# Patient Record
Sex: MALE | Race: BLACK OR AFRICAN AMERICAN | NOT HISPANIC OR LATINO | URBAN - METROPOLITAN AREA
[De-identification: names, ages, dates, MRNs, and addresses within clinical notes are randomized per-mention and may not be internally consistent; named-entity substitution may affect disease eponyms.]

---

## 2017-01-13 ENCOUNTER — EMERGENCY (EMERGENCY)
Facility: HOSPITAL | Age: 72
LOS: 1 days | Discharge: HOME | End: 2017-01-13
Attending: EMERGENCY MEDICINE
Payer: MEDICARE

## 2017-01-13 VITALS
DIASTOLIC BLOOD PRESSURE: 97 MMHG | TEMPERATURE: 100 F | RESPIRATION RATE: 19 BRPM | OXYGEN SATURATION: 97 % | HEART RATE: 114 BPM | SYSTOLIC BLOOD PRESSURE: 184 MMHG

## 2017-01-13 VITALS — HEIGHT: 68 IN | WEIGHT: 216.93 LBS

## 2017-01-13 DIAGNOSIS — R11.2 NAUSEA WITH VOMITING, UNSPECIFIED: ICD-10-CM

## 2017-01-13 DIAGNOSIS — Z96.60 PRESENCE OF UNSPECIFIED ORTHOPEDIC JOINT IMPLANT: Chronic | ICD-10-CM

## 2017-01-13 DIAGNOSIS — E78.5 HYPERLIPIDEMIA, UNSPECIFIED: ICD-10-CM

## 2017-01-13 DIAGNOSIS — Z98.89 OTHER SPECIFIED POSTPROCEDURAL STATES: Chronic | ICD-10-CM

## 2017-01-13 DIAGNOSIS — Z96.641 PRESENCE OF RIGHT ARTIFICIAL HIP JOINT: ICD-10-CM

## 2017-01-13 DIAGNOSIS — E11.9 TYPE 2 DIABETES MELLITUS WITHOUT COMPLICATIONS: ICD-10-CM

## 2017-01-13 DIAGNOSIS — Z98.890 OTHER SPECIFIED POSTPROCEDURAL STATES: ICD-10-CM

## 2017-01-13 DIAGNOSIS — Z79.4 LONG TERM (CURRENT) USE OF INSULIN: ICD-10-CM

## 2017-01-13 DIAGNOSIS — E03.9 HYPOTHYROIDISM, UNSPECIFIED: ICD-10-CM

## 2017-01-13 LAB
ALBUMIN SERPL ELPH-MCNC: 4.7 G/DL — SIGNIFICANT CHANGE UP (ref 3.3–5.2)
ALP SERPL-CCNC: 103 U/L — SIGNIFICANT CHANGE UP (ref 40–120)
ALT FLD-CCNC: 21 U/L — SIGNIFICANT CHANGE UP
ANION GAP SERPL CALC-SCNC: 16 MMOL/L — SIGNIFICANT CHANGE UP (ref 5–17)
AST SERPL-CCNC: 19 U/L — SIGNIFICANT CHANGE UP
BILIRUB SERPL-MCNC: 0.9 MG/DL — SIGNIFICANT CHANGE UP (ref 0.4–2)
BUN SERPL-MCNC: 11 MG/DL — SIGNIFICANT CHANGE UP (ref 8–20)
CALCIUM SERPL-MCNC: 9.2 MG/DL — SIGNIFICANT CHANGE UP (ref 8.6–10.2)
CHLORIDE SERPL-SCNC: 89 MMOL/L — LOW (ref 98–107)
CO2 SERPL-SCNC: 27 MMOL/L — SIGNIFICANT CHANGE UP (ref 22–29)
CREAT SERPL-MCNC: 0.58 MG/DL — SIGNIFICANT CHANGE UP (ref 0.5–1.3)
EOSINOPHIL NFR BLD AUTO: 1 % — SIGNIFICANT CHANGE UP (ref 0–6)
GLUCOSE SERPL-MCNC: 313 MG/DL — HIGH (ref 70–115)
HCT VFR BLD CALC: 43.3 % — SIGNIFICANT CHANGE UP (ref 42–52)
HGB BLD-MCNC: 14.8 G/DL — SIGNIFICANT CHANGE UP (ref 14–18)
LACTATE BLDV-MCNC: 2.7 MMOL/L — HIGH (ref 0.7–2)
LIDOCAIN IGE QN: 8 U/L — LOW (ref 22–51)
LYMPHOCYTES # BLD AUTO: 8 % — LOW (ref 20–55)
MCHC RBC-ENTMCNC: 29.8 PG — SIGNIFICANT CHANGE UP (ref 27–31)
MCHC RBC-ENTMCNC: 34.2 G/DL — SIGNIFICANT CHANGE UP (ref 32–36)
MCV RBC AUTO: 87.3 FL — SIGNIFICANT CHANGE UP (ref 80–94)
MONOCYTES NFR BLD AUTO: 6 % — SIGNIFICANT CHANGE UP (ref 3–10)
NEUTROPHILS NFR BLD AUTO: 82 % — HIGH (ref 37–73)
NEUTS BAND # BLD: 3 % — SIGNIFICANT CHANGE UP (ref 0–8)
PLAT MORPH BLD: NORMAL — SIGNIFICANT CHANGE UP
PLATELET # BLD AUTO: 263 K/UL — SIGNIFICANT CHANGE UP (ref 150–400)
POTASSIUM SERPL-MCNC: 4.3 MMOL/L — SIGNIFICANT CHANGE UP (ref 3.5–5.3)
POTASSIUM SERPL-SCNC: 4.3 MMOL/L — SIGNIFICANT CHANGE UP (ref 3.5–5.3)
PROT SERPL-MCNC: 8.8 G/DL — HIGH (ref 6.6–8.7)
RBC # BLD: 4.96 M/UL — SIGNIFICANT CHANGE UP (ref 4.6–6.2)
RBC # FLD: 13 % — SIGNIFICANT CHANGE UP (ref 11–15.6)
RBC BLD AUTO: NORMAL — SIGNIFICANT CHANGE UP
SODIUM SERPL-SCNC: 132 MMOL/L — LOW (ref 135–145)
TROPONIN T SERPL-MCNC: <0.01 NG/ML — SIGNIFICANT CHANGE UP (ref 0–0.06)
WBC # BLD: 9.39 K/UL — SIGNIFICANT CHANGE UP (ref 4.8–10.8)
WBC # FLD AUTO: 9.39 K/UL — SIGNIFICANT CHANGE UP (ref 4.8–10.8)

## 2017-01-13 PROCEDURE — 93010 ELECTROCARDIOGRAM REPORT: CPT

## 2017-01-13 PROCEDURE — 99285 EMERGENCY DEPT VISIT HI MDM: CPT

## 2017-01-13 PROCEDURE — 74177 CT ABD & PELVIS W/CONTRAST: CPT | Mod: 26

## 2017-01-13 PROCEDURE — 93010 ELECTROCARDIOGRAM REPORT: CPT | Mod: 77

## 2017-01-13 RX ORDER — ONDANSETRON 8 MG/1
8 TABLET, FILM COATED ORAL ONCE
Refills: 0 | Status: COMPLETED | OUTPATIENT
Start: 2017-01-13 | End: 2017-01-13

## 2017-01-13 RX ORDER — SODIUM CHLORIDE 9 MG/ML
1000 INJECTION, SOLUTION INTRAVENOUS
Refills: 0 | Status: DISCONTINUED | OUTPATIENT
Start: 2017-01-13 | End: 2017-01-13

## 2017-01-13 RX ORDER — ONDANSETRON 8 MG/1
1 TABLET, FILM COATED ORAL
Qty: 15 | Refills: 0
Start: 2017-01-13 | End: 2017-01-18

## 2017-01-13 RX ORDER — INSULIN HUMAN 100 [IU]/ML
6 INJECTION, SOLUTION SUBCUTANEOUS ONCE
Refills: 0 | Status: COMPLETED | OUTPATIENT
Start: 2017-01-13 | End: 2017-01-13

## 2017-01-13 RX ORDER — SODIUM CHLORIDE 9 MG/ML
1000 INJECTION INTRAMUSCULAR; INTRAVENOUS; SUBCUTANEOUS ONCE
Refills: 0 | Status: COMPLETED | OUTPATIENT
Start: 2017-01-13 | End: 2017-01-13

## 2017-01-13 RX ADMIN — ONDANSETRON 8 MILLIGRAM(S): 8 TABLET, FILM COATED ORAL at 16:05

## 2017-01-13 RX ADMIN — SODIUM CHLORIDE 3000 MILLILITER(S): 9 INJECTION INTRAMUSCULAR; INTRAVENOUS; SUBCUTANEOUS at 16:05

## 2017-01-13 RX ADMIN — INSULIN HUMAN 6 UNIT(S): 100 INJECTION, SOLUTION SUBCUTANEOUS at 18:48

## 2017-01-13 NOTE — ED ADULT NURSE NOTE - CHIEF COMPLAINT QUOTE
patient arrived ambulatory to ED, awake, alert and oriented times 3, breathing unlabored,  patient complaining of N/V, decreased PO intake and epigastric pain which started last night.  No CP as per patient.

## 2017-01-13 NOTE — ED PROVIDER NOTE - MEDICAL DECISION MAKING DETAILS
pt feelign much better he is tolerating po fluids he is reliable to check sugar at home and wife will be watching him he is asking for dispo home likely colitis stric return precautions fever intractable abd pain unbalt to tolerate po fluids pt is feeling much better no ttp to abd at discahrge pt and pts wife agree to paln of care

## 2017-01-13 NOTE — ED STATDOCS - PROGRESS NOTE DETAILS
72 y/o M pt with hx of diabetes presents to ED c/o abdominal pain,  nausea, vomiting starting last night. Pt called his endocrinologist because he couldn't keep his medication down was told to come to ED. Last sugar 299 at noon.

## 2017-01-13 NOTE — ED PROVIDER NOTE - OBJECTIVE STATEMENT
pt presents with intermittent achy crampy abd pain assoictaed with non bloody non bilious vomiting x 16 hours last meal chinese last night. pt states he cannot hold down his diabetes medications. denies fever. denies HA or neck pain. no chest pain or sob.  no urinary f/u/d. no back pain. no motor or sensory deficits. denies drug use. no recent travel. no rash. no other acute issues symptoms or concerns

## 2017-01-13 NOTE — ED ADULT NURSE NOTE - OBJECTIVE STATEMENT
72 y/o male c/o abd pain x 1 day. Pt AOx3, denies chest pain, SOB. Abd soft non tender, generalized abd pain, c/o nausea denies vomiting. NSR 84HR. will continue to monitor.

## 2017-01-17 RX ORDER — ONDANSETRON 8 MG/1
1 TABLET, FILM COATED ORAL
Qty: 15 | Refills: 0
Start: 2017-01-17 | End: 2017-01-22

## 2017-02-07 ENCOUNTER — APPOINTMENT (OUTPATIENT)
Dept: ORTHOPEDIC SURGERY | Facility: CLINIC | Age: 72
End: 2017-02-07

## 2017-02-07 VITALS
BODY MASS INDEX: 32.33 KG/M2 | HEART RATE: 111 BPM | TEMPERATURE: 98.7 F | HEIGHT: 67 IN | SYSTOLIC BLOOD PRESSURE: 152 MMHG | WEIGHT: 206 LBS | DIASTOLIC BLOOD PRESSURE: 89 MMHG

## 2017-02-22 ENCOUNTER — FORM ENCOUNTER (OUTPATIENT)
Age: 72
End: 2017-02-22

## 2017-02-23 ENCOUNTER — APPOINTMENT (OUTPATIENT)
Dept: MRI IMAGING | Facility: CLINIC | Age: 72
End: 2017-02-23

## 2017-02-23 ENCOUNTER — OUTPATIENT (OUTPATIENT)
Dept: OUTPATIENT SERVICES | Facility: HOSPITAL | Age: 72
LOS: 1 days | End: 2017-02-23
Payer: MEDICARE

## 2017-02-23 DIAGNOSIS — Z98.89 OTHER SPECIFIED POSTPROCEDURAL STATES: Chronic | ICD-10-CM

## 2017-02-23 DIAGNOSIS — Z96.60 PRESENCE OF UNSPECIFIED ORTHOPEDIC JOINT IMPLANT: Chronic | ICD-10-CM

## 2017-02-23 DIAGNOSIS — Z00.8 ENCOUNTER FOR OTHER GENERAL EXAMINATION: ICD-10-CM

## 2017-02-23 DIAGNOSIS — M48.04 SPINAL STENOSIS, THORACIC REGION: ICD-10-CM

## 2017-02-23 DIAGNOSIS — Z98.890 OTHER SPECIFIED POSTPROCEDURAL STATES: ICD-10-CM

## 2017-02-23 DIAGNOSIS — M47.14 OTHER SPONDYLOSIS WITH MYELOPATHY, THORACIC REGION: ICD-10-CM

## 2017-02-23 DIAGNOSIS — M48.07 SPINAL STENOSIS, LUMBOSACRAL REGION: ICD-10-CM

## 2017-02-23 PROCEDURE — 72148 MRI LUMBAR SPINE W/O DYE: CPT

## 2017-07-17 ENCOUNTER — APPOINTMENT (OUTPATIENT)
Dept: ORTHOPEDIC SURGERY | Facility: CLINIC | Age: 72
End: 2017-07-17

## 2017-07-17 VITALS
HEART RATE: 105 BPM | DIASTOLIC BLOOD PRESSURE: 88 MMHG | TEMPERATURE: 97.9 F | SYSTOLIC BLOOD PRESSURE: 152 MMHG | BODY MASS INDEX: 34.06 KG/M2 | HEIGHT: 67 IN | WEIGHT: 217 LBS

## 2017-07-17 DIAGNOSIS — M19.012 PRIMARY OSTEOARTHRITIS, LEFT SHOULDER: ICD-10-CM

## 2017-07-17 RX ORDER — LEVOTHYROXINE SODIUM 0.14 MG/1
137 TABLET ORAL
Qty: 90 | Refills: 0 | Status: ACTIVE | COMMUNITY
Start: 2017-07-09

## 2017-07-17 RX ORDER — METOPROLOL SUCCINATE 25 MG/1
25 TABLET, EXTENDED RELEASE ORAL
Qty: 90 | Refills: 0 | Status: ACTIVE | COMMUNITY
Start: 2017-06-04

## 2017-07-17 RX ORDER — HYOSCYAMINE SULFATE 0.38 MG/1
0.38 TABLET, EXTENDED RELEASE ORAL
Qty: 60 | Refills: 0 | Status: ACTIVE | COMMUNITY
Start: 2017-06-26

## 2017-07-17 RX ORDER — INSULIN ASPART 100 [IU]/ML
100 INJECTION, SOLUTION INTRAVENOUS; SUBCUTANEOUS
Qty: 30 | Refills: 0 | Status: ACTIVE | COMMUNITY
Start: 2017-05-05

## 2017-08-22 ENCOUNTER — APPOINTMENT (OUTPATIENT)
Dept: ORTHOPEDIC SURGERY | Facility: CLINIC | Age: 72
End: 2017-08-22
Payer: MEDICARE

## 2017-08-22 VITALS
HEIGHT: 67 IN | WEIGHT: 218 LBS | DIASTOLIC BLOOD PRESSURE: 90 MMHG | SYSTOLIC BLOOD PRESSURE: 167 MMHG | BODY MASS INDEX: 34.21 KG/M2 | HEART RATE: 95 BPM

## 2017-08-22 PROCEDURE — 99214 OFFICE O/P EST MOD 30 MIN: CPT

## 2017-08-26 ENCOUNTER — EMERGENCY (EMERGENCY)
Facility: HOSPITAL | Age: 72
LOS: 1 days | Discharge: HOME | End: 2017-08-26
Attending: EMERGENCY MEDICINE
Payer: COMMERCIAL

## 2017-08-26 VITALS
RESPIRATION RATE: 18 BRPM | OXYGEN SATURATION: 100 % | SYSTOLIC BLOOD PRESSURE: 133 MMHG | HEART RATE: 88 BPM | DIASTOLIC BLOOD PRESSURE: 62 MMHG | TEMPERATURE: 98 F

## 2017-08-26 VITALS
RESPIRATION RATE: 18 BRPM | SYSTOLIC BLOOD PRESSURE: 162 MMHG | WEIGHT: 214.95 LBS | DIASTOLIC BLOOD PRESSURE: 80 MMHG | HEART RATE: 104 BPM | TEMPERATURE: 98 F | HEIGHT: 67 IN

## 2017-08-26 DIAGNOSIS — Z98.89 OTHER SPECIFIED POSTPROCEDURAL STATES: Chronic | ICD-10-CM

## 2017-08-26 DIAGNOSIS — Z96.60 PRESENCE OF UNSPECIFIED ORTHOPEDIC JOINT IMPLANT: Chronic | ICD-10-CM

## 2017-08-26 PROCEDURE — 99283 EMERGENCY DEPT VISIT LOW MDM: CPT

## 2017-08-26 RX ORDER — IBUPROFEN 200 MG
600 TABLET ORAL ONCE
Refills: 0 | Status: COMPLETED | OUTPATIENT
Start: 2017-08-26 | End: 2017-08-26

## 2017-08-26 RX ADMIN — Medication 600 MILLIGRAM(S): at 16:24
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
## 2017-08-26 NOTE — ED ADULT NURSE NOTE - NS ED NURSE DC INFO COMPLEXITY
Simple: Patient demonstrates quick and easy understanding/Straightforward: Basic instructions, no meds, no home treatment/Patient asked questions/Verbalized Understanding
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
## 2017-09-15 ENCOUNTER — OTHER (OUTPATIENT)
Age: 72
End: 2017-09-15

## 2017-09-27 ENCOUNTER — OUTPATIENT (OUTPATIENT)
Dept: OUTPATIENT SERVICES | Facility: HOSPITAL | Age: 72
LOS: 1 days | End: 2017-09-27

## 2017-09-27 ENCOUNTER — APPOINTMENT (OUTPATIENT)
Dept: MRI IMAGING | Facility: CLINIC | Age: 72
End: 2017-09-27
Payer: MEDICARE

## 2017-09-27 DIAGNOSIS — Z98.89 OTHER SPECIFIED POSTPROCEDURAL STATES: Chronic | ICD-10-CM

## 2017-09-27 DIAGNOSIS — Z96.60 PRESENCE OF UNSPECIFIED ORTHOPEDIC JOINT IMPLANT: Chronic | ICD-10-CM

## 2017-09-27 DIAGNOSIS — Z00.8 ENCOUNTER FOR OTHER GENERAL EXAMINATION: ICD-10-CM

## 2017-09-27 PROCEDURE — 73721 MRI JNT OF LWR EXTRE W/O DYE: CPT | Mod: 26,RT

## 2017-10-04 ENCOUNTER — OTHER (OUTPATIENT)
Age: 72
End: 2017-10-04

## 2017-10-20 ENCOUNTER — APPOINTMENT (OUTPATIENT)
Dept: PHYSICAL MEDICINE AND REHAB | Facility: CLINIC | Age: 72
End: 2017-10-20
Payer: MEDICARE

## 2017-10-20 VITALS
BODY MASS INDEX: 34.21 KG/M2 | SYSTOLIC BLOOD PRESSURE: 186 MMHG | WEIGHT: 218 LBS | OXYGEN SATURATION: 97 % | HEIGHT: 67 IN | DIASTOLIC BLOOD PRESSURE: 93 MMHG | HEART RATE: 93 BPM

## 2017-10-20 DIAGNOSIS — M54.16 RADICULOPATHY, LUMBAR REGION: ICD-10-CM

## 2017-10-20 PROCEDURE — 99214 OFFICE O/P EST MOD 30 MIN: CPT

## 2017-11-09 ENCOUNTER — FORM ENCOUNTER (OUTPATIENT)
Age: 72
End: 2017-11-09

## 2017-11-10 ENCOUNTER — OUTPATIENT (OUTPATIENT)
Dept: OUTPATIENT SERVICES | Facility: HOSPITAL | Age: 72
LOS: 1 days | End: 2017-11-10

## 2017-11-10 ENCOUNTER — APPOINTMENT (OUTPATIENT)
Dept: MRI IMAGING | Facility: CLINIC | Age: 72
End: 2017-11-10
Payer: MEDICARE

## 2017-11-10 DIAGNOSIS — Z96.60 PRESENCE OF UNSPECIFIED ORTHOPEDIC JOINT IMPLANT: Chronic | ICD-10-CM

## 2017-11-10 DIAGNOSIS — Z98.89 OTHER SPECIFIED POSTPROCEDURAL STATES: Chronic | ICD-10-CM

## 2017-11-10 DIAGNOSIS — Z00.8 ENCOUNTER FOR OTHER GENERAL EXAMINATION: ICD-10-CM

## 2017-11-10 PROCEDURE — 72158 MRI LUMBAR SPINE W/O & W/DYE: CPT | Mod: 26

## 2017-12-07 ENCOUNTER — APPOINTMENT (OUTPATIENT)
Dept: ORTHOPEDIC SURGERY | Facility: CLINIC | Age: 72
End: 2017-12-07
Payer: MEDICARE

## 2017-12-07 VITALS
WEIGHT: 218 LBS | HEART RATE: 109 BPM | SYSTOLIC BLOOD PRESSURE: 150 MMHG | BODY MASS INDEX: 34.21 KG/M2 | HEIGHT: 67 IN | DIASTOLIC BLOOD PRESSURE: 82 MMHG

## 2017-12-07 PROCEDURE — 99215 OFFICE O/P EST HI 40 MIN: CPT

## 2017-12-07 PROCEDURE — 72100 X-RAY EXAM L-S SPINE 2/3 VWS: CPT

## 2017-12-15 ENCOUNTER — APPOINTMENT (OUTPATIENT)
Dept: PHYSICAL MEDICINE AND REHAB | Facility: CLINIC | Age: 72
End: 2017-12-15
Payer: MEDICARE

## 2017-12-15 DIAGNOSIS — M76.30 ILIOTIBIAL BAND SYNDROME, UNSPECIFIED LEG: ICD-10-CM

## 2017-12-15 PROCEDURE — 99214 OFFICE O/P EST MOD 30 MIN: CPT

## 2017-12-23 ENCOUNTER — EMERGENCY (EMERGENCY)
Facility: HOSPITAL | Age: 72
LOS: 1 days | Discharge: HOME | End: 2017-12-23
Attending: EMERGENCY MEDICINE | Admitting: EMERGENCY MEDICINE
Payer: MEDICARE

## 2017-12-23 VITALS
TEMPERATURE: 98 F | OXYGEN SATURATION: 97 % | DIASTOLIC BLOOD PRESSURE: 77 MMHG | HEART RATE: 89 BPM | SYSTOLIC BLOOD PRESSURE: 138 MMHG | RESPIRATION RATE: 20 BRPM

## 2017-12-23 VITALS
SYSTOLIC BLOOD PRESSURE: 167 MMHG | WEIGHT: 216.93 LBS | RESPIRATION RATE: 20 BRPM | TEMPERATURE: 98 F | HEIGHT: 66 IN | OXYGEN SATURATION: 95 % | DIASTOLIC BLOOD PRESSURE: 91 MMHG | HEART RATE: 88 BPM

## 2017-12-23 DIAGNOSIS — Z98.89 OTHER SPECIFIED POSTPROCEDURAL STATES: Chronic | ICD-10-CM

## 2017-12-23 DIAGNOSIS — Z96.60 PRESENCE OF UNSPECIFIED ORTHOPEDIC JOINT IMPLANT: Chronic | ICD-10-CM

## 2017-12-23 LAB
ALBUMIN SERPL ELPH-MCNC: 4.1 G/DL — SIGNIFICANT CHANGE UP (ref 3.3–5.2)
ALP SERPL-CCNC: 72 U/L — SIGNIFICANT CHANGE UP (ref 40–120)
ALT FLD-CCNC: 32 U/L — SIGNIFICANT CHANGE UP
ANION GAP SERPL CALC-SCNC: 13 MMOL/L — SIGNIFICANT CHANGE UP (ref 5–17)
APTT BLD: 33.4 SEC — SIGNIFICANT CHANGE UP (ref 27.5–37.4)
AST SERPL-CCNC: 54 U/L — HIGH
BASOPHILS # BLD AUTO: 0 K/UL — SIGNIFICANT CHANGE UP (ref 0–0.2)
BASOPHILS NFR BLD AUTO: 0.7 % — SIGNIFICANT CHANGE UP (ref 0–2)
BILIRUB SERPL-MCNC: 0.4 MG/DL — SIGNIFICANT CHANGE UP (ref 0.4–2)
BLD GP AB SCN SERPL QL: SIGNIFICANT CHANGE UP
BUN SERPL-MCNC: 11 MG/DL — SIGNIFICANT CHANGE UP (ref 8–20)
CALCIUM SERPL-MCNC: 9 MG/DL — SIGNIFICANT CHANGE UP (ref 8.6–10.2)
CHLORIDE SERPL-SCNC: 97 MMOL/L — LOW (ref 98–107)
CO2 SERPL-SCNC: 28 MMOL/L — SIGNIFICANT CHANGE UP (ref 22–29)
CREAT SERPL-MCNC: 0.82 MG/DL — SIGNIFICANT CHANGE UP (ref 0.5–1.3)
EOSINOPHIL # BLD AUTO: 0.3 K/UL — SIGNIFICANT CHANGE UP (ref 0–0.5)
EOSINOPHIL NFR BLD AUTO: 4.8 % — SIGNIFICANT CHANGE UP (ref 0–5)
GLUCOSE SERPL-MCNC: 94 MG/DL — SIGNIFICANT CHANGE UP (ref 70–115)
HCT VFR BLD CALC: 37.7 % — LOW (ref 42–52)
HGB BLD-MCNC: 12.5 G/DL — LOW (ref 14–18)
INR BLD: 1.22 RATIO — HIGH (ref 0.88–1.16)
LACTATE BLDV-MCNC: 0.8 MMOL/L — SIGNIFICANT CHANGE UP (ref 0.5–2)
LYMPHOCYTES # BLD AUTO: 1.7 K/UL — SIGNIFICANT CHANGE UP (ref 1–4.8)
LYMPHOCYTES # BLD AUTO: 27.4 % — SIGNIFICANT CHANGE UP (ref 20–55)
MCHC RBC-ENTMCNC: 28.7 PG — SIGNIFICANT CHANGE UP (ref 27–31)
MCHC RBC-ENTMCNC: 33.2 G/DL — SIGNIFICANT CHANGE UP (ref 32–36)
MCV RBC AUTO: 86.5 FL — SIGNIFICANT CHANGE UP (ref 80–94)
MONOCYTES # BLD AUTO: 0.7 K/UL — SIGNIFICANT CHANGE UP (ref 0–0.8)
MONOCYTES NFR BLD AUTO: 10.8 % — HIGH (ref 3–10)
NEUTROPHILS # BLD AUTO: 3.4 K/UL — SIGNIFICANT CHANGE UP (ref 1.8–8)
NEUTROPHILS NFR BLD AUTO: 56 % — SIGNIFICANT CHANGE UP (ref 37–73)
OB PNL STL: NEGATIVE — SIGNIFICANT CHANGE UP
PLATELET # BLD AUTO: 216 K/UL — SIGNIFICANT CHANGE UP (ref 150–400)
POTASSIUM SERPL-MCNC: 4.2 MMOL/L — SIGNIFICANT CHANGE UP (ref 3.5–5.3)
POTASSIUM SERPL-SCNC: 4.2 MMOL/L — SIGNIFICANT CHANGE UP (ref 3.5–5.3)
PROT SERPL-MCNC: 7.7 G/DL — SIGNIFICANT CHANGE UP (ref 6.6–8.7)
PROTHROM AB SERPL-ACNC: 13.5 SEC — HIGH (ref 9.8–12.7)
RBC # BLD: 4.36 M/UL — LOW (ref 4.6–6.2)
RBC # FLD: 14 % — SIGNIFICANT CHANGE UP (ref 11–15.6)
SODIUM SERPL-SCNC: 138 MMOL/L — SIGNIFICANT CHANGE UP (ref 135–145)
TYPE + AB SCN PNL BLD: SIGNIFICANT CHANGE UP
WBC # BLD: 6.1 K/UL — SIGNIFICANT CHANGE UP (ref 4.8–10.8)
WBC # FLD AUTO: 6.1 K/UL — SIGNIFICANT CHANGE UP (ref 4.8–10.8)

## 2017-12-23 PROCEDURE — 99284 EMERGENCY DEPT VISIT MOD MDM: CPT

## 2017-12-23 PROCEDURE — 93010 ELECTROCARDIOGRAM REPORT: CPT

## 2017-12-23 PROCEDURE — 74177 CT ABD & PELVIS W/CONTRAST: CPT | Mod: 26

## 2017-12-23 RX ORDER — MORPHINE SULFATE 50 MG/1
4 CAPSULE, EXTENDED RELEASE ORAL ONCE
Refills: 0 | Status: DISCONTINUED | OUTPATIENT
Start: 2017-12-23 | End: 2017-12-23

## 2017-12-23 RX ORDER — ACETAMINOPHEN 500 MG
975 TABLET ORAL ONCE
Refills: 0 | Status: COMPLETED | OUTPATIENT
Start: 2017-12-23 | End: 2017-12-23

## 2017-12-23 RX ADMIN — Medication 975 MILLIGRAM(S): at 16:45

## 2017-12-23 NOTE — ED ADULT NURSE NOTE - OBJECTIVE STATEMENT
pt comes to ED with reports of abd bloating x few months after eating. states the last 5 days it had been getting worse, was advised by his PMD to take Pepto for comfort. reporting black stools, but has been taking Pepto Bismol the past few days. pt with no chest pain or SOB, reports abd pain and bloating. distention noted on exam. slight bilat lower extremity edema present. pt denies cardiac hx. even and unlabored resps present, skin warm dry and intact.

## 2017-12-23 NOTE — ED ADULT TRIAGE NOTE - CHIEF COMPLAINT QUOTE
Patient arrived to ED today with c/o abdominal pain and black stools.  Patient states he has had black stools for the past 5 days.

## 2017-12-23 NOTE — ED PROVIDER NOTE - ABDOMINAL EXAM
Mild tenderness to LUQ/mid abdominal and LLQ region. NO rebound tenderness. No hernia noted. No testicular pain./soft

## 2017-12-23 NOTE — ED ADULT NURSE REASSESSMENT NOTE - NS ED NURSE REASSESS COMMENT FT1
pt remains AOX3 in no distress, no reports of pain currently. awaiting transport to CT scan. even and unlabored resps present.

## 2017-12-23 NOTE — CONSULT NOTE ADULT - SUBJECTIVE AND OBJECTIVE BOX
73 y/o M PSHx umbilical and Spigelian hernia s/p repair presents with progressively worsening Left abdominal distention and pain. Pt states pain began a few months prior, worsening in last 5 days with associated distention when taking PO. Denies any nausea/vomiting, passing flatus and having regular BM. Wife is sick at home with flu, traveled to [facility] a few months ago, denies change in diet habits. Colonoscopy about 5 years ago with Dr. Tim, unremarkable.    PMHx: DM, HTN, HLD, Hypothyroidism  PSHx: R Spigellian hernia? repaired with mesh, ventral hernia repaired as infant, R hip repair  NKDA  Denies Tobacco, ETOH, or drug misuse, LIves at home with fiance    Vital Signs Last 24 Hrs  T(C): 36.7 (23 Dec 2017 16:28), Max: 36.9 (23 Dec 2017 11:08)  T(F): 98.1 (23 Dec 2017 16:28), Max: 98.5 (23 Dec 2017 11:08)  HR: 90 (23 Dec 2017 16:28) (88 - 90)  BP: 180/91 (23 Dec 2017 16:28) (167/91 - 180/91)  BP(mean): --  RR: 20 (23 Dec 2017 16:28) (20 - 20)  SpO2: 97% (23 Dec 2017 16:28) (95% - 97%)    NAD, AAOX3, WDWN  Head NCAT, EOMI,   Chest expansion symmetric  Lungs CTAB  RRR, S1S2nl  Abd soft, mild distention without tympany, mild Left TTP without rebound/guarding/rigidity, old healed R paramedian scar, umbilical scar  : No hernias    CBC Full  -  ( 23 Dec 2017 12:11 )  WBC Count : 6.1 K/uL  Hemoglobin : 12.5 g/dL  Hematocrit : 37.7 %  Platelet Count - Automated : 216 K/uL  Mean Cell Volume : 86.5 fl  Mean Cell Hemoglobin : 28.7 pg  Mean Cell Hemoglobin Concentration : 33.2 g/dL  Auto Neutrophil # : 3.4 K/uL  Auto Lymphocyte # : 1.7 K/uL  Auto Monocyte # : 0.7 K/uL  Auto Eosinophil # : 0.3 K/uL  Auto Basophil # : 0.0 K/uL  Auto Neutrophil % : 56.0 %  Auto Lymphocyte % : 27.4 %  Auto Monocyte % : 10.8 %  Auto Eosinophil % : 4.8 %  Auto Basophil % : 0.7 %    12-23    138  |  97<L>  |  11.0  ----------------------------<  94  4.2   |  28.0  |  0.82    Ca    9.0      23 Dec 2017 12:11    TPro  7.7  /  Alb  4.1  /  TBili  0.4  /  DBili  x   /  AST  54<H>  /  ALT  32  /  AlkPhos  72  12-23

## 2017-12-23 NOTE — ED ADULT NURSE NOTE - PMH
BPH (benign prostatic hyperplasia)    Diabetes    Dyslipidemia    HTN (hypertension)    Hypothyroid    OA (osteoarthritis)

## 2017-12-23 NOTE — CONSULT NOTE ADULT - ATTENDING COMMENTS
Consulted for CT finding of anatomic malposition of cecum based on CT scan. Patient reports history of bloating and abdominal discomfort for past two weeks. Reports normal bowel function and flatus.  No leukocytosis. No CLARI.  Patient's abdomen is obese, nontender, mild distension.  Cecum noted to be anterior to stomach and with moderately dilated.    No acute surgical intervention warranted and suspect this is just an anatomic variance.  Do not suspect that immediate bloating complaints related to cecum.  Due to report of PO intolerance would recommend observation overnight, fluid hydration, and serial abdominal examinations. Would consider GI consultation for upper endoscopy.  Can obtain KUB to evaluate cecal diameter if clinically worsens or abdominal examination worsens.  Will follow while patient is in house.

## 2017-12-23 NOTE — ED PROVIDER NOTE - OBJECTIVE STATEMENT
Pt. present to ED c/o Left sided(upper/mid/lower) abdominal pain x 1 week. Pt. also c/o having abdominal swelling/distension on and off for the past month. Pt.  also states that for the past 3-4 days he has noted black watery stool. NO vomiting. No chest pain. The abdominal pain at times radiates down to his genital area. Pt. denies any testicular pain. Pt. states that his pain currently is 7 out 10.

## 2017-12-23 NOTE — CONSULT NOTE ADULT - ASSESSMENT
73 y/o M with abd pain and distention, radiologic finding of malpositioned cecum, mildly distended, nontoxic, hemodynamically nl, nonobstructed  - 71 y/o M with abd pain and distention, radiologic finding of malpositioned cecum, mildly distended, nontoxic, hemodynamically nl, nonobstructed, likely gastric related pain  -Recommend medicine evaluation for admission for PO intolerance, GI w/u, upper and lower endoscopy to evaluate for pain and distention  -No surgical intervention indicated  -Repeat AM labs with lactate, AXR, serial abd exams

## 2017-12-23 NOTE — ED PROVIDER NOTE - PROGRESS NOTE DETAILS
Pt. seen by cardiologist. He recommend a repeat troponin. If negative, pt. can be d/c home and to follow up with him in the office. Pt. has no symptoms now. Pt. would benefit further outpatient work up. This was discussed with the patient and his wife. Pt. re-evaluated. Pt. still with some upper abdominal tenderness. CT scan discussed with pt. Surgical consultation obtained. Pt. seen by Surgical team. No acute surgical intervention needed. Sx believes that the patient would benefit from an upper and lower scope. I spoke to Dr. Tim(pt's PMD and GI doc). CT and labs discussed with Dr. Tim. He agrees that pt. does not need to be admitted and would gladly see the patient in his office next week. This was discussed with the patient and he agrees to being discharged home. Pt. only now has no one to pick him up at this time. Pt. willing to pay for a cab to take him home. He understands that he needs to follow up with Dr. Tim.

## 2017-12-23 NOTE — ED ADULT NURSE REASSESSMENT NOTE - NS ED NURSE REASSESS COMMENT FT1
pt OK for d/c as per GI and PMD Meeta, pt remains awake, alert and oriented in no distress, even and unlabored resps. pt states he would like to go home, seen by sx and no admission needed.

## 2017-12-28 ENCOUNTER — APPOINTMENT (OUTPATIENT)
Dept: CT IMAGING | Facility: CLINIC | Age: 72
End: 2017-12-28

## 2018-01-03 ENCOUNTER — APPOINTMENT (OUTPATIENT)
Dept: CT IMAGING | Facility: CLINIC | Age: 73
End: 2018-01-03
Payer: MEDICARE

## 2018-01-03 ENCOUNTER — OUTPATIENT (OUTPATIENT)
Dept: OUTPATIENT SERVICES | Facility: HOSPITAL | Age: 73
LOS: 1 days | End: 2018-01-03
Payer: MEDICARE

## 2018-01-03 DIAGNOSIS — Z96.60 PRESENCE OF UNSPECIFIED ORTHOPEDIC JOINT IMPLANT: Chronic | ICD-10-CM

## 2018-01-03 DIAGNOSIS — Z00.8 ENCOUNTER FOR OTHER GENERAL EXAMINATION: ICD-10-CM

## 2018-01-03 DIAGNOSIS — Z98.89 OTHER SPECIFIED POSTPROCEDURAL STATES: Chronic | ICD-10-CM

## 2018-01-03 PROCEDURE — 74261 CT COLONOGRAPHY DX: CPT

## 2018-01-03 PROCEDURE — 74261 CT COLONOGRAPHY DX: CPT | Mod: 26

## 2018-01-08 ENCOUNTER — APPOINTMENT (OUTPATIENT)
Dept: ORTHOPEDIC SURGERY | Facility: CLINIC | Age: 73
End: 2018-01-08
Payer: MEDICARE

## 2018-01-08 VITALS
DIASTOLIC BLOOD PRESSURE: 84 MMHG | SYSTOLIC BLOOD PRESSURE: 167 MMHG | BODY MASS INDEX: 34.53 KG/M2 | TEMPERATURE: 98.2 F | HEIGHT: 67 IN | WEIGHT: 220 LBS | HEART RATE: 75 BPM

## 2018-01-08 PROCEDURE — 99213 OFFICE O/P EST LOW 20 MIN: CPT

## 2018-01-17 ENCOUNTER — APPOINTMENT (OUTPATIENT)
Dept: ORTHOPEDIC SURGERY | Facility: CLINIC | Age: 73
End: 2018-01-17
Payer: MEDICARE

## 2018-01-17 VITALS
HEIGHT: 67 IN | DIASTOLIC BLOOD PRESSURE: 93 MMHG | SYSTOLIC BLOOD PRESSURE: 177 MMHG | TEMPERATURE: 98.4 F | BODY MASS INDEX: 34.53 KG/M2 | WEIGHT: 220 LBS | HEART RATE: 100 BPM

## 2018-01-17 PROCEDURE — 99205 OFFICE O/P NEW HI 60 MIN: CPT | Mod: 25

## 2018-01-17 PROCEDURE — 99214 OFFICE O/P EST MOD 30 MIN: CPT | Mod: 25

## 2018-01-17 PROCEDURE — 20610 DRAIN/INJ JOINT/BURSA W/O US: CPT | Mod: 50

## 2018-01-17 PROCEDURE — 73562 X-RAY EXAM OF KNEE 3: CPT | Mod: 50

## 2018-03-21 ENCOUNTER — APPOINTMENT (OUTPATIENT)
Dept: ORTHOPEDIC SURGERY | Facility: CLINIC | Age: 73
End: 2018-03-21
Payer: MEDICARE

## 2018-03-21 VITALS
BODY MASS INDEX: 34.53 KG/M2 | HEART RATE: 110 BPM | DIASTOLIC BLOOD PRESSURE: 99 MMHG | WEIGHT: 220 LBS | HEIGHT: 67 IN | TEMPERATURE: 98.4 F | SYSTOLIC BLOOD PRESSURE: 194 MMHG

## 2018-03-21 DIAGNOSIS — M17.0 BILATERAL PRIMARY OSTEOARTHRITIS OF KNEE: ICD-10-CM

## 2018-03-21 PROCEDURE — 73564 X-RAY EXAM KNEE 4 OR MORE: CPT | Mod: 50

## 2018-03-21 PROCEDURE — 99213 OFFICE O/P EST LOW 20 MIN: CPT

## 2018-03-21 RX ORDER — PANTOPRAZOLE 40 MG/1
40 TABLET, DELAYED RELEASE ORAL
Qty: 30 | Refills: 0 | Status: ACTIVE | COMMUNITY
Start: 2017-12-12

## 2018-03-21 RX ORDER — DICLOFENAC SODIUM 75 MG/1
75 TABLET, DELAYED RELEASE ORAL
Qty: 30 | Refills: 0 | Status: ACTIVE | COMMUNITY
Start: 2018-03-21 | End: 1900-01-01

## 2018-03-21 RX ORDER — SILDENAFIL 20 MG/1
20 TABLET ORAL
Qty: 30 | Refills: 0 | Status: ACTIVE | COMMUNITY
Start: 2017-11-29

## 2018-03-21 RX ORDER — TAMSULOSIN HYDROCHLORIDE 0.4 MG/1
0.4 CAPSULE ORAL
Qty: 30 | Refills: 0 | Status: ACTIVE | COMMUNITY
Start: 2017-11-21

## 2018-03-21 RX ORDER — DOXYCYCLINE HYCLATE 100 MG/1
100 CAPSULE ORAL
Qty: 14 | Refills: 0 | Status: ACTIVE | COMMUNITY
Start: 2017-11-28

## 2018-03-21 RX ORDER — GLIMEPIRIDE 2 MG/1
2 TABLET ORAL
Qty: 30 | Refills: 0 | Status: ACTIVE | COMMUNITY
Start: 2017-12-14

## 2018-03-21 RX ORDER — TADALAFIL 10 MG/1
10 TABLET, FILM COATED ORAL
Qty: 6 | Refills: 0 | Status: ACTIVE | COMMUNITY
Start: 2017-10-18

## 2018-04-13 ENCOUNTER — OUTPATIENT (OUTPATIENT)
Dept: OUTPATIENT SERVICES | Facility: HOSPITAL | Age: 73
LOS: 1 days | End: 2018-04-13
Payer: MEDICARE

## 2018-04-13 ENCOUNTER — OTHER (OUTPATIENT)
Age: 73
End: 2018-04-13

## 2018-04-13 DIAGNOSIS — Z96.60 PRESENCE OF UNSPECIFIED ORTHOPEDIC JOINT IMPLANT: Chronic | ICD-10-CM

## 2018-04-13 DIAGNOSIS — Z98.89 OTHER SPECIFIED POSTPROCEDURAL STATES: Chronic | ICD-10-CM

## 2018-04-13 DIAGNOSIS — M17.0 BILATERAL PRIMARY OSTEOARTHRITIS OF KNEE: ICD-10-CM

## 2018-04-13 DIAGNOSIS — M70.61 TROCHANTERIC BURSITIS, RIGHT HIP: ICD-10-CM

## 2018-04-13 DIAGNOSIS — Z51.89 ENCOUNTER FOR OTHER SPECIFIED AFTERCARE: ICD-10-CM

## 2018-05-04 ENCOUNTER — APPOINTMENT (OUTPATIENT)
Dept: ORTHOPEDIC SURGERY | Facility: CLINIC | Age: 73
End: 2018-05-04
Payer: MEDICARE

## 2018-05-04 VITALS
DIASTOLIC BLOOD PRESSURE: 78 MMHG | SYSTOLIC BLOOD PRESSURE: 172 MMHG | HEART RATE: 106 BPM | WEIGHT: 220 LBS | HEIGHT: 68 IN | BODY MASS INDEX: 33.34 KG/M2

## 2018-05-04 DIAGNOSIS — M48.07 SPINAL STENOSIS, LUMBOSACRAL REGION: ICD-10-CM

## 2018-05-04 PROCEDURE — 99215 OFFICE O/P EST HI 40 MIN: CPT

## 2018-05-04 PROCEDURE — 72100 X-RAY EXAM L-S SPINE 2/3 VWS: CPT

## 2018-05-17 ENCOUNTER — FORM ENCOUNTER (OUTPATIENT)
Age: 73
End: 2018-05-17

## 2018-05-18 ENCOUNTER — APPOINTMENT (OUTPATIENT)
Dept: MRI IMAGING | Facility: CLINIC | Age: 73
End: 2018-05-18
Payer: MEDICARE

## 2018-05-18 ENCOUNTER — OUTPATIENT (OUTPATIENT)
Dept: OUTPATIENT SERVICES | Facility: HOSPITAL | Age: 73
LOS: 1 days | End: 2018-05-18
Payer: MEDICARE

## 2018-05-18 DIAGNOSIS — Z96.60 PRESENCE OF UNSPECIFIED ORTHOPEDIC JOINT IMPLANT: Chronic | ICD-10-CM

## 2018-05-18 DIAGNOSIS — R26.9 UNSPECIFIED ABNORMALITIES OF GAIT AND MOBILITY: ICD-10-CM

## 2018-05-18 DIAGNOSIS — E11.9 TYPE 2 DIABETES MELLITUS WITHOUT COMPLICATIONS: ICD-10-CM

## 2018-05-18 DIAGNOSIS — M47.14 OTHER SPONDYLOSIS WITH MYELOPATHY, THORACIC REGION: ICD-10-CM

## 2018-05-18 DIAGNOSIS — Z98.89 OTHER SPECIFIED POSTPROCEDURAL STATES: Chronic | ICD-10-CM

## 2018-05-18 PROCEDURE — 72146 MRI CHEST SPINE W/O DYE: CPT | Mod: 26

## 2018-05-18 PROCEDURE — 72146 MRI CHEST SPINE W/O DYE: CPT

## 2018-05-23 ENCOUNTER — APPOINTMENT (OUTPATIENT)
Dept: ORTHOPEDIC SURGERY | Facility: CLINIC | Age: 73
End: 2018-05-23
Payer: MEDICARE

## 2018-05-23 VITALS
HEART RATE: 103 BPM | TEMPERATURE: 98 F | DIASTOLIC BLOOD PRESSURE: 89 MMHG | HEIGHT: 68 IN | SYSTOLIC BLOOD PRESSURE: 179 MMHG | WEIGHT: 220 LBS | BODY MASS INDEX: 33.34 KG/M2

## 2018-05-23 PROCEDURE — 73564 X-RAY EXAM KNEE 4 OR MORE: CPT | Mod: 50

## 2018-05-23 PROCEDURE — 99213 OFFICE O/P EST LOW 20 MIN: CPT

## 2018-06-01 ENCOUNTER — APPOINTMENT (OUTPATIENT)
Dept: ORTHOPEDIC SURGERY | Facility: CLINIC | Age: 73
End: 2018-06-01
Payer: MEDICARE

## 2018-06-01 VITALS
SYSTOLIC BLOOD PRESSURE: 154 MMHG | DIASTOLIC BLOOD PRESSURE: 83 MMHG | HEART RATE: 111 BPM | HEIGHT: 68 IN | BODY MASS INDEX: 33.34 KG/M2 | WEIGHT: 220 LBS

## 2018-06-01 PROCEDURE — 99215 OFFICE O/P EST HI 40 MIN: CPT

## 2018-07-13 ENCOUNTER — APPOINTMENT (OUTPATIENT)
Dept: ORTHOPEDIC SURGERY | Facility: CLINIC | Age: 73
End: 2018-07-13

## 2018-08-29 ENCOUNTER — OTHER (OUTPATIENT)
Age: 73
End: 2018-08-29

## 2018-09-14 ENCOUNTER — APPOINTMENT (OUTPATIENT)
Dept: ORTHOPEDIC SURGERY | Facility: CLINIC | Age: 73
End: 2018-09-14
Payer: MEDICARE

## 2018-09-14 VITALS
BODY MASS INDEX: 33.34 KG/M2 | DIASTOLIC BLOOD PRESSURE: 88 MMHG | SYSTOLIC BLOOD PRESSURE: 169 MMHG | HEART RATE: 109 BPM | HEIGHT: 68 IN | WEIGHT: 220 LBS

## 2018-09-14 DIAGNOSIS — M70.61 TROCHANTERIC BURSITIS, RIGHT HIP: ICD-10-CM

## 2018-09-14 PROCEDURE — 99214 OFFICE O/P EST MOD 30 MIN: CPT

## 2018-10-17 ENCOUNTER — APPOINTMENT (OUTPATIENT)
Dept: ORTHOPEDIC SURGERY | Facility: CLINIC | Age: 73
End: 2018-10-17

## 2018-10-29 ENCOUNTER — OTHER (OUTPATIENT)
Age: 73
End: 2018-10-29

## 2018-11-26 ENCOUNTER — INPATIENT (INPATIENT)
Facility: HOSPITAL | Age: 73
LOS: 13 days | Discharge: NURSING, REHAB OR LONG-TERM CARE | DRG: 460 | End: 2018-12-10
Attending: HOSPITALIST | Admitting: HOSPITALIST
Payer: MEDICARE

## 2018-11-26 VITALS
SYSTOLIC BLOOD PRESSURE: 182 MMHG | WEIGHT: 216.93 LBS | HEIGHT: 67 IN | RESPIRATION RATE: 20 BRPM | HEART RATE: 100 BPM | TEMPERATURE: 98 F | DIASTOLIC BLOOD PRESSURE: 83 MMHG | OXYGEN SATURATION: 98 %

## 2018-11-26 DIAGNOSIS — Z96.60 PRESENCE OF UNSPECIFIED ORTHOPEDIC JOINT IMPLANT: Chronic | ICD-10-CM

## 2018-11-26 DIAGNOSIS — Z98.89 OTHER SPECIFIED POSTPROCEDURAL STATES: Chronic | ICD-10-CM

## 2018-11-26 LAB
ALBUMIN SERPL ELPH-MCNC: 4.3 G/DL — SIGNIFICANT CHANGE UP (ref 3.3–5.2)
ALP SERPL-CCNC: 95 U/L — SIGNIFICANT CHANGE UP (ref 40–120)
ALT FLD-CCNC: 21 U/L — SIGNIFICANT CHANGE UP
ANION GAP SERPL CALC-SCNC: 12 MMOL/L — SIGNIFICANT CHANGE UP (ref 5–17)
APPEARANCE UR: CLEAR — SIGNIFICANT CHANGE UP
APTT BLD: 33 SEC — SIGNIFICANT CHANGE UP (ref 27.5–36.3)
AST SERPL-CCNC: 28 U/L — SIGNIFICANT CHANGE UP
BACTERIA # UR AUTO: ABNORMAL
BASOPHILS # BLD AUTO: 0 K/UL — SIGNIFICANT CHANGE UP (ref 0–0.2)
BASOPHILS NFR BLD AUTO: 0.5 % — SIGNIFICANT CHANGE UP (ref 0–2)
BILIRUB SERPL-MCNC: 0.5 MG/DL — SIGNIFICANT CHANGE UP (ref 0.4–2)
BILIRUB UR-MCNC: NEGATIVE — SIGNIFICANT CHANGE UP
BUN SERPL-MCNC: 8 MG/DL — SIGNIFICANT CHANGE UP (ref 8–20)
CALCIUM SERPL-MCNC: 8.7 MG/DL — SIGNIFICANT CHANGE UP (ref 8.6–10.2)
CHLORIDE SERPL-SCNC: 91 MMOL/L — LOW (ref 98–107)
CO2 SERPL-SCNC: 28 MMOL/L — SIGNIFICANT CHANGE UP (ref 22–29)
COLOR SPEC: YELLOW — SIGNIFICANT CHANGE UP
CREAT SERPL-MCNC: 0.72 MG/DL — SIGNIFICANT CHANGE UP (ref 0.5–1.3)
DIFF PNL FLD: ABNORMAL
EOSINOPHIL # BLD AUTO: 0.3 K/UL — SIGNIFICANT CHANGE UP (ref 0–0.5)
EOSINOPHIL NFR BLD AUTO: 5 % — SIGNIFICANT CHANGE UP (ref 0–5)
EPI CELLS # UR: SIGNIFICANT CHANGE UP
GLUCOSE SERPL-MCNC: 180 MG/DL — HIGH (ref 70–115)
GLUCOSE UR QL: NEGATIVE MG/DL — SIGNIFICANT CHANGE UP
HCT VFR BLD CALC: 36.6 % — LOW (ref 42–52)
HGB BLD-MCNC: 12.1 G/DL — LOW (ref 14–18)
INR BLD: 1.28 RATIO — HIGH (ref 0.88–1.16)
KETONES UR-MCNC: NEGATIVE — SIGNIFICANT CHANGE UP
LEUKOCYTE ESTERASE UR-ACNC: NEGATIVE — SIGNIFICANT CHANGE UP
LYMPHOCYTES # BLD AUTO: 1.1 K/UL — SIGNIFICANT CHANGE UP (ref 1–4.8)
LYMPHOCYTES # BLD AUTO: 16.9 % — LOW (ref 20–55)
MCHC RBC-ENTMCNC: 27.9 PG — SIGNIFICANT CHANGE UP (ref 27–31)
MCHC RBC-ENTMCNC: 33.1 G/DL — SIGNIFICANT CHANGE UP (ref 32–36)
MCV RBC AUTO: 84.3 FL — SIGNIFICANT CHANGE UP (ref 80–94)
MONOCYTES # BLD AUTO: 0.8 K/UL — SIGNIFICANT CHANGE UP (ref 0–0.8)
MONOCYTES NFR BLD AUTO: 12.7 % — HIGH (ref 3–10)
NEUTROPHILS # BLD AUTO: 4.3 K/UL — SIGNIFICANT CHANGE UP (ref 1.8–8)
NEUTROPHILS NFR BLD AUTO: 64.6 % — SIGNIFICANT CHANGE UP (ref 37–73)
NITRITE UR-MCNC: NEGATIVE — SIGNIFICANT CHANGE UP
NT-PROBNP SERPL-SCNC: 92 PG/ML — SIGNIFICANT CHANGE UP (ref 0–300)
PH UR: 6.5 — SIGNIFICANT CHANGE UP (ref 5–8)
PLATELET # BLD AUTO: 287 K/UL — SIGNIFICANT CHANGE UP (ref 150–400)
POTASSIUM SERPL-MCNC: 4.4 MMOL/L — SIGNIFICANT CHANGE UP (ref 3.5–5.3)
POTASSIUM SERPL-SCNC: 4.4 MMOL/L — SIGNIFICANT CHANGE UP (ref 3.5–5.3)
PROT SERPL-MCNC: 7.7 G/DL — SIGNIFICANT CHANGE UP (ref 6.6–8.7)
PROT UR-MCNC: 15 MG/DL
PROTHROM AB SERPL-ACNC: 14.8 SEC — HIGH (ref 10–12.9)
RBC # BLD: 4.34 M/UL — LOW (ref 4.6–6.2)
RBC # FLD: 14.1 % — SIGNIFICANT CHANGE UP (ref 11–15.6)
RBC CASTS # UR COMP ASSIST: SIGNIFICANT CHANGE UP /HPF (ref 0–4)
SODIUM SERPL-SCNC: 131 MMOL/L — LOW (ref 135–145)
SP GR SPEC: 1.01 — SIGNIFICANT CHANGE UP (ref 1.01–1.02)
UROBILINOGEN FLD QL: 4 MG/DL
WBC # BLD: 6.6 K/UL — SIGNIFICANT CHANGE UP (ref 4.8–10.8)
WBC # FLD AUTO: 6.6 K/UL — SIGNIFICANT CHANGE UP (ref 4.8–10.8)
WBC UR QL: SIGNIFICANT CHANGE UP

## 2018-11-26 PROCEDURE — 93970 EXTREMITY STUDY: CPT | Mod: 26

## 2018-11-26 PROCEDURE — 72148 MRI LUMBAR SPINE W/O DYE: CPT | Mod: 26

## 2018-11-26 PROCEDURE — 71046 X-RAY EXAM CHEST 2 VIEWS: CPT | Mod: 26

## 2018-11-26 PROCEDURE — 99285 EMERGENCY DEPT VISIT HI MDM: CPT

## 2018-11-26 RX ORDER — SODIUM CHLORIDE 9 MG/ML
1000 INJECTION INTRAMUSCULAR; INTRAVENOUS; SUBCUTANEOUS ONCE
Refills: 0 | Status: COMPLETED | OUTPATIENT
Start: 2018-11-26 | End: 2018-11-26

## 2018-11-26 RX ORDER — OXYCODONE AND ACETAMINOPHEN 5; 325 MG/1; MG/1
1 TABLET ORAL ONCE
Refills: 0 | Status: DISCONTINUED | OUTPATIENT
Start: 2018-11-26 | End: 2018-11-26

## 2018-11-26 RX ADMIN — OXYCODONE AND ACETAMINOPHEN 1 TABLET(S): 5; 325 TABLET ORAL at 21:00

## 2018-11-26 RX ADMIN — OXYCODONE AND ACETAMINOPHEN 1 TABLET(S): 5; 325 TABLET ORAL at 20:30

## 2018-11-26 RX ADMIN — SODIUM CHLORIDE 1000 MILLILITER(S): 9 INJECTION INTRAMUSCULAR; INTRAVENOUS; SUBCUTANEOUS at 20:21

## 2018-11-26 NOTE — ED PROVIDER NOTE - MEDICAL DECISION MAKING DETAILS
Pt with possible metabolic causes for weakness and inability to walk vs cord compression. Will check labs, MRI, and neuro consult.

## 2018-11-26 NOTE — ED PROVIDER NOTE - OBJECTIVE STATEMENT
Pt is a 72 y/o M, with PMHx of BPH, DM, HLD, HTN, hypothyroidism, and OA, presenting to the ED with c/o swelling and pain to the BLE, with progressive difficulties with ambulation for the last 1 week. Pt states that at baseline, he walks with a walker, but over the last 1 week he has not been able to walk even with the walker. He reports progressive pain and swelling to the BLE. No trauma. Pt also c/o urinary incontinence over the same time period, no dysuria, no fevers. He notes that he is not drinking as much water as usual but has the sensation of fully voiding but still dribbling after standing up.  Pt reports chronic back pain s/p laminectomy. Denies numbness/tingling to the LE,, abd pain, CP, SOB, diarrhea, constipation. No recent falls or sick contacts. Wife at bedside mentions pt consumes rum daily.

## 2018-11-26 NOTE — CONSULT NOTE ADULT - SUBJECTIVE AND OBJECTIVE BOX
Pt Name: TIMUR LOUIS    MRN: [id number]      Patient is a 73y Male presenting to the emergency department with a chief complaint of chronic lower back pain with progressively worsening B/L LE weakness (R>L) x 1.5 weeks. Pt states that he has been participating in physical therapy to strengthen his legs and normally ambulates with a walker. He states that for the past week he has been experiencing worsening LE weakness to the point he is unable to ambulate with a walker. Pt admits to recent fall approx 1 week ago due to his progressing weakness that began before the fall. Pt also admits to mild paresthesias of the RLE. Pt otherwise denies fever, chills, c/p, sob, abdominal pain, saddle anesthesia, urinary/bowel dysfunction, IVDA and has no other complaints. Pt states that he is able to feel the sensation of having to urinate and is able to hold in his urine if needed, but that he sometimes experiences "a few dribbles" after he is done urinating.       HEALTH ISSUES - PROBLEM Dx:      .      REVIEW OF SYSTEMS      General: Alert, responsive, in NAD    Skin/Breast: No rashes, no pruritis   	  Ophthalmologic: No visual changes. No redness.   	  ENMT:	No discharge. No swelling.    Respiratory and Thorax: No difficulty breathing. No cough.  	   Cardiovascular:	No chest pain. No palpitations.    Gastrointestinal:	 No abdominal pain. No diarrhea.     Genitourinary: No dysuria. No bleeding.    Musculoskeletal: SEE HPI.    Neurological: +paresthesias of the RLE.    Psychiatric: No anxiety or depression.    Hematology/Lymphatics: No swelling.    Endocrine: No Hx of diabetes.    ROS is otherwise negative.    PAST MEDICAL & SURGICAL HISTORY:  PAST MEDICAL & SURGICAL HISTORY:  BPH (benign prostatic hyperplasia)  HTN (hypertension)  Hypothyroid  Dyslipidemia  OA (osteoarthritis)  Diabetes  S/P laminectomy  S/P hernia repair  S/P hip replacement: R      Allergies: No Known Allergies      Medications:     FAMILY HISTORY:  Family history of diabetes mellitus (Mother)  : non-contributory    Social History: h/o ETOH abuse, denies IVDA    Ambulation: Walking independently [ ] With Cane [ ] With Walker [ x ]  Bed / wheelchairbound [ ]                           12.1   6.6   )-----------( 287      ( 26 Nov 2018 17:19 )             36.6     11-26    131<L>  |  91<L>  |  8.0  ----------------------------<  180<H>  4.4   |  28.0  |  0.72    Ca    8.7      26 Nov 2018 17:19    TPro  7.7  /  Alb  4.3  /  TBili  0.5  /  DBili  x   /  AST  28  /  ALT  21  /  AlkPhos  95  11-26      PHYSICAL EXAM:    Vital Signs Last 24 Hrs  T(C): 36.8 (26 Nov 2018 19:30), Max: 36.8 (26 Nov 2018 16:01)  T(F): 98.3 (26 Nov 2018 19:30), Max: 98.3 (26 Nov 2018 16:01)  HR: 91 (26 Nov 2018 19:30) (91 - 100)  BP: 163/80 (26 Nov 2018 19:30) (163/80 - 182/83)  BP(mean): --  RR: 18 (26 Nov 2018 19:30) (18 - 20)  SpO2: 98% (26 Nov 2018 19:30) (98% - 98%)  Daily Height in cm: 170.18 (26 Nov 2018 16:01)    Daily     Appearance: Alert, responsive, in no acute distress.    Skin: no rash on visible skin. Skin is clean, dry and intact. No bleeding. No abrasions. No ulcerations.    Vascular: 2+ distal DP/PT/radial pulses. Cap refill < 2 sec. No signs of venous  insufficiency  or stasis. No extremity ulcerations. No cyanosis.    Musculoskeletal:         Left Upper Extremity: Atraumatic with normal alignment NROM. No crepitus. No bony tenderness.        Right Upper Extremity: Atraumatic with normal alignment NROM. No crepitus. No bony tenderness.        Left Lower Extremity: Atraumatic with normal alignment NROM. No crepitus. No bony tenderness.        Right Lower Extremity: Atraumatic with normal alignment NROM. No crepitus. No bony tenderness.     Neurological: Decreased sensation of the RLE on the L5 nerve root, SILT on the LLE. SILT of the b/l LE.     Pathologic reflexes: NO Odom,  NO  Clonus            Reflexes:   Patella, intact            Motor exam: [  ]          Upper extremities - 5/5 hand  strength b/l, 5/5 biceps flexion b/l, 5/5 tricep ext b/l, 5/5 shoulder flexion b/l           Lower extremities  -  4/5 plantar/dorsiflexion/hip flexion on the LLE, 4/5 plantar/dorsiflexion on the RLE, 3/5 hip flexion of the RLE      Imaging Studies: < from: MR Lumbar Spine No Cont (11.26.18 @ 21:26) >  ******PRELIMINARY REPORT******    ******PRELIMINARY REPORT******           EXAM:  MR SPINE LUMBAR                          PROCEDURE DATE:  11/26/2018        ******PRELIMINARY REPORT******    ******PRELIMINARY REPORT******          INTERPRETATION:[facility] RADI[employer]IST PRELIMINARY ADDENDUM REPORT      Addendum created by Andreas Carlin MD on 11/26/2018 10:09:36 PM EST     THIS REPORT CONTAINS FINDINGS THAT MAY BE CRITICAL TO PATIENT CARE. The   findings were verbally communicated via telephone conference withDr Schaefer   10:09 PM EST on 11/26/2018. The findings were acknowledged and understood.    Initial report created on 11/26/2018 10:05:10 PM EST     EXAM:    MR Lumbar Spine Without Intravenous Contrast.     EXAM DATE/TIME:    11/26/20188:44 PM     CLINICAL HISTORY:    73 years old, male; Screening exam; Patient HX: Pain. Inability to   ambulate     TECHNIQUE:    Multiplanar magnetic resonance images of the lumbar spine without   intravenous   contrast.     COMPARISON:    No relevant prior studies available.     FINDINGS:    Vertebrae:  There is no vertebral fracture. Vertebral height is   maintained.    Marrow:  There is no evidence of a marrow infiltrating lesion.    Spinal cord:  There is increased signal noted within the spinal cord at   T11-T12 which is the level of cord compression. This is consistent with   myelopathic changes secondary to cord compression.    Thoracic discs/Spinal canal/Neural foramina:  T10-T11 facet arthropathy   with   moderate left and more severe right foraminal stenosis. Mild central   stenosis.   No disc bulge or herniation. T11-T12: There is a central disc herniation   with   cephalad extrusion. There is severe central spinal stenosis with cord   compression slightly superior to the disc space. There is a T12   laminectomy.   The spinal stenosis is slightly superior to the posterior decompression.     DISCS/SPINAL CANAL/NEURAL FORAMINA:      T12-L1:  Moderate central to right paracentral disc herniation indenting   the   thecal sac. The laminectomy decompresses the spinal canal posteriorly.   There is   moderate left and more severe right foraminal stenosis.    L1-L2:  No disc bulge or herniation. Facet arthropathy with moderate   right and   more severe left foraminal stenosis.    L2-L3:  Severe disc space narrowing. There is spondylosis. There is a   left   paracentral osteophyte and disc bulge. This encroaches on the left   lateral   recess there is severe bilateral foraminal stenosis.. A laminectomy   decompresses the spinal canal posteriorly.    L3-L4:  Severe disc space narrowing with a posterior osteophyte and disc   bulge. A laminectomy decompresses the spinal canal posteriorly. There is   moderate to severe bilateral foraminal stenosis.    L4-L5:  There is a largecentral to right paracentral disc herniation   indenting the thecal sac. A laminectomy decompresses the spinal canal   posteriorly. There is severe bilateral foraminal stenosis.    L5-S1:  Mild disc bulge. Facet arthropathy with severe bilateral   foraminal   stenosis.      Kidneys and ureters:  Bilateral renal cysts are only partly visualized.    Soft tissues:  There is no evidence of paraspinous or intraspinal soft   tissue   mass, hemorrhage or fluid collection.     IMPRESSION:   1. T11-T12: Cephalad disc extrusion superimposed on a facet and ligament   hypertrophy resulting in severe spinal stenosis with cord compression.   This is   slightly superior to a posterior laminectomy and decompression. There is   increased signal within the spinal cord indicating myelopathic changes   secondary to cord compression.   2. Disc disease and spondylosis from T12-L1 to L5-S1 is detailed above.   Central   stenosis is decompressed by laminectomies at T12-L1 and L2-L3 to L4-L5.   Multilevel foraminal stenosis as detailed above.          ******PRELIMINARY REPORT******    ******PRELIMINARY REPORT******              ANDREAS CARLIN     < end of copied text >      A/P:  Pt is a 73y Male with progressively worsening LE weakness x 1.5 weeks. MRI shows T11-T12 spinal stenosis with cord compression.      PLAN d/w Dr. Gray:  * Pain control as clinically indicated  * MRI completed  * Treatment plan to be finalized after review of pending imaging studies by Dr. Gray. Pt Name: TIMUR LOUIS    MRN: [id number]      Patient is a 73y Male presenting to the emergency department with a chief complaint of chronic lower back pain with progressively worsening B/L LE weakness (R>L) x 1.5 weeks. Pt states that he has been participating in physical therapy to strengthen his legs and normally ambulates with a walker. He states that for the past week he has been experiencing worsening LE weakness to the point he is unable to ambulate with a walker. Pt admits to recent fall approx 1 week ago due to his progressing weakness that began before the fall. Pt also admits to mild paresthesias of the RLE. Pt otherwise denies fever, chills, c/p, sob, abdominal pain, saddle anesthesia, urinary/bowel dysfunction, IVDA and has no other complaints. Pt states that he is able to feel the sensation of having to urinate and is able to hold in his urine if needed, but that he sometimes experiences "a few dribbles" after he is done urinating.       HEALTH ISSUES - PROBLEM Dx:      .      REVIEW OF SYSTEMS      General: Alert, responsive, in NAD    Skin/Breast: No rashes, no pruritis   	  Ophthalmologic: No visual changes. No redness.   	  ENMT:	No discharge. No swelling.    Respiratory and Thorax: No difficulty breathing. No cough.  	   Cardiovascular:	No chest pain. No palpitations.    Gastrointestinal:	 No abdominal pain. No diarrhea.     Genitourinary: No dysuria. No bleeding.    Musculoskeletal: SEE HPI.    Neurological: +paresthesias of the RLE.    Psychiatric: No anxiety or depression.    Hematology/Lymphatics: No swelling.    Endocrine: No Hx of diabetes.    ROS is otherwise negative.    PAST MEDICAL & SURGICAL HISTORY:  PAST MEDICAL & SURGICAL HISTORY:  BPH (benign prostatic hyperplasia)  HTN (hypertension)  Hypothyroid  Dyslipidemia  OA (osteoarthritis)  Diabetes  S/P laminectomy  S/P hernia repair  S/P hip replacement: R      Allergies: No Known Allergies      Medications:     FAMILY HISTORY:  Family history of diabetes mellitus (Mother)  : non-contributory    Social History: h/o ETOH abuse, denies IVDA    Ambulation: Walking independently [ ] With Cane [ ] With Walker [ x ]  Bed / wheelchairbound [ ]                           12.1   6.6   )-----------( 287      ( 26 Nov 2018 17:19 )             36.6     11-26    131<L>  |  91<L>  |  8.0  ----------------------------<  180<H>  4.4   |  28.0  |  0.72    Ca    8.7      26 Nov 2018 17:19    TPro  7.7  /  Alb  4.3  /  TBili  0.5  /  DBili  x   /  AST  28  /  ALT  21  /  AlkPhos  95  11-26      PHYSICAL EXAM:    Vital Signs Last 24 Hrs  T(C): 36.8 (26 Nov 2018 19:30), Max: 36.8 (26 Nov 2018 16:01)  T(F): 98.3 (26 Nov 2018 19:30), Max: 98.3 (26 Nov 2018 16:01)  HR: 91 (26 Nov 2018 19:30) (91 - 100)  BP: 163/80 (26 Nov 2018 19:30) (163/80 - 182/83)  BP(mean): --  RR: 18 (26 Nov 2018 19:30) (18 - 20)  SpO2: 98% (26 Nov 2018 19:30) (98% - 98%)  Daily Height in cm: 170.18 (26 Nov 2018 16:01)    Daily     Appearance: Alert, responsive, in no acute distress.    Skin: no rash on visible skin. Skin is clean, dry and intact. No bleeding. No abrasions. No ulcerations.    Vascular: 2+ distal DP/PT/radial pulses. Cap refill < 2 sec. No signs of venous  insufficiency  or stasis. No extremity ulcerations. No cyanosis.    Musculoskeletal:         Back: No bony deformities or step offs noted, No midline tenderness throughout the entire spine noted.       Left Upper Extremity: Atraumatic with normal alignment NROM. No crepitus. No bony tenderness.        Right Upper Extremity: Atraumatic with normal alignment NROM. No crepitus. No bony tenderness.        Left Lower Extremity: Atraumatic with normal alignment NROM. No crepitus. No bony tenderness.        Right Lower Extremity: Atraumatic with normal alignment NROM. No crepitus. No bony tenderness.     Neurological: Decreased sensation of the RLE on the L5 nerve root, SILT on the LLE. SILT of the b/l LE. Strong rectal tone noted.    Pathologic reflexes: NO Odom,  NO  Clonus            Reflexes:   Unable to elicit Patella reflexes b/l - possibly due to patients not fully cooperative with exam    Motor exam: [  ]          Upper extremities - 5/5 hand  strength b/l, 5/5 biceps flexion b/l, 5/5 tricep ext b/l, 5/5 shoulder flexion b/l           Lower extremities  -  4/5 plantar/dorsiflexion/hip flexion on the LLE, 4/5 plantar/dorsiflexion on the RLE, 3/5 hip flexion of the RLE      Imaging Studies: < from: MR Lumbar Spine No Cont (11.26.18 @ 21:26) >  ******PRELIMINARY REPORT******    ******PRELIMINARY REPORT******           EXAM:  MR SPINE LUMBAR                          PROCEDURE DATE:  11/26/2018        ******PRELIMINARY REPORT******    ******PRELIMINARY REPORT******          INTERPRETATION:[facility] RADIOLOGIST PRELIMINARY ADDENDUM REPORT      Addendum created by Andreas Carlin MD on 11/26/2018 10:09:36 PM EST     THIS REPORT CONTAINS FINDINGS THAT MAY BE CRITICAL TO PATIENT CARE. The   findings were verbally communicated via telephone conference withDr Schaefer   10:09 PM EST on 11/26/2018. The findings were acknowledged and understood.    Initial report created on 11/26/2018 10:05:10 PM EST     EXAM:    MR Lumbar Spine Without Intravenous Contrast.     EXAM DATE/TIME:    11/26/20188:44 PM     CLINICAL HISTORY:    73 years old, male; Screening exam; Patient HX: Pain. Inability to   ambulate     TECHNIQUE:    Multiplanar magnetic resonance images of the lumbar spine without   intravenous   contrast.     COMPARISON:    No relevant prior studies available.     FINDINGS:    Vertebrae:  There is no vertebral fracture. Vertebral height is   maintained.    Marrow:  There is no evidence of a marrow infiltrating lesion.    Spinal cord:  There is increased signal noted within the spinal cord at   T11-T12 which is the level of cord compression. This is consistent with   myelopathic changes secondary to cord compression.    Thoracic discs/Spinal canal/Neural foramina:  T10-T11 facet arthropathy   with   moderate left and more severe right foraminal stenosis. Mild central   stenosis.   No disc bulge or herniation. T11-T12: There is a central disc herniation   with   cephalad extrusion. There is severe central spinal stenosis with cord   compression slightly superior to the disc space. There is a T12   laminectomy.   The spinal stenosis is slightly superior to the posterior decompression.     DISCS/SPINAL CANAL/NEURAL FORAMINA:      T12-L1:  Moderate central to right paracentral disc herniation indenting   the   thecal sac. The laminectomy decompresses the spinal canal posteriorly.   There is   moderate left and more severe right foraminal stenosis.    L1-L2:  No disc bulge or herniation. Facet arthropathy with moderate   right and   more severe left foraminal stenosis.    L2-L3:  Severe disc space narrowing. There is spondylosis. There is a   left   paracentral osteophyte and disc bulge. This encroaches on the left   lateral   recess there is severe bilateral foraminal stenosis.. A laminectomy   decompresses the spinal canal posteriorly.    L3-L4:  Severe disc space narrowing with a posterior osteophyte and disc   bulge. A laminectomy decompresses the spinal canal posteriorly. There is   moderate to severe bilateral foraminal stenosis.    L4-L5:  There is a largecentral to right paracentral disc herniation   indenting the thecal sac. A laminectomy decompresses the spinal canal   posteriorly. There is severe bilateral foraminal stenosis.    L5-S1:  Mild disc bulge. Facet arthropathy with severe bilateral   foraminal   stenosis.      Kidneys and ureters:  Bilateral renal cysts are only partly visualized.    Soft tissues:  There is no evidence of paraspinous or intraspinal soft   tissue   mass, hemorrhage or fluid collection.     IMPRESSION:   1. T11-T12: Cephalad disc extrusion superimposed on a facet and ligament   hypertrophy resulting in severe spinal stenosis with cord compression.   This is   slightly superior to a posterior laminectomy and decompression. There is   increased signal within the spinal cord indicating myelopathic changes   secondary to cord compression.   2. Disc disease and spondylosis from T12-L1 to L5-S1 is detailed above.   Central   stenosis is decompressed by laminectomies at T12-L1 and L2-L3 to L4-L5.   Multilevel foraminal stenosis as detailed above.          ******PRELIMINARY REPORT******    ******PRELIMINARY REPORT******              ANDREAS CARLIN     < end of copied text >      A/P:  Pt is a 73y Male with progressively worsening LE weakness x 1.5 weeks. MRI shows T11-T12 spinal stenosis with cord compression.      PLAN d/w Dr. Gray:  * Pain control as clinically indicated  * MRI completed  * Treatment plan to be finalized after review of pending imaging studies by Dr. Gray. Pt Name: TIMUR LOUIS    MRN: [id number]      Patient is a 73y Male presenting to the emergency department with a chief complaint of chronic lower back pain with progressively worsening B/L LE weakness (R>L) x 1.5 weeks. Pt states that he has been participating in physical therapy to strengthen his legs and normally ambulates with a walker. He states that for the past week he has been experiencing worsening LE weakness to the point he is unable to ambulate with a walker. Pt admits to recent fall approx 1 week ago due to his progressing weakness that began before the fall. Pt also admits to mild paresthesias of the RLE. Pt otherwise denies fever, chills, c/p, sob, abdominal pain, saddle anesthesia, urinary/bowel dysfunction, IVDA and has no other complaints. Pt states that he is able to feel the sensation of having to urinate and is able to hold in his urine if needed, but that he sometimes experiences "a few dribbles" after he is done urinating.       HEALTH ISSUES - PROBLEM Dx:      .      REVIEW OF SYSTEMS      General: Alert, responsive, in NAD    Skin/Breast: No rashes, no pruritis   	  Ophthalmologic: No visual changes. No redness.   	  ENMT:	No discharge. No swelling.    Respiratory and Thorax: No difficulty breathing. No cough.  	   Cardiovascular:	No chest pain. No palpitations.    Gastrointestinal:	 No abdominal pain. No diarrhea.     Genitourinary: No dysuria. No bleeding.    Musculoskeletal: SEE HPI.    Neurological: +paresthesias of the RLE.    Psychiatric: No anxiety or depression.    Hematology/Lymphatics: No swelling.    Endocrine: No Hx of diabetes.    ROS is otherwise negative.    PAST MEDICAL & SURGICAL HISTORY:  PAST MEDICAL & SURGICAL HISTORY:  BPH (benign prostatic hyperplasia)  HTN (hypertension)  Hypothyroid  Dyslipidemia  OA (osteoarthritis)  Diabetes  S/P laminectomy  S/P hernia repair  S/P hip replacement: R      Allergies: No Known Allergies      Medications:     FAMILY HISTORY:  Family history of diabetes mellitus (Mother)  : non-contributory    Social History: h/o ETOH abuse, denies IVDA    Ambulation: Walking independently [ ] With Cane [ ] With Walker [ x ]  Bed / wheelchairbound [ ]                           12.1   6.6   )-----------( 287      ( 26 Nov 2018 17:19 )             36.6     11-26    131<L>  |  91<L>  |  8.0  ----------------------------<  180<H>  4.4   |  28.0  |  0.72    Ca    8.7      26 Nov 2018 17:19    TPro  7.7  /  Alb  4.3  /  TBili  0.5  /  DBili  x   /  AST  28  /  ALT  21  /  AlkPhos  95  11-26      PHYSICAL EXAM:    Vital Signs Last 24 Hrs  T(C): 36.8 (26 Nov 2018 19:30), Max: 36.8 (26 Nov 2018 16:01)  T(F): 98.3 (26 Nov 2018 19:30), Max: 98.3 (26 Nov 2018 16:01)  HR: 91 (26 Nov 2018 19:30) (91 - 100)  BP: 163/80 (26 Nov 2018 19:30) (163/80 - 182/83)  BP(mean): --  RR: 18 (26 Nov 2018 19:30) (18 - 20)  SpO2: 98% (26 Nov 2018 19:30) (98% - 98%)  Daily Height in cm: 170.18 (26 Nov 2018 16:01)    Daily     Appearance: Alert, responsive, in no acute distress.    Skin: no rash on visible skin. Skin is clean, dry and intact. No bleeding. No abrasions. No ulcerations.    Vascular: 2+ distal DP/PT/radial pulses. Cap refill < 2 sec. No signs of venous  insufficiency  or stasis. No extremity ulcerations. No cyanosis.    Musculoskeletal:         Back: No bony deformities or step offs noted, No midline tenderness throughout the entire spine noted.       Left Upper Extremity: Atraumatic with normal alignment NROM. No crepitus. No bony tenderness.        Right Upper Extremity: Atraumatic with normal alignment NROM. No crepitus. No bony tenderness.        Left Lower Extremity: Atraumatic with normal alignment NROM. No crepitus. No bony tenderness.        Right Lower Extremity: Atraumatic with normal alignment NROM. No crepitus. No bony tenderness.     Neurological: Decreased sensation of the RLE on the L5 nerve root, SILT on the LLE. SILT of the b/l LE. Strong rectal tone noted.    Pathologic reflexes: NO Odom,  NO  Clonus            Reflexes:   Unable to elicit Patella reflexes b/l - possibly due to patients not fully cooperative with exam    Motor exam: [  ]          Upper extremities - 5/5 hand  strength b/l, 5/5 biceps flexion b/l, 5/5 tricep ext b/l, 5/5 shoulder flexion b/l           Lower extremities  -  4/5 plantar/dorsiflexion/hip flexion on the LLE, 4/5 plantar/dorsiflexion on the RLE, 3/5 hip flexion of the RLE      Imaging Studies: < from: MR Lumbar Spine No Cont (11.26.18 @ 21:26) >  ******PRELIMINARY REPORT******    ******PRELIMINARY REPORT******           EXAM:  MR SPINE LUMBAR                          PROCEDURE DATE:  11/26/2018        ******PRELIMINARY REPORT******    ******PRELIMINARY REPORT******          INTERPRETATION:[facility] RADIOLOGIST PRELIMINARY ADDENDUM REPORT      Addendum created by Andreas Carlin MD on 11/26/2018 10:09:36 PM EST     THIS REPORT CONTAINS FINDINGS THAT MAY BE CRITICAL TO PATIENT CARE. The   findings were verbally communicated via telephone conference withDr Schaefer   10:09 PM EST on 11/26/2018. The findings were acknowledged and understood.    Initial report created on 11/26/2018 10:05:10 PM EST     EXAM:    MR Lumbar Spine Without Intravenous Contrast.     EXAM DATE/TIME:    11/26/20188:44 PM     CLINICAL HISTORY:    73 years old, male; Screening exam; Patient HX: Pain. Inability to   ambulate     TECHNIQUE:    Multiplanar magnetic resonance images of the lumbar spine without   intravenous   contrast.     COMPARISON:    No relevant prior studies available.     FINDINGS:    Vertebrae:  There is no vertebral fracture. Vertebral height is   maintained.    Marrow:  There is no evidence of a marrow infiltrating lesion.    Spinal cord:  There is increased signal noted within the spinal cord at   T11-T12 which is the level of cord compression. This is consistent with   myelopathic changes secondary to cord compression.    Thoracic discs/Spinal canal/Neural foramina:  T10-T11 facet arthropathy   with   moderate left and more severe right foraminal stenosis. Mild central   stenosis.   No disc bulge or herniation. T11-T12: There is a central disc herniation   with   cephalad extrusion. There is severe central spinal stenosis with cord   compression slightly superior to the disc space. There is a T12   laminectomy.   The spinal stenosis is slightly superior to the posterior decompression.     DISCS/SPINAL CANAL/NEURAL FORAMINA:      T12-L1:  Moderate central to right paracentral disc herniation indenting   the   thecal sac. The laminectomy decompresses the spinal canal posteriorly.   There is   moderate left and more severe right foraminal stenosis.    L1-L2:  No disc bulge or herniation. Facet arthropathy with moderate   right and   more severe left foraminal stenosis.    L2-L3:  Severe disc space narrowing. There is spondylosis. There is a   left   paracentral osteophyte and disc bulge. This encroaches on the left   lateral   recess there is severe bilateral foraminal stenosis.. A laminectomy   decompresses the spinal canal posteriorly.    L3-L4:  Severe disc space narrowing with a posterior osteophyte and disc   bulge. A laminectomy decompresses the spinal canal posteriorly. There is   moderate to severe bilateral foraminal stenosis.    L4-L5:  There is a largecentral to right paracentral disc herniation   indenting the thecal sac. A laminectomy decompresses the spinal canal   posteriorly. There is severe bilateral foraminal stenosis.    L5-S1:  Mild disc bulge. Facet arthropathy with severe bilateral   foraminal   stenosis.      Kidneys and ureters:  Bilateral renal cysts are only partly visualized.    Soft tissues:  There is no evidence of paraspinous or intraspinal soft   tissue   mass, hemorrhage or fluid collection.     IMPRESSION:   1. T11-T12: Cephalad disc extrusion superimposed on a facet and ligament   hypertrophy resulting in severe spinal stenosis with cord compression.   This is   slightly superior to a posterior laminectomy and decompression. There is   increased signal within the spinal cord indicating myelopathic changes   secondary to cord compression.   2. Disc disease and spondylosis from T12-L1 to L5-S1 is detailed above.   Central   stenosis is decompressed by laminectomies at T12-L1 and L2-L3 to L4-L5.   Multilevel foraminal stenosis as detailed above.          ******PRELIMINARY REPORT******    ******PRELIMINARY REPORT******              ANDREAS CARLIN     < end of copied text >      A/P:  Pt is a 73y Male with progressively worsening LE weakness x 1.5 weeks. MRI shows T11-T12 spinal stenosis with cord compression.      PLAN awaiting discussion with Dr. Gray:  * Pain control as clinically indicated  * MRI completed  * Treatment plan to be finalized after review of pending imaging studies by Dr. Gray. Pt Name: TIMUR LOUIS    MRN: [id number]      Patient is a 73y Male presenting to the emergency department with a chief complaint of chronic lower back pain with progressively worsening B/L LE weakness (R>L) x 1.5 weeks. Pt states that he has been participating in physical therapy to strengthen his legs and normally ambulates with a walker. He states that for the past week he has been experiencing worsening LE weakness to the point he is unable to ambulate with a walker. Pt admits to recent fall approx 1 week ago due to his progressing weakness that began before the fall. Pt also admits to mild paresthesias of the RLE. Pt otherwise denies fever, chills, c/p, sob, abdominal pain, saddle anesthesia, urinary/bowel dysfunction, IVDA and has no other complaints. Pt states that he is able to feel the sensation of having to urinate and is able to hold in his urine if needed, but that he sometimes experiences "a few dribbles" after he is done urinating.       HEALTH ISSUES - PROBLEM Dx:      .      REVIEW OF SYSTEMS      General: Alert, responsive, in NAD    Skin/Breast: No rashes, no pruritis   	  Ophthalmologic: No visual changes. No redness.   	  ENMT:	No discharge. No swelling.    Respiratory and Thorax: No difficulty breathing. No cough.  	   Cardiovascular:	No chest pain. No palpitations.    Gastrointestinal:	 No abdominal pain. No diarrhea.     Genitourinary: No dysuria. No bleeding.    Musculoskeletal: SEE HPI.    Neurological: +paresthesias of the RLE.    Psychiatric: No anxiety or depression.    Hematology/Lymphatics: No swelling.    Endocrine: No Hx of diabetes.    ROS is otherwise negative.    PAST MEDICAL & SURGICAL HISTORY:  PAST MEDICAL & SURGICAL HISTORY:  BPH (benign prostatic hyperplasia)  HTN (hypertension)  Hypothyroid  Dyslipidemia  OA (osteoarthritis)  Diabetes  S/P laminectomy  S/P hernia repair  S/P hip replacement: R      Allergies: No Known Allergies      Medications:     FAMILY HISTORY:  Family history of diabetes mellitus (Mother)  : non-contributory    Social History: h/o ETOH abuse, denies IVDA    Ambulation: Walking independently [ ] With Cane [ ] With Walker [ x ]  Bed / wheelchairbound [ ]                           12.1   6.6   )-----------( 287      ( 26 Nov 2018 17:19 )             36.6     11-26    131<L>  |  91<L>  |  8.0  ----------------------------<  180<H>  4.4   |  28.0  |  0.72    Ca    8.7      26 Nov 2018 17:19    TPro  7.7  /  Alb  4.3  /  TBili  0.5  /  DBili  x   /  AST  28  /  ALT  21  /  AlkPhos  95  11-26      PHYSICAL EXAM:    Vital Signs Last 24 Hrs  T(C): 36.8 (26 Nov 2018 19:30), Max: 36.8 (26 Nov 2018 16:01)  T(F): 98.3 (26 Nov 2018 19:30), Max: 98.3 (26 Nov 2018 16:01)  HR: 91 (26 Nov 2018 19:30) (91 - 100)  BP: 163/80 (26 Nov 2018 19:30) (163/80 - 182/83)  BP(mean): --  RR: 18 (26 Nov 2018 19:30) (18 - 20)  SpO2: 98% (26 Nov 2018 19:30) (98% - 98%)  Daily Height in cm: 170.18 (26 Nov 2018 16:01)    Daily     Appearance: Alert, responsive, in no acute distress.    Skin: no rash on visible skin. Skin is clean, dry and intact. No bleeding. No abrasions. No ulcerations.    Vascular: 2+ distal DP/PT/radial pulses. Cap refill < 2 sec. No signs of venous  insufficiency  or stasis. No extremity ulcerations. No cyanosis.    Musculoskeletal:         Back: No bony deformities or step offs noted, No midline tenderness throughout the entire spine noted.       Left Upper Extremity: Atraumatic with normal alignment NROM. No crepitus. No bony tenderness.        Right Upper Extremity: Atraumatic with normal alignment NROM. No crepitus. No bony tenderness.        Left Lower Extremity: Atraumatic with normal alignment NROM. No crepitus. No bony tenderness.        Right Lower Extremity: Atraumatic with normal alignment NROM. No crepitus. No bony tenderness.     Neurological: Decreased sensation of the RLE on the L5 nerve root, SILT on the LLE. SILT of the b/l LE. Strong rectal tone noted.    Pathologic reflexes: NO Odom,  NO  Clonus            Reflexes:   Unable to elicit Patella reflexes b/l - possibly due to patients not fully cooperative with exam    Motor exam: [  ]          Upper extremities - 5/5 hand  strength b/l, 5/5 biceps flexion b/l, 5/5 tricep ext b/l, 5/5 shoulder flexion b/l           Lower extremities  -  4/5 plantar/dorsiflexion/hip flexion on the LLE, 4/5 plantar/dorsiflexion on the RLE, 3/5 hip flexion of the RLE      Imaging Studies: < from: MR Lumbar Spine No Cont (11.26.18 @ 21:26) >  ******PRELIMINARY REPORT******    ******PRELIMINARY REPORT******           EXAM:  MR SPINE LUMBAR                          PROCEDURE DATE:  11/26/2018        ******PRELIMINARY REPORT******    ******PRELIMINARY REPORT******          INTERPRETATION:[facility] RAD[facility] PRELIMINARY ADDENDUM REPORT      Addendum created by Andreas Carlin MD on 11/26/2018 10:09:36 PM EST     THIS REPORT CONTAINS FINDINGS THAT MAY BE CRITICAL TO PATIENT CARE. The   findings were verbally communicated via telephone conference withDr Schaefer   10:09 PM EST on 11/26/2018. The findings were acknowledged and understood.    Initial report created on 11/26/2018 10:05:10 PM EST     EXAM:    MR Lumbar Spine Without Intravenous Contrast.     EXAM DATE/TIME:    11/26/20188:44 PM     CLINICAL HISTORY:    73 years old, male; Screening exam; Patient HX: Pain. Inability to   ambulate     TECHNIQUE:    Multiplanar magnetic resonance images of the lumbar spine without   intravenous   contrast.     COMPARISON:    No relevant prior studies available.     FINDINGS:    Vertebrae:  There is no vertebral fracture. Vertebral height is   maintained.    Marrow:  There is no evidence of a marrow infiltrating lesion.    Spinal cord:  There is increased signal noted within the spinal cord at   T11-T12 which is the level of cord compression. This is consistent with   myelopathic changes secondary to cord compression.    Thoracic discs/Spinal canal/Neural foramina:  T10-T11 facet arthropathy   with   moderate left and more severe right foraminal stenosis. Mild central   stenosis.   No disc bulge or herniation. T11-T12: There is a central disc herniation   with   cephalad extrusion. There is severe central spinal stenosis with cord   compression slightly superior to the disc space. There is a T12   laminectomy.   The spinal stenosis is slightly superior to the posterior decompression.     DISCS/SPINAL CANAL/NEURAL FORAMINA:      T12-L1:  Moderate central to right paracentral disc herniation indenting   the   thecal sac. The laminectomy decompresses the spinal canal posteriorly.   There is   moderate left and more severe right foraminal stenosis.    L1-L2:  No disc bulge or herniation. Facet arthropathy with moderate   right and   more severe left foraminal stenosis.    L2-L3:  Severe disc space narrowing. There is spondylosis. There is a   left   paracentral osteophyte and disc bulge. This encroaches on the left   lateral   recess there is severe bilateral foraminal stenosis.. A laminectomy   decompresses the spinal canal posteriorly.    L3-L4:  Severe disc space narrowing with a posterior osteophyte and disc   bulge. A laminectomy decompresses the spinal canal posteriorly. There is   moderate to severe bilateral foraminal stenosis.    L4-L5:  There is a largecentral to right paracentral disc herniation   indenting the thecal sac. A laminectomy decompresses the spinal canal   posteriorly. There is severe bilateral foraminal stenosis.    L5-S1:  Mild disc bulge. Facet arthropathy with severe bilateral   foraminal   stenosis.      Kidneys and ureters:  Bilateral renal cysts are only partly visualized.    Soft tissues:  There is no evidence of paraspinous or intraspinal soft   tissue   mass, hemorrhage or fluid collection.     IMPRESSION:   1. T11-T12: Cephalad disc extrusion superimposed on a facet and ligament   hypertrophy resulting in severe spinal stenosis with cord compression.   This is   slightly superior to a posterior laminectomy and decompression. There is   increased signal within the spinal cord indicating myelopathic changes   secondary to cord compression.   2. Disc disease and spondylosis from T12-L1 to L5-S1 is detailed above.   Central   stenosis is decompressed by laminectomies at T12-L1 and L2-L3 to L4-L5.   Multilevel foraminal stenosis as detailed above.          ******PRELIMINARY REPORT******    ******PRELIMINARY REPORT******              ANDERAS CARLIN     < end of copied text >      A/P:  Pt is a 73y Male with progressively worsening LE weakness x 1.5 weeks. MRI shows T11-T12 spinal stenosis with cord compression.      PLAN-- awaiting recommendations from Dr. Gray:  * Pain control as clinically indicated  * MRI completed  * Treatment plan to be finalized after review of pending imaging studies by Dr. Gray. Pt Name: TIMUR LOUIS    MRN: [id number]      Patient is a 73y Male presenting to the emergency department with a chief complaint of chronic lower back pain with progressively worsening B/L LE weakness (R>L) x 1.5 weeks. Pt states that he has been participating in physical therapy to strengthen his legs and normally ambulates with a walker. He states that for the past week he has been experiencing worsening LE weakness to the point he is unable to ambulate with a walker. Pt admits to recent fall approx 1 week ago due to his progressing weakness that began before the fall. Pt also admits to mild paresthesias of the RLE. Pt otherwise denies fever, chills, c/p, sob, abdominal pain, saddle anesthesia, urinary/bowel dysfunction, IVDA and has no other complaints. Pt states that he is able to feel the sensation of having to urinate and is able to hold in his urine if needed, but that he sometimes experiences "a few dribbles" after he is done urinating.       HEALTH ISSUES - PROBLEM Dx:      .      REVIEW OF SYSTEMS      General: Alert, responsive, in NAD    Skin/Breast: No rashes, no pruritis   	  Ophthalmologic: No visual changes. No redness.   	  ENMT:	No discharge. No swelling.    Respiratory and Thorax: No difficulty breathing. No cough.  	   Cardiovascular:	No chest pain. No palpitations.    Gastrointestinal:	 No abdominal pain. No diarrhea.     Genitourinary: No dysuria. No bleeding.    Musculoskeletal: SEE HPI.    Neurological: +paresthesias of the RLE.    Psychiatric: No anxiety or depression.    Hematology/Lymphatics: No swelling.    Endocrine: No Hx of diabetes.    ROS is otherwise negative.    PAST MEDICAL & SURGICAL HISTORY:  PAST MEDICAL & SURGICAL HISTORY:  BPH (benign prostatic hyperplasia)  HTN (hypertension)  Hypothyroid  Dyslipidemia  OA (osteoarthritis)  Diabetes  S/P laminectomy  S/P hernia repair  S/P hip replacement: R      Allergies: No Known Allergies      Medications:     FAMILY HISTORY:  Family history of diabetes mellitus (Mother)  : non-contributory    Social History: h/o ETOH abuse, denies IVDA    Ambulation: Walking independently [ ] With Cane [ ] With Walker [ x ]  Bed / wheelchairbound [ ]                           12.1   6.6   )-----------( 287      ( 26 Nov 2018 17:19 )             36.6     11-26    131<L>  |  91<L>  |  8.0  ----------------------------<  180<H>  4.4   |  28.0  |  0.72    Ca    8.7      26 Nov 2018 17:19    TPro  7.7  /  Alb  4.3  /  TBili  0.5  /  DBili  x   /  AST  28  /  ALT  21  /  AlkPhos  95  11-26      PHYSICAL EXAM:    Vital Signs Last 24 Hrs  T(C): 36.8 (26 Nov 2018 19:30), Max: 36.8 (26 Nov 2018 16:01)  T(F): 98.3 (26 Nov 2018 19:30), Max: 98.3 (26 Nov 2018 16:01)  HR: 91 (26 Nov 2018 19:30) (91 - 100)  BP: 163/80 (26 Nov 2018 19:30) (163/80 - 182/83)  BP(mean): --  RR: 18 (26 Nov 2018 19:30) (18 - 20)  SpO2: 98% (26 Nov 2018 19:30) (98% - 98%)  Daily Height in cm: 170.18 (26 Nov 2018 16:01)    Daily     Appearance: Alert, responsive, in no acute distress.    Skin: no rash on visible skin. Skin is clean, dry and intact. No bleeding. No abrasions. No ulcerations.    Vascular: 2+ distal DP/PT/radial pulses. Cap refill < 2 sec. No signs of venous  insufficiency  or stasis. No extremity ulcerations. No cyanosis.    Musculoskeletal:         Back: No bony deformities or step offs noted, No midline tenderness throughout the entire spine noted.       Left Upper Extremity: Atraumatic with normal alignment NROM. No crepitus. No bony tenderness.        Right Upper Extremity: Atraumatic with normal alignment NROM. No crepitus. No bony tenderness.        Left Lower Extremity: Atraumatic with normal alignment NROM. No crepitus. No bony tenderness.        Right Lower Extremity: Atraumatic with normal alignment NROM. No crepitus. No bony tenderness.     Neurological: Decreased sensation of the RLE on the L5 nerve root, SILT on the LLE. SILT of the b/l LE. Strong rectal tone noted.    Pathologic reflexes: NO Odom,  NO  Clonus            Reflexes:   Unable to elicit Patella reflexes b/l - possibly due to patients not fully cooperative with exam    Motor exam: [  ]          Upper extremities - 5/5 hand  strength b/l, 5/5 biceps flexion b/l, 5/5 tricep ext b/l, 5/5 shoulder flexion b/l           Lower extremities  -  4/5 plantar/dorsiflexion/hip flexion on the LLE, 4/5 plantar/dorsiflexion on the RLE, 3/5 hip flexion of the RLE      Imaging Studies: < from: MR Lumbar Spine No Cont (11.26.18 @ 21:26) >  ******PRELIMINARY REPORT******    ******PRELIMINARY REPORT******           EXAM:  MR SPINE LUMBAR                          PROCEDURE DATE:  11/26/2018        ******PRELIMINARY REPORT******    ******PRELIMINARY REPORT******          INTERPRETATION:[facility] PRELIMINARY ADDENDUM REPORT      Addendum created by Andreas Carlin MD on 11/26/2018 10:09:36 PM EST     THIS REPORT CONTAINS FINDINGS THAT MAY BE CRITICAL TO PATIENT CARE. The   findings were verbally communicated via telephone conference withDr Schaefer   10:09 PM EST on 11/26/2018. The findings were acknowledged and understood.    Initial report created on 11/26/2018 10:05:10 PM EST     EXAM:    MR Lumbar Spine Without Intravenous Contrast.     EXAM DATE/TIME:    11/26/20188:44 PM     CLINICAL HISTORY:    73 years old, male; Screening exam; Patient HX: Pain. Inability to   ambulate     TECHNIQUE:    Multiplanar magnetic resonance images of the lumbar spine without   intravenous   contrast.     COMPARISON:    No relevant prior studies available.     FINDINGS:    Vertebrae:  There is no vertebral fracture. Vertebral height is   maintained.    Marrow:  There is no evidence of a marrow infiltrating lesion.    Spinal cord:  There is increased signal noted within the spinal cord at   T11-T12 which is the level of cord compression. This is consistent with   myelopathic changes secondary to cord compression.    Thoracic discs/Spinal canal/Neural foramina:  T10-T11 facet arthropathy   with   moderate left and more severe right foraminal stenosis. Mild central   stenosis.   No disc bulge or herniation. T11-T12: There is a central disc herniation   with   cephalad extrusion. There is severe central spinal stenosis with cord   compression slightly superior to the disc space. There is a T12   laminectomy.   The spinal stenosis is slightly superior to the posterior decompression.     DISCS/SPINAL CANAL/NEURAL FORAMINA:      T12-L1:  Moderate central to right paracentral disc herniation indenting   the   thecal sac. The laminectomy decompresses the spinal canal posteriorly.   There is   moderate left and more severe right foraminal stenosis.    L1-L2:  No disc bulge or herniation. Facet arthropathy with moderate   right and   more severe left foraminal stenosis.    L2-L3:  Severe disc space narrowing. There is spondylosis. There is a   left   paracentral osteophyte and disc bulge. This encroaches on the left   lateral   recess there is severe bilateral foraminal stenosis.. A laminectomy   decompresses the spinal canal posteriorly.    L3-L4:  Severe disc space narrowing with a posterior osteophyte and disc   bulge. A laminectomy decompresses the spinal canal posteriorly. There is   moderate to severe bilateral foraminal stenosis.    L4-L5:  There is a largecentral to right paracentral disc herniation   indenting the thecal sac. A laminectomy decompresses the spinal canal   posteriorly. There is severe bilateral foraminal stenosis.    L5-S1:  Mild disc bulge. Facet arthropathy with severe bilateral   foraminal   stenosis.      Kidneys and ureters:  Bilateral renal cysts are only partly visualized.    Soft tissues:  There is no evidence of paraspinous or intraspinal soft   tissue   mass, hemorrhage or fluid collection.     IMPRESSION:   1. T11-T12: Cephalad disc extrusion superimposed on a facet and ligament   hypertrophy resulting in severe spinal stenosis with cord compression.   This is   slightly superior to a posterior laminectomy and decompression. There is   increased signal within the spinal cord indicating myelopathic changes   secondary to cord compression.   2. Disc disease and spondylosis from T12-L1 to L5-S1 is detailed above.   Central   stenosis is decompressed by laminectomies at T12-L1 and L2-L3 to L4-L5.   Multilevel foraminal stenosis as detailed above.          ******PRELIMINARY REPORT******    ******PRELIMINARY REPORT******              ANDREAS CARLIN     < end of copied text >      A/P:  Pt is a 73y Male with progressively worsening LE weakness x 1.5 weeks. MRI shows T11-T12 spinal stenosis with cord compression.      PLAN d/w Dr. Gray:  * Pain control as clinically indicated  * MRI L spine completed  * F/u MRI C & T spine (ordered)  * Patient likely non op as he is a poor surgical candidate  * Final Treatment plan to be finalized after review of pending imaging studies by Dr. Gray.

## 2018-11-26 NOTE — ED STATDOCS - OBJECTIVE STATEMENT
Telemedicine assessment was conducted (using real time 2 way audio-video technology) by Dr. Conchis Paiz  located at [address] [place] [id number]  ++++++++++++++++++++++++  Pertinent patient history and initial plan:     Pt is a 74 y/o M, with PMHx of BPH, DM, HLD, HTN, hypothyroidism, and OA, presenting to the ED with c/o swelling and pain to the BLE, with progressive difficulties with ambulation for the last 1.5 weeks. Pt states that at baseline, he walks with a walker, but over the last 1.5 weeks he has not been able to walk even with the walker. He reports progressive pain and swelling to the BLE. No trauma. Pt also c/o urinary urgency and frequency over the same time frame. He notes that he is not drinking as much water as usual but has the sensation of fully voiding but still dribbling after standing up. Denies fever. Pt reports chronic back pain s/p laminectomy. Denies numbness/tingling/weakness to the LE, fevers, abd pain, CP, SOB, diarrhea, constipation. No recent falls or sick contacts. Telemedicine assessment was conducted (using real time 2 way audio-video technology) by Dr. Conchis Paiz  located at [address] [place] [phone]  ++++++++++++++++++++++++  Pertinent patient history and initial plan:     Pt is a 74 y/o M, with PMHx of BPH, DM, HLD, HTN, hypothyroidism, and OA, presenting to the ED with c/o swelling and pain to the BLE, with progressive difficulties with ambulation for the last 1 week. Pt states that at baseline, he walks with a walker, but over the last 1 week he has not been able to walk even with the walker. He reports progressive pain and swelling to the BLE. No trauma. Pt also c/o urinary incontinence over the same time period, no dysuria, no fevers. He notes that he is not drinking as much water as usual but has the sensation of fully voiding but still dribbling after standing up.  Pt reports chronic back pain s/p laminectomy. Denies numbness/tingling to the LE,, abd pain, CP, SOB, diarrhea, constipation. No recent falls or sick contacts.    On virtual and self exam pt appears chronically ill, wheelchair bound, no pedal edema, unable to lift up legs without assistance.    Plan - concern for cord compression, will check labs, defer MRI till in person exam

## 2018-11-26 NOTE — ED ADULT NURSE NOTE - NSIMPLEMENTINTERV_GEN_ALL_ED
Implemented All Fall Risk Interventions:  [place] to call system. Call bell, personal items and telephone within reach. Instruct patient to call for assistance. Room bathroom lighting operational. Non-slip footwear when patient is off stretcher. Physically safe environment: no spills, clutter or unnecessary equipment. Stretcher in lowest position, wheels locked, appropriate side rails in place. Provide visual cue, wrist band, yellow gown, etc. Monitor gait and stability. Monitor for mental status changes and reorient to person, place, and time. Review medications for side effects contributing to fall risk. Reinforce activity limits and safety measures with patient and family.

## 2018-11-26 NOTE — ED PROVIDER NOTE - PROGRESS NOTE DETAILS
received sign out dr lux . pt refused thoracic mri. awaiting spine formal recs. medicine not accepting pt withotu spine recs. awaiting attending recs that has been consulted [place]: received sign out from , spine saw the patient, recommend non-operative management. I spoke to hospitalist team, admit to Dr. Enriquez

## 2018-11-26 NOTE — ED ADULT NURSE NOTE - OBJECTIVE STATEMENT
pt reports inability to walk for week and a half due to leg swelling and giving out. pt reports after a hip replacement and laminectomy was using a walker but isn't able to stand and walk lately. states his wife has been helping him to get around. pt also reports 5 recent falls and back pain. pt pending some testing to determine if there is nerve damage and these symptoms are r/t neuropathy. pt reports inability to walk for week and a half due to leg swelling and giving out, also with back pain. pt reports after a hip replacement and laminectomy was using a walker but isn't able to stand and walk lately. states his wife has been helping him to get around. pt also reports 5 recent falls last fall 2 weeks ago. pt pending some out-pt testing to determine if there is nerve damage and these symptoms are r/t neuropathy.

## 2018-11-26 NOTE — ED ADULT TRIAGE NOTE - CHIEF COMPLAINT QUOTE
Pt comes to ED accompanied by family c/o increased swelling to B/L lower extremities, hx of DM, also reports neuropathy to B/L lower extremities.

## 2018-11-27 DIAGNOSIS — M48.00 SPINAL STENOSIS, SITE UNSPECIFIED: ICD-10-CM

## 2018-11-27 LAB
BLD GP AB SCN SERPL QL: SIGNIFICANT CHANGE UP
CULTURE RESULTS: SIGNIFICANT CHANGE UP
GLUCOSE BLDC GLUCOMTR-MCNC: 184 MG/DL — HIGH (ref 70–99)
GLUCOSE BLDC GLUCOMTR-MCNC: 227 MG/DL — HIGH (ref 70–99)
GLUCOSE BLDC GLUCOMTR-MCNC: 294 MG/DL — HIGH (ref 70–99)
SPECIMEN SOURCE: SIGNIFICANT CHANGE UP
TYPE + AB SCN PNL BLD: SIGNIFICANT CHANGE UP
VIT B12 SERPL-MCNC: 436 PG/ML — SIGNIFICANT CHANGE UP (ref 232–1245)

## 2018-11-27 PROCEDURE — 72141 MRI NECK SPINE W/O DYE: CPT | Mod: 26

## 2018-11-27 PROCEDURE — 72146 MRI CHEST SPINE W/O DYE: CPT | Mod: 26

## 2018-11-27 PROCEDURE — 99223 1ST HOSP IP/OBS HIGH 75: CPT | Mod: AI

## 2018-11-27 PROCEDURE — 93010 ELECTROCARDIOGRAM REPORT: CPT

## 2018-11-27 PROCEDURE — 72128 CT CHEST SPINE W/O DYE: CPT | Mod: 26

## 2018-11-27 PROCEDURE — 72131 CT LUMBAR SPINE W/O DYE: CPT | Mod: 26

## 2018-11-27 RX ORDER — BUPIVACAINE 13.3 MG/ML
20 INJECTION, SUSPENSION, LIPOSOMAL INFILTRATION ONCE
Refills: 0 | Status: DISCONTINUED | OUTPATIENT
Start: 2018-11-28 | End: 2018-11-28

## 2018-11-27 RX ORDER — INSULIN LISPRO 100/ML
VIAL (ML) SUBCUTANEOUS
Refills: 0 | Status: DISCONTINUED | OUTPATIENT
Start: 2018-11-27 | End: 2018-11-28

## 2018-11-27 RX ORDER — DEXAMETHASONE 0.5 MG/5ML
6 ELIXIR ORAL ONCE
Refills: 0 | Status: DISCONTINUED | OUTPATIENT
Start: 2018-11-27 | End: 2018-11-27

## 2018-11-27 RX ORDER — DEXTROSE 50 % IN WATER 50 %
25 SYRINGE (ML) INTRAVENOUS ONCE
Refills: 0 | Status: DISCONTINUED | OUTPATIENT
Start: 2018-11-27 | End: 2018-11-28

## 2018-11-27 RX ORDER — CEPHALEXIN 500 MG
500 CAPSULE ORAL
Refills: 0 | Status: DISCONTINUED | OUTPATIENT
Start: 2018-11-27 | End: 2018-11-27

## 2018-11-27 RX ORDER — DEXAMETHASONE 0.5 MG/5ML
10 ELIXIR ORAL ONCE
Refills: 0 | Status: COMPLETED | OUTPATIENT
Start: 2018-11-27 | End: 2018-11-27

## 2018-11-27 RX ORDER — CELECOXIB 200 MG/1
200 CAPSULE ORAL ONCE
Refills: 0 | Status: COMPLETED | OUTPATIENT
Start: 2018-11-28 | End: 2018-11-28

## 2018-11-27 RX ORDER — ATORVASTATIN CALCIUM 80 MG/1
40 TABLET, FILM COATED ORAL AT BEDTIME
Refills: 0 | Status: DISCONTINUED | OUTPATIENT
Start: 2018-11-27 | End: 2018-11-28

## 2018-11-27 RX ORDER — GLUCAGON INJECTION, SOLUTION 0.5 MG/.1ML
1 INJECTION, SOLUTION SUBCUTANEOUS ONCE
Refills: 0 | Status: DISCONTINUED | OUTPATIENT
Start: 2018-11-27 | End: 2018-11-28

## 2018-11-27 RX ORDER — LISINOPRIL 2.5 MG/1
10 TABLET ORAL DAILY
Refills: 0 | Status: DISCONTINUED | OUTPATIENT
Start: 2018-11-27 | End: 2018-11-28

## 2018-11-27 RX ORDER — SENNA PLUS 8.6 MG/1
2 TABLET ORAL AT BEDTIME
Refills: 0 | Status: DISCONTINUED | OUTPATIENT
Start: 2018-11-27 | End: 2018-11-28

## 2018-11-27 RX ORDER — LISINOPRIL 2.5 MG/1
10 TABLET ORAL DAILY
Refills: 0 | Status: DISCONTINUED | OUTPATIENT
Start: 2018-11-27 | End: 2018-11-27

## 2018-11-27 RX ORDER — DIAZEPAM 5 MG
5 TABLET ORAL ONCE
Refills: 0 | Status: DISCONTINUED | OUTPATIENT
Start: 2018-11-27 | End: 2018-11-28

## 2018-11-27 RX ORDER — PANTOPRAZOLE SODIUM 20 MG/1
40 TABLET, DELAYED RELEASE ORAL
Refills: 0 | Status: DISCONTINUED | OUTPATIENT
Start: 2018-11-27 | End: 2018-11-28

## 2018-11-27 RX ORDER — ENOXAPARIN SODIUM 100 MG/ML
40 INJECTION SUBCUTANEOUS ONCE
Refills: 0 | Status: COMPLETED | OUTPATIENT
Start: 2018-11-27 | End: 2018-11-27

## 2018-11-27 RX ORDER — SODIUM CHLORIDE 9 MG/ML
1000 INJECTION, SOLUTION INTRAVENOUS
Refills: 0 | Status: DISCONTINUED | OUTPATIENT
Start: 2018-11-27 | End: 2018-11-28

## 2018-11-27 RX ORDER — TAMSULOSIN HYDROCHLORIDE 0.4 MG/1
0.4 CAPSULE ORAL AT BEDTIME
Refills: 0 | Status: DISCONTINUED | OUTPATIENT
Start: 2018-11-27 | End: 2018-11-28

## 2018-11-27 RX ORDER — LEVOTHYROXINE SODIUM 125 MCG
137 TABLET ORAL DAILY
Refills: 0 | Status: DISCONTINUED | OUTPATIENT
Start: 2018-11-27 | End: 2018-11-28

## 2018-11-27 RX ORDER — DEXTROSE 50 % IN WATER 50 %
15 SYRINGE (ML) INTRAVENOUS ONCE
Refills: 0 | Status: DISCONTINUED | OUTPATIENT
Start: 2018-11-27 | End: 2018-11-28

## 2018-11-27 RX ORDER — KETOROLAC TROMETHAMINE 30 MG/ML
15 SYRINGE (ML) INJECTION ONCE
Refills: 0 | Status: DISCONTINUED | OUTPATIENT
Start: 2018-11-27 | End: 2018-11-27

## 2018-11-27 RX ORDER — OXYCODONE HYDROCHLORIDE 5 MG/1
10 TABLET ORAL ONCE
Refills: 0 | Status: DISCONTINUED | OUTPATIENT
Start: 2018-11-28 | End: 2018-11-28

## 2018-11-27 RX ORDER — DEXTROSE 50 % IN WATER 50 %
12.5 SYRINGE (ML) INTRAVENOUS ONCE
Refills: 0 | Status: DISCONTINUED | OUTPATIENT
Start: 2018-11-27 | End: 2018-11-28

## 2018-11-27 RX ADMIN — Medication 500 MILLIGRAM(S): at 02:42

## 2018-11-27 RX ADMIN — PANTOPRAZOLE SODIUM 40 MILLIGRAM(S): 20 TABLET, DELAYED RELEASE ORAL at 17:33

## 2018-11-27 RX ADMIN — Medication 15 MILLIGRAM(S): at 03:00

## 2018-11-27 RX ADMIN — LISINOPRIL 10 MILLIGRAM(S): 2.5 TABLET ORAL at 12:40

## 2018-11-27 RX ADMIN — SENNA PLUS 2 TABLET(S): 8.6 TABLET ORAL at 23:37

## 2018-11-27 RX ADMIN — Medication 4: at 17:32

## 2018-11-27 RX ADMIN — Medication 6: at 12:40

## 2018-11-27 RX ADMIN — ATORVASTATIN CALCIUM 40 MILLIGRAM(S): 80 TABLET, FILM COATED ORAL at 23:37

## 2018-11-27 RX ADMIN — Medication 500 MILLIGRAM(S): at 06:21

## 2018-11-27 RX ADMIN — Medication 10 MILLIGRAM(S): at 02:41

## 2018-11-27 RX ADMIN — ENOXAPARIN SODIUM 40 MILLIGRAM(S): 100 INJECTION SUBCUTANEOUS at 12:40

## 2018-11-27 RX ADMIN — TAMSULOSIN HYDROCHLORIDE 0.4 MILLIGRAM(S): 0.4 CAPSULE ORAL at 23:38

## 2018-11-27 RX ADMIN — Medication 137 MICROGRAM(S): at 12:40

## 2018-11-27 RX ADMIN — SODIUM CHLORIDE 80 MILLILITER(S): 9 INJECTION, SOLUTION INTRAVENOUS at 23:56

## 2018-11-27 RX ADMIN — Medication 15 MILLIGRAM(S): at 02:41

## 2018-11-27 NOTE — H&P ADULT - NSHPPHYSICALEXAM_GEN_ALL_CORE
Vital Signs Last 24 Hrs  T(C): 37 (27 Nov 2018 07:46), Max: 37 (27 Nov 2018 07:46)  T(F): 98.6 (27 Nov 2018 07:46), Max: 98.6 (27 Nov 2018 07:46)  HR: 96 (27 Nov 2018 07:46) (85 - 100)  BP: 177/93 (27 Nov 2018 07:46) (154/74 - 182/83)  BP(mean): --  RR: 16 (27 Nov 2018 07:46) (16 - 20)  SpO2: 98% (27 Nov 2018 07:46) (98% - 100%)    General appearance: No acute distress, Awake, Alert  HEENT: Normocephalic, Atraumatic, Conjunctiva clear, EOMI  Neck: Supple, No JVD, No tenderness  Lungs: Clear to auscultation, Breath sound equal bilaterally, No wheezes, No rales  Cardiovascular: S1S2, Regular rhythm  Abdomen: Soft, Nontender, Nondistended, No guarding/rebound, Positive bowel sounds  Extremities: No clubbing, No cyanosis, No edema, No calf tenderness  Neuro: Decreased strength and sensation to the lower extremities  Psychiatric: Appropriate mood, Normal affect

## 2018-11-27 NOTE — ED ADULT NURSE REASSESSMENT NOTE - COMFORT CARE
plan of care explained/repositioned/side rails up/treatment delay explained/wait time explained/warm blanket provided

## 2018-11-27 NOTE — CONSULT NOTE ADULT - SUBJECTIVE AND OBJECTIVE BOX
CHIEF COMPLAINT: cant walk    HPI: 73yMale  73M presented with increasing leg weakness and episodes of mechanical falls. The patient reports as history of spinal stenosis for which he has had laminectomies in the past and has had increasing difficulty ambulating with his walker. He also noted decrease in sensation in both his legs. He reports a history of chronic back pain which has not changed recently. He denied any urinary incontinence but did note some hesitancy with urination. There was no associated dysuria or hematuria. The leg weakness is constant and progressive. He is unaware of any aggravating or relieving factors. The patient's wife reported decrease in oral intake at home.      Patient has had walking difficulties for several months.  EMG/ NCV study in Feb 2018 showed moderately severe sensorimotor axonal polyneuropathy and chronic L3-4 bilateral radiculopathy and active right radiculopathy.  Saw Dr Shields on 11/20 who recommended blood work for autoimmune disease (not yet done)    PAST MEDICAL & SURGICAL HISTORY:  BPH (benign prostatic hyperplasia)  HTN (hypertension)  Hypothyroid  Dyslipidemia  OA (osteoarthritis)  Diabetes  S/P laminectomy T11-12, L3-4  S/P hernia repair  S/P hip replacement: R    MEDICATIONS  (STANDING):  atorvastatin 40 milliGRAM(s) Oral at bedtime  dextrose 5%. 1000 milliLiter(s) (50 mL/Hr) IV Continuous <Continuous>  dextrose 50% Injectable 12.5 Gram(s) IV Push once  dextrose 50% Injectable 25 Gram(s) IV Push once  dextrose 50% Injectable 25 Gram(s) IV Push once  diazepam    Tablet 5 milliGRAM(s) Oral once  enoxaparin Injectable 40 milliGRAM(s) SubCutaneous Once  insulin lispro (HumaLOG) corrective regimen sliding scale   SubCutaneous three times a day before meals  levothyroxine 137 MICROGram(s) Oral daily  pantoprazole    Tablet 40 milliGRAM(s) Oral before breakfast  senna 2 Tablet(s) Oral at bedtime  tamsulosin 0.4 milliGRAM(s) Oral at bedtime    MEDICATIONS  (PRN):  dextrose 40% Gel 15 Gram(s) Oral once PRN Blood Glucose LESS THAN 70 milliGRAM(s)/deciliter  glucagon  Injectable 1 milliGRAM(s) IntraMuscular once PRN Glucose LESS THAN 70 milligrams/deciliter    Allergies    No Known Allergies    Intolerances        FAMILY HISTORY:  Family history of diabetes mellitus (Father)          SOCIAL HISTORY:    Tobacco: no    Alcohol:   no  Drugs:   no        REVIEW OF SYSTEMS:    Relevant systems are negative except as noted in the chart, HPI, and PMH      VITAL SIGNS:  Vital Signs Last 24 Hrs  T(C): 37 (27 Nov 2018 07:46), Max: 37 (27 Nov 2018 07:46)  T(F): 98.6 (27 Nov 2018 07:46), Max: 98.6 (27 Nov 2018 07:46)  HR: 96 (27 Nov 2018 07:46) (85 - 100)  BP: 177/93 (27 Nov 2018 07:46) (154/74 - 182/83)  BP(mean): --  RR: 16 (27 Nov 2018 07:46) (16 - 20)  SpO2: 98% (27 Nov 2018 07:46) (98% - 100%)    PHYSICAL EXAMINATION:    General: Well-developed, well nourished, in no acute distress.  Cardiac:  Regular rate and rhythm. No carotid bruits appreciated.  Eyes: Fundoscopic examination was deferred.  Neurologic:  - Mental Status:  Alert, awake, oriented to person, place, and time; Speech is fluent. Language is normal. Follows commands well.  Insight and knowledge appear appropriate.  Cranial Nerves II-XII:    II:  Visual acuity is normal for age ; Visual fields are full to confrontation; Pupils are equal, round, and reactive to light.  III, IV, VI:  Extraocular movements are intact without nystagmus.  V:  Facial sensation is intact in the V1-V3 distribution bilaterally.  VII:  Face is symmetric with normal eye closure and smile  VIII:  Hearing is grossly intact  IX, X, XII:  speech is clear  XI:  Head turning and shoulder shrug are intact.  - Motor:  Strength is 5/5 x 4 except 4/5 bialteral EHL    There is no pronator drift. .  - Reflexes:  1+ and symmetric at the knees. Trace at ankles   Plantar responses flexor.  - Sensory:  Decreased bilateral LE distal with additonal reduction in the left L5,S1 dermatomes  - Coordination:  Finger-nose-finger is normal. Rapid alternating hand and foot  movements are intact. Dexterity appears normal      LABS:                          12.1   6.6   )-----------( 287      ( 26 Nov 2018 17:19 )             36.6     26 Nov 2018 17:19    131    |  91     |  8.0    ----------------------------<  180    4.4     |  28.0   |  0.72     Ca    8.7        26 Nov 2018 17:19    TPro  7.7    /  Alb  4.3    /  TBili  0.5    /  DBili  x      /  AST  28     /  ALT  21     /  AlkPhos  95     26 Nov 2018 17:19    LIVER FUNCTIONS - ( 26 Nov 2018 17:19 )  Alb: 4.3 g/dL / Pro: 7.7 g/dL / ALK PHOS: 95 U/L / ALT: 21 U/L / AST: 28 U/L / GGT: x           PT/INR - ( 26 Nov 2018 17:19 )   PT: 14.8 sec;   INR: 1.28 ratio         PTT - ( 26 Nov 2018 17:19 )  PTT:33.0 sec      RADIOLOGY & ADDITIONAL STUDIES:    < from: MR Cervical Spine No Cont (11.27.18 @ 09:27) >  IMPRESSION:      Asymmetric to the right discherniation at C2/C3 mildly flattening   the ventral aspect of the cord. No cord signal abnormality. Additional   mild multilevel discogenic degenerative changes, as above.      < end of copied text >      ]< from: MR Thoracic Spine No Cont (11.27.18 @ 09:28) >  IMPRESSION: Mild cord compression at T11/T12, unchanged. No cord signal   abnormality. Limited study secondary to motion artifact. Consider   follow-up imaging with anesthesia sedation.    < end of copied text >      < from: MR Lumbar Spine No Cont (11.26.18 @ 21:26) >  DISCS/SPINAL CANAL/NEURAL FORAMINA:      T12-L1:  Moderate central to right paracentral disc herniation indenting   the   thecal sac. The laminectomy decompresses the spinal canal posteriorly.   There is   moderate left and more severe right foraminal stenosis.    L1-L2:  No disc bulge or herniation. Facet arthropathy with moderate   right and   more severe left foraminal stenosis.    L2-L3:  Severe disc space narrowing. There is spondylosis. There is a   left   paracentral osteophyte and disc bulge. This encroaches on the left   lateral   recess there is severe bilateral foraminal stenosis.. A laminectomy   decompresses the spinal canal posteriorly.    L3-L4:  Severe disc space narrowing with a posterior osteophyte and disc   bulge. A laminectomy decompresses the spinal canal posteriorly. There is   moderate to severe bilateral foraminal stenosis.    L4-L5:  There is a largecentral to right paracentral disc herniation   indenting the thecal sac. A laminectomy decompresses the spinal canal   posteriorly. There is severe bilateral foraminal stenosis.    L5-S1:  Mild disc bulge. Facet arthropathy with severe bilateral   foraminal   stenosis.      Kidneys and ureters:  Bilateral renal cysts are only partly visualized.    Soft tissues:  There is no evidence of paraspinous or intraspinal soft   tissue   mass, hemorrhage or fluid collection.     IMPRESSION:   1. T11-T12: Cephalad disc extrusion superimposed on a facet and ligament   hypertrophy resulting in severe spinal stenosis with cord compression.   This is   slightly superior to a posterior laminectomy and decompression. There is   increased signal within the spinal cord indicating myelopathic changes   secondary to cord compression.   2. Disc disease and spondylosis from T12-L1 to L5-S1 is detailed above.   Central   stenosis is decompressed by laminectomies at T12-L1 and L2-L3 to L4-L5.   Multilevel foraminal stenosis as detailed above.        < end of copied text >  IMPRESSION:    Polyneuropathy- chronic, likely diabetic  Lumbar Radiculopathies chronic and active based on EMG from Feb 2018.  h/o bilateral laminectomies T11-12 and L3-5  There is persistent foraminal stenosis at these levels. His neurologic exam does not demonstrate any notable myelopathic features to clearly implicat the cervical  or thoracic spine stenosis as contirbuting to his progressive gait difficulties    PLAN:  1. Orthopedic evaluation - Dr. Gray-   2. Last EMG was 9 months ago. Might be beneficial to repeat the study if additonal surgery is being contemplated  3. Will order labs that Dr. Shields had requested last week in the office , not yet done  4. PT, Rehab, pain managment

## 2018-11-27 NOTE — H&P ADULT - NSHPSOCIALHISTORY_GEN_ALL_CORE
Prior tobacco use  Denied alcohol abuse   Lives at home with his wife    Family History:  Father with diabetes

## 2018-11-27 NOTE — PROGRESS NOTE ADULT - SUBJECTIVE AND OBJECTIVE BOX
ORTHO-SPINE PROGRESS NOTE:    Pt Name: TIMUR LOUIS    MRN: [id number]      Patient is a being followed for progressive lower extremity weakness. He well known to the ortho-spine program. He has a long standing history of thoracic spine stenosis. He reports of decreased right LE sensation. He does have a history of peripheral neuropathy. He currently reports of left LE weakness. He is pending MR's of C & T spine. Current management plan is non-operative given overall medical comorbities but operative management could be considered depending on current imaging.         PAST MEDICAL & SURGICAL HISTORY:  BPH (benign prostatic hyperplasia)  HTN (hypertension)  Hypothyroid  Dyslipidemia  OA (osteoarthritis)  Diabetes  S/P laminectomy  S/P hernia repair  S/P hip replacement: R      Allergies: No Known Allergies      Medications: cephalexin 500 milliGRAM(s) Oral four times a day                            12.1   6.6   )-----------( 287      ( 26 Nov 2018 17:19 )             36.6     11-26    131<L>  |  91<L>  |  8.0  ----------------------------<  180<H>  4.4   |  28.0  |  0.72    Ca    8.7      26 Nov 2018 17:19    TPro  7.7  /  Alb  4.3  /  TBili  0.5  /  DBili  x   /  AST  28  /  ALT  21  /  AlkPhos  95  11-26      PHYSICAL EXAM:    Vital Signs Last 24 Hrs  T(C): 36.7 (27 Nov 2018 02:47), Max: 36.9 (27 Nov 2018 00:00)  T(F): 98 (27 Nov 2018 02:47), Max: 98.4 (27 Nov 2018 00:00)  HR: 85 (27 Nov 2018 02:47) (85 - 100)  BP: 156/70 (27 Nov 2018 02:47) (154/74 - 182/83)  BP(mean): --  RR: 16 (27 Nov 2018 02:47) (16 - 20)  SpO2: 100% (27 Nov 2018 02:47) (98% - 100%)  Daily Height in cm: 170.18 (26 Nov 2018 16:01)    Daily     Appearance: Alert, responsive, in no acute distress.    Neurological: Sensation of the left LE is grossly intact to light touch without focal deficit. There is diminished distal RLE sensation distal to the knee. 4/5 motor function of right hip/ knee and ankle. 3/5 motor strength of the LLE joints. No clonus noted. No focal deficits or weaknesses found.    Skin: no rash on visible skin. Skin is clean, dry and intact. No bleeding. No abrasions. No ulcerations.    Vascular: 2+ distal pulses. Cap refill < 2 sec. No signs of venous   insufficiency   or stasis. No extremity ulcerations. No cyanosis.    MS: No bony tenderness. No calf tenderness.       A/P:  Pt is a  73y Male with T11-T12 cord compression with myelopathic changes of the LE's    PLAN:   * Pending MR's of C& T spines  * Likely non-op management - may consider operative intervention to halt progression of myelopathic changes. Operative risks were briefly discussed with patient.

## 2018-11-27 NOTE — H&P ADULT - ASSESSMENT
73M with spinal stenosis and progressive lower extremity weakness.    Spinal stenosis - Lumbar MRI results noted. Spine consultation noted, unclear if the patient would be suitable for further surgery. MRI of the cervical and thoracic spine pending results. Physical Therapy evaluation for leg weakness and multiple falls.    Diabetes - Insulin coverage, close monitoring of blood glucose levels.    Hypothyroidism - On levothyroxine.    Hyponatremia - Mild. Repeat laboratory studies ordered to monitor. Diet as tolerated.    Discussed with the patient and her  at the bedside.

## 2018-11-27 NOTE — H&P ADULT - NEGATIVE CARDIOVASCULAR SYMPTOMS
no chest pain/no palpitations/no dyspnea on exertion/no orthopnea/no peripheral edema/no claudication

## 2018-11-27 NOTE — H&P ADULT - HISTORY OF PRESENT ILLNESS
73M presented with increasing leg weakness and episodes of mechanical falls. The patient reports as history of spinal stenosis for which he has had laminectomies in the past and has had increasing difficulty ambulating with his walker. He also noted decrease in sensation in both his legs. He reports a history of chronic back pain which has not changed recently. He denied any urinary incontinence but did note some hesitancy with urination. There was no associated dysuria or hematuria. The leg weakness is constant and progressive. He is unaware of any aggravating or relieving factors. The patient's wife reported decrease in oral intake at home.

## 2018-11-27 NOTE — CONSULT NOTE ADULT - SUBJECTIVE AND OBJECTIVE BOX
[place] [facility]                                   [address] [place] [address]                                                      PHONE: [phone]                                                         FAX: [phone]  [email or web address]  ---------------------------------------------------------------------------------------------------------------------------------    Reason for Consult: CV CLEARANCE    HPI:  TIMUR LOUIS is an 73y Male with Hx of HTN HCL prior spinal surgery a year ago presenting to [facility] after he had experiencing weakness and falls in the last few weeks planning to go for laminectomy tomorrow. Cardiac wise asymptomatic no CP or SOB. N o prior Hx of CAD or CVA    PAST MEDICAL & SURGICAL HISTORY:  BPH (benign prostatic hyperplasia)  HTN (hypertension)  Hypothyroid  Dyslipidemia  OA (osteoarthritis)  Diabetes  S/P laminectomy  S/P hernia repair  S/P hip replacement: R      No Known Allergies      MEDICATIONS  (STANDING):  atorvastatin 40 milliGRAM(s) Oral at bedtime  dextrose 5%. 1000 milliLiter(s) (50 mL/Hr) IV Continuous <Continuous>  dextrose 50% Injectable 12.5 Gram(s) IV Push once  dextrose 50% Injectable 25 Gram(s) IV Push once  dextrose 50% Injectable 25 Gram(s) IV Push once  diazepam    Tablet 5 milliGRAM(s) Oral once  insulin lispro (HumaLOG) corrective regimen sliding scale   SubCutaneous three times a day before meals  levothyroxine 137 MICROGram(s) Oral daily  lisinopril 10 milliGRAM(s) Oral daily  pantoprazole    Tablet 40 milliGRAM(s) Oral before breakfast  senna 2 Tablet(s) Oral at bedtime  tamsulosin 0.4 milliGRAM(s) Oral at bedtime    MEDICATIONS  (PRN):  dextrose 40% Gel 15 Gram(s) Oral once PRN Blood Glucose LESS THAN 70 milliGRAM(s)/deciliter  glucagon  Injectable 1 milliGRAM(s) IntraMuscular once PRN Glucose LESS THAN 70 milligrams/deciliter      Social History:  Cigarettes:                    Alchohol:                 Illicit Drug Abuse:      REVIEW OF SYSTEMS:    Constitutional: No fever, weight loss or fatigue  Eyes: No eye pain, visual disturbances, or discharge  ENMT:  No difficulty hearing, tinnitus, vertigo; No sinus or throat pain  Neck: No pain or stiffness  Respiratory: No cough, wheezing, chills or hemoptysis  Cardiovascular: No chest pain, palpitations, shortness of breath, dizziness or leg swelling  Gastrointestinal: No abdominal or epigastric pain. No nausea, vomiting or hematemesis; No diarrhea or constipation. No melena or hematochezia.  Genitourinary: No dysuria, frequency, hematuria or incontinence  Rectal: No pain, hemorrhoids or incontinence  Neurological: No headaches, memory loss, loss of strength, numbness or tremors  Skin: No itching, burning, rashes or lesions   Lymph Nodes: No enlarged glands  Endocrine: No heat or cold intolerance; No hair loss  Musculoskeletal: No joint pain or swelling; No muscle, back or extremity pain  Psychiatric: No depression, anxiety, mood swings or difficulty sleeping  Heme/Lymph: No easy bruising or bleeding gums  Allergy and Immunologic: No hives or eczema    Vital Signs Last 24 Hrs  T(C): 36.9 (2018 15:56), Max: 37 (2018 07:46)  T(F): 98.4 (2018 15:56), Max: 98.6 (2018 07:46)  HR: 92 (2018 15:56) (85 - 100)  BP: 150/83 (2018 15:56) (150/83 - 177/93)  BP(mean): --  RR: 18 (2018 15:56) (16 - 18)  SpO2: 98% (2018 15:56) (96% - 100%)  ICU Vital Signs Last 24 Hrs  TIMUR LOUIS  I&O's Detail    I&O's Summary    Drug Dosing Weight  TIMUR LOUIS      PHYSICAL EXAM:  General: Appears well developed, well nourished alert and cooperative.  HEENT: Head; normocephalic, atraumatic.  Eyes: Pupils reactive, cornea wnl.  Neck: Supple, no nodes adenopathy, no NVD or carotid bruit or thyromegaly.  CARDIOVASCULAR: Normal S1 and S2, No murmur, rub, gallop or lift.   LUNGS: No rales, rhonchi or wheeze. Normal breath sounds bilaterally.  ABDOMEN: Soft, nontender without mass or organomegaly. bowel sounds normoactive.  EXTREMITIES: No clubbing, cyanosis or edema. Distal pulses wnl.   SKIN: warm and dry with normal turgor.  NEURO: Alert/oriented x 3/normal motor exam. No pathologic reflexes.    PSYCH: normal affect.        LABS:                        12.1   6.6   )-----------( 287      ( 2018 17:19 )             36.6         131<L>  |  91<L>  |  8.0  ----------------------------<  180<H>  4.4   |  28.0  |  0.72    Ca    8.7      2018 17:19    TPro  7.7  /  Alb  4.3  /  TBili  0.5  /  DBili  x   /  AST  28  /  ALT  21  /  AlkPhos  95      TIMUR LOUIS      PT/INR - ( 2018 17:19 )   PT: 14.8 sec;   INR: 1.28 ratio         PTT - ( 2018 17:19 )  PTT:33.0 sec  Urinalysis Basic - ( 2018 17:45 )    Color: Yellow / Appearance: Clear / S.010 / pH: x  Gluc: x / Ketone: Negative  / Bili: Negative / Urobili: 4 mg/dL   Blood: x / Protein: 15 mg/dL / Nitrite: Negative   Leuk Esterase: Negative / RBC: 0-2 /HPF / WBC 0-2   Sq Epi: x / Non Sq Epi: Occasional / Bacteria: Moderate        RADIOLOGY & ADDITIONAL STUDIES:    INTERPRETATION OF TELEMETRY (personally reviewed):    ECG: NSR LVH      ACTIVE PROBLEMS:  HEALTH ISSUES - PROBLEM Dx:          Assessment and Plan:    TIMUR LOUIS is an 73y Male with Hx of HTN HCL prior spinal surgery a year ago presenting to [facility] after he had experiencing weakness and falls in the last few weeks planning to go for laminectomy tomorrow. Cardiac wise asymptomatic no CP or SOB. N o prior Hx of CAD or CVA    1) Preoperative Optimization       -No Cardiovascular Contraindication to laminectomy       -Patient is acceptable risk for his surgery       -Continue cardiac medications as scheduled       - Please recall with any questions or concerns       FLEX STEIN MD, FACC, ERYN

## 2018-11-27 NOTE — ED ADULT NURSE REASSESSMENT NOTE - NS ED NURSE REASSESS COMMENT FT1
Verbal report given to  holding  nurse Kirill More , pt care endorsed at this time to holding nurse, all questions answered, pt in no distress at this time
pt continues to c/o lower back pain, medicated as rxd. updated on plan of care, call bell in reach
received pt at this time from ongoing shift. pt a&ox3 still c/o generalized back pain. pt returned from mri, ortho pa at bedside to consult.
spine team at bedside, discussed at length with pt necessity of mri. pt verbalizes understanding. test scheduled for this am
Pt received in the stretcher awaiting MRI , no distress noted , no chest pain reported, pt  requesting a diaper , transport here to  pt to MRI

## 2018-11-27 NOTE — PROGRESS NOTE ADULT - ATTENDING COMMENTS
Patient was evaluated approximately 1400 hrs. on 1127 2018 have reviewed thoracic and lumbar MRIs. 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 I have read I do appreciate Dimitri Liu/neurology's note and I do agree that this is a multifactorial issue with diabetes lumbar stenosis peripheral foraminal encroachment etc. I have discussed a revision laminectomy at thoracic spine to help decompress his thoracic myelomalacia segment. Patient wishes to undergo this in an effort to possibly call to progressive thoracic myelopathy. Albeit multifactorial.    I discussed the potential for worsening signs and symptoms I think this may help prevent further progression of his gait abnormality urinary tract retention etc. He is also understanding of this mesh and may worsen the signs and symptoms and will require revision surgery. De La Cruz catheter will be required. Palpation follow up with neurology for EMG studies etc. The patient be scheduled for revision thoracic laminectomy tomorrow on 1128 2018. In addition to incomplete resolution of the signs and symptoms of also discussed potential for assistive devices he is currently using a rolling walker which I think this can be mandatory postoperative as well as the progression to use/utilization of a wheelchair    A long discussion was had with the patient regarding surgical plan. Patient is aware that surgery is elective in nature and is choosing to proceed with surgery. Risks, benefits, alternatives were discussed and all questions, comments and concerns were answered to the patient's satisfaction. The statistical probability of improvement was discussed at length as well as post surgical course.  Literature was provided regarding the specific type of surgery as well as a written surgical consent which the patient will need to sign and return to the office prior to surgical date.    If patient is a smoker, discontinuation of smoking was advised and must be accomplished 6-8 weeks prior to surgery date.  Patient was advised that help with quitting smoking is available through [place] [employer]oker[employer] Quit Line and phone number/website was provided, or patient can ask assistance from primary care provider.  Elective surgery will not be performed unless patient complies with this policy.

## 2018-11-28 LAB
ANION GAP SERPL CALC-SCNC: 15 MMOL/L — SIGNIFICANT CHANGE UP (ref 5–17)
APTT BLD: 32.6 SEC — SIGNIFICANT CHANGE UP (ref 27.5–36.3)
BASOPHILS # BLD AUTO: 0 K/UL — SIGNIFICANT CHANGE UP (ref 0–0.2)
BASOPHILS NFR BLD AUTO: 0.2 % — SIGNIFICANT CHANGE UP (ref 0–2)
BUN SERPL-MCNC: 14 MG/DL — SIGNIFICANT CHANGE UP (ref 8–20)
CALCIUM SERPL-MCNC: 8.7 MG/DL — SIGNIFICANT CHANGE UP (ref 8.6–10.2)
CHLORIDE SERPL-SCNC: 94 MMOL/L — LOW (ref 98–107)
CO2 SERPL-SCNC: 26 MMOL/L — SIGNIFICANT CHANGE UP (ref 22–29)
CREAT SERPL-MCNC: 0.76 MG/DL — SIGNIFICANT CHANGE UP (ref 0.5–1.3)
DSDNA AB FLD-ACNC: <0.2 AI — SIGNIFICANT CHANGE UP
ENA SS-A AB FLD IA-ACNC: <0.2 AI — SIGNIFICANT CHANGE UP
EOSINOPHIL # BLD AUTO: 0 K/UL — SIGNIFICANT CHANGE UP (ref 0–0.5)
EOSINOPHIL NFR BLD AUTO: 0.4 % — SIGNIFICANT CHANGE UP (ref 0–5)
GLUCOSE BLDC GLUCOMTR-MCNC: 213 MG/DL — HIGH (ref 70–99)
GLUCOSE BLDC GLUCOMTR-MCNC: 214 MG/DL — HIGH (ref 70–99)
GLUCOSE BLDC GLUCOMTR-MCNC: 222 MG/DL — HIGH (ref 70–99)
GLUCOSE BLDC GLUCOMTR-MCNC: 227 MG/DL — HIGH (ref 70–99)
GLUCOSE SERPL-MCNC: 211 MG/DL — HIGH (ref 70–115)
HBA1C BLD-MCNC: 7.5 % — HIGH (ref 4–5.6)
HCT VFR BLD CALC: 35.3 % — LOW (ref 42–52)
HCT VFR BLD CALC: 36.4 % — LOW (ref 42–52)
HGB BLD-MCNC: 11.5 G/DL — LOW (ref 14–18)
HGB BLD-MCNC: 11.9 G/DL — LOW (ref 14–18)
INR BLD: 1.28 RATIO — HIGH (ref 0.88–1.16)
INTERPRETATION SERPL IFE-IMP: SIGNIFICANT CHANGE UP
LYMPHOCYTES # BLD AUTO: 0.8 K/UL — LOW (ref 1–4.8)
LYMPHOCYTES # BLD AUTO: 9.2 % — LOW (ref 20–55)
MCHC RBC-ENTMCNC: 27 PG — SIGNIFICANT CHANGE UP (ref 27–31)
MCHC RBC-ENTMCNC: 27.7 PG — SIGNIFICANT CHANGE UP (ref 27–31)
MCHC RBC-ENTMCNC: 32.6 G/DL — SIGNIFICANT CHANGE UP (ref 32–36)
MCHC RBC-ENTMCNC: 32.7 G/DL — SIGNIFICANT CHANGE UP (ref 32–36)
MCV RBC AUTO: 82.7 FL — SIGNIFICANT CHANGE UP (ref 80–94)
MCV RBC AUTO: 85.1 FL — SIGNIFICANT CHANGE UP (ref 80–94)
MONOCYTES # BLD AUTO: 0.5 K/UL — SIGNIFICANT CHANGE UP (ref 0–0.8)
MONOCYTES NFR BLD AUTO: 5.3 % — SIGNIFICANT CHANGE UP (ref 3–10)
NEUTROPHILS # BLD AUTO: 7.6 K/UL — SIGNIFICANT CHANGE UP (ref 1.8–8)
NEUTROPHILS NFR BLD AUTO: 83.9 % — HIGH (ref 37–73)
PLATELET # BLD AUTO: 263 K/UL — SIGNIFICANT CHANGE UP (ref 150–400)
PLATELET # BLD AUTO: 307 K/UL — SIGNIFICANT CHANGE UP (ref 150–400)
POTASSIUM SERPL-MCNC: 3.7 MMOL/L — SIGNIFICANT CHANGE UP (ref 3.5–5.3)
POTASSIUM SERPL-SCNC: 3.7 MMOL/L — SIGNIFICANT CHANGE UP (ref 3.5–5.3)
PROTHROM AB SERPL-ACNC: 14.8 SEC — HIGH (ref 10–12.9)
RBC # BLD: 4.15 M/UL — LOW (ref 4.6–6.2)
RBC # BLD: 4.4 M/UL — LOW (ref 4.6–6.2)
RBC # FLD: 13.8 % — SIGNIFICANT CHANGE UP (ref 11–15.6)
RBC # FLD: 14.2 % — SIGNIFICANT CHANGE UP (ref 11–15.6)
SODIUM SERPL-SCNC: 135 MMOL/L — SIGNIFICANT CHANGE UP (ref 135–145)
T PALLIDUM AB TITR SER: NEGATIVE — SIGNIFICANT CHANGE UP
WBC # BLD: 10.7 K/UL — SIGNIFICANT CHANGE UP (ref 4.8–10.8)
WBC # BLD: 9 K/UL — SIGNIFICANT CHANGE UP (ref 4.8–10.8)
WBC # FLD AUTO: 10.7 K/UL — SIGNIFICANT CHANGE UP (ref 4.8–10.8)
WBC # FLD AUTO: 9 K/UL — SIGNIFICANT CHANGE UP (ref 4.8–10.8)

## 2018-11-28 PROCEDURE — 99232 SBSQ HOSP IP/OBS MODERATE 35: CPT

## 2018-11-28 PROCEDURE — 63046 LAM FACETEC & FORAMOT THRC: CPT | Mod: 22

## 2018-11-28 PROCEDURE — 20936 SP BONE AGRFT LOCAL ADD-ON: CPT

## 2018-11-28 PROCEDURE — 22842 INSERT SPINE FIXATION DEVICE: CPT

## 2018-11-28 PROCEDURE — 20930 SP BONE ALGRFT MORSEL ADD-ON: CPT

## 2018-11-28 PROCEDURE — 22610 ARTHRD PST TQ 1NTRSPC THRC: CPT

## 2018-11-28 PROCEDURE — 22614 ARTHRD PST TQ 1NTRSPC EA ADD: CPT

## 2018-11-28 PROCEDURE — 63048 LAM FACETEC &FORAMOT EA ADDL: CPT | Mod: 22

## 2018-11-28 RX ORDER — VANCOMYCIN HCL 1 G
1500 VIAL (EA) INTRAVENOUS ONCE
Refills: 0 | Status: COMPLETED | OUTPATIENT
Start: 2018-11-28 | End: 2018-11-28

## 2018-11-28 RX ORDER — MAGNESIUM HYDROXIDE 400 MG/1
30 TABLET, CHEWABLE ORAL EVERY 12 HOURS
Refills: 0 | Status: DISCONTINUED | OUTPATIENT
Start: 2018-11-28 | End: 2018-12-10

## 2018-11-28 RX ORDER — INSULIN HUMAN 100 [IU]/ML
6 INJECTION, SOLUTION SUBCUTANEOUS ONCE
Refills: 0 | Status: COMPLETED | OUTPATIENT
Start: 2018-11-28 | End: 2018-11-28

## 2018-11-28 RX ORDER — GABAPENTIN 400 MG/1
300 CAPSULE ORAL EVERY 8 HOURS
Refills: 0 | Status: DISCONTINUED | OUTPATIENT
Start: 2018-11-28 | End: 2018-12-10

## 2018-11-28 RX ORDER — DEXTROSE 50 % IN WATER 50 %
15 SYRINGE (ML) INTRAVENOUS ONCE
Refills: 0 | Status: DISCONTINUED | OUTPATIENT
Start: 2018-11-28 | End: 2018-12-05

## 2018-11-28 RX ORDER — LISINOPRIL 2.5 MG/1
10 TABLET ORAL DAILY
Refills: 0 | Status: DISCONTINUED | OUTPATIENT
Start: 2018-11-30 | End: 2018-12-04

## 2018-11-28 RX ORDER — SODIUM CHLORIDE 9 MG/ML
1000 INJECTION INTRAMUSCULAR; INTRAVENOUS; SUBCUTANEOUS
Refills: 0 | Status: DISCONTINUED | OUTPATIENT
Start: 2018-11-28 | End: 2018-11-28

## 2018-11-28 RX ORDER — VANCOMYCIN HCL 1 G
1500 VIAL (EA) INTRAVENOUS
Refills: 0 | Status: COMPLETED | OUTPATIENT
Start: 2018-11-28 | End: 2018-11-29

## 2018-11-28 RX ORDER — SODIUM CHLORIDE 9 MG/ML
1000 INJECTION, SOLUTION INTRAVENOUS
Refills: 0 | Status: DISCONTINUED | OUTPATIENT
Start: 2018-11-28 | End: 2018-12-04

## 2018-11-28 RX ORDER — ONDANSETRON 8 MG/1
4 TABLET, FILM COATED ORAL EVERY 6 HOURS
Refills: 0 | Status: DISCONTINUED | OUTPATIENT
Start: 2018-11-28 | End: 2018-11-28

## 2018-11-28 RX ORDER — SODIUM CHLORIDE 9 MG/ML
1000 INJECTION, SOLUTION INTRAVENOUS
Refills: 0 | Status: DISCONTINUED | OUTPATIENT
Start: 2018-11-28 | End: 2018-12-05

## 2018-11-28 RX ORDER — GLUCAGON INJECTION, SOLUTION 0.5 MG/.1ML
1 INJECTION, SOLUTION SUBCUTANEOUS ONCE
Refills: 0 | Status: DISCONTINUED | OUTPATIENT
Start: 2018-11-28 | End: 2018-12-05

## 2018-11-28 RX ORDER — SENNA PLUS 8.6 MG/1
2 TABLET ORAL AT BEDTIME
Refills: 0 | Status: DISCONTINUED | OUTPATIENT
Start: 2018-11-28 | End: 2018-12-10

## 2018-11-28 RX ORDER — DEXTROSE 50 % IN WATER 50 %
25 SYRINGE (ML) INTRAVENOUS ONCE
Refills: 0 | Status: DISCONTINUED | OUTPATIENT
Start: 2018-11-28 | End: 2018-12-05

## 2018-11-28 RX ORDER — ENOXAPARIN SODIUM 100 MG/ML
40 INJECTION SUBCUTANEOUS EVERY 24 HOURS
Refills: 0 | Status: DISCONTINUED | OUTPATIENT
Start: 2018-11-29 | End: 2018-12-10

## 2018-11-28 RX ORDER — FENTANYL CITRATE 50 UG/ML
30 INJECTION INTRAVENOUS
Refills: 0 | Status: DISCONTINUED | OUTPATIENT
Start: 2018-11-28 | End: 2018-11-28

## 2018-11-28 RX ORDER — DOCUSATE SODIUM 100 MG
100 CAPSULE ORAL THREE TIMES A DAY
Refills: 0 | Status: DISCONTINUED | OUTPATIENT
Start: 2018-11-28 | End: 2018-12-10

## 2018-11-28 RX ORDER — TAMSULOSIN HYDROCHLORIDE 0.4 MG/1
0.4 CAPSULE ORAL AT BEDTIME
Refills: 0 | Status: DISCONTINUED | OUTPATIENT
Start: 2018-11-28 | End: 2018-12-02

## 2018-11-28 RX ORDER — INSULIN HUMAN 100 [IU]/ML
8 INJECTION, SOLUTION SUBCUTANEOUS ONCE
Refills: 0 | Status: COMPLETED | OUTPATIENT
Start: 2018-11-28 | End: 2018-11-28

## 2018-11-28 RX ORDER — OXYCODONE HYDROCHLORIDE 5 MG/1
5 TABLET ORAL EVERY 4 HOURS
Refills: 0 | Status: DISCONTINUED | OUTPATIENT
Start: 2018-11-28 | End: 2018-11-29

## 2018-11-28 RX ORDER — ACETAMINOPHEN 500 MG
975 TABLET ORAL EVERY 6 HOURS
Refills: 0 | Status: DISCONTINUED | OUTPATIENT
Start: 2018-11-28 | End: 2018-12-05

## 2018-11-28 RX ORDER — INSULIN LISPRO 100/ML
VIAL (ML) SUBCUTANEOUS
Refills: 0 | Status: DISCONTINUED | OUTPATIENT
Start: 2018-11-28 | End: 2018-12-05

## 2018-11-28 RX ORDER — OXYCODONE HYDROCHLORIDE 5 MG/1
10 TABLET ORAL EVERY 4 HOURS
Refills: 0 | Status: DISCONTINUED | OUTPATIENT
Start: 2018-11-28 | End: 2018-11-29

## 2018-11-28 RX ORDER — ONDANSETRON 8 MG/1
4 TABLET, FILM COATED ORAL EVERY 6 HOURS
Refills: 0 | Status: DISCONTINUED | OUTPATIENT
Start: 2018-11-28 | End: 2018-12-10

## 2018-11-28 RX ORDER — FENTANYL CITRATE 50 UG/ML
25 INJECTION INTRAVENOUS
Refills: 0 | Status: DISCONTINUED | OUTPATIENT
Start: 2018-11-28 | End: 2018-11-28

## 2018-11-28 RX ORDER — DEXTROSE 50 % IN WATER 50 %
12.5 SYRINGE (ML) INTRAVENOUS ONCE
Refills: 0 | Status: DISCONTINUED | OUTPATIENT
Start: 2018-11-28 | End: 2018-12-05

## 2018-11-28 RX ORDER — PANTOPRAZOLE SODIUM 20 MG/1
40 TABLET, DELAYED RELEASE ORAL
Refills: 0 | Status: DISCONTINUED | OUTPATIENT
Start: 2018-11-28 | End: 2018-12-03

## 2018-11-28 RX ORDER — LEVOTHYROXINE SODIUM 125 MCG
137 TABLET ORAL DAILY
Refills: 0 | Status: DISCONTINUED | OUTPATIENT
Start: 2018-11-28 | End: 2018-12-06

## 2018-11-28 RX ORDER — ONDANSETRON 8 MG/1
4 TABLET, FILM COATED ORAL ONCE
Refills: 0 | Status: COMPLETED | OUTPATIENT
Start: 2018-11-28 | End: 2018-11-28

## 2018-11-28 RX ORDER — NALOXONE HYDROCHLORIDE 4 MG/.1ML
0.1 SPRAY NASAL
Refills: 0 | Status: DISCONTINUED | OUTPATIENT
Start: 2018-11-28 | End: 2018-12-10

## 2018-11-28 RX ORDER — ACETAMINOPHEN 500 MG
650 TABLET ORAL EVERY 6 HOURS
Refills: 0 | Status: DISCONTINUED | OUTPATIENT
Start: 2018-11-30 | End: 2018-12-10

## 2018-11-28 RX ORDER — METHOCARBAMOL 500 MG/1
500 TABLET, FILM COATED ORAL EVERY 6 HOURS
Refills: 0 | Status: DISCONTINUED | OUTPATIENT
Start: 2018-11-28 | End: 2018-12-10

## 2018-11-28 RX ADMIN — Medication 137 MICROGRAM(S): at 06:36

## 2018-11-28 RX ADMIN — INSULIN HUMAN 8 UNIT(S): 100 INJECTION, SOLUTION SUBCUTANEOUS at 12:20

## 2018-11-28 RX ADMIN — Medication 75 MILLIGRAM(S): at 11:46

## 2018-11-28 RX ADMIN — Medication 4: at 22:27

## 2018-11-28 RX ADMIN — GABAPENTIN 300 MILLIGRAM(S): 400 CAPSULE ORAL at 22:13

## 2018-11-28 RX ADMIN — INSULIN HUMAN 6 UNIT(S): 100 INJECTION, SOLUTION SUBCUTANEOUS at 18:30

## 2018-11-28 RX ADMIN — Medication 975 MILLIGRAM(S): at 23:00

## 2018-11-28 RX ADMIN — FENTANYL CITRATE 25 MICROGRAM(S): 50 INJECTION INTRAVENOUS at 19:50

## 2018-11-28 RX ADMIN — SODIUM CHLORIDE 100 MILLILITER(S): 9 INJECTION INTRAMUSCULAR; INTRAVENOUS; SUBCUTANEOUS at 19:14

## 2018-11-28 RX ADMIN — FENTANYL CITRATE 25 MICROGRAM(S): 50 INJECTION INTRAVENOUS at 19:40

## 2018-11-28 RX ADMIN — SODIUM CHLORIDE 100 MILLILITER(S): 9 INJECTION, SOLUTION INTRAVENOUS at 22:12

## 2018-11-28 RX ADMIN — Medication 300 MILLIGRAM(S): at 12:17

## 2018-11-28 RX ADMIN — FENTANYL CITRATE 25 MICROGRAM(S): 50 INJECTION INTRAVENOUS at 20:05

## 2018-11-28 RX ADMIN — CELECOXIB 200 MILLIGRAM(S): 200 CAPSULE ORAL at 11:46

## 2018-11-28 RX ADMIN — SENNA PLUS 2 TABLET(S): 8.6 TABLET ORAL at 22:13

## 2018-11-28 RX ADMIN — OXYCODONE HYDROCHLORIDE 10 MILLIGRAM(S): 5 TABLET ORAL at 11:46

## 2018-11-28 RX ADMIN — Medication 975 MILLIGRAM(S): at 22:13

## 2018-11-28 RX ADMIN — LISINOPRIL 10 MILLIGRAM(S): 2.5 TABLET ORAL at 06:36

## 2018-11-28 RX ADMIN — TAMSULOSIN HYDROCHLORIDE 0.4 MILLIGRAM(S): 0.4 CAPSULE ORAL at 22:13

## 2018-11-28 RX ADMIN — Medication 100 MILLIGRAM(S): at 22:13

## 2018-11-28 RX ADMIN — ONDANSETRON 4 MILLIGRAM(S): 8 TABLET, FILM COATED ORAL at 19:22

## 2018-11-28 NOTE — BRIEF OPERATIVE NOTE - PROCEDURE
<<-----Click on this checkbox to enter Procedure Thoracic fusion  11/28/2018    Active  DBRANDENSTEIN  Laminectomy  11/28/2018    Active  DBRANDENSTEIN

## 2018-11-28 NOTE — PROGRESS NOTE ADULT - SUBJECTIVE AND OBJECTIVE BOX
TIMUR LOUIS  ----------------------------------------  The patient was seen and evaluated for spinal stenosis.  The patient is in no acute distress.  Denied any chest pain, palpitations, dyspnea, or abdominal pain.  Offers no complaints.    Vital Signs Last 24 Hrs  T(C): 36.9 (2018 11:14), Max: 36.9 (2018 15:56)  T(F): 98.4 (2018 11:14), Max: 98.5 (2018 18:11)  HR: 94 (2018 11:14) (90 - 95)  BP: 155/78 (2018 11:14) (125/70 - 157/83)  BP(mean): --  RR: 16 (2018 11:14) (16 - 20)  SpO2: 99% (2018 11:14) (98% - 99%)    CAPILLARY BLOOD GLUCOSE  POCT Blood Glucose.: 222 mg/dL (2018 11:24)  POCT Blood Glucose.: 214 mg/dL (2018 08:20)  POCT Blood Glucose.: 184 mg/dL (2018 23:42)  POCT Blood Glucose.: 227 mg/dL (2018 17:30)  POCT Blood Glucose.: 294 mg/dL (2018 12:39)    PHYSICAL EXAMINATION:  ----------------------------------------  General appearance: No acute distress, Awake, Alert  HEENT: Normocephalic, Atraumatic, Conjunctiva clear, EOMI  Neck: Supple, No JVD, No tenderness  Lungs: Clear to auscultation, Breath sound equal bilaterally, No wheezes, No rales  Cardiovascular: S1S2, Regular rhythm  Abdomen: Soft, Nontender, Nondistended, No guarding/rebound, Positive bowel sounds  Extremities: No clubbing, No cyanosis, No edema, No calf tenderness  Neuro: Decreased strength and sensation to the lower extremities  Psychiatric: Appropriate mood, Normal affect    LABORATORY STUDIES:  ----------------------------------------             11.9   10.7  )-----------( 307      ( 2018 06:08 )             36.4     11-    135  |  94<L>  |  14.0  ----------------------------<  211<H>  3.7   |  26.0  |  0.76    Ca    8.7      2018 06:08    TPro  7.7  /  Alb  4.3  /  TBili  0.5  /  DBili  x   /  AST  28  /  ALT  21  /  AlkPhos  95      LIVER FUNCTIONS - ( 2018 17:19 )  Alb: 4.3 g/dL / Pro: 7.7 g/dL / ALK PHOS: 95 U/L / ALT: 21 U/L / AST: 28 U/L / GGT: x           PT/INR - ( 2018 06:08 )   PT: 14.8 sec;   INR: 1.28 ratio    PTT - ( 2018 06:08 )  PTT:32.6 sec    Urinalysis Basic - ( 2018 17:45 )  Color: Yellow / Appearance: Clear / S.010 / pH: x  Gluc: x / Ketone: Negative  / Bili: Negative / Urobili: 4 mg/dL   Blood: x / Protein: 15 mg/dL / Nitrite: Negative   Leuk Esterase: Negative / RBC: 0-2 /HPF / WBC 0-2   Sq Epi: x / Non Sq Epi: Occasional / Bacteria: Moderate    Culture - Urine (collected 2018 17:46)  Source: .Urine Clean Catch (Midstream)  Final Report (2018 15:11):    Culture grew 3 or more types of organisms which indicate    collection contamination; consider recollection only if clinically    indicated.    MEDICATIONS  (STANDING):  atorvastatin 40 milliGRAM(s) Oral at bedtime  BUpivacaine liposome 1.3% Injectable 20 milliLiter(s) Local Injection once  celecoxib 200 milliGRAM(s) Oral once  dextrose 5% + sodium chloride 0.45%. 1000 milliLiter(s) (80 mL/Hr) IV Continuous <Continuous>  dextrose 5%. 1000 milliLiter(s) (50 mL/Hr) IV Continuous <Continuous>  dextrose 50% Injectable 12.5 Gram(s) IV Push once  dextrose 50% Injectable 25 Gram(s) IV Push once  dextrose 50% Injectable 25 Gram(s) IV Push once  diazepam    Tablet 5 milliGRAM(s) Oral once  insulin lispro (HumaLOG) corrective regimen sliding scale   SubCutaneous three times a day before meals  levothyroxine 137 MICROGram(s) Oral daily  lisinopril 10 milliGRAM(s) Oral daily  oxyCODONE  ER Tablet 10 milliGRAM(s) Oral once  pantoprazole    Tablet 40 milliGRAM(s) Oral before breakfast  pregabalin 75 milliGRAM(s) Oral once  senna 2 Tablet(s) Oral at bedtime  tamsulosin 0.4 milliGRAM(s) Oral at bedtime  vancomycin  IVPB 1500 milliGRAM(s) IV Intermittent once    MEDICATIONS  (PRN):  dextrose 40% Gel 15 Gram(s) Oral once PRN Blood Glucose LESS THAN 70 milliGRAM(s)/deciliter  glucagon  Injectable 1 milliGRAM(s) IntraMuscular once PRN Glucose LESS THAN 70 milligrams/deciliter      ASSESSMENT / PLAN:  ----------------------------------------  Spinal stenosis - For spine surgery today. Will most likely require transfer to a rehabilitation facility after discharge from the hospital.    Diabetes - Insulin coverage, close monitoring of blood glucose levels.    Hypothyroidism - On levothyroxine.    Hyponatremia - Mild. Resolved on repeat laboratory studies. Diet as tolerated.    Discussed with the patient and his wife at the bedside.

## 2018-11-28 NOTE — PROGRESS NOTE ADULT - SUBJECTIVE AND OBJECTIVE BOX
TIMUR [id number]    73yMale  Spinal stenosis  Family history of diabetes mellitus (Father)  Handoff  MEWS Score  BPH (benign prostatic hyperplasia)  HTN (hypertension)  Hypothyroid  Dyslipidemia  OA (osteoarthritis)  Diabetes  Thoracic myelopathy  Thoracic stenosis  Thoracic myelopathy  Thoracic stenosis  Spinal stenosis  Laminectomy  Thoracic fusion  S/P laminectomy  S/P hernia repair  S/P hip replacement  NUMBNESS IN LEGS/NOT URINATING  90+    STATUS POST:  T11, T10 laminectomy/T10 through L1 posterior instrumented and posterolateral fusion for thoracic  stenosis with myelopathy, repair of incidental durotomy  SUBJECTIVE: Patient seen and examined doing well    Back Pain controlled at this time    OBJECTIVE:   T(C): 36.9 (11-28-18 @ 11:14), Max: 36.9 (11-28-18 @ 11:14)  HR: 94 (11-28-18 @ 11:14) (90 - 94)  BP: 155/78 (11-28-18 @ 11:14) (133/71 - 157/83)  RR: 16 (11-28-18 @ 11:14) (16 - 20)  SpO2: 99% (11-28-18 @ 11:14) (99% - 99%)   Constitutional: Pleasant in no acute distress  Psych:A&Ox3  Abdominal: soft and supple non distended  Lymphatics: no pretibial pitting edema  Spine:          Dressing:  clean/dry/intact                            HV drain in place, patent, Rich dressing operating well               Sensation:           [x ] Upper extremity decreased in patchy distribution manually         [ x] Lower extremity  decreased in patchy distribution manually                               Motor:         Upper extremity grossly nonfocal bilaterally         [x ] Lower extremity                 HF(l2)   KE(l3)    TA(l4)   EHL(l5)  GS(s1)                                                 R        4/5        3/5        1/5       1/5         1/5                                               L         2/5        2/5       1/5       1/5          1/5                                                        [x] warm well perfused; capillary refill sluggish, consistent with PVD                A/P : 73yMale   S/P THORACIC LAMINECTOMY, THORACOLUMBAR FUSION AS ABOVE  POD#0  -    Pain control- PCA for now, start oral rx 23-48 hours  -    DVT ppx: [ x]SCDs[ x] Pharmacolgic lovenox daily until ambulating well or 28 days post surgery   -    Periop abx:  Vanco   -    Check AM labs    -    Monitor Drain Output, can discontinue drain when output equal or less than 10 cc/hr/12 hr.  change dressing when drain pulled and as needed, honeycomb dressing.  Consider continue RICH dressing if incisional area is exudative or large.   -    Resume home meds as appropriate  -PT/OT WBAT, balance and gait can start in 24-48 hours once patient is able to sit up/stand without headache  - TLSO with OOB for comfort is not mandatory and will be delivered by [facility] tomorrow  -medical follow up, patient is admitted back to Hospitalist team, Dr Moises Burgos  -Acute rehab consult due to myelopathy.  Likely discharge 3-5 days to acute vs [facility].  - Patient with incidental durotomy, bed rest x 24 hours, ok log roll, bed pain and keep HOB no higher than 15 degrees. In 24 hours at approximately 1800 on 11/29/18, can  Raise HOB incrementally 10 degrees per hour as tolerated.  If headache occurs, decrease HOB and call MD.   - Will follow while admitted. TIMUR [id number]    73yMale  Spinal stenosis  Family history of diabetes mellitus (Father)  Handoff  MEWS Score  BPH (benign prostatic hyperplasia)  HTN (hypertension)  Hypothyroid  Dyslipidemia  OA (osteoarthritis)  Diabetes  Thoracic myelopathy  Thoracic stenosis  Thoracic myelopathy  Thoracic stenosis  Spinal stenosis  Laminectomy  Thoracic fusion  S/P laminectomy  S/P hernia repair  S/P hip replacement  NUMBNESS IN LEGS/NOT URINATING  90+    STATUS POST:  T11, T10 laminectomy/T10 through L1 posterior instrumented and posterolateral fusion for thoracic  stenosis with myelopathy, repair of incidental durotomy  SUBJECTIVE: Patient seen and examined doing well    Back Pain controlled at this time    OBJECTIVE:   T(C): 36.9 (11-28-18 @ 11:14), Max: 36.9 (11-28-18 @ 11:14)  HR: 94 (11-28-18 @ 11:14) (90 - 94)  BP: 155/78 (11-28-18 @ 11:14) (133/71 - 157/83)  RR: 16 (11-28-18 @ 11:14) (16 - 20)  SpO2: 99% (11-28-18 @ 11:14) (99% - 99%)   Constitutional: Pleasant in no acute distress  Psych:A&Ox3  Abdominal: soft and supple non distended  Lymphatics: no pretibial pitting edema  Spine:          Dressing:  clean/dry/intact                            HV drain in place, patent, Rich dressing operating well               Sensation:           [x ] Upper extremity decreased in patchy distribution manually         [ x] Lower extremity  decreased in patchy distribution manually                               Motor:         Upper extremity grossly nonfocal bilaterally         [x ] Lower extremity                 HF(l2)   KE(l3)    TA(l4)   EHL(l5)  GS(s1)                                                 R        3/5        3/5        3/5       2/5         3/5                                               L         4/5        4/5       3/5       2/5          3/5                                                        [x] warm well perfused; capillary refill sluggish, consistent with PVD                A/P : 73yMale   S/P THORACIC LAMINECTOMY, THORACOLUMBAR FUSION AS ABOVE  POD#0  -    Pain control- PCA for now, start oral rx 23-48 hours  -    DVT ppx: [ x]SCDs[ x] Pharmacolgic lovenox daily until ambulating well or 28 days post surgery   -    Periop abx:  Vanco   -    Check AM labs    -    Monitor Drain Output, can discontinue drain when output equal or less than 10 cc/hr/12 hr.  change dressing when drain pulled and as needed, honeycomb dressing.  Consider continue RICH dressing if incisional area is exudative or large.   -    Resume home meds as appropriate  -PT/OT WBAT, balance and gait can start in 24-48 hours once patient is able to sit up/stand without headache  - TLSO with OOB for comfort is not mandatory and will be delivered by [facility] tomorrow  -medical follow up, patient is admitted back to Hospitalist team, Dr Moises Burgos  -Acute rehab consult due to myelopathy.  Likely discharge 3-5 days to acute vs [facility].  - Patient with incidental durotomy, bed rest x 24 hours, ok log roll, bed pain and keep HOB no higher than 15 degrees. In 24 hours at approximately 1800 on 11/29/18, can  Raise HOB incrementally 10 degrees per hour as tolerated.  If headache occurs, decrease HOB and call MD.   - Will follow while admitted.

## 2018-11-28 NOTE — BRIEF OPERATIVE NOTE - PRE-OP DX
Thoracic myelopathy  11/28/2018    Active  Ollie Gray  Thoracic stenosis  11/28/2018    Active  Ollie Gray

## 2018-11-29 LAB
ANA PAT FLD IF-IMP: ABNORMAL
ANA TITR SER: ABNORMAL
ANION GAP SERPL CALC-SCNC: 11 MMOL/L — SIGNIFICANT CHANGE UP (ref 5–17)
ANISOCYTOSIS BLD QL: SLIGHT — SIGNIFICANT CHANGE UP
BUN SERPL-MCNC: 11 MG/DL — SIGNIFICANT CHANGE UP (ref 8–20)
CALCIUM SERPL-MCNC: 7.7 MG/DL — LOW (ref 8.6–10.2)
CHLORIDE SERPL-SCNC: 97 MMOL/L — LOW (ref 98–107)
CO2 SERPL-SCNC: 28 MMOL/L — SIGNIFICANT CHANGE UP (ref 22–29)
CREAT SERPL-MCNC: 0.74 MG/DL — SIGNIFICANT CHANGE UP (ref 0.5–1.3)
ELLIPTOCYTES BLD QL SMEAR: SLIGHT — SIGNIFICANT CHANGE UP
GLUCOSE BLDC GLUCOMTR-MCNC: 144 MG/DL — HIGH (ref 70–99)
GLUCOSE BLDC GLUCOMTR-MCNC: 150 MG/DL — HIGH (ref 70–99)
GLUCOSE BLDC GLUCOMTR-MCNC: 159 MG/DL — HIGH (ref 70–99)
GLUCOSE BLDC GLUCOMTR-MCNC: 204 MG/DL — HIGH (ref 70–99)
GLUCOSE SERPL-MCNC: 150 MG/DL — HIGH (ref 70–115)
HCT VFR BLD CALC: 32.9 % — LOW (ref 42–52)
HGB BLD-MCNC: 10.4 G/DL — LOW (ref 14–18)
HYPOCHROMIA BLD QL: SLIGHT — SIGNIFICANT CHANGE UP
LYMPHOCYTES # BLD AUTO: 5 % — LOW (ref 20–55)
MACROCYTES BLD QL: SLIGHT — SIGNIFICANT CHANGE UP
MCHC RBC-ENTMCNC: 27.4 PG — SIGNIFICANT CHANGE UP (ref 27–31)
MCHC RBC-ENTMCNC: 31.6 G/DL — LOW (ref 32–36)
MCV RBC AUTO: 86.8 FL — SIGNIFICANT CHANGE UP (ref 80–94)
MICROCYTES BLD QL: SLIGHT — SIGNIFICANT CHANGE UP
MONOCYTES NFR BLD AUTO: 7 % — SIGNIFICANT CHANGE UP (ref 3–10)
NEUTROPHILS NFR BLD AUTO: 87 % — HIGH (ref 37–73)
PLAT MORPH BLD: NORMAL — SIGNIFICANT CHANGE UP
PLATELET # BLD AUTO: 279 K/UL — SIGNIFICANT CHANGE UP (ref 150–400)
POIKILOCYTOSIS BLD QL AUTO: SLIGHT — SIGNIFICANT CHANGE UP
POTASSIUM SERPL-MCNC: 4.3 MMOL/L — SIGNIFICANT CHANGE UP (ref 3.5–5.3)
POTASSIUM SERPL-SCNC: 4.3 MMOL/L — SIGNIFICANT CHANGE UP (ref 3.5–5.3)
RBC # BLD: 3.79 M/UL — LOW (ref 4.6–6.2)
RBC # FLD: 14.3 % — SIGNIFICANT CHANGE UP (ref 11–15.6)
RBC BLD AUTO: ABNORMAL
SODIUM SERPL-SCNC: 136 MMOL/L — SIGNIFICANT CHANGE UP (ref 135–145)
VARIANT LYMPHS # BLD: 1 % — SIGNIFICANT CHANGE UP (ref 0–6)
WBC # BLD: 13.8 K/UL — HIGH (ref 4.8–10.8)
WBC # FLD AUTO: 13.8 K/UL — HIGH (ref 4.8–10.8)

## 2018-11-29 PROCEDURE — 99232 SBSQ HOSP IP/OBS MODERATE 35: CPT

## 2018-11-29 PROCEDURE — 99222 1ST HOSP IP/OBS MODERATE 55: CPT | Mod: GC

## 2018-11-29 RX ORDER — KETOROLAC TROMETHAMINE 30 MG/ML
15 SYRINGE (ML) INJECTION EVERY 6 HOURS
Refills: 0 | Status: DISCONTINUED | OUTPATIENT
Start: 2018-11-29 | End: 2018-11-30

## 2018-11-29 RX ADMIN — ONDANSETRON 4 MILLIGRAM(S): 8 TABLET, FILM COATED ORAL at 10:00

## 2018-11-29 RX ADMIN — Medication 2: at 09:00

## 2018-11-29 RX ADMIN — Medication 975 MILLIGRAM(S): at 11:33

## 2018-11-29 RX ADMIN — Medication 975 MILLIGRAM(S): at 18:09

## 2018-11-29 RX ADMIN — SODIUM CHLORIDE 100 MILLILITER(S): 9 INJECTION, SOLUTION INTRAVENOUS at 16:49

## 2018-11-29 RX ADMIN — Medication 975 MILLIGRAM(S): at 18:47

## 2018-11-29 RX ADMIN — Medication 137 MICROGRAM(S): at 05:39

## 2018-11-29 RX ADMIN — PANTOPRAZOLE SODIUM 40 MILLIGRAM(S): 20 TABLET, DELAYED RELEASE ORAL at 05:40

## 2018-11-29 RX ADMIN — Medication 975 MILLIGRAM(S): at 05:57

## 2018-11-29 RX ADMIN — ONDANSETRON 4 MILLIGRAM(S): 8 TABLET, FILM COATED ORAL at 21:10

## 2018-11-29 RX ADMIN — Medication 15 MILLIGRAM(S): at 21:18

## 2018-11-29 RX ADMIN — ENOXAPARIN SODIUM 40 MILLIGRAM(S): 100 INJECTION SUBCUTANEOUS at 05:49

## 2018-11-29 RX ADMIN — METHOCARBAMOL 500 MILLIGRAM(S): 500 TABLET, FILM COATED ORAL at 05:39

## 2018-11-29 RX ADMIN — Medication 100 MILLIGRAM(S): at 21:12

## 2018-11-29 RX ADMIN — SENNA PLUS 2 TABLET(S): 8.6 TABLET ORAL at 21:12

## 2018-11-29 RX ADMIN — TAMSULOSIN HYDROCHLORIDE 0.4 MILLIGRAM(S): 0.4 CAPSULE ORAL at 21:12

## 2018-11-29 RX ADMIN — GABAPENTIN 300 MILLIGRAM(S): 400 CAPSULE ORAL at 13:21

## 2018-11-29 RX ADMIN — Medication 100 MILLIGRAM(S): at 13:21

## 2018-11-29 RX ADMIN — GABAPENTIN 300 MILLIGRAM(S): 400 CAPSULE ORAL at 05:39

## 2018-11-29 RX ADMIN — Medication 30 MILLILITER(S): at 21:12

## 2018-11-29 RX ADMIN — GABAPENTIN 300 MILLIGRAM(S): 400 CAPSULE ORAL at 21:12

## 2018-11-29 RX ADMIN — Medication 975 MILLIGRAM(S): at 05:40

## 2018-11-29 RX ADMIN — Medication 300 MILLIGRAM(S): at 00:21

## 2018-11-29 RX ADMIN — Medication 975 MILLIGRAM(S): at 12:30

## 2018-11-29 RX ADMIN — OXYCODONE HYDROCHLORIDE 10 MILLIGRAM(S): 5 TABLET ORAL at 09:25

## 2018-11-29 RX ADMIN — Medication 15 MILLIGRAM(S): at 22:00

## 2018-11-29 RX ADMIN — OXYCODONE HYDROCHLORIDE 10 MILLIGRAM(S): 5 TABLET ORAL at 08:25

## 2018-11-29 RX ADMIN — Medication 100 MILLIGRAM(S): at 05:40

## 2018-11-29 NOTE — CONSULT NOTE ADULT - SUBJECTIVE AND OBJECTIVE BOX
73yM was admitted on 11-27    Patient is a 73y old  Male who presents with a chief complaint of weakness     HPI:  72yo male with a h/o spinal stenosis s/p laminectomies presented to [facility] 11/26/18 with constant, progressive leg weakness and episodes of mechanical falls. The patient endorsed increasing difficulty ambulating with his walker and decreased sensation in b/l LEs. No acute change in chronic back pain reported.   MRI 11/36/18 revealed osteophyte and dorsal ligamentous ossification notable for spinal cord compression at T11-12 with less significant findings elsewhere.   Patient underwent laminectomy and thoracic fusion (T10-T11 laminectomy/T10-L1 posterior instrumented and posterolateral fusion) with Dr Gray 11/28/18, EBL ~200cc.  Post-op course c/b incidental durotomy s/p repair, plan for bed rest x 24hrs.    REVIEW OF SYSTEMS  Constitutional - No fever, No weight loss, No fatigue  HEENT - No eye pain, No visual disturbances, No difficulty hearing, No tinnitus, No vertigo, No neck pain  Respiratory - No cough, No wheezing, No shortness of breath  Cardiovascular - No chest pain, No palpitations  Gastrointestinal - No abdominal pain, No nausea, No vomiting, No diarrhea, No constipation  Genitourinary - No dysuria, No frequency, No hematuria, No incontinence  Neurological - No headaches, No memory loss, No loss of strength, No numbness, No tremors  Skin - No itching, No rashes, No lesions   Endocrine - No temperature intolerance  Musculoskeletal - No joint pain, No joint swelling, No muscle pain  Psychiatric - No depression, No anxiety    VITALS  T(C): 35.9 (11-29-18 @ 04:25), Max: 36.9 (11-28-18 @ 11:14)  HR: 82 (11-29-18 @ 04:25) (80 - 110)  BP: 113/61 (11-29-18 @ 04:25) (112/58 - 166/77)  RR: 20 (11-29-18 @ 04:25) (6 - 20)  SpO2: 97% (11-29-18 @ 04:25) (88% - 100%)  Wt(kg): --    PAST MEDICAL & SURGICAL HISTORY  BPH (benign prostatic hyperplasia)  HTN (hypertension)  Hypothyroid  Dyslipidemia  OA (osteoarthritis)  Diabetes  S/P laminectomy  S/P hernia repair  S/P hip replacement    SOCIAL HISTORY  Smoking - Denied  EtOH - Denied   Drugs - Denied    FUNCTIONAL HISTORY  Lives   Independent    CURRENT FUNCTIONAL STATUS  bedrest until this evening s/p durotomy   PT/OT eval pending [ ]    FAMILY HISTORY   Family history of diabetes mellitus (Father)    RECENT LABS/IMAGING  CBC Full  -  ( 29 Nov 2018 05:34 )  WBC Count : 13.8 K/uL  Hemoglobin : 10.4 g/dL  Hematocrit : 32.9 %  Platelet Count - Automated : 279 K/uL  Mean Cell Volume : 86.8 fl  Mean Cell Hemoglobin : 27.4 pg  Mean Cell Hemoglobin Concentration : 31.6 g/dL  Auto Neutrophil # : x  Auto Lymphocyte # : x  Auto Monocyte # : x  Auto Eosinophil # : x  Auto Basophil # : x  Auto Neutrophil % : 87.0 %  Auto Lymphocyte % : 5.0 %  Auto Monocyte % : 7.0 %  Auto Eosinophil % : x  Auto Basophil % : x    11-29    136  |  97<L>  |  11.0  ----------------------------<  150<H>  4.3   |  28.0  |  0.74    Ca    7.7<L>      29 Nov 2018 05:34    ALLERGIES NKA    MEDICATIONS  (STANDING):  acetaminophen   Tablet .. 975 milliGRAM(s) Oral every 6 hours  dextrose 5%. 1000 milliLiter(s) (50 mL/Hr) IV Continuous <Continuous>  dextrose 50% Injectable 12.5 Gram(s) IV Push once  dextrose 50% Injectable 25 Gram(s) IV Push once  dextrose 50% Injectable 25 Gram(s) IV Push once  docusate sodium 100 milliGRAM(s) Oral three times a day  enoxaparin Injectable 40 milliGRAM(s) SubCutaneous every 24 hours  gabapentin 300 milliGRAM(s) Oral every 8 hours  insulin lispro (HumaLOG) corrective regimen sliding scale   SubCutaneous three times a day before meals  levothyroxine 137 MICROGram(s) Oral daily  pantoprazole    Tablet 40 milliGRAM(s) Oral before breakfast  senna 2 Tablet(s) Oral at bedtime  sodium chloride 0.45%. 1000 milliLiter(s) (100 mL/Hr) IV Continuous <Continuous>  tamsulosin 0.4 milliGRAM(s) Oral at bedtime    MEDICATIONS  (PRN):  aluminum hydroxide/magnesium hydroxide/simethicone Suspension 30 milliLiter(s) Oral every 12 hours PRN Indigestion  dextrose 40% Gel 15 Gram(s) Oral once PRN Blood Glucose LESS THAN 70 milliGRAM(s)/deciliter  glucagon  Injectable 1 milliGRAM(s) IntraMuscular once PRN Glucose LESS THAN 70 milligrams/deciliter  magnesium hydroxide Suspension 30 milliLiter(s) Oral every 12 hours PRN Constipation  methocarbamol 500 milliGRAM(s) Oral every 6 hours PRN Back Spasms  naloxone Injectable 0.1 milliGRAM(s) IV Push every 3 minutes PRN For ANY of the following changes in patient status:  A. RR LESS THAN 10 breaths per minute, B. Oxygen saturation LESS THAN 90%, C. Sedation score of 6  ondansetron Injectable 4 milliGRAM(s) IV Push every 6 hours PRN Nausea  oxyCODONE    IR 5 milliGRAM(s) Oral every 4 hours PRN Moderate Pain (4 - 6)  oxyCODONE    IR 10 milliGRAM(s) Oral every 4 hours PRN Severe Pain (7 - 10) 73yM was admitted on 11-27    Patient is a 73y old  Male who presents with a chief complaint of weakness     HPI:  74yo male with a h/o spinal stenosis s/p laminectomies presented to [facility] 11/26/18 with constant, progressive leg weakness and episodes of mechanical falls. The patient endorsed increasing difficulty ambulating with his walker x 2 weeks and decreased sensation in b/l LEs, below knee level. No acute change in chronic back pain reported.   MRI 11/26/18 revealed osteophyte and dorsal ligamentous ossification notable for spinal cord compression at T11-12 with less significant findings elsewhere.   Patient underwent laminectomy and thoracic fusion (T10-T11 laminectomy/T10-L1 posterior instrumented and posterolateral fusion) with Dr Gray 11/28/18, EBL ~200cc.  Post-op course c/b incidental durotomy s/p repair, plan for bed rest x 24hrs.    REVIEW OF SYSTEMS  Constitutional - No fever, No weight loss, No fatigue  HEENT - No eye pain, No visual disturbances, No difficulty hearing, No tinnitus, No vertigo, No neck pain  Respiratory - No cough, No wheezing, No shortness of breath  Cardiovascular - No chest pain, No palpitations  Gastrointestinal - No abdominal pain, No nausea, No vomiting, No diarrhea, (+) constipation  Genitourinary - No dysuria, No frequency, No hematuria, No incontinence  Neurological - No headaches, No memory loss, No loss of strength, No tremors, Improved numbness post-operatively   Skin - No itching, No rashes, No lesions   Endocrine - No temperature intolerance  Musculoskeletal - No joint pain, No joint swelling, (+) back pain   Psychiatric - No depression, No anxiety    VITALS  T(C): 35.9 (11-29-18 @ 04:25), Max: 36.9 (11-28-18 @ 11:14)  HR: 82 (11-29-18 @ 04:25) (80 - 110)  BP: 113/61 (11-29-18 @ 04:25) (112/58 - 166/77)  RR: 20 (11-29-18 @ 04:25) (6 - 20)  SpO2: 97% (11-29-18 @ 04:25) (88% - 100%)  Wt(kg): --    PAST MEDICAL & SURGICAL HISTORY  BPH (benign prostatic hyperplasia)  HTN (hypertension)  Hypothyroid  Dyslipidemia  OA (osteoarthritis)  Diabetes  S/P laminectomy  S/P hernia repair  S/P hip replacement    SOCIAL HISTORY  Smoking - Denied  EtOH - Denied   Drugs - Denied    FUNCTIONAL HISTORY  Lives with wife in [place], 1 [address], no stairs inside  Previously ambulating with RW, needed help with dressing, needed assistance into the bathroom although able to perform personal hygiene.     CURRENT FUNCTIONAL STATUS  bedrest until this evening s/p durotomy   PT/OT eval pending   Bed mobility requiring assistance     FAMILY HISTORY   Family history of diabetes mellitus (Father)    RECENT LABS/IMAGING  CBC Full  -  ( 29 Nov 2018 05:34 )  WBC Count : 13.8 K/uL  Hemoglobin : 10.4 g/dL  Hematocrit : 32.9 %  Platelet Count - Automated : 279 K/uL  Mean Cell Volume : 86.8 fl  Mean Cell Hemoglobin : 27.4 pg  Mean Cell Hemoglobin Concentration : 31.6 g/dL  Auto Neutrophil # : x  Auto Lymphocyte # : x  Auto Monocyte # : x  Auto Eosinophil # : x  Auto Basophil # : x  Auto Neutrophil % : 87.0 %  Auto Lymphocyte % : 5.0 %  Auto Monocyte % : 7.0 %  Auto Eosinophil % : x  Auto Basophil % : x    11-29    136  |  97<L>  |  11.0  ----------------------------<  150<H>  4.3   |  28.0  |  0.74    Ca    7.7<L>      29 Nov 2018 05:34    ALLERGIES NKA    MEDICATIONS  (STANDING):  acetaminophen   Tablet .. 975 milliGRAM(s) Oral every 6 hours  dextrose 5%. 1000 milliLiter(s) (50 mL/Hr) IV Continuous <Continuous>  dextrose 50% Injectable 12.5 Gram(s) IV Push once  dextrose 50% Injectable 25 Gram(s) IV Push once  dextrose 50% Injectable 25 Gram(s) IV Push once  docusate sodium 100 milliGRAM(s) Oral three times a day  enoxaparin Injectable 40 milliGRAM(s) SubCutaneous every 24 hours  gabapentin 300 milliGRAM(s) Oral every 8 hours  insulin lispro (HumaLOG) corrective regimen sliding scale   SubCutaneous three times a day before meals  levothyroxine 137 MICROGram(s) Oral daily  pantoprazole    Tablet 40 milliGRAM(s) Oral before breakfast  senna 2 Tablet(s) Oral at bedtime  sodium chloride 0.45%. 1000 milliLiter(s) (100 mL/Hr) IV Continuous <Continuous>  tamsulosin 0.4 milliGRAM(s) Oral at bedtime    MEDICATIONS  (PRN):  aluminum hydroxide/magnesium hydroxide/simethicone Suspension 30 milliLiter(s) Oral every 12 hours PRN Indigestion  dextrose 40% Gel 15 Gram(s) Oral once PRN Blood Glucose LESS THAN 70 milliGRAM(s)/deciliter  glucagon  Injectable 1 milliGRAM(s) IntraMuscular once PRN Glucose LESS THAN 70 milligrams/deciliter  magnesium hydroxide Suspension 30 milliLiter(s) Oral every 12 hours PRN Constipation  methocarbamol 500 milliGRAM(s) Oral every 6 hours PRN Back Spasms  naloxone Injectable 0.1 milliGRAM(s) IV Push every 3 minutes PRN For ANY of the following changes in patient status:  A. RR LESS THAN 10 breaths per minute, B. Oxygen saturation LESS THAN 90%, C. Sedation score of 6  ondansetron Injectable 4 milliGRAM(s) IV Push every 6 hours PRN Nausea  oxyCODONE    IR 5 milliGRAM(s) Oral every 4 hours PRN Moderate Pain (4 - 6)  oxyCODONE    IR 10 milliGRAM(s) Oral every 4 hours PRN Severe Pain (7 - 10)    ----------------------------------------------------------------------------------------  PHYSICAL EXAM  Constitutional - in some discomfort, using ice pack for low back/incisional pain   HEENT - NCAT, EOMI  Neck - Supple, No limited ROM  Chest - CTA bilaterally, No wheeze  Cardiovascular - RRR, S1S2, No murmurs  Abdomen - BS+, Soft, NTND  - De La Cruz catheter still in place   Extremities - No calf tenderness   Neurologic Exam -                    Cognitive - Awake, Alert, Oriented x 4     Communication - Fluent, No dysarthria     Cranial Nerves - CN 2-12 intact     Motor -                     LEFT    UE - ShAB 5/5, EF 5/5, EE 5/5, WE 5/5,  5/5                    RIGHT UE - ShAB 5/5, EF 5/5, EE 5/5, WE 5/5,  5/5                    LEFT    LE - HF 2/5, KE 3/5, DF 3/5, PF 3/5, EHL 3/5                    RIGHT LE - HF 2/5, KE 4/5, DF 4/5, PF 4/5, EHL 4/5     Sensory - subjectively reduced LT sensation in b/l pedal L5 distribution     Reflexes - DTR diminished, symmetrical      Balance - to be evaluated   Psychiatric - Mood stable, Affect WNL  Skin- thoracolumbar spine with DSD c/d/i + drain in place with sanguinous drainage 73yM was admitted on 11-27    Patient is a 73y old  Male who presents with a chief complaint of weakness     HPI:  72yo male with a h/o spinal stenosis s/p laminectomies presented to [facility] 11/26/18 with constant, progressive leg weakness and episodes of mechanical falls. The patient endorsed increasing difficulty ambulating with his walker x 2 weeks and decreased sensation in b/l LEs, below knee level. No acute change in chronic back pain reported.   MRI 11/26/18 revealed osteophyte and dorsal ligamentous ossification notable for spinal cord compression at T11-12 with less significant findings elsewhere.   Patient underwent laminectomy and thoracic fusion (T10-T11 laminectomy/T10-L1 posterior instrumented and posterolateral fusion) with Dr Gray 11/28/18, EBL ~200cc.  Post-op course c/b incidental durotomy s/p repair, plan for bed rest x 24hrs.    REVIEW OF SYSTEMS  Constitutional - No fever, No weight loss, No fatigue (+) difficulty sleeping   HEENT - No eye pain, No visual disturbances, No difficulty hearing, No tinnitus, No vertigo, No neck pain  Respiratory - No cough, No wheezing, No shortness of breath  Cardiovascular - No chest pain, No palpitations  Gastrointestinal - No abdominal pain, (+) nausea, No vomiting, No diarrhea, (+) constipation  Genitourinary - No dysuria, No frequency, No hematuria, No incontinence  Neurological - No headaches, No memory loss, No loss of strength, No tremors, Improved numbness post-operatively   Skin - No itching, No rashes, No lesions   Endocrine - No temperature intolerance  Musculoskeletal - No joint pain, No joint swelling, (+) back pain   Psychiatric - No depression, No anxiety    VITALS  T(C): 35.9 (11-29-18 @ 04:25), Max: 36.9 (11-28-18 @ 11:14)  HR: 82 (11-29-18 @ 04:25) (80 - 110)  BP: 113/61 (11-29-18 @ 04:25) (112/58 - 166/77)  RR: 20 (11-29-18 @ 04:25) (6 - 20)  SpO2: 97% (11-29-18 @ 04:25) (88% - 100%)  Wt(kg): --    PAST MEDICAL & SURGICAL HISTORY  BPH (benign prostatic hyperplasia)  HTN (hypertension)  Hypothyroid  Dyslipidemia  OA (osteoarthritis)  Diabetes  S/P laminectomy  S/P hernia repair  S/P hip replacement    SOCIAL HISTORY  Smoking - Denied  EtOH - Denied   Drugs - Denied    FUNCTIONAL HISTORY  Lives with wife in PH, 1 [address], no stairs inside  Previously ambulating with RW, needed help with dressing, needed assistance into the bathroom although able to perform personal hygiene.     CURRENT FUNCTIONAL STATUS  bedrest until this evening s/p durotomy   PT/OT eval pending   Bed mobility requiring assistance from resident physician     FAMILY HISTORY   Family history of diabetes mellitus (Father)    RECENT LABS/IMAGING  CBC Full  -  ( 29 Nov 2018 05:34 )  WBC Count : 13.8 K/uL  Hemoglobin : 10.4 g/dL  Hematocrit : 32.9 %  Platelet Count - Automated : 279 K/uL  Mean Cell Volume : 86.8 fl  Mean Cell Hemoglobin : 27.4 pg  Mean Cell Hemoglobin Concentration : 31.6 g/dL  Auto Neutrophil # : x  Auto Lymphocyte # : x  Auto Monocyte # : x  Auto Eosinophil # : x  Auto Basophil # : x  Auto Neutrophil % : 87.0 %  Auto Lymphocyte % : 5.0 %  Auto Monocyte % : 7.0 %  Auto Eosinophil % : x  Auto Basophil % : x    11-29    136  |  97<L>  |  11.0  ----------------------------<  150<H>  4.3   |  28.0  |  0.74    Ca    7.7<L>      29 Nov 2018 05:34    ALLERGIES NKA    MEDICATIONS  (STANDING):  acetaminophen   Tablet .. 975 milliGRAM(s) Oral every 6 hours  dextrose 5%. 1000 milliLiter(s) (50 mL/Hr) IV Continuous <Continuous>  dextrose 50% Injectable 12.5 Gram(s) IV Push once  dextrose 50% Injectable 25 Gram(s) IV Push once  dextrose 50% Injectable 25 Gram(s) IV Push once  docusate sodium 100 milliGRAM(s) Oral three times a day  enoxaparin Injectable 40 milliGRAM(s) SubCutaneous every 24 hours  gabapentin 300 milliGRAM(s) Oral every 8 hours  insulin lispro (HumaLOG) corrective regimen sliding scale   SubCutaneous three times a day before meals  levothyroxine 137 MICROGram(s) Oral daily  pantoprazole    Tablet 40 milliGRAM(s) Oral before breakfast  senna 2 Tablet(s) Oral at bedtime  sodium chloride 0.45%. 1000 milliLiter(s) (100 mL/Hr) IV Continuous <Continuous>  tamsulosin 0.4 milliGRAM(s) Oral at bedtime    MEDICATIONS  (PRN):  aluminum hydroxide/magnesium hydroxide/simethicone Suspension 30 milliLiter(s) Oral every 12 hours PRN Indigestion  dextrose 40% Gel 15 Gram(s) Oral once PRN Blood Glucose LESS THAN 70 milliGRAM(s)/deciliter  glucagon  Injectable 1 milliGRAM(s) IntraMuscular once PRN Glucose LESS THAN 70 milligrams/deciliter  magnesium hydroxide Suspension 30 milliLiter(s) Oral every 12 hours PRN Constipation  methocarbamol 500 milliGRAM(s) Oral every 6 hours PRN Back Spasms  naloxone Injectable 0.1 milliGRAM(s) IV Push every 3 minutes PRN For ANY of the following changes in patient status:  A. RR LESS THAN 10 breaths per minute, B. Oxygen saturation LESS THAN 90%, C. Sedation score of 6  ondansetron Injectable 4 milliGRAM(s) IV Push every 6 hours PRN Nausea  oxyCODONE    IR 5 milliGRAM(s) Oral every 4 hours PRN Moderate Pain (4 - 6)  oxyCODONE    IR 10 milliGRAM(s) Oral every 4 hours PRN Severe Pain (7 - 10)    ----------------------------------------------------------------------------------------  PHYSICAL EXAM  Constitutional - in some discomfort, using ice pack for low back/incisional pain   HEENT - NCAT, EOMI  Neck - Supple, No limited ROM  Chest - CTA bilaterally, No wheeze  Cardiovascular - RRR, S1S2, No murmurs  Abdomen - BS+, Soft, NTND  - De La Cruz catheter still in place   Extremities - No calf tenderness   Neurologic Exam -                    Cognitive - Awake, Alert, Oriented x 4     Communication - Fluent, No dysarthria     Cranial Nerves - CN 2-12 intact     Motor -                     LEFT    UE - ShAB 5/5, EF 5/5, EE 5/5, WE 5/5,  5/5                    RIGHT UE - ShAB 5/5, EF 5/5, EE 5/5, WE 5/5,  5/5                    LEFT    LE - HF 2/5, KE 3/5, DF 3/5, PF 3/5, EHL 3/5                    RIGHT LE - HF 2/5, KE 4/5, DF 4/5, PF 4/5, EHL 4/5     Sensory - subjectively reduced LT sensation in b/l pedal L5 distribution     Reflexes - DTR diminished, symmetrical      Balance - to be evaluated   Psychiatric - Mood stable, Affect WNL  Skin- thoracolumbar spine with DSD c/d/i + drain in place with sanguinous drainage    ------------------------------------------------------------------------------------------------  ASSESSMENT/PLAN    72yo male with gait, ADL and functional deficits following spinal stenosis with T11-L2 spinal cord compression s/p laminectomy and thoracic fusion 11/28/18.    Pain - Tylenol, Neurontin, oxycodone PRN  DVT PPX - SCDs, lovenox   Rehab -   Pending OT/OT evaluation, recommend ACUTE inpatient rehabilitation for the functional deficits consisting of 3 hours of therapy/day & 24 hour RN/daily PMR physician for comorbid medical management. Will continue to follow for ongoing rehab needs and recommendations. Patient will be able to tolerate 3 hours a day.

## 2018-11-29 NOTE — PROGRESS NOTE ADULT - SUBJECTIVE AND OBJECTIVE BOX
INTERVAL HISTORY:   s/p T10-L1 decompression/fusion      VITAL SIGNS:  Vital Signs Last 24 Hrs  T(C): 36.7 (29 Nov 2018 09:01), Max: 36.9 (28 Nov 2018 11:14)  T(F): 98.1 (29 Nov 2018 09:01), Max: 98.4 (28 Nov 2018 11:14)  HR: 81 (29 Nov 2018 09:01) (80 - 110)  BP: 97/50 (29 Nov 2018 09:01) (90/41 - 166/77)  BP(mean): --  RR: 18 (29 Nov 2018 09:01) (6 - 20)  SpO2: 100% (29 Nov 2018 09:01) (88% - 100%)    PHYSICAL EXAMINATION:    Mentation:  nl  Language/Speech: nl  CN: nl  Visual Fields:  Motor:very limited exam- pain, nausea, post-op. Moves toes well/ Toe strength appears goo  Sensory:  DTR:  Babinski:      MEDS:  MEDICATIONS  (STANDING):  acetaminophen   Tablet .. 975 milliGRAM(s) Oral every 6 hours  dextrose 5%. 1000 milliLiter(s) (50 mL/Hr) IV Continuous <Continuous>  dextrose 50% Injectable 12.5 Gram(s) IV Push once  dextrose 50% Injectable 25 Gram(s) IV Push once  dextrose 50% Injectable 25 Gram(s) IV Push once  docusate sodium 100 milliGRAM(s) Oral three times a day  enoxaparin Injectable 40 milliGRAM(s) SubCutaneous every 24 hours  gabapentin 300 milliGRAM(s) Oral every 8 hours  insulin lispro (HumaLOG) corrective regimen sliding scale   SubCutaneous three times a day before meals  levothyroxine 137 MICROGram(s) Oral daily  pantoprazole    Tablet 40 milliGRAM(s) Oral before breakfast  senna 2 Tablet(s) Oral at bedtime  sodium chloride 0.45%. 1000 milliLiter(s) (100 mL/Hr) IV Continuous <Continuous>  tamsulosin 0.4 milliGRAM(s) Oral at bedtime    MEDICATIONS  (PRN):  aluminum hydroxide/magnesium hydroxide/simethicone Suspension 30 milliLiter(s) Oral every 12 hours PRN Indigestion  dextrose 40% Gel 15 Gram(s) Oral once PRN Blood Glucose LESS THAN 70 milliGRAM(s)/deciliter  glucagon  Injectable 1 milliGRAM(s) IntraMuscular once PRN Glucose LESS THAN 70 milligrams/deciliter  Will not actively follow.   Neurologically cleared for discharge/disposition.  Please recontact as needed.  Follow up in office in 2-4 weeks as instructed.magnesium hydroxide Suspension 30 milliLiter(s) Oral every 12 hours PRN Constipation  methocarbamol 500 milliGRAM(s) Oral every 6 hours PRN Back Spasms  naloxone Injectable 0.1 milliGRAM(s) IV Push every 3 minutes PRN For ANY of the following changes in patient status:  A. RR LESS THAN 10 breaths per minute, B. Oxygen saturation LESS THAN 90%, C. Sedation score of 6  ondansetron Injectable 4 milliGRAM(s) IV Push every 6 hours PRN Nausea  oxyCODONE    IR 5 milliGRAM(s) Oral every 4 hours PRN Moderate Pain (4 - 6)  oxyCODONE    IR 10 milliGRAM(s) Oral every 4 hours PRN Severe Pain (7 - 10)      LABS:                          10.4   13.8  )-----------( 279      ( 29 Nov 2018 05:34 )             32.9     11-29    136  |  97<L>  |  11.0  ----------------------------<  150<H>  4.3   |  28.0  |  0.74    Ca    7.7<L>      29 Nov 2018 05:34      BELLA 1:80 homogeneous-  nonspecfic      RADIOLOGY & ADDITIONAL STUDIES:      IMPRESSION & PLAN:    s/p thoracic spine decompression procedure. He is stable. Hope for functional improvment given multifactorial issues as outlined  Post op managements and rehab  Follow up with Dr. Shields as planned, reivew of neuropathy labs ordered  Will not actively follow.   Neurologically cleared for discharge/disposition.  Please recontact as needed.  Follow up in office as noted

## 2018-11-29 NOTE — PROGRESS NOTE ADULT - SUBJECTIVE AND OBJECTIVE BOX
ORTHO-SPINE POST-OP PROGRESS NOTE:      [id number]    TIMUR LOUIS      PROCEDURE:  T11 & T10 laminectomy/T10 through L1 posterior instrumented and posterolateral fusion for thoracic  stenosis with myelopathy, repair of incidental durotomy    DOS: 11/29/2018      SUBJECTIVE: 73y Patient seen and examined. Patient reports of moderate discomfort that is controlled by pain medications. Patient denies of acute sensory or motor changes.                             10.4   13.8  )-----------( 279      ( 29 Nov 2018 05:34 )             32.9           I&O's Detail    28 Nov 2018 07:01  -  29 Nov 2018 07:00  --------------------------------------------------------  IN:    sodium chloride 0.45%.: 900 mL    sodium chloride 0.9%: 400 mL  Total IN: 1300 mL    OUT:    Accordian: 340 mL    Indwelling Catheter - Urethral: 1200 mL  Total OUT: 1540 mL    Total NET: -240 mL            acetaminophen   Tablet .. 975 milliGRAM(s) Oral every 6 hours  aluminum hydroxide/magnesium hydroxide/simethicone Suspension 30 milliLiter(s) Oral every 12 hours PRN  dextrose 40% Gel 15 Gram(s) Oral once PRN  dextrose 5%. 1000 milliLiter(s) IV Continuous <Continuous>  dextrose 50% Injectable 12.5 Gram(s) IV Push once  dextrose 50% Injectable 25 Gram(s) IV Push once  dextrose 50% Injectable 25 Gram(s) IV Push once  docusate sodium 100 milliGRAM(s) Oral three times a day  enoxaparin Injectable 40 milliGRAM(s) SubCutaneous every 24 hours  gabapentin 300 milliGRAM(s) Oral every 8 hours  glucagon  Injectable 1 milliGRAM(s) IntraMuscular once PRN  insulin lispro (HumaLOG) corrective regimen sliding scale   SubCutaneous three times a day before meals  levothyroxine 137 MICROGram(s) Oral daily  magnesium hydroxide Suspension 30 milliLiter(s) Oral every 12 hours PRN  methocarbamol 500 milliGRAM(s) Oral every 6 hours PRN  naloxone Injectable 0.1 milliGRAM(s) IV Push every 3 minutes PRN  ondansetron Injectable 4 milliGRAM(s) IV Push every 6 hours PRN  oxyCODONE    IR 5 milliGRAM(s) Oral every 4 hours PRN  oxyCODONE    IR 10 milliGRAM(s) Oral every 4 hours PRN  pantoprazole    Tablet 40 milliGRAM(s) Oral before breakfast  senna 2 Tablet(s) Oral at bedtime  sodium chloride 0.45%. 1000 milliLiter(s) IV Continuous <Continuous>  tamsulosin 0.4 milliGRAM(s) Oral at bedtime        T(C): 36.8 (11-29-18 @ 08:07), Max: 36.9 (11-28-18 @ 11:14)  HR: 83 (11-29-18 @ 08:07) (80 - 110)  BP: 90/41 (11-29-18 @ 08:07) (90/41 - 166/77)  RR: 18 (11-29-18 @ 08:07) (6 - 20)  SpO2: 100% (11-29-18 @ 08:07) (88% - 100%)  Wt(kg): --      PHYSICAL EXAM:     Appearance: Alert, responsive, in no acute distress.    Neurological: Sensation of the left LE is grossly intact to light touch without focal deficit. There is diminished distal RLE sensation distal to the knee. 4/5 motor function of right hip/ knee and ankle. 3/5 motor strength of the LLE joints. No clonus noted. No focal deficits or weaknesses found.    Skin: + drain and RICH dressing. no rash on visible skin.  No bleeding. No abrasions. No ulcerations.    Vascular: 2+ distal pulses. Cap refill < 2 sec. No signs of venous   insufficiency   or stasis. No extremity ulcerations. No cyanosis.                                                        A/P :  69y Male S/P T11 & T10 laminectomy/T10 through L1 posterior instrumented and posterolateral fusion for thoracic  stenosis with myelopathy, repair of incidental durotomy  POD# 1    -  Pain control  -  DVT ppx: [x]SCDs   [x] Pharmacolgic    -  PT and out of bed today  -  Weight bearing status: WBAT [X]        PWB    [ ]     TTWB  [ ]      NWB  [ ]  * Patient to remain on bedrest until 6p tonight  -  Dispo: [facility] when medically optimized  - Will continue drain until later this afternoon  - Will continue barreto until 7a Friday morning ORTHO-SPINE POST-OP PROGRESS NOTE:      [id number]    TIMUR LOUIS      PROCEDURE:  T11 & T10 laminectomy/T10 through L1 posterior instrumented and posterolateral fusion for thoracic  stenosis with myelopathy, repair of incidental durotomy    DOS: 11/29/2018      SUBJECTIVE: 73y Patient seen and examined. Patient reports of moderate discomfort that is controlled by pain medications. Patient denies of acute sensory or motor changes.                             10.4   13.8  )-----------( 279      ( 29 Nov 2018 05:34 )             32.9           I&O's Detail    28 Nov 2018 07:01  -  29 Nov 2018 07:00  --------------------------------------------------------  IN:    sodium chloride 0.45%.: 900 mL    sodium chloride 0.9%: 400 mL  Total IN: 1300 mL    OUT:    Accordian: 340 mL    Indwelling Catheter - Urethral: 1200 mL  Total OUT: 1540 mL    Total NET: -240 mL            acetaminophen   Tablet .. 975 milliGRAM(s) Oral every 6 hours  aluminum hydroxide/magnesium hydroxide/simethicone Suspension 30 milliLiter(s) Oral every 12 hours PRN  dextrose 40% Gel 15 Gram(s) Oral once PRN  dextrose 5%. 1000 milliLiter(s) IV Continuous <Continuous>  dextrose 50% Injectable 12.5 Gram(s) IV Push once  dextrose 50% Injectable 25 Gram(s) IV Push once  dextrose 50% Injectable 25 Gram(s) IV Push once  docusate sodium 100 milliGRAM(s) Oral three times a day  enoxaparin Injectable 40 milliGRAM(s) SubCutaneous every 24 hours  gabapentin 300 milliGRAM(s) Oral every 8 hours  glucagon  Injectable 1 milliGRAM(s) IntraMuscular once PRN  insulin lispro (HumaLOG) corrective regimen sliding scale   SubCutaneous three times a day before meals  levothyroxine 137 MICROGram(s) Oral daily  magnesium hydroxide Suspension 30 milliLiter(s) Oral every 12 hours PRN  methocarbamol 500 milliGRAM(s) Oral every 6 hours PRN  naloxone Injectable 0.1 milliGRAM(s) IV Push every 3 minutes PRN  ondansetron Injectable 4 milliGRAM(s) IV Push every 6 hours PRN  oxyCODONE    IR 5 milliGRAM(s) Oral every 4 hours PRN  oxyCODONE    IR 10 milliGRAM(s) Oral every 4 hours PRN  pantoprazole    Tablet 40 milliGRAM(s) Oral before breakfast  senna 2 Tablet(s) Oral at bedtime  sodium chloride 0.45%. 1000 milliLiter(s) IV Continuous <Continuous>  tamsulosin 0.4 milliGRAM(s) Oral at bedtime        T(C): 36.8 (11-29-18 @ 08:07), Max: 36.9 (11-28-18 @ 11:14)  HR: 83 (11-29-18 @ 08:07) (80 - 110)  BP: 90/41 (11-29-18 @ 08:07) (90/41 - 166/77)  RR: 18 (11-29-18 @ 08:07) (6 - 20)  SpO2: 100% (11-29-18 @ 08:07) (88% - 100%)  Wt(kg): --      PHYSICAL EXAM:     Appearance: Alert, responsive, in no acute distress.    Neurological: Sensation of the left LE is grossly intact to light touch without focal deficit. There is diminished distal RLE sensation distal to the knee. 4/5 motor function of right hip/ knee and ankle. 3/5 motor strength of the LLE joints. No clonus noted. No focal deficits or weaknesses found.    Skin: + drain and RICH dressing. no rash on visible skin.  No bleeding. No abrasions. No ulcerations.    Vascular: 2+ distal pulses. Cap refill < 2 sec. No signs of venous   insufficiency   or stasis. No extremity ulcerations. No cyanosis.                                                        A/P :  69y Male S/P T11 & T10 laminectomy/T10 through L1 posterior instrumented and posterolateral fusion for thoracic  stenosis with myelopathy, repair of incidental durotomy  POD# 1    -  Pain control  -  DVT ppx: [x]SCDs   [x] Pharmacolgic    -  PT and out of bed today  -  Weight bearing status: WBAT [X]        PWB    [ ]     TTWB  [ ]      NWB  [ ]  * Patient to remain on bedrest until 8 am Saturday 11/31  -  Dispo: [facility] when medically optimized  - Will continue drain until later this afternoon  - Will continue barreto until 7a Friday morning

## 2018-11-29 NOTE — CONSULT NOTE ADULT - ATTENDING COMMENTS
Agree with Dr. Whyte.  Patient s/p thoracic & lumbar laminectomy for myelopathy.  Rehab - Recommend ACUTE inpatient rehabilitation for the functional deficits consisting of 3 hours of therapy/day & 24 hour RN/daily PMR physician for comorbid medical management. Will continue to follow for ongoing rehab needs and recommendations. Patient will be able to tolerate 3 hours a day.    Continue bedside therapy as well as OOB throughout the day with mobilization throughout the day with staff to maintain cardiopulmonary function and prevention of secondary complications related to debility.
Patient was examined in the ER this morning. I have also reviewed my office notes that document this progressive thoracic myelopathic-type presentation as well as lumbar neurogenic claudication patient has a complex medical history of a whether significant intervening factors is diabetic and diabetic neuropathy. Patient does have progressive weakness in his legs which I think can be contributing or stemming from his thoracic myelopathy. I did discuss operative management with the patient this morning and at present the concept that this may be preventative to help prevent progression is not going to be curative and render him asymptomatic at this point I still think the use of assistive devices is going to require despite operative versus nonoperative management patient wishes to discuss this with his wife/operative management and now returned later today for an operative conversation with risks and benefits etc.

## 2018-11-29 NOTE — PROGRESS NOTE ADULT - SUBJECTIVE AND OBJECTIVE BOX
TIMUR LOUIS  ----------------------------------------  The patient was seen and evaluated for spinal stenosis.  The patient is in no acute distress.  Denied any chest pain, palpitations, dyspnea, or abdominal pain.    Vital Signs Last 24 Hrs  T(C): 36.7 (29 Nov 2018 09:01), Max: 36.9 (28 Nov 2018 22:17)  T(F): 98.1 (29 Nov 2018 09:01), Max: 98.4 (28 Nov 2018 22:17)  HR: 81 (29 Nov 2018 09:01) (80 - 110)  BP: 97/50 (29 Nov 2018 09:01) (90/41 - 166/77)  BP(mean): --  RR: 18 (29 Nov 2018 09:01) (6 - 20)  SpO2: 100% (29 Nov 2018 09:01) (88% - 100%)    CAPILLARY BLOOD GLUCOSE  POCT Blood Glucose.: 150 mg/dL (29 Nov 2018 11:35)  POCT Blood Glucose.: 159 mg/dL (29 Nov 2018 08:36)  POCT Blood Glucose.: 213 mg/dL (28 Nov 2018 22:24)  POCT Blood Glucose.: 227 mg/dL (28 Nov 2018 18:22)    PHYSICAL EXAMINATION:  ----------------------------------------  General appearance: No acute distress, Awake, Alert  HEENT: Normocephalic, Atraumatic, Conjunctiva clear, EOMI  Neck: Supple, No JVD, No tenderness  Lungs: Clear to auscultation, Breath sound equal bilaterally, No wheezes, No rales  Cardiovascular: S1S2, Regular rhythm  Abdomen: Soft, Nontender, Nondistended, No guarding/rebound, Positive bowel sounds, back dressing intact, Drain in place  Extremities: No clubbing, No cyanosis, No edema, No calf tenderness  Neuro: Decreased strength and sensation to the lower extremities  Psychiatric: Appropriate mood, Normal affect    LABORATORY STUDIES:  ----------------------------------------             10.4   13.8  )-----------( 279      ( 29 Nov 2018 05:34 )             32.9     11-29    136  |  97<L>  |  11.0  ----------------------------<  150<H>  4.3   |  28.0  |  0.74    Ca    7.7<L>      29 Nov 2018 05:34    PT/INR - ( 28 Nov 2018 06:08 )   PT: 14.8 sec;   INR: 1.28 ratio    PTT - ( 28 Nov 2018 06:08 )  PTT:32.6 sec    Culture - Urine (collected 26 Nov 2018 17:46)  Source: .Urine Clean Catch (Midstream)  Final Report (27 Nov 2018 15:11):    Culture grew 3 or more types of organisms which indicate    collection contamination; consider recollection only if clinically    indicated.    MEDICATIONS  (STANDING):  acetaminophen   Tablet .. 975 milliGRAM(s) Oral every 6 hours  dextrose 5%. 1000 milliLiter(s) (50 mL/Hr) IV Continuous <Continuous>  dextrose 50% Injectable 12.5 Gram(s) IV Push once  dextrose 50% Injectable 25 Gram(s) IV Push once  dextrose 50% Injectable 25 Gram(s) IV Push once  docusate sodium 100 milliGRAM(s) Oral three times a day  enoxaparin Injectable 40 milliGRAM(s) SubCutaneous every 24 hours  gabapentin 300 milliGRAM(s) Oral every 8 hours  insulin lispro (HumaLOG) corrective regimen sliding scale   SubCutaneous three times a day before meals  levothyroxine 137 MICROGram(s) Oral daily  pantoprazole    Tablet 40 milliGRAM(s) Oral before breakfast  senna 2 Tablet(s) Oral at bedtime  sodium chloride 0.45%. 1000 milliLiter(s) (100 mL/Hr) IV Continuous <Continuous>  tamsulosin 0.4 milliGRAM(s) Oral at bedtime    MEDICATIONS  (PRN):  aluminum hydroxide/magnesium hydroxide/simethicone Suspension 30 milliLiter(s) Oral every 12 hours PRN Indigestion  dextrose 40% Gel 15 Gram(s) Oral once PRN Blood Glucose LESS THAN 70 milliGRAM(s)/deciliter  glucagon  Injectable 1 milliGRAM(s) IntraMuscular once PRN Glucose LESS THAN 70 milligrams/deciliter  magnesium hydroxide Suspension 30 milliLiter(s) Oral every 12 hours PRN Constipation  methocarbamol 500 milliGRAM(s) Oral every 6 hours PRN Back Spasms  naloxone Injectable 0.1 milliGRAM(s) IV Push every 3 minutes PRN For ANY of the following changes in patient status:  A. RR LESS THAN 10 breaths per minute, B. Oxygen saturation LESS THAN 90%, C. Sedation score of 6  ondansetron Injectable 4 milliGRAM(s) IV Push every 6 hours PRN Nausea  oxyCODONE    IR 5 milliGRAM(s) Oral every 4 hours PRN Moderate Pain (4 - 6)  oxyCODONE    IR 10 milliGRAM(s) Oral every 4 hours PRN Severe Pain (7 - 10)      ASSESSMENT / PLAN:  ----------------------------------------  Spinal stenosis - Status post spinal surgery. Drain in palce. Analgesic medications as needed.    Diabetes - Insulin coverage, close monitoring of blood glucose levels.    Hypothyroidism - On levothyroxine.    Hyponatremia - Mild. Resolved on repeat laboratory studies. Diet as tolerated.    BPH - On tamsulosin.

## 2018-11-29 NOTE — PROGRESS NOTE ADULT - SUBJECTIVE AND OBJECTIVE BOX
D/w Dr. Rodríguez -  to be removed tonight. Patient seen at bedside. Back dressing mild blood drainage however moderate bloody drainage coming from drain site. HV drain sewn in. Suture cut  and drain removed without complication. Betadine compressive dressing placed over drain site secured with tegaderm. Patient without complaints.

## 2018-11-30 LAB
ANION GAP SERPL CALC-SCNC: 11 MMOL/L — SIGNIFICANT CHANGE UP (ref 5–17)
AUTO DIFF PNL BLD: NEGATIVE — SIGNIFICANT CHANGE UP
AUTO DIFF PNL BLD: NEGATIVE — SIGNIFICANT CHANGE UP
B BURGDOR C6 AB SER-ACNC: NEGATIVE — SIGNIFICANT CHANGE UP
B BURGDOR IGG+IGM SER-ACNC: 0.21 INDEX — SIGNIFICANT CHANGE UP (ref 0.01–0.89)
BASOPHILS # BLD AUTO: 0 K/UL — SIGNIFICANT CHANGE UP (ref 0–0.2)
BASOPHILS NFR BLD AUTO: 0.2 % — SIGNIFICANT CHANGE UP (ref 0–2)
BUN SERPL-MCNC: 9 MG/DL — SIGNIFICANT CHANGE UP (ref 8–20)
C-ANCA SER-ACNC: NEGATIVE — SIGNIFICANT CHANGE UP
C-ANCA SER-ACNC: NEGATIVE — SIGNIFICANT CHANGE UP
CALCIUM SERPL-MCNC: 7.5 MG/DL — LOW (ref 8.6–10.2)
CHLORIDE SERPL-SCNC: 95 MMOL/L — LOW (ref 98–107)
CO2 SERPL-SCNC: 27 MMOL/L — SIGNIFICANT CHANGE UP (ref 22–29)
CREAT SERPL-MCNC: 0.56 MG/DL — SIGNIFICANT CHANGE UP (ref 0.5–1.3)
EOSINOPHIL # BLD AUTO: 0.2 K/UL — SIGNIFICANT CHANGE UP (ref 0–0.5)
EOSINOPHIL NFR BLD AUTO: 2 % — SIGNIFICANT CHANGE UP (ref 0–5)
GLUCOSE BLDC GLUCOMTR-MCNC: 173 MG/DL — HIGH (ref 70–99)
GLUCOSE BLDC GLUCOMTR-MCNC: 212 MG/DL — HIGH (ref 70–99)
GLUCOSE BLDC GLUCOMTR-MCNC: 224 MG/DL — HIGH (ref 70–99)
GLUCOSE SERPL-MCNC: 175 MG/DL — HIGH (ref 70–115)
HCT VFR BLD CALC: 28.5 % — LOW (ref 42–52)
HGB BLD-MCNC: 9.1 G/DL — LOW (ref 14–18)
LYMPHOCYTES # BLD AUTO: 0.8 K/UL — LOW (ref 1–4.8)
LYMPHOCYTES # BLD AUTO: 9.5 % — LOW (ref 20–55)
MCHC RBC-ENTMCNC: 27.4 PG — SIGNIFICANT CHANGE UP (ref 27–31)
MCHC RBC-ENTMCNC: 31.9 G/DL — LOW (ref 32–36)
MCV RBC AUTO: 85.8 FL — SIGNIFICANT CHANGE UP (ref 80–94)
MONOCYTES # BLD AUTO: 1 K/UL — HIGH (ref 0–0.8)
MONOCYTES NFR BLD AUTO: 12.3 % — HIGH (ref 3–10)
NEUTROPHILS # BLD AUTO: 6.4 K/UL — SIGNIFICANT CHANGE UP (ref 1.8–8)
NEUTROPHILS NFR BLD AUTO: 75.4 % — HIGH (ref 37–73)
P-ANCA SER-ACNC: NEGATIVE — SIGNIFICANT CHANGE UP
P-ANCA SER-ACNC: NEGATIVE — SIGNIFICANT CHANGE UP
PLATELET # BLD AUTO: 218 K/UL — SIGNIFICANT CHANGE UP (ref 150–400)
POTASSIUM SERPL-MCNC: 4 MMOL/L — SIGNIFICANT CHANGE UP (ref 3.5–5.3)
POTASSIUM SERPL-SCNC: 4 MMOL/L — SIGNIFICANT CHANGE UP (ref 3.5–5.3)
RBC # BLD: 3.32 M/UL — LOW (ref 4.6–6.2)
RBC # FLD: 14.2 % — SIGNIFICANT CHANGE UP (ref 11–15.6)
SODIUM SERPL-SCNC: 133 MMOL/L — LOW (ref 135–145)
WBC # BLD: 8.5 K/UL — SIGNIFICANT CHANGE UP (ref 4.8–10.8)
WBC # FLD AUTO: 8.5 K/UL — SIGNIFICANT CHANGE UP (ref 4.8–10.8)
WNV AB SPEC QL: SIGNIFICANT CHANGE UP
WNV IGG TITR FLD: NEGATIVE — SIGNIFICANT CHANGE UP
WNV IGM SPEC QL: NEGATIVE — SIGNIFICANT CHANGE UP

## 2018-11-30 PROCEDURE — 99232 SBSQ HOSP IP/OBS MODERATE 35: CPT

## 2018-11-30 RX ADMIN — Medication 137 MICROGRAM(S): at 06:04

## 2018-11-30 RX ADMIN — Medication 975 MILLIGRAM(S): at 06:04

## 2018-11-30 RX ADMIN — GABAPENTIN 300 MILLIGRAM(S): 400 CAPSULE ORAL at 23:15

## 2018-11-30 RX ADMIN — Medication 4: at 14:03

## 2018-11-30 RX ADMIN — Medication 975 MILLIGRAM(S): at 07:00

## 2018-11-30 RX ADMIN — ENOXAPARIN SODIUM 40 MILLIGRAM(S): 100 INJECTION SUBCUTANEOUS at 06:05

## 2018-11-30 RX ADMIN — Medication 100 MILLIGRAM(S): at 23:15

## 2018-11-30 RX ADMIN — PANTOPRAZOLE SODIUM 40 MILLIGRAM(S): 20 TABLET, DELAYED RELEASE ORAL at 06:04

## 2018-11-30 RX ADMIN — Medication 975 MILLIGRAM(S): at 23:14

## 2018-11-30 RX ADMIN — TAMSULOSIN HYDROCHLORIDE 0.4 MILLIGRAM(S): 0.4 CAPSULE ORAL at 23:15

## 2018-11-30 RX ADMIN — Medication 2: at 17:48

## 2018-11-30 RX ADMIN — Medication 15 MILLIGRAM(S): at 07:00

## 2018-11-30 RX ADMIN — SENNA PLUS 2 TABLET(S): 8.6 TABLET ORAL at 23:15

## 2018-11-30 RX ADMIN — GABAPENTIN 300 MILLIGRAM(S): 400 CAPSULE ORAL at 06:03

## 2018-11-30 RX ADMIN — Medication 100 MILLIGRAM(S): at 06:04

## 2018-11-30 RX ADMIN — Medication 975 MILLIGRAM(S): at 01:13

## 2018-11-30 RX ADMIN — Medication 100 MILLIGRAM(S): at 14:03

## 2018-11-30 RX ADMIN — Medication 15 MILLIGRAM(S): at 06:02

## 2018-11-30 RX ADMIN — GABAPENTIN 300 MILLIGRAM(S): 400 CAPSULE ORAL at 14:03

## 2018-11-30 RX ADMIN — ONDANSETRON 4 MILLIGRAM(S): 8 TABLET, FILM COATED ORAL at 06:09

## 2018-11-30 RX ADMIN — Medication 650 MILLIGRAM(S): at 23:14

## 2018-11-30 RX ADMIN — Medication 975 MILLIGRAM(S): at 00:18

## 2018-11-30 NOTE — PROGRESS NOTE ADULT - ATTENDING COMMENTS
Patient is postop day #2 status post revision Greg lumbar laminectomy as well as instrumentation effusion. Patient's head is elevated approximately 30° has no complaints of spinal headache given his unrepairable incidental durotomy. Patient states his legs do feel better from strength perspective and mild improvement and status subjective stable and improved sensory exam. Overall patient's dressing remained clean dry and intact as no acute neurologic deficits continue with the head of the bed elevated to approximately 30° and no further at this point in time tomorrow we can advance up to approximately 45° or even in the sitting position on Saturday on December 1, 2018

## 2018-11-30 NOTE — PROGRESS NOTE ADULT - SUBJECTIVE AND OBJECTIVE BOX
Patient in bed, continues to be on rest.  Continues to feel nauseated, but drinking/eating.  Reports the back pain is improved.     FUNCTIONAL PROGRESS  Unable to assess at this time    REVIEW OF SYSTEMS  Constitutional - No fever,  +fatigue  Neurological - +loss of strength, +numbness, No tremors  Skin - No rashes, +lesions   Musculoskeletal - +joint pain, No joint swelling, +muscle pain  Psychiatric - No depression, No anxiety    VITALS  T(C): 36.9 (11-30-18 @ 07:53), Max: 37.1 (11-29-18 @ 15:49)  HR: 96 (11-30-18 @ 07:53) (80 - 96)  BP: 118/59 (11-30-18 @ 07:53) (109/59 - 144/73)  RR: 20 (11-30-18 @ 07:53) (18 - 20)  SpO2: 96% (11-30-18 @ 07:53) (96% - 99%)  Wt(kg): --    MEDICATIONS   acetaminophen   Tablet .. 975 milliGRAM(s) every 6 hours  acetaminophen   Tablet .. 650 milliGRAM(s) every 6 hours PRN  aluminum hydroxide/magnesium hydroxide/simethicone Suspension 30 milliLiter(s) every 12 hours PRN  dextrose 40% Gel 15 Gram(s) once PRN  dextrose 5%. 1000 milliLiter(s) <Continuous>  dextrose 50% Injectable 12.5 Gram(s) once  dextrose 50% Injectable 25 Gram(s) once  dextrose 50% Injectable 25 Gram(s) once  docusate sodium 100 milliGRAM(s) three times a day  enoxaparin Injectable 40 milliGRAM(s) every 24 hours  gabapentin 300 milliGRAM(s) every 8 hours  glucagon  Injectable 1 milliGRAM(s) once PRN  insulin lispro (HumaLOG) corrective regimen sliding scale   three times a day before meals  ketorolac   Injectable 15 milliGRAM(s) every 6 hours PRN  levothyroxine 137 MICROGram(s) daily  lisinopril 10 milliGRAM(s) daily  magnesium hydroxide Suspension 30 milliLiter(s) every 12 hours PRN  methocarbamol 500 milliGRAM(s) every 6 hours PRN  naloxone Injectable 0.1 milliGRAM(s) every 3 minutes PRN  ondansetron Injectable 4 milliGRAM(s) every 6 hours PRN  oxyCODONE    IR 10 milliGRAM(s) every 4 hours PRN  pantoprazole    Tablet 40 milliGRAM(s) before breakfast  senna 2 Tablet(s) at bedtime  sodium chloride 0.45%. 1000 milliLiter(s) <Continuous>  tamsulosin 0.4 milliGRAM(s) at bedtime      RECENT LABS/IMAGING  CBC Full  -  ( 30 Nov 2018 05:53 )  WBC Count : 8.5 K/uL  Hemoglobin : 9.1 g/dL  Hematocrit : 28.5 %  Platelet Count - Automated : 218 K/uL  Mean Cell Volume : 85.8 fl  Mean Cell Hemoglobin : 27.4 pg  Mean Cell Hemoglobin Concentration : 31.9 g/dL  Auto Neutrophil # : 6.4 K/uL  Auto Lymphocyte # : 0.8 K/uL  Auto Monocyte # : 1.0 K/uL  Auto Eosinophil # : 0.2 K/uL  Auto Basophil # : 0.0 K/uL  Auto Neutrophil % : 75.4 %  Auto Lymphocyte % : 9.5 %  Auto Monocyte % : 12.3 %  Auto Eosinophil % : 2.0 %  Auto Basophil % : 0.2 %    11-30    133<L>  |  95<L>  |  9.0  ----------------------------<  175<H>  4.0   |  27.0  |  0.56    Ca    7.5<L>      30 Nov 2018 06:07      ----------------------------------------------------------------------------------------  PHYSICAL EXAM  Constitutional - NAD, Comfortable  Extremities - No calf tenderness   Neurologic Exam -                    Motor -                     LEFT    LE - HF 2/5, KE 3/5, DF 3/5, PF 3/5, EHL 3/5                    RIGHT LE - HF 2/5, KE 4/5, DF 4/5, PF 4/5, EHL 4/5     Sensory - reduced LT sensation in b/l pedal L5 distribution  Psychiatric - Mood stable, Affect WNL  Skin- thoracolumbar spine with DSD c/d/i + drain in place with sanguinous drainage    ------------------------------------------------------------------------------------------------  ASSESSMENT/PLAN    73M with functional deficits following spinal stenosis with T11-L2 spinal cord compression s/p laminectomy and thoracic fusion  Pain - Tylenol, Toradol, Neurontin, Robaxin, Oxycodone PRN  DVT PPX - SCDs, Lovenox   Rehab - Bed rest, pending OT/OT evaluation. Will likely need ACUTE inpatient rehabilitation for the functional deficits consisting of 3 hours of therapy/day & 24 hour RN/daily PMR physician for comorbid medical management. Will continue to follow for ongoing rehab needs and recommendations. Patient will be able to tolerate 3 hours a day.

## 2018-11-30 NOTE — PROVIDER CONTACT NOTE (OTHER) - REASON
Chart Reviewed. Events noted for Spinal Sx Revision, PT Orders cancelled. Await updated orders as per MD to continue initiate PT Evaluation

## 2018-11-30 NOTE — PROVIDER CONTACT NOTE (OTHER) - SITUATION
OT orders to evaluate and treat have been cancelled. Need new orders if and when appropriate to be seen for OT services.

## 2018-11-30 NOTE — PROGRESS NOTE ADULT - SUBJECTIVE AND OBJECTIVE BOX
73 year Old Male, S/P Revision Laminectomy T11 Posterior Spinal Fusion at T10 Through L1 with Neuro Monitoring SSEP, MEP, and EMG on 11/29/18 under GETA/TIVA. Patient is resting comfortably, vital signs are stable. Patient is awake and alert. Patient mentioned about sore throat. Patient did have Stridor in Recovery Room, which was treated successfully with Rasemic Epi with nebulizing treatment, and Solu Cortef 100mgs. IVP. O2 sat was   97% during treatment, Stridor did resolve, no reintubation was required. Patient had no other issues with Anesthesia Care. 73 year Old Male, S/P Revision Laminectomy T11 Posterior Spinal Fusion at T10 Through L1 with Neuro Monitoring SSEP, MEP, and EMG on 11/28/18 under GETA/TIVA. Patient is resting comfortably, vital signs are stable. Patient is awake and alert. Patient mentioned about sore throat. Patient did have Stridor in Recovery Room, which was treated successfully with Rasemic Epi with nebulizing treatment, and Solu Cortef 100mgs. IVP. O2 sat was   97% during treatment, Stridor did resolve, no reintubation was required. Patient had no other issues with Anesthesia Care.

## 2018-11-30 NOTE — PROGRESS NOTE ADULT - SUBJECTIVE AND OBJECTIVE BOX
TIMUR LOUIS    [id number]    POST OPERATIVE DAY #:  2   STATUS POST:  T11 & T10 laminectomy/T10 through L1 posterior instrumented and posterolateral fusion for thoracic  stenosis with myelopathy, repair of incidental durotomy                  SUBJECTIVE: Patient seen and examined, patient laying on his right side in bed complaining of abdominal pain and nausea. States no BM or flatulence.      Pain (0-10): 5      PAST MEDICAL & SURGICAL HISTORY:  BPH (benign prostatic hyperplasia)  HTN (hypertension)  Hypothyroid  Dyslipidemia  OA (osteoarthritis)  Diabetes  S/P laminectomy  S/P hernia repair  S/P hip replacement: R    OBJECTIVE:     Vital Signs Last 24 Hrs  T(C): 36.9 (30 Nov 2018 07:53), Max: 37.1 (29 Nov 2018 15:49)  T(F): 98.5 (30 Nov 2018 07:53), Max: 98.8 (30 Nov 2018 04:25)  HR: 96 (30 Nov 2018 07:53) (80 - 96)  BP: 118/59 (30 Nov 2018 07:53) (109/59 - 144/73)  BP(mean): --  RR: 20 (30 Nov 2018 07:53) (18 - 20)  SpO2: 96% (30 Nov 2018 07:53) (96% - 99%)  I&O's Summary    29 Nov 2018 07:01  -  30 Nov 2018 07:00  --------------------------------------------------------  IN: 1000 mL / OUT: 1415 mL / NET: -415 mL        Constitutional: Alert, responsive, in no acute distress.   Abdominal: soft and supple non distended yet has mild pain to palpation  Lymphatics no pretibial pitting edema    Spine:          Dressing: Yunior dressing clean/dry/intact. Dressing from drain site saturated - will change        Sensation of the left LE is grossly intact to light touch without focal deficit. There is diminished distal RLE sensation distal to the knee. 4/5 motor function of right hip/ knee and ankle. 3/5 motor strength of the LLE joints. No clonus noted. No focal deficits or weaknesses found.        Vascular:  warm well perfused; capillary refill <3 seconds                                                      MEDICATIONS  (STANDING):  acetaminophen   Tablet .. 975 milliGRAM(s) Oral every 6 hours  dextrose 5%. 1000 milliLiter(s) (50 mL/Hr) IV Continuous <Continuous>  dextrose 50% Injectable 12.5 Gram(s) IV Push once  dextrose 50% Injectable 25 Gram(s) IV Push once  dextrose 50% Injectable 25 Gram(s) IV Push once  docusate sodium 100 milliGRAM(s) Oral three times a day  enoxaparin Injectable 40 milliGRAM(s) SubCutaneous every 24 hours  gabapentin 300 milliGRAM(s) Oral every 8 hours  insulin lispro (HumaLOG) corrective regimen sliding scale   SubCutaneous three times a day before meals  levothyroxine 137 MICROGram(s) Oral daily  lisinopril 10 milliGRAM(s) Oral daily  pantoprazole    Tablet 40 milliGRAM(s) Oral before breakfast  senna 2 Tablet(s) Oral at bedtime  sodium chloride 0.45%. 1000 milliLiter(s) (100 mL/Hr) IV Continuous <Continuous>  tamsulosin 0.4 milliGRAM(s) Oral at bedtime    MEDICATIONS  (PRN):  acetaminophen   Tablet .. 650 milliGRAM(s) Oral every 6 hours PRN Temp greater or equal to 38C (100.4F), Mild Pain (1 - 3)  aluminum hydroxide/magnesium hydroxide/simethicone Suspension 30 milliLiter(s) Oral every 12 hours PRN Indigestion  dextrose 40% Gel 15 Gram(s) Oral once PRN Blood Glucose LESS THAN 70 milliGRAM(s)/deciliter  glucagon  Injectable 1 milliGRAM(s) IntraMuscular once PRN Glucose LESS THAN 70 milligrams/deciliter  ketorolac   Injectable 15 milliGRAM(s) IV Push every 6 hours PRN Moderate Pain (4 - 6)  magnesium hydroxide Suspension 30 milliLiter(s) Oral every 12 hours PRN Constipation  methocarbamol 500 milliGRAM(s) Oral every 6 hours PRN Back Spasms  naloxone Injectable 0.1 milliGRAM(s) IV Push every 3 minutes PRN For ANY of the following changes in patient status:  A. RR LESS THAN 10 breaths per minute, B. Oxygen saturation LESS THAN 90%, C. Sedation score of 6  ondansetron Injectable 4 milliGRAM(s) IV Push every 6 hours PRN Nausea  oxyCODONE    IR 10 milliGRAM(s) Oral every 4 hours PRN Severe Pain (7 - 10)           LABS:                        9.1    8.5   )-----------( 218      ( 30 Nov 2018 05:53 )             28.5       11-30    133<L>  |  95<L>  |  9.0  ----------------------------<  175<H>  4.0   |  27.0  |  0.56    Ca    7.5<L>      30 Nov 2018 06:07      A/P :  73y Male S/P T11 & T10 laminectomy/T10 through L1 posterior instrumented and posterolateral fusion for thoracic  stenosis with myelopathy, repair of incidental durotomy                       POD#2  -    Pain control  -    DVT ppx: [x ]SCDs[x ] Pharmacolgic- lovenox     -    Resume home meds  -    Bedrest until tomorrow- due to durotomy repair  -    Dispo: Acute Rehab

## 2018-11-30 NOTE — PROGRESS NOTE ADULT - SUBJECTIVE AND OBJECTIVE BOX
TIMUR LOUIS  ----------------------------------------  The patient was seen and evaluated for spinal stenosis.  The patient is in no acute distress.  Denied any chest pain, palpitations, or dyspnea.  Reported some abdominal discomfort with food.    Vital Signs Last 24 Hrs  T(C): 36.9 (30 Nov 2018 07:53), Max: 37.1 (29 Nov 2018 15:49)  T(F): 98.5 (30 Nov 2018 07:53), Max: 98.8 (30 Nov 2018 04:25)  HR: 96 (30 Nov 2018 07:53) (80 - 96)  BP: 118/59 (30 Nov 2018 07:53) (109/59 - 144/73)  BP(mean): --  RR: 20 (30 Nov 2018 07:53) (18 - 20)  SpO2: 96% (30 Nov 2018 07:53) (96% - 99%)    CAPILLARY BLOOD GLUCOSE  POCT Blood Glucose.: 212 mg/dL (30 Nov 2018 09:43)  POCT Blood Glucose.: 204 mg/dL (29 Nov 2018 22:10)  POCT Blood Glucose.: 144 mg/dL (29 Nov 2018 18:14)  POCT Blood Glucose.: 150 mg/dL (29 Nov 2018 11:35)    PHYSICAL EXAMINATION:  ----------------------------------------      LABORATORY STUDIES:  ----------------------------------------                        9.1    8.5   )-----------( 218      ( 30 Nov 2018 05:53 )             28.5     11-30    133<L>  |  95<L>  |  9.0  ----------------------------<  175<H>  4.0   |  27.0  |  0.56    Ca    7.5<L>      30 Nov 2018 06:07                        MEDICATIONS  (STANDING):  acetaminophen   Tablet .. 975 milliGRAM(s) Oral every 6 hours  dextrose 5%. 1000 milliLiter(s) (50 mL/Hr) IV Continuous <Continuous>  dextrose 50% Injectable 12.5 Gram(s) IV Push once  dextrose 50% Injectable 25 Gram(s) IV Push once  dextrose 50% Injectable 25 Gram(s) IV Push once  docusate sodium 100 milliGRAM(s) Oral three times a day  enoxaparin Injectable 40 milliGRAM(s) SubCutaneous every 24 hours  gabapentin 300 milliGRAM(s) Oral every 8 hours  insulin lispro (HumaLOG) corrective regimen sliding scale   SubCutaneous three times a day before meals  levothyroxine 137 MICROGram(s) Oral daily  lisinopril 10 milliGRAM(s) Oral daily  pantoprazole    Tablet 40 milliGRAM(s) Oral before breakfast  senna 2 Tablet(s) Oral at bedtime  sodium chloride 0.45%. 1000 milliLiter(s) (100 mL/Hr) IV Continuous <Continuous>  tamsulosin 0.4 milliGRAM(s) Oral at bedtime    MEDICATIONS  (PRN):  acetaminophen   Tablet .. 650 milliGRAM(s) Oral every 6 hours PRN Temp greater or equal to 38C (100.4F), Mild Pain (1 - 3)  aluminum hydroxide/magnesium hydroxide/simethicone Suspension 30 milliLiter(s) Oral every 12 hours PRN Indigestion  dextrose 40% Gel 15 Gram(s) Oral once PRN Blood Glucose LESS THAN 70 milliGRAM(s)/deciliter  glucagon  Injectable 1 milliGRAM(s) IntraMuscular once PRN Glucose LESS THAN 70 milligrams/deciliter  ketorolac   Injectable 15 milliGRAM(s) IV Push every 6 hours PRN Moderate Pain (4 - 6)  magnesium hydroxide Suspension 30 milliLiter(s) Oral every 12 hours PRN Constipation  methocarbamol 500 milliGRAM(s) Oral every 6 hours PRN Back Spasms  naloxone Injectable 0.1 milliGRAM(s) IV Push every 3 minutes PRN For ANY of the following changes in patient status:  A. RR LESS THAN 10 breaths per minute, B. Oxygen saturation LESS THAN 90%, C. Sedation score of 6  ondansetron Injectable 4 milliGRAM(s) IV Push every 6 hours PRN Nausea  oxyCODONE    IR 10 milliGRAM(s) Oral every 4 hours PRN Severe Pain (7 - 10)      ASSESSMENT / PLAN:  ---------------------------------------- TIMUR LOUIS  ----------------------------------------  The patient was seen and evaluated for spinal stenosis.  The patient is in no acute distress.  Denied any chest pain, palpitations, or dyspnea.  Reported some abdominal discomfort with food.    HPI:  73M presented with worsening leg weakness and falls at home. MRI of the lumbar spine noted T11-T12 disc extrusion superimposed on a facet and ligament hypertrophy resulting in severe spinal stenosis with cord compression, disc disease and spondylosis from T12-L1 to L5-S1, multilevel foraminal stenosis. The patient was seen by Ortho/Spine in consultation for spinal stenosis. Lower extremity Doppler was without DVT. MRI of the cervical spine noted disc herniation at C2/C3 mildly flattening the ventral aspect of the cord, mild multilevel discogenic degenerative changes. MRI of the thoracic spine noted unchanged mild cord compression at T11/T12. The patient was seen by Neurology in consultation for myelopathy. The patient was seen by Orthopedics with recommendations to pursue surgical intervention to prevent further progression of the myelopathy. The patient was seen by Cardiology in consultation for perioperative evaluation. The patient underwent surgery for decompression. The patient was followed by Orthopedics.     Vital Signs Last 24 Hrs  T(C): 36.9 (30 Nov 2018 07:53), Max: 37.1 (29 Nov 2018 15:49)  T(F): 98.5 (30 Nov 2018 07:53), Max: 98.8 (30 Nov 2018 04:25)  HR: 96 (30 Nov 2018 07:53) (80 - 96)  BP: 118/59 (30 Nov 2018 07:53) (109/59 - 144/73)  BP(mean): --  RR: 20 (30 Nov 2018 07:53) (18 - 20)  SpO2: 96% (30 Nov 2018 07:53) (96% - 99%)    CAPILLARY BLOOD GLUCOSE  POCT Blood Glucose.: 212 mg/dL (30 Nov 2018 09:43)  POCT Blood Glucose.: 204 mg/dL (29 Nov 2018 22:10)  POCT Blood Glucose.: 144 mg/dL (29 Nov 2018 18:14)  POCT Blood Glucose.: 150 mg/dL (29 Nov 2018 11:35)    PHYSICAL EXAMINATION:  ----------------------------------------  General appearance: No acute distress, Awake, Alert  HEENT: Normocephalic, Atraumatic, Conjunctiva clear, EOMI  Neck: Supple, No JVD, No tenderness  Lungs: Clear to auscultation, Breath sound equal bilaterally, No wheezes, No rales  Cardiovascular: S1S2, Regular rhythm  Abdomen: Soft, Nontender, Nondistended, No guarding/rebound, Positive bowel sounds, back dressing intact  Extremities: No clubbing, No cyanosis, No edema, No calf tenderness  Neuro: Decreased strength and sensation to the lower extremities  Psychiatric: Appropriate mood, Normal affect    LABORATORY STUDIES:  ----------------------------------------             9.1    8.5   )-----------( 218      ( 30 Nov 2018 05:53 )             28.5     11-30    133<L>  |  95<L>  |  9.0  ----------------------------<  175<H>  4.0   |  27.0  |  0.56    Ca    7.5<L>      30 Nov 2018 06:07    MEDICATIONS  (STANDING):  acetaminophen   Tablet .. 975 milliGRAM(s) Oral every 6 hours  dextrose 5%. 1000 milliLiter(s) (50 mL/Hr) IV Continuous <Continuous>  dextrose 50% Injectable 12.5 Gram(s) IV Push once  dextrose 50% Injectable 25 Gram(s) IV Push once  dextrose 50% Injectable 25 Gram(s) IV Push once  docusate sodium 100 milliGRAM(s) Oral three times a day  enoxaparin Injectable 40 milliGRAM(s) SubCutaneous every 24 hours  gabapentin 300 milliGRAM(s) Oral every 8 hours  insulin lispro (HumaLOG) corrective regimen sliding scale   SubCutaneous three times a day before meals  levothyroxine 137 MICROGram(s) Oral daily  lisinopril 10 milliGRAM(s) Oral daily  pantoprazole    Tablet 40 milliGRAM(s) Oral before breakfast  senna 2 Tablet(s) Oral at bedtime  sodium chloride 0.45%. 1000 milliLiter(s) (100 mL/Hr) IV Continuous <Continuous>  tamsulosin 0.4 milliGRAM(s) Oral at bedtime    MEDICATIONS  (PRN):  acetaminophen   Tablet .. 650 milliGRAM(s) Oral every 6 hours PRN Temp greater or equal to 38C (100.4F), Mild Pain (1 - 3)  aluminum hydroxide/magnesium hydroxide/simethicone Suspension 30 milliLiter(s) Oral every 12 hours PRN Indigestion  dextrose 40% Gel 15 Gram(s) Oral once PRN Blood Glucose LESS THAN 70 milliGRAM(s)/deciliter  glucagon  Injectable 1 milliGRAM(s) IntraMuscular once PRN Glucose LESS THAN 70 milligrams/deciliter  ketorolac   Injectable 15 milliGRAM(s) IV Push every 6 hours PRN Moderate Pain (4 - 6)  magnesium hydroxide Suspension 30 milliLiter(s) Oral every 12 hours PRN Constipation  methocarbamol 500 milliGRAM(s) Oral every 6 hours PRN Back Spasms  naloxone Injectable 0.1 milliGRAM(s) IV Push every 3 minutes PRN For ANY of the following changes in patient status:  A. RR LESS THAN 10 breaths per minute, B. Oxygen saturation LESS THAN 90%, C. Sedation score of 6  ondansetron Injectable 4 milliGRAM(s) IV Push every 6 hours PRN Nausea  oxyCODONE    IR 10 milliGRAM(s) Oral every 4 hours PRN Severe Pain (7 - 10)      ASSESSMENT / PLAN:  ----------------------------------------  Spinal stenosis - Status post spinal surgery. Dressing in place. Analgesic medications as needed.    Diabetes - Insulin coverage, close monitoring of blood glucose levels.    Hypothyroidism - On levothyroxine.    Hyponatremia - Mild. Resolved on repeat laboratory studies. Diet as tolerated.    BPH - On tamsulosin.    Abdominal discomfort - Abdominal examination was unremarkable. On pantoprazole. Had evidence of bowel movement when being changed.

## 2018-12-01 ENCOUNTER — TRANSCRIPTION ENCOUNTER (OUTPATIENT)
Age: 73
End: 2018-12-01

## 2018-12-01 LAB
ANION GAP SERPL CALC-SCNC: 10 MMOL/L — SIGNIFICANT CHANGE UP (ref 5–17)
BASOPHILS # BLD AUTO: 0 K/UL — SIGNIFICANT CHANGE UP (ref 0–0.2)
BASOPHILS NFR BLD AUTO: 0.1 % — SIGNIFICANT CHANGE UP (ref 0–2)
BUN SERPL-MCNC: 8 MG/DL — SIGNIFICANT CHANGE UP (ref 8–20)
CALCIUM SERPL-MCNC: 7.8 MG/DL — LOW (ref 8.6–10.2)
CHLORIDE SERPL-SCNC: 91 MMOL/L — LOW (ref 98–107)
CO2 SERPL-SCNC: 31 MMOL/L — HIGH (ref 22–29)
CREAT SERPL-MCNC: 0.49 MG/DL — LOW (ref 0.5–1.3)
EOSINOPHIL # BLD AUTO: 0 K/UL — SIGNIFICANT CHANGE UP (ref 0–0.5)
EOSINOPHIL NFR BLD AUTO: 0.3 % — SIGNIFICANT CHANGE UP (ref 0–5)
GLUCOSE BLDC GLUCOMTR-MCNC: 188 MG/DL — HIGH (ref 70–99)
GLUCOSE BLDC GLUCOMTR-MCNC: 192 MG/DL — HIGH (ref 70–99)
GLUCOSE BLDC GLUCOMTR-MCNC: 281 MG/DL — HIGH (ref 70–99)
GLUCOSE SERPL-MCNC: 191 MG/DL — HIGH (ref 70–115)
HCT VFR BLD CALC: 29.3 % — LOW (ref 42–52)
HGB BLD-MCNC: 9.6 G/DL — LOW (ref 14–18)
LYMPHOCYTES # BLD AUTO: 0.6 K/UL — LOW (ref 1–4.8)
LYMPHOCYTES # BLD AUTO: 5.1 % — LOW (ref 20–55)
MCHC RBC-ENTMCNC: 27.7 PG — SIGNIFICANT CHANGE UP (ref 27–31)
MCHC RBC-ENTMCNC: 32.8 G/DL — SIGNIFICANT CHANGE UP (ref 32–36)
MCV RBC AUTO: 84.4 FL — SIGNIFICANT CHANGE UP (ref 80–94)
MONOCYTES # BLD AUTO: 1.3 K/UL — HIGH (ref 0–0.8)
MONOCYTES NFR BLD AUTO: 12 % — HIGH (ref 3–10)
NEUTROPHILS # BLD AUTO: 9.2 K/UL — HIGH (ref 1.8–8)
NEUTROPHILS NFR BLD AUTO: 82.1 % — HIGH (ref 37–73)
PLATELET # BLD AUTO: 248 K/UL — SIGNIFICANT CHANGE UP (ref 150–400)
POTASSIUM SERPL-MCNC: 4.1 MMOL/L — SIGNIFICANT CHANGE UP (ref 3.5–5.3)
POTASSIUM SERPL-SCNC: 4.1 MMOL/L — SIGNIFICANT CHANGE UP (ref 3.5–5.3)
RBC # BLD: 3.47 M/UL — LOW (ref 4.6–6.2)
RBC # FLD: 13.9 % — SIGNIFICANT CHANGE UP (ref 11–15.6)
SODIUM SERPL-SCNC: 132 MMOL/L — LOW (ref 135–145)
WBC # BLD: 11.2 K/UL — HIGH (ref 4.8–10.8)
WBC # FLD AUTO: 11.2 K/UL — HIGH (ref 4.8–10.8)

## 2018-12-01 PROCEDURE — 99232 SBSQ HOSP IP/OBS MODERATE 35: CPT

## 2018-12-01 RX ORDER — METOPROLOL TARTRATE 50 MG
25 TABLET ORAL DAILY
Refills: 0 | Status: DISCONTINUED | OUTPATIENT
Start: 2018-12-01 | End: 2018-12-10

## 2018-12-01 RX ADMIN — Medication 975 MILLIGRAM(S): at 13:46

## 2018-12-01 RX ADMIN — GABAPENTIN 300 MILLIGRAM(S): 400 CAPSULE ORAL at 05:51

## 2018-12-01 RX ADMIN — Medication 650 MILLIGRAM(S): at 23:00

## 2018-12-01 RX ADMIN — ENOXAPARIN SODIUM 40 MILLIGRAM(S): 100 INJECTION SUBCUTANEOUS at 05:50

## 2018-12-01 RX ADMIN — Medication 975 MILLIGRAM(S): at 00:00

## 2018-12-01 RX ADMIN — Medication 975 MILLIGRAM(S): at 13:24

## 2018-12-01 RX ADMIN — PANTOPRAZOLE SODIUM 40 MILLIGRAM(S): 20 TABLET, DELAYED RELEASE ORAL at 05:51

## 2018-12-01 RX ADMIN — Medication 975 MILLIGRAM(S): at 06:30

## 2018-12-01 RX ADMIN — SENNA PLUS 2 TABLET(S): 8.6 TABLET ORAL at 22:47

## 2018-12-01 RX ADMIN — GABAPENTIN 300 MILLIGRAM(S): 400 CAPSULE ORAL at 22:46

## 2018-12-01 RX ADMIN — Medication 100 MILLIGRAM(S): at 22:47

## 2018-12-01 RX ADMIN — Medication 100 MILLIGRAM(S): at 05:51

## 2018-12-01 RX ADMIN — Medication 25 MILLIGRAM(S): at 13:43

## 2018-12-01 RX ADMIN — TAMSULOSIN HYDROCHLORIDE 0.4 MILLIGRAM(S): 0.4 CAPSULE ORAL at 22:47

## 2018-12-01 RX ADMIN — Medication 975 MILLIGRAM(S): at 17:52

## 2018-12-01 RX ADMIN — Medication 975 MILLIGRAM(S): at 22:19

## 2018-12-01 RX ADMIN — Medication 975 MILLIGRAM(S): at 17:57

## 2018-12-01 RX ADMIN — Medication 137 MICROGRAM(S): at 05:51

## 2018-12-01 RX ADMIN — Medication 2: at 09:33

## 2018-12-01 RX ADMIN — GABAPENTIN 300 MILLIGRAM(S): 400 CAPSULE ORAL at 13:26

## 2018-12-01 RX ADMIN — Medication 2: at 13:23

## 2018-12-01 RX ADMIN — Medication 975 MILLIGRAM(S): at 05:51

## 2018-12-01 RX ADMIN — Medication 975 MILLIGRAM(S): at 23:00

## 2018-12-01 RX ADMIN — LISINOPRIL 10 MILLIGRAM(S): 2.5 TABLET ORAL at 05:51

## 2018-12-01 RX ADMIN — Medication 6: at 17:51

## 2018-12-01 RX ADMIN — Medication 100 MILLIGRAM(S): at 13:26

## 2018-12-01 NOTE — DISCHARGE NOTE ADULT - CARE PROVIDER_API CALL
Ollie Gray (DO), Orthopaedic Surgery  [address] [facility] [address]  [place] [address]  Phone: [phone]  Fax: [phone] Ollie Gray (DO), Orthopaedic Surgery  [address] [facility] [facility]  [address]  [place] [address]  Phone: [phone]  Fax: [phone]    Mina Tim (MD), Gastroenterology; Internal Medicine  [address] [facility]  [address]  [place] [address]  Phone: [phone]  Fax: [phone]    Vivek Shields), Neurology  [address] [place] [address]  Phone: [phone]  Fax: [phone]    Jono Wall (DO), Internal Medicine  [address] [place] [address]  Phone: [phone]  Fax: [phone]

## 2018-12-01 NOTE — DISCHARGE NOTE ADULT - MEDICATION SUMMARY - MEDICATIONS TO TAKE
I will START or STAY ON the medications listed below when I get home from the hospital:    oxyCODONE 5 mg oral tablet  -- 1 tab(s) by mouth every 4 hours, As needed, Moderate Pain (4 - 6)  -- Indication: For Pain    oxyCODONE 10 mg oral tablet  -- 1 tab(s) by mouth every 4 hours, As needed, Severe Pain (7 - 10)  -- Indication: For Pain    tamsulosin 0.4 mg oral capsule  -- 2 cap(s) by mouth once a day (at bedtime)  -- Indication: For BPH    gabapentin 300 mg oral capsule  -- 1 cap(s) by mouth every 8 hours  -- Indication: For Pain    HumaLOG 100 units/mL subcutaneous solution  -- 2 Unit(s) if Glucose 151 - 200  4 Unit(s) if Glucose 201 - 250  6 Unit(s) if Glucose 251 - 300  8 Unit(s) if Glucose 301 - 350  10 Unit(s) if Glucose 351 - 400  12 Unit(s) if Glucose Greater Than 400  -- Indication: For DM    atorvastatin 40 mg oral tablet  -- 1 tab(s) by mouth once a day  -- Indication: For Hyperlipidemia    metoprolol succinate 25 mg oral tablet, extended release  -- 1 tab(s) by mouth once a day  -- Indication: For HTN    polyethylene glycol 3350 oral powder for reconstitution  -- 17 gram(s) by mouth once a day  -- Indication: For Constipation    docusate sodium 100 mg oral capsule  -- 1 cap(s) by mouth 3 times a day  -- Indication: For Constipation    senna oral tablet  -- 2 tab(s) by mouth once a day (at bedtime)  -- Indication: For Constipation    sodium chloride 1 g oral tablet  -- 1 tab(s) by mouth 3 times a day  -- Indication: For Hyponatremia    methocarbamol 500 mg oral tablet  -- 1 tab(s) by mouth every 6 hours, As needed, Back Spasms  -- Indication: For Spasms    Restasis 0.05% ophthalmic emulsion  -- 1 drop(s) to each affected eye every 12 hours  -- Indication: For Dry eyes    levothyroxine 150 mcg (0.15 mg) oral tablet  -- 1 tab(s) by mouth once a day  -- Indication: For Hypothyroidism I will START or STAY ON the medications listed below when I get home from the hospital:    oxyCODONE 5 mg oral tablet  -- 1 tab(s) by mouth every 4 hours, As needed, Moderate Pain (4 - 6)  -- Indication: For Pain    oxyCODONE 10 mg oral tablet  -- 1 tab(s) by mouth every 4 hours, As needed, Severe Pain (7 - 10)  -- Indication: For Pain    tamsulosin 0.4 mg oral capsule  -- 2 cap(s) by mouth once a day (at bedtime)  -- Indication: For BPH    enoxaparin  -- 40 milligram(s) subcutaneous once a day through December 27, 2018  -- Indication: For VTE prophylaxis    gabapentin 300 mg oral capsule  -- 1 cap(s) by mouth every 8 hours  -- Indication: For Pain    HumaLOG 100 units/mL subcutaneous solution  -- 2 Unit(s) if Glucose 151 - 200  4 Unit(s) if Glucose 201 - 250  6 Unit(s) if Glucose 251 - 300  8 Unit(s) if Glucose 301 - 350  10 Unit(s) if Glucose 351 - 400  12 Unit(s) if Glucose Greater Than 400  -- Indication: For DM    atorvastatin 40 mg oral tablet  -- 1 tab(s) by mouth once a day  -- Indication: For Hyperlipidemia    metoprolol succinate 25 mg oral tablet, extended release  -- 1 tab(s) by mouth once a day  -- Indication: For HTN    polyethylene glycol 3350 oral powder for reconstitution  -- 17 gram(s) by mouth once a day  -- Indication: For Constipation    docusate sodium 100 mg oral capsule  -- 1 cap(s) by mouth 3 times a day  -- Indication: For Constipation    senna oral tablet  -- 2 tab(s) by mouth once a day (at bedtime)  -- Indication: For Constipation    sodium chloride 1 g oral tablet  -- 1 tab(s) by mouth 3 times a day  -- Indication: For Hyponatremia    methocarbamol 500 mg oral tablet  -- 1 tab(s) by mouth every 6 hours, As needed, Back Spasms  -- Indication: For Spasms    Restasis 0.05% ophthalmic emulsion  -- 1 drop(s) to each affected eye every 12 hours  -- Indication: For Dry eyes    levothyroxine 150 mcg (0.15 mg) oral tablet  -- 1 tab(s) by mouth once a day  -- Indication: For Hypothyroidism

## 2018-12-01 NOTE — PROGRESS NOTE ADULT - ASSESSMENT
ASSESSMENT / PLAN:  ----------------------------------------  Spinal stenosis - Status post spinal surgery. Dressing in place. Pain control. OT/PT. complete mobilization after cleared by ortho. ortho follow up    Diabetes - Insulin coverage, close monitoring of blood glucose levels.    Hypothyroidism - On levothyroxine.    Hyponatremia - Mild. Resolved on repeat laboratory studies. Diet as tolerated.    BPH - On tamsulosin.    Abdominal discomfort - resolved. Abdominal examination was unremarkable. On pantoprazole. Had evidence of bowel movement when being changed.    urinary frequency with post-void dribbling: h/p BPH and myelopathy s/p spinal surgery/ c/w flomax. bladder scan to r/o retention. UA and UC    DVT-P: lovenox

## 2018-12-01 NOTE — DISCHARGE NOTE ADULT - MEDICATION SUMMARY - MEDICATIONS TO CHANGE
I will SWITCH the dose or number of times a day I take the medications listed below when I get home from the hospital:    tamsulosin 0.4 mg oral capsule  -- 1 cap(s) by mouth once a day    Synthroid  -- 13 microgram(s) by mouth once a day

## 2018-12-01 NOTE — DISCHARGE NOTE ADULT - CARE PLAN
Principal Discharge DX:	Spinal stenosis of lumbar region, unspecified whether neurogenic claudication present  Goal:	improve ADLs  Assessment and plan of treatment:	Patient was admitted for thoracic laminectomy and fusion for T10-L1 for cord compression and myelopathy, durotomy on 11/28/2018. Patient was on bedrest for initial 48 hours post op for prevention dural leak. Patient had no evidence of headache or photosensitivity therefore his head was raised incrementally on 12/1/18 without incident. Patient was able to participate in PT/OT 12/2/18. Patient to go to [facility] Principal Discharge DX:	Spinal stenosis of lumbar region, unspecified whether neurogenic claudication present  Goal:	improve ADLs  Assessment and plan of treatment:	Patient was admitted for thoracic laminectomy and fusion for T10-L1 for cord compression and myelopathy, durotomy on 11/28/2018. Patient was on bedrest for initial 48 hours post op for prevention dural leak. Patient had no evidence of headache or photosensitivity therefore his head was raised incrementally on 12/1/18 without incident. Patient was able to participate in PT/OT 12/2/18. Patient to go to [facility]  Secondary Diagnosis:	Hypothyroid  Assessment and plan of treatment:	Continue on levothyroxine.  Secondary Diagnosis:	HTN (hypertension)  Assessment and plan of treatment:	Continue on the cardiac medications.  Secondary Diagnosis:	Hyponatremia  Assessment and plan of treatment:	Continue on sodium chloride supplementation. Laboratory studies to be repeated to monitor sodium levels. Continue on fluid restriction.  Secondary Diagnosis:	Diabetes  Assessment and plan of treatment:	Continue on insulin coverage. Close glucose monitoring.

## 2018-12-01 NOTE — PROGRESS NOTE ADULT - SUBJECTIVE AND OBJECTIVE BOX
Dr. Rodriguez Hospitalist Progress Note  TIMUR LOUIS [id number]    Patient is a 73y old  Male who presents with a chief complaint of revision lami and fusion secondary to thoracic progressive myelopathy (01 Dec 2018 14:12). s/p surgery. POD#3  back pain, leg weakness and numbness, urinary dribling    HPI:  73M presented with increasing leg weakness and episodes of mechanical falls. The patient reports as history of spinal stenosis for which he has had laminectomies in the past and has had increasing difficulty ambulating with his walker. He also noted decrease in sensation in both his legs. He reports a history of chronic back pain which has not changed recently. He denied any urinary incontinence but did note some hesitancy with urination. There was no associated dysuria or hematuria. The leg weakness is constant and progressive. He is unaware of any aggravating or relieving factors. The patient's wife reported decrease in oral intake at home. (27 Nov 2018 10:48). Patient is approximately 3 days status post revision laminectomy and fusion secondary to progressive thoracic myelopathy. and durotomy. ortho suggested [facility]. seen by PMR, suggested possibly need acute rehab. PT will reassess. ortho suggested slow mobilization no full mobilization until monday    seen at bedside with ortho team, suggested gradual elevation of head end. patient denied any headaches. reported limb weakness or numbness but not worsening. reported urinary frequency and post-void dribbling. no retention. no urgency/dysuria/hematuria. no fecal incontinence. h/o spine surgery as above. h/o BPH on flomax.  still have back pain controlled with meds.         ROS:  CONSTITUTIONAL:  No distress.no fever/chills  HEENT:  Eyes:  No diplopia or blurred vision.   CARDIOVASCULAR:  No pressure, squeezing, tightness, heaviness or aching about the chest; no palpitations.no leg swelling, no orthopnea or PND  RESPIRATORY:  no SOB. no wheezing.no cough or sputum.  No hemoptysis  GI: no abd pain, no nausea, no vomiting, no diarrhea, no constipation. No hematochezia or melena  EXT:No joint pain or joint swelling or redness. no calf pain   SKIN: no skin break or ulcer. No cellulitis.   CNS: No headaches. No depression or anxiety. No SI. as per HPI    T(C): 38.1 (12-01-18 @ 15:41), Max: 38.1 (12-01-18 @ 15:41)  HR: 110 (12-01-18 @ 15:41) (102 - 110)  BP: 130/74 (12-01-18 @ 15:41) (130/74 - 172/87)  RR: 18 (12-01-18 @ 15:41) (18 - 20)  SpO2: 96% (12-01-18 @ 15:41) (95% - 96%)    12-01-18 @ 07:01  -  12-01-18 @ 17:15  --------------------------------------------------------  IN: 0 mL / OUT: 500 mL / NET: -500 mL    CAPILLARY BLOOD GLUCOSE      POCT Blood Glucose.: 192 mg/dL (01 Dec 2018 13:11)  POCT Blood Glucose.: 188 mg/dL (01 Dec 2018 09:00)  POCT Blood Glucose.: 173 mg/dL (30 Nov 2018 17:44)      Physical Exam:  GENERAL: Not in distress. Alert    HEENT:  Normocephalic and atraumatic. PEARLA,EOMI  NECK: Supple.  No JVD.    CARDIOVASCULAR: RRR S1, S2. No murmur/rubs/gallop  LUNGS: BLAE+, no rales, no wheezing, no rhonchi.    ABDOMEN: ND. Soft,  NT, no guarding / rebound / rigidity. BS normoactive. No CVA tenderness.    BACK: No spine tenderness.   EXTREMITIES: no cyanosis, no clubbing, no edema.   SKIN: no rash. No skin break or ulcer. No cellulitis.  NEUROLOGIC: AAO*3.strength is symmetric sensation intact, speech fluent.    PSYCHIATRIC: Calm.  No agitation.    Labs                        9.6    11.2  )-----------( 248      ( 01 Dec 2018 07:31 )             29.3     12-01    132<L>  |  91<L>  |  8.0  ----------------------------<  191<H>  4.1   |  31.0<H>  |  0.49<L>    Ca    7.8<L>      01 Dec 2018 07:31       MEDICATIONS  (STANDING):  acetaminophen   Tablet .. 975 milliGRAM(s) Oral every 6 hours  dextrose 5%. 1000 milliLiter(s) (50 mL/Hr) IV Continuous <Continuous>  dextrose 50% Injectable 12.5 Gram(s) IV Push once  dextrose 50% Injectable 25 Gram(s) IV Push once  dextrose 50% Injectable 25 Gram(s) IV Push once  docusate sodium 100 milliGRAM(s) Oral three times a day  enoxaparin Injectable 40 milliGRAM(s) SubCutaneous every 24 hours  gabapentin 300 milliGRAM(s) Oral every 8 hours  insulin lispro (HumaLOG) corrective regimen sliding scale   SubCutaneous three times a day before meals  levothyroxine 137 MICROGram(s) Oral daily  lisinopril 10 milliGRAM(s) Oral daily  metoprolol succinate ER 25 milliGRAM(s) Oral daily  pantoprazole    Tablet 40 milliGRAM(s) Oral before breakfast  senna 2 Tablet(s) Oral at bedtime  sodium chloride 0.45%. 1000 milliLiter(s) (100 mL/Hr) IV Continuous <Continuous>  tamsulosin 0.4 milliGRAM(s) Oral at bedtime    MEDICATIONS  (PRN):  acetaminophen   Tablet .. 650 milliGRAM(s) Oral every 6 hours PRN Temp greater or equal to 38C (100.4F), Mild Pain (1 - 3)  aluminum hydroxide/magnesium hydroxide/simethicone Suspension 30 milliLiter(s) Oral every 12 hours PRN Indigestion  dextrose 40% Gel 15 Gram(s) Oral once PRN Blood Glucose LESS THAN 70 milliGRAM(s)/deciliter  glucagon  Injectable 1 milliGRAM(s) IntraMuscular once PRN Glucose LESS THAN 70 milligrams/deciliter  ketorolac   Injectable 15 milliGRAM(s) IV Push every 6 hours PRN Moderate Pain (4 - 6)  magnesium hydroxide Suspension 30 milliLiter(s) Oral every 12 hours PRN Constipation  methocarbamol 500 milliGRAM(s) Oral every 6 hours PRN Back Spasms  naloxone Injectable 0.1 milliGRAM(s) IV Push every 3 minutes PRN For ANY of the following changes in patient status:  A. RR LESS THAN 10 breaths per minute, B. Oxygen saturation LESS THAN 90%, C. Sedation score of 6  ondansetron Injectable 4 milliGRAM(s) IV Push every 6 hours PRN Nausea  oxyCODONE    IR 10 milliGRAM(s) Oral every 4 hours PRN Severe Pain (7 - 10)

## 2018-12-01 NOTE — DISCHARGE NOTE ADULT - PROVIDER TOKENS
TOKEN:'[id number]' TOKEN:'[id number]:'[id number]:'[id number]',TOKEN:'[id number]'

## 2018-12-01 NOTE — DISCHARGE NOTE ADULT - HOSPITAL COURSE
73M presented with worsening leg weakness and falls at home. On presentation, H/H(12.1/36.6), Na(131). MRI of the lumbar spine noted T11-T12 disc extrusion superimposed on a facet and ligament hypertrophy resulting in severe spinal stenosis with cord compression, disc disease and spondylosis from T12-L1 to L5-S1, multilevel foraminal stenosis. The patient was seen by Ortho/Spine in consultation for spinal stenosis. Lower extremity Doppler was without DVT. MRI of the cervical spine noted disc herniation at C2/C3 mildly flattening the ventral aspect of the cord, mild multilevel discogenic degenerative changes. MRI of the thoracic spine noted unchanged mild cord compression at T11/T12. The patient was seen by Neurology in consultation for myelopathy. The patient was seen by Orthopedics with recommendations to pursue surgical intervention to prevent further progression of the myelopathy. The patient was seen by Cardiology in consultation for perioperative evaluation. The patient underwent surgery with revision laminectomy and fusion. A dural tear was also repaired during surgery. Repeat laboratory studies noted increased hyponatremia. The patient was seen by Physical Therapy for evaluation and thought to benefit from transfer to a rehabilitation facility upon discharge from the hospital. The patient had abdominal discomfort and nausea. Xray of the abdomen noted no dilated bowel loops or air-fluid levels, no significant fecal retention in the colon. The patient also noted urinary frequency and prostate ultrasound noted enlarged prostate measuring 4.0 x 3.6 x 4.4 cm, a 0.75 cm cyst within the left seminal vesicle. The patient as seen by Urology in consultation and continued on tamsulosin. 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 Hypertonic saline infusion was initiated. TSH was noted to be elevated and the dose of levothyroxine was adjusted. Xray of the abdomen noted moderate stool within the colon with LEFT upper quadrant small bowel ileus pattern. Repeat laboratory studies noted improvement in the hyponatremia and the hypertonic saline infusion was discontinued. The patient had recurrent hyponatremia and sodium chloride supplementation was initiated along with continuation of fluid restriction with improvement. The patient was transferred to the rehabilitation facility for further management with instructions to monitor the sodium level and to follow up with Orthopedics, Nephrology, and Neurology.    38 minutes total time

## 2018-12-01 NOTE — DISCHARGE NOTE ADULT - PATIENT PORTAL LINK FT
You can access the [facility] Patient Portal, offered by [facility], by registering with the following website: [email or web address]

## 2018-12-01 NOTE — DISCHARGE NOTE ADULT - PLAN OF CARE
improve ADLs Patient was admitted for thoracic laminectomy and fusion for T10-L1 for cord compression and myelopathy, durotomy on 11/28/2018. Patient was on bedrest for initial 48 hours post op for prevention dural leak. Patient had no evidence of headache or photosensitivity therefore his head was raised incrementally on 12/1/18 without incident. Patient was able to participate in PT/OT 12/2/18. Patient to go to [facility] Continue on levothyroxine. Continue on the cardiac medications. Continue on sodium chloride supplementation. Laboratory studies to be repeated to monitor sodium levels. Continue on fluid restriction. Continue on insulin coverage. Close glucose monitoring.

## 2018-12-01 NOTE — DISCHARGE NOTE ADULT - MEDICATION SUMMARY - MEDICATIONS TO STOP TAKING
I will STOP taking the medications listed below when I get home from the hospital:    Ct Solostar Pen  --  subcutaneous    glimepiride 2 mg oral tablet  -- 1 tab(s) by mouth once a day    metFORMIN 500 mg oral tablet  -- 1 tab(s) by mouth 3 times a day

## 2018-12-01 NOTE — DISCHARGE NOTE ADULT - CARE PROVIDERS DIRECT ADDRESSES
[email or web address]

## 2018-12-01 NOTE — PROGRESS NOTE ADULT - SUBJECTIVE AND OBJECTIVE BOX
Patient is evaluated on December 1, 2018. Patient is approximately 3 days status post revision laminectomy and fusion secondary to progressive thoracic myelopathy.    Intraoperative course was discussed with a repairable dural tear secondary to removal of these osteophytes. Patient has been doing well thus far. He's been head is elevated to approximately 30° he has no evidence of a spinal headache and his dressings/negative pressure dressing remains relatively clean dry and intact.    Physical exam as mentioned the skin is dry dressings maintained neurologically he states his legs feel better from the sentry perspective motor strength is stable at the left being slightly worse than the right approximate 4/5. There is no evidence of a spinal headache at this point in time.    Impression status post revision laminectomy and fusion secondary to progressive thoracic myelopathy.    His plan dressing will be changed today would continue with the negative pressure dressing at this point in time. We can elevate the head of the bed to approximately 45° and even later today with an approximate increase the head of the bed to 90° her eating/oral intake. Urinary we'll continue with Flomax and observation for the urinary issue. Continue with PT OT would not recommend full embolization to approximately early part of this coming week maybe Monday

## 2018-12-02 LAB
ANION GAP SERPL CALC-SCNC: 9 MMOL/L — SIGNIFICANT CHANGE UP (ref 5–17)
APPEARANCE UR: CLEAR — SIGNIFICANT CHANGE UP
APPEARANCE UR: CLEAR — SIGNIFICANT CHANGE UP
BILIRUB UR-MCNC: NEGATIVE — SIGNIFICANT CHANGE UP
BILIRUB UR-MCNC: NEGATIVE — SIGNIFICANT CHANGE UP
BUN SERPL-MCNC: 14 MG/DL — SIGNIFICANT CHANGE UP (ref 8–20)
CALCIUM SERPL-MCNC: 8 MG/DL — LOW (ref 8.6–10.2)
CHLORIDE SERPL-SCNC: 89 MMOL/L — LOW (ref 98–107)
CO2 SERPL-SCNC: 31 MMOL/L — HIGH (ref 22–29)
COLOR SPEC: YELLOW — SIGNIFICANT CHANGE UP
COLOR SPEC: YELLOW — SIGNIFICANT CHANGE UP
CREAT SERPL-MCNC: 0.56 MG/DL — SIGNIFICANT CHANGE UP (ref 0.5–1.3)
DIFF PNL FLD: NEGATIVE — SIGNIFICANT CHANGE UP
DIFF PNL FLD: NEGATIVE — SIGNIFICANT CHANGE UP
EPI CELLS # UR: SIGNIFICANT CHANGE UP
EPI CELLS # UR: SIGNIFICANT CHANGE UP
GLUCOSE BLDC GLUCOMTR-MCNC: 158 MG/DL — HIGH (ref 70–99)
GLUCOSE BLDC GLUCOMTR-MCNC: 164 MG/DL — HIGH (ref 70–99)
GLUCOSE BLDC GLUCOMTR-MCNC: 208 MG/DL — HIGH (ref 70–99)
GLUCOSE BLDC GLUCOMTR-MCNC: 228 MG/DL — HIGH (ref 70–99)
GLUCOSE SERPL-MCNC: 180 MG/DL — HIGH (ref 70–115)
GLUCOSE UR QL: 100 MG/DL
GLUCOSE UR QL: 50 MG/DL
HCT VFR BLD CALC: 29 % — LOW (ref 42–52)
HGB BLD-MCNC: 9.3 G/DL — LOW (ref 14–18)
KETONES UR-MCNC: ABNORMAL
KETONES UR-MCNC: NEGATIVE — SIGNIFICANT CHANGE UP
LEUKOCYTE ESTERASE UR-ACNC: NEGATIVE — SIGNIFICANT CHANGE UP
LEUKOCYTE ESTERASE UR-ACNC: NEGATIVE — SIGNIFICANT CHANGE UP
MCHC RBC-ENTMCNC: 27 PG — SIGNIFICANT CHANGE UP (ref 27–31)
MCHC RBC-ENTMCNC: 32.1 G/DL — SIGNIFICANT CHANGE UP (ref 32–36)
MCV RBC AUTO: 84.1 FL — SIGNIFICANT CHANGE UP (ref 80–94)
NITRITE UR-MCNC: NEGATIVE — SIGNIFICANT CHANGE UP
NITRITE UR-MCNC: NEGATIVE — SIGNIFICANT CHANGE UP
PH UR: 6.5 — SIGNIFICANT CHANGE UP (ref 5–8)
PH UR: 8 — SIGNIFICANT CHANGE UP (ref 5–8)
PLATELET # BLD AUTO: 238 K/UL — SIGNIFICANT CHANGE UP (ref 150–400)
POTASSIUM SERPL-MCNC: 3.8 MMOL/L — SIGNIFICANT CHANGE UP (ref 3.5–5.3)
POTASSIUM SERPL-SCNC: 3.8 MMOL/L — SIGNIFICANT CHANGE UP (ref 3.5–5.3)
PROT UR-MCNC: 30 MG/DL
PROT UR-MCNC: NEGATIVE MG/DL — SIGNIFICANT CHANGE UP
RBC # BLD: 3.45 M/UL — LOW (ref 4.6–6.2)
RBC # FLD: 14 % — SIGNIFICANT CHANGE UP (ref 11–15.6)
SODIUM SERPL-SCNC: 129 MMOL/L — LOW (ref 135–145)
SP GR SPEC: 1.01 — SIGNIFICANT CHANGE UP (ref 1.01–1.02)
SP GR SPEC: 1.01 — SIGNIFICANT CHANGE UP (ref 1.01–1.02)
UROBILINOGEN FLD QL: 1 MG/DL
UROBILINOGEN FLD QL: 4 MG/DL
WBC # BLD: 10.2 K/UL — SIGNIFICANT CHANGE UP (ref 4.8–10.8)
WBC # FLD AUTO: 10.2 K/UL — SIGNIFICANT CHANGE UP (ref 4.8–10.8)
WBC UR QL: SIGNIFICANT CHANGE UP

## 2018-12-02 PROCEDURE — 99232 SBSQ HOSP IP/OBS MODERATE 35: CPT

## 2018-12-02 RX ORDER — INSULIN LISPRO 100/ML
3 VIAL (ML) SUBCUTANEOUS
Refills: 0 | Status: DISCONTINUED | OUTPATIENT
Start: 2018-12-02 | End: 2018-12-05

## 2018-12-02 RX ORDER — SODIUM CHLORIDE 9 MG/ML
1000 INJECTION INTRAMUSCULAR; INTRAVENOUS; SUBCUTANEOUS
Refills: 0 | Status: DISCONTINUED | OUTPATIENT
Start: 2018-12-02 | End: 2018-12-03

## 2018-12-02 RX ORDER — INSULIN GLARGINE 100 [IU]/ML
10 INJECTION, SOLUTION SUBCUTANEOUS AT BEDTIME
Refills: 0 | Status: DISCONTINUED | OUTPATIENT
Start: 2018-12-02 | End: 2018-12-05

## 2018-12-02 RX ORDER — TAMSULOSIN HYDROCHLORIDE 0.4 MG/1
0.8 CAPSULE ORAL AT BEDTIME
Refills: 0 | Status: DISCONTINUED | OUTPATIENT
Start: 2018-12-02 | End: 2018-12-10

## 2018-12-02 RX ADMIN — SODIUM CHLORIDE 75 MILLILITER(S): 9 INJECTION INTRAMUSCULAR; INTRAVENOUS; SUBCUTANEOUS at 09:26

## 2018-12-02 RX ADMIN — Medication 100 MILLIGRAM(S): at 05:31

## 2018-12-02 RX ADMIN — GABAPENTIN 300 MILLIGRAM(S): 400 CAPSULE ORAL at 05:30

## 2018-12-02 RX ADMIN — Medication 975 MILLIGRAM(S): at 14:00

## 2018-12-02 RX ADMIN — TAMSULOSIN HYDROCHLORIDE 0.8 MILLIGRAM(S): 0.4 CAPSULE ORAL at 22:33

## 2018-12-02 RX ADMIN — LISINOPRIL 10 MILLIGRAM(S): 2.5 TABLET ORAL at 05:30

## 2018-12-02 RX ADMIN — Medication 2: at 18:14

## 2018-12-02 RX ADMIN — Medication 975 MILLIGRAM(S): at 18:14

## 2018-12-02 RX ADMIN — GABAPENTIN 300 MILLIGRAM(S): 400 CAPSULE ORAL at 22:33

## 2018-12-02 RX ADMIN — SENNA PLUS 2 TABLET(S): 8.6 TABLET ORAL at 22:35

## 2018-12-02 RX ADMIN — Medication 975 MILLIGRAM(S): at 23:15

## 2018-12-02 RX ADMIN — Medication 975 MILLIGRAM(S): at 18:39

## 2018-12-02 RX ADMIN — Medication 975 MILLIGRAM(S): at 22:33

## 2018-12-02 RX ADMIN — Medication 975 MILLIGRAM(S): at 13:37

## 2018-12-02 RX ADMIN — INSULIN GLARGINE 10 UNIT(S): 100 INJECTION, SOLUTION SUBCUTANEOUS at 22:00

## 2018-12-02 RX ADMIN — Medication 4: at 09:24

## 2018-12-02 RX ADMIN — Medication 100 MILLIGRAM(S): at 13:36

## 2018-12-02 RX ADMIN — PANTOPRAZOLE SODIUM 40 MILLIGRAM(S): 20 TABLET, DELAYED RELEASE ORAL at 05:31

## 2018-12-02 RX ADMIN — Medication 975 MILLIGRAM(S): at 06:09

## 2018-12-02 RX ADMIN — ONDANSETRON 4 MILLIGRAM(S): 8 TABLET, FILM COATED ORAL at 08:17

## 2018-12-02 RX ADMIN — Medication 3 UNIT(S): at 18:14

## 2018-12-02 RX ADMIN — Medication 100 MILLIGRAM(S): at 22:33

## 2018-12-02 RX ADMIN — ENOXAPARIN SODIUM 40 MILLIGRAM(S): 100 INJECTION SUBCUTANEOUS at 05:30

## 2018-12-02 RX ADMIN — Medication 25 MILLIGRAM(S): at 05:30

## 2018-12-02 RX ADMIN — Medication 137 MICROGRAM(S): at 05:30

## 2018-12-02 RX ADMIN — Medication 4: at 13:38

## 2018-12-02 RX ADMIN — Medication 975 MILLIGRAM(S): at 05:30

## 2018-12-02 RX ADMIN — GABAPENTIN 300 MILLIGRAM(S): 400 CAPSULE ORAL at 13:37

## 2018-12-02 NOTE — PROGRESS NOTE ADULT - SUBJECTIVE AND OBJECTIVE BOX
PA note- Dressing check:        Lumbar dressing clean, dry and intact. No staining present.       * Because there was no evidence of dressing staining over the past 8 hours, next dressing check to be done in the morning during round for the convenience of the patient.

## 2018-12-02 NOTE — PHYSICAL THERAPY INITIAL EVALUATION ADULT - ADDITIONAL COMMENTS
Prior to admission, pt with difficulty ambulating, requiring RW and assist.  Pt reports LE weakness becoming worse over last few weeks. Pt and wife do not drive, neighbor assists with shopping

## 2018-12-02 NOTE — PROGRESS NOTE ADULT - SUBJECTIVE AND OBJECTIVE BOX
Ortho Post Op Check    Name: TIMUR LOUIS    MR #: [id number]    Procedure: T11 & T10 laminectomy/T10 through L1 posterior instrumented and posterolateral fusion for thoracic stenosis with myelopathy  Surgeon: Dr Gray    PAST MEDICAL & SURGICAL HISTORY:  BPH (benign prostatic hyperplasia)  HTN (hypertension)  Hypothyroid  Dyslipidemia  OA (osteoarthritis)  Diabetes  S/P laminectomy  S/P hernia repair  S/P hip replacement: R      Pt uncomfortable with complaints of N/V and abdominal pain  Denies CP, SOB, numbness/tingling     General Exam:  Vital Signs Last 24 Hrs  T(C): 37.1 (12-02-18 @ 08:15), Max: 37.1 (12-02-18 @ 05:03)  T(F): 98.7 (12-02-18 @ 08:15), Max: 98.7 (12-02-18 @ 05:03)  HR: 91 (12-02-18 @ 08:15) (91 - 91)  BP: 152/79 (12-02-18 @ 08:15) (152/79 - 153/74)  BP(mean): --  RR: 19 (12-02-18 @ 08:15) (19 - 19)  SpO2: 97% (12-02-18 @ 08:15) (97% - 98%)    General: Pt Alert and oriented, NAD, controlled pain.  Patient laying on his right side with knees flexed.   Back Dressings saturated with blood. Dr Gray and I expressed blood from center incision hematoma/seroma.   There is active mild bleeding. Incision cleansed with betadine and bulky dressing applied.   Pulses: 1+ dorsalis pedis pulse. Cap refill < 2 sec.  Sensation: Grossly intact to light touch without deficit.  Motor: + 4/5                          9.6    11.2  )-----------( 248      ( 01 Dec 2018 07:31 )             29.3   02 Dec 2018 07:07    129    |  89     |  14.0   ----------------------------<  180    3.8     |  31.0   |  0.56     Ca    8.0        02 Dec 2018 07:07      MEDICATIONS  (STANDING):  acetaminophen   Tablet .. 975 milliGRAM(s) Oral every 6 hours  dextrose 5%. 1000 milliLiter(s) (50 mL/Hr) IV Continuous <Continuous>  dextrose 50% Injectable 12.5 Gram(s) IV Push once  dextrose 50% Injectable 25 Gram(s) IV Push once  dextrose 50% Injectable 25 Gram(s) IV Push once  docusate sodium 100 milliGRAM(s) Oral three times a day  enoxaparin Injectable 40 milliGRAM(s) SubCutaneous every 24 hours  gabapentin 300 milliGRAM(s) Oral every 8 hours  insulin glargine Injectable (LANTUS) 10 Unit(s) SubCutaneous at bedtime  insulin lispro (HumaLOG) corrective regimen sliding scale   SubCutaneous three times a day before meals  levothyroxine 137 MICROGram(s) Oral daily  lisinopril 10 milliGRAM(s) Oral daily  metoprolol succinate ER 25 milliGRAM(s) Oral daily  pantoprazole    Tablet 40 milliGRAM(s) Oral before breakfast  senna 2 Tablet(s) Oral at bedtime  sodium chloride 0.45%. 1000 milliLiter(s) (100 mL/Hr) IV Continuous <Continuous>  sodium chloride 0.9%. 1000 milliLiter(s) (75 mL/Hr) IV Continuous <Continuous>  tamsulosin 0.4 milliGRAM(s) Oral at bedtime    MEDICATIONS  (PRN):  acetaminophen   Tablet .. 650 milliGRAM(s) Oral every 6 hours PRN Temp greater or equal to 38C (100.4F), Mild Pain (1 - 3)  aluminum hydroxide/magnesium hydroxide/simethicone Suspension 30 milliLiter(s) Oral every 12 hours PRN Indigestion  dextrose 40% Gel 15 Gram(s) Oral once PRN Blood Glucose LESS THAN 70 milliGRAM(s)/deciliter  glucagon  Injectable 1 milliGRAM(s) IntraMuscular once PRN Glucose LESS THAN 70 milligrams/deciliter  ketorolac   Injectable 15 milliGRAM(s) IV Push every 6 hours PRN Moderate Pain (4 - 6)  magnesium hydroxide Suspension 30 milliLiter(s) Oral every 12 hours PRN Constipation  methocarbamol 500 milliGRAM(s) Oral every 6 hours PRN Back Spasms  naloxone Injectable 0.1 milliGRAM(s) IV Push every 3 minutes PRN For ANY of the following changes in patient status:  A. RR LESS THAN 10 breaths per minute, B. Oxygen saturation LESS THAN 90%, C. Sedation score of 6  ondansetron Injectable 4 milliGRAM(s) IV Push every 6 hours PRN Nausea  oxyCODONE    IR 10 milliGRAM(s) Oral every 4 hours PRN Severe Pain (7 - 10)      A/P: 73yMale POD#4 s/p T11 & T10 laminectomy/T10 through L1 posterior instrumented and posterolateral fusion for thoracic stenosis with myelopathy  - Stable  - Pain Control  - DVT ppx: lovenox  - PT eval pending  - Weight bearing status: WBAT with brace and walker.   - will continue to monitor dressing/ wound  - Dc planning to rehab tuesday/wednesday

## 2018-12-02 NOTE — PROGRESS NOTE ADULT - SUBJECTIVE AND OBJECTIVE BOX
Dr. Rodriguez Hospitalist Progress Note  TIMUR LOUIS [id number]    Patient is a 73y old  Male who presents with a chief complaint of revision lami and fusion secondary to thoracic progressive myelopathy (01 Dec 2018 14:12). s/p surgery. POD#4  back pain, leg weakness and numbness, urinary dribling. today nausea, vomiting, one episode of vertigo.    seen at bedside with ortho team, suggested gradual elevation of head end. patient denied any headaches. reported limb weakness or numbness but not worsening. reported urinary frequency and post-void dribbling. no retention. no urgency/dysuria/hematuria. no fecal incontinence. h/o spine surgery as above. h/o BPH on flomax.  still have back pain controlled with meds. nausea+, vomitted once this am. no abd pain. no BM for 2-3 days. one episode of room spinning after retching and vomiting. ortho changed back dressing this am. UA,UC and post-void bladder scan pending.     ROS:  CONSTITUTIONAL:  No distress.no fever/chills  HEENT:  Eyes:  No diplopia or blurred vision.   CARDIOVASCULAR:  No pressure, squeezing, tightness, heaviness or aching about the chest; no palpitations.no leg swelling, no orthopnea or PND  RESPIRATORY:  no SOB. no wheezing.no cough or sputum.  No hemoptysis  GI: as per HPI  EXT:No joint pain or joint swelling or redness. no calf pain .   SKIN: no skin rash  CNS: No headaches. No depression or anxiety. No SI. as per HPI    Vital Signs Last 24 Hrs  T(C): 37.1 (02 Dec 2018 05:03), Max: 38.1 (01 Dec 2018 15:41)  T(F): 98.7 (02 Dec 2018 05:03), Max: 100.5 (01 Dec 2018 15:41)  HR: 91 (02 Dec 2018 05:03) (91 - 110)  BP: 153/74 (02 Dec 2018 05:03) (98/46 - 165/78)  BP(mean): --  RR: 19 (02 Dec 2018 05:03) (18 - 19)  SpO2: 98% (02 Dec 2018 05:03) (96% - 98%)    CAPILLARY BLOOD GLUCOSE      POCT Blood Glucose.: 281 mg/dL (01 Dec 2018 17:49)  POCT Blood Glucose.: 192 mg/dL (01 Dec 2018 13:11)  POCT Blood Glucose.: 188 mg/dL (01 Dec 2018 09:00)    I&O's Detail    01 Dec 2018 07:01  -  02 Dec 2018 07:00  --------------------------------------------------------  IN:    Oral Fluid: 700 mL  Total IN: 700 mL    OUT:    Voided: 700 mL  Total OUT: 700 mL    Total NET: 0 mL      Physical Exam:  GENERAL: Not in distress. Alert    HEENT:  Normocephalic and atraumatic.   NECK: Supple.  No JVD.    CARDIOVASCULAR: RRR S1, S2. No murmur/rubs/gallop.  LUNGS: BLAE+, no rales, no wheezing, no rhonchi.    ABDOMEN: ND. Soft,  NT, no guarding / rebound / rigidity. BS normoactive. No CVA tenderness.    BACK: dressing over the back socked with small blood and serosanguinous fluid. mild TP  EXTREMITIES: no cyanosis, no clubbing, no edema.   SKIN: no rash. No skin break or ulcer. No cellulitis.  NEUROLOGIC: AAO*3.strength is symmetric sensation intact, speech fluent.    PSYCHIATRIC: Calm.  No agitation.    Labs             cbc pending    12-02    129<L>  |  89<L>  |  14.0  ----------------------------<  180<H>  3.8   |  31.0<H>  |  0.56    Ca    8.0<L>      02 Dec 2018 07:07    Hemoglobin A1C, Whole Blood (11.28.18 @ 06:08)    Hemoglobin A1C, Whole Blood: 7.5 %    MEDICATIONS  (STANDING):  acetaminophen   Tablet .. 975 milliGRAM(s) Oral every 6 hours  dextrose 5%. 1000 milliLiter(s) (50 mL/Hr) IV Continuous <Continuous>  dextrose 50% Injectable 12.5 Gram(s) IV Push once  dextrose 50% Injectable 25 Gram(s) IV Push once  dextrose 50% Injectable 25 Gram(s) IV Push once  docusate sodium 100 milliGRAM(s) Oral three times a day  enoxaparin Injectable 40 milliGRAM(s) SubCutaneous every 24 hours  gabapentin 300 milliGRAM(s) Oral every 8 hours  insulin lispro (HumaLOG) corrective regimen sliding scale   SubCutaneous three times a day before meals  levothyroxine 137 MICROGram(s) Oral daily  lisinopril 10 milliGRAM(s) Oral daily  metoprolol succinate ER 25 milliGRAM(s) Oral daily  pantoprazole    Tablet 40 milliGRAM(s) Oral before breakfast  senna 2 Tablet(s) Oral at bedtime  sodium chloride 0.45%. 1000 milliLiter(s) (100 mL/Hr) IV Continuous <Continuous>  tamsulosin 0.4 milliGRAM(s) Oral at bedtime    MEDICATIONS  (PRN):  acetaminophen   Tablet .. 650 milliGRAM(s) Oral every 6 hours PRN Temp greater or equal to 38C (100.4F), Mild Pain (1 - 3)  aluminum hydroxide/magnesium hydroxide/simethicone Suspension 30 milliLiter(s) Oral every 12 hours PRN Indigestion  dextrose 40% Gel 15 Gram(s) Oral once PRN Blood Glucose LESS THAN 70 milliGRAM(s)/deciliter  glucagon  Injectable 1 milliGRAM(s) IntraMuscular once PRN Glucose LESS THAN 70 milligrams/deciliter  ketorolac   Injectable 15 milliGRAM(s) IV Push every 6 hours PRN Moderate Pain (4 - 6)  magnesium hydroxide Suspension 30 milliLiter(s) Oral every 12 hours PRN Constipation  methocarbamol 500 milliGRAM(s) Oral every 6 hours PRN Back Spasms  naloxone Injectable 0.1 milliGRAM(s) IV Push every 3 minutes PRN For ANY of the following changes in patient status:  A. RR LESS THAN 10 breaths per minute, B. Oxygen saturation LESS THAN 90%, C. Sedation score of 6  ondansetron Injectable 4 milliGRAM(s) IV Push every 6 hours PRN Nausea  oxyCODONE    IR 10 milliGRAM(s) Oral every 4 hours PRN Severe Pain (7 - 10)

## 2018-12-02 NOTE — PROGRESS NOTE ADULT - SUBJECTIVE AND OBJECTIVE BOX
Ortho PA note addendum:  Called by nurse to evaluate incision due to bloody drainage on dressing. Removed dressing that was approximately 80% saturated wit bloody discharge. Incision had no active bleeding noted. I tried to express the center of incision, a few drops of blood came out. Incision was cleansed with betadine and new bulky sterile dressings applied. Patient states he was comfortable and was asking for straw so he could drink.

## 2018-12-02 NOTE — PHYSICAL THERAPY INITIAL EVALUATION ADULT - PLANNED THERAPY INTERVENTIONS, PT EVAL
balance training/bed mobility training/gait training/neuromuscular re-education/ROM/strengthening/stretching/transfer training

## 2018-12-02 NOTE — PROGRESS NOTE ADULT - ASSESSMENT
ASSESSMENT / PLAN:  ----------------------------------------  Spinal stenosis - Status post spinal surgery. Dressing in place. scoked. spoke to ortho PA, reported seroma and small hematoma over incision site. they are monitoring closely. CBC ordered. Pain control. OT/PT. complete mobilization after cleared by ortho. ortho follow up    Diabetes - Insulin coverage, close monitoring of blood glucose levels. BS needs to better controlled. A1c 7.5, will add small lantus for BS control now    Hypothyroidism - On levothyroxine.    Hyponatremia - Mild. Resolved on repeat laboratory studies. Diet as tolerated.    BPH - On tamsulosin.    Abdominal discomfort - resolved. Abdominal examination was unremarkable. On pantoprazole. currently nausea/vomiting. no hematochezia or melena. on zofran PRN. monitor. CT abd/pelvis if symptos persist or worsens    urinary frequency with post-void dribbling, currently fever and mils tachy: h/p BPH and myelopathy s/p spinal surgery/ c/w flomax. bladder scan to r/o retention. UA and UC. BC. informed ortho.     DVT-P: lovenox ASSESSMENT / PLAN:  ----------------------------------------  Spinal stenosis - Status post spinal surgery. Dressing in place. scoked. spoke to ortho PA, reported seroma and small hematoma over incision site. they are monitoring closely. CBC ordered. Pain control. OT/PT. complete mobilization after cleared by ortho. ortho follow up    Diabetes - Insulin coverage, close monitoring of blood glucose levels. BS needs to better controlled. A1c 7.5, will add small lantus for BS control now    Hypothyroidism - On levothyroxine.    Hyponatremia : recurred. possibly related to vomiting. will add small dose Nacl. monitor    BPH - On tamsulosin.    Abdominal discomfort - resolved. Abdominal examination was unremarkable. On pantoprazole. currently nausea/vomiting. no hematochezia or melena. on zofran PRN. monitor. CT abd/pelvis if symptos persist or worsens    urinary frequency with post-void dribbling, currently fever and mils tachy: h/p BPH and myelopathy s/p spinal surgery/ c/w flomax. bladder scan to r/o retention. UA and UC. BC. informed ortho.     DVT-P: lovenox

## 2018-12-02 NOTE — PROGRESS NOTE ADULT - SUBJECTIVE AND OBJECTIVE BOX
PA note - Dressing check:            Lumbar dressing clean, dry and intact without evidence of active bleeding, discharge or wound dehiscence. No staining present on dressing at this time.       * Will re-evaluate dressing in approximately 4 hours.

## 2018-12-02 NOTE — PHYSICAL THERAPY INITIAL EVALUATION ADULT - IMPAIRMENTS CONTRIBUTING TO GAIT DEVIATIONS, PT EVAL
Bilateral knee flexion/impaired balance/impaired coordination/narrow base of support/decreased ROM/decreased strength

## 2018-12-03 LAB
ACE SERPL-CCNC: 6 U/L — LOW (ref 14–82)
ANION GAP SERPL CALC-SCNC: 13 MMOL/L — SIGNIFICANT CHANGE UP (ref 5–17)
BUN SERPL-MCNC: 8 MG/DL — SIGNIFICANT CHANGE UP (ref 8–20)
CALCIUM SERPL-MCNC: 7.9 MG/DL — LOW (ref 8.6–10.2)
CHLORIDE SERPL-SCNC: 86 MMOL/L — LOW (ref 98–107)
CO2 SERPL-SCNC: 26 MMOL/L — SIGNIFICANT CHANGE UP (ref 22–29)
CREAT SERPL-MCNC: 0.44 MG/DL — LOW (ref 0.5–1.3)
GLUCOSE BLDC GLUCOMTR-MCNC: 175 MG/DL — HIGH (ref 70–99)
GLUCOSE BLDC GLUCOMTR-MCNC: 187 MG/DL — HIGH (ref 70–99)
GLUCOSE BLDC GLUCOMTR-MCNC: 208 MG/DL — HIGH (ref 70–99)
GLUCOSE BLDC GLUCOMTR-MCNC: 237 MG/DL — HIGH (ref 70–99)
GLUCOSE SERPL-MCNC: 168 MG/DL — HIGH (ref 70–115)
HCT VFR BLD CALC: 26.9 % — LOW (ref 42–52)
HGB BLD-MCNC: 8.9 G/DL — LOW (ref 14–18)
MCHC RBC-ENTMCNC: 27.4 PG — SIGNIFICANT CHANGE UP (ref 27–31)
MCHC RBC-ENTMCNC: 33.1 G/DL — SIGNIFICANT CHANGE UP (ref 32–36)
MCV RBC AUTO: 82.8 FL — SIGNIFICANT CHANGE UP (ref 80–94)
OSMOLALITY SERPL: 276 MOSM/KG — LOW (ref 280–300)
PLATELET # BLD AUTO: 271 K/UL — SIGNIFICANT CHANGE UP (ref 150–400)
POTASSIUM SERPL-MCNC: 3.9 MMOL/L — SIGNIFICANT CHANGE UP (ref 3.5–5.3)
POTASSIUM SERPL-SCNC: 3.9 MMOL/L — SIGNIFICANT CHANGE UP (ref 3.5–5.3)
RBC # BLD: 3.25 M/UL — LOW (ref 4.6–6.2)
RBC # FLD: 13.7 % — SIGNIFICANT CHANGE UP (ref 11–15.6)
SODIUM SERPL-SCNC: 125 MMOL/L — LOW (ref 135–145)
WBC # BLD: 9.3 K/UL — SIGNIFICANT CHANGE UP (ref 4.8–10.8)
WBC # FLD AUTO: 9.3 K/UL — SIGNIFICANT CHANGE UP (ref 4.8–10.8)

## 2018-12-03 PROCEDURE — 74019 RADEX ABDOMEN 2 VIEWS: CPT | Mod: 26

## 2018-12-03 PROCEDURE — 99233 SBSQ HOSP IP/OBS HIGH 50: CPT

## 2018-12-03 PROCEDURE — 99232 SBSQ HOSP IP/OBS MODERATE 35: CPT

## 2018-12-03 RX ORDER — ONDANSETRON 8 MG/1
4 TABLET, FILM COATED ORAL
Refills: 0 | Status: DISCONTINUED | OUTPATIENT
Start: 2018-12-03 | End: 2018-12-03

## 2018-12-03 RX ORDER — METOCLOPRAMIDE HCL 10 MG
10 TABLET ORAL THREE TIMES A DAY
Refills: 0 | Status: DISCONTINUED | OUTPATIENT
Start: 2018-12-03 | End: 2018-12-03

## 2018-12-03 RX ORDER — POLYETHYLENE GLYCOL 3350 17 G/17G
17 POWDER, FOR SOLUTION ORAL DAILY
Refills: 0 | Status: DISCONTINUED | OUTPATIENT
Start: 2018-12-03 | End: 2018-12-10

## 2018-12-03 RX ORDER — HYDRALAZINE HCL 50 MG
10 TABLET ORAL ONCE
Refills: 0 | Status: COMPLETED | OUTPATIENT
Start: 2018-12-03 | End: 2018-12-03

## 2018-12-03 RX ORDER — PANTOPRAZOLE SODIUM 20 MG/1
40 TABLET, DELAYED RELEASE ORAL
Refills: 0 | Status: DISCONTINUED | OUTPATIENT
Start: 2018-12-03 | End: 2018-12-05

## 2018-12-03 RX ADMIN — Medication 137 MICROGRAM(S): at 05:25

## 2018-12-03 RX ADMIN — Medication 975 MILLIGRAM(S): at 13:00

## 2018-12-03 RX ADMIN — ONDANSETRON 4 MILLIGRAM(S): 8 TABLET, FILM COATED ORAL at 13:00

## 2018-12-03 RX ADMIN — Medication 25 MILLIGRAM(S): at 05:25

## 2018-12-03 RX ADMIN — Medication 4: at 13:01

## 2018-12-03 RX ADMIN — Medication 100 MILLIGRAM(S): at 13:00

## 2018-12-03 RX ADMIN — Medication 100 MILLIGRAM(S): at 05:25

## 2018-12-03 RX ADMIN — SENNA PLUS 2 TABLET(S): 8.6 TABLET ORAL at 22:14

## 2018-12-03 RX ADMIN — Medication 975 MILLIGRAM(S): at 05:25

## 2018-12-03 RX ADMIN — LISINOPRIL 10 MILLIGRAM(S): 2.5 TABLET ORAL at 05:25

## 2018-12-03 RX ADMIN — INSULIN GLARGINE 10 UNIT(S): 100 INJECTION, SOLUTION SUBCUTANEOUS at 22:16

## 2018-12-03 RX ADMIN — Medication 3 UNIT(S): at 09:43

## 2018-12-03 RX ADMIN — TAMSULOSIN HYDROCHLORIDE 0.8 MILLIGRAM(S): 0.4 CAPSULE ORAL at 22:15

## 2018-12-03 RX ADMIN — Medication 975 MILLIGRAM(S): at 06:25

## 2018-12-03 RX ADMIN — Medication 4: at 17:54

## 2018-12-03 RX ADMIN — Medication 3 UNIT(S): at 13:01

## 2018-12-03 RX ADMIN — PANTOPRAZOLE SODIUM 40 MILLIGRAM(S): 20 TABLET, DELAYED RELEASE ORAL at 17:53

## 2018-12-03 RX ADMIN — ENOXAPARIN SODIUM 40 MILLIGRAM(S): 100 INJECTION SUBCUTANEOUS at 05:25

## 2018-12-03 RX ADMIN — SODIUM CHLORIDE 100 MILLILITER(S): 9 INJECTION, SOLUTION INTRAVENOUS at 19:40

## 2018-12-03 RX ADMIN — Medication 10 MILLIGRAM(S): at 17:53

## 2018-12-03 RX ADMIN — Medication 2: at 09:42

## 2018-12-03 RX ADMIN — ONDANSETRON 4 MILLIGRAM(S): 8 TABLET, FILM COATED ORAL at 20:04

## 2018-12-03 RX ADMIN — GABAPENTIN 300 MILLIGRAM(S): 400 CAPSULE ORAL at 13:00

## 2018-12-03 RX ADMIN — Medication 975 MILLIGRAM(S): at 14:00

## 2018-12-03 RX ADMIN — PANTOPRAZOLE SODIUM 40 MILLIGRAM(S): 20 TABLET, DELAYED RELEASE ORAL at 05:25

## 2018-12-03 RX ADMIN — GABAPENTIN 300 MILLIGRAM(S): 400 CAPSULE ORAL at 22:14

## 2018-12-03 RX ADMIN — Medication 100 MILLIGRAM(S): at 22:14

## 2018-12-03 RX ADMIN — GABAPENTIN 300 MILLIGRAM(S): 400 CAPSULE ORAL at 05:25

## 2018-12-03 NOTE — PROGRESS NOTE ADULT - SUBJECTIVE AND OBJECTIVE BOX
Patient in bed, feels vertigo.  Also complaining of a headache.   Reports that he was in the chair yesterday and worked with PT.  Reports that he did not perform many steps.     FUNCTIONAL PROGRESS    Bed Mobility: Rolling/Turning:     · Level of [place]	moderate assist (50% patients effort)	  · Physical Assist/Nonphysical Assist	1 person assist	  · Assistive Device	bed rails	    Bed Mobility: Sit to Supine:     · Level of [place]	maximum assist (25% patients effort)	  · Physical Assist/Nonphysical Assist	1 person assist	  · Assistive Device	bed rails	    Bed Mobility: Supine to Sit:     · Level of [place]	maximum assist (25% patients effort)	  · Physical Assist/Nonphysical Assist	1 person assist	  · Assistive Device	bed rails	    Transfer: Bed to Chair:     Transfer Skill: Bed to Chair   · Level of [place]	maximum assist (25% patients effort)	  · Physical Assist/Nonphysical Assist	2 person assist	  · Weight-Bearing Restrictions	weight-bearing as tolerated	  · Assistive Device	rolling walker	    Transfer: Chair to Bed:     · Level of [place]	maximum assist (25% patients effort)	  · Physical Assist/Nonphysical Assist	2 person assist	  · Weight-Bearing Restrictions	weight-bearing as tolerated	  · Assistive Device	rolling walker	    Transfer: Sit to Stand:     · Level of [place]	maximum assist (25% patients effort)	  · Physical Assist/Nonphysical Assist	2 person assist	  · Weight-Bearing Restrictions	weight-bearing as tolerated	  · Assistive Device	rolling walker	    Transfer: Stand to Sit:     · Level of [place]	maximum assist (25% patients effort)	  · Physical Assist/Nonphysical Assist	2 person assist	  · Weight-Bearing Restrictions	weight-bearing as tolerated	  · Assistive Device	rolling walker	    Gait Skills:     · Level of [place]	maximum assist (25% patients effort)	  · Physical Assist/Nonphysical Assist	2 person assist	  · Weight-Bearing Restrictions	weight-bearing as tolerated	  · Assistive Device	rolling walker	  · Gait Distance	bed to chair; chair to bed	      REVIEW OF SYSTEMS  Constitutional - No fever,  +fatigue  HEENT - +vertigo, No neck pain  Neurological - +headaches, No memory loss, +loss of strength, +numbness, No tremors  Skin - No rashes, +lesions   Musculoskeletal - +joint pain, +muscle pain  Psychiatric - +depression, No anxiety    VITALS  T(C): 36.9 (18 @ 08:18), Max: 37.1 (12-02-18 @ 20:27)  HR: 90 (18 @ 08:18) (90 - 98)  BP: 156/68 (18 @ 08:18) (153/74 - 175/90)  RR: 18 (18 @ 08:18) (18 - 19)  SpO2: 98% (18 @ 08:18) (96% - 98%)  Wt(kg): --    MEDICATIONS   acetaminophen   Tablet .. 975 milliGRAM(s) every 6 hours  acetaminophen   Tablet .. 650 milliGRAM(s) every 6 hours PRN  aluminum hydroxide/magnesium hydroxide/simethicone Suspension 30 milliLiter(s) every 12 hours PRN  dextrose 40% Gel 15 Gram(s) once PRN  dextrose 5%. 1000 milliLiter(s) <Continuous>  dextrose 50% Injectable 12.5 Gram(s) once  dextrose 50% Injectable 25 Gram(s) once  dextrose 50% Injectable 25 Gram(s) once  docusate sodium 100 milliGRAM(s) three times a day  enoxaparin Injectable 40 milliGRAM(s) every 24 hours  gabapentin 300 milliGRAM(s) every 8 hours  glucagon  Injectable 1 milliGRAM(s) once PRN  insulin glargine Injectable (LANTUS) 10 Unit(s) at bedtime  insulin lispro (HumaLOG) corrective regimen sliding scale   three times a day before meals  insulin lispro Injectable (HumaLOG) 3 Unit(s) three times a day before meals  ketorolac   Injectable 15 milliGRAM(s) every 6 hours PRN  levothyroxine 137 MICROGram(s) daily  lisinopril 10 milliGRAM(s) daily  magnesium hydroxide Suspension 30 milliLiter(s) every 12 hours PRN  methocarbamol 500 milliGRAM(s) every 6 hours PRN  metoprolol succinate ER 25 milliGRAM(s) daily  naloxone Injectable 0.1 milliGRAM(s) every 3 minutes PRN  ondansetron Injectable 4 milliGRAM(s) every 6 hours PRN  oxyCODONE    IR 10 milliGRAM(s) every 4 hours PRN  pantoprazole    Tablet 40 milliGRAM(s) before breakfast  senna 2 Tablet(s) at bedtime  sodium chloride 0.45%. 1000 milliLiter(s) <Continuous>  sodium chloride 0.9%. 1000 milliLiter(s) <Continuous>  tamsulosin 0.8 milliGRAM(s) at bedtime      RECENT LABS/IMAGING  CBC Full  -  ( 03 Dec 2018 05:47 )  WBC Count : 9.3 K/uL  Hemoglobin : 8.9 g/dL  Hematocrit : 26.9 %  Platelet Count - Automated : 271 K/uL  Mean Cell Volume : 82.8 fl  Mean Cell Hemoglobin : 27.4 pg  Mean Cell Hemoglobin Concentration : 33.1 g/dL  Auto Neutrophil # : x  Auto Lymphocyte # : x  Auto Monocyte # : x  Auto Eosinophil # : x  Auto Basophil # : x  Auto Neutrophil % : x  Auto Lymphocyte % : x  Auto Monocyte % : x  Auto Eosinophil % : x  Auto Basophil % : x    12-03    125<L>  |  86<L>  |  8.0  ----------------------------<  168<H>  3.9   |  26.0  |  0.44<L>    Ca    7.9<L>      03 Dec 2018 05:47      Urinalysis Basic - ( 02 Dec 2018 19:57 )    Color: Yellow / Appearance: Clear / S.015 / pH: x  Gluc: x / Ketone: Negative  / Bili: Negative / Urobili: 1 mg/dL   Blood: x / Protein: Negative mg/dL / Nitrite: Negative   Leuk Esterase: Negative / RBC: x / WBC x   Sq Epi: x / Non Sq Epi: Occasional / Bacteria: x    ----------------------------------------------------------------------------------------  PHYSICAL EXAM  Constitutional - NAD, Mildly uncomfortable  Extremities - No calf tenderness   Neurologic Exam -                    Motor -                     LEFT    LE - HF 2/5, KE 3/5, DF 3/5, PF 3/5, EHL 3/5                    RIGHT LE - HF 2/5, KE 4/5, DF 4/5, PF 4/5, EHL 4/5     Sensory - Decreased LT in L5   Psychiatric - Mood frustrated     ------------------------------------------------------------------------------------------------  ASSESSMENT/PLAN  73M with functional deficits following spinal stenosis with T11-L2 spinal cord compression s/p laminectomy and thoracic fusion  Pain - Tylenol, Toradol, Neurontin, Robaxin, Oxycodone  DM2 - Lantus, Humalog  DVT PPX - SCDs, Lovenox   Rehab - Recommend ACUTE inpatient rehabilitation for the functional deficits consisting of 3 hours of therapy/day & 24 hour RN/daily PMR physician for comorbid medical management. Will continue to follow for ongoing rehab needs and recommendations. Patient will be able to tolerate 3 hours a day.    Continue bedside therapy as well as OOB throughout the day with mobilization throughout the day with staff to maintain cardiopulmonary function and prevention of secondary complications related to debility.

## 2018-12-03 NOTE — DIETITIAN INITIAL EVALUATION ADULT. - OTHER INFO
Pt admitted for spinal stenosis, s/p fusion. Pt currently with abdominal pain and nausea. CT scan ordered, Pt to remain NPO to rule out obstruction.  Pt had fair appetite/intake prior to NPO- able to drink some Glucerna, and few spoonfuls of potatoes.

## 2018-12-03 NOTE — PROGRESS NOTE ADULT - SUBJECTIVE AND OBJECTIVE BOX
Dressing check at 1130AM - large posterior dressing dry; dime size serosanguinous spot at proximal dressing noted, unchanged from this AM.    Patient sitting in chair upright, c/o room spinning on him from time to time, denies HA - patient has known dural tear. Will continue to monitor dressing q 4 hours as per DB    Pt also endorses urinary post-void/dribbling symptoms which he has had for many months prior to sx, no paresthesias - hospitalist aware, pt has h/o BPH

## 2018-12-03 NOTE — PROGRESS NOTE ADULT - ASSESSMENT
ASSESSMENT / PLAN:  ----------------------------------------  Spinal stenosis - Status post spinal surgery. Dressing in place. dry. Pain control. OT/PT. PMR cx appreciated. For Acute Rehab    Diabetes - c/w lantus, ISS. a1c 7.5%. adjust meds    Hypothyroidism - On levothyroxine.    Hyponatremia : recurred. possibly related to vomiting/fluid. DC fluid. encourage PO. monitor level. Uosm, Sarah, S.osm,     Abdominal discomfort - resolved. Abdominal examination was unremarkable. On pantoprazole. currently nausea/vomiting/constipation. passing gas. no hematochezia or melena. on zofran PRN. DC zofran, try reglan.  monitor. abd xray. CT colonography on jan 2018 reported mobile cecum with risk of volvulous. will keep NPO until xray is done    BPH with LUTS-  urinary frequency with post-void dribbling,  one episode of fever and mild tachy: h/p BPH and myelopathy s/p spinal surgery/ c/w flomax. bladder scan showed post-void residual yesterday, not today. UA neg. and UC contamination. . BC pending. urology eval     DVT-P: lovenox ASSESSMENT / PLAN:  ----------------------------------------  Spinal stenosis - Status post spinal surgery. Dressing in place. dry. Pain control. OT/PT. PMR cx appreciated. For Acute Rehab    Diabetes - c/w lantus, ISS. a1c 7.5%. adjust meds    Hypothyroidism - On levothyroxine.    Hyponatremia : recurred. possibly related to vomiting/fluid. DC fluid. encourage PO. monitor level. Uosm, Sarah, S.osm,     Abdominal discomfort - resolved. Abdominal examination was unremarkable. On pantoprazole. currently nausea/vomiting/constipation. passing gas. no hematochezia or melena. on zofran PRN. c/w zofran for now, try reglan if no obstruction.  monitor. abd xray. CT colonography on jan 2018 reported mobile cecum with risk of volvulous. will keep NPO until xray is done. GI eval if persistent dyspepsia. FOBT    BPH with LUTS-  urinary frequency with post-void dribbling,  one episode of fever and mild tachy: h/p BPH and myelopathy s/p spinal surgery/ c/w flomax. bladder scan showed post-void residual yesterday, not today. UA neg. and UC contamination. . BC pending. urology eval     DVT-P: lovenox ASSESSMENT / PLAN:  ----------------------------------------  Spinal stenosis - Status post spinal surgery. Dressing in place. dry. Pain control. OT/PT. PMR cx appreciated. For Acute Rehab    Diabetes - c/w lantus, ISS. a1c 7.5%. adjust meds    Hypothyroidism - On levothyroxine.    Hyponatremia : recurred. possibly related to vomiting/fluid. DC fluid. encourage PO. monitor level. Uosm, Sarah, S.osm,     Abdominal discomfort - resolved. Abdominal examination was unremarkable. On pantoprazole. currently nausea/vomiting/constipation. passing gas. no hematochezia or melena. on zofran PRN. c/w zofran for now, try reglan if no obstruction.  monitor. abd xray. CT colonography on jan 2018 reported mobile cecum with risk of volvulous. will keep NPO until xray is done. GI eval if persistent dyspepsia. FOBT    BPH with LUTS-  urinary frequency with post-void dribbling,  one episode of fever and mild tachy: h/p BPH and myelopathy s/p spinal surgery/ c/w flomax. bladder scan showed post-void residual yesterday, not today. UA neg. and UC contamination. . BC pending. prostate USG, urology eval     DVT-P: lovenox

## 2018-12-03 NOTE — PROGRESS NOTE ADULT - SUBJECTIVE AND OBJECTIVE BOX
CANDIDOTOYA LOUIS  [id number]      Procedure: T11 & T10 laminectomy/T10 through L1 posterior instrumented and posterolateral fusion for thoracic stenosis with myelopathy  Surgeon: Dr Gray on 11/28/18  - Patient seen and examined bedside; laying on his left side which is most comfortable for him.  His pain is relatively controlled at this time.  He continues to have nausea and vomiting x1 while at bed.  He denies pain radiating down his legs.  He feels weakness in both his legs which is improving.  Numbness is improving as well.  He has been able to sit in the chair during PT.  He denies fevers, chills, SOB, CP.  His only other complaint is urination - he states he has sensation to urinate, but cannot stop his stream once he starts/dribbling which is concerning for him.                          8.9    9.3   )-----------( 271      ( 03 Dec 2018 05:47 )             26.9     12-03    125<L>  |  86<L>  |  8.0  ----------------------------<  168<H>  3.9   |  26.0  |  0.44<L>    Ca    7.9<L>      03 Dec 2018 05:47        MEDICATIONS  (STANDING):  acetaminophen   Tablet .. 975 milliGRAM(s) Oral every 6 hours  dextrose 5%. 1000 milliLiter(s) (50 mL/Hr) IV Continuous <Continuous>  dextrose 50% Injectable 12.5 Gram(s) IV Push once  dextrose 50% Injectable 25 Gram(s) IV Push once  dextrose 50% Injectable 25 Gram(s) IV Push once  docusate sodium 100 milliGRAM(s) Oral three times a day  enoxaparin Injectable 40 milliGRAM(s) SubCutaneous every 24 hours  gabapentin 300 milliGRAM(s) Oral every 8 hours  insulin glargine Injectable (LANTUS) 10 Unit(s) SubCutaneous at bedtime  insulin lispro (HumaLOG) corrective regimen sliding scale   SubCutaneous three times a day before meals  insulin lispro Injectable (HumaLOG) 3 Unit(s) SubCutaneous three times a day before meals  levothyroxine 137 MICROGram(s) Oral daily  lisinopril 10 milliGRAM(s) Oral daily  metoprolol succinate ER 25 milliGRAM(s) Oral daily  pantoprazole    Tablet 40 milliGRAM(s) Oral before breakfast  senna 2 Tablet(s) Oral at bedtime  sodium chloride 0.45%. 1000 milliLiter(s) (100 mL/Hr) IV Continuous <Continuous>  sodium chloride 0.9%. 1000 milliLiter(s) (75 mL/Hr) IV Continuous <Continuous>  tamsulosin 0.8 milliGRAM(s) Oral at bedtime    MEDICATIONS  (PRN):  acetaminophen   Tablet .. 650 milliGRAM(s) Oral every 6 hours PRN Temp greater or equal to 38C (100.4F), Mild Pain (1 - 3)  aluminum hydroxide/magnesium hydroxide/simethicone Suspension 30 milliLiter(s) Oral every 12 hours PRN Indigestion  dextrose 40% Gel 15 Gram(s) Oral once PRN Blood Glucose LESS THAN 70 milliGRAM(s)/deciliter  glucagon  Injectable 1 milliGRAM(s) IntraMuscular once PRN Glucose LESS THAN 70 milligrams/deciliter  ketorolac   Injectable 15 milliGRAM(s) IV Push every 6 hours PRN Moderate Pain (4 - 6)  magnesium hydroxide Suspension 30 milliLiter(s) Oral every 12 hours PRN Constipation  methocarbamol 500 milliGRAM(s) Oral every 6 hours PRN Back Spasms  naloxone Injectable 0.1 milliGRAM(s) IV Push every 3 minutes PRN For ANY of the following changes in patient status:  A. RR LESS THAN 10 breaths per minute, B. Oxygen saturation LESS THAN 90%, C. Sedation score of 6  ondansetron Injectable 4 milliGRAM(s) IV Push every 6 hours PRN Nausea  oxyCODONE    IR 10 milliGRAM(s) Oral every 4 hours PRN Severe Pain (7 - 10)      General: Pt Alert and oriented, NAD, controlled pain.  Patient laying on his left side with knees slightly flexed.   Back Dressings:  dime-sized blood-tinged fluid on the proximal aspect of the dressing.  Dressing otherwise dry.  Will recheck in a few hours to see if drainage increases and needs to be changed  Pulses: 1+ dorsalis pedis pulse. Cap refill < 2 sec.  Sensation: Decreased sensation right medial calf; otherwise sensation grossly intact  Motor: + 4/5 bilateral LE        A/P: 73yMale POD#5 s/p T11 & T10 laminectomy/T10 through L1 posterior instrumented and posterolateral fusion for thoracic stenosis with myelopathy    - Stable  - Pain Control as clinically indicated  - DVT ppx: lovenox  - Continue physical therapy  - Weight bearing status: WBAT with brace and walker.   - Will continue to monitor dressing/ wound q 4 hours  - Dc planning to rehab Tuesday/Wednesday CANDIDOTOYA LOUIS  [id number]      Procedure: T11 & T10 laminectomy/T10 through L1 posterior instrumented and posterolateral fusion for thoracic stenosis with myelopathy  Surgeon: Dr Gray on 11/28/18  - Patient seen and examined bedside; laying on his left side which is most comfortable for him.  His pain is relatively controlled at this time.  He continues to have nausea and vomiting x1 while at bed.  He denies pain radiating down his legs.  He feels weakness in both his legs which is improving.  Numbness is improving as well.  He has been able to sit in the chair during PT.  He denies fevers, chills, SOB, CP.  His only other complaint is urination - he states he has sensation to urinate, but cannot stop his stream once he starts/dribbling which is concerning for him.                          8.9    9.3   )-----------( 271      ( 03 Dec 2018 05:47 )             26.9     12-03    125<L>  |  86<L>  |  8.0  ----------------------------<  168<H>  3.9   |  26.0  |  0.44<L>    Ca    7.9<L>      03 Dec 2018 05:47        MEDICATIONS  (STANDING):  acetaminophen   Tablet .. 975 milliGRAM(s) Oral every 6 hours  dextrose 5%. 1000 milliLiter(s) (50 mL/Hr) IV Continuous <Continuous>  dextrose 50% Injectable 12.5 Gram(s) IV Push once  dextrose 50% Injectable 25 Gram(s) IV Push once  dextrose 50% Injectable 25 Gram(s) IV Push once  docusate sodium 100 milliGRAM(s) Oral three times a day  enoxaparin Injectable 40 milliGRAM(s) SubCutaneous every 24 hours  gabapentin 300 milliGRAM(s) Oral every 8 hours  insulin glargine Injectable (LANTUS) 10 Unit(s) SubCutaneous at bedtime  insulin lispro (HumaLOG) corrective regimen sliding scale   SubCutaneous three times a day before meals  insulin lispro Injectable (HumaLOG) 3 Unit(s) SubCutaneous three times a day before meals  levothyroxine 137 MICROGram(s) Oral daily  lisinopril 10 milliGRAM(s) Oral daily  metoprolol succinate ER 25 milliGRAM(s) Oral daily  pantoprazole    Tablet 40 milliGRAM(s) Oral before breakfast  senna 2 Tablet(s) Oral at bedtime  sodium chloride 0.45%. 1000 milliLiter(s) (100 mL/Hr) IV Continuous <Continuous>  sodium chloride 0.9%. 1000 milliLiter(s) (75 mL/Hr) IV Continuous <Continuous>  tamsulosin 0.8 milliGRAM(s) Oral at bedtime    MEDICATIONS  (PRN):  acetaminophen   Tablet .. 650 milliGRAM(s) Oral every 6 hours PRN Temp greater or equal to 38C (100.4F), Mild Pain (1 - 3)  aluminum hydroxide/magnesium hydroxide/simethicone Suspension 30 milliLiter(s) Oral every 12 hours PRN Indigestion  dextrose 40% Gel 15 Gram(s) Oral once PRN Blood Glucose LESS THAN 70 milliGRAM(s)/deciliter  glucagon  Injectable 1 milliGRAM(s) IntraMuscular once PRN Glucose LESS THAN 70 milligrams/deciliter  ketorolac   Injectable 15 milliGRAM(s) IV Push every 6 hours PRN Moderate Pain (4 - 6)  magnesium hydroxide Suspension 30 milliLiter(s) Oral every 12 hours PRN Constipation  methocarbamol 500 milliGRAM(s) Oral every 6 hours PRN Back Spasms  naloxone Injectable 0.1 milliGRAM(s) IV Push every 3 minutes PRN For ANY of the following changes in patient status:  A. RR LESS THAN 10 breaths per minute, B. Oxygen saturation LESS THAN 90%, C. Sedation score of 6  ondansetron Injectable 4 milliGRAM(s) IV Push every 6 hours PRN Nausea  oxyCODONE    IR 10 milliGRAM(s) Oral every 4 hours PRN Severe Pain (7 - 10)      General: Pt Alert and oriented, NAD, controlled pain.  Patient laying on his left side with knees slightly flexed.   Back Dressings:  dime-sized blood-tinged fluid on the proximal aspect of the dressing.  Dressing otherwise dry.  Will recheck in a few hours to see if drainage increases and needs to be changed  Pulses: 1+ dorsalis pedis pulse. Cap refill < 2 sec.  Sensation: Decreased sensation right medial calf; otherwise sensation grossly intact  Motor: + 4/5 bilateral LE        A/P: 73yMale POD#5 s/p T11 & T10 laminectomy/T10 through L1 posterior instrumented and posterolateral fusion for thoracic stenosis with myelopathy  - Monitor H/H  - Stable  - Pain Control as clinically indicated  - DVT ppx: lovenox  - Continue physical therapy  - Weight bearing status: WBAT with brace and walker.   - Will continue to monitor dressing/ wound q 4 hours  - Dc planning to rehab Tuesday/Wednesday

## 2018-12-03 NOTE — PROGRESS NOTE ADULT - SUBJECTIVE AND OBJECTIVE BOX
Dr. Rodriguez Hospitalist Progress Note  TIMUR LOUIS [id number]    Patient is a 73y old  Male who presents with a chief complaint of revision lami and fusion secondary to thoracic progressive myelopathy (01 Dec 2018 14:12). s/p surgery. POD#4  back pain, leg weakness and numbness, urinary dribling.  nausea, vomiting,     seen at bedside.  + urinary frequency and post-void dribbling. no retention. no urgency/dysuria/hematuria. no fecal incontinence. h/o spine surgery as above. h/o BPH on flomax.  flomax dose was increased last night. UA neg. back pain controlled with meds. nausea+, no vomiting today. able to bear down Glucerna and mashed potatoes, no abd pain. no BM for  3-4 days. on multiple laxatives. not eating well.  no vertigo today . b/l leg weakness and numbness is improving slowly.     ROS:  CONSTITUTIONAL:  No distress.no fever/chills  CARDIOVASCULAR:  No pressure, squeezing, tightness, heaviness or aching about the chest; no palpitations.no leg swelling, no orthopnea or PND  RESPIRATORY:  no SOB. no wheezing.no cough or sputum.  No hemoptysis  GI: as per HPI  EXT:No joint pain or joint swelling or redness. no calf pain .   SKIN: no skin rash  CNS: No headaches. No depression or anxiety. No SI. as per HPI    Vital Signs Last 24 Hrs  T(C): 36.9 (03 Dec 2018 08:18), Max: 37.1 (02 Dec 2018 20:27)  T(F): 98.5 (03 Dec 2018 08:18), Max: 98.8 (02 Dec 2018 20:27)  HR: 90 (03 Dec 2018 08:18) (90 - 98)  BP: 156/68 (03 Dec 2018 08:18) (153/74 - 175/90)  BP(mean): --  RR: 18 (03 Dec 2018 08:18) (18 - 19)  SpO2: 98% (03 Dec 2018 08:18) (96% - 98%)    CAPILLARY BLOOD GLUCOSE      POCT Blood Glucose.: 237 mg/dL (03 Dec 2018 12:57)  POCT Blood Glucose.: 187 mg/dL (03 Dec 2018 09:08)  POCT Blood Glucose.: 164 mg/dL (02 Dec 2018 21:35)  POCT Blood Glucose.: 158 mg/dL (02 Dec 2018 18:00)      I&O's Detail    02 Dec 2018 07:01  -  03 Dec 2018 07:00  --------------------------------------------------------  IN:  Total IN: 0 mL    OUT:    Voided: 200 mL  Total OUT: 200 mL    Total NET: -200 mL      Physical Exam:  GENERAL: Not in distress. Alert    HEENT:  Normocephalic and atraumatic.   NECK: Supple.  No JVD.    CARDIOVASCULAR: RRR S1, S2. No murmur/rubs/gallop.  LUNGS: BLAE+, no rales, no wheezing, no rhonchi.    ABDOMEN: ND. Soft,  NT, no guarding / rebound / rigidity. BS normoactive. No CVA tenderness.    BACK: dressing over the back , dry. mild TP  EXTREMITIES: no cyanosis, no clubbing, no edema.   SKIN: no rash.  NEUROLOGIC: AAO*3. grossly intact  PSYCHIATRIC: Calm.  No agitation.    Labs                                   8.9    9.3   )-----------( 271      ( 03 Dec 2018 05:47 )             26.9       12-03    125<L>  |  86<L>  |  8.0  ----------------------------<  168<H>  3.9   |  26.0  |  0.44<L>    Ca    7.9<L>      03 Dec 2018 05:47    Culture - Urine (11.26.18 @ 17:46)    Specimen Source: .Urine Clean Catch (Midstream)    Culture Results:   Culture grew 3 or more types of organisms which indicate  collection contamination; consider recollection only if clinically  indicated.    Osmolality, Serum (12.03.18 @ 05:47)    Osmolality, Serum: 276 mosm/kg      Hemoglobin A1C, Whole Blood (11.28.18 @ 06:08)    Hemoglobin A1C, Whole Blood: 7.5 %    < from: CT Lumbar Spine No Cont (11.27.18 @ 16:48) >    Impression:   Marked severe degenerative changes. Obliteration of the L2-L3 disc space.   Discitis at this level cannot be excluded. Large posterior osteophytes   narrowing the spinal canal. Evidence of prior lumbar surgery...    < end of copied text >    < from: CT Thoracic Spine No Cont (11.27.18 @ 16:48) >  Impression:   Marked severe degenerative changes. Obliteration of the L2-L3 disc space.   Discitis at this level cannot be excluded. Large posterior osteophytes   narrowing the spinal canal. Evidence of prior lumbar surgery...    < end of copied text >    < from: CT Colonography, Diagnosis (01.03.18 @ 10:47) >    IMPRESSION:     No colonic polyp or mass.    Mobile cecum as above placing the patient at risk for cecal volvulus.    < end of copied text >      MEDICATIONS  (STANDING):  acetaminophen   Tablet .. 975 milliGRAM(s) Oral every 6 hours  dextrose 5%. 1000 milliLiter(s) (50 mL/Hr) IV Continuous <Continuous>  dextrose 50% Injectable 12.5 Gram(s) IV Push once  dextrose 50% Injectable 25 Gram(s) IV Push once  dextrose 50% Injectable 25 Gram(s) IV Push once  docusate sodium 100 milliGRAM(s) Oral three times a day  enoxaparin Injectable 40 milliGRAM(s) SubCutaneous every 24 hours  gabapentin 300 milliGRAM(s) Oral every 8 hours  insulin glargine Injectable (LANTUS) 10 Unit(s) SubCutaneous at bedtime  insulin lispro (HumaLOG) corrective regimen sliding scale   SubCutaneous three times a day before meals  insulin lispro Injectable (HumaLOG) 3 Unit(s) SubCutaneous three times a day before meals  levothyroxine 137 MICROGram(s) Oral daily  lisinopril 10 milliGRAM(s) Oral daily  metoclopramide 10 milliGRAM(s) Oral three times a day  metoprolol succinate ER 25 milliGRAM(s) Oral daily  pantoprazole  Injectable 40 milliGRAM(s) IV Push two times a day  polyethylene glycol 3350 17 Gram(s) Oral daily  senna 2 Tablet(s) Oral at bedtime  sodium chloride 0.45%. 1000 milliLiter(s) (100 mL/Hr) IV Continuous <Continuous>  tamsulosin 0.8 milliGRAM(s) Oral at bedtime    MEDICATIONS  (PRN):  acetaminophen   Tablet .. 650 milliGRAM(s) Oral every 6 hours PRN Temp greater or equal to 38C (100.4F), Mild Pain (1 - 3)  aluminum hydroxide/magnesium hydroxide/simethicone Suspension 30 milliLiter(s) Oral every 12 hours PRN Indigestion  dextrose 40% Gel 15 Gram(s) Oral once PRN Blood Glucose LESS THAN 70 milliGRAM(s)/deciliter  glucagon  Injectable 1 milliGRAM(s) IntraMuscular once PRN Glucose LESS THAN 70 milligrams/deciliter  ketorolac   Injectable 15 milliGRAM(s) IV Push every 6 hours PRN Moderate Pain (4 - 6)  magnesium hydroxide Suspension 30 milliLiter(s) Oral every 12 hours PRN Constipation  methocarbamol 500 milliGRAM(s) Oral every 6 hours PRN Back Spasms  naloxone Injectable 0.1 milliGRAM(s) IV Push every 3 minutes PRN For ANY of the following changes in patient status:  A. RR LESS THAN 10 breaths per minute, B. Oxygen saturation LESS THAN 90%, C. Sedation score of 6  ondansetron Injectable 4 milliGRAM(s) IV Push every 6 hours PRN Nausea  oxyCODONE    IR 10 milliGRAM(s) Oral every 4 hours PRN Severe Pain (7 - 10)

## 2018-12-04 LAB
-  AMIKACIN: SIGNIFICANT CHANGE UP
-  AMIKACIN: SIGNIFICANT CHANGE UP
-  AMPICILLIN/SULBACTAM: SIGNIFICANT CHANGE UP
-  AMPICILLIN/SULBACTAM: SIGNIFICANT CHANGE UP
-  AMPICILLIN: SIGNIFICANT CHANGE UP
-  AMPICILLIN: SIGNIFICANT CHANGE UP
-  AZTREONAM: SIGNIFICANT CHANGE UP
-  AZTREONAM: SIGNIFICANT CHANGE UP
-  CEFAZOLIN: SIGNIFICANT CHANGE UP
-  CEFAZOLIN: SIGNIFICANT CHANGE UP
-  CEFEPIME: SIGNIFICANT CHANGE UP
-  CEFEPIME: SIGNIFICANT CHANGE UP
-  CEFOXITIN: SIGNIFICANT CHANGE UP
-  CEFOXITIN: SIGNIFICANT CHANGE UP
-  CEFTRIAXONE: SIGNIFICANT CHANGE UP
-  CEFTRIAXONE: SIGNIFICANT CHANGE UP
-  CIPROFLOXACIN: SIGNIFICANT CHANGE UP
-  CIPROFLOXACIN: SIGNIFICANT CHANGE UP
-  ERTAPENEM: SIGNIFICANT CHANGE UP
-  ERTAPENEM: SIGNIFICANT CHANGE UP
-  GENTAMICIN: SIGNIFICANT CHANGE UP
-  GENTAMICIN: SIGNIFICANT CHANGE UP
-  IMIPENEM: SIGNIFICANT CHANGE UP
-  IMIPENEM: SIGNIFICANT CHANGE UP
-  LEVOFLOXACIN: SIGNIFICANT CHANGE UP
-  LEVOFLOXACIN: SIGNIFICANT CHANGE UP
-  MEROPENEM: SIGNIFICANT CHANGE UP
-  MEROPENEM: SIGNIFICANT CHANGE UP
-  NITROFURANTOIN: SIGNIFICANT CHANGE UP
-  NITROFURANTOIN: SIGNIFICANT CHANGE UP
-  PIPERACILLIN/TAZOBACTAM: SIGNIFICANT CHANGE UP
-  PIPERACILLIN/TAZOBACTAM: SIGNIFICANT CHANGE UP
-  TIGECYCLINE: SIGNIFICANT CHANGE UP
-  TIGECYCLINE: SIGNIFICANT CHANGE UP
-  TOBRAMYCIN: SIGNIFICANT CHANGE UP
-  TOBRAMYCIN: SIGNIFICANT CHANGE UP
-  TRIMETHOPRIM/SULFAMETHOXAZOLE: SIGNIFICANT CHANGE UP
-  TRIMETHOPRIM/SULFAMETHOXAZOLE: SIGNIFICANT CHANGE UP
ALBUMIN SERPL ELPH-MCNC: 3.3 G/DL — SIGNIFICANT CHANGE UP (ref 3.3–5.2)
ANION GAP SERPL CALC-SCNC: 12 MMOL/L — SIGNIFICANT CHANGE UP (ref 5–17)
BUN SERPL-MCNC: 8 MG/DL — SIGNIFICANT CHANGE UP (ref 8–20)
CALCIUM SERPL-MCNC: 7.7 MG/DL — LOW (ref 8.6–10.2)
CHLORIDE SERPL-SCNC: 83 MMOL/L — LOW (ref 98–107)
CO2 SERPL-SCNC: 26 MMOL/L — SIGNIFICANT CHANGE UP (ref 22–29)
CREAT ?TM UR-MCNC: 51 MG/DL — SIGNIFICANT CHANGE UP
CREAT SERPL-MCNC: 0.43 MG/DL — LOW (ref 0.5–1.3)
CULTURE RESULTS: SIGNIFICANT CHANGE UP
GLUCOSE BLDC GLUCOMTR-MCNC: 146 MG/DL — HIGH (ref 70–99)
GLUCOSE BLDC GLUCOMTR-MCNC: 146 MG/DL — HIGH (ref 70–99)
GLUCOSE BLDC GLUCOMTR-MCNC: 167 MG/DL — HIGH (ref 70–99)
GLUCOSE BLDC GLUCOMTR-MCNC: 192 MG/DL — HIGH (ref 70–99)
GLUCOSE SERPL-MCNC: 131 MG/DL — HIGH (ref 70–115)
HCT VFR BLD CALC: 27.2 % — LOW (ref 42–52)
HGB BLD-MCNC: 9.2 G/DL — LOW (ref 14–18)
MCHC RBC-ENTMCNC: 27.5 PG — SIGNIFICANT CHANGE UP (ref 27–31)
MCHC RBC-ENTMCNC: 33.8 G/DL — SIGNIFICANT CHANGE UP (ref 32–36)
MCV RBC AUTO: 81.4 FL — SIGNIFICANT CHANGE UP (ref 80–94)
METHOD TYPE: SIGNIFICANT CHANGE UP
METHOD TYPE: SIGNIFICANT CHANGE UP
NT-PROBNP SERPL-SCNC: 467 PG/ML — HIGH (ref 0–300)
ORGANISM # SPEC MICROSCOPIC CNT: SIGNIFICANT CHANGE UP
OSMOLALITY SERPL: 254 MOSM/KG — LOW (ref 280–300)
OSMOLALITY UR: 613 MOSM/KG — SIGNIFICANT CHANGE UP (ref 300–1000)
PLATELET # BLD AUTO: 301 K/UL — SIGNIFICANT CHANGE UP (ref 150–400)
POTASSIUM SERPL-MCNC: 3.9 MMOL/L — SIGNIFICANT CHANGE UP (ref 3.5–5.3)
POTASSIUM SERPL-SCNC: 3.9 MMOL/L — SIGNIFICANT CHANGE UP (ref 3.5–5.3)
RBC # BLD: 3.34 M/UL — LOW (ref 4.6–6.2)
RBC # FLD: 13.8 % — SIGNIFICANT CHANGE UP (ref 11–15.6)
SODIUM SERPL-SCNC: 121 MMOL/L — LOW (ref 135–145)
SODIUM UR-SCNC: 232 MMOL/L — SIGNIFICANT CHANGE UP
SPECIMEN SOURCE: SIGNIFICANT CHANGE UP
URATE SERPL-MCNC: 1.5 MG/DL — LOW (ref 3.4–7)
WBC # BLD: 9.9 K/UL — SIGNIFICANT CHANGE UP (ref 4.8–10.8)
WBC # FLD AUTO: 9.9 K/UL — SIGNIFICANT CHANGE UP (ref 4.8–10.8)

## 2018-12-04 PROCEDURE — 99223 1ST HOSP IP/OBS HIGH 75: CPT

## 2018-12-04 PROCEDURE — 99232 SBSQ HOSP IP/OBS MODERATE 35: CPT

## 2018-12-04 PROCEDURE — 99233 SBSQ HOSP IP/OBS HIGH 50: CPT

## 2018-12-04 PROCEDURE — 76857 US EXAM PELVIC LIMITED: CPT | Mod: 26

## 2018-12-04 RX ORDER — SODIUM CHLORIDE 5 G/100ML
1000 INJECTION, SOLUTION INTRAVENOUS
Refills: 0 | Status: DISCONTINUED | OUTPATIENT
Start: 2018-12-04 | End: 2018-12-05

## 2018-12-04 RX ORDER — LISINOPRIL 2.5 MG/1
20 TABLET ORAL DAILY
Refills: 0 | Status: DISCONTINUED | OUTPATIENT
Start: 2018-12-04 | End: 2018-12-10

## 2018-12-04 RX ADMIN — Medication 3 UNIT(S): at 13:08

## 2018-12-04 RX ADMIN — SENNA PLUS 2 TABLET(S): 8.6 TABLET ORAL at 21:08

## 2018-12-04 RX ADMIN — INSULIN GLARGINE 10 UNIT(S): 100 INJECTION, SOLUTION SUBCUTANEOUS at 21:08

## 2018-12-04 RX ADMIN — Medication 2: at 18:46

## 2018-12-04 RX ADMIN — ONDANSETRON 4 MILLIGRAM(S): 8 TABLET, FILM COATED ORAL at 13:05

## 2018-12-04 RX ADMIN — TAMSULOSIN HYDROCHLORIDE 0.8 MILLIGRAM(S): 0.4 CAPSULE ORAL at 21:08

## 2018-12-04 RX ADMIN — Medication 975 MILLIGRAM(S): at 01:28

## 2018-12-04 RX ADMIN — Medication 975 MILLIGRAM(S): at 14:05

## 2018-12-04 RX ADMIN — GABAPENTIN 300 MILLIGRAM(S): 400 CAPSULE ORAL at 21:08

## 2018-12-04 RX ADMIN — Medication 25 MILLIGRAM(S): at 05:22

## 2018-12-04 RX ADMIN — ENOXAPARIN SODIUM 40 MILLIGRAM(S): 100 INJECTION SUBCUTANEOUS at 05:21

## 2018-12-04 RX ADMIN — Medication 975 MILLIGRAM(S): at 19:30

## 2018-12-04 RX ADMIN — Medication 975 MILLIGRAM(S): at 18:48

## 2018-12-04 RX ADMIN — PANTOPRAZOLE SODIUM 40 MILLIGRAM(S): 20 TABLET, DELAYED RELEASE ORAL at 18:46

## 2018-12-04 RX ADMIN — ONDANSETRON 4 MILLIGRAM(S): 8 TABLET, FILM COATED ORAL at 06:31

## 2018-12-04 RX ADMIN — PANTOPRAZOLE SODIUM 40 MILLIGRAM(S): 20 TABLET, DELAYED RELEASE ORAL at 06:31

## 2018-12-04 RX ADMIN — Medication 100 MILLIGRAM(S): at 05:20

## 2018-12-04 RX ADMIN — GABAPENTIN 300 MILLIGRAM(S): 400 CAPSULE ORAL at 05:20

## 2018-12-04 RX ADMIN — SODIUM CHLORIDE 100 MILLILITER(S): 9 INJECTION, SOLUTION INTRAVENOUS at 06:31

## 2018-12-04 RX ADMIN — Medication 100 MILLIGRAM(S): at 21:08

## 2018-12-04 RX ADMIN — POLYETHYLENE GLYCOL 3350 17 GRAM(S): 17 POWDER, FOR SOLUTION ORAL at 13:06

## 2018-12-04 RX ADMIN — LISINOPRIL 10 MILLIGRAM(S): 2.5 TABLET ORAL at 05:21

## 2018-12-04 RX ADMIN — Medication 137 MICROGRAM(S): at 05:21

## 2018-12-04 RX ADMIN — Medication 3 UNIT(S): at 18:47

## 2018-12-04 RX ADMIN — Medication 975 MILLIGRAM(S): at 00:28

## 2018-12-04 RX ADMIN — Medication 2: at 13:08

## 2018-12-04 RX ADMIN — Medication 100 MILLIGRAM(S): at 13:05

## 2018-12-04 RX ADMIN — GABAPENTIN 300 MILLIGRAM(S): 400 CAPSULE ORAL at 13:05

## 2018-12-04 RX ADMIN — Medication 975 MILLIGRAM(S): at 13:05

## 2018-12-04 NOTE — CONSULT NOTE ADULT - ASSESSMENT
DX : SIADH,    Anemia,      D.C 0.45 NS.    Urine Indices.    2 % saline, Oral Fluid Restriction,    Correct SNa+ 4-6 Meq., in 24 Hours,

## 2018-12-04 NOTE — PROGRESS NOTE ADULT - ATTENDING COMMENTS
Patient was evaluated this morning with the PA with regard to dressing changes may be some aspect of a subcutaneous hematoma which is expected given the early discontinuation the drain secondary to a nonrepairable dural tear of item observe this patient here at [facility]acious hematomas not expansile or leads 2 wound dehiscence. Patient is going to be continued to be monitored PT OT out of bed to chair ambulation etc.

## 2018-12-04 NOTE — PROGRESS NOTE ADULT - SUBJECTIVE AND OBJECTIVE BOX
Patient in bed, family at bedside.  Continues ot have nausea and vertigo intermittently.   Reports back pain.  Wants to be able to walk more and has made it only to the bedside commode.  Relayed requests to therapy teams.    FUNCTIONAL PROGRESS  12/2  Bed Mobility: Rolling/Turning:     · Level of Mobile	moderate assist (50% patients effort)	  · Physical Assist/Nonphysical Assist	1 person assist	  · Assistive Device	bed rails	    Bed Mobility: Sit to Supine:     · Level of Mobile	maximum assist (25% patients effort)	  · Physical Assist/Nonphysical Assist	1 person assist	  · Assistive Device	bed rails	    Bed Mobility: Supine to Sit:     · Level of Mobile	maximum assist (25% patients effort)	  · Physical Assist/Nonphysical Assist	1 person assist	  · Assistive Device	bed rails	    Transfer: Bed to Chair:     Transfer Skill: Bed to Chair   · Level of Mobile	maximum assist (25% patients effort)	  · Physical Assist/Nonphysical Assist	2 person assist	  · Weight-Bearing Restrictions	weight-bearing as tolerated	  · Assistive Device	rolling walker	    Transfer: Chair to Bed:     · Level of Mobile	maximum assist (25% patients effort)	  · Physical Assist/Nonphysical Assist	2 person assist	  · Weight-Bearing Restrictions	weight-bearing as tolerated	  · Assistive Device	rolling walker	    Transfer: Sit to Stand:     · Level of Mobile	maximum assist (25% patients effort)	  · Physical Assist/Nonphysical Assist	2 person assist	  · Weight-Bearing Restrictions	weight-bearing as tolerated	  · Assistive Device	rolling walker	    Transfer: Stand to Sit:     · Level of Mobile	maximum assist (25% patients effort)	  · Physical Assist/Nonphysical Assist	2 person assist	  · Weight-Bearing Restrictions	weight-bearing as tolerated	  · Assistive Device	rolling walker	        REVIEW OF SYSTEMS  Constitutional - No fever,  +fatigue  HEENT - No vertigo, No neck pain  Neurological - +headaches, +loss of strength, +numbness  Musculoskeletal - +joint pain, +muscle pain  Psychiatric - +depression, No anxiety    VITALS  T(C): 37.7 (12-04-18 @ 04:25), Max: 37.7 (12-04-18 @ 04:25)  HR: 95 (12-04-18 @ 04:25) (84 - 95)  BP: 161/79 (12-04-18 @ 04:25) (155/80 - 161/79)  RR: 20 (12-04-18 @ 04:25) (20 - 20)  SpO2: 95% (12-04-18 @ 04:25) (95% - 97%)  Wt(kg): --    MEDICATIONS   acetaminophen   Tablet .. 975 milliGRAM(s) every 6 hours  acetaminophen   Tablet .. 650 milliGRAM(s) every 6 hours PRN  aluminum hydroxide/magnesium hydroxide/simethicone Suspension 30 milliLiter(s) every 12 hours PRN  dextrose 40% Gel 15 Gram(s) once PRN  dextrose 5%. 1000 milliLiter(s) <Continuous>  dextrose 50% Injectable 12.5 Gram(s) once  dextrose 50% Injectable 25 Gram(s) once  dextrose 50% Injectable 25 Gram(s) once  docusate sodium 100 milliGRAM(s) three times a day  enoxaparin Injectable 40 milliGRAM(s) every 24 hours  gabapentin 300 milliGRAM(s) every 8 hours  glucagon  Injectable 1 milliGRAM(s) once PRN  insulin glargine Injectable (LANTUS) 10 Unit(s) at bedtime  insulin lispro (HumaLOG) corrective regimen sliding scale   three times a day before meals  insulin lispro Injectable (HumaLOG) 3 Unit(s) three times a day before meals  ketorolac   Injectable 15 milliGRAM(s) every 6 hours PRN  levothyroxine 137 MICROGram(s) daily  lisinopril 20 milliGRAM(s) daily  magnesium hydroxide Suspension 30 milliLiter(s) every 12 hours PRN  methocarbamol 500 milliGRAM(s) every 6 hours PRN  metoprolol succinate ER 25 milliGRAM(s) daily  naloxone Injectable 0.1 milliGRAM(s) every 3 minutes PRN  ondansetron Injectable 4 milliGRAM(s) every 6 hours PRN  oxyCODONE    IR 10 milliGRAM(s) every 4 hours PRN  pantoprazole  Injectable 40 milliGRAM(s) two times a day  polyethylene glycol 3350 17 Gram(s) daily  senna 2 Tablet(s) at bedtime  sodium chloride 2% . 1000 milliLiter(s) <Continuous>  tamsulosin 0.8 milliGRAM(s) at bedtime      RECENT LABS/IMAGING  CBC Full  -  ( 04 Dec 2018 09:15 )  WBC Count : 9.9 K/uL  Hemoglobin : 9.2 g/dL  Hematocrit : 27.2 %  Platelet Count - Automated : 301 K/uL  Mean Cell Volume : 81.4 fl  Mean Cell Hemoglobin : 27.5 pg  Mean Cell Hemoglobin Concentration : 33.8 g/dL  Auto Neutrophil # : x  Auto Lymphocyte # : x  Auto Monocyte # : x  Auto Eosinophil # : x  Auto Basophil # : x  Auto Neutrophil % : x  Auto Lymphocyte % : x  Auto Monocyte % : x  Auto Eosinophil % : x  Auto Basophil % : x    12-04    121<L>  |  83<L>  |  8.0  ----------------------------<  131<H>  3.9   |  26.0  |  0.43<L>    Ca    7.7<L>      04 Dec 2018 09:15    TPro  x   /  Alb  3.3  /  TBili  x   /  DBili  x   /  AST  x   /  ALT  x   /  AlkPhos  x   12-04      ----------------------------------------------------------------------------------------  PHYSICAL EXAM  Constitutional - NAD, Comfortable  Extremities - No calf tenderness   Neurologic Exam -                    Motor -                     LEFT    LE - HF 2/5, KE 3/5, DF 3/5, PF 3/5, EHL 3/5                    RIGHT LE - HF 2/5, KE 4/5, DF 4/5, PF 4/5, EHL 4/5     Sensory - Decreased LT in L5   Psychiatric - Mood depressed    ------------------------------------------------------------------------------------------------  ASSESSMENT/PLAN  73M with functional deficits following spinal stenosis with T11-L2 spinal cord compression s/p laminectomy and thoracic fusion  Pain - Tylenol, Toradol, Neurontin, Robaxin, Oxycodone  DM2 - Lantus, Humalog  DVT PPX - SCDs, Lovenox   Rehab - Family wants [facility] sent out for DC tomorrow.    Will sign off, please reconsult for additional rehab dispo needs if functional status changes.

## 2018-12-04 NOTE — PROGRESS NOTE ADULT - SUBJECTIVE AND OBJECTIVE BOX
Dr. Rodriguez Hospitalist Progress Note  TIMUR LOUIS [id number]    Patient is a 73y old  Male who presents with a chief complaint of revision lami and fusion secondary to thoracic progressive myelopathy (01 Dec 2018 14:12). s/p surgery. POD#4  back pain, leg weakness and numbness, urinary dribling.  nausea, vomiting,     seen at bedside.  + urinary frequency and post-void dribbling. no retention. no urgency/dysuria/hematuria. no fecal incontinence. h/o spine surgery as above. h/o BPH on flomax. seen by urology. awaiting repeat culture. UA neg. + nausea. no vomiting. no abd pain/diarrhoa. constipation , no hematochezia/melena/hematuria. no CP. SOB. palpitation. legs weakness and numbness is slowly gettng better. no fever/chills/bd xray no obstruction. resumed diet with cleared liquid.    ROS:  as per HPI    Vital Signs Last 24 Hrs  T(C): 37.7 (04 Dec 2018 04:25), Max: 37.7 (04 Dec 2018 04:25)  T(F): 99.9 (04 Dec 2018 04:25), Max: 99.9 (04 Dec 2018 04:25)  HR: 95 (04 Dec 2018 04:25) (84 - 95)  BP: 161/79 (04 Dec 2018 04:25) (152/77 - 171/93)  BP(mean): --  RR: 20 (04 Dec 2018 04:25) (18 - 20)  SpO2: 95% (04 Dec 2018 04:25) (95% - 97%)    CAPILLARY BLOOD GLUCOSE      POCT Blood Glucose.: 167 mg/dL (04 Dec 2018 13:03)  POCT Blood Glucose.: 146 mg/dL (04 Dec 2018 06:26)  POCT Blood Glucose.: 175 mg/dL (03 Dec 2018 21:37)  POCT Blood Glucose.: 208 mg/dL (03 Dec 2018 17:51)    I&O's Detail    03 Dec 2018 07:01  -  04 Dec 2018 07:00  --------------------------------------------------------  IN:    sodium chloride 0.45%: 1200 mL  Total IN: 1200 mL    OUT:    Voided: 1500 mL  Total OUT: 1500 mL    Total NET: -300 mL      Physical Exam:  GENERAL: Not in distress. Alert    HEENT:  Normocephalic and atraumatic.   NECK: Supple.  No JVD.    CARDIOVASCULAR: RRR S1, S2. No murmur/rubs/gallop.  LUNGS: BLAE+, no rales, no wheezing, no rhonchi.    ABDOMEN: ND. Soft,  NT, no guarding / rebound / rigidity. BS normoactive. No CVA tenderness.    BACK: dressing over the back , dry. mild TP  EXTREMITIES: no cyanosis, no clubbing, no edema.   SKIN: no rash.  NEUROLOGIC: AAO*3. grossly intact  PSYCHIATRIC: Calm.  No agitation.    Labs                                   9.2    9.9   )-----------( 301      ( 04 Dec 2018 09:15 )             27.2       12-04    121<L>  |  83<L>  |  8.0  ----------------------------<  131<H>  3.9   |  26.0  |  0.43<L>    Ca    7.7<L>      04 Dec 2018 09:15      Culture - Urine (18 @ 17:46)    Specimen Source: .Urine Clean Catch (Midstream)  Urinalysis Basic - ( 02 Dec 2018 19:57 )    Color: Yellow / Appearance: Clear / S.015 / pH: x  Gluc: x / Ketone: Negative  / Bili: Negative / Urobili: 1 mg/dL   Blood: x / Protein: Negative mg/dL / Nitrite: Negative   Leuk Esterase: Negative / RBC: x / WBC x   Sq Epi: x / Non Sq Epi: Occasional / Bacteria: x    Culture - Urine (18 @ 19:56)    Specimen Source: .Urine    Culture Results:   Culture in progress      Culture Results:   Culture grew 3 or more types of organisms which indicate  collection contamination; consider recollection only if clinically  indicated.    Osmolality, Serum (18 @ 05:47)    Osmolality, Serum: 276 mosm/kg      Hemoglobin A1C, Whole Blood (18 @ 06:08)    Hemoglobin A1C, Whole Blood: 7.5 %    < from: CT Lumbar Spine No Cont (18 @ 16:48) >    Impression:   Marked severe degenerative changes. Obliteration of the L2-L3 disc space.   Discitis at this level cannot be excluded. Large posterior osteophytes   narrowing the spinal canal. Evidence of prior lumbar surgery...    < end of copied text >    < from: CT Thoracic Spine No Cont (18 @ 16:48) >  Impression:   Marked severe degenerative changes. Obliteration of the L2-L3 disc space.   Discitis at this level cannot be excluded. Large posterior osteophytes   narrowing the spinal canal. Evidence of prior lumbar surgery...    < end of copied text >    < from: CT Colonography, Diagnosis (18 @ 10:47) >    IMPRESSION:     No colonic polyp or mass.    Mobile cecum as above placing the patient at risk for cecal volvulus.    < end of copied text >    < from: US Prostate, Non Rectal (18 @ 11:18) >  IMPRESSION:        Prostamegaly measuring 4.0 x 3.6 x 4.4 cm (volume 47.39 mL).    < end of copied text >    MEDICATIONS  (STANDING):  acetaminophen   Tablet .. 975 milliGRAM(s) Oral every 6 hours  dextrose 5%. 1000 milliLiter(s) (50 mL/Hr) IV Continuous <Continuous>  dextrose 50% Injectable 12.5 Gram(s) IV Push once  dextrose 50% Injectable 25 Gram(s) IV Push once  dextrose 50% Injectable 25 Gram(s) IV Push once  docusate sodium 100 milliGRAM(s) Oral three times a day  enoxaparin Injectable 40 milliGRAM(s) SubCutaneous every 24 hours  gabapentin 300 milliGRAM(s) Oral every 8 hours  insulin glargine Injectable (LANTUS) 10 Unit(s) SubCutaneous at bedtime  insulin lispro (HumaLOG) corrective regimen sliding scale   SubCutaneous three times a day before meals  insulin lispro Injectable (HumaLOG) 3 Unit(s) SubCutaneous three times a day before meals  levothyroxine 137 MICROGram(s) Oral daily  lisinopril 20 milliGRAM(s) Oral daily  metoprolol succinate ER 25 milliGRAM(s) Oral daily  pantoprazole  Injectable 40 milliGRAM(s) IV Push two times a day  polyethylene glycol 3350 17 Gram(s) Oral daily  senna 2 Tablet(s) Oral at bedtime  sodium chloride 2% . 1000 milliLiter(s) (100 mL/Hr) IV Continuous <Continuous>  tamsulosin 0.8 milliGRAM(s) Oral at bedtime    MEDICATIONS  (PRN):  acetaminophen   Tablet .. 650 milliGRAM(s) Oral every 6 hours PRN Temp greater or equal to 38C (100.4F), Mild Pain (1 - 3)  aluminum hydroxide/magnesium hydroxide/simethicone Suspension 30 milliLiter(s) Oral every 12 hours PRN Indigestion  dextrose 40% Gel 15 Gram(s) Oral once PRN Blood Glucose LESS THAN 70 milliGRAM(s)/deciliter  glucagon  Injectable 1 milliGRAM(s) IntraMuscular once PRN Glucose LESS THAN 70 milligrams/deciliter  ketorolac   Injectable 15 milliGRAM(s) IV Push every 6 hours PRN Moderate Pain (4 - 6)  magnesium hydroxide Suspension 30 milliLiter(s) Oral every 12 hours PRN Constipation  methocarbamol 500 milliGRAM(s) Oral every 6 hours PRN Back Spasms  naloxone Injectable 0.1 milliGRAM(s) IV Push every 3 minutes PRN For ANY of the following changes in patient status:  A. RR LESS THAN 10 breaths per minute, B. Oxygen saturation LESS THAN 90%, C. Sedation score of 6  ondansetron Injectable 4 milliGRAM(s) IV Push every 6 hours PRN Nausea  oxyCODONE    IR 10 milliGRAM(s) Oral every 4 hours PRN Severe Pain (7 - 10)

## 2018-12-04 NOTE — CONSULT NOTE ADULT - SUBJECTIVE AND OBJECTIVE BOX
HPI:  73M admitted with progressive lower extremity weakness and a h/o spinal stenosis. Now s/p T10-L2 fusion and laminectomy of T11/12 laminectomy.      PAST MEDICAL & SURGICAL HISTORY:  BPH (benign prostatic hyperplasia)  HTN (hypertension)  Hypothyroid  Dyslipidemia  OA (osteoarthritis)  Diabetes  S/P laminectomy  S/P hernia repair  S/P hip replacement: R      Home Medications:  Aspir 81 oral delayed release tablet: 1 tab(s) orally once a day (2018 12:37)  atorvastatin 40 mg oral tablet: 1 tab(s) orally once a day (2018 12:37)  glimepiride 2 mg oral tablet: 1 tab(s) orally once a day (2018 12:37)  Lantus Solostar Pen:  subcutaneous  (2018 12:37)  metFORMIN 500 mg oral tablet: 1 tab(s) orally 3 times a day (2018 12:37)  NovoLOG:  (2018 12:37)  Restasis 0.05% ophthalmic emulsion: 1 drop(s) to each affected eye every 12 hours (2018 12:37)  Synthroid: 13 microgram(s) orally once a day (2018 12:37)  tamsulosin 0.4 mg oral capsule: 1 cap(s) orally once a day (2018 12:37)      Review of Systems: All negative except where noted in HPI    MEDICATIONS  (STANDING):  acetaminophen   Tablet .. 975 milliGRAM(s) Oral every 6 hours  dextrose 5%. 1000 milliLiter(s) (50 mL/Hr) IV Continuous <Continuous>  dextrose 50% Injectable 12.5 Gram(s) IV Push once  dextrose 50% Injectable 25 Gram(s) IV Push once  dextrose 50% Injectable 25 Gram(s) IV Push once  docusate sodium 100 milliGRAM(s) Oral three times a day  enoxaparin Injectable 40 milliGRAM(s) SubCutaneous every 24 hours  gabapentin 300 milliGRAM(s) Oral every 8 hours  insulin glargine Injectable (LANTUS) 10 Unit(s) SubCutaneous at bedtime  insulin lispro (HumaLOG) corrective regimen sliding scale   SubCutaneous three times a day before meals  insulin lispro Injectable (HumaLOG) 3 Unit(s) SubCutaneous three times a day before meals  levothyroxine 137 MICROGram(s) Oral daily  lisinopril 10 milliGRAM(s) Oral daily  metoprolol succinate ER 25 milliGRAM(s) Oral daily  pantoprazole  Injectable 40 milliGRAM(s) IV Push two times a day  polyethylene glycol 3350 17 Gram(s) Oral daily  senna 2 Tablet(s) Oral at bedtime  sodium chloride 0.45%. 1000 milliLiter(s) (100 mL/Hr) IV Continuous <Continuous>  tamsulosin 0.8 milliGRAM(s) Oral at bedtime    MEDICATIONS  (PRN):  acetaminophen   Tablet .. 650 milliGRAM(s) Oral every 6 hours PRN Temp greater or equal to 38C (100.4F), Mild Pain (1 - 3)  aluminum hydroxide/magnesium hydroxide/simethicone Suspension 30 milliLiter(s) Oral every 12 hours PRN Indigestion  dextrose 40% Gel 15 Gram(s) Oral once PRN Blood Glucose LESS THAN 70 milliGRAM(s)/deciliter  glucagon  Injectable 1 milliGRAM(s) IntraMuscular once PRN Glucose LESS THAN 70 milligrams/deciliter  ketorolac   Injectable 15 milliGRAM(s) IV Push every 6 hours PRN Moderate Pain (4 - 6)  magnesium hydroxide Suspension 30 milliLiter(s) Oral every 12 hours PRN Constipation  methocarbamol 500 milliGRAM(s) Oral every 6 hours PRN Back Spasms  naloxone Injectable 0.1 milliGRAM(s) IV Push every 3 minutes PRN For ANY of the following changes in patient status:  A. RR LESS THAN 10 breaths per minute, B. Oxygen saturation LESS THAN 90%, C. Sedation score of 6  ondansetron Injectable 4 milliGRAM(s) IV Push every 6 hours PRN Nausea  oxyCODONE    IR 10 milliGRAM(s) Oral every 4 hours PRN Severe Pain (7 - 10)      SOCIAL HISTORY:      Vital Signs Last 24 Hrs  T(C): 37.7 (04 Dec 2018 04:25), Max: 37.7 (04 Dec 2018 04:25)  T(F): 99.9 (04 Dec 2018 04:25), Max: 99.9 (04 Dec 2018 04:25)  HR: 95 (04 Dec 2018 04:25) (84 - 95)  BP: 161/79 (04 Dec 2018 04:25) (152/77 - 171/93)  BP(mean): --  RR: 20 (04 Dec 2018 04:25) (18 - 20)  SpO2: 95% (04 Dec 2018 04:25) (95% - 97%)    Physical Exam:    General: NAD  HEENT: PERRL, EOMI  Neck: No JVD, FROM without pain  Pulm: Respirations non-labored, no accessory muscle use  Abdomen: Soft, NT/ND, unable to palpate bladder  Neuro: Alert and oriented x3, no focal deficits      LABS:  Urine culture pending      Urinalysis Basic - ( 02 Dec 2018 19:57 )    Color: Yellow / Appearance: Clear / S.015 / pH: x  Gluc: x / Ketone: Negative  / Bili: Negative / Urobili: 1 mg/dL   Blood: x / Protein: Negative mg/dL / Nitrite: Negative   Leuk Esterase: Negative / RBC: x / WBC x   Sq Epi: x / Non Sq Epi: Occasional / Bacteria: x        RADIOLOGY & ADDITIONAL STUDIES    Impression and Plan:    Plan to be discussed with  HPI:  73M admitted with progressive lower extremity weakness and a h/o spinal stenosis. Now s/p T10-L2 fusion and laminectomy of T11/12 laminectomy. Pt has been having low velocity urinary streams. He states that this has been going on for "a while" and that the issue predates his recent surgery. Denies dysuria, hematuria, sensation of urinary retention, sensation of incomplete voiding.       PAST MEDICAL & SURGICAL HISTORY:  BPH (benign prostatic hyperplasia)  HTN (hypertension)  Hypothyroid  Dyslipidemia  OA (osteoarthritis)  Diabetes  S/P laminectomy  S/P hernia repair  S/P hip replacement: R      Home Medications:  Aspir 81 oral delayed release tablet: 1 tab(s) orally once a day (2018 12:37)  atorvastatin 40 mg oral tablet: 1 tab(s) orally once a day (2018 12:37)  glimepiride 2 mg oral tablet: 1 tab(s) orally once a day (2018 12:37)  Lantus Solostar Pen:  subcutaneous  (2018 12:37)  metFORMIN 500 mg oral tablet: 1 tab(s) orally 3 times a day (2018 12:37)  NovoLOG:  (2018 12:37)  Restasis 0.05% ophthalmic emulsion: 1 drop(s) to each affected eye every 12 hours (2018 12:37)  Synthroid: 13 microgram(s) orally once a day (2018 12:37)  tamsulosin 0.4 mg oral capsule: 1 cap(s) orally once a day (2018 12:37)      Review of Systems: All negative except where noted in HPI    MEDICATIONS  (STANDING):  acetaminophen   Tablet .. 975 milliGRAM(s) Oral every 6 hours  dextrose 5%. 1000 milliLiter(s) (50 mL/Hr) IV Continuous <Continuous>  dextrose 50% Injectable 12.5 Gram(s) IV Push once  dextrose 50% Injectable 25 Gram(s) IV Push once  dextrose 50% Injectable 25 Gram(s) IV Push once  docusate sodium 100 milliGRAM(s) Oral three times a day  enoxaparin Injectable 40 milliGRAM(s) SubCutaneous every 24 hours  gabapentin 300 milliGRAM(s) Oral every 8 hours  insulin glargine Injectable (LANTUS) 10 Unit(s) SubCutaneous at bedtime  insulin lispro (HumaLOG) corrective regimen sliding scale   SubCutaneous three times a day before meals  insulin lispro Injectable (HumaLOG) 3 Unit(s) SubCutaneous three times a day before meals  levothyroxine 137 MICROGram(s) Oral daily  lisinopril 10 milliGRAM(s) Oral daily  metoprolol succinate ER 25 milliGRAM(s) Oral daily  pantoprazole  Injectable 40 milliGRAM(s) IV Push two times a day  polyethylene glycol 3350 17 Gram(s) Oral daily  senna 2 Tablet(s) Oral at bedtime  sodium chloride 0.45%. 1000 milliLiter(s) (100 mL/Hr) IV Continuous <Continuous>  tamsulosin 0.8 milliGRAM(s) Oral at bedtime    MEDICATIONS  (PRN):  acetaminophen   Tablet .. 650 milliGRAM(s) Oral every 6 hours PRN Temp greater or equal to 38C (100.4F), Mild Pain (1 - 3)  aluminum hydroxide/magnesium hydroxide/simethicone Suspension 30 milliLiter(s) Oral every 12 hours PRN Indigestion  dextrose 40% Gel 15 Gram(s) Oral once PRN Blood Glucose LESS THAN 70 milliGRAM(s)/deciliter  glucagon  Injectable 1 milliGRAM(s) IntraMuscular once PRN Glucose LESS THAN 70 milligrams/deciliter  ketorolac   Injectable 15 milliGRAM(s) IV Push every 6 hours PRN Moderate Pain (4 - 6)  magnesium hydroxide Suspension 30 milliLiter(s) Oral every 12 hours PRN Constipation  methocarbamol 500 milliGRAM(s) Oral every 6 hours PRN Back Spasms  naloxone Injectable 0.1 milliGRAM(s) IV Push every 3 minutes PRN For ANY of the following changes in patient status:  A. RR LESS THAN 10 breaths per minute, B. Oxygen saturation LESS THAN 90%, C. Sedation score of 6  ondansetron Injectable 4 milliGRAM(s) IV Push every 6 hours PRN Nausea  oxyCODONE    IR 10 milliGRAM(s) Oral every 4 hours PRN Severe Pain (7 - 10)      SOCIAL HISTORY:      Vital Signs Last 24 Hrs  T(C): 37.7 (04 Dec 2018 04:25), Max: 37.7 (04 Dec 2018 04:25)  T(F): 99.9 (04 Dec 2018 04:25), Max: 99.9 (04 Dec 2018 04:25)  HR: 95 (04 Dec 2018 04:25) (84 - 95)  BP: 161/79 (04 Dec 2018 04:25) (152/77 - 171/93)  BP(mean): --  RR: 20 (04 Dec 2018 04:25) (18 - 20)  SpO2: 95% (04 Dec 2018 04:25) (95% - 97%)    Physical Exam:    General: NAD  HEENT: PERRL, EOMI  Neck: No JVD, FROM without pain  Pulm: Respirations non-labored, no accessory muscle use  Abdomen: Soft, NT/ND, unable to palpate bladder  Neuro: Alert and oriented x3, no focal deficits        LABS:  Urine culture pending      Urinalysis Basic - ( 02 Dec 2018 19:57 )    Color: Yellow / Appearance: Clear / S.015 / pH: x  Gluc: x / Ketone: Negative  / Bili: Negative / Urobili: 1 mg/dL   Blood: x / Protein: Negative mg/dL / Nitrite: Negative   Leuk Esterase: Negative / RBC: x / WBC x   Sq Epi: x / Non Sq Epi: Occasional / Bacteria: x        Impression and Plan:  73M with low velocity urinary stream. Urine culture was performed but contaminated. Awaiting culture results.  Bladder scan q6h to r/o urinary retention  Encourage upright position, standing preferably, when urinating  Plan to be discussed with  HPI:  73M admitted with progressive lower extremity weakness and a h/o spinal stenosis. Now s/p T10-L2 fusion and laminectomy of T11/12 laminectomy. Pt has been having low velocity urinary streams. He states that this has been going on for "a while" and that the issue predates his recent surgery. Denies dysuria, hematuria, sensation of urinary retention, sensation of incomplete voiding.       PAST MEDICAL & SURGICAL HISTORY:  BPH (benign prostatic hyperplasia)  HTN (hypertension)  Hypothyroid  Dyslipidemia  OA (osteoarthritis)  Diabetes  S/P laminectomy  S/P hernia repair  S/P hip replacement: R      Home Medications:  Aspir 81 oral delayed release tablet: 1 tab(s) orally once a day (2018 12:37)  atorvastatin 40 mg oral tablet: 1 tab(s) orally once a day (2018 12:37)  glimepiride 2 mg oral tablet: 1 tab(s) orally once a day (2018 12:37)  Lantus Solostar Pen:  subcutaneous  (2018 12:37)  metFORMIN 500 mg oral tablet: 1 tab(s) orally 3 times a day (2018 12:37)  NovoLOG:  (2018 12:37)  Restasis 0.05% ophthalmic emulsion: 1 drop(s) to each affected eye every 12 hours (2018 12:37)  Synthroid: 13 microgram(s) orally once a day (2018 12:37)  tamsulosin 0.4 mg oral capsule: 1 cap(s) orally once a day (2018 12:37)      Review of Systems: All negative except where noted in HPI    MEDICATIONS  (STANDING):  acetaminophen   Tablet .. 975 milliGRAM(s) Oral every 6 hours  dextrose 5%. 1000 milliLiter(s) (50 mL/Hr) IV Continuous <Continuous>  dextrose 50% Injectable 12.5 Gram(s) IV Push once  dextrose 50% Injectable 25 Gram(s) IV Push once  dextrose 50% Injectable 25 Gram(s) IV Push once  docusate sodium 100 milliGRAM(s) Oral three times a day  enoxaparin Injectable 40 milliGRAM(s) SubCutaneous every 24 hours  gabapentin 300 milliGRAM(s) Oral every 8 hours  insulin glargine Injectable (LANTUS) 10 Unit(s) SubCutaneous at bedtime  insulin lispro (HumaLOG) corrective regimen sliding scale   SubCutaneous three times a day before meals  insulin lispro Injectable (HumaLOG) 3 Unit(s) SubCutaneous three times a day before meals  levothyroxine 137 MICROGram(s) Oral daily  lisinopril 10 milliGRAM(s) Oral daily  metoprolol succinate ER 25 milliGRAM(s) Oral daily  pantoprazole  Injectable 40 milliGRAM(s) IV Push two times a day  polyethylene glycol 3350 17 Gram(s) Oral daily  senna 2 Tablet(s) Oral at bedtime  sodium chloride 0.45%. 1000 milliLiter(s) (100 mL/Hr) IV Continuous <Continuous>  tamsulosin 0.8 milliGRAM(s) Oral at bedtime    MEDICATIONS  (PRN):  acetaminophen   Tablet .. 650 milliGRAM(s) Oral every 6 hours PRN Temp greater or equal to 38C (100.4F), Mild Pain (1 - 3)  aluminum hydroxide/magnesium hydroxide/simethicone Suspension 30 milliLiter(s) Oral every 12 hours PRN Indigestion  dextrose 40% Gel 15 Gram(s) Oral once PRN Blood Glucose LESS THAN 70 milliGRAM(s)/deciliter  glucagon  Injectable 1 milliGRAM(s) IntraMuscular once PRN Glucose LESS THAN 70 milligrams/deciliter  ketorolac   Injectable 15 milliGRAM(s) IV Push every 6 hours PRN Moderate Pain (4 - 6)  magnesium hydroxide Suspension 30 milliLiter(s) Oral every 12 hours PRN Constipation  methocarbamol 500 milliGRAM(s) Oral every 6 hours PRN Back Spasms  naloxone Injectable 0.1 milliGRAM(s) IV Push every 3 minutes PRN For ANY of the following changes in patient status:  A. RR LESS THAN 10 breaths per minute, B. Oxygen saturation LESS THAN 90%, C. Sedation score of 6  ondansetron Injectable 4 milliGRAM(s) IV Push every 6 hours PRN Nausea  oxyCODONE    IR 10 milliGRAM(s) Oral every 4 hours PRN Severe Pain (7 - 10)      SOCIAL HISTORY:      Vital Signs Last 24 Hrs  T(C): 37.7 (04 Dec 2018 04:25), Max: 37.7 (04 Dec 2018 04:25)  T(F): 99.9 (04 Dec 2018 04:25), Max: 99.9 (04 Dec 2018 04:25)  HR: 95 (04 Dec 2018 04:25) (84 - 95)  BP: 161/79 (04 Dec 2018 04:25) (152/77 - 171/93)  BP(mean): --  RR: 20 (04 Dec 2018 04:25) (18 - 20)  SpO2: 95% (04 Dec 2018 04:25) (95% - 97%)    Physical Exam:    General: NAD  HEENT: PERRL, EOMI  Neck: No JVD, FROM without pain  Pulm: Respirations non-labored, no accessory muscle use  Abdomen: Soft, NT/ND, unable to palpate bladder  Neuro: Alert and oriented x3, no focal deficits        LABS:  Urine culture pending      Urinalysis Basic - ( 02 Dec 2018 19:57 )    Color: Yellow / Appearance: Clear / S.015 / pH: x  Gluc: x / Ketone: Negative  / Bili: Negative / Urobili: 1 mg/dL   Blood: x / Protein: Negative mg/dL / Nitrite: Negative   Leuk Esterase: Negative / RBC: x / WBC x   Sq Epi: x / Non Sq Epi: Occasional / Bacteria: x        Impression and Plan:  73M with low velocity urinary stream. Urine culture was performed but contaminated. Awaiting culture results.  Agree with Flomax 0.8mg  Bladder scan q6h to r/o urinary retention  Encourage upright position, standing preferably, when urinating  Awaiting urine culture results  Plan discussed with Dr. Bello

## 2018-12-04 NOTE — CONSULT NOTE ADULT - SUBJECTIVE AND OBJECTIVE BOX
Patient is a 73y old  Male who presents with a chief complaint of revision lami and fusion secondary to thoracic progressive myelopathy (01 Dec 2018 14:12)    HPI:  72 YO - M presented with increasing leg weakness and episodes of mechanical falls. The patient reports  history of spinal stenosis for which he has had laminectomies in the past and has had increasing difficulty ambulating with his walker. He also noted decrease in sensation in both his legs. He reports a history of chronic back pain which has not changed recently. He denied any urinary incontinence but did note some hesitancy with urination. There was no associated dysuria or hematuria. The leg weakness is constant and progressive. He is unaware of any aggravating or relieving factors. The patient's wife reported decrease in oral intake at home. (2018 10:48)    PAST MEDICAL & SURGICAL HISTORY:    BPH (benign prostatic hyperplasia)  HTN (hypertension)  Hypothyroid  OA (osteoarthritis)  Diabetes,    S/P laminectomy  S/P hernia repair  S/P hip replacement: R    FAMILY HISTORY:  Family history of diabetes mellitus (Father)    Social History: No ETOH,    MEDICATIONS  (STANDING):  acetaminophen   Tablet .. 975 milliGRAM(s) Oral every 6 hours  dextrose 5%. 1000 milliLiter(s) (50 mL/Hr) IV Continuous <Continuous>  dextrose 50% Injectable 12.5 Gram(s) IV Push once  dextrose 50% Injectable 25 Gram(s) IV Push once  dextrose 50% Injectable 25 Gram(s) IV Push once  docusate sodium 100 milliGRAM(s) Oral three times a day  enoxaparin Injectable 40 milliGRAM(s) SubCutaneous every 24 hours  gabapentin 300 milliGRAM(s) Oral every 8 hours  insulin glargine Injectable (LANTUS) 10 Unit(s) SubCutaneous at bedtime  insulin lispro (HumaLOG) corrective regimen sliding scale   SubCutaneous three times a day before meals  insulin lispro Injectable (HumaLOG) 3 Unit(s) SubCutaneous three times a day before meals  levothyroxine 137 MICROGram(s) Oral daily  lisinopril 20 milliGRAM(s) Oral daily  metoprolol succinate ER 25 milliGRAM(s) Oral daily  pantoprazole  Injectable 40 milliGRAM(s) IV Push two times a day  polyethylene glycol 3350 17 Gram(s) Oral daily  senna 2 Tablet(s) Oral at bedtime  sodium chloride 2% . 1000 milliLiter(s) (100 mL/Hr) IV Continuous <Continuous>  tamsulosin 0.8 milliGRAM(s) Oral at bedtime    MEDICATIONS  (PRN):  acetaminophen   Tablet .. 650 milliGRAM(s) Oral every 6 hours PRN Temp greater or equal to 38C (100.4F), Mild Pain (1 - 3)  aluminum hydroxide/magnesium hydroxide/simethicone Suspension 30 milliLiter(s) Oral every 12 hours PRN Indigestion  dextrose 40% Gel 15 Gram(s) Oral once PRN Blood Glucose LESS THAN 70 milliGRAM(s)/deciliter  glucagon  Injectable 1 milliGRAM(s) IntraMuscular once PRN Glucose LESS THAN 70 milligrams/deciliter  ketorolac   Injectable 15 milliGRAM(s) IV Push every 6 hours PRN Moderate Pain (4 - 6)  magnesium hydroxide Suspension 30 milliLiter(s) Oral every 12 hours PRN Constipation  methocarbamol 500 milliGRAM(s) Oral every 6 hours PRN Back Spasms  naloxone Injectable 0.1 milliGRAM(s) IV Push every 3 minutes PRN For ANY of the following changes in patient status:  A. RR LESS THAN 10 breaths per minute, B. Oxygen saturation LESS THAN 90%, C. Sedation score of 6  ondansetron Injectable 4 milliGRAM(s) IV Push every 6 hours PRN Nausea  oxyCODONE    IR 10 milliGRAM(s) Oral every 4 hours PRN Severe Pain (7 - 10)      Allergies    No Known Allergies    REVIEW OF SYSTEMS:    CONSTITUTIONAL: No fever, weight loss, or fatigue  EYES: No eye pain, visual disturbances, or discharge  ENMT:  No difficulty hearing, tinnitus, vertigo; No sinus or throat pain  NECK: No pain or stiffness  BREASTS: No pain, masses, or nipple discharge  RESPIRATORY: No cough, wheezing, chills or hemoptysis; No shortness of breath  CARDIOVASCULAR: No chest pain, palpitations, dizziness, or leg swelling  GASTROINTESTINAL: No abdominal or epigastric pain. No nausea, vomiting, or hematemesis; No diarrhea or constipation. No melena or hematochezia.  GENITOURINARY: No dysuria, frequency, hematuria, or incontinence  NEUROLOGICAL: No headaches, memory loss,  + loss of strength, numbness,  tremors  SKIN: No itching, burning, rashes, or lesions   LYMPH NODES: No enlarged glands  ENDOCRINE: No heat or cold intolerance; No hair loss  MUSCULOSKELETAL: No joint pain or swelling; + muscle, back, or extremity pain  PSYCHIATRIC: No depression, anxiety, mood swings, or difficulty sleeping  HEME/LYMPH: No easy bruising, or bleeding gums  ALLERGY AND IMMUNOLOGIC: No hives or eczema      Vital Signs Last 24 Hrs  T(C): 37.7 (04 Dec 2018 04:25), Max: 37.7 (04 Dec 2018 04:25)  T(F): 99.9 (04 Dec 2018 04:25), Max: 99.9 (04 Dec 2018 04:25)  HR: 95 (04 Dec 2018 04:25) (84 - 95)  BP: 161/79 (04 Dec 2018 04:25) (152/77 - 171/93)  BP(mean): --  RR: 20 (04 Dec 2018 04:25) (18 - 20)  SpO2: 95% (04 Dec 2018 04:25) (95% - 97%)    PHYSICAL EXAM:    GENERAL: NAD, well-developed, pale,  HEAD:  Atraumatic, Normocephalic  EYES: EOMI, PERRLA, conjunctiva and sclera clear  ENMT: Moist mucous membranes,   NECK: Supple, No JVD, Normal thyroid  NERVOUS SYSTEM:  Alert & Oriented X3, Good concentration;   CHEST/LUNG: Clear to percussion bilaterally; No rales, rhonchi, wheezing, or rubs  HEART: Regular rate and rhythm; No murmurs, rubs, or gallops  ABDOMEN: Soft, Nontender, Nondistended; Bowel sounds present  EXTREMITIES:  2+ Peripheral Pulses, No clubbing, cyanosis, or edema  LYMPH: No lymphadenopathy noted  SKIN: No rashes or lesions      LABS:                        9.2    9.9   )-----------( 301      ( 04 Dec 2018 09:15 )             27.2     12-04    121<L>  |  83<L>  |  8.0  ----------------------------<  131<H>  3.9   |  26.0  |  0.43<L>    Ca    7.7<L>      04 Dec 2018 09:15    Urinalysis Basic - ( 02 Dec 2018 19:57 )    Color: Yellow / Appearance: Clear / S.015 / pH: x  Gluc: x / Ketone: Negative  / Bili: Negative / Urobili: 1 mg/dL   Blood: x / Protein: Negative mg/dL / Nitrite: Negative   Leuk Esterase: Negative / RBC: x / WBC x   Sq Epi: x / Non Sq Epi: Occasional / Bacteria: x    RADIOLOGY & ADDITIONAL TESTS:    EXAM:  XR CHEST PA LAT 2V                          PROCEDURE DATE:  2018      INTERPRETATION:  PA and lateral chest radiographs     COMPARISON: 3/9/2015.    CLINICAL INFORMATION: Chest Pain.    FINDINGS:    The airway is midline.  There are no airspace consolidations.  There is no pleural effusion or pneumothorax.   The heart size is within the limits of normal.   Marginal osteophytes are noted at multiple disc spaces in the spine.      IMPRESSION:    No acute radiographic cardiopulmonary pathology.      MYRNA HARDEN M.D., ATTENDING RADIOLOGIST  This document has been electronically signed. 2018  7:12AM

## 2018-12-04 NOTE — PROGRESS NOTE ADULT - SUBJECTIVE AND OBJECTIVE BOX
ORTHO-SPINE POST-OP PROGRESS NOTE:      [id number]    TIMUR LOUIS      PROCEDURE:  T11 & T10 laminectomy/T10 through L1 posterior instrumented and posterolateral fusion for thoracic  stenosis with myelopathy, repair of incidental durotomy    DOS: 11/29/2018      SUBJECTIVE: 73y Patient seen and examined. Patient reports of mild to moderate discomfort with active movement that is controlled by pain medications. Patient denies of acute sensory or motor changes. He was out of bed to chair yesterday. He is pending transfer to [facility].                             8.9    9.3   )-----------( 271      ( 03 Dec 2018 05:47 )             26.9         I&O's Detail    03 Dec 2018 07:01  -  04 Dec 2018 07:00  --------------------------------------------------------  IN:    sodium chloride 0.45%.: 1200 mL  Total IN: 1200 mL    OUT:    Voided: 1500 mL  Total OUT: 1500 mL    Total NET: -300 mL            acetaminophen   Tablet .. 975 milliGRAM(s) Oral every 6 hours  acetaminophen   Tablet .. 650 milliGRAM(s) Oral every 6 hours PRN  aluminum hydroxide/magnesium hydroxide/simethicone Suspension 30 milliLiter(s) Oral every 12 hours PRN  dextrose 40% Gel 15 Gram(s) Oral once PRN  dextrose 5%. 1000 milliLiter(s) IV Continuous <Continuous>  dextrose 50% Injectable 12.5 Gram(s) IV Push once  dextrose 50% Injectable 25 Gram(s) IV Push once  dextrose 50% Injectable 25 Gram(s) IV Push once  docusate sodium 100 milliGRAM(s) Oral three times a day  enoxaparin Injectable 40 milliGRAM(s) SubCutaneous every 24 hours  gabapentin 300 milliGRAM(s) Oral every 8 hours  glucagon  Injectable 1 milliGRAM(s) IntraMuscular once PRN  insulin glargine Injectable (LANTUS) 10 Unit(s) SubCutaneous at bedtime  insulin lispro (HumaLOG) corrective regimen sliding scale   SubCutaneous three times a day before meals  insulin lispro Injectable (HumaLOG) 3 Unit(s) SubCutaneous three times a day before meals  ketorolac   Injectable 15 milliGRAM(s) IV Push every 6 hours PRN  levothyroxine 137 MICROGram(s) Oral daily  lisinopril 10 milliGRAM(s) Oral daily  magnesium hydroxide Suspension 30 milliLiter(s) Oral every 12 hours PRN  methocarbamol 500 milliGRAM(s) Oral every 6 hours PRN  metoprolol succinate ER 25 milliGRAM(s) Oral daily  naloxone Injectable 0.1 milliGRAM(s) IV Push every 3 minutes PRN  ondansetron Injectable 4 milliGRAM(s) IV Push every 6 hours PRN  oxyCODONE    IR 10 milliGRAM(s) Oral every 4 hours PRN  pantoprazole  Injectable 40 milliGRAM(s) IV Push two times a day  polyethylene glycol 3350 17 Gram(s) Oral daily  senna 2 Tablet(s) Oral at bedtime  sodium chloride 0.45%. 1000 milliLiter(s) IV Continuous <Continuous>  tamsulosin 0.8 milliGRAM(s) Oral at bedtime        T(C): 37.7 (12-04-18 @ 04:25), Max: 37.7 (12-04-18 @ 04:25)  HR: 95 (12-04-18 @ 04:25) (84 - 95)  BP: 161/79 (12-04-18 @ 04:25) (152/77 - 171/93)  RR: 20 (12-04-18 @ 04:25) (18 - 20)  SpO2: 95% (12-04-18 @ 04:25) (95% - 98%)  Wt(kg): --      PHYSICAL EXAM:     Constitutional: Alert, responsive, in no acute distress.     Spine:          Dressing: + saturation with serous bloody discharge. + mild soft tissue swelling noted of the lumbar back associated with wound.            Skin: Wound is clean and intact. + slight active thin bloody discharge for most proximal aspect of the wound. No wound dehiscence or erythema noted.               Motor exam: 5/5 hip flexion, knee flexion and ankle plantar and dorsiflexion. No focal weaknesses noted.                                                        Neurological: Sensation of the left LE is grossly intact to light touch without focal deficit. There is diminished distal RLE sensation distal to the knee. 4/5 motor function of right hip/ knee and ankle. 3/5 motor strength of the LLE joints. No clonus noted. No focal deficits or weaknesses found.    Vascular:  + warm well perfused; capillary refill <3 seconds                                                       A/P :  69y Male S/P T11 & T10 laminectomy/T10 through L1 posterior instrumented and posterolateral fusion for thoracic  stenosis with myelopathy, repair of incidental durotomy  POD# 6    -  Pain control  -  DVT ppx: [x]SCDs   [x] Pharmacolgic    -  PT and out of bed today  -  Weight bearing status: WBAT [X]        PWB    [ ]     TTWB  [ ]      NWB  [ ]  -  Dispo: [facility] Wednesday to allow for one additional day to monitor wound drainage  - dressing changed performed

## 2018-12-04 NOTE — PROGRESS NOTE ADULT - ASSESSMENT
ASSESSMENT / PLAN:  ----------------------------------------  Spinal stenosis - Status post spinal surgery. Dressing in place. dry. Pain control. OT/PT. PMR cx appreciated. For Acute Rehab once medically stable and cleared bu ortho. WBAT    Diabetes - c/w lantus, ISS. a1c 7.5%. adjust meds    Hypothyroidism - On levothyroxine.    Hyponatremia : worsening. possibly related to SIADH, nephrology cx appreciated. on 2% NS and fluid restriction. f/u hyponatremia w/u     Abdominal discomfort - resolved. Abdominal examination was unremarkable. On pantoprazole. currently nausea/constipation. passing gas. no hematochezia or melena. on zofran PRN. c/w zofran for now, try reglan if no obstruction.  monitor. abd xray showed no obstruction. nausea could be related to worsening hyponatremia.. CT colonography on jan 2018 reported mobile cecum with risk of volvulous. DC NPO, start clear liquid, advance diet as tolerated. GI eval if persistent dyspepsia. FOBT    BPH with LUTS-  urinary frequency with post-void dribbling,  one episode of fever and mild tachy: h/o BPH and myelopathy s/p spinal surgery/ c/w flomax. bladder scan showed post-void residual initially, no retention after increasing flomax.  UA neg. and UC contamination. repeat UC pending . BC pending. prostate USG noted, urology eval noted    DVT-P: lovenox

## 2018-12-05 DIAGNOSIS — I10 ESSENTIAL (PRIMARY) HYPERTENSION: ICD-10-CM

## 2018-12-05 DIAGNOSIS — M48.061 SPINAL STENOSIS, LUMBAR REGION WITHOUT NEUROGENIC CLAUDICATION: ICD-10-CM

## 2018-12-05 DIAGNOSIS — K59.01 SLOW TRANSIT CONSTIPATION: ICD-10-CM

## 2018-12-05 DIAGNOSIS — R50.9 FEVER, UNSPECIFIED: ICD-10-CM

## 2018-12-05 DIAGNOSIS — E22.2 SYNDROME OF INAPPROPRIATE SECRETION OF ANTIDIURETIC HORMONE: ICD-10-CM

## 2018-12-05 DIAGNOSIS — E03.9 HYPOTHYROIDISM, UNSPECIFIED: ICD-10-CM

## 2018-12-05 LAB
ALBUMIN SERPL ELPH-MCNC: 3.4 G/DL — SIGNIFICANT CHANGE UP (ref 3.3–5.2)
ALP SERPL-CCNC: 67 U/L — SIGNIFICANT CHANGE UP (ref 40–120)
ALT FLD-CCNC: 21 U/L — SIGNIFICANT CHANGE UP
ANION GAP SERPL CALC-SCNC: 11 MMOL/L — SIGNIFICANT CHANGE UP (ref 5–17)
ANION GAP SERPL CALC-SCNC: 9 MMOL/L — SIGNIFICANT CHANGE UP (ref 5–17)
APPEARANCE UR: CLEAR — SIGNIFICANT CHANGE UP
AST SERPL-CCNC: 28 U/L — SIGNIFICANT CHANGE UP
BACTERIA # UR AUTO: ABNORMAL
BILIRUB SERPL-MCNC: 0.9 MG/DL — SIGNIFICANT CHANGE UP (ref 0.4–2)
BILIRUB UR-MCNC: NEGATIVE — SIGNIFICANT CHANGE UP
BUN SERPL-MCNC: 8 MG/DL — SIGNIFICANT CHANGE UP (ref 8–20)
BUN SERPL-MCNC: 9 MG/DL — SIGNIFICANT CHANGE UP (ref 8–20)
CALCIUM SERPL-MCNC: 7.8 MG/DL — LOW (ref 8.6–10.2)
CALCIUM SERPL-MCNC: 7.8 MG/DL — LOW (ref 8.6–10.2)
CHLORIDE SERPL-SCNC: 83 MMOL/L — LOW (ref 98–107)
CHLORIDE SERPL-SCNC: 84 MMOL/L — LOW (ref 98–107)
CO2 SERPL-SCNC: 26 MMOL/L — SIGNIFICANT CHANGE UP (ref 22–29)
CO2 SERPL-SCNC: 26 MMOL/L — SIGNIFICANT CHANGE UP (ref 22–29)
COLOR SPEC: YELLOW — SIGNIFICANT CHANGE UP
CREAT SERPL-MCNC: 0.44 MG/DL — LOW (ref 0.5–1.3)
CREAT SERPL-MCNC: 0.44 MG/DL — LOW (ref 0.5–1.3)
CULTURE RESULTS: SIGNIFICANT CHANGE UP
DIFF PNL FLD: ABNORMAL
EPI CELLS # UR: SIGNIFICANT CHANGE UP
GLUCOSE BLDC GLUCOMTR-MCNC: 128 MG/DL — HIGH (ref 70–99)
GLUCOSE BLDC GLUCOMTR-MCNC: 129 MG/DL — HIGH (ref 70–99)
GLUCOSE BLDC GLUCOMTR-MCNC: 168 MG/DL — HIGH (ref 70–99)
GLUCOSE BLDC GLUCOMTR-MCNC: 189 MG/DL — HIGH (ref 70–99)
GLUCOSE SERPL-MCNC: 116 MG/DL — HIGH (ref 70–115)
GLUCOSE SERPL-MCNC: 176 MG/DL — HIGH (ref 70–115)
GLUCOSE UR QL: NEGATIVE MG/DL — SIGNIFICANT CHANGE UP
KETONES UR-MCNC: ABNORMAL
LEUKOCYTE ESTERASE UR-ACNC: ABNORMAL
NITRITE UR-MCNC: NEGATIVE — SIGNIFICANT CHANGE UP
OB PNL STL: NEGATIVE — SIGNIFICANT CHANGE UP
PH UR: 7 — SIGNIFICANT CHANGE UP (ref 5–8)
POTASSIUM SERPL-MCNC: 3.8 MMOL/L — SIGNIFICANT CHANGE UP (ref 3.5–5.3)
POTASSIUM SERPL-MCNC: 4 MMOL/L — SIGNIFICANT CHANGE UP (ref 3.5–5.3)
POTASSIUM SERPL-SCNC: 3.8 MMOL/L — SIGNIFICANT CHANGE UP (ref 3.5–5.3)
POTASSIUM SERPL-SCNC: 4 MMOL/L — SIGNIFICANT CHANGE UP (ref 3.5–5.3)
PROT SERPL-MCNC: 6.5 G/DL — LOW (ref 6.6–8.7)
PROT UR-MCNC: 30 MG/DL
RBC CASTS # UR COMP ASSIST: SIGNIFICANT CHANGE UP /HPF (ref 0–4)
SODIUM SERPL-SCNC: 118 MMOL/L — CRITICAL LOW (ref 135–145)
SODIUM SERPL-SCNC: 121 MMOL/L — LOW (ref 135–145)
SP GR SPEC: 1.01 — SIGNIFICANT CHANGE UP (ref 1.01–1.02)
SPECIMEN SOURCE: SIGNIFICANT CHANGE UP
UROBILINOGEN FLD QL: 8 MG/DL
WBC UR QL: SIGNIFICANT CHANGE UP

## 2018-12-05 PROCEDURE — 74018 RADEX ABDOMEN 1 VIEW: CPT | Mod: 26

## 2018-12-05 PROCEDURE — 99233 SBSQ HOSP IP/OBS HIGH 50: CPT

## 2018-12-05 PROCEDURE — 71045 X-RAY EXAM CHEST 1 VIEW: CPT | Mod: 26

## 2018-12-05 RX ORDER — DEXTROSE 50 % IN WATER 50 %
25 SYRINGE (ML) INTRAVENOUS ONCE
Refills: 0 | Status: DISCONTINUED | OUTPATIENT
Start: 2018-12-05 | End: 2018-12-10

## 2018-12-05 RX ORDER — INSULIN LISPRO 100/ML
VIAL (ML) SUBCUTANEOUS AT BEDTIME
Refills: 0 | Status: DISCONTINUED | OUTPATIENT
Start: 2018-12-05 | End: 2018-12-10

## 2018-12-05 RX ORDER — GLUCAGON INJECTION, SOLUTION 0.5 MG/.1ML
1 INJECTION, SOLUTION SUBCUTANEOUS ONCE
Refills: 0 | Status: DISCONTINUED | OUTPATIENT
Start: 2018-12-05 | End: 2018-12-10

## 2018-12-05 RX ORDER — DEXTROSE 50 % IN WATER 50 %
12.5 SYRINGE (ML) INTRAVENOUS ONCE
Refills: 0 | Status: DISCONTINUED | OUTPATIENT
Start: 2018-12-05 | End: 2018-12-10

## 2018-12-05 RX ORDER — INSULIN LISPRO 100/ML
VIAL (ML) SUBCUTANEOUS
Refills: 0 | Status: DISCONTINUED | OUTPATIENT
Start: 2018-12-05 | End: 2018-12-10

## 2018-12-05 RX ORDER — SODIUM CHLORIDE 5 G/100ML
1000 INJECTION, SOLUTION INTRAVENOUS
Refills: 0 | Status: DISCONTINUED | OUTPATIENT
Start: 2018-12-05 | End: 2018-12-06

## 2018-12-05 RX ORDER — SODIUM CHLORIDE 9 MG/ML
1000 INJECTION, SOLUTION INTRAVENOUS
Refills: 0 | Status: DISCONTINUED | OUTPATIENT
Start: 2018-12-05 | End: 2018-12-10

## 2018-12-05 RX ORDER — DEXTROSE 50 % IN WATER 50 %
15 SYRINGE (ML) INTRAVENOUS ONCE
Refills: 0 | Status: DISCONTINUED | OUTPATIENT
Start: 2018-12-05 | End: 2018-12-10

## 2018-12-05 RX ADMIN — PANTOPRAZOLE SODIUM 40 MILLIGRAM(S): 20 TABLET, DELAYED RELEASE ORAL at 06:53

## 2018-12-05 RX ADMIN — METHOCARBAMOL 500 MILLIGRAM(S): 500 TABLET, FILM COATED ORAL at 13:54

## 2018-12-05 RX ADMIN — Medication 100 MILLIGRAM(S): at 23:26

## 2018-12-05 RX ADMIN — Medication 10 MILLIGRAM(S): at 09:13

## 2018-12-05 RX ADMIN — GABAPENTIN 300 MILLIGRAM(S): 400 CAPSULE ORAL at 13:54

## 2018-12-05 RX ADMIN — ENOXAPARIN SODIUM 40 MILLIGRAM(S): 100 INJECTION SUBCUTANEOUS at 06:51

## 2018-12-05 RX ADMIN — MAGNESIUM HYDROXIDE 30 MILLILITER(S): 400 TABLET, CHEWABLE ORAL at 07:39

## 2018-12-05 RX ADMIN — Medication 100 MILLIGRAM(S): at 06:51

## 2018-12-05 RX ADMIN — Medication 25 MILLIGRAM(S): at 06:51

## 2018-12-05 RX ADMIN — Medication 2: at 13:43

## 2018-12-05 RX ADMIN — GABAPENTIN 300 MILLIGRAM(S): 400 CAPSULE ORAL at 23:26

## 2018-12-05 RX ADMIN — Medication 137 MICROGRAM(S): at 06:51

## 2018-12-05 RX ADMIN — POLYETHYLENE GLYCOL 3350 17 GRAM(S): 17 POWDER, FOR SOLUTION ORAL at 13:44

## 2018-12-05 RX ADMIN — Medication 100 MILLIGRAM(S): at 13:44

## 2018-12-05 RX ADMIN — SENNA PLUS 2 TABLET(S): 8.6 TABLET ORAL at 23:26

## 2018-12-05 RX ADMIN — TAMSULOSIN HYDROCHLORIDE 0.8 MILLIGRAM(S): 0.4 CAPSULE ORAL at 23:26

## 2018-12-05 RX ADMIN — GABAPENTIN 300 MILLIGRAM(S): 400 CAPSULE ORAL at 06:51

## 2018-12-05 RX ADMIN — LISINOPRIL 20 MILLIGRAM(S): 2.5 TABLET ORAL at 06:51

## 2018-12-05 NOTE — PROGRESS NOTE ADULT - SUBJECTIVE AND OBJECTIVE BOX
[facility] DIVISION OF KIDNEY DISEASES AND HYPERTENSION -- FOLLOW UP NOTE  --------------------------------------------------------------------------------  Chief Complaint: hyponatremia    24 hour events/subjective:  Pt seen and examined this AM  Worsening hyponatremia  Off fluids this AM; had gotten 6 hrs 2% yesterday      PAST HISTORY  --------------------------------------------------------------------------------  No significant changes to PMH, PSH, FHx, SHx, unless otherwise noted    ALLERGIES & MEDICATIONS  --------------------------------------------------------------------------------  Allergies    No Known Allergies    Intolerances      Standing Inpatient Medications  dextrose 5%. 1000 milliLiter(s) IV Continuous <Continuous>  dextrose 50% Injectable 12.5 Gram(s) IV Push once  dextrose 50% Injectable 25 Gram(s) IV Push once  dextrose 50% Injectable 25 Gram(s) IV Push once  docusate sodium 100 milliGRAM(s) Oral three times a day  enoxaparin Injectable 40 milliGRAM(s) SubCutaneous every 24 hours  gabapentin 300 milliGRAM(s) Oral every 8 hours  insulin lispro (HumaLOG) corrective regimen sliding scale   SubCutaneous three times a day before meals  insulin lispro (HumaLOG) corrective regimen sliding scale   SubCutaneous at bedtime  levothyroxine 137 MICROGram(s) Oral daily  lisinopril 20 milliGRAM(s) Oral daily  metoprolol succinate ER 25 milliGRAM(s) Oral daily  polyethylene glycol 3350 17 Gram(s) Oral daily  senna 2 Tablet(s) Oral at bedtime  sodium chloride 2% . 1000 milliLiter(s) IV Continuous <Continuous>  tamsulosin 0.8 milliGRAM(s) Oral at bedtime    PRN Inpatient Medications  acetaminophen   Tablet .. 650 milliGRAM(s) Oral every 6 hours PRN  aluminum hydroxide/magnesium hydroxide/simethicone Suspension 30 milliLiter(s) Oral every 12 hours PRN  bisacodyl Suppository 10 milliGRAM(s) Rectal daily PRN  dextrose 40% Gel 15 Gram(s) Oral once PRN  glucagon  Injectable 1 milliGRAM(s) IntraMuscular once PRN  magnesium hydroxide Suspension 30 milliLiter(s) Oral every 12 hours PRN  methocarbamol 500 milliGRAM(s) Oral every 6 hours PRN  naloxone Injectable 0.1 milliGRAM(s) IV Push every 3 minutes PRN  ondansetron Injectable 4 milliGRAM(s) IV Push every 6 hours PRN  oxyCODONE    IR 10 milliGRAM(s) Oral every 4 hours PRN      REVIEW OF SYSTEMS  --------------------------------------------------------------------------------  Gen: No weight changes, fatigue, fevers/chills, weakness  Skin: No rashes  Head/Eyes/Ears/Mouth: No headache; Normal hearing; Normal vision w/o blurriness; No sinus pain/discomfort, sore throat  Respiratory: No dyspnea, cough, wheezing, hemoptysis  CV: No chest pain, PND, orthopnea  GI: No abdominal pain, diarrhea, constipation, nausea, vomiting, melena, hematochezia  : No increased frequency, dysuria, hematuria, nocturia  MSK: No joint pain/swelling; no back pain; no edema  Neuro: No dizziness/lightheadedness, weakness, seizures, numbness, tingling  Heme: No easy bruising or bleeding  Endo: No heat/cold intolerance  Psych: No significant nervousness, anxiety, stress, depression    All other systems were reviewed and are negative, except as noted.    VITALS/PHYSICAL EXAM  --------------------------------------------------------------------------------  T(C): 37.1 (12-05-18 @ 07:55), Max: 37.8 (12-04-18 @ 23:20)  HR: 94 (12-05-18 @ 07:55) (93 - 94)  BP: 142/71 (12-05-18 @ 07:55) (121/68 - 142/71)  RR: 20 (12-05-18 @ 07:55) (20 - 20)  SpO2: 98% (12-05-18 @ 07:55) (98% - 99%)  Wt(kg): --        12-05-18 @ 07:01  -  12-05-18 @ 14:23  --------------------------------------------------------  IN: 575 mL / OUT: 0 mL / NET: 575 mL      Physical Exam:  	Gen: NAD, well-appearing  	HEENT: PERRL, supple neck, clear oropharynx  	Pulm: CTA B/L  	CV: RRR, S1S2; no rub  	Back: No spinal or CVA tenderness; no sacral edema  	Abd: +BS, soft, nontender/nondistended  	: No suprapubic tenderness  	UE: Warm, FROM, no clubbing, intact strength; no edema; no asterixis  	LE: Warm, FROM, no clubbing, intact strength; no edema  	Neuro: No focal deficits, intact gait  	Psych: Normal affect and mood  	Skin: Warm, without rashes  	Vascular access:    LABS/STUDIES  --------------------------------------------------------------------------------              9.2    9.9   >-----------<  301      [12-04-18 @ 09:15]              27.2     121  |  84  |  9.0  ----------------------------<  176      [12-05-18 @ 11:39]  4.0   |  26.0  |  0.44        Ca     7.8     [12-05-18 @ 11:39]    TPro  6.5  /  Alb  3.4  /  TBili  0.9  /  DBili  x   /  AST  28  /  ALT  21  /  AlkPhos  67  [12-05-18 @ 07:08]        Uric acid 1.5      [12-04-18 @ 16:12]  Serum Osmolality 254      [12-04-18 @ 16:12]    Creatinine Trend:  SCr 0.44 [12-05 @ 11:39]  SCr 0.44 [12-05 @ 07:08]  SCr 0.43 [12-04 @ 09:15]  SCr 0.44 [12-03 @ 05:47]  SCr 0.56 [12-02 @ 07:07]    Urinalysis - [12-02-18 @ 19:57]      Color Yellow / Appearance Clear / SG 1.015 / pH 8.0      Gluc 50 / Ketone Negative  / Bili Negative / Urobili 1       Blood Negative / Protein Negative / Leuk Est Negative / Nitrite Negative      RBC  / WBC  / Hyaline  / Gran  / Sq Epi  / Non Sq Epi Occasional / Bacteria     Urine Creatinine 51      [12-04-18 @ 15:12]  Urine Sodium 232      [12-04-18 @ 15:12]  Urine Osmolality 613      [12-04-18 @ 15:12]    HbA1c 7.5      [11-28-18 @ 06:08]      BELLA: titer 1:80, pattern Homogeneous      [11-27-18 @ 22:20]  ANCA: cANCA Negative, pANCA Negative, atypical ANCA Negative      [11-29-18 @ 23:33]  Syphilis Screen (Treponema Pallidum Ab) Negative      [11-27-18 @ 22:20]  Immunofixation Serum:   No Monoclonal Band Identified      [11-27-18 @ 22:20]

## 2018-12-05 NOTE — PROGRESS NOTE ADULT - ASSESSMENT
73 year old male back pain, status post spinal surgery, constipation, SIADH with hyponatremia, DM, hypothyroid, Htn. 12/5 patient sodium continue to be very low. I spoke with nephrology reagarding treatment.

## 2018-12-05 NOTE — PROGRESS NOTE ADULT - ATTENDING COMMENTS
Patient was evaluated Yunior dressings in place some saturation on the proximal aspect of the picot dressing. I do think this postop seroma should be treated conservatively at this point in time his wound dehiscence worsens with his no resolution of the seroma we will consider up bringing him back for a cleanout/washout of his postop seroma.

## 2018-12-05 NOTE — PROGRESS NOTE ADULT - PROBLEM SELECTOR PLAN 2
Monitor sodium closely, Add 2% saline as per nephro on 12/5. Tight fluid restriction. No symptoms on 12/5.

## 2018-12-05 NOTE — PROGRESS NOTE ADULT - SUBJECTIVE AND OBJECTIVE BOX
CANDIDOTOYA LOUIS     Chief Complaint: Patient is a 73y old  Male who presents with a chief complaint of Myelopathy (04 Dec 2018 13:33)      PAST MEDICAL & SURGICAL HISTORY:  BPH (benign prostatic hyperplasia)  HTN (hypertension)  Hypothyroid  Dyslipidemia  OA (osteoarthritis)  Diabetes  S/P laminectomy  S/P hernia repair  S/P hip replacement: R      HPI/OVERNIGHT EVENTS: Patient with constipation and worsening hyponatremia. He has some nausea and low grade fever.    MEDICATIONS  (STANDING):  dextrose 5%. 1000 milliLiter(s) (50 mL/Hr) IV Continuous <Continuous>  dextrose 50% Injectable 12.5 Gram(s) IV Push once  dextrose 50% Injectable 25 Gram(s) IV Push once  dextrose 50% Injectable 25 Gram(s) IV Push once  docusate sodium 100 milliGRAM(s) Oral three times a day  enoxaparin Injectable 40 milliGRAM(s) SubCutaneous every 24 hours  gabapentin 300 milliGRAM(s) Oral every 8 hours  insulin lispro (HumaLOG) corrective regimen sliding scale   SubCutaneous three times a day before meals  insulin lispro (HumaLOG) corrective regimen sliding scale   SubCutaneous at bedtime  levothyroxine 137 MICROGram(s) Oral daily  lisinopril 20 milliGRAM(s) Oral daily  metoprolol succinate ER 25 milliGRAM(s) Oral daily  polyethylene glycol 3350 17 Gram(s) Oral daily  senna 2 Tablet(s) Oral at bedtime  sodium chloride 2% . 1000 milliLiter(s) (75 mL/Hr) IV Continuous <Continuous>  tamsulosin 0.8 milliGRAM(s) Oral at bedtime      Vital Signs Last 24 Hrs  T(C): 37.1 (05 Dec 2018 07:55), Max: 37.8 (04 Dec 2018 23:20)  T(F): 98.8 (05 Dec 2018 07:55), Max: 100.1 (04 Dec 2018 23:20)  HR: 94 (05 Dec 2018 07:55) (93 - 94)  BP: 142/71 (05 Dec 2018 07:55) (121/68 - 142/71)  BP(mean): --  RR: 20 (05 Dec 2018 07:55) (20 - 20)  SpO2: 98% (05 Dec 2018 07:55) (98% - 99%)    PHYSICAL EXAM:  HEENT: PERRLA, EOMI, Normal Hearing  Neck: No LAD, No JVD  Back: No CVA tenderness  Respiratory: CTAB Cardiovascular: S1 and S2, RRR, no M/G/R  Gastrointestinal: BS+, soft, NT/ND  Extremities: No peripheral edema  Vascular: 2+ peripheral pulses  Neurological: A/O x 3, no focal deficits  Wound on his back        CAPILLARY BLOOD GLUCOSE    LABS:                        9.2    9.9   )-----------( 301      ( 04 Dec 2018 09:15 )             27.2     12-05    121<L>  |  84<L>  |  9.0  ----------------------------<  176<H>  4.0   |  26.0  |  0.44<L>    Ca    7.8<L>      05 Dec 2018 11:39    TPro  6.5<L>  /  Alb  3.4  /  TBili  0.9  /  DBili  x   /  AST  28  /  ALT  21  /  AlkPhos  67  12-05          RADIOLOGY & ADDITIONAL TESTS:

## 2018-12-05 NOTE — PROGRESS NOTE ADULT - SUBJECTIVE AND OBJECTIVE BOX
Ortho Post Op Check    Name: TIMUR LOUIS    MR #: [id number]    Procedure:T11 & T10 laminectomy/T10 through L1 posterior instrumented and posterolateral fusion for thoracic stenosis with myelopathy  Surgeon: Dr Gray      PAST MEDICAL & SURGICAL HISTORY:  BPH (benign prostatic hyperplasia)  HTN (hypertension)  Hypothyroid  Dyslipidemia  OA (osteoarthritis)  Diabetes  S/P laminectomy  S/P hernia repair  S/P hip replacement: R      Pt laying on his side. States he is still nauseous.    Denies CP, SOB.     General Exam:  Vital Signs Last 24 Hrs  T(C): 37.1 (12-05-18 @ 07:55), Max: 37.8 (12-05-18 @ 04:45)  T(F): 98.8 (12-05-18 @ 07:55), Max: 100 (12-05-18 @ 04:45)  HR: 94 (12-05-18 @ 07:55) (94 - 94)  BP: 142/71 (12-05-18 @ 07:55) (122/64 - 142/71)  BP(mean): --  RR: 20 (12-05-18 @ 07:55) (20 - 20)  SpO2: 98% (12-05-18 @ 07:55) (98% - 99%)    General: Pt Alert and oriented, NAD, controlled pain.  back dressing saturated with serosanguinous drainage. New RICH dressing applied as per Dr Gray.   Pulses: 1+ dorsalis pedis pulse. Cap refill < 2 sec.  Sensation: Grossly intact to light touch without deficit.  Motor: + 4/5                          9.2    9.9   )-----------( 301      ( 04 Dec 2018 09:15 )             27.2   05 Dec 2018 07:08    118    |  83     |  8.0    ----------------------------<  116    3.8     |  26.0   |  0.44     Ca    7.8        05 Dec 2018 07:08    TPro  6.5    /  Alb  3.4    /  TBili  0.9    /  DBili  x      /  AST  28     /  ALT  21     /  AlkPhos  67     05 Dec 2018 07:08    MEDICATIONS  (STANDING):  dextrose 5%. 1000 milliLiter(s) (50 mL/Hr) IV Continuous <Continuous>  dextrose 50% Injectable 12.5 Gram(s) IV Push once  dextrose 50% Injectable 25 Gram(s) IV Push once  dextrose 50% Injectable 25 Gram(s) IV Push once  docusate sodium 100 milliGRAM(s) Oral three times a day  enoxaparin Injectable 40 milliGRAM(s) SubCutaneous every 24 hours  gabapentin 300 milliGRAM(s) Oral every 8 hours  insulin lispro (HumaLOG) corrective regimen sliding scale   SubCutaneous three times a day before meals  insulin lispro (HumaLOG) corrective regimen sliding scale   SubCutaneous at bedtime  levothyroxine 137 MICROGram(s) Oral daily  lisinopril 20 milliGRAM(s) Oral daily  metoprolol succinate ER 25 milliGRAM(s) Oral daily  polyethylene glycol 3350 17 Gram(s) Oral daily  senna 2 Tablet(s) Oral at bedtime  tamsulosin 0.8 milliGRAM(s) Oral at bedtime    MEDICATIONS  (PRN):  acetaminophen   Tablet .. 650 milliGRAM(s) Oral every 6 hours PRN Temp greater or equal to 38C (100.4F), Mild Pain (1 - 3)  aluminum hydroxide/magnesium hydroxide/simethicone Suspension 30 milliLiter(s) Oral every 12 hours PRN Indigestion  bisacodyl Suppository 10 milliGRAM(s) Rectal daily PRN Constipation  dextrose 40% Gel 15 Gram(s) Oral once PRN Blood Glucose LESS THAN 70 milliGRAM(s)/deciliter  glucagon  Injectable 1 milliGRAM(s) IntraMuscular once PRN Glucose LESS THAN 70 milligrams/deciliter  magnesium hydroxide Suspension 30 milliLiter(s) Oral every 12 hours PRN Constipation  methocarbamol 500 milliGRAM(s) Oral every 6 hours PRN Back Spasms  naloxone Injectable 0.1 milliGRAM(s) IV Push every 3 minutes PRN For ANY of the following changes in patient status:  A. RR LESS THAN 10 breaths per minute, B. Oxygen saturation LESS THAN 90%, C. Sedation score of 6  ondansetron Injectable 4 milliGRAM(s) IV Push every 6 hours PRN Nausea  oxyCODONE    IR 10 milliGRAM(s) Oral every 4 hours PRN Severe Pain (7 - 10)      A/P: 73yMale POD#8 s/p T11 & T10 laminectomy/T10 through L1 posterior instrumented and posterolateral fusion for thoracic stenosis with myelopathy  - Stable  - Pain Control  - DVT ppx: Lovenox   - PT eval pending  - Weight bearing status: WBAT with brace  - Medical team aware and treating Na+  - Will monitor

## 2018-12-05 NOTE — PROGRESS NOTE ADULT - ASSESSMENT
DX : SIADH,    Anemia,    Urine Indices.    2 % saline, Oral Fluid Restriction, reinstated 2% saline @ 75cc/hr for 24 hrs    Correct SNa+ 4-6 Meq., in 24 Hours,    dw Dr Barrett

## 2018-12-05 NOTE — PROGRESS NOTE ADULT - SUBJECTIVE AND OBJECTIVE BOX
Event note: Spine dressing/RICH saturated. No active bleeding. Incision wiped down. Removed and replaced with sterile betadine soaked gauze wet to dry dressing and tegaderms. Will recheck in 2-3 hours.

## 2018-12-06 LAB
24R-OH-CALCIDIOL SERPL-MCNC: 17.8 NG/ML — LOW (ref 30–80)
ANION GAP SERPL CALC-SCNC: 12 MMOL/L — SIGNIFICANT CHANGE UP (ref 5–17)
BUN SERPL-MCNC: 9 MG/DL — SIGNIFICANT CHANGE UP (ref 8–20)
CA-I BLD-SCNC: 1.03 MMOL/L — LOW (ref 1.12–1.3)
CALCIUM SERPL-MCNC: 7.9 MG/DL — LOW (ref 8.6–10.2)
CHLORIDE SERPL-SCNC: 85 MMOL/L — LOW (ref 98–107)
CO2 SERPL-SCNC: 26 MMOL/L — SIGNIFICANT CHANGE UP (ref 22–29)
CORTIS AM PEAK SERPL-MCNC: 16.4 UG/DL — SIGNIFICANT CHANGE UP (ref 6–18.4)
CREAT SERPL-MCNC: 0.58 MG/DL — SIGNIFICANT CHANGE UP (ref 0.5–1.3)
GLUCOSE BLDC GLUCOMTR-MCNC: 133 MG/DL — HIGH (ref 70–99)
GLUCOSE BLDC GLUCOMTR-MCNC: 139 MG/DL — HIGH (ref 70–99)
GLUCOSE BLDC GLUCOMTR-MCNC: 186 MG/DL — HIGH (ref 70–99)
GLUCOSE BLDC GLUCOMTR-MCNC: 189 MG/DL — HIGH (ref 70–99)
GLUCOSE SERPL-MCNC: 133 MG/DL — HIGH (ref 70–115)
HCT VFR BLD CALC: 27.3 % — LOW (ref 42–52)
HGB BLD-MCNC: 9.1 G/DL — LOW (ref 14–18)
MAGNESIUM SERPL-MCNC: 1.8 MG/DL — SIGNIFICANT CHANGE UP (ref 1.6–2.6)
MCHC RBC-ENTMCNC: 27.6 PG — SIGNIFICANT CHANGE UP (ref 27–31)
MCHC RBC-ENTMCNC: 33.3 G/DL — SIGNIFICANT CHANGE UP (ref 32–36)
MCV RBC AUTO: 82.7 FL — SIGNIFICANT CHANGE UP (ref 80–94)
PHOSPHATE SERPL-MCNC: 2.9 MG/DL — SIGNIFICANT CHANGE UP (ref 2.4–4.7)
PLATELET # BLD AUTO: 345 K/UL — SIGNIFICANT CHANGE UP (ref 150–400)
POTASSIUM SERPL-MCNC: 4.4 MMOL/L — SIGNIFICANT CHANGE UP (ref 3.5–5.3)
POTASSIUM SERPL-SCNC: 4.4 MMOL/L — SIGNIFICANT CHANGE UP (ref 3.5–5.3)
RBC # BLD: 3.3 M/UL — LOW (ref 4.6–6.2)
RBC # FLD: 13.9 % — SIGNIFICANT CHANGE UP (ref 11–15.6)
SODIUM SERPL-SCNC: 123 MMOL/L — LOW (ref 135–145)
TSH SERPL-MCNC: 16.19 UIU/ML — HIGH (ref 0.27–4.2)
WBC # BLD: 8.3 K/UL — SIGNIFICANT CHANGE UP (ref 4.8–10.8)
WBC # FLD AUTO: 8.3 K/UL — SIGNIFICANT CHANGE UP (ref 4.8–10.8)

## 2018-12-06 PROCEDURE — 99233 SBSQ HOSP IP/OBS HIGH 50: CPT

## 2018-12-06 PROCEDURE — 99232 SBSQ HOSP IP/OBS MODERATE 35: CPT

## 2018-12-06 RX ORDER — FUROSEMIDE 40 MG
20 TABLET ORAL ONCE
Refills: 0 | Status: COMPLETED | OUTPATIENT
Start: 2018-12-06 | End: 2018-12-06

## 2018-12-06 RX ORDER — SODIUM CHLORIDE 5 G/100ML
1000 INJECTION, SOLUTION INTRAVENOUS
Refills: 0 | Status: DISCONTINUED | OUTPATIENT
Start: 2018-12-06 | End: 2018-12-06

## 2018-12-06 RX ORDER — LEVOTHYROXINE SODIUM 125 MCG
150 TABLET ORAL DAILY
Refills: 0 | Status: DISCONTINUED | OUTPATIENT
Start: 2018-12-06 | End: 2018-12-10

## 2018-12-06 RX ADMIN — Medication 100 MILLIGRAM(S): at 06:06

## 2018-12-06 RX ADMIN — GABAPENTIN 300 MILLIGRAM(S): 400 CAPSULE ORAL at 21:15

## 2018-12-06 RX ADMIN — GABAPENTIN 300 MILLIGRAM(S): 400 CAPSULE ORAL at 13:00

## 2018-12-06 RX ADMIN — TAMSULOSIN HYDROCHLORIDE 0.8 MILLIGRAM(S): 0.4 CAPSULE ORAL at 21:15

## 2018-12-06 RX ADMIN — GABAPENTIN 300 MILLIGRAM(S): 400 CAPSULE ORAL at 06:06

## 2018-12-06 RX ADMIN — Medication 2: at 13:02

## 2018-12-06 RX ADMIN — Medication 100 MILLIGRAM(S): at 21:15

## 2018-12-06 RX ADMIN — LISINOPRIL 20 MILLIGRAM(S): 2.5 TABLET ORAL at 06:06

## 2018-12-06 RX ADMIN — Medication 100 MILLIGRAM(S): at 13:00

## 2018-12-06 RX ADMIN — POLYETHYLENE GLYCOL 3350 17 GRAM(S): 17 POWDER, FOR SOLUTION ORAL at 13:00

## 2018-12-06 RX ADMIN — SENNA PLUS 2 TABLET(S): 8.6 TABLET ORAL at 21:15

## 2018-12-06 RX ADMIN — Medication 2: at 17:22

## 2018-12-06 RX ADMIN — ENOXAPARIN SODIUM 40 MILLIGRAM(S): 100 INJECTION SUBCUTANEOUS at 06:06

## 2018-12-06 RX ADMIN — Medication 20 MILLIGRAM(S): at 13:00

## 2018-12-06 RX ADMIN — Medication 137 MICROGRAM(S): at 06:06

## 2018-12-06 RX ADMIN — METHOCARBAMOL 500 MILLIGRAM(S): 500 TABLET, FILM COATED ORAL at 12:59

## 2018-12-06 RX ADMIN — Medication 25 MILLIGRAM(S): at 06:06

## 2018-12-06 NOTE — PROGRESS NOTE ADULT - PROBLEM SELECTOR PLAN 5
Suppository and enema given on 12/5 without bowel movement. Abdominal x-ray ordered. Patient had a bowel movement.

## 2018-12-06 NOTE — PROGRESS NOTE ADULT - SUBJECTIVE AND OBJECTIVE BOX
CANDIDOAAMIRKEVIN HERIBERTO     Chief Complaint: Patient is a 73y old  Male hyponatremia      PAST MEDICAL & SURGICAL HISTORY:  BPH (benign prostatic hyperplasia)  HTN (hypertension)  Hypothyroid  Dyslipidemia  OA (osteoarthritis)  Diabetes  S/P laminectomy  S/P hernia repair  S/P hip replacement: R      HPI/OVERNIGHT EVENTS: Patient better today. Sodium is improving, hypothyroidism. He had a bowel movement yesterday.    MEDICATIONS  (STANDING):  dextrose 5%. 1000 milliLiter(s) (50 mL/Hr) IV Continuous <Continuous>  dextrose 50% Injectable 12.5 Gram(s) IV Push once  dextrose 50% Injectable 25 Gram(s) IV Push once  dextrose 50% Injectable 25 Gram(s) IV Push once  docusate sodium 100 milliGRAM(s) Oral three times a day  enoxaparin Injectable 40 milliGRAM(s) SubCutaneous every 24 hours  furosemide   Injectable 20 milliGRAM(s) IV Push once  gabapentin 300 milliGRAM(s) Oral every 8 hours  insulin lispro (HumaLOG) corrective regimen sliding scale   SubCutaneous three times a day before meals  insulin lispro (HumaLOG) corrective regimen sliding scale   SubCutaneous at bedtime  levothyroxine 150 MICROGram(s) Oral daily  lisinopril 20 milliGRAM(s) Oral daily  metoprolol succinate ER 25 milliGRAM(s) Oral daily  polyethylene glycol 3350 17 Gram(s) Oral daily  senna 2 Tablet(s) Oral at bedtime  tamsulosin 0.8 milliGRAM(s) Oral at bedtime      Vital Signs Last 24 Hrs  T(C): 36.8 (06 Dec 2018 08:15), Max: 37.3 (06 Dec 2018 04:25)  T(F): 98.3 (06 Dec 2018 08:15), Max: 99.2 (06 Dec 2018 04:25)  HR: 53 (06 Dec 2018 08:15) (53 - 95)  BP: 146/77 (06 Dec 2018 08:15) (110/59 - 155/86)  BP(mean): --  RR: 18 (06 Dec 2018 08:15) (18 - 18)  SpO2: 96% (06 Dec 2018 08:15) (96% - 99%)    PHYSICAL EXAM:  HEENT: PERRLA, EOMI, Normal Hearing  Neck: No LAD, No JVD  Back: No CVA tenderness  Respiratory: CTAB Cardiovascular: S1 and S2, RRR, no M/G/R  Gastrointestinal: BS+, soft, NT/ND  Extremities: No peripheral edema  Vascular: 2+ peripheral pulses  Neurological: A/O x 3 unable to ambulate        CAPILLARY BLOOD GLUCOSE    LABS:                        9.1    8.3   )-----------( 345      ( 06 Dec 2018 07:59 )             27.3     12-06    123<L>  |  85<L>  |  9.0  ----------------------------<  133<H>  4.4   |  26.0  |  0.58    Ca    7.9<L>      06 Dec 2018 07:59  Phos  2.9     12-06  Mg     1.8     12-06    TPro  6.5<L>  /  Alb  3.4  /  TBili  0.9  /  DBili  x   /  AST  28  /  ALT  21  /  AlkPhos  67  12-05      Urinalysis Basic - ( 05 Dec 2018 18:28 )    Color: Yellow / Appearance: Clear / S.010 / pH: x  Gluc: x / Ketone: Small  / Bili: Negative / Urobili: 8 mg/dL   Blood: x / Protein: 30 mg/dL / Nitrite: Negative   Leuk Esterase: Small / RBC: 0-2 /HPF / WBC 3-5   Sq Epi: x / Non Sq Epi: Occasional / Bacteria: Few        RADIOLOGY & ADDITIONAL TESTS:

## 2018-12-06 NOTE — PROGRESS NOTE ADULT - PROBLEM SELECTOR PLAN 1
Status post surgery. 12/5/2018 Surgery applied special dressing to wound.  12/6 I spoke to surgery who continues to monitor the wound.

## 2018-12-06 NOTE — PROGRESS NOTE ADULT - ASSESSMENT
73 year old male back pain, status post spinal surgery, constipation, SIADH with hyponatremia, DM, hypothyroid, Htn. 12/5 patient sodium continue to be very low. I spoke with nephrology regarding treatment.  12/6 Patient is better. Sodium improving. TSH is elevated and he had a bowel movement.

## 2018-12-06 NOTE — PROGRESS NOTE ADULT - SUBJECTIVE AND OBJECTIVE BOX
Ortho Post Op Check    Name: TIMUR LOUIS    MR #: [id number]    Procedure: T11 & T10 laminectomy/T10 through L1 posterior instrumented and posterolateral fusion for thoracic stenosis with myelopathy  Surgeon: Dr Gray      Pt laying in bed sleeping. Complaining of urine leakage when he coughs or sneezes  Denies CP, SOB, N/V, numbness/tingling     General Exam:  Vital Signs Last 24 Hrs  T(C): 36.8 (12-06-18 @ 08:15), Max: 37.3 (12-06-18 @ 04:25)  T(F): 98.3 (12-06-18 @ 08:15), Max: 99.2 (12-06-18 @ 04:25)  HR: 53 (12-06-18 @ 08:15) (53 - 94)  BP: 146/77 (12-06-18 @ 08:15) (110/59 - 146/77)  BP(mean): --  RR: 18 (12-06-18 @ 08:15) (18 - 18)  SpO2: 96% (12-06-18 @ 08:15) (96% - 96%)    General: Pt Alert and oriented, NAD, controlled pain.  Back dressing have mild amount serous drainage noted. No active bleeding noted  New damp to dry dressing using betadine applied as per Dr Gray.   Pulses: 1+ dorsalis pedis pulse. Cap refill < 2 sec.  Sensation: Grossly intact to light touch without deficit.  Motor 4/5                        9.2    9.9   )-----------( 301      ( 04 Dec 2018 09:15 )             27.2   05 Dec 2018 11:39    121    |  84     |  9.0    ----------------------------<  176    4.0     |  26.0   |  0.44       TPro  6.5    /  Alb  3.4    /  TBili  0.9    /  DBili  x      /  AST  28     /  ALT  21     /  AlkPhos  67     05 Dec 2018 07:08      MEDICATIONS  (STANDING):  dextrose 5%. 1000 milliLiter(s) (50 mL/Hr) IV Continuous <Continuous>  dextrose 50% Injectable 12.5 Gram(s) IV Push once  dextrose 50% Injectable 25 Gram(s) IV Push once  dextrose 50% Injectable 25 Gram(s) IV Push once  docusate sodium 100 milliGRAM(s) Oral three times a day  enoxaparin Injectable 40 milliGRAM(s) SubCutaneous every 24 hours  gabapentin 300 milliGRAM(s) Oral every 8 hours  insulin lispro (HumaLOG) corrective regimen sliding scale   SubCutaneous three times a day before meals  insulin lispro (HumaLOG) corrective regimen sliding scale   SubCutaneous at bedtime  levothyroxine 137 MICROGram(s) Oral daily  lisinopril 20 milliGRAM(s) Oral daily  metoprolol succinate ER 25 milliGRAM(s) Oral daily  polyethylene glycol 3350 17 Gram(s) Oral daily  senna 2 Tablet(s) Oral at bedtime  sodium chloride 2% . 1000 milliLiter(s) (75 mL/Hr) IV Continuous <Continuous>  tamsulosin 0.8 milliGRAM(s) Oral at bedtime    MEDICATIONS  (PRN):  acetaminophen   Tablet .. 650 milliGRAM(s) Oral every 6 hours PRN Temp greater or equal to 38C (100.4F), Mild Pain (1 - 3)  aluminum hydroxide/magnesium hydroxide/simethicone Suspension 30 milliLiter(s) Oral every 12 hours PRN Indigestion  bisacodyl Suppository 10 milliGRAM(s) Rectal daily PRN Constipation  dextrose 40% Gel 15 Gram(s) Oral once PRN Blood Glucose LESS THAN 70 milliGRAM(s)/deciliter  glucagon  Injectable 1 milliGRAM(s) IntraMuscular once PRN Glucose LESS THAN 70 milligrams/deciliter  magnesium hydroxide Suspension 30 milliLiter(s) Oral every 12 hours PRN Constipation  methocarbamol 500 milliGRAM(s) Oral every 6 hours PRN Back Spasms  naloxone Injectable 0.1 milliGRAM(s) IV Push every 3 minutes PRN For ANY of the following changes in patient status:  A. RR LESS THAN 10 breaths per minute, B. Oxygen saturation LESS THAN 90%, C. Sedation score of 6  ondansetron Injectable 4 milliGRAM(s) IV Push every 6 hours PRN Nausea      A/P: 73yMale POD#8 s/p T11 & T10 laminectomy/T10 through L1 posterior instrumented and posterolateral fusion for thoracic stenosis with myelopathy  - Stable  - Pain Control  - DVT ppx: Lovenox  - Weight bearing status: WBAT with walker and LSO  - Will monitor dressings for drainage

## 2018-12-06 NOTE — PROGRESS NOTE ADULT - SUBJECTIVE AND OBJECTIVE BOX
PA - Note    Patient's Lumbar Dressing was checked for drainage.  Dressing intact, no signs of active bleeding or drainage at the present time.  No Drainage noted on bed sheets.  Nurse at bedside when dressing was checked.  Will recheck dressing in AM

## 2018-12-06 NOTE — PROGRESS NOTE ADULT - SUBJECTIVE AND OBJECTIVE BOX
Patient seen and examined    I&O's Summary    05 Dec 2018 07:01  -  06 Dec 2018 07:00  --------------------------------------------------------  IN: 875 mL / OUT: 250 mL / NET: 625 mL    06 Dec 2018 07:01  -  06 Dec 2018 11:48  --------------------------------------------------------  IN: 200 mL / OUT: 0 mL / NET: 200 mL    REVIEW OF SYSTEMS:    CONSTITUTIONAL: No F/C  RESPIRATORY: No cough or SOB  CARDIOVASCULAR: No CP/palpitations,    GASTROINTESTINAL: No abdominal pain , NVD   GENITOURINARY: No UTI sx  NEUROLOGICAL: No headaches/wk/numbness  MUSCULOSKELETAL:  No joint pain/swelling; No LBP  EXTREMITIES : no swelling,    Vital Signs Last 24 Hrs  T(C): 36.8 (06 Dec 2018 08:15), Max: 37.3 (06 Dec 2018 04:25)  T(F): 98.3 (06 Dec 2018 08:15), Max: 99.2 (06 Dec 2018 04:25)  HR: 53 (06 Dec 2018 08:15) (53 - 95)  BP: 146/77 (06 Dec 2018 08:15) (110/59 - 155/86)  BP(mean): --  RR: 18 (06 Dec 2018 08:15) (18 - 18)  SpO2: 96% (06 Dec 2018 08:15) (96% - 99%)    PHYSICAL EXAM:    GENERAL: NAD, pale,  EYES:  conjunctiva and sclera clear  NECK: Supple, No JVD/Bruit  NERVOUS SYSTEM:  Lethargic,  CHEST:  CTA ,No rales or rhonchi  HEART:  RRR, No murmurs  ABDOMEN: Soft, NT/ND BS+  EXTREMITIES:  No Edema;  SKIN: No rashes    LABS:                        9.1    8.3   )-----------( 345      ( 06 Dec 2018 07:59 )             27.3     12-06    123<L>  |  85<L>  |  9.0  ----------------------------<  133<H>  4.4   |  26.0  |  0.58    Ca    7.9<L>      06 Dec 2018 07:59  Phos  2.9     12-06  Mg     1.8     12-06    TPro  6.5<L>  /  Alb  3.4  /  TBili  0.9  /  DBili  x   /  AST  28  /  ALT  21  /  AlkPhos  67  12-05      MEDICATIONS  (STANDING):  acetaminophen   Tablet .. PRN  aluminum hydroxide/magnesium hydroxide/simethicone Suspension PRN  bisacodyl Suppository PRN  dextrose 40% Gel PRN  dextrose 5%.  dextrose 50% Injectable  dextrose 50% Injectable  dextrose 50% Injectable  docusate sodium  enoxaparin Injectable  furosemide   Injectable  gabapentin  glucagon  Injectable PRN  insulin lispro (HumaLOG) corrective regimen sliding scale  insulin lispro (HumaLOG) corrective regimen sliding scale  levothyroxine  lisinopril  magnesium hydroxide Suspension PRN  methocarbamol PRN  metoprolol succinate ER  naloxone Injectable PRN  ondansetron Injectable PRN  polyethylene glycol 3350  senna  sodium chloride 2% .  tamsulosin

## 2018-12-06 NOTE — PROGRESS NOTE ADULT - PROBLEM SELECTOR PLAN 2
Monitor sodium closely, Add 2% saline as per nephro on 12/5. Tight fluid restriction. No symptoms on 12/5. The sodium improved with therapy. Continue fluid restriction.

## 2018-12-07 DIAGNOSIS — E11.42 TYPE 2 DIABETES MELLITUS WITH DIABETIC POLYNEUROPATHY: ICD-10-CM

## 2018-12-07 DIAGNOSIS — E55.9 VITAMIN D DEFICIENCY, UNSPECIFIED: ICD-10-CM

## 2018-12-07 DIAGNOSIS — D50.0 IRON DEFICIENCY ANEMIA SECONDARY TO BLOOD LOSS (CHRONIC): ICD-10-CM

## 2018-12-07 LAB
ANION GAP SERPL CALC-SCNC: 12 MMOL/L — SIGNIFICANT CHANGE UP (ref 5–17)
BUN SERPL-MCNC: 9 MG/DL — SIGNIFICANT CHANGE UP (ref 8–20)
CALCIUM SERPL-MCNC: 8.3 MG/DL — LOW (ref 8.6–10.2)
CHLORIDE SERPL-SCNC: 88 MMOL/L — LOW (ref 98–107)
CO2 SERPL-SCNC: 27 MMOL/L — SIGNIFICANT CHANGE UP (ref 22–29)
CREAT SERPL-MCNC: 0.46 MG/DL — LOW (ref 0.5–1.3)
CULTURE RESULTS: SIGNIFICANT CHANGE UP
CULTURE RESULTS: SIGNIFICANT CHANGE UP
GLUCOSE BLDC GLUCOMTR-MCNC: 115 MG/DL — HIGH (ref 70–99)
GLUCOSE BLDC GLUCOMTR-MCNC: 149 MG/DL — HIGH (ref 70–99)
GLUCOSE BLDC GLUCOMTR-MCNC: 188 MG/DL — HIGH (ref 70–99)
GLUCOSE BLDC GLUCOMTR-MCNC: 190 MG/DL — HIGH (ref 70–99)
GLUCOSE SERPL-MCNC: 119 MG/DL — HIGH (ref 70–115)
HCT VFR BLD CALC: 28.5 % — LOW (ref 42–52)
HGB BLD-MCNC: 9.5 G/DL — LOW (ref 14–18)
MAGNESIUM SERPL-MCNC: 1.9 MG/DL — SIGNIFICANT CHANGE UP (ref 1.6–2.6)
MCHC RBC-ENTMCNC: 27.7 PG — SIGNIFICANT CHANGE UP (ref 27–31)
MCHC RBC-ENTMCNC: 33.3 G/DL — SIGNIFICANT CHANGE UP (ref 32–36)
MCV RBC AUTO: 83.1 FL — SIGNIFICANT CHANGE UP (ref 80–94)
PHOSPHATE SERPL-MCNC: 3.4 MG/DL — SIGNIFICANT CHANGE UP (ref 2.4–4.7)
PLATELET # BLD AUTO: 392 K/UL — SIGNIFICANT CHANGE UP (ref 150–400)
POTASSIUM SERPL-MCNC: 4.6 MMOL/L — SIGNIFICANT CHANGE UP (ref 3.5–5.3)
POTASSIUM SERPL-SCNC: 4.6 MMOL/L — SIGNIFICANT CHANGE UP (ref 3.5–5.3)
RBC # BLD: 3.43 M/UL — LOW (ref 4.6–6.2)
RBC # FLD: 13.8 % — SIGNIFICANT CHANGE UP (ref 11–15.6)
SODIUM SERPL-SCNC: 127 MMOL/L — LOW (ref 135–145)
SPECIMEN SOURCE: SIGNIFICANT CHANGE UP
SPECIMEN SOURCE: SIGNIFICANT CHANGE UP
WBC # BLD: 9.4 K/UL — SIGNIFICANT CHANGE UP (ref 4.8–10.8)
WBC # FLD AUTO: 9.4 K/UL — SIGNIFICANT CHANGE UP (ref 4.8–10.8)

## 2018-12-07 PROCEDURE — 99232 SBSQ HOSP IP/OBS MODERATE 35: CPT

## 2018-12-07 PROCEDURE — 99233 SBSQ HOSP IP/OBS HIGH 50: CPT

## 2018-12-07 RX ORDER — SODIUM CHLORIDE 5 G/100ML
1000 INJECTION, SOLUTION INTRAVENOUS
Refills: 0 | Status: DISCONTINUED | OUTPATIENT
Start: 2018-12-07 | End: 2018-12-08

## 2018-12-07 RX ORDER — ERGOCALCIFEROL 1.25 MG/1
50000 CAPSULE ORAL
Refills: 0 | Status: DISCONTINUED | OUTPATIENT
Start: 2018-12-07 | End: 2018-12-10

## 2018-12-07 RX ADMIN — Medication 2: at 16:35

## 2018-12-07 RX ADMIN — METHOCARBAMOL 500 MILLIGRAM(S): 500 TABLET, FILM COATED ORAL at 11:06

## 2018-12-07 RX ADMIN — TAMSULOSIN HYDROCHLORIDE 0.8 MILLIGRAM(S): 0.4 CAPSULE ORAL at 22:00

## 2018-12-07 RX ADMIN — SENNA PLUS 2 TABLET(S): 8.6 TABLET ORAL at 22:01

## 2018-12-07 RX ADMIN — POLYETHYLENE GLYCOL 3350 17 GRAM(S): 17 POWDER, FOR SOLUTION ORAL at 11:07

## 2018-12-07 RX ADMIN — SODIUM CHLORIDE 75 MILLILITER(S): 5 INJECTION, SOLUTION INTRAVENOUS at 17:21

## 2018-12-07 RX ADMIN — Medication 2: at 11:14

## 2018-12-07 RX ADMIN — LISINOPRIL 20 MILLIGRAM(S): 2.5 TABLET ORAL at 06:21

## 2018-12-07 RX ADMIN — Medication 150 MICROGRAM(S): at 06:21

## 2018-12-07 RX ADMIN — Medication 100 MILLIGRAM(S): at 13:49

## 2018-12-07 RX ADMIN — Medication 25 MILLIGRAM(S): at 06:21

## 2018-12-07 RX ADMIN — GABAPENTIN 300 MILLIGRAM(S): 400 CAPSULE ORAL at 13:49

## 2018-12-07 RX ADMIN — Medication 100 MILLIGRAM(S): at 06:21

## 2018-12-07 RX ADMIN — GABAPENTIN 300 MILLIGRAM(S): 400 CAPSULE ORAL at 06:21

## 2018-12-07 RX ADMIN — GABAPENTIN 300 MILLIGRAM(S): 400 CAPSULE ORAL at 22:00

## 2018-12-07 RX ADMIN — ERGOCALCIFEROL 50000 UNIT(S): 1.25 CAPSULE ORAL at 11:07

## 2018-12-07 RX ADMIN — ENOXAPARIN SODIUM 40 MILLIGRAM(S): 100 INJECTION SUBCUTANEOUS at 06:21

## 2018-12-07 RX ADMIN — Medication 100 MILLIGRAM(S): at 22:01

## 2018-12-07 NOTE — PROGRESS NOTE ADULT - ATTENDING COMMENTS
It appeared initially suspected hematoma is improving at this point in time dressing was clean dry and intact at approximately 1 PM on 12 7 2018. Patient is no acute sensory deficits motor from a manual supine testing perspective is wall maintained at 4+ out of 5. Left lower sham he may be slightly worse. I do think planning for subcutaneous rehabilitation transfer on Monday on December 10 may be reasonable.

## 2018-12-07 NOTE — PROGRESS NOTE ADULT - PROBLEM SELECTOR PLAN 5
Suppository and enema given on 12/5 without bowel movement. Abdominal x-ray ordered. Patient had a bowel movement. Suppository and enema given on 12/5 without bowel movement. Abdominal x-ray ordered. Patient had a bowel movement on 12/7. Continue current bowel regimen of colace, senna and miralax.

## 2018-12-07 NOTE — PROGRESS NOTE ADULT - PROBLEM SELECTOR PLAN 4
TSH elevated, increase synthroid from 137 to 150 mcg daily TSH elevated, increase synthroid from 137 to 150 mcg daily. Free T4 has been ordered and his thyroid function tests must be monitored frequently.

## 2018-12-07 NOTE — PROGRESS NOTE ADULT - PROBLEM SELECTOR PLAN 6
Low grade fever on 12/5. Assess blood culture, ua, and cxr.  No obvious infectious etiology.
Low grade fever on 12/5. Assess blood culture, ua, and cxr.  No obvious infectious etiology.
Low grade fever on 12/5. Assess blood culture, ua, and cxr.

## 2018-12-07 NOTE — PROGRESS NOTE ADULT - SUBJECTIVE AND OBJECTIVE BOX
Ortho Post Op Check    Name: TIMUR LOUIS    MR #: [id number]    Procedure: T11 & T10 laminectomy/T10 through L1 posterior instrumented and posterolateral fusion for thoracic stenosis with myelopathy  Surgeon: Dr Gray    PAST MEDICAL & SURGICAL HISTORY:  BPH (benign prostatic hyperplasia)  HTN (hypertension)  Hypothyroid  Dyslipidemia  OA (osteoarthritis)  Diabetes  S/P laminectomy  S/P hernia repair  S/P hip replacement: R      Pt in bed sleeping. easily awoken. No new complaints  Denies CP, SOB, N/V, numbness/tingling     General Exam:  Vital Signs Last 24 Hrs  T(C): 36.2 (12-07-18 @ 08:05), Max: 36.7 (12-07-18 @ 05:17)  T(F): 97.2 (12-07-18 @ 08:05), Max: 98.1 (12-07-18 @ 05:17)  HR: 91 (12-07-18 @ 08:05) (91 - 99)  BP: 121/63 (12-07-18 @ 08:05) (110/59 - 121/63)  BP(mean): --  RR: 18 (12-07-18 @ 08:05) (18 - 18)  SpO2: 98% (12-07-18 @ 08:05) (98% - 98%)    General: Pt Alert and oriented, NAD, controlled pain.  Back Dressings removed to reveal proximal incision has minimal amount of serosanguinous drainage noted on dressing No active bleeding noted. No active serous drainage noted.  Pulses: 2+ dorsalis pedis pulse. Cap refill < 2 sec.  Sensation: Grossly intact to light touch without deficit.  Motor: + EHL/FHL Motor B/L LE 3+/5                          9.5    9.4   )-----------( 392      ( 07 Dec 2018 06:56 )             28.5   07 Dec 2018 06:56    127    |  88     |  9.0    ----------------------------<  119    4.6     |  27.0   |  0.46     Ca    8.3        07 Dec 2018 06:56  Phos  3.4       07 Dec 2018 06:56  Mg     1.9       07 Dec 2018 06:56      MEDICATIONS  (STANDING):  dextrose 5%. 1000 milliLiter(s) (50 mL/Hr) IV Continuous <Continuous>  dextrose 50% Injectable 12.5 Gram(s) IV Push once  dextrose 50% Injectable 25 Gram(s) IV Push once  dextrose 50% Injectable 25 Gram(s) IV Push once  docusate sodium 100 milliGRAM(s) Oral three times a day  enoxaparin Injectable 40 milliGRAM(s) SubCutaneous every 24 hours  ergocalciferol 82281 Unit(s) Oral every week  gabapentin 300 milliGRAM(s) Oral every 8 hours  insulin lispro (HumaLOG) corrective regimen sliding scale   SubCutaneous three times a day before meals  insulin lispro (HumaLOG) corrective regimen sliding scale   SubCutaneous at bedtime  levothyroxine 150 MICROGram(s) Oral daily  lisinopril 20 milliGRAM(s) Oral daily  metoprolol succinate ER 25 milliGRAM(s) Oral daily  polyethylene glycol 3350 17 Gram(s) Oral daily  senna 2 Tablet(s) Oral at bedtime  tamsulosin 0.8 milliGRAM(s) Oral at bedtime    MEDICATIONS  (PRN):  acetaminophen   Tablet .. 650 milliGRAM(s) Oral every 6 hours PRN Temp greater or equal to 38C (100.4F), Mild Pain (1 - 3)  aluminum hydroxide/magnesium hydroxide/simethicone Suspension 30 milliLiter(s) Oral every 12 hours PRN Indigestion  bisacodyl Suppository 10 milliGRAM(s) Rectal daily PRN Constipation  dextrose 40% Gel 15 Gram(s) Oral once PRN Blood Glucose LESS THAN 70 milliGRAM(s)/deciliter  glucagon  Injectable 1 milliGRAM(s) IntraMuscular once PRN Glucose LESS THAN 70 milligrams/deciliter  magnesium hydroxide Suspension 30 milliLiter(s) Oral every 12 hours PRN Constipation  methocarbamol 500 milliGRAM(s) Oral every 6 hours PRN Back Spasms  naloxone Injectable 0.1 milliGRAM(s) IV Push every 3 minutes PRN For ANY of the following changes in patient status:  A. RR LESS THAN 10 breaths per minute, B. Oxygen saturation LESS THAN 90%, C. Sedation score of 6  ondansetron Injectable 4 milliGRAM(s) IV Push every 6 hours PRN Nausea      A/P: 73yMale POD#9 s/p T11 & T10 laminectomy/T10 through L1 posterior instrumented and posterolateral fusion for thoracic stenosis with myelopathy  - Stable  - Pain Control  - DVT ppx: Lovenox  - Weight bearing status: WBAT with TLSO brace  - Will monitor dressings Q8 hrs

## 2018-12-07 NOTE — PROGRESS NOTE ADULT - SUBJECTIVE AND OBJECTIVE BOX
CANDIDOAAMIRKEVIN HERIBERTO     Chief Complaint: Patient is a 73y old  Male who presents with a chief complaint of Backpain (06 Dec 2018 12:16)      PAST MEDICAL & SURGICAL HISTORY:  BPH (benign prostatic hyperplasia)  HTN (hypertension)  Hypothyroid  Dyslipidemia  OA (osteoarthritis)  Diabetes  S/P laminectomy  S/P hernia repair  S/P hip replacement: R      HPI/OVERNIGHT EVENTS: Patient in no distress    MEDICATIONS  (STANDING):  dextrose 5%. 1000 milliLiter(s) (50 mL/Hr) IV Continuous <Continuous>  dextrose 50% Injectable 12.5 Gram(s) IV Push once  dextrose 50% Injectable 25 Gram(s) IV Push once  dextrose 50% Injectable 25 Gram(s) IV Push once  docusate sodium 100 milliGRAM(s) Oral three times a day  enoxaparin Injectable 40 milliGRAM(s) SubCutaneous every 24 hours  ergocalciferol 21930 Unit(s) Oral every week  gabapentin 300 milliGRAM(s) Oral every 8 hours  insulin lispro (HumaLOG) corrective regimen sliding scale   SubCutaneous three times a day before meals  insulin lispro (HumaLOG) corrective regimen sliding scale   SubCutaneous at bedtime  levothyroxine 150 MICROGram(s) Oral daily  lisinopril 20 milliGRAM(s) Oral daily  metoprolol succinate ER 25 milliGRAM(s) Oral daily  polyethylene glycol 3350 17 Gram(s) Oral daily  senna 2 Tablet(s) Oral at bedtime  tamsulosin 0.8 milliGRAM(s) Oral at bedtime      Vital Signs Last 24 Hrs  T(C): 36.2 (07 Dec 2018 08:05), Max: 36.9 (06 Dec 2018 17:21)  T(F): 97.2 (07 Dec 2018 08:05), Max: 98.5 (06 Dec 2018 17:21)  HR: 91 (07 Dec 2018 08:05) (87 - 99)  BP: 121/63 (07 Dec 2018 08:05) (110/59 - 137/81)  BP(mean): --  RR: 18 (07 Dec 2018 08:05) (18 - 18)  SpO2: 98% (07 Dec 2018 08:05) (98% - 100%)    PHYSICAL EXAM:  HEENT: PERRLA, EOMI, Normal Hearing  Neck: No LAD, No JVD  Back: No CVA tenderness  Respiratory: CTAB Cardiovascular: S1 and S2, RRR, no M/G/R  Gastrointestinal: BS+, soft, NT/ND  Extremities: No peripheral edema  Vascular: 2+ peripheral pulses  Neurological: A/O x 3, no focal deficits  patient is unable to ambulate        CAPILLARY BLOOD GLUCOSE    LABS:                        9.5    9.4   )-----------( 392      ( 07 Dec 2018 06:56 )             28.5     12-    127<L>  |  88<L>  |  9.0  ----------------------------<  119<H>  4.6   |  27.0  |  0.46<L>    Ca    8.3<L>      07 Dec 2018 06:56  Phos  3.4     -  Mg     1.9     -        Urinalysis Basic - ( 05 Dec 2018 18:28 )    Color: Yellow / Appearance: Clear / S.010 / pH: x  Gluc: x / Ketone: Small  / Bili: Negative / Urobili: 8 mg/dL   Blood: x / Protein: 30 mg/dL / Nitrite: Negative   Leuk Esterase: Small / RBC: 0-2 /HPF / WBC 3-5   Sq Epi: x / Non Sq Epi: Occasional / Bacteria: Few        RADIOLOGY & ADDITIONAL TESTS:

## 2018-12-07 NOTE — PROGRESS NOTE ADULT - ASSESSMENT
SIADH    Hypothyroidism,    SNa+ Improving, on 2 % saline , Lasix & Oral FR,    will continue with 2% for 24 hours; álvaro

## 2018-12-07 NOTE — PROGRESS NOTE ADULT - PROBLEM SELECTOR PLAN 2
Monitor sodium closely, Add 2% saline as per nephro on 12/5. Tight fluid restriction. No symptoms on 12/5. The sodium improved with therapy. Continue fluid restriction. The patient completed an infusion of 2% saline and continues on strict fluid restriction. I believe his profound hypothyroidism has also contributed to his hyponatremia and if his sodium continues to improve as is expected then he may liberalize his fluid restriction from Monitor sodium closely, Add 2% saline as per nephro on 12/5. Tight fluid restriction. No symptoms on 12/5. The sodium improved with therapy. Continue fluid restriction. The patient completed an infusion of 2% saline and continues on strict fluid restriction. I believe his profound hypothyroidism has also contributed to his hyponatremia and if his sodium continues to improve as is expected then he may liberalize his fluid restriction from 800 ml to 1000 ml.

## 2018-12-07 NOTE — PROGRESS NOTE ADULT - ASSESSMENT
73 year old male back pain, status post spinal surgery, constipation, SIADH with hyponatremia, DM, hypothyroid, Htn. 12/5 patient sodium continue to be very low. I spoke with nephrology regarding treatment.  12/6 Patient is better. Sodium improving. TSH is elevated and he had a bowel movement. 12/7 Patient continues to improve. He has completed 2% saline infusion. His dose of synthroid has been increased. Orthopedic surgery continues wound care changing his bandage three times a day resulting in marked improvement in the wound and reduction of hematoma. He remains on strict fluid restriction for now although his hyponatremia is rapidly improving. He is anticipated to be discharged to rehab on Monday 12/10/2018.

## 2018-12-07 NOTE — PROGRESS NOTE ADULT - SUBJECTIVE AND OBJECTIVE BOX
[facility] DIVISION OF KIDNEY DISEASES AND HYPERTENSION -- FOLLOW UP NOTE  --------------------------------------------------------------------------------  Chief Complaint:  Hyponatremia  24 hour events/subjective:    Seen and examined  Improving Na  Was on 2%; may benefit from another 12 hours; > 130 threshold;     PAST HISTORY  --------------------------------------------------------------------------------  No significant changes to PMH, PSH, FHx, SHx, unless otherwise noted    ALLERGIES & MEDICATIONS  --------------------------------------------------------------------------------  Allergies    No Known Allergies    Intolerances      Standing Inpatient Medications  dextrose 5%. 1000 milliLiter(s) IV Continuous <Continuous>  dextrose 50% Injectable 12.5 Gram(s) IV Push once  dextrose 50% Injectable 25 Gram(s) IV Push once  dextrose 50% Injectable 25 Gram(s) IV Push once  docusate sodium 100 milliGRAM(s) Oral three times a day  enoxaparin Injectable 40 milliGRAM(s) SubCutaneous every 24 hours  ergocalciferol 20603 Unit(s) Oral every week  gabapentin 300 milliGRAM(s) Oral every 8 hours  insulin lispro (HumaLOG) corrective regimen sliding scale   SubCutaneous three times a day before meals  insulin lispro (HumaLOG) corrective regimen sliding scale   SubCutaneous at bedtime  levothyroxine 150 MICROGram(s) Oral daily  lisinopril 20 milliGRAM(s) Oral daily  metoprolol succinate ER 25 milliGRAM(s) Oral daily  polyethylene glycol 3350 17 Gram(s) Oral daily  senna 2 Tablet(s) Oral at bedtime  tamsulosin 0.8 milliGRAM(s) Oral at bedtime    PRN Inpatient Medications  acetaminophen   Tablet .. 650 milliGRAM(s) Oral every 6 hours PRN  aluminum hydroxide/magnesium hydroxide/simethicone Suspension 30 milliLiter(s) Oral every 12 hours PRN  bisacodyl Suppository 10 milliGRAM(s) Rectal daily PRN  dextrose 40% Gel 15 Gram(s) Oral once PRN  glucagon  Injectable 1 milliGRAM(s) IntraMuscular once PRN  magnesium hydroxide Suspension 30 milliLiter(s) Oral every 12 hours PRN  methocarbamol 500 milliGRAM(s) Oral every 6 hours PRN  naloxone Injectable 0.1 milliGRAM(s) IV Push every 3 minutes PRN  ondansetron Injectable 4 milliGRAM(s) IV Push every 6 hours PRN      REVIEW OF SYSTEMS  --------------------------------------------------------------------------------  Gen: No weight changes, fatigue, fevers/chills, weakness  Skin: No rashes  Head/Eyes/Ears/Mouth: No headache; Normal hearing; Normal vision w/o blurriness; No sinus pain/discomfort, sore throat  Respiratory: No dyspnea, cough, wheezing, hemoptysis  CV: No chest pain, PND, orthopnea  GI: No abdominal pain, diarrhea, constipation, nausea, vomiting, melena, hematochezia  : No increased frequency, dysuria, hematuria, nocturia  MSK: No joint pain/swelling; no back pain; no edema  Neuro: No dizziness/lightheadedness, weakness, seizures, numbness, tingling  Heme: No easy bruising or bleeding  Endo: No heat/cold intolerance  Psych: No significant nervousness, anxiety, stress, depression    All other systems were reviewed and are negative, except as noted.    VITALS/PHYSICAL EXAM  --------------------------------------------------------------------------------  T(C): 36.2 (12-07-18 @ 08:05), Max: 36.9 (12-06-18 @ 17:21)  HR: 91 (12-07-18 @ 08:05) (87 - 99)  BP: 121/63 (12-07-18 @ 08:05) (110/59 - 137/81)  RR: 18 (12-07-18 @ 08:05) (18 - 18)  SpO2: 98% (12-07-18 @ 08:05) (98% - 100%)  Wt(kg): --        12-06-18 @ 07:01  -  12-07-18 @ 07:00  --------------------------------------------------------  IN: 440 mL / OUT: 0 mL / NET: 440 mL      Physical Exam:  	Gen: NAD, well-appearing  	HEENT: PERRL, supple neck, clear oropharynx  	Pulm: CTA B/L  	CV: RRR, S1S2; no rub  	Back: No spinal or CVA tenderness; no sacral edema  	Abd: +BS, soft, nontender/nondistended  	: No suprapubic tenderness  	UE: Warm, FROM, no clubbing, intact strength; no edema; no asterixis  	LE: Warm, FROM, no clubbing, intact strength; no edema  	Neuro: No focal deficits, intact gait  	Psych: Normal affect and mood  	Skin: Warm, without rashes  	Vascular access:    LABS/STUDIES  --------------------------------------------------------------------------------              9.5    9.4   >-----------<  392      [12-07-18 @ 06:56]              28.5     127  |  88  |  9.0  ----------------------------<  119      [12-07-18 @ 06:56]  4.6   |  27.0  |  0.46        Ca     8.3     [12-07-18 @ 06:56]      iCa    1.03     [12-06 @ 18:07]      Mg     1.9     [12-07-18 @ 06:56]      Phos  3.4     [12-07-18 @ 06:56]            Creatinine Trend:  SCr 0.46 [12-07 @ 06:56]  SCr 0.58 [12-06 @ 07:59]  SCr 0.44 [12-05 @ 11:39]  SCr 0.44 [12-05 @ 07:08]  SCr 0.43 [12-04 @ 09:15]    Urinalysis - [12-05-18 @ 18:28]      Color Yellow / Appearance Clear / SG 1.010 / pH 7.0      Gluc Negative / Ketone Small  / Bili Negative / Urobili 8       Blood Trace / Protein 30 / Leuk Est Small / Nitrite Negative      RBC 0-2 / WBC 3-5 / Hyaline  / Gran  / Sq Epi  / Non Sq Epi Occasional / Bacteria Few    Urine Creatinine 51      [12-04-18 @ 15:12]  Urine Sodium 232      [12-04-18 @ 15:12]  Urine Osmolality 613      [12-04-18 @ 15:12]    Vitamin D (25OH) 17.8      [12-06-18 @ 17:36]  HbA1c 7.5      [11-28-18 @ 06:08]  TSH 16.19      [12-06-18 @ 07:59]      BELLA: titer 1:80, pattern Homogeneous      [11-27-18 @ 22:20]  ANCA: cANCA Negative, pANCA Negative, atypical ANCA Negative      [11-29-18 @ 23:33]  Syphilis Screen (Treponema Pallidum Ab) Negative      [11-27-18 @ 22:20]  Immunofixation Serum:   No Monoclonal Band Identified      [11-27-18 @ 22:20]

## 2018-12-07 NOTE — PROGRESS NOTE ADULT - PROBLEM SELECTOR PLAN 1
Status post surgery. 12/5/2018 Surgery applied special dressing to wound.  12/6 I spoke to surgery who continues to monitor the wound. The wound from his spinal surgery is improving with wound care.

## 2018-12-08 LAB
ANION GAP SERPL CALC-SCNC: 9 MMOL/L — SIGNIFICANT CHANGE UP (ref 5–17)
BUN SERPL-MCNC: 7 MG/DL — LOW (ref 8–20)
CALCIUM SERPL-MCNC: 8.1 MG/DL — LOW (ref 8.6–10.2)
CHLORIDE SERPL-SCNC: 94 MMOL/L — LOW (ref 98–107)
CO2 SERPL-SCNC: 29 MMOL/L — SIGNIFICANT CHANGE UP (ref 22–29)
CREAT SERPL-MCNC: 0.42 MG/DL — LOW (ref 0.5–1.3)
GLUCOSE BLDC GLUCOMTR-MCNC: 139 MG/DL — HIGH (ref 70–99)
GLUCOSE BLDC GLUCOMTR-MCNC: 187 MG/DL — HIGH (ref 70–99)
GLUCOSE BLDC GLUCOMTR-MCNC: 241 MG/DL — HIGH (ref 70–99)
GLUCOSE BLDC GLUCOMTR-MCNC: 252 MG/DL — HIGH (ref 70–99)
GLUCOSE SERPL-MCNC: 140 MG/DL — HIGH (ref 70–115)
HCT VFR BLD CALC: 27.1 % — LOW (ref 42–52)
HGB BLD-MCNC: 8.8 G/DL — LOW (ref 14–18)
MAGNESIUM SERPL-MCNC: 1.9 MG/DL — SIGNIFICANT CHANGE UP (ref 1.8–2.6)
MCHC RBC-ENTMCNC: 27.6 PG — SIGNIFICANT CHANGE UP (ref 27–31)
MCHC RBC-ENTMCNC: 32.5 G/DL — SIGNIFICANT CHANGE UP (ref 32–36)
MCV RBC AUTO: 85 FL — SIGNIFICANT CHANGE UP (ref 80–94)
PHOSPHATE SERPL-MCNC: 3.3 MG/DL — SIGNIFICANT CHANGE UP (ref 2.4–4.7)
PLATELET # BLD AUTO: 413 K/UL — HIGH (ref 150–400)
POTASSIUM SERPL-MCNC: 4.5 MMOL/L — SIGNIFICANT CHANGE UP (ref 3.5–5.3)
POTASSIUM SERPL-SCNC: 4.5 MMOL/L — SIGNIFICANT CHANGE UP (ref 3.5–5.3)
RBC # BLD: 3.19 M/UL — LOW (ref 4.6–6.2)
RBC # FLD: 14.2 % — SIGNIFICANT CHANGE UP (ref 11–15.6)
SODIUM SERPL-SCNC: 132 MMOL/L — LOW (ref 135–145)
WBC # BLD: 10 K/UL — SIGNIFICANT CHANGE UP (ref 4.8–10.8)
WBC # FLD AUTO: 10 K/UL — SIGNIFICANT CHANGE UP (ref 4.8–10.8)

## 2018-12-08 PROCEDURE — 99232 SBSQ HOSP IP/OBS MODERATE 35: CPT

## 2018-12-08 PROCEDURE — 99233 SBSQ HOSP IP/OBS HIGH 50: CPT

## 2018-12-08 RX ADMIN — Medication 100 MILLIGRAM(S): at 05:21

## 2018-12-08 RX ADMIN — Medication 100 MILLIGRAM(S): at 21:23

## 2018-12-08 RX ADMIN — Medication 25 MILLIGRAM(S): at 05:21

## 2018-12-08 RX ADMIN — GABAPENTIN 300 MILLIGRAM(S): 400 CAPSULE ORAL at 13:09

## 2018-12-08 RX ADMIN — SENNA PLUS 2 TABLET(S): 8.6 TABLET ORAL at 21:23

## 2018-12-08 RX ADMIN — Medication 6: at 17:02

## 2018-12-08 RX ADMIN — Medication 100 MILLIGRAM(S): at 13:09

## 2018-12-08 RX ADMIN — METHOCARBAMOL 500 MILLIGRAM(S): 500 TABLET, FILM COATED ORAL at 05:21

## 2018-12-08 RX ADMIN — GABAPENTIN 300 MILLIGRAM(S): 400 CAPSULE ORAL at 21:23

## 2018-12-08 RX ADMIN — Medication 150 MICROGRAM(S): at 05:21

## 2018-12-08 RX ADMIN — METHOCARBAMOL 500 MILLIGRAM(S): 500 TABLET, FILM COATED ORAL at 13:08

## 2018-12-08 RX ADMIN — GABAPENTIN 300 MILLIGRAM(S): 400 CAPSULE ORAL at 05:21

## 2018-12-08 RX ADMIN — TAMSULOSIN HYDROCHLORIDE 0.8 MILLIGRAM(S): 0.4 CAPSULE ORAL at 21:23

## 2018-12-08 RX ADMIN — ENOXAPARIN SODIUM 40 MILLIGRAM(S): 100 INJECTION SUBCUTANEOUS at 05:21

## 2018-12-08 RX ADMIN — Medication 4: at 13:07

## 2018-12-08 RX ADMIN — LISINOPRIL 20 MILLIGRAM(S): 2.5 TABLET ORAL at 05:21

## 2018-12-08 NOTE — PROGRESS NOTE ADULT - SUBJECTIVE AND OBJECTIVE BOX
[facility] DIVISION OF KIDNEY DISEASES AND HYPERTENSION -- FOLLOW UP NOTE  --------------------------------------------------------------------------------  Hyponatremia  24 hour events/subjective:    Seen and examined  Improving Na  Up to 132      PAST HISTORY  --------------------------------------------------------------------------------  No significant changes to PMH, PSH, FHx, SHx, unless otherwise noted    ALLERGIES & MEDICATIONS  --------------------------------------------------------------------------------  Allergies    No Known Allergies    Intolerances      Standing Inpatient Medications  dextrose 5%. 1000 milliLiter(s) IV Continuous <Continuous>  dextrose 50% Injectable 12.5 Gram(s) IV Push once  dextrose 50% Injectable 25 Gram(s) IV Push once  dextrose 50% Injectable 25 Gram(s) IV Push once  docusate sodium 100 milliGRAM(s) Oral three times a day  enoxaparin Injectable 40 milliGRAM(s) SubCutaneous every 24 hours  ergocalciferol 44962 Unit(s) Oral every week  gabapentin 300 milliGRAM(s) Oral every 8 hours  insulin lispro (HumaLOG) corrective regimen sliding scale   SubCutaneous three times a day before meals  insulin lispro (HumaLOG) corrective regimen sliding scale   SubCutaneous at bedtime  levothyroxine 150 MICROGram(s) Oral daily  lisinopril 20 milliGRAM(s) Oral daily  metoprolol succinate ER 25 milliGRAM(s) Oral daily  polyethylene glycol 3350 17 Gram(s) Oral daily  senna 2 Tablet(s) Oral at bedtime  tamsulosin 0.8 milliGRAM(s) Oral at bedtime    PRN Inpatient Medications  acetaminophen   Tablet .. 650 milliGRAM(s) Oral every 6 hours PRN  aluminum hydroxide/magnesium hydroxide/simethicone Suspension 30 milliLiter(s) Oral every 12 hours PRN  bisacodyl Suppository 10 milliGRAM(s) Rectal daily PRN  dextrose 40% Gel 15 Gram(s) Oral once PRN  glucagon  Injectable 1 milliGRAM(s) IntraMuscular once PRN  magnesium hydroxide Suspension 30 milliLiter(s) Oral every 12 hours PRN  methocarbamol 500 milliGRAM(s) Oral every 6 hours PRN  naloxone Injectable 0.1 milliGRAM(s) IV Push every 3 minutes PRN  ondansetron Injectable 4 milliGRAM(s) IV Push every 6 hours PRN      REVIEW OF SYSTEMS  --------------------------------------------------------------------------------  Gen: No weight changes, fatigue, fevers/chills, weakness  Skin: No rashes  Head/Eyes/Ears/Mouth: No headache; Normal hearing; Normal vision w/o blurriness; No sinus pain/discomfort, sore throat  Respiratory: No dyspnea, cough, wheezing, hemoptysis  CV: No chest pain, PND, orthopnea  GI: No abdominal pain, diarrhea, constipation, nausea, vomiting, melena, hematochezia  : No increased frequency, dysuria, hematuria, nocturia  MSK: No joint pain/swelling; no back pain; no edema  Neuro: No dizziness/lightheadedness, weakness, seizures, numbness, tingling  Heme: No easy bruising or bleeding  Endo: No heat/cold intolerance  Psych: No significant nervousness, anxiety, stress, depression    All other systems were reviewed and are negative, except as noted.    VITALS/PHYSICAL EXAM  --------------------------------------------------------------------------------  T(C): 36.7 (12-08-18 @ 08:37), Max: 36.8 (12-07-18 @ 19:00)  HR: 86 (12-08-18 @ 08:37) (86 - 92)  BP: 140/70 (12-08-18 @ 08:37) (129/60 - 145/72)  RR: 18 (12-08-18 @ 08:37) (18 - 18)  SpO2: 98% (12-08-18 @ 08:37) (97% - 98%)  Wt(kg): --        12-07-18 @ 07:01  -  12-08-18 @ 07:00  --------------------------------------------------------  IN: 750 mL / OUT: 0 mL / NET: 750 mL    12-08-18 @ 07:01  -  12-08-18 @ 15:37  --------------------------------------------------------  IN: 500 mL / OUT: 0 mL / NET: 500 mL      Physical Exam:  	Gen: NAD, well-appearing  	HEENT: PERRL, supple neck, clear oropharynx  	Pulm: CTA B/L  	CV: RRR, S1S2; no rub  	Back: No spinal or CVA tenderness; no sacral edema  	Abd: +BS, soft, nontender/nondistended  	: No suprapubic tenderness  	UE: Warm, FROM, no clubbing, intact strength; no edema; no asterixis  	LE: Warm, FROM, no clubbing, intact strength; no edema  	Neuro: No focal deficits, intact gait  	Psych: Normal affect and mood  	Skin: Warm, without rashes  	Vascular access:    LABS/STUDIES  --------------------------------------------------------------------------------              8.8    10.0  >-----------<  413      [12-08-18 @ 07:09]              27.1     132  |  94  |  7.0  ----------------------------<  140      [12-08-18 @ 07:09]  4.5   |  29.0  |  0.42        Ca     8.1     [12-08-18 @ 07:09]      iCa    1.03     [12-06 @ 18:07]      Mg     1.9     [12-08-18 @ 07:09]      Phos  3.3     [12-08-18 @ 07:09]            Creatinine Trend:  SCr 0.42 [12-08 @ 07:09]  SCr 0.46 [12-07 @ 06:56]  SCr 0.58 [12-06 @ 07:59]  SCr 0.44 [12-05 @ 11:39]  SCr 0.44 [12-05 @ 07:08]    Urinalysis - [12-05-18 @ 18:28]      Color Yellow / Appearance Clear / SG 1.010 / pH 7.0      Gluc Negative / Ketone Small  / Bili Negative / Urobili 8       Blood Trace / Protein 30 / Leuk Est Small / Nitrite Negative      RBC 0-2 / WBC 3-5 / Hyaline  / Gran  / Sq Epi  / Non Sq Epi Occasional / Bacteria Few    Urine Creatinine 51      [12-04-18 @ 15:12]  Urine Sodium 232      [12-04-18 @ 15:12]  Urine Osmolality 613      [12-04-18 @ 15:12]    Vitamin D (25OH) 17.8      [12-06-18 @ 17:36]  HbA1c 7.5      [11-28-18 @ 06:08]  TSH 16.19      [12-06-18 @ 07:59]      BELLA: titer 1:80, pattern Homogeneous      [11-27-18 @ 22:20]  ANCA: cANCA Negative, pANCA Negative, atypical ANCA Negative      [11-29-18 @ 23:33]  Syphilis Screen (Treponema Pallidum Ab) Negative      [11-27-18 @ 22:20]  Immunofixation Serum:   No Monoclonal Band Identified      [11-27-18 @ 22:20]

## 2018-12-08 NOTE — PROGRESS NOTE ADULT - SUBJECTIVE AND OBJECTIVE BOX
Ortho Progress note    Name: TIMUR LOUIS    MR #: [id number]    Procedure: T11 & T10 laminectomy/T10 through L1 posterior instrumented and posterolateral fusion for thoracic stenosis with myelopathy  Surgeon: Dr Gray    PAST MEDICAL & SURGICAL HISTORY:  BPH (benign prostatic hyperplasia)  HTN (hypertension)  Hypothyroid  Dyslipidemia  OA (osteoarthritis)  Diabetes  S/P laminectomy  S/P hernia repair  S/P hip replacement: R    Pt in bed sleeping comfortably  Denies any acute motor or sensory changes  Denies CP, SOB, N/V, numbness/tingling     General Exam:  Vital Signs Last 24 Hrs  T(C): 36.4 (08 Dec 2018 04:20), Max: 36.8 (07 Dec 2018 15:00)  T(F): 97.5 (08 Dec 2018 04:20), Max: 98.2 (07 Dec 2018 15:00)  HR: 89 (08 Dec 2018 04:20) (88 - 92)  BP: 145/72 (08 Dec 2018 04:20) (128/67 - 145/72)  BP(mean): --  RR: 18 (08 Dec 2018 04:20) (18 - 18)  SpO2: 97% (08 Dec 2018 04:20) (83% - 98%)                          8.8    10.0  )-----------( 413      ( 08 Dec 2018 07:09 )             27.1     12-08    132<L>  |  94<L>  |  7.0<L>  ----------------------------<  140<H>  4.5   |  29.0  |  0.42<L>    Ca    8.1<L>      08 Dec 2018 07:09  Phos  3.3     12-08  Mg     1.9     12-08    General: Pt Alert and oriented, NAD, controlled pain.  Dressing removed to reveal proximal incision has minimal amount of serosanguinous drainage   No active serosanguinous drainage noted.  Incision healing well  New dressing applied  Pulses: 2+ dorsalis pedis pulse. Cap refill < 2 sec.  Sensation: Grossly intact to light touch without deficit.  Motor: + EHL/FHL Motor B/L LE 3+    A/P: 73y Male POD#10 s/p T11 & T10 laminectomy/T10 through L1 posterior instrumented and posterolateral fusion for thoracic stenosis with myelopathy    - Medicine/ Nephrology notes appreciated  - Pain Control  - DVT ppx: Lovenox until ambulating well or 28 days post op, SCD's  - Weight bearing status: WBAT with TLSO brace  - dressing change/ wound check daily  - Continue care per primary team  - DC planning is MIMI Monday 12/10/18  - Ortho to follow

## 2018-12-08 NOTE — PROGRESS NOTE ADULT - ATTENDING COMMENTS
Patient is doing fairly well at this point in time. His lower bertha and weakness I do think is a limiting factor at this point in time and secondary to limited activity over the last 2 weeks I do think maybe a limiting factor in his overall discharge. I do think this gentleman is going to require significant PT OT acute rehabilitation type management of his lower extremity weakness. Dressings are dry at this point in time and maybe a small subcutaneous seroma I would not recommend repeat operative management at this point in time I believe this gentleman can be prepped for discharged to subacute rehabilitation in the coming week. In the short-term I recommend aggressive out of bed PT OT weightbearing as tolerated.

## 2018-12-08 NOTE — PROGRESS NOTE ADULT - ASSESSMENT
SIADH    Hypothyroidism,    SNa+ Improving, on 2 % saline , Lasix & Oral FR,    Stopping 2% saline  Signing off  Please recall if needed

## 2018-12-08 NOTE — PROGRESS NOTE ADULT - SUBJECTIVE AND OBJECTIVE BOX
TIMUR LOUIS  ----------------------------------------  The patient was seen and evaluated for hyponatremia.  The patient is in no acute distress.  Denied any chest pain, palpitations, dyspnea, or abdominal pain.  Reports some back pain.    Vital Signs Last 24 Hrs  T(C): 36.7 (08 Dec 2018 08:37), Max: 36.8 (07 Dec 2018 15:00)  T(F): 98 (08 Dec 2018 08:37), Max: 98.2 (07 Dec 2018 15:00)  HR: 86 (08 Dec 2018 08:37) (86 - 92)  BP: 140/70 (08 Dec 2018 08:37) (128/67 - 145/72)  BP(mean): --  RR: 18 (08 Dec 2018 08:37) (18 - 18)  SpO2: 98% (08 Dec 2018 08:37) (83% - 98%)    CAPILLARY BLOOD GLUCOSE  POCT Blood Glucose.: 139 mg/dL (08 Dec 2018 08:03)  POCT Blood Glucose.: 149 mg/dL (07 Dec 2018 21:59)  POCT Blood Glucose.: 190 mg/dL (07 Dec 2018 16:28)    PHYSICAL EXAMINATION:  ----------------------------------------  General appearance: No acute distress, Awake, Alert  HEENT: Normocephalic, Atraumatic, Conjunctiva clear, EOMI  Neck: Supple, No JVD, No tenderness  Lungs: Clear to auscultation, Breath sound equal bilaterally, No wheezes, No rales  Cardiovascular: S1S2, Regular rhythm  Abdomen: Soft, Nontender, Nondistended, No guarding/rebound, Positive bowel sounds  Extremities: No clubbing, No cyanosis, No edema, No calf tenderness  Neuro: Decreased strength and sensation to the lower extremities  Psychiatric: Appropriate mood, Normal affect    LABORATORY STUDIES:  ----------------------------------------             8.8    10.0  )-----------( 413      ( 08 Dec 2018 07:09 )             27.1     12-08    132<L>  |  94<L>  |  7.0<L>  ----------------------------<  140<H>  4.5   |  29.0  |  0.42<L>    Ca    8.1<L>      08 Dec 2018 07:09  Phos  3.3     12-08  Mg     1.9     12-08    MEDICATIONS  (STANDING):  dextrose 5%. 1000 milliLiter(s) (50 mL/Hr) IV Continuous <Continuous>  dextrose 50% Injectable 12.5 Gram(s) IV Push once  dextrose 50% Injectable 25 Gram(s) IV Push once  dextrose 50% Injectable 25 Gram(s) IV Push once  docusate sodium 100 milliGRAM(s) Oral three times a day  enoxaparin Injectable 40 milliGRAM(s) SubCutaneous every 24 hours  ergocalciferol 36695 Unit(s) Oral every week  gabapentin 300 milliGRAM(s) Oral every 8 hours  insulin lispro (HumaLOG) corrective regimen sliding scale   SubCutaneous three times a day before meals  insulin lispro (HumaLOG) corrective regimen sliding scale   SubCutaneous at bedtime  levothyroxine 150 MICROGram(s) Oral daily  lisinopril 20 milliGRAM(s) Oral daily  metoprolol succinate ER 25 milliGRAM(s) Oral daily  polyethylene glycol 3350 17 Gram(s) Oral daily  senna 2 Tablet(s) Oral at bedtime  sodium chloride 2% . 1000 milliLiter(s) (75 mL/Hr) IV Continuous <Continuous>  tamsulosin 0.8 milliGRAM(s) Oral at bedtime    MEDICATIONS  (PRN):  acetaminophen   Tablet .. 650 milliGRAM(s) Oral every 6 hours PRN Temp greater or equal to 38C (100.4F), Mild Pain (1 - 3)  aluminum hydroxide/magnesium hydroxide/simethicone Suspension 30 milliLiter(s) Oral every 12 hours PRN Indigestion  bisacodyl Suppository 10 milliGRAM(s) Rectal daily PRN Constipation  dextrose 40% Gel 15 Gram(s) Oral once PRN Blood Glucose LESS THAN 70 milliGRAM(s)/deciliter  glucagon  Injectable 1 milliGRAM(s) IntraMuscular once PRN Glucose LESS THAN 70 milligrams/deciliter  magnesium hydroxide Suspension 30 milliLiter(s) Oral every 12 hours PRN Constipation  methocarbamol 500 milliGRAM(s) Oral every 6 hours PRN Back Spasms  naloxone Injectable 0.1 milliGRAM(s) IV Push every 3 minutes PRN For ANY of the following changes in patient status:  A. RR LESS THAN 10 breaths per minute, B. Oxygen saturation LESS THAN 90%, C. Sedation score of 6  ondansetron Injectable 4 milliGRAM(s) IV Push every 6 hours PRN Nausea      ASSESSMENT / PLAN:  ----------------------------------------  Hyponatremia - Nephrology consultation noted. Improved with hypertonic saline. Monitoring repeat laboratory studies.    Spinal stenosis - Status post spinal surgery. Orthopedics following. Analgesic medications as needed. Will need transfer to a rehabilitation facility for further treatment once stable.    Diabetes - Insulin coverage, close monitoring of blood glucose levels.    Hypothyroidism - On levothyroxine. The dose was previously increased.    Hyponatremia - Mild. Resolved on repeat laboratory studies. Diet as tolerated.    BPH - On tamsulosin.    Hypertension - Close blood pressure monitoring. On lisinopril and metoprolol. TIMUR LOUIS  ----------------------------------------  The patient was seen and evaluated for hyponatremia.  The patient is in no acute distress.  Denied any chest pain, palpitations, dyspnea, or abdominal pain.  Reports some back pain.    Vital Signs Last 24 Hrs  T(C): 36.7 (08 Dec 2018 08:37), Max: 36.8 (07 Dec 2018 15:00)  T(F): 98 (08 Dec 2018 08:37), Max: 98.2 (07 Dec 2018 15:00)  HR: 86 (08 Dec 2018 08:37) (86 - 92)  BP: 140/70 (08 Dec 2018 08:37) (128/67 - 145/72)  BP(mean): --  RR: 18 (08 Dec 2018 08:37) (18 - 18)  SpO2: 98% (08 Dec 2018 08:37) (83% - 98%)    CAPILLARY BLOOD GLUCOSE  POCT Blood Glucose.: 139 mg/dL (08 Dec 2018 08:03)  POCT Blood Glucose.: 149 mg/dL (07 Dec 2018 21:59)  POCT Blood Glucose.: 190 mg/dL (07 Dec 2018 16:28)    PHYSICAL EXAMINATION:  ----------------------------------------  General appearance: No acute distress, Awake, Alert  HEENT: Normocephalic, Atraumatic, Conjunctiva clear, EOMI  Neck: Supple, No JVD, No tenderness  Lungs: Clear to auscultation, Breath sound equal bilaterally, No wheezes, No rales  Cardiovascular: S1S2, Regular rhythm  Abdomen: Soft, Nontender, Nondistended, No guarding/rebound, Positive bowel sounds  Extremities: No clubbing, No cyanosis, No edema, No calf tenderness  Neuro: Decreased strength and sensation to the lower extremities  Psychiatric: Appropriate mood, Normal affect    LABORATORY STUDIES:  ----------------------------------------             8.8    10.0  )-----------( 413      ( 08 Dec 2018 07:09 )             27.1     12-08    132<L>  |  94<L>  |  7.0<L>  ----------------------------<  140<H>  4.5   |  29.0  |  0.42<L>    Ca    8.1<L>      08 Dec 2018 07:09  Phos  3.3     12-08  Mg     1.9     12-08    MEDICATIONS  (STANDING):  dextrose 5%. 1000 milliLiter(s) (50 mL/Hr) IV Continuous <Continuous>  dextrose 50% Injectable 12.5 Gram(s) IV Push once  dextrose 50% Injectable 25 Gram(s) IV Push once  dextrose 50% Injectable 25 Gram(s) IV Push once  docusate sodium 100 milliGRAM(s) Oral three times a day  enoxaparin Injectable 40 milliGRAM(s) SubCutaneous every 24 hours  ergocalciferol 96525 Unit(s) Oral every week  gabapentin 300 milliGRAM(s) Oral every 8 hours  insulin lispro (HumaLOG) corrective regimen sliding scale   SubCutaneous three times a day before meals  insulin lispro (HumaLOG) corrective regimen sliding scale   SubCutaneous at bedtime  levothyroxine 150 MICROGram(s) Oral daily  lisinopril 20 milliGRAM(s) Oral daily  metoprolol succinate ER 25 milliGRAM(s) Oral daily  polyethylene glycol 3350 17 Gram(s) Oral daily  senna 2 Tablet(s) Oral at bedtime  sodium chloride 2% . 1000 milliLiter(s) (75 mL/Hr) IV Continuous <Continuous>  tamsulosin 0.8 milliGRAM(s) Oral at bedtime    MEDICATIONS  (PRN):  acetaminophen   Tablet .. 650 milliGRAM(s) Oral every 6 hours PRN Temp greater or equal to 38C (100.4F), Mild Pain (1 - 3)  aluminum hydroxide/magnesium hydroxide/simethicone Suspension 30 milliLiter(s) Oral every 12 hours PRN Indigestion  bisacodyl Suppository 10 milliGRAM(s) Rectal daily PRN Constipation  dextrose 40% Gel 15 Gram(s) Oral once PRN Blood Glucose LESS THAN 70 milliGRAM(s)/deciliter  glucagon  Injectable 1 milliGRAM(s) IntraMuscular once PRN Glucose LESS THAN 70 milligrams/deciliter  magnesium hydroxide Suspension 30 milliLiter(s) Oral every 12 hours PRN Constipation  methocarbamol 500 milliGRAM(s) Oral every 6 hours PRN Back Spasms  naloxone Injectable 0.1 milliGRAM(s) IV Push every 3 minutes PRN For ANY of the following changes in patient status:  A. RR LESS THAN 10 breaths per minute, B. Oxygen saturation LESS THAN 90%, C. Sedation score of 6  ondansetron Injectable 4 milliGRAM(s) IV Push every 6 hours PRN Nausea      ASSESSMENT / PLAN:  ----------------------------------------  Hyponatremia - Discussed with Nephrology. Improved with hypertonic saline, to discontinue. Monitoring repeat laboratory studies.    Spinal stenosis - Status post spinal surgery. Orthopedics following. Analgesic medications as needed. Will need transfer to a rehabilitation facility for further treatment once stable.    Diabetes - Insulin coverage, close monitoring of blood glucose levels.    Hypothyroidism - On levothyroxine. The dose was previously increased.    Hyponatremia - Mild. Resolved on repeat laboratory studies. Diet as tolerated.    BPH - On tamsulosin.    Hypertension - Close blood pressure monitoring. On lisinopril and metoprolol. TIMUR LOUIS  ----------------------------------------  The patient was seen and evaluated for hyponatremia.  The patient is in no acute distress.  Denied any chest pain, palpitations, dyspnea, or abdominal pain.  Reports some back pain.    Vital Signs Last 24 Hrs  T(C): 36.7 (08 Dec 2018 08:37), Max: 36.8 (07 Dec 2018 15:00)  T(F): 98 (08 Dec 2018 08:37), Max: 98.2 (07 Dec 2018 15:00)  HR: 86 (08 Dec 2018 08:37) (86 - 92)  BP: 140/70 (08 Dec 2018 08:37) (128/67 - 145/72)  BP(mean): --  RR: 18 (08 Dec 2018 08:37) (18 - 18)  SpO2: 98% (08 Dec 2018 08:37) (83% - 98%)    CAPILLARY BLOOD GLUCOSE  POCT Blood Glucose.: 139 mg/dL (08 Dec 2018 08:03)  POCT Blood Glucose.: 149 mg/dL (07 Dec 2018 21:59)  POCT Blood Glucose.: 190 mg/dL (07 Dec 2018 16:28)    PHYSICAL EXAMINATION:  ----------------------------------------  General appearance: No acute distress, Awake, Alert  HEENT: Normocephalic, Atraumatic, Conjunctiva clear, EOMI  Neck: Supple, No JVD, No tenderness  Lungs: Clear to auscultation, Breath sound equal bilaterally, No wheezes, No rales  Cardiovascular: S1S2, Regular rhythm  Abdomen: Soft, Nontender, Nondistended, No guarding/rebound, Positive bowel sounds  Extremities: No clubbing, No cyanosis, No edema, No calf tenderness  Neuro: Decreased strength and sensation to the lower extremities  Psychiatric: Appropriate mood, Normal affect    LABORATORY STUDIES:  ----------------------------------------             8.8    10.0  )-----------( 413      ( 08 Dec 2018 07:09 )             27.1     12-08    132<L>  |  94<L>  |  7.0<L>  ----------------------------<  140<H>  4.5   |  29.0  |  0.42<L>    Ca    8.1<L>      08 Dec 2018 07:09  Phos  3.3     12-08  Mg     1.9     12-08    MEDICATIONS  (STANDING):  dextrose 5%. 1000 milliLiter(s) (50 mL/Hr) IV Continuous <Continuous>  dextrose 50% Injectable 12.5 Gram(s) IV Push once  dextrose 50% Injectable 25 Gram(s) IV Push once  dextrose 50% Injectable 25 Gram(s) IV Push once  docusate sodium 100 milliGRAM(s) Oral three times a day  enoxaparin Injectable 40 milliGRAM(s) SubCutaneous every 24 hours  ergocalciferol 53175 Unit(s) Oral every week  gabapentin 300 milliGRAM(s) Oral every 8 hours  insulin lispro (HumaLOG) corrective regimen sliding scale   SubCutaneous three times a day before meals  insulin lispro (HumaLOG) corrective regimen sliding scale   SubCutaneous at bedtime  levothyroxine 150 MICROGram(s) Oral daily  lisinopril 20 milliGRAM(s) Oral daily  metoprolol succinate ER 25 milliGRAM(s) Oral daily  polyethylene glycol 3350 17 Gram(s) Oral daily  senna 2 Tablet(s) Oral at bedtime  sodium chloride 2% . 1000 milliLiter(s) (75 mL/Hr) IV Continuous <Continuous>  tamsulosin 0.8 milliGRAM(s) Oral at bedtime    MEDICATIONS  (PRN):  acetaminophen   Tablet .. 650 milliGRAM(s) Oral every 6 hours PRN Temp greater or equal to 38C (100.4F), Mild Pain (1 - 3)  aluminum hydroxide/magnesium hydroxide/simethicone Suspension 30 milliLiter(s) Oral every 12 hours PRN Indigestion  bisacodyl Suppository 10 milliGRAM(s) Rectal daily PRN Constipation  dextrose 40% Gel 15 Gram(s) Oral once PRN Blood Glucose LESS THAN 70 milliGRAM(s)/deciliter  glucagon  Injectable 1 milliGRAM(s) IntraMuscular once PRN Glucose LESS THAN 70 milligrams/deciliter  magnesium hydroxide Suspension 30 milliLiter(s) Oral every 12 hours PRN Constipation  methocarbamol 500 milliGRAM(s) Oral every 6 hours PRN Back Spasms  naloxone Injectable 0.1 milliGRAM(s) IV Push every 3 minutes PRN For ANY of the following changes in patient status:  A. RR LESS THAN 10 breaths per minute, B. Oxygen saturation LESS THAN 90%, C. Sedation score of 6  ondansetron Injectable 4 milliGRAM(s) IV Push every 6 hours PRN Nausea      ASSESSMENT / PLAN:  ----------------------------------------  Hyponatremia - Discussed with Nephrology. Improved with hypertonic saline, to discontinue. Monitoring repeat laboratory studies.    Spinal stenosis - Status post spinal surgery. Orthopedics following. Analgesic medications as needed. Will need transfer to a rehabilitation facility for further treatment once stable.    Diabetes - Insulin coverage, close monitoring of blood glucose levels.    Hypothyroidism - On levothyroxine. The dose was previously increased.    BPH - On tamsulosin.    Hypertension - Close blood pressure monitoring. On lisinopril and metoprolol.

## 2018-12-09 LAB
ANION GAP SERPL CALC-SCNC: 10 MMOL/L — SIGNIFICANT CHANGE UP (ref 5–17)
BUN SERPL-MCNC: 6 MG/DL — LOW (ref 8–20)
CALCIUM SERPL-MCNC: 8.3 MG/DL — LOW (ref 8.6–10.2)
CHLORIDE SERPL-SCNC: 88 MMOL/L — LOW (ref 98–107)
CO2 SERPL-SCNC: 28 MMOL/L — SIGNIFICANT CHANGE UP (ref 22–29)
CREAT SERPL-MCNC: 0.52 MG/DL — SIGNIFICANT CHANGE UP (ref 0.5–1.3)
GLUCOSE BLDC GLUCOMTR-MCNC: 156 MG/DL — HIGH (ref 70–99)
GLUCOSE BLDC GLUCOMTR-MCNC: 173 MG/DL — HIGH (ref 70–99)
GLUCOSE BLDC GLUCOMTR-MCNC: 235 MG/DL — HIGH (ref 70–99)
GLUCOSE BLDC GLUCOMTR-MCNC: 244 MG/DL — HIGH (ref 70–99)
GLUCOSE SERPL-MCNC: 151 MG/DL — HIGH (ref 70–115)
HCT VFR BLD CALC: 28 % — LOW (ref 42–52)
HGB BLD-MCNC: 8.9 G/DL — LOW (ref 14–18)
MCHC RBC-ENTMCNC: 27 PG — SIGNIFICANT CHANGE UP (ref 27–31)
MCHC RBC-ENTMCNC: 31.8 G/DL — LOW (ref 32–36)
MCV RBC AUTO: 84.8 FL — SIGNIFICANT CHANGE UP (ref 80–94)
PLATELET # BLD AUTO: 433 K/UL — HIGH (ref 150–400)
POTASSIUM SERPL-MCNC: 3.9 MMOL/L — SIGNIFICANT CHANGE UP (ref 3.5–5.3)
POTASSIUM SERPL-SCNC: 3.9 MMOL/L — SIGNIFICANT CHANGE UP (ref 3.5–5.3)
RBC # BLD: 3.3 M/UL — LOW (ref 4.6–6.2)
RBC # FLD: 14.4 % — SIGNIFICANT CHANGE UP (ref 11–15.6)
SODIUM SERPL-SCNC: 126 MMOL/L — LOW (ref 135–145)
T4 FREE SERPL-MCNC: 1.5 NG/DL — SIGNIFICANT CHANGE UP (ref 0.9–1.8)
WBC # BLD: 12.7 K/UL — HIGH (ref 4.8–10.8)
WBC # FLD AUTO: 12.7 K/UL — HIGH (ref 4.8–10.8)

## 2018-12-09 PROCEDURE — 99233 SBSQ HOSP IP/OBS HIGH 50: CPT

## 2018-12-09 PROCEDURE — 99232 SBSQ HOSP IP/OBS MODERATE 35: CPT

## 2018-12-09 RX ORDER — OXYCODONE HYDROCHLORIDE 5 MG/1
10 TABLET ORAL EVERY 4 HOURS
Refills: 0 | Status: DISCONTINUED | OUTPATIENT
Start: 2018-12-09 | End: 2018-12-10

## 2018-12-09 RX ORDER — SODIUM CHLORIDE 9 MG/ML
1 INJECTION INTRAMUSCULAR; INTRAVENOUS; SUBCUTANEOUS THREE TIMES A DAY
Refills: 0 | Status: DISCONTINUED | OUTPATIENT
Start: 2018-12-09 | End: 2018-12-10

## 2018-12-09 RX ORDER — OXYCODONE HYDROCHLORIDE 5 MG/1
5 TABLET ORAL EVERY 4 HOURS
Refills: 0 | Status: DISCONTINUED | OUTPATIENT
Start: 2018-12-09 | End: 2018-12-10

## 2018-12-09 RX ADMIN — METHOCARBAMOL 500 MILLIGRAM(S): 500 TABLET, FILM COATED ORAL at 17:24

## 2018-12-09 RX ADMIN — GABAPENTIN 300 MILLIGRAM(S): 400 CAPSULE ORAL at 14:20

## 2018-12-09 RX ADMIN — GABAPENTIN 300 MILLIGRAM(S): 400 CAPSULE ORAL at 05:17

## 2018-12-09 RX ADMIN — TAMSULOSIN HYDROCHLORIDE 0.8 MILLIGRAM(S): 0.4 CAPSULE ORAL at 22:27

## 2018-12-09 RX ADMIN — ENOXAPARIN SODIUM 40 MILLIGRAM(S): 100 INJECTION SUBCUTANEOUS at 05:18

## 2018-12-09 RX ADMIN — SODIUM CHLORIDE 1 GRAM(S): 9 INJECTION INTRAMUSCULAR; INTRAVENOUS; SUBCUTANEOUS at 14:20

## 2018-12-09 RX ADMIN — Medication 2: at 08:43

## 2018-12-09 RX ADMIN — OXYCODONE HYDROCHLORIDE 10 MILLIGRAM(S): 5 TABLET ORAL at 22:49

## 2018-12-09 RX ADMIN — Medication 100 MILLIGRAM(S): at 14:20

## 2018-12-09 RX ADMIN — Medication 100 MILLIGRAM(S): at 05:17

## 2018-12-09 RX ADMIN — Medication 150 MICROGRAM(S): at 05:18

## 2018-12-09 RX ADMIN — POLYETHYLENE GLYCOL 3350 17 GRAM(S): 17 POWDER, FOR SOLUTION ORAL at 13:01

## 2018-12-09 RX ADMIN — Medication 4: at 17:22

## 2018-12-09 RX ADMIN — SODIUM CHLORIDE 1 GRAM(S): 9 INJECTION INTRAMUSCULAR; INTRAVENOUS; SUBCUTANEOUS at 22:27

## 2018-12-09 RX ADMIN — GABAPENTIN 300 MILLIGRAM(S): 400 CAPSULE ORAL at 22:27

## 2018-12-09 RX ADMIN — METHOCARBAMOL 500 MILLIGRAM(S): 500 TABLET, FILM COATED ORAL at 05:17

## 2018-12-09 RX ADMIN — SENNA PLUS 2 TABLET(S): 8.6 TABLET ORAL at 22:27

## 2018-12-09 RX ADMIN — Medication 4: at 12:24

## 2018-12-09 RX ADMIN — LISINOPRIL 20 MILLIGRAM(S): 2.5 TABLET ORAL at 05:18

## 2018-12-09 RX ADMIN — OXYCODONE HYDROCHLORIDE 10 MILLIGRAM(S): 5 TABLET ORAL at 23:50

## 2018-12-09 RX ADMIN — Medication 25 MILLIGRAM(S): at 05:18

## 2018-12-09 RX ADMIN — Medication 100 MILLIGRAM(S): at 22:27

## 2018-12-09 NOTE — PROGRESS NOTE ADULT - SUBJECTIVE AND OBJECTIVE BOX
Procedure: T11 & T10 laminectomy/T10 through L1 posterior instrumented and posterolateral fusion for thoracic stenosis with myelopathy  Surgeon: Dr Gray      Pt in bed sleeping comfortably  Denies any acute motor or sensory changes  Denies CP, SOB, N/V, numbness/tingling     Vital Signs Last 24 Hrs  T(C): 36.6 (09 Dec 2018 08:11), Max: 37.2 (08 Dec 2018 16:13)  T(F): 97.8 (09 Dec 2018 08:11), Max: 98.9 (08 Dec 2018 16:13)  HR: 94 (09 Dec 2018 08:11) (88 - 102)  BP: 102/50 (09 Dec 2018 08:11) (102/50 - 157/75)  BP(mean): --  RR: 18 (09 Dec 2018 08:11) (18 - 18)  SpO2: 96% (09 Dec 2018 08:11) (96% - 99%)                                      8.9    12.7  )-----------( 433      ( 09 Dec 2018 07:31 )             28.0   12-09    126<L>  |  88<L>  |  6.0<L>  ----------------------------<  151<H>  3.9   |  28.0  |  0.52    Ca    8.3<L>      09 Dec 2018 07:31  Phos  3.3     12-08  Mg     1.9     12-08         	    General: Pt Alert and oriented, NAD, controlled pain.  Dressing removed to reveal proximal incision has minimal amount of serosanguinous drainage   No active serosanguinous drainage noted.  Incision healing well  New dressing applied  Pulses: 2+ dorsalis pedis pulse. Cap refill < 2 sec.  Sensation: Grossly intact to light touch without deficit.  Motor: + EHL/FHL Motor B/L LE 3+    A/P: 73y Male POD#10 s/p T11 & T10 laminectomy/T10 through L1 posterior instrumented and posterolateral fusion for thoracic stenosis with myelopathy    - Pain Control  - DVT ppx: Lovenox until ambulating well or 28 days post op, SCD's  - Weight bearing status: WBAT with TLSO brace  - dressing change/ wound check daily  - Continue care per primary team  - DC planning is [facility] Monday 12/10/18

## 2018-12-09 NOTE — PROGRESS NOTE ADULT - SUBJECTIVE AND OBJECTIVE BOX
[facility] DIVISION OF KIDNEY DISEASES AND HYPERTENSION -- FOLLOW UP NOTE  --------------------------------------------------------------------------------  Chief Complaint:  hyponatremia  24 hour events/subjective:  pt seen and examined;  recalled as Na dropped off 2%        PAST HISTORY  --------------------------------------------------------------------------------  No significant changes to PMH, PSH, FHx, SHx, unless otherwise noted    ALLERGIES & MEDICATIONS  --------------------------------------------------------------------------------  Allergies    No Known Allergies    Intolerances      Standing Inpatient Medications  dextrose 5%. 1000 milliLiter(s) IV Continuous <Continuous>  dextrose 50% Injectable 12.5 Gram(s) IV Push once  dextrose 50% Injectable 25 Gram(s) IV Push once  dextrose 50% Injectable 25 Gram(s) IV Push once  docusate sodium 100 milliGRAM(s) Oral three times a day  enoxaparin Injectable 40 milliGRAM(s) SubCutaneous every 24 hours  ergocalciferol 65062 Unit(s) Oral every week  gabapentin 300 milliGRAM(s) Oral every 8 hours  insulin lispro (HumaLOG) corrective regimen sliding scale   SubCutaneous three times a day before meals  insulin lispro (HumaLOG) corrective regimen sliding scale   SubCutaneous at bedtime  levothyroxine 150 MICROGram(s) Oral daily  lisinopril 20 milliGRAM(s) Oral daily  metoprolol succinate ER 25 milliGRAM(s) Oral daily  polyethylene glycol 3350 17 Gram(s) Oral daily  senna 2 Tablet(s) Oral at bedtime  sodium chloride 1 Gram(s) Oral three times a day  tamsulosin 0.8 milliGRAM(s) Oral at bedtime    PRN Inpatient Medications  acetaminophen   Tablet .. 650 milliGRAM(s) Oral every 6 hours PRN  aluminum hydroxide/magnesium hydroxide/simethicone Suspension 30 milliLiter(s) Oral every 12 hours PRN  bisacodyl Suppository 10 milliGRAM(s) Rectal daily PRN  dextrose 40% Gel 15 Gram(s) Oral once PRN  glucagon  Injectable 1 milliGRAM(s) IntraMuscular once PRN  magnesium hydroxide Suspension 30 milliLiter(s) Oral every 12 hours PRN  methocarbamol 500 milliGRAM(s) Oral every 6 hours PRN  naloxone Injectable 0.1 milliGRAM(s) IV Push every 3 minutes PRN  ondansetron Injectable 4 milliGRAM(s) IV Push every 6 hours PRN      REVIEW OF SYSTEMS  --------------------------------------------------------------------------------  Gen: No weight changes, fatigue, fevers/chills, weakness  Skin: No rashes  Head/Eyes/Ears/Mouth: No headache; Normal hearing; Normal vision w/o blurriness; No sinus pain/discomfort, sore throat  Respiratory: No dyspnea, cough, wheezing, hemoptysis  CV: No chest pain, PND, orthopnea  GI: No abdominal pain, diarrhea, constipation, nausea, vomiting, melena, hematochezia  : No increased frequency, dysuria, hematuria, nocturia  MSK: No joint pain/swelling; no back pain; no edema  Neuro: No dizziness/lightheadedness, weakness, seizures, numbness, tingling  Heme: No easy bruising or bleeding  Endo: No heat/cold intolerance  Psych: No significant nervousness, anxiety, stress, depression    All other systems were reviewed and are negative, except as noted.    VITALS/PHYSICAL EXAM  --------------------------------------------------------------------------------  T(C): 36.6 (12-09-18 @ 08:11), Max: 37.2 (12-08-18 @ 16:13)  HR: 94 (12-09-18 @ 08:11) (88 - 102)  BP: 102/50 (12-09-18 @ 08:11) (102/50 - 157/75)  RR: 18 (12-09-18 @ 08:11) (18 - 18)  SpO2: 96% (12-09-18 @ 08:11) (96% - 99%)  Wt(kg): --        12-08-18 @ 07:01  -  12-09-18 @ 07:00  --------------------------------------------------------  IN: 500 mL / OUT: 0 mL / NET: 500 mL      Physical Exam:  	Gen: NAD, well-appearing  	HEENT: PERRL, supple neck, clear oropharynx  	Pulm: CTA B/L  	CV: RRR, S1S2; no rub  	Back: No spinal or CVA tenderness; no sacral edema  	Abd: +BS, soft, nontender/nondistended  	: No suprapubic tenderness  	UE: Warm, FROM, no clubbing, intact strength; no edema; no asterixis  	LE: Warm, FROM, no clubbing, intact strength; no edema  	Neuro: No focal deficits, intact gait  	Psych: Normal affect and mood  	Skin: Warm, without rashes  	Vascular access:    LABS/STUDIES  --------------------------------------------------------------------------------              8.9    12.7  >-----------<  433      [12-09-18 @ 07:31]              28.0     126  |  88  |  6.0  ----------------------------<  151      [12-09-18 @ 07:31]  3.9   |  28.0  |  0.52        Ca     8.3     [12-09-18 @ 07:31]      Mg     1.9     [12-08-18 @ 07:09]      Phos  3.3     [12-08-18 @ 07:09]            Creatinine Trend:  SCr 0.52 [12-09 @ 07:31]  SCr 0.42 [12-08 @ 07:09]  SCr 0.46 [12-07 @ 06:56]  SCr 0.58 [12-06 @ 07:59]  SCr 0.44 [12-05 @ 11:39]    Urinalysis - [12-05-18 @ 18:28]      Color Yellow / Appearance Clear / SG 1.010 / pH 7.0      Gluc Negative / Ketone Small  / Bili Negative / Urobili 8       Blood Trace / Protein 30 / Leuk Est Small / Nitrite Negative      RBC 0-2 / WBC 3-5 / Hyaline  / Gran  / Sq Epi  / Non Sq Epi Occasional / Bacteria Few    Urine Creatinine 51      [12-04-18 @ 15:12]  Urine Sodium 232      [12-04-18 @ 15:12]  Urine Osmolality 613      [12-04-18 @ 15:12]    Vitamin D (25OH) 17.8      [12-06-18 @ 17:36]  HbA1c 7.5      [11-28-18 @ 06:08]  TSH 16.19      [12-06-18 @ 07:59]      BELLA: titer 1:80, pattern Homogeneous      [11-27-18 @ 22:20]  ANCA: cANCA Negative, pANCA Negative, atypical ANCA Negative      [11-29-18 @ 23:33]  Syphilis Screen (Treponema Pallidum Ab) Negative      [11-27-18 @ 22:20]  Immunofixation Serum:   No Monoclonal Band Identified      [11-27-18 @ 22:20]

## 2018-12-09 NOTE — PROGRESS NOTE ADULT - SUBJECTIVE AND OBJECTIVE BOX
TIMUR LOUIS  ----------------------------------------  The patient was seen and evaluated for hyponatremia.  The patient is in no acute distress.  Denied any chest pain, palpitations, dyspnea, or abdominal pain.  Reports some back pain.    Vital Signs Last 24 Hrs  T(C): 36.6 (09 Dec 2018 08:11), Max: 37.2 (08 Dec 2018 16:13)  T(F): 97.8 (09 Dec 2018 08:11), Max: 98.9 (08 Dec 2018 16:13)  HR: 94 (09 Dec 2018 08:11) (88 - 102)  BP: 102/50 (09 Dec 2018 08:11) (102/50 - 157/75)  BP(mean): --  RR: 18 (09 Dec 2018 08:11) (18 - 18)  SpO2: 96% (09 Dec 2018 08:11) (96% - 99%)    CAPILLARY BLOOD GLUCOSE      POCT Blood Glucose.: 156 mg/dL (09 Dec 2018 08:23)  POCT Blood Glucose.: 187 mg/dL (08 Dec 2018 22:21)  POCT Blood Glucose.: 252 mg/dL (08 Dec 2018 16:11)  POCT Blood Glucose.: 241 mg/dL (08 Dec 2018 12:17)    PHYSICAL EXAMINATION:  ----------------------------------------  General appearance: No acute distress, Awake, Alert  HEENT: Normocephalic, Atraumatic, Conjunctiva clear, EOMI  Neck: Supple, No JVD, No tenderness  Lungs: Clear to auscultation, Breath sound equal bilaterally, No wheezes, No rales  Cardiovascular: S1S2, Regular rhythm  Abdomen: Soft, Nontender, Nondistended, No guarding/rebound, Positive bowel sounds, back dressing clean and intact  Extremities: No clubbing, No cyanosis, No edema, No calf tenderness  Neuro: Decreased strength and sensation to the lower extremities  Psychiatric: Appropriate mood, Normal affect    LABORATORY STUDIES:  ----------------------------------------             8.9    12.7  )-----------( 433      ( 09 Dec 2018 07:31 )             28.0     12-09    126<L>  |  88<L>  |  6.0<L>  ----------------------------<  151<H>  3.9   |  28.0  |  0.52    Ca    8.3<L>      09 Dec 2018 07:31  Phos  3.3     12-08  Mg     1.9     12-08    MEDICATIONS  (STANDING):  dextrose 5%. 1000 milliLiter(s) (50 mL/Hr) IV Continuous <Continuous>  dextrose 50% Injectable 12.5 Gram(s) IV Push once  dextrose 50% Injectable 25 Gram(s) IV Push once  dextrose 50% Injectable 25 Gram(s) IV Push once  docusate sodium 100 milliGRAM(s) Oral three times a day  enoxaparin Injectable 40 milliGRAM(s) SubCutaneous every 24 hours  ergocalciferol 68531 Unit(s) Oral every week  gabapentin 300 milliGRAM(s) Oral every 8 hours  insulin lispro (HumaLOG) corrective regimen sliding scale   SubCutaneous three times a day before meals  insulin lispro (HumaLOG) corrective regimen sliding scale   SubCutaneous at bedtime  levothyroxine 150 MICROGram(s) Oral daily  lisinopril 20 milliGRAM(s) Oral daily  metoprolol succinate ER 25 milliGRAM(s) Oral daily  polyethylene glycol 3350 17 Gram(s) Oral daily  senna 2 Tablet(s) Oral at bedtime  sodium chloride 1 Gram(s) Oral three times a day  tamsulosin 0.8 milliGRAM(s) Oral at bedtime    MEDICATIONS  (PRN):  acetaminophen   Tablet .. 650 milliGRAM(s) Oral every 6 hours PRN Temp greater or equal to 38C (100.4F), Mild Pain (1 - 3)  aluminum hydroxide/magnesium hydroxide/simethicone Suspension 30 milliLiter(s) Oral every 12 hours PRN Indigestion  bisacodyl Suppository 10 milliGRAM(s) Rectal daily PRN Constipation  dextrose 40% Gel 15 Gram(s) Oral once PRN Blood Glucose LESS THAN 70 milliGRAM(s)/deciliter  glucagon  Injectable 1 milliGRAM(s) IntraMuscular once PRN Glucose LESS THAN 70 milligrams/deciliter  magnesium hydroxide Suspension 30 milliLiter(s) Oral every 12 hours PRN Constipation  methocarbamol 500 milliGRAM(s) Oral every 6 hours PRN Back Spasms  naloxone Injectable 0.1 milliGRAM(s) IV Push every 3 minutes PRN For ANY of the following changes in patient status:  A. RR LESS THAN 10 breaths per minute, B. Oxygen saturation LESS THAN 90%, C. Sedation score of 6  ondansetron Injectable 4 milliGRAM(s) IV Push every 6 hours PRN Nausea      ASSESSMENT / PLAN:  ----------------------------------------  Hyponatremia - Sodium level decreased after discontinuation of hypertonic saline. Discussed with Nephrology. To continue on fluid restriction with addition of sodium chloride supplementation.    Spinal stenosis - Status post spinal surgery. Orthopedics following. Analgesic medications as needed. Will need transfer to a rehabilitation facility for further treatment once stable.    Diabetes - Insulin coverage, close monitoring of blood glucose levels.    Hypothyroidism - On levothyroxine. The dose was previously increased.    BPH - On tamsulosin.    Hypertension - Close blood pressure monitoring. On lisinopril and metoprolol.

## 2018-12-09 NOTE — PROGRESS NOTE ADULT - SUBJECTIVE AND OBJECTIVE BOX
Patient is status post revision thoracic laminectomy and fusion secondary to progressive motor loss and functional paralysis. Patient has no complaints and is 24-hour course I would say the patient is disgruntled and frustrated with his lower extremity motor function. Patient is aware that he may not gain significant strength back after surgery and I think at this point in time patient needs to be prepared for subacute rehabilitation.    His physical exam demonstrates a dressing that does have some mild saturation in the proximal aspect. There is some mild fluctuance some mild seroma but I would not recommend revision surgery/debridement at this point in time upper extremity motor strength is well maintained lower extremity motor strength I believe is consistent with his presurgical exam there is no acute deficits. Strength is diminished worse on the left.    His thoracic myelopathy status post thoracic revision laminectomy and fusion.    His plan dressing change today with a PA staff and nursing staff. Prepare for discharged to subacute rehabilitation and early coming week

## 2018-12-09 NOTE — PROGRESS NOTE ADULT - ASSESSMENT
SIADH    Hypothyroidism,    1L fluid restriction; salt tabs 1gm TID for now    Will follow    hossein Burgos

## 2018-12-10 VITALS
DIASTOLIC BLOOD PRESSURE: 62 MMHG | TEMPERATURE: 98 F | RESPIRATION RATE: 18 BRPM | SYSTOLIC BLOOD PRESSURE: 127 MMHG | OXYGEN SATURATION: 96 % | HEART RATE: 94 BPM

## 2018-12-10 LAB
ANION GAP SERPL CALC-SCNC: 11 MMOL/L — SIGNIFICANT CHANGE UP (ref 5–17)
BUN SERPL-MCNC: 9 MG/DL — SIGNIFICANT CHANGE UP (ref 8–20)
CALCIUM SERPL-MCNC: 8.4 MG/DL — LOW (ref 8.6–10.2)
CHLORIDE SERPL-SCNC: 90 MMOL/L — LOW (ref 98–107)
CO2 SERPL-SCNC: 28 MMOL/L — SIGNIFICANT CHANGE UP (ref 22–29)
CREAT SERPL-MCNC: 0.47 MG/DL — LOW (ref 0.5–1.3)
CULTURE RESULTS: SIGNIFICANT CHANGE UP
CULTURE RESULTS: SIGNIFICANT CHANGE UP
GLUCOSE BLDC GLUCOMTR-MCNC: 149 MG/DL — HIGH (ref 70–99)
GLUCOSE BLDC GLUCOMTR-MCNC: 204 MG/DL — HIGH (ref 70–99)
GLUCOSE SERPL-MCNC: 153 MG/DL — HIGH (ref 70–115)
POTASSIUM SERPL-MCNC: 4.5 MMOL/L — SIGNIFICANT CHANGE UP (ref 3.5–5.3)
POTASSIUM SERPL-SCNC: 4.5 MMOL/L — SIGNIFICANT CHANGE UP (ref 3.5–5.3)
SODIUM SERPL-SCNC: 129 MMOL/L — LOW (ref 135–145)
SPECIMEN SOURCE: SIGNIFICANT CHANGE UP
SPECIMEN SOURCE: SIGNIFICANT CHANGE UP

## 2018-12-10 PROCEDURE — 99239 HOSP IP/OBS DSCHRG MGMT >30: CPT

## 2018-12-10 PROCEDURE — 99233 SBSQ HOSP IP/OBS HIGH 50: CPT

## 2018-12-10 RX ORDER — METHOCARBAMOL 500 MG/1
1 TABLET, FILM COATED ORAL
Qty: 0 | Refills: 0 | DISCHARGE
Start: 2018-12-10

## 2018-12-10 RX ORDER — GABAPENTIN 400 MG/1
1 CAPSULE ORAL
Qty: 0 | Refills: 0 | DISCHARGE
Start: 2018-12-10

## 2018-12-10 RX ORDER — OXYCODONE HYDROCHLORIDE 5 MG/1
1 TABLET ORAL
Qty: 0 | Refills: 0 | DISCHARGE
Start: 2018-12-10

## 2018-12-10 RX ORDER — SODIUM CHLORIDE 9 MG/ML
1 INJECTION INTRAMUSCULAR; INTRAVENOUS; SUBCUTANEOUS
Qty: 0 | Refills: 0 | DISCHARGE
Start: 2018-12-10

## 2018-12-10 RX ORDER — DOCUSATE SODIUM 100 MG
1 CAPSULE ORAL
Qty: 0 | Refills: 0 | DISCHARGE
Start: 2018-12-10

## 2018-12-10 RX ORDER — ENOXAPARIN SODIUM 100 MG/ML
40 INJECTION SUBCUTANEOUS
Qty: 0 | Refills: 0 | DISCHARGE
Start: 2018-12-10

## 2018-12-10 RX ORDER — TAMSULOSIN HYDROCHLORIDE 0.4 MG/1
2 CAPSULE ORAL
Qty: 0 | Refills: 0 | DISCHARGE
Start: 2018-12-10

## 2018-12-10 RX ORDER — METOPROLOL TARTRATE 50 MG
1 TABLET ORAL
Qty: 0 | Refills: 0 | DISCHARGE
Start: 2018-12-10

## 2018-12-10 RX ORDER — POLYETHYLENE GLYCOL 3350 17 G/17G
17 POWDER, FOR SOLUTION ORAL
Qty: 0 | Refills: 0 | DISCHARGE
Start: 2018-12-10

## 2018-12-10 RX ORDER — LEVOTHYROXINE SODIUM 125 MCG
1 TABLET ORAL
Qty: 0 | Refills: 0 | DISCHARGE
Start: 2018-12-10

## 2018-12-10 RX ORDER — SENNA PLUS 8.6 MG/1
2 TABLET ORAL
Qty: 0 | Refills: 0 | DISCHARGE
Start: 2018-12-10

## 2018-12-10 RX ADMIN — SODIUM CHLORIDE 1 GRAM(S): 9 INJECTION INTRAMUSCULAR; INTRAVENOUS; SUBCUTANEOUS at 05:37

## 2018-12-10 RX ADMIN — Medication 4: at 11:49

## 2018-12-10 RX ADMIN — METHOCARBAMOL 500 MILLIGRAM(S): 500 TABLET, FILM COATED ORAL at 11:47

## 2018-12-10 RX ADMIN — Medication 150 MICROGRAM(S): at 05:37

## 2018-12-10 RX ADMIN — GABAPENTIN 300 MILLIGRAM(S): 400 CAPSULE ORAL at 13:45

## 2018-12-10 RX ADMIN — Medication 100 MILLIGRAM(S): at 05:37

## 2018-12-10 RX ADMIN — LISINOPRIL 20 MILLIGRAM(S): 2.5 TABLET ORAL at 09:22

## 2018-12-10 RX ADMIN — Medication 100 MILLIGRAM(S): at 11:44

## 2018-12-10 RX ADMIN — GABAPENTIN 300 MILLIGRAM(S): 400 CAPSULE ORAL at 05:37

## 2018-12-10 RX ADMIN — ENOXAPARIN SODIUM 40 MILLIGRAM(S): 100 INJECTION SUBCUTANEOUS at 05:37

## 2018-12-10 RX ADMIN — Medication 25 MILLIGRAM(S): at 09:22

## 2018-12-10 RX ADMIN — SODIUM CHLORIDE 1 GRAM(S): 9 INJECTION INTRAMUSCULAR; INTRAVENOUS; SUBCUTANEOUS at 11:44

## 2018-12-10 NOTE — PROGRESS NOTE ADULT - SUBJECTIVE AND OBJECTIVE BOX
[facility] DIVISION OF KIDNEY DISEASES AND HYPERTENSION -- FOLLOW UP NOTE  --------------------------------------------------------------------------------  Chief Complaint:  hyponatremia  24 hour events/subjective:  pt seen and examined;  Na improved to 129    PAST HISTORY  --------------------------------------------------------------------------------  No significant changes to PMH, PSH, FHx, SHx, unless otherwise noted    ALLERGIES & MEDICATIONS  --------------------------------------------------------------------------------  Allergies    No Known Allergies    Intolerances      Standing Inpatient Medications  dextrose 5%. 1000 milliLiter(s) IV Continuous <Continuous>  dextrose 50% Injectable 12.5 Gram(s) IV Push once  dextrose 50% Injectable 25 Gram(s) IV Push once  dextrose 50% Injectable 25 Gram(s) IV Push once  docusate sodium 100 milliGRAM(s) Oral three times a day  enoxaparin Injectable 40 milliGRAM(s) SubCutaneous every 24 hours  ergocalciferol 06510 Unit(s) Oral every week  gabapentin 300 milliGRAM(s) Oral every 8 hours  insulin lispro (HumaLOG) corrective regimen sliding scale   SubCutaneous three times a day before meals  insulin lispro (HumaLOG) corrective regimen sliding scale   SubCutaneous at bedtime  levothyroxine 150 MICROGram(s) Oral daily  lisinopril 20 milliGRAM(s) Oral daily  metoprolol succinate ER 25 milliGRAM(s) Oral daily  polyethylene glycol 3350 17 Gram(s) Oral daily  senna 2 Tablet(s) Oral at bedtime  sodium chloride 1 Gram(s) Oral three times a day  tamsulosin 0.8 milliGRAM(s) Oral at bedtime    PRN Inpatient Medications  acetaminophen   Tablet .. 650 milliGRAM(s) Oral every 6 hours PRN  aluminum hydroxide/magnesium hydroxide/simethicone Suspension 30 milliLiter(s) Oral every 12 hours PRN  bisacodyl Suppository 10 milliGRAM(s) Rectal daily PRN  dextrose 40% Gel 15 Gram(s) Oral once PRN  glucagon  Injectable 1 milliGRAM(s) IntraMuscular once PRN  magnesium hydroxide Suspension 30 milliLiter(s) Oral every 12 hours PRN  methocarbamol 500 milliGRAM(s) Oral every 6 hours PRN  naloxone Injectable 0.1 milliGRAM(s) IV Push every 3 minutes PRN  ondansetron Injectable 4 milliGRAM(s) IV Push every 6 hours PRN  oxyCODONE    IR 5 milliGRAM(s) Oral every 4 hours PRN  oxyCODONE    IR 10 milliGRAM(s) Oral every 4 hours PRN      REVIEW OF SYSTEMS  --------------------------------------------------------------------------------  Gen: No weight changes, fatigue, fevers/chills, weakness  Skin: No rashes  Head/Eyes/Ears/Mouth: No headache; Normal hearing; Normal vision w/o blurriness; No sinus pain/discomfort, sore throat  Respiratory: No dyspnea, cough, wheezing, hemoptysis  CV: No chest pain, PND, orthopnea  GI: No abdominal pain, diarrhea, constipation, nausea, vomiting, melena, hematochezia  : No increased frequency, dysuria, hematuria, nocturia  MSK: No joint pain/swelling; no back pain; no edema  Neuro: No dizziness/lightheadedness, weakness, seizures, numbness, tingling  Heme: No easy bruising or bleeding  Endo: No heat/cold intolerance  Psych: No significant nervousness, anxiety, stress, depression    All other systems were reviewed and are negative, except as noted.    VITALS/PHYSICAL EXAM  --------------------------------------------------------------------------------  T(C): 36.7 (12-10-18 @ 08:17), Max: 37.6 (12-09-18 @ 19:42)  HR: 94 (12-10-18 @ 08:17) (94 - 98)  BP: 127/62 (12-10-18 @ 08:17) (98/52 - 134/64)  RR: 18 (12-10-18 @ 08:17) (18 - 18)  SpO2: 96% (12-10-18 @ 08:17) (96% - 100%)  Wt(kg): --        12-09-18 @ 07:01  -  12-10-18 @ 07:00  --------------------------------------------------------  IN: 200 mL / OUT: 300 mL / NET: -100 mL      Physical Exam:  	Gen: NAD, well-appearing  	HEENT: PERRL, supple neck, clear oropharynx  	Pulm: CTA B/L  	CV: RRR, S1S2; no rub  	Back: No spinal or CVA tenderness; no sacral edema  	Abd: +BS, soft, nontender/nondistended  	: No suprapubic tenderness  	UE: Warm, FROM, no clubbing, intact strength; no edema; no asterixis  	LE: Warm, FROM, no clubbing, intact strength; no edema  	Neuro: No focal deficits, intact gait  	Psych: Normal affect and mood  	Skin: Warm, without rashes  	Vascular access:    LABS/STUDIES  --------------------------------------------------------------------------------              8.9    12.7  >-----------<  433      [12-09-18 @ 07:31]              28.0     129  |  90  |  9.0  ----------------------------<  153      [12-10-18 @ 06:21]  4.5   |  28.0  |  0.47        Ca     8.4     [12-10-18 @ 06:21]            Creatinine Trend:  SCr 0.47 [12-10 @ 06:21]  SCr 0.52 [12-09 @ 07:31]  SCr 0.42 [12-08 @ 07:09]  SCr 0.46 [12-07 @ 06:56]  SCr 0.58 [12-06 @ 07:59]    Urinalysis - [12-05-18 @ 18:28]      Color Yellow / Appearance Clear / SG 1.010 / pH 7.0      Gluc Negative / Ketone Small  / Bili Negative / Urobili 8       Blood Trace / Protein 30 / Leuk Est Small / Nitrite Negative      RBC 0-2 / WBC 3-5 / Hyaline  / Gran  / Sq Epi  / Non Sq Epi Occasional / Bacteria Few    Urine Creatinine 51      [12-04-18 @ 15:12]  Urine Sodium 232      [12-04-18 @ 15:12]  Urine Osmolality 613      [12-04-18 @ 15:12]    Vitamin D (25OH) 17.8      [12-06-18 @ 17:36]  HbA1c 7.5      [11-28-18 @ 06:08]  TSH 16.19      [12-06-18 @ 07:59]      BELLA: titer 1:80, pattern Homogeneous      [11-27-18 @ 22:20]  ANCA: cANCA Negative, pANCA Negative, atypical ANCA Negative      [11-29-18 @ 23:33]  Syphilis Screen (Treponema Pallidum Ab) Negative      [11-27-18 @ 22:20]  Immunofixation Serum:   No Monoclonal Band Identified      [11-27-18 @ 22:20]

## 2018-12-10 NOTE — PROGRESS NOTE ADULT - SUBJECTIVE AND OBJECTIVE BOX
TIMUR LOUIS  ----------------------------------------  The patient was seen and evaluated for hyponatremia.  The patient is in no acute distress.  Denied any chest pain, palpitations, dyspnea, or abdominal pain.  Reported some back pain.    Vital Signs Last 24 Hrs  T(C): 36.7 (10 Dec 2018 08:17), Max: 37.6 (09 Dec 2018 19:42)  T(F): 98.1 (10 Dec 2018 08:17), Max: 99.7 (09 Dec 2018 19:42)  HR: 94 (10 Dec 2018 08:17) (87 - 98)  BP: 127/62 (10 Dec 2018 08:17) (98/52 - 134/64)  BP(mean): --  RR: 18 (10 Dec 2018 08:17) (18 - 18)  SpO2: 96% (10 Dec 2018 08:17) (96% - 100%)    CAPILLARY BLOOD GLUCOSE  POCT Blood Glucose.: 204 mg/dL (10 Dec 2018 11:45)  POCT Blood Glucose.: 149 mg/dL (10 Dec 2018 09:22)  POCT Blood Glucose.: 173 mg/dL (09 Dec 2018 22:26)  POCT Blood Glucose.: 235 mg/dL (09 Dec 2018 17:13)    PHYSICAL EXAMINATION:  ----------------------------------------  General appearance: No acute distress, Awake, Alert  HEENT: Normocephalic, Atraumatic, Conjunctiva clear, EOMI  Neck: Supple, No JVD, No tenderness  Lungs: Clear to auscultation, Breath sound equal bilaterally, No wheezes, No rales  Cardiovascular: S1S2, Regular rhythm  Abdomen: Soft, Nontender, Nondistended, No guarding/rebound, Positive bowel sounds  Extremities: No clubbing, No cyanosis, No edema, No calf tenderness  Neuro: Decreased strength and sensation to the lower extremities  Psychiatric: Appropriate mood, Normal affect    LABORATORY STUDIES:  ----------------------------------------             8.9    12.7  )-----------( 433      ( 09 Dec 2018 07:31 )             28.0     12-10    129<L>  |  90<L>  |  9.0  ----------------------------<  153<H>  4.5   |  28.0  |  0.47<L>    Ca    8.4<L>      10 Dec 2018 06:21    MEDICATIONS  (STANDING):  dextrose 5%. 1000 milliLiter(s) (50 mL/Hr) IV Continuous <Continuous>  dextrose 50% Injectable 12.5 Gram(s) IV Push once  dextrose 50% Injectable 25 Gram(s) IV Push once  dextrose 50% Injectable 25 Gram(s) IV Push once  docusate sodium 100 milliGRAM(s) Oral three times a day  enoxaparin Injectable 40 milliGRAM(s) SubCutaneous every 24 hours  ergocalciferol 77598 Unit(s) Oral every week  gabapentin 300 milliGRAM(s) Oral every 8 hours  insulin lispro (HumaLOG) corrective regimen sliding scale   SubCutaneous three times a day before meals  insulin lispro (HumaLOG) corrective regimen sliding scale   SubCutaneous at bedtime  levothyroxine 150 MICROGram(s) Oral daily  lisinopril 20 milliGRAM(s) Oral daily  metoprolol succinate ER 25 milliGRAM(s) Oral daily  polyethylene glycol 3350 17 Gram(s) Oral daily  senna 2 Tablet(s) Oral at bedtime  sodium chloride 1 Gram(s) Oral three times a day  tamsulosin 0.8 milliGRAM(s) Oral at bedtime    MEDICATIONS  (PRN):  acetaminophen   Tablet .. 650 milliGRAM(s) Oral every 6 hours PRN Temp greater or equal to 38C (100.4F), Mild Pain (1 - 3)  aluminum hydroxide/magnesium hydroxide/simethicone Suspension 30 milliLiter(s) Oral every 12 hours PRN Indigestion  bisacodyl Suppository 10 milliGRAM(s) Rectal daily PRN Constipation  dextrose 40% Gel 15 Gram(s) Oral once PRN Blood Glucose LESS THAN 70 milliGRAM(s)/deciliter  glucagon  Injectable 1 milliGRAM(s) IntraMuscular once PRN Glucose LESS THAN 70 milligrams/deciliter  magnesium hydroxide Suspension 30 milliLiter(s) Oral every 12 hours PRN Constipation  methocarbamol 500 milliGRAM(s) Oral every 6 hours PRN Back Spasms  naloxone Injectable 0.1 milliGRAM(s) IV Push every 3 minutes PRN For ANY of the following changes in patient status:  A. RR LESS THAN 10 breaths per minute, B. Oxygen saturation LESS THAN 90%, C. Sedation score of 6  ondansetron Injectable 4 milliGRAM(s) IV Push every 6 hours PRN Nausea  oxyCODONE    IR 5 milliGRAM(s) Oral every 4 hours PRN Moderate Pain (4 - 6)  oxyCODONE    IR 10 milliGRAM(s) Oral every 4 hours PRN Severe Pain (7 - 10)      ASSESSMENT / PLAN:  ----------------------------------------  Hyponatremia - Sodium level improved with sodium chloride supplementation. Discussed with Nephrology. To continue on fluid restriction.    Spinal stenosis - Status post spinal surgery. Orthopedics following. Analgesic medications as needed. Will need transfer to a rehabilitation facility for further treatment.    Diabetes - Insulin coverage, close monitoring of blood glucose levels.    Hypothyroidism - On levothyroxine. The dose was previously increased.    BPH - On tamsulosin.    Hypertension - Close blood pressure monitoring. On lisinopril and metoprolol.    Discussed with the patient and his wife at the bedside.

## 2018-12-10 NOTE — PROGRESS NOTE ADULT - REASON FOR ADMISSION
hyponatremia
Hyponatremia
myelopathy
revision lami and fusion secondary to thoracic progressive myelopathy
weak legs, back pain, thoracic myelopathy
weakenss
Hyponatremia
Back pain
Back pain
Backpain

## 2018-12-10 NOTE — PROGRESS NOTE ADULT - PROVIDER SPECIALTY LIST ADULT
Anesthesia
Hospitalist
Nephrology
Neurology
Orthopedics
Rehab Medicine
Orthopedics
Nephrology
Nephrology

## 2018-12-11 ENCOUNTER — INPATIENT (INPATIENT)
Facility: HOSPITAL | Age: 73
LOS: 29 days | Discharge: HOME | DRG: 908 | End: 2019-01-10
Attending: FAMILY MEDICINE | Admitting: STUDENT IN AN ORGANIZED HEALTH CARE EDUCATION/TRAINING PROGRAM
Payer: MEDICARE

## 2018-12-11 VITALS
OXYGEN SATURATION: 97 % | HEIGHT: 67 IN | TEMPERATURE: 101 F | RESPIRATION RATE: 20 BRPM | HEART RATE: 106 BPM | WEIGHT: 214.07 LBS | SYSTOLIC BLOOD PRESSURE: 100 MMHG | DIASTOLIC BLOOD PRESSURE: 60 MMHG

## 2018-12-11 DIAGNOSIS — Z98.89 OTHER SPECIFIED POSTPROCEDURAL STATES: Chronic | ICD-10-CM

## 2018-12-11 DIAGNOSIS — Z96.60 PRESENCE OF UNSPECIFIED ORTHOPEDIC JOINT IMPLANT: Chronic | ICD-10-CM

## 2018-12-11 LAB
ALBUMIN SERPL ELPH-MCNC: 3.1 G/DL — LOW (ref 3.3–5.2)
ALP SERPL-CCNC: 72 U/L — SIGNIFICANT CHANGE UP (ref 40–120)
ALT FLD-CCNC: 24 U/L — SIGNIFICANT CHANGE UP
ANION GAP SERPL CALC-SCNC: 10 MMOL/L — SIGNIFICANT CHANGE UP (ref 5–17)
APTT BLD: 32.1 SEC — SIGNIFICANT CHANGE UP (ref 27.5–36.3)
AST SERPL-CCNC: 25 U/L — SIGNIFICANT CHANGE UP
BASOPHILS # BLD AUTO: 0 K/UL — SIGNIFICANT CHANGE UP (ref 0–0.2)
BASOPHILS NFR BLD AUTO: 0.3 % — SIGNIFICANT CHANGE UP (ref 0–2)
BILIRUB SERPL-MCNC: 0.7 MG/DL — SIGNIFICANT CHANGE UP (ref 0.4–2)
BUN SERPL-MCNC: 9 MG/DL — SIGNIFICANT CHANGE UP (ref 8–20)
CALCIUM SERPL-MCNC: 8.1 MG/DL — LOW (ref 8.6–10.2)
CHLORIDE SERPL-SCNC: 88 MMOL/L — LOW (ref 98–107)
CO2 SERPL-SCNC: 26 MMOL/L — SIGNIFICANT CHANGE UP (ref 22–29)
CREAT SERPL-MCNC: 0.55 MG/DL — SIGNIFICANT CHANGE UP (ref 0.5–1.3)
EOSINOPHIL # BLD AUTO: 0.1 K/UL — SIGNIFICANT CHANGE UP (ref 0–0.5)
EOSINOPHIL NFR BLD AUTO: 1.4 % — SIGNIFICANT CHANGE UP (ref 0–5)
GLUCOSE SERPL-MCNC: 138 MG/DL — HIGH (ref 70–115)
HCT VFR BLD CALC: 25.7 % — LOW (ref 42–52)
HGB BLD-MCNC: 8.5 G/DL — LOW (ref 14–18)
INR BLD: 1.55 RATIO — HIGH (ref 0.88–1.16)
LACTATE BLDV-MCNC: 1.2 MMOL/L — SIGNIFICANT CHANGE UP (ref 0.5–2)
LYMPHOCYTES # BLD AUTO: 0.7 K/UL — LOW (ref 1–4.8)
LYMPHOCYTES # BLD AUTO: 10.5 % — LOW (ref 20–55)
MCHC RBC-ENTMCNC: 27.8 PG — SIGNIFICANT CHANGE UP (ref 27–31)
MCHC RBC-ENTMCNC: 33.1 G/DL — SIGNIFICANT CHANGE UP (ref 32–36)
MCV RBC AUTO: 84 FL — SIGNIFICANT CHANGE UP (ref 80–94)
MONOCYTES # BLD AUTO: 0.6 K/UL — SIGNIFICANT CHANGE UP (ref 0–0.8)
MONOCYTES NFR BLD AUTO: 9.7 % — SIGNIFICANT CHANGE UP (ref 3–10)
NEUTROPHILS # BLD AUTO: 4.9 K/UL — SIGNIFICANT CHANGE UP (ref 1.8–8)
NEUTROPHILS NFR BLD AUTO: 77.8 % — HIGH (ref 37–73)
PLATELET # BLD AUTO: 304 K/UL — SIGNIFICANT CHANGE UP (ref 150–400)
POTASSIUM SERPL-MCNC: 4.5 MMOL/L — SIGNIFICANT CHANGE UP (ref 3.5–5.3)
POTASSIUM SERPL-SCNC: 4.5 MMOL/L — SIGNIFICANT CHANGE UP (ref 3.5–5.3)
PROT SERPL-MCNC: 6.3 G/DL — LOW (ref 6.6–8.7)
PROTHROM AB SERPL-ACNC: 18.1 SEC — HIGH (ref 10–12.9)
RBC # BLD: 3.06 M/UL — LOW (ref 4.6–6.2)
RBC # FLD: 14.3 % — SIGNIFICANT CHANGE UP (ref 11–15.6)
SODIUM SERPL-SCNC: 124 MMOL/L — LOW (ref 135–145)
WBC # BLD: 6.3 K/UL — SIGNIFICANT CHANGE UP (ref 4.8–10.8)
WBC # FLD AUTO: 6.3 K/UL — SIGNIFICANT CHANGE UP (ref 4.8–10.8)

## 2018-12-11 PROCEDURE — 99285 EMERGENCY DEPT VISIT HI MDM: CPT

## 2018-12-11 PROCEDURE — 93010 ELECTROCARDIOGRAM REPORT: CPT

## 2018-12-11 PROCEDURE — 71045 X-RAY EXAM CHEST 1 VIEW: CPT | Mod: 26

## 2018-12-11 RX ORDER — IBUPROFEN 200 MG
600 TABLET ORAL ONCE
Refills: 0 | Status: COMPLETED | OUTPATIENT
Start: 2018-12-11 | End: 2018-12-11

## 2018-12-11 RX ORDER — CEFEPIME 1 G/1
2000 INJECTION, POWDER, FOR SOLUTION INTRAMUSCULAR; INTRAVENOUS ONCE
Refills: 0 | Status: COMPLETED | OUTPATIENT
Start: 2018-12-11 | End: 2018-12-11

## 2018-12-11 RX ORDER — SODIUM CHLORIDE 9 MG/ML
3000 INJECTION, SOLUTION INTRAVENOUS ONCE
Refills: 0 | Status: COMPLETED | OUTPATIENT
Start: 2018-12-11 | End: 2018-12-11

## 2018-12-11 RX ORDER — VANCOMYCIN HCL 1 G
1000 VIAL (EA) INTRAVENOUS ONCE
Refills: 0 | Status: COMPLETED | OUTPATIENT
Start: 2018-12-11 | End: 2018-12-11

## 2018-12-11 RX ADMIN — Medication 250 MILLIGRAM(S): at 22:01

## 2018-12-11 RX ADMIN — Medication 600 MILLIGRAM(S): at 22:54

## 2018-12-11 RX ADMIN — SODIUM CHLORIDE 3000 MILLILITER(S): 9 INJECTION, SOLUTION INTRAVENOUS at 22:43

## 2018-12-11 RX ADMIN — CEFEPIME 100 MILLIGRAM(S): 1 INJECTION, POWDER, FOR SOLUTION INTRAMUSCULAR; INTRAVENOUS at 21:42

## 2018-12-11 RX ADMIN — Medication 600 MILLIGRAM(S): at 23:35

## 2018-12-11 RX ADMIN — Medication 1000 MILLIGRAM(S): at 23:01

## 2018-12-11 RX ADMIN — SODIUM CHLORIDE 2000 MILLILITER(S): 9 INJECTION, SOLUTION INTRAVENOUS at 21:42

## 2018-12-11 RX ADMIN — CEFEPIME 2000 MILLIGRAM(S): 1 INJECTION, POWDER, FOR SOLUTION INTRAMUSCULAR; INTRAVENOUS at 22:15

## 2018-12-11 NOTE — ED ADULT NURSE REASSESSMENT NOTE - NS ED NURSE REASSESS COMMENT FT1
assumed pt care as Critical care RN as per [facility] ER protocol.  Code sepsis called upon arrival.  Recent laminectomy with fever today.  Bloody drainage from surgical site, dressing intact.  Pt is alert and oriented X 3.  Moves all four extremities.  Labs drawn and Stat medications given.  Pt stable.  Will give report to ER RN.

## 2018-12-11 NOTE — ED PROVIDER NOTE - MUSCULOSKELETAL, MLM
Spine appears normal, range of motion is not limited, no muscle or joint tenderness.  Full ROM in extremities x4

## 2018-12-11 NOTE — ED ADULT TRIAGE NOTE - CHIEF COMPLAINT QUOTE
pt arrive by ambulance from [facility] with reports of fever 102, treated with tylenol at 7:40pm. reports pt has a surgical incision to back that's opening and draining.

## 2018-12-11 NOTE — ED PROVIDER NOTE - PROGRESS NOTE DETAILS
Discussed admission with hospitalist. CT requested. patient stable. Complete Ortho consult pend. Results are as noted.

## 2018-12-11 NOTE — ED PROVIDER NOTE - MEDICAL DECISION MAKING DETAILS
Code sepsis activated, pt will likely be admitted for recurrent fever, will r/o bacteriemia, and reeval

## 2018-12-11 NOTE — ED PROVIDER NOTE - OBJECTIVE STATEMENT
72 y/o M with PMHx of HTN, BPH, DM, Hypothyroid, and OA BIBA from [facility], c/o recurrent fevers and back pain, onset today.  Pt s/p laminectomy to T-spine earlier last week and was discharged a "couple of days ago".  Pt states that he tolerated the procedure well, however he is now experiencing recurrent fevers and back pain.  Denies visual changes, numbness, tingling, bladder or bowel incontinence, chest pain, SOB, cough, N/V/D, abd pain, neck pain, or HA.  Code sepsis activated 72 y/o M with PMHx of HTN, BPH, DM, Hypothyroid, and OA BIBA from [facility], c/o recurrent fevers ( tamx 102) and back pain, onset today.  Pt s/p laminectomy to T-spine earlier last week and was discharged a "couple of days ago".  Pt states that he tolerated the procedure well, however he is now experiencing recurrent fevers and back pain.  Denies visual changes, numbness, tingling, bladder or bowel incontinence, chest pain, SOB, cough, N/V/D, abd pain, neck pain, or HA.  Code sepsis activated

## 2018-12-11 NOTE — ED PROVIDER NOTE - CHPI ED SYMPTOMS NEG
no bladder dysfunction/no bowel dysfunction/no neck tenderness/no numbness/no tingling/no motor function loss

## 2018-12-11 NOTE — ED ADULT NURSE NOTE - NSIMPLEMENTINTERV_GEN_ALL_ED
Implemented All Universal Safety Interventions:  [place] to call system. Call bell, personal items and telephone within reach. Instruct patient to call for assistance. Room bathroom lighting operational. Non-slip footwear when patient is off stretcher. Physically safe environment: no spills, clutter or unnecessary equipment. Stretcher in lowest position, wheels locked, appropriate side rails in place.

## 2018-12-11 NOTE — ED ADULT NURSE NOTE - OBJECTIVE STATEMENT
patient with fever sent by NH, tylenol given at NH prior to arrival, patient also had laminectomy with drainage noted from site, redressed

## 2018-12-11 NOTE — CONSULT NOTE ADULT - ATTENDING COMMENTS
cont with dry dressing changes and pt / ot. If seroma fails non op mngt will schedule seroma drainage early next week

## 2018-12-11 NOTE — CONSULT NOTE ADULT - SUBJECTIVE AND OBJECTIVE BOX
Pt Name: TIMUR LOUIS    MRN: [id number]      Patient is a 73y Male s/p T11/T10 laminectomy, T10-L1 fusion POD #13 sent from [facility] for fever approx 102 today. Pt presents to the ED with fever of 100.7. Pt states that he has no increased in back pain since surgery and that his pain is consistent to what he felt while he was in the hospital. Pt also reports no change in motor/sensory functioning. Pt otherwise denies c/p, sob, abdominal pain, numbness/tingling, n/v/c/d, urinary/bowel dysfunction, saddle anesthesia and has no other complaints.      HEALTH ISSUES - PROBLEM Dx:      .      REVIEW OF SYSTEMS      General: Alert, responsive, in NAD    Skin/Breast: No rashes, no pruritis. Central lumbar incision noted.  	  Ophthalmologic: No visual changes. No redness.   	  ENMT:	No discharge. No swelling.    Respiratory and Thorax: No difficulty breathing. No cough.  	   Cardiovascular:	No chest pain. No palpitations.    Gastrointestinal:	 No abdominal pain. No diarrhea.     Genitourinary: No dysuria. No bleeding.    Musculoskeletal: SEE HPI.    Neurological: No sensory or motor changes.     Psychiatric: No anxiety or depression.    Hematology/Lymphatics: No swelling.    Endocrine: No Hx of diabetes.    ROS is otherwise negative.    PAST MEDICAL & SURGICAL HISTORY:  PAST MEDICAL & SURGICAL HISTORY:  BPH (benign prostatic hyperplasia)  HTN (hypertension)  Hypothyroid  Dyslipidemia  OA (osteoarthritis)  Diabetes  S/P laminectomy  S/P hernia repair  S/P hip replacement: R      Allergies: No Known Allergies      Medications:     FAMILY HISTORY:  Family history of diabetes mellitus (Father)  : non-contributory    Social History: Denies ETOH abuse.                          8.5    6.3   )-----------( 304      ( 11 Dec 2018 21:34 )             25.7     12-11    124<L>  |  88<L>  |  9.0  ----------------------------<  138<H>  4.5   |  26.0  |  0.55    Ca    8.1<L>      11 Dec 2018 21:34    TPro  6.3<L>  /  Alb  3.1<L>  /  TBili  0.7  /  DBili  x   /  AST  25  /  ALT  24  /  AlkPhos  72  12-11      PHYSICAL EXAM:    Vital Signs Last 24 Hrs  T(C): 37.8 (11 Dec 2018 22:03), Max: 38.2 (11 Dec 2018 21:14)  T(F): 100 (11 Dec 2018 22:03), Max: 100.7 (11 Dec 2018 21:14)  HR: 107 (11 Dec 2018 22:03) (105 - 107)  BP: 110/55 (11 Dec 2018 22:03) (100/60 - 111/51)  BP(mean): --  RR: 18 (11 Dec 2018 22:03) (18 - 20)  SpO2: 100% (11 Dec 2018 22:03) (97% - 100%)  Daily Height in cm: 170.18 (11 Dec 2018 21:14)    Daily     Appearance: Alert, responsive, in no acute distress.    Skin: + Well healing incision of the central lumbar region with mild wound dehiscence noted at the proximal incision. No surrounding redness/swelling/warmth noted. No active bleeding/drainage from incision at time of exam. Previous dressing had been half removed and had significant bloody saturation.    Vascular: 2+ distal radial/DP/PTpulses. Cap refill < 2 sec. No signs of venous  insufficiency  or stasis. No extremity ulcerations. No cyanosis.    Musculoskeletal:         Left Upper Extremity: Atraumatic with normal alignment NROM. No crepitus. No bony tenderness.        Right Upper Extremity: Atraumatic with normal alignment NROM. No crepitus. No bony tenderness.        Left Lower Extremity: Atraumatic with normal alignment NROM. No crepitus. No bony tenderness.        Right Lower Extremity: Atraumatic with normal alignment NROM. No crepitus. No bony tenderness.     Neurological: SILT of the RLE, mild decrease in sensation of the LLE noted.    Pathologic reflexes: NO Clonus            Motor exam:          Upper extremities - 5/5 hand  strength b/l, 5/5 biceps flexion b/l, 5/5 triceps extension b/l, 5/5 shoulder flexion b/l           Lower extremities  -  4/5 plantar/dorsiflexion b/l, 4/5 knee flexion b/l, 2/5 hip flexion b/l      A/P:  Pt is a  73y Male s/p T11/T10 laminectomy, T10-L1 fusion POD #13 presenting from [facility] nursing facility with recurrent fevers.       PLAN pending discussion with Dr. Gray:  * Pain control as clinically indicated  * Further plan pending recommendations from Dr. Gray  * Continue care as per primary team

## 2018-12-12 DIAGNOSIS — E78.5 HYPERLIPIDEMIA, UNSPECIFIED: ICD-10-CM

## 2018-12-12 DIAGNOSIS — N40.0 BENIGN PROSTATIC HYPERPLASIA WITHOUT LOWER URINARY TRACT SYMPTOMS: ICD-10-CM

## 2018-12-12 DIAGNOSIS — E11.9 TYPE 2 DIABETES MELLITUS WITHOUT COMPLICATIONS: ICD-10-CM

## 2018-12-12 DIAGNOSIS — E03.9 HYPOTHYROIDISM, UNSPECIFIED: ICD-10-CM

## 2018-12-12 DIAGNOSIS — R50.9 FEVER, UNSPECIFIED: ICD-10-CM

## 2018-12-12 DIAGNOSIS — I10 ESSENTIAL (PRIMARY) HYPERTENSION: ICD-10-CM

## 2018-12-12 LAB
APPEARANCE UR: CLEAR — SIGNIFICANT CHANGE UP
BILIRUB UR-MCNC: NEGATIVE — SIGNIFICANT CHANGE UP
COLOR SPEC: YELLOW — SIGNIFICANT CHANGE UP
DIFF PNL FLD: NEGATIVE — SIGNIFICANT CHANGE UP
GLUCOSE BLDC GLUCOMTR-MCNC: 132 MG/DL — HIGH (ref 70–99)
GLUCOSE BLDC GLUCOMTR-MCNC: 213 MG/DL — HIGH (ref 70–99)
GLUCOSE UR QL: NEGATIVE MG/DL — SIGNIFICANT CHANGE UP
KETONES UR-MCNC: NEGATIVE — SIGNIFICANT CHANGE UP
LEUKOCYTE ESTERASE UR-ACNC: NEGATIVE — SIGNIFICANT CHANGE UP
NITRITE UR-MCNC: NEGATIVE — SIGNIFICANT CHANGE UP
PH UR: 7 — SIGNIFICANT CHANGE UP (ref 5–8)
PROT UR-MCNC: NEGATIVE MG/DL — SIGNIFICANT CHANGE UP
SP GR SPEC: 1 — LOW (ref 1.01–1.02)
UROBILINOGEN FLD QL: 1 MG/DL

## 2018-12-12 PROCEDURE — 99223 1ST HOSP IP/OBS HIGH 75: CPT

## 2018-12-12 PROCEDURE — 12345: CPT | Mod: NC

## 2018-12-12 PROCEDURE — 99222 1ST HOSP IP/OBS MODERATE 55: CPT

## 2018-12-12 PROCEDURE — 72132 CT LUMBAR SPINE W/DYE: CPT | Mod: 26

## 2018-12-12 PROCEDURE — 72129 CT CHEST SPINE W/DYE: CPT | Mod: 26

## 2018-12-12 PROCEDURE — 99223 1ST HOSP IP/OBS HIGH 75: CPT | Mod: 24

## 2018-12-12 RX ORDER — GABAPENTIN 400 MG/1
300 CAPSULE ORAL THREE TIMES A DAY
Refills: 0 | Status: DISCONTINUED | OUTPATIENT
Start: 2018-12-12 | End: 2019-01-02

## 2018-12-12 RX ORDER — SODIUM CHLORIDE 9 MG/ML
1000 INJECTION, SOLUTION INTRAVENOUS
Refills: 0 | Status: DISCONTINUED | OUTPATIENT
Start: 2018-12-12 | End: 2019-01-02

## 2018-12-12 RX ORDER — METOPROLOL TARTRATE 50 MG
25 TABLET ORAL DAILY
Refills: 0 | Status: DISCONTINUED | OUTPATIENT
Start: 2018-12-12 | End: 2018-12-17

## 2018-12-12 RX ORDER — METHOCARBAMOL 500 MG/1
500 TABLET, FILM COATED ORAL EVERY 6 HOURS
Refills: 0 | Status: DISCONTINUED | OUTPATIENT
Start: 2018-12-12 | End: 2019-01-02

## 2018-12-12 RX ORDER — INSULIN LISPRO 100/ML
VIAL (ML) SUBCUTANEOUS
Refills: 0 | Status: DISCONTINUED | OUTPATIENT
Start: 2018-12-12 | End: 2019-01-02

## 2018-12-12 RX ORDER — LEVOTHYROXINE SODIUM 125 MCG
150 TABLET ORAL DAILY
Refills: 0 | Status: DISCONTINUED | OUTPATIENT
Start: 2018-12-12 | End: 2019-01-02

## 2018-12-12 RX ORDER — SODIUM CHLORIDE 9 MG/ML
1 INJECTION INTRAMUSCULAR; INTRAVENOUS; SUBCUTANEOUS THREE TIMES A DAY
Refills: 0 | Status: DISCONTINUED | OUTPATIENT
Start: 2018-12-12 | End: 2018-12-14

## 2018-12-12 RX ORDER — CEFEPIME 1 G/1
2000 INJECTION, POWDER, FOR SOLUTION INTRAMUSCULAR; INTRAVENOUS EVERY 12 HOURS
Refills: 0 | Status: DISCONTINUED | OUTPATIENT
Start: 2018-12-12 | End: 2018-12-21

## 2018-12-12 RX ORDER — ACETAMINOPHEN 500 MG
650 TABLET ORAL EVERY 6 HOURS
Refills: 0 | Status: DISCONTINUED | OUTPATIENT
Start: 2018-12-12 | End: 2018-12-19

## 2018-12-12 RX ORDER — DEXTROSE 50 % IN WATER 50 %
15 SYRINGE (ML) INTRAVENOUS ONCE
Refills: 0 | Status: DISCONTINUED | OUTPATIENT
Start: 2018-12-12 | End: 2019-01-02

## 2018-12-12 RX ORDER — ATORVASTATIN CALCIUM 80 MG/1
40 TABLET, FILM COATED ORAL AT BEDTIME
Refills: 0 | Status: DISCONTINUED | OUTPATIENT
Start: 2018-12-12 | End: 2019-01-02

## 2018-12-12 RX ORDER — DEXTROSE 50 % IN WATER 50 %
25 SYRINGE (ML) INTRAVENOUS ONCE
Refills: 0 | Status: DISCONTINUED | OUTPATIENT
Start: 2018-12-12 | End: 2019-01-02

## 2018-12-12 RX ORDER — GLUCAGON INJECTION, SOLUTION 0.5 MG/.1ML
1 INJECTION, SOLUTION SUBCUTANEOUS ONCE
Refills: 0 | Status: DISCONTINUED | OUTPATIENT
Start: 2018-12-12 | End: 2019-01-02

## 2018-12-12 RX ORDER — VANCOMYCIN HCL 1 G
1000 VIAL (EA) INTRAVENOUS EVERY 8 HOURS
Refills: 0 | Status: DISCONTINUED | OUTPATIENT
Start: 2018-12-12 | End: 2018-12-19

## 2018-12-12 RX ORDER — DOCUSATE SODIUM 100 MG
100 CAPSULE ORAL THREE TIMES A DAY
Refills: 0 | Status: DISCONTINUED | OUTPATIENT
Start: 2018-12-12 | End: 2018-12-18

## 2018-12-12 RX ORDER — ENOXAPARIN SODIUM 100 MG/ML
40 INJECTION SUBCUTANEOUS DAILY
Refills: 0 | Status: DISCONTINUED | OUTPATIENT
Start: 2018-12-12 | End: 2018-12-31

## 2018-12-12 RX ORDER — OXYCODONE HYDROCHLORIDE 5 MG/1
10 TABLET ORAL EVERY 4 HOURS
Refills: 0 | Status: DISCONTINUED | OUTPATIENT
Start: 2018-12-12 | End: 2018-12-18

## 2018-12-12 RX ORDER — SENNA PLUS 8.6 MG/1
2 TABLET ORAL AT BEDTIME
Refills: 0 | Status: DISCONTINUED | OUTPATIENT
Start: 2018-12-12 | End: 2018-12-18

## 2018-12-12 RX ORDER — DEXTROSE 50 % IN WATER 50 %
12.5 SYRINGE (ML) INTRAVENOUS ONCE
Refills: 0 | Status: DISCONTINUED | OUTPATIENT
Start: 2018-12-12 | End: 2019-01-02

## 2018-12-12 RX ORDER — TAMSULOSIN HYDROCHLORIDE 0.4 MG/1
0.8 CAPSULE ORAL AT BEDTIME
Refills: 0 | Status: DISCONTINUED | OUTPATIENT
Start: 2018-12-12 | End: 2019-01-02

## 2018-12-12 RX ADMIN — ATORVASTATIN CALCIUM 40 MILLIGRAM(S): 80 TABLET, FILM COATED ORAL at 22:12

## 2018-12-12 RX ADMIN — Medication 650 MILLIGRAM(S): at 18:48

## 2018-12-12 RX ADMIN — CEFEPIME 100 MILLIGRAM(S): 1 INJECTION, POWDER, FOR SOLUTION INTRAMUSCULAR; INTRAVENOUS at 17:49

## 2018-12-12 RX ADMIN — CEFEPIME 100 MILLIGRAM(S): 1 INJECTION, POWDER, FOR SOLUTION INTRAMUSCULAR; INTRAVENOUS at 06:44

## 2018-12-12 RX ADMIN — GABAPENTIN 300 MILLIGRAM(S): 400 CAPSULE ORAL at 06:44

## 2018-12-12 RX ADMIN — SENNA PLUS 2 TABLET(S): 8.6 TABLET ORAL at 22:12

## 2018-12-12 RX ADMIN — Medication 150 MICROGRAM(S): at 06:45

## 2018-12-12 RX ADMIN — Medication 100 MILLIGRAM(S): at 22:12

## 2018-12-12 RX ADMIN — OXYCODONE HYDROCHLORIDE 10 MILLIGRAM(S): 5 TABLET ORAL at 13:29

## 2018-12-12 RX ADMIN — Medication 250 MILLIGRAM(S): at 13:30

## 2018-12-12 RX ADMIN — GABAPENTIN 300 MILLIGRAM(S): 400 CAPSULE ORAL at 22:12

## 2018-12-12 RX ADMIN — SODIUM CHLORIDE 1 GRAM(S): 9 INJECTION INTRAMUSCULAR; INTRAVENOUS; SUBCUTANEOUS at 13:31

## 2018-12-12 RX ADMIN — ENOXAPARIN SODIUM 40 MILLIGRAM(S): 100 INJECTION SUBCUTANEOUS at 13:30

## 2018-12-12 RX ADMIN — Medication 650 MILLIGRAM(S): at 18:53

## 2018-12-12 RX ADMIN — OXYCODONE HYDROCHLORIDE 10 MILLIGRAM(S): 5 TABLET ORAL at 20:10

## 2018-12-12 RX ADMIN — OXYCODONE HYDROCHLORIDE 10 MILLIGRAM(S): 5 TABLET ORAL at 19:40

## 2018-12-12 RX ADMIN — OXYCODONE HYDROCHLORIDE 10 MILLIGRAM(S): 5 TABLET ORAL at 23:35

## 2018-12-12 RX ADMIN — OXYCODONE HYDROCHLORIDE 10 MILLIGRAM(S): 5 TABLET ORAL at 13:59

## 2018-12-12 RX ADMIN — Medication 25 MILLIGRAM(S): at 06:48

## 2018-12-12 RX ADMIN — Medication 250 MILLIGRAM(S): at 22:12

## 2018-12-12 RX ADMIN — Medication 100 MILLIGRAM(S): at 06:44

## 2018-12-12 RX ADMIN — Medication 100 MILLIGRAM(S): at 13:28

## 2018-12-12 RX ADMIN — Medication 2: at 17:50

## 2018-12-12 RX ADMIN — SODIUM CHLORIDE 1 GRAM(S): 9 INJECTION INTRAMUSCULAR; INTRAVENOUS; SUBCUTANEOUS at 22:17

## 2018-12-12 RX ADMIN — TAMSULOSIN HYDROCHLORIDE 0.8 MILLIGRAM(S): 0.4 CAPSULE ORAL at 22:12

## 2018-12-12 RX ADMIN — GABAPENTIN 300 MILLIGRAM(S): 400 CAPSULE ORAL at 13:29

## 2018-12-12 NOTE — PROGRESS NOTE ADULT - SUBJECTIVE AND OBJECTIVE BOX
INTERVAL HPI/OVERNIGHT EVENTS:    CC: fever, post op seroma, hypertension, hypothyroid    Chart reviewed. Events noted, MRI was not done as he was unable to lay flat. Denies pain at time of exam.    Vital Signs Last 24 Hrs  T(C): 37.2 (12 Dec 2018 11:00), Max: 38.2 (11 Dec 2018 21:14)  T(F): 98.9 (12 Dec 2018 11:00), Max: 100.7 (11 Dec 2018 21:14)  HR: 92 (12 Dec 2018 11:00) (77 - 107)  BP: 146/70 (12 Dec 2018 11:00) (100/60 - 146/70)  BP(mean): --  RR: 18 (12 Dec 2018 11:00) (17 - 20)  SpO2: 100% (12 Dec 2018 11:00) (95% - 100%)    PHYSICAL EXAM:    GENERAL: Not in distress, alert, unable to lay on back  CHEST/LUNG: b/l air entry  HEART: Regular   ABDOMEN: Soft, BS+  EXTREMITIES:  no edema, tenderness.     MEDICATIONS  (STANDING):  atorvastatin 40 milliGRAM(s) Oral at bedtime  cefepime   IVPB 2000 milliGRAM(s) IV Intermittent every 12 hours  dextrose 5%. 1000 milliLiter(s) (50 mL/Hr) IV Continuous <Continuous>  dextrose 50% Injectable 12.5 Gram(s) IV Push once  dextrose 50% Injectable 25 Gram(s) IV Push once  dextrose 50% Injectable 25 Gram(s) IV Push once  docusate sodium 100 milliGRAM(s) Oral three times a day  enoxaparin Injectable 40 milliGRAM(s) SubCutaneous daily  gabapentin 300 milliGRAM(s) Oral three times a day  insulin lispro (HumaLOG) corrective regimen sliding scale   SubCutaneous three times a day before meals  levothyroxine 150 MICROGram(s) Oral daily  metoprolol succinate ER 25 milliGRAM(s) Oral daily  senna 2 Tablet(s) Oral at bedtime  sodium chloride 1 Gram(s) Oral three times a day  tamsulosin 0.8 milliGRAM(s) Oral at bedtime  vancomycin  IVPB 1000 milliGRAM(s) IV Intermittent every 8 hours    MEDICATIONS  (PRN):  acetaminophen   Tablet .. 650 milliGRAM(s) Oral every 6 hours PRN Temp greater or equal to 38C (100.4F), Moderate Pain (4 - 6)  dextrose 40% Gel 15 Gram(s) Oral once PRN Blood Glucose LESS THAN 70 milliGRAM(s)/deciliter  glucagon  Injectable 1 milliGRAM(s) IntraMuscular once PRN Glucose LESS THAN 70 milligrams/deciliter  methocarbamol 500 milliGRAM(s) Oral every 6 hours PRN Muscle Spasm  oxyCODONE    IR 10 milliGRAM(s) Oral every 4 hours PRN Severe Pain (7 - 10)      Allergies    No Known Allergies    Intolerances          LABS:                          8.5    6.3   )-----------( 304      ( 11 Dec 2018 21:34 )             25.7     12-    124<L>  |  88<L>  |  9.0  ----------------------------<  138<H>  4.5   |  26.0  |  0.55    Ca    8.1<L>      11 Dec 2018 21:34    TPro  6.3<L>  /  Alb  3.1<L>  /  TBili  0.7  /  DBili  x   /  AST  25  /  ALT  24  /  AlkPhos  72  12-    PT/INR - ( 11 Dec 2018 21:34 )   PT: 18.1 sec;   INR: 1.55 ratio         PTT - ( 11 Dec 2018 21:34 )  PTT:32.1 sec  Urinalysis Basic - ( 12 Dec 2018 01:38 )    Color: Yellow / Appearance: Clear / S.005 / pH: x  Gluc: x / Ketone: Negative  / Bili: Negative / Urobili: 1 mg/dL   Blood: x / Protein: Negative mg/dL / Nitrite: Negative   Leuk Esterase: Negative / RBC: x / WBC x   Sq Epi: x / Non Sq Epi: x / Bacteria: x        RADIOLOGY & ADDITIONAL TESTS:

## 2018-12-12 NOTE — H&P ADULT - ASSESSMENT
72 y/o male with PMHx of HTN, BPH, DM, Hypothyroid, and OA was sent to the ED from [facility] due to recurrent fevers (T-max 102) and back pain of 1 day duration. Patient was recently discharge from [facility] for thoracic laminectomy and fusion.  Patient said he has been doing well since dc until yesterday when he started having recurrent fevers and back pain. Patient has no   trauma/fall, neck pain, HA, numbness, tingling, bowel/urinary incontinence, chills, chest pain, shortness of breath, palpitation, cough, nausea/vomiting, abdominal pain.   Patient found to have a temp of 100.7 in the ED and tachy; code sepsis was called.    Back pain with fever in the setting of recent laminectomy rule out abscess   CT thoracic: Postsurgical changes with multiple foci of air , fluid, and soft tissue edema is seen posteriorly in the surgical bed. Fluid collection is measuring approximately 26 x 49 x 112 millimeter. Given foci of air within the collection, and abscess formation cannot be completely excluded.  Septic work up sent   Vancomycin and Cefepime once given in the ED   Will continue Cefepime for now   MRI ordered   Orthopedic on board   Tylenol 650mg q6h for pain and temp   Oxycodone 10mg q4h PRN for pain   Gabapentin 300mg q8h     HTN/HDL  Continue home medications    DM-2  Anti-DM medications were discontinued during last admission   Insulin sliding scale     BPH  Tamsulosin 0.8mg HS    Hypothyroidism   Synthroid 150mcg     Supportive   DVT prophylaxis: Lovenox 40mg daily  Diet: DASH 72 y/o male with PMHx of HTN, BPH, DM, Hypothyroid, and OA was sent to the ED from [facility] due to recurrent fevers (T-max 102) and back pain of 1 day duration. Patient was recently discharge from [facility] for thoracic laminectomy and fusion.  Patient said he has been doing well since dc until yesterday when he started having recurrent fevers and back pain. Patient has no   trauma/fall, neck pain, HA, numbness, tingling, bowel/urinary incontinence, chills, chest pain, shortness of breath, palpitation, cough, nausea/vomiting, abdominal pain.   Patient found to have a temp of 100.7 in the ED and tachy; code sepsis was called.    Back pain with fever in the setting of recent laminectomy rule out abscess   CT thoracic: Postsurgical changes with multiple foci of air , fluid, and soft tissue edema is seen posteriorly in the surgical bed. Fluid collection is measuring approximately 26 x 49 x 112 millimeter. Given foci of air within the collection, and abscess formation cannot be completely excluded.  Septic work up sent   Vancomycin and Cefepime once given in the ED   Will continue Cefepime for now   MRI ordered   Orthopedic on board   Tylenol 650mg q6h for pain and temp   Oxycodone 10mg q4h PRN for pain   Gabapentin 300mg q8h     HTN/HDL  Continue home medications    DM-2  Anti-DM medications were discontinued during last admission   Insulin sliding scale     BPH  Tamsulosin 0.8mg HS    Hypothyroidism   Synthroid 150mcg     Supportive   DVT prophylaxis: Lovenox 40mg daily  Diet: CHO consistent 74 y/o male with PMHx of HTN, BPH, DM, Hypothyroid, and OA was sent to the ED from [facility] due to recurrent fevers (T-max 102) and back pain of 1 day duration. Patient was recently discharge from [facility] for thoracic laminectomy and fusion.  Patient said he has been doing well since dc until yesterday when he started having recurrent fevers and back pain. Patient has no   trauma/fall, neck pain, HA, numbness, tingling, bowel/urinary incontinence, chills, chest pain, shortness of breath, palpitation, cough, nausea/vomiting, abdominal pain.   Patient found to have a temp of 100.7 in the ED and tachy; code sepsis was called.    Back pain with fever in the setting of recent laminectomy rule out abscess   CT thoracic: Postsurgical changes with multiple foci of air , fluid, and soft tissue edema is seen posteriorly in the surgical bed. Fluid collection is measuring approximately 26 x 49 x 112 millimeter. Given foci of air within the collection, and abscess formation cannot be completely excluded.  Septic work up sent   Vancomycin and Cefepime once given in the ED   Will continue Cefepime for now   MRI ordered   Orthopedic on board   Tylenol 650mg q6h for pain and temp   Oxycodone 10mg q4h PRN for pain   Gabapentin 300mg q8h     HTN/HDL  Continue home medications    DM-2  Anti-DM medications were discontinued during last admission   Insulin sliding scale     Hyponatremia   Chronic  Continue NaCl 1gm TID     BPH  Tamsulosin 0.8mg HS    Hypothyroidism   Synthroid 150mcg     Supportive   DVT prophylaxis: Lovenox 40mg daily  Diet: CHO consistent

## 2018-12-12 NOTE — PROGRESS NOTE ADULT - SUBJECTIVE AND OBJECTIVE BOX
Patient encountered for RICH dressing change. Laying comfortably in bed, no complaints.     RICH noted, functioning with mild-moderate dry bloody staining. RICH removed. Well healing incision of the central lumbar region with mild wound dehiscence noted at the proximal aspect of incision site noted. No surrounding erythema noted. No active bleeding/drainage noted.    incision site cleaned with betadine, new RICH dressing applied    Dr. Gray aware of above

## 2018-12-12 NOTE — H&P ADULT - HISTORY OF PRESENT ILLNESS
74 y/o male with PMHx of HTN, BPH, DM, Hypothyroid, and OA was sent to the ED from [facility] due to recurrent fevers (T-max 102) and back pain of 1 day duration. Patient was recently discharge from [facility] for thoracic laminectomy and fusion.  Patient said he has been doing well since dc until yesterday when he started having recurrent fevers and back pain. Patient has no   trauma/fall, neck pain, HA, numbness, tingling, bowel/urinary incontinence, chills, chest pain, shortness of breath, palpitation, cough, nausea/vomiting, abdominal pain.

## 2018-12-12 NOTE — PROGRESS NOTE ADULT - PROBLEM SELECTOR PLAN 1
Cultures sent, orthopedics and ID consults noted. Started on Vancomycin and Cefepime. Local wound care. Possible seroma drainage next week if no improvement per orthopedics. Pain control.

## 2018-12-12 NOTE — PROGRESS NOTE ADULT - SUBJECTIVE AND OBJECTIVE BOX
Pt Name: TIMUR LOUIS    MRN: [id number]      Patient is a 73y Male s/p T11/T10 laminectomy, T10-L1 fusion POD #14.  Patient seen and examined bedside; laying on left side.  c/o feeling "cold" with minimal discomfort in his lower back - has not increased since returning to the ED.  He reports no change to motor/sensory function.  He denies CP, SOB, abdominal pain, nausea, vomiting.  He denies numbness/tingling or pain down his LE.  He has normal bowel and bladder function.  He has no other complaints at this time.  He states while in [facility], he was able to get himself out of bed and sit in the chair      PAST MEDICAL & SURGICAL HISTORY:  PAST MEDICAL & SURGICAL HISTORY:  BPH (benign prostatic hyperplasia)  HTN (hypertension)  Hypothyroid  Dyslipidemia  OA (osteoarthritis)  Diabetes  S/P laminectomy  S/P hernia repair  S/P hip replacement: R      Allergies: No Known Allergies      Medications: acetaminophen   Tablet .. 650 milliGRAM(s) Oral every 6 hours PRN  atorvastatin 40 milliGRAM(s) Oral at bedtime  cefepime   IVPB 2000 milliGRAM(s) IV Intermittent every 12 hours  dextrose 40% Gel 15 Gram(s) Oral once PRN  dextrose 5%. 1000 milliLiter(s) IV Continuous <Continuous>  dextrose 50% Injectable 12.5 Gram(s) IV Push once  dextrose 50% Injectable 25 Gram(s) IV Push once  dextrose 50% Injectable 25 Gram(s) IV Push once  docusate sodium 100 milliGRAM(s) Oral three times a day  enoxaparin Injectable 40 milliGRAM(s) SubCutaneous daily  gabapentin 300 milliGRAM(s) Oral three times a day  glucagon  Injectable 1 milliGRAM(s) IntraMuscular once PRN  insulin lispro (HumaLOG) corrective regimen sliding scale   SubCutaneous three times a day before meals  levothyroxine 150 MICROGram(s) Oral daily  methocarbamol 500 milliGRAM(s) Oral every 6 hours PRN  metoprolol succinate ER 25 milliGRAM(s) Oral daily  oxyCODONE    IR 10 milliGRAM(s) Oral every 4 hours PRN  senna 2 Tablet(s) Oral at bedtime  sodium chloride 1 Gram(s) Oral three times a day  tamsulosin 0.8 milliGRAM(s) Oral at bedtime                            8.5    6.3   )-----------( 304      ( 11 Dec 2018 21:34 )             25.7     12-11    124<L>  |  88<L>  |  9.0  ----------------------------<  138<H>  4.5   |  26.0  |  0.55    Ca    8.1<L>      11 Dec 2018 21:34    TPro  6.3<L>  /  Alb  3.1<L>  /  TBili  0.7  /  DBili  x   /  AST  25  /  ALT  24  /  AlkPhos  72  12-11      PHYSICAL EXAM:    Vital Signs Last 24 Hrs  T(C): 37.2 (12 Dec 2018 08:23), Max: 38.2 (11 Dec 2018 21:14)  T(F): 99 (12 Dec 2018 08:23), Max: 100.7 (11 Dec 2018 21:14)  HR: 77 (12 Dec 2018 08:23) (77 - 107)  BP: 125/63 (12 Dec 2018 08:23) (100/60 - 132/75)  BP(mean): --  RR: 18 (12 Dec 2018 08:23) (17 - 20)  SpO2: 96% (12 Dec 2018 08:23) (95% - 100%)  Daily Height in cm: 170.18 (11 Dec 2018 21:14)    Daily     Appearance: Alert, responsive, in no acute distress.    Skin: + Well healing incision of the central lumbar region with mild wound dehiscence noted at the proximal incision. No surrounding redness/swelling/warmth noted. No active bleeding/drainage from incision at time of exam. Previous dressing that was placed last night has moderate-significant bloody saturation.  Dressing was removed.  Incision with cleaned with betadine.  RICH dressing was placed; patent    Vascular: 2+ distal radial/DP/PTpulses. Cap refill < 2 sec. No signs of venous  insufficiency  or stasis. No extremity ulcerations. No cyanosis.    Musculoskeletal:         Left Upper Extremity: Atraumatic with normal alignment NROM. No crepitus. No bony tenderness.        Right Upper Extremity: Atraumatic with normal alignment NROM. No crepitus. No bony tenderness.        Left Lower Extremity: Atraumatic with normal alignment NROM. No crepitus. No bony tenderness.        Right Lower Extremity: Atraumatic with normal alignment NROM. No crepitus. No bony tenderness.     Neurological: SILT of the BLE    Pathologic reflexes: NO Clonus            Motor exam:          Upper extremities - 5/5 hand  strength bilateral, 5/5 biceps flexion bilateral, 5/5 triceps extension bilateral, 5/5 shoulder flexion bilateral           Lower extremities  -  5/5 plantar/dorsiflexion bilateral, 4/5 knee flexion bilateral, 3+/5 hip flexion bilateral      A/P:  Pt is a  73y Male s/p T11/T10 laminectomy, T10-L1 fusion POD #14 presenting from [facility] with recurrent fevers.     PLAN:   * Continue care as per primary team  * RICH dressing was placed and is intact; will evaluate in 4-5 hours to asses drainage on dressing  * PT/OT; WBAT  * As per Dr. Gray - If seroma fails non op mngt will schedule seroma drainage early next week   * Pain control as clinically indicated

## 2018-12-12 NOTE — CONSULT NOTE ADULT - SUBJECTIVE AND OBJECTIVE BOX
[facility] INFECTIOUS DISEASES [facility] INTERNAL MEDIC[facility] OF [facility] CABRERA MD FACP   DORINDA GUO MD  Diplomates American Board of Internal Medicine and Infecctious Diseases  [phone]  [id number] MARIBETH [id number]      HPI:  72 y/o male with PMHx of HTN, BPH, DM, Hypothyroid, and OA was sent to the ED from [facility] due to recurrent fevers (T-max 102) and back pain of 1 day duration. Patient was recently discharge from [facility] for thoracic laminectomy and fusion.  Patient said he has been doing well since dc until yesterday when he started having recurrent fevers and back pain. Patient has no   trauma/fall, neck pain, HA, numbness, tingling, bowel/urinary incontinence, chills, chest pain, shortness of breath, palpitation, cough, nausea/vomiting, abdominal pain. (   TMAX 100 HERE IN ER  ASKED TO EVALUATE FROM ID STANDPOINT       PAST MEDICAL & SURGICAL HISTORY:  BPH (benign prostatic hyperplasia)  HTN (hypertension)  Hypothyroid  Dyslipidemia  OA (osteoarthritis)  Diabetes  S/P laminectomy  S/P hernia repair  S/P hip replacement: R      ANTIBIOTICS  cefepime   IVPB 2000 milliGRAM(s) IV Intermittent every 12 hours      Allergies    No Known Allergies    Intolerances        SOCIAL HISTORY:       FAMILY HX   FAMILY HISTORY:  Family history of diabetes mellitus (Father)      Vital Signs Last 24 Hrs  T(C): 37.2 (12 Dec 2018 11:00), Max: 38.2 (11 Dec 2018 21:14)  T(F): 98.9 (12 Dec 2018 11:00), Max: 100.7 (11 Dec 2018 21:14)  HR: 92 (12 Dec 2018 11:00) (77 - 107)  BP: 146/70 (12 Dec 2018 11:00) (100/60 - 146/70)  BP(mean): --  RR: 18 (12 Dec 2018 11:00) (17 - 20)  SpO2: 100% (12 Dec 2018 11:00) (95% - 100%)  Drug Dosing Weight  Height (cm): 170.18 (11 Dec 2018 21:14)  Weight (kg): 97.1 (11 Dec 2018 21:14)  BMI (kg/m2): 33.5 (11 Dec 2018 21:14)  BSA (m2): 2.08 (11 Dec 2018 21:14)      REVIEW OF SYSTEMS:    CONSTITUTIONAL:  As per HPI.    HEENT:  Eyes:  No diplopia or blurred vision. ENT:  No earache, sore throat or runny nose.    CARDIOVASCULAR:  No pressure, squeezing, strangling, tightness, heaviness or aching about the chest, neck, axilla or epigastrium.    RESPIRATORY:  No cough, shortness of breath, PND or orthopnea.    GASTROINTESTINAL:  No nausea, vomiting or diarrhea.    GENITOURINARY:  No dysuria, frequency or urgency.    MUSCULOSKELETAL:  As per HPI.    SKIN:  No change in skin, hair or nails.    NEUROLOGIC:  No paresthesias, fasciculations, seizures or weakness.                  PHYSICAL EXAMINATION:    GENERAL: The patient is a well-developed, well-nourished ___IN NAD    VITAL SIGNS: T(C): 37.2 (18 @ 11:00), Max: 38.2 (18 @ 21:14)  HR: 92 (18 @ 11:00) (77 - 107)  BP: 146/70 (18 @ 11:00) (100/60 - 146/70)  RR: 18 (18 @ 11:00) (17 - 20)  SpO2: 100% (18 @ 11:00) (95% - 100%)  Wt(kg): --    HEENT: Head is normocephalic and atraumatic.  ANICTERIC  NECK: Supple. No carotid bruits.  No lymphadenopathy or thyromegaly.    LUNGS :COARSE BREATH SOUNDS    HEART: Regular rate and rhythm without murmur.    ABDOMEN: Soft, nontender, and nondistended.  Positive bowel sounds.  No hepatosplenomegaly was noted. NO REBOUND NO GUARDING    EXTREMITIES: NO EDEMA NO ERYTHEMA    NEUROLOGIC: NON FOCAL      SKIN: No ulceration or induration present. NO RASH  RICH DRESSING ON BACK AREA SURGICAL SITE        BLOOD CULTURES       URINE CX          LABS:                        8.5    6.3   )-----------( 304      ( 11 Dec 2018 21:34 )             25.7     12-11    124<L>  |  88<L>  |  9.0  ----------------------------<  138<H>  4.5   |  26.0  |  0.55    Ca    8.1<L>      11 Dec 2018 21:34    TPro  6.3<L>  /  Alb  3.1<L>  /  TBili  0.7  /  DBili  x   /  AST  25  /  ALT  24  /  AlkPhos  72  12-    PT/INR - ( 11 Dec 2018 21:34 )   PT: 18.1 sec;   INR: 1.55 ratio         PTT - ( 11 Dec 2018 21:34 )  PTT:32.1 sec  Urinalysis Basic - ( 12 Dec 2018 01:38 )    Color: Yellow / Appearance: Clear / S.005 / pH: x  Gluc: x / Ketone: Negative  / Bili: Negative / Urobili: 1 mg/dL   Blood: x / Protein: Negative mg/dL / Nitrite: Negative   Leuk Esterase: Negative / RBC: x / WBC x   Sq Epi: x / Non Sq Epi: x / Bacteria: x        RADIOLOGY & ADDITIONAL STUDIES:      ASSESSMENT/PLAN    72 y/o male with PMHx of HTN, BPH, DM, Hypothyroid, and OA was sent to the ED from [facility] due to recurrent fevers (T-max 102) and back pain of 1 day duration. Patient was recently discharge from [facility] for thoracic laminectomy and fusion.  Patient said he has been doing well since dc until yesterday when he started having recurrent fevers and back pain. Patient has no   trauma/fall, neck pain, HA, numbness, tingling, bowel/urinary incontinence, chills, chest pain, shortness of breath, palpitation, cough, nausea/vomiting, abdominal pain. (   IMAGING WITH  WHAT APPEARS TO BE POST OP SEROMA  BLOOD CX X2 PENDING PT NON TOXIC BUT   UNFORTUNATELY PT WITH HARDWARE IN PLACE AND THEREFORE WILL CONTINUE EMPRIC ABX   D/W  ORTHOPEDIC  SURGERY CEFEPIME VANCO  WILL FOLLOW UP               DORINDA GARSIA MD

## 2018-12-12 NOTE — PROGRESS NOTE ADULT - ASSESSMENT
73 yr old male s/p T11/T10 laminectomy, T10-L1 fusion POD #13, hypertension, diabetes mellitus, hypothyroid was sent in from [facility] for fever and back pain. He was recently discharged from [facility] after surgery. Noted to have fever 102. He was evaluated by orthopedics in ED given recent surgery and CT of thoracolumbar spine with contrast was done, which showed post surgical changes and multiple foci of air, fluid and soft tissue edema. ID was consulted and he was started on Vancomycin and Cefepime. Orthopedics advised local wound care and possible seroma drainage if no improvement.

## 2018-12-13 DIAGNOSIS — E83.42 HYPOMAGNESEMIA: ICD-10-CM

## 2018-12-13 DIAGNOSIS — E87.1 HYPO-OSMOLALITY AND HYPONATREMIA: ICD-10-CM

## 2018-12-13 LAB
ANION GAP SERPL CALC-SCNC: 12 MMOL/L — SIGNIFICANT CHANGE UP (ref 5–17)
APPEARANCE UR: CLEAR — SIGNIFICANT CHANGE UP
BACTERIA # UR AUTO: ABNORMAL
BILIRUB UR-MCNC: NEGATIVE — SIGNIFICANT CHANGE UP
BUN SERPL-MCNC: 10 MG/DL — SIGNIFICANT CHANGE UP (ref 8–20)
CALCIUM SERPL-MCNC: 7.7 MG/DL — LOW (ref 8.6–10.2)
CHLORIDE SERPL-SCNC: 89 MMOL/L — LOW (ref 98–107)
CO2 SERPL-SCNC: 26 MMOL/L — SIGNIFICANT CHANGE UP (ref 22–29)
COLOR SPEC: YELLOW — SIGNIFICANT CHANGE UP
CREAT SERPL-MCNC: 0.55 MG/DL — SIGNIFICANT CHANGE UP (ref 0.5–1.3)
CULTURE RESULTS: NO GROWTH — SIGNIFICANT CHANGE UP
DIFF PNL FLD: ABNORMAL
EPI CELLS # UR: SIGNIFICANT CHANGE UP
GLUCOSE BLDC GLUCOMTR-MCNC: 125 MG/DL — HIGH (ref 70–99)
GLUCOSE BLDC GLUCOMTR-MCNC: 148 MG/DL — HIGH (ref 70–99)
GLUCOSE BLDC GLUCOMTR-MCNC: 168 MG/DL — HIGH (ref 70–99)
GLUCOSE BLDC GLUCOMTR-MCNC: 178 MG/DL — HIGH (ref 70–99)
GLUCOSE SERPL-MCNC: 157 MG/DL — HIGH (ref 70–115)
GLUCOSE UR QL: NEGATIVE MG/DL — SIGNIFICANT CHANGE UP
HCT VFR BLD CALC: 24.6 % — LOW (ref 42–52)
HGB BLD-MCNC: 8 G/DL — LOW (ref 14–18)
KETONES UR-MCNC: NEGATIVE — SIGNIFICANT CHANGE UP
LEUKOCYTE ESTERASE UR-ACNC: NEGATIVE — SIGNIFICANT CHANGE UP
MAGNESIUM SERPL-MCNC: 1.6 MG/DL — LOW (ref 1.8–2.6)
MCHC RBC-ENTMCNC: 27.6 PG — SIGNIFICANT CHANGE UP (ref 27–31)
MCHC RBC-ENTMCNC: 32.5 G/DL — SIGNIFICANT CHANGE UP (ref 32–36)
MCV RBC AUTO: 84.8 FL — SIGNIFICANT CHANGE UP (ref 80–94)
NITRITE UR-MCNC: NEGATIVE — SIGNIFICANT CHANGE UP
OSMOLALITY UR: 285 MOSM/KG — LOW (ref 300–1000)
PH UR: 5 — SIGNIFICANT CHANGE UP (ref 5–8)
PHOSPHATE SERPL-MCNC: 3.6 MG/DL — SIGNIFICANT CHANGE UP (ref 2.4–4.7)
PLATELET # BLD AUTO: 332 K/UL — SIGNIFICANT CHANGE UP (ref 150–400)
POTASSIUM SERPL-MCNC: 4.2 MMOL/L — SIGNIFICANT CHANGE UP (ref 3.5–5.3)
POTASSIUM SERPL-SCNC: 4.2 MMOL/L — SIGNIFICANT CHANGE UP (ref 3.5–5.3)
POTASSIUM UR-SCNC: 16 MMOL/L — SIGNIFICANT CHANGE UP
PROT UR-MCNC: 30 MG/DL
RBC # BLD: 2.9 M/UL — LOW (ref 4.6–6.2)
RBC # FLD: 14.4 % — SIGNIFICANT CHANGE UP (ref 11–15.6)
RBC CASTS # UR COMP ASSIST: SIGNIFICANT CHANGE UP /HPF (ref 0–4)
SODIUM SERPL-SCNC: 127 MMOL/L — LOW (ref 135–145)
SODIUM UR-SCNC: 36 MMOL/L — SIGNIFICANT CHANGE UP
SP GR SPEC: 1.02 — SIGNIFICANT CHANGE UP (ref 1.01–1.02)
SPECIMEN SOURCE: SIGNIFICANT CHANGE UP
UROBILINOGEN FLD QL: 4 MG/DL
VANCOMYCIN TROUGH SERPL-MCNC: 11.7 UG/ML — SIGNIFICANT CHANGE UP (ref 10–20)
WBC # BLD: 5.1 K/UL — SIGNIFICANT CHANGE UP (ref 4.8–10.8)
WBC # FLD AUTO: 5.1 K/UL — SIGNIFICANT CHANGE UP (ref 4.8–10.8)
WBC UR QL: SIGNIFICANT CHANGE UP

## 2018-12-13 PROCEDURE — 99232 SBSQ HOSP IP/OBS MODERATE 35: CPT

## 2018-12-13 PROCEDURE — 99233 SBSQ HOSP IP/OBS HIGH 50: CPT

## 2018-12-13 PROCEDURE — 99231 SBSQ HOSP IP/OBS SF/LOW 25: CPT | Mod: 24

## 2018-12-13 RX ORDER — MAGNESIUM SULFATE 500 MG/ML
2 VIAL (ML) INJECTION ONCE
Refills: 0 | Status: COMPLETED | OUTPATIENT
Start: 2018-12-13 | End: 2018-12-13

## 2018-12-13 RX ADMIN — Medication 1: at 08:43

## 2018-12-13 RX ADMIN — Medication 150 MICROGRAM(S): at 05:01

## 2018-12-13 RX ADMIN — SENNA PLUS 2 TABLET(S): 8.6 TABLET ORAL at 21:31

## 2018-12-13 RX ADMIN — Medication 100 MILLIGRAM(S): at 05:01

## 2018-12-13 RX ADMIN — Medication 50 GRAM(S): at 10:14

## 2018-12-13 RX ADMIN — GABAPENTIN 300 MILLIGRAM(S): 400 CAPSULE ORAL at 05:01

## 2018-12-13 RX ADMIN — Medication 100 MILLIGRAM(S): at 21:31

## 2018-12-13 RX ADMIN — Medication 25 MILLIGRAM(S): at 05:01

## 2018-12-13 RX ADMIN — CEFEPIME 100 MILLIGRAM(S): 1 INJECTION, POWDER, FOR SOLUTION INTRAMUSCULAR; INTRAVENOUS at 17:16

## 2018-12-13 RX ADMIN — OXYCODONE HYDROCHLORIDE 10 MILLIGRAM(S): 5 TABLET ORAL at 05:30

## 2018-12-13 RX ADMIN — GABAPENTIN 300 MILLIGRAM(S): 400 CAPSULE ORAL at 21:31

## 2018-12-13 RX ADMIN — SODIUM CHLORIDE 1 GRAM(S): 9 INJECTION INTRAMUSCULAR; INTRAVENOUS; SUBCUTANEOUS at 12:02

## 2018-12-13 RX ADMIN — Medication 650 MILLIGRAM(S): at 13:17

## 2018-12-13 RX ADMIN — Medication 250 MILLIGRAM(S): at 13:42

## 2018-12-13 RX ADMIN — GABAPENTIN 300 MILLIGRAM(S): 400 CAPSULE ORAL at 12:02

## 2018-12-13 RX ADMIN — TAMSULOSIN HYDROCHLORIDE 0.8 MILLIGRAM(S): 0.4 CAPSULE ORAL at 21:31

## 2018-12-13 RX ADMIN — Medication 650 MILLIGRAM(S): at 12:03

## 2018-12-13 RX ADMIN — OXYCODONE HYDROCHLORIDE 10 MILLIGRAM(S): 5 TABLET ORAL at 05:00

## 2018-12-13 RX ADMIN — OXYCODONE HYDROCHLORIDE 10 MILLIGRAM(S): 5 TABLET ORAL at 20:47

## 2018-12-13 RX ADMIN — ATORVASTATIN CALCIUM 40 MILLIGRAM(S): 80 TABLET, FILM COATED ORAL at 21:31

## 2018-12-13 RX ADMIN — Medication 250 MILLIGRAM(S): at 21:31

## 2018-12-13 RX ADMIN — OXYCODONE HYDROCHLORIDE 10 MILLIGRAM(S): 5 TABLET ORAL at 00:05

## 2018-12-13 RX ADMIN — SODIUM CHLORIDE 1 GRAM(S): 9 INJECTION INTRAMUSCULAR; INTRAVENOUS; SUBCUTANEOUS at 05:01

## 2018-12-13 RX ADMIN — SODIUM CHLORIDE 1 GRAM(S): 9 INJECTION INTRAMUSCULAR; INTRAVENOUS; SUBCUTANEOUS at 21:31

## 2018-12-13 RX ADMIN — ENOXAPARIN SODIUM 40 MILLIGRAM(S): 100 INJECTION SUBCUTANEOUS at 12:02

## 2018-12-13 RX ADMIN — Medication 100 MILLIGRAM(S): at 12:02

## 2018-12-13 RX ADMIN — Medication 250 MILLIGRAM(S): at 05:44

## 2018-12-13 RX ADMIN — CEFEPIME 100 MILLIGRAM(S): 1 INJECTION, POWDER, FOR SOLUTION INTRAMUSCULAR; INTRAVENOUS at 05:01

## 2018-12-13 RX ADMIN — OXYCODONE HYDROCHLORIDE 10 MILLIGRAM(S): 5 TABLET ORAL at 21:20

## 2018-12-13 NOTE — PROGRESS NOTE ADULT - SUBJECTIVE AND OBJECTIVE BOX
UProt:-- UCr:-- P/C Ratio:-- 24 hour Prot:-- UVol:-- CrCl:--  Juan:36 mmol/L UOsm:285 mosm/kg<L> UVol:-- UCl:-- UK:16 mmol/L (12-13 @ 22:43)             73 yr old male s/p T11/T10 laminectomy, T10-L1 fusion POD #13, hypertension, diabetes mellitus, hypothyroid was sent in from [facility] for fever and back pain. He was recently discharged from [facility] after surgery. Noted to have fever 102. He was evaluated by orthopedics in ED given recent surgery and CT of thoracolumbar spine with contrast was done, which showed post surgical changes and multiple foci of air, fluid and soft tissue edema. ID was consulted and he was started on Vancomycin and Cefepime. Orthopedics advised local wound care and possible seroma drainage if no improvement. Nephrology consulted for hyponatremia.       Problem/Plan - 1:  ·  Problem: Hyponatremia - Euvolemic, Hypotonic,    Plan: Continue sodium chloride ,  Oral FR, monitor,     Consider Urea for long term therapy,     Problem/Plan - 8:  ·  Problem: Hypomagnesemia.      Plan: Replace IV  magnesium.     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 Maddox,    Full note to follow,

## 2018-12-13 NOTE — PROGRESS NOTE ADULT - PROBLEM SELECTOR PLAN 1
Continue antibiotics per ID. Follow up cultures. Local wound care by orthopedics for seroma seen on imaging. Possible OR early this week.

## 2018-12-13 NOTE — PROGRESS NOTE ADULT - SUBJECTIVE AND OBJECTIVE BOX
[facility]  INFECTIOUS DISEASES AND INTERNAL MEDICINE at [place]  =======================================================  Wellington Collier MD  Diplomates American Board of Internal Medicine and Infectious Diseases  =======================================================    TIMUR LOUIS [id number]    Follow up: back pain SEROMA    Allergies:  No Known Allergies      Medications:  acetaminophen   Tablet .. 650 milliGRAM(s) Oral every 6 hours PRN  atorvastatin 40 milliGRAM(s) Oral at bedtime  cefepime   IVPB 2000 milliGRAM(s) IV Intermittent every 12 hours  dextrose 40% Gel 15 Gram(s) Oral once PRN  dextrose 5%. 1000 milliLiter(s) IV Continuous <Continuous>  dextrose 50% Injectable 12.5 Gram(s) IV Push once  dextrose 50% Injectable 25 Gram(s) IV Push once  dextrose 50% Injectable 25 Gram(s) IV Push once  docusate sodium 100 milliGRAM(s) Oral three times a day  enoxaparin Injectable 40 milliGRAM(s) SubCutaneous daily  gabapentin 300 milliGRAM(s) Oral three times a day  glucagon  Injectable 1 milliGRAM(s) IntraMuscular once PRN  insulin lispro (HumaLOG) corrective regimen sliding scale   SubCutaneous three times a day before meals  levothyroxine 150 MICROGram(s) Oral daily  methocarbamol 500 milliGRAM(s) Oral every 6 hours PRN  metoprolol succinate ER 25 milliGRAM(s) Oral daily  oxyCODONE    IR 10 milliGRAM(s) Oral every 4 hours PRN  senna 2 Tablet(s) Oral at bedtime  sodium chloride 1 Gram(s) Oral three times a day  tamsulosin 0.8 milliGRAM(s) Oral at bedtime  vancomycin  IVPB 1000 milliGRAM(s) IV Intermittent every 8 hours    SOCIAL       FAMILY   FAMILY HISTORY:  Family history of diabetes mellitus (Father)    REVIEW OF SYSTEMS:  CONSTITUTIONAL:  No Fever or chills  HEENT:   No diplopia or blurred vision.  No earache, sore throat or runny nose.  CARDIOVASCULAR:  No pressure, squeezing, strangling, tightness, heaviness or aching about the chest, neck, axilla or epigastrium.  RESPIRATORY:  No cough, shortness of breath, PND or orthopnea.  GASTROINTESTINAL:  No nausea, vomiting or diarrhea.  GENITOURINARY:  No dysuria, frequency or urgency. No Blood in urine  MUSCULOSKELETAL:   AS PER HPI  SKIN:  No change in skin, hair or nails.  NEUROLOGIC:  No paresthesias, fasciculations, seizures or weakness.  PSYCHIATRIC:  No disorder of thought or mood.  ENDOCRINE:  No heat or cold intolerance, polyuria or polydipsia.  HEMATOLOGICAL:  No easy bruising or bleeding.            Physical Exam:  ICU Vital Signs Last 24 Hrs  T(C): 37.1 (13 Dec 2018 04:25), Max: 38.2 (12 Dec 2018 18:39)  T(F): 98.7 (13 Dec 2018 04:25), Max: 100.7 (12 Dec 2018 18:39)  HR: 103 (13 Dec 2018 04:25) (98 - 104)  BP: 102/65 (13 Dec 2018 04:25) (102/65 - 140/73)  BP(mean): --  ABP: --  ABP(mean): --  RR: 18 (12 Dec 2018 20:59) (17 - 18)  SpO2: 100% (12 Dec 2018 18:39) (95% - 100%)    GEN: NAD, pleasant  HEENT: normocephalic and atraumatic. EOMI. MANUEL.    NECK: Supple. No carotid bruits.  No lymphadenopathy or thyromegaly.  LUNGS: Clear to auscultation.  HEART: Regular rate and rhythm without murmur.  ABDOMEN: Soft, nontender, and nondistended.  Positive bowel sounds.    : No CVA tenderness  EXTREMITIES: Without any cyanosis, clubbing, rash, lesions or edema.  MSK: DRESSING PLACE  NEUROLOGIC: Cranial nerves II through XII are grossly intact.  PSYCHIATRIC: Appropriate affect .  SKIN: No ulceration or induration present.        Labs:      127<L>  |  89<L>  |  10.0  ----------------------------<  157<H>  4.2   |  26.0  |  0.55    Ca    7.7<L>      13 Dec 2018 07:39  Phos  3.6       Mg     1.6         TPro  6.3<L>  /  Alb  3.1<L>  /  TBili  0.7  /  DBili  x   /  AST  25  /  ALT  24  /  AlkPhos  72                            8.0    5.1   )-----------( 332      ( 13 Dec 2018 07:39 )             24.6       PT/INR - ( 11 Dec 2018 21:34 )   PT: 18.1 sec;   INR: 1.55 ratio         PTT - ( 11 Dec 2018 21:34 )  PTT:32.1 sec  Urinalysis Basic - ( 12 Dec 2018 01:38 )    Color: Yellow / Appearance: Clear / S.005 / pH: x  Gluc: x / Ketone: Negative  / Bili: Negative / Urobili: 1 mg/dL   Blood: x / Protein: Negative mg/dL / Nitrite: Negative   Leuk Esterase: Negative / RBC: x / WBC x   Sq Epi: x / Non Sq Epi: x / Bacteria: x      LIVER FUNCTIONS - ( 11 Dec 2018 21:34 )  Alb: 3.1 g/dL / Pro: 6.3 g/dL / ALK PHOS: 72 U/L / ALT: 24 U/L / AST: 25 U/L / GGT: x               CAPILLARY BLOOD GLUCOSE      POCT Blood Glucose.: 125 mg/dL (13 Dec 2018 11:28)  POCT Blood Glucose.: 178 mg/dL (13 Dec 2018 07:46)  POCT Blood Glucose.: 213 mg/dL (12 Dec 2018 17:49)        RECENT CULTURES:   @ 01:39 .Urine     No growth         @ 11:40 .Blood     No growth at 5 days.         @ 15:14 .Urine     Culture grew 3 or more types of organisms which indicate  collection contamination; consider recollection only if clinically  indicated.  Results called to tata jacobo         @ 19:56 .Urine Klebsiella pneumoniae  Morganella morganii    10,000 - 49,000 CFU/mL Morganella morganii  10,000 - 49,000 CFU/mL Klebsiella pneumoniae         @ 10:35 .Blood     No growth at 5 days.

## 2018-12-13 NOTE — PROGRESS NOTE ADULT - ASSESSMENT
4 y/o male with PMHx of HTN, BPH, DM, Hypothyroid, and OA was sent to the ED from [facility] due to recurrent fevers (T-max 102) and back pain of 1 day duration. Patient was recently discharge from [facility] for thoracic laminectomy and fusion.  Patient said he has been doing well since dc until yesterday when he started having recurrent fevers and back pain. Patient has no   trauma/fall, neck pain, HA, numbness, tingling, bowel/urinary incontinence, chills, chest pain, shortness of breath, palpitation, cough, nausea/vomiting, abdominal pain. (   IMAGING WITH  WHAT APPEARS TO BE POST OP SEROMA  BLOOD CX X2neg so far   PT NON TOXIC BUT   UNFORTUNATELY PT WITH HARDWARE IN PLACE AND THEREFORE WILL CONTINUE EMPRIC ABX

## 2018-12-13 NOTE — PROGRESS NOTE ADULT - SUBJECTIVE AND OBJECTIVE BOX
INTERVAL HPI/OVERNIGHT EVENTS:    CC: fever, post op seroma, hypertension, hypothyroid    No overnight events, low grade fever yesterday. Pain controlled, no incontinence    Vital Signs Last 24 Hrs  T(C): 37.1 (13 Dec 2018 04:25), Max: 38.2 (12 Dec 2018 18:39)  T(F): 98.7 (13 Dec 2018 04:25), Max: 100.7 (12 Dec 2018 18:39)  HR: 103 (13 Dec 2018 04:25) (103 - 104)  BP: 102/65 (13 Dec 2018 04:25) (102/65 - 140/73)  BP(mean): --  RR: 18 (12 Dec 2018 20:59) (17 - 18)  SpO2: 100% (12 Dec 2018 18:39) (100% - 100%)    PHYSICAL EXAM:    GENERAL: Not in distress, alert  CHEST/LUNG: b/l air entry, clear  HEART: Regular   ABDOMEN: Soft, BS+  EXTREMITIES:  No edema, tenderness.    MEDICATIONS  (STANDING):  atorvastatin 40 milliGRAM(s) Oral at bedtime  cefepime   IVPB 2000 milliGRAM(s) IV Intermittent every 12 hours  dextrose 5%. 1000 milliLiter(s) (50 mL/Hr) IV Continuous <Continuous>  dextrose 50% Injectable 12.5 Gram(s) IV Push once  dextrose 50% Injectable 25 Gram(s) IV Push once  dextrose 50% Injectable 25 Gram(s) IV Push once  docusate sodium 100 milliGRAM(s) Oral three times a day  enoxaparin Injectable 40 milliGRAM(s) SubCutaneous daily  gabapentin 300 milliGRAM(s) Oral three times a day  insulin lispro (HumaLOG) corrective regimen sliding scale   SubCutaneous three times a day before meals  levothyroxine 150 MICROGram(s) Oral daily  metoprolol succinate ER 25 milliGRAM(s) Oral daily  senna 2 Tablet(s) Oral at bedtime  sodium chloride 1 Gram(s) Oral three times a day  tamsulosin 0.8 milliGRAM(s) Oral at bedtime  vancomycin  IVPB 1000 milliGRAM(s) IV Intermittent every 8 hours    MEDICATIONS  (PRN):  acetaminophen   Tablet .. 650 milliGRAM(s) Oral every 6 hours PRN Temp greater or equal to 38C (100.4F), Moderate Pain (4 - 6)  dextrose 40% Gel 15 Gram(s) Oral once PRN Blood Glucose LESS THAN 70 milliGRAM(s)/deciliter  glucagon  Injectable 1 milliGRAM(s) IntraMuscular once PRN Glucose LESS THAN 70 milligrams/deciliter  methocarbamol 500 milliGRAM(s) Oral every 6 hours PRN Muscle Spasm  oxyCODONE    IR 10 milliGRAM(s) Oral every 4 hours PRN Severe Pain (7 - 10)      Allergies    No Known Allergies    Intolerances          LABS:                          8.0    5.1   )-----------( 332      ( 13 Dec 2018 07:39 )             24.6     12-13    127<L>  |  89<L>  |  10.0  ----------------------------<  157<H>  4.2   |  26.0  |  0.55    Ca    7.7<L>      13 Dec 2018 07:39  Phos  3.6     12-  Mg     1.6     -    TPro  6.3<L>  /  Alb  3.1<L>  /  TBili  0.7  /  DBili  x   /  AST  25  /  ALT  24  /  AlkPhos  72  12-11    PT/INR - ( 11 Dec 2018 21:34 )   PT: 18.1 sec;   INR: 1.55 ratio         PTT - ( 11 Dec 2018 21:34 )  PTT:32.1 sec  Urinalysis Basic - ( 12 Dec 2018 01:38 )    Color: Yellow / Appearance: Clear / S.005 / pH: x  Gluc: x / Ketone: Negative  / Bili: Negative / Urobili: 1 mg/dL   Blood: x / Protein: Negative mg/dL / Nitrite: Negative   Leuk Esterase: Negative / RBC: x / WBC x   Sq Epi: x / Non Sq Epi: x / Bacteria: x        RADIOLOGY & ADDITIONAL TESTS:

## 2018-12-13 NOTE — PROGRESS NOTE ADULT - SUBJECTIVE AND OBJECTIVE BOX
Ortho event note: RICH dressing on lumbar spine saturated. Removed and replaced with Betadine soaked gauze and tegaderms. Incision distally healing well, no erythema, no blistering. Proximally incision is dehisced. No active drainage at time of exam. Mild swelling noted. Will continue to monitor drainage. Ortho event note: RICH dressing on lumbar spine saturated- Bloody drainage noted. Removed and replaced with Betadine soaked gauze and tegaderms. Incision distally healing well, no erythema, no blistering. Proximally incision with small dehiscence- No active drainage. No purulence, No warmth. Mild swelling noted. Will continue to monitor drainage.

## 2018-12-13 NOTE — PHARMACOTHERAPY INTERVENTION NOTE - COMMENTS
Vancomycin trough ordered for 13:00 today, dose adjustments will be made to attain a level in the 15-20 range

## 2018-12-13 NOTE — PROGRESS NOTE ADULT - ASSESSMENT
73 yr old male s/p T11/T10 laminectomy, T10-L1 fusion POD #13, hypertension, diabetes mellitus, hypothyroid was sent in from [facility] for fever and back pain. He was recently discharged from [facility] after surgery. Noted to have fever 102. He was evaluated by orthopedics in ED given recent surgery and CT of thoracolumbar spine with contrast was done, which showed post surgical changes and multiple foci of air, fluid and soft tissue edema. ID was consulted and he was started on Vancomycin and Cefepime. Orthopedics advised local wound care and possible seroma drainage if no improvement. Nephrology consulted for hyponatremia.

## 2018-12-13 NOTE — PROGRESS NOTE ADULT - SUBJECTIVE AND OBJECTIVE BOX
Pt Name: TIMUR LOUIS    MRN: [id number]      Patient is a 73 year old Male status post T11/T10 laminectomy, T10-L1 fusion on 11/28,15, POD # 15..  Patient seen and examined bedside. Patient states he feels tired this morning, but denies back pain at this time. No acute motor or sensory changes. No new orthopedic complaints. Denies chest pain, shortness of breath, subjective fevers or chills, numbness or tingling to bilateral lower extremities,  abdominal pain, nausea, vomiting. l    PAST MEDICAL & SURGICAL HISTORY:  PAST MEDICAL & SURGICAL HISTORY:  BPH (benign prostatic hyperplasia)  HTN (hypertension)  Hypothyroid  Dyslipidemia  OA (osteoarthritis)  Diabetes  S/P laminectomy  S/P hernia repair  S/P hip replacement: R    Allergies: No Known Allergies  Medications: acetaminophen   Tablet .. 650 milliGRAM(s) Oral every 6 hours PRN  atorvastatin 40 milliGRAM(s) Oral at bedtime  cefepime   IVPB 2000 milliGRAM(s) IV Intermittent every 12 hours  dextrose 40% Gel 15 Gram(s) Oral once PRN  dextrose 5%. 1000 milliLiter(s) IV Continuous <Continuous>  dextrose 50% Injectable 12.5 Gram(s) IV Push once  dextrose 50% Injectable 25 Gram(s) IV Push once  dextrose 50% Injectable 25 Gram(s) IV Push once  docusate sodium 100 milliGRAM(s) Oral three times a day  enoxaparin Injectable 40 milliGRAM(s) SubCutaneous daily  gabapentin 300 milliGRAM(s) Oral three times a day  glucagon  Injectable 1 milliGRAM(s) IntraMuscular once PRN  insulin lispro (HumaLOG) corrective regimen sliding scale   SubCutaneous three times a day before meals  levothyroxine 150 MICROGram(s) Oral daily  methocarbamol 500 milliGRAM(s) Oral every 6 hours PRN  metoprolol succinate ER 25 milliGRAM(s) Oral daily  oxyCODONE    IR 10 milliGRAM(s) Oral every 4 hours PRN  senna 2 Tablet(s) Oral at bedtime  sodium chloride 1 Gram(s) Oral three times a day  tamsulosin 0.8 milliGRAM(s) Oral at bedtime  vancomycin  IVPB 1000 milliGRAM(s) IV Intermittent every 8 hours      PHYSICAL EXAM:    Vital Signs Last 24 Hrs  T(C): 37.1 (13 Dec 2018 04:25), Max: 38.2 (12 Dec 2018 18:39)  T(F): 98.7 (13 Dec 2018 04:25), Max: 100.7 (12 Dec 2018 18:39)  HR: 103 (13 Dec 2018 04:25) (77 - 104)  BP: 102/65 (13 Dec 2018 04:25) (102/65 - 146/70)  BP(mean): --  RR: 18 (12 Dec 2018 20:59) (17 - 18)  SpO2: 100% (12 Dec 2018 18:39) (95% - 100%)  Daily     Daily     Appearance: Alert, responsive, in no acute distress.    Skin: +RICH at central lumbar region. Saturated proximally, suction intact. No erythema, swelling, warmth noted.      Vascular: 2+ distal radial/ dorsalis pedis pulse bilaterally. Cap refill < 2 sec. No signs of venous  insufficiency  or stasis. No extremity ulcerations. No cyanosis.    Musculoskeletal:         Left Upper Extremity: Atraumatic with normal alignment NROM. No crepitus. No bony tenderness.        Right Upper Extremity: Atraumatic with normal alignment NROM. No crepitus. No bony tenderness.        Left Lower Extremity: Atraumatic with normal alignment NROM. No crepitus. No bony tenderness.        Right Lower Extremity: Atraumatic with normal alignment NROM. No crepitus. No bony tenderness.     Neurological: Sensation to light touch grossly intact to bilateral lower extemities             Motor exam:          Upper extremities - 5/5 hand  strength bilateral, 5/5 biceps flexion bilateral, 5/5 triceps extension bilateral, 5/5 shoulder flexion bilateral           Lower extremities  -  5/5 plantar/dorsiflexion bilateral, 4/5 knee flexion bilateral, 3+/5 hip flexion bilateral      A/P:  Pt is a  73y Male s/p T11/T10 laminectomy, T10-L1 fusion POD #15 presenting from [facility] nursing facility with recurrent fevers.     PLAN:   -PT/OT, weight bearing as tolerated  -Continue to monitor back dressing, RICH functioning at this time. Will reassess later today.  -Possible surgical intervention early next week if wound continues to drain  -Continue rest of care as per primary team Pt Name: TIMUR LOUIS    MRN: [id number]      Patient is a 73 year old Male status post T11/T10 laminectomy, T10-L1 fusion on 11/28,15, POD # 15..  Patient seen and examined bedside. Patient states he feels fatigued this morning, but denies back pain at this time. No acute motor or sensory changes to upper or lower extremities bilaterally. Patient endorses difficulty with using urinal and has had a few incidents of urinary incontinence, but his bladder sensation has not acutely changed- likely due to preexisting BHP. No fecal incontinence. No new orthopedic complaints. Denies chest pain, shortness of breath, subjective fevers or chills, numbness or tingling, abdominal pain, nausea, vomiting.     PAST MEDICAL & SURGICAL HISTORY:  PAST MEDICAL & SURGICAL HISTORY:  BPH (benign prostatic hyperplasia)  HTN (hypertension)  Hypothyroid  Dyslipidemia  OA (osteoarthritis)  Diabetes  S/P laminectomy  S/P hernia repair  S/P hip replacement: R    Allergies: No Known Allergies  Medications: acetaminophen   Tablet .. 650 milliGRAM(s) Oral every 6 hours PRN  atorvastatin 40 milliGRAM(s) Oral at bedtime  cefepime   IVPB 2000 milliGRAM(s) IV Intermittent every 12 hours  dextrose 40% Gel 15 Gram(s) Oral once PRN  dextrose 5%. 1000 milliLiter(s) IV Continuous <Continuous>  dextrose 50% Injectable 12.5 Gram(s) IV Push once  dextrose 50% Injectable 25 Gram(s) IV Push once  dextrose 50% Injectable 25 Gram(s) IV Push once  docusate sodium 100 milliGRAM(s) Oral three times a day  enoxaparin Injectable 40 milliGRAM(s) SubCutaneous daily  gabapentin 300 milliGRAM(s) Oral three times a day  glucagon  Injectable 1 milliGRAM(s) IntraMuscular once PRN  insulin lispro (HumaLOG) corrective regimen sliding scale   SubCutaneous three times a day before meals  levothyroxine 150 MICROGram(s) Oral daily  methocarbamol 500 milliGRAM(s) Oral every 6 hours PRN  metoprolol succinate ER 25 milliGRAM(s) Oral daily  oxyCODONE    IR 10 milliGRAM(s) Oral every 4 hours PRN  senna 2 Tablet(s) Oral at bedtime  sodium chloride 1 Gram(s) Oral three times a day  tamsulosin 0.8 milliGRAM(s) Oral at bedtime  vancomycin  IVPB 1000 milliGRAM(s) IV Intermittent every 8 hours      PHYSICAL EXAM:    Vital Signs Last 24 Hrs  T(C): 37.1 (13 Dec 2018 04:25), Max: 38.2 (12 Dec 2018 18:39)  T(F): 98.7 (13 Dec 2018 04:25), Max: 100.7 (12 Dec 2018 18:39)  HR: 103 (13 Dec 2018 04:25) (77 - 104)  BP: 102/65 (13 Dec 2018 04:25) (102/65 - 146/70)  BP(mean): --  RR: 18 (12 Dec 2018 20:59) (17 - 18)  SpO2: 100% (12 Dec 2018 18:39) (95% - 100%)  Daily     Daily     Appearance: Alert, responsive, in no acute distress.    Skin: +RICH at central lumbar region. Saturated proximally, suction intact. No erythema, swelling, warmth noted.      Vascular: 2+ distal radial/ dorsalis pedis pulse bilaterally. Cap refill < 2 sec. No signs of venous  insufficiency  or stasis. No extremity ulcerations. No cyanosis.    Musculoskeletal:         Left Upper Extremity: Atraumatic with normal alignment NROM. No crepitus. No bony tenderness.        Right Upper Extremity: Atraumatic with normal alignment NROM. No crepitus. No bony tenderness.        Left Lower Extremity: Atraumatic with normal alignment NROM. No crepitus. No bony tenderness.        Right Lower Extremity: Atraumatic with normal alignment NROM. No crepitus. No bony tenderness.     Neurological: Sensation to light touch grossly intact to bilateral lower extemities             Motor exam:          Upper extremities - 5/5 hand  strength bilateral, 5/5 biceps flexion bilateral, 5/5 triceps extension bilateral, 5/5 shoulder flexion bilateral           Lower extremities  -  5/5 plantar/dorsiflexion bilateral, 4/5 knee flexion bilateral, 3+/5 hip flexion bilateral      A/P:  Pt is a  73y Male s/p T11/T10 laminectomy, T10-L1 fusion POD #15 presenting from [facility] with recurrent fevers.     PLAN:   -PT/OT, weight bearing as tolerated  -Continue to monitor back dressing, RICH functioning at this time. Will reassess later today.  -Possible surgical intervention early next week if wound continues to drain  -Continue rest of care as per primary team

## 2018-12-14 ENCOUNTER — APPOINTMENT (OUTPATIENT)
Dept: ORTHOPEDIC SURGERY | Facility: CLINIC | Age: 73
End: 2018-12-14

## 2018-12-14 DIAGNOSIS — D64.9 ANEMIA, UNSPECIFIED: ICD-10-CM

## 2018-12-14 LAB
ANION GAP SERPL CALC-SCNC: 10 MMOL/L — SIGNIFICANT CHANGE UP (ref 5–17)
BLD GP AB SCN SERPL QL: SIGNIFICANT CHANGE UP
BUN SERPL-MCNC: 7 MG/DL — LOW (ref 8–20)
CALCIUM SERPL-MCNC: 7.8 MG/DL — LOW (ref 8.6–10.2)
CHLORIDE SERPL-SCNC: 88 MMOL/L — LOW (ref 98–107)
CO2 SERPL-SCNC: 28 MMOL/L — SIGNIFICANT CHANGE UP (ref 22–29)
CREAT SERPL-MCNC: 0.49 MG/DL — LOW (ref 0.5–1.3)
GLUCOSE BLDC GLUCOMTR-MCNC: 114 MG/DL — HIGH (ref 70–99)
GLUCOSE BLDC GLUCOMTR-MCNC: 142 MG/DL — HIGH (ref 70–99)
GLUCOSE BLDC GLUCOMTR-MCNC: 150 MG/DL — HIGH (ref 70–99)
GLUCOSE BLDC GLUCOMTR-MCNC: 165 MG/DL — HIGH (ref 70–99)
GLUCOSE SERPL-MCNC: 113 MG/DL — SIGNIFICANT CHANGE UP (ref 70–115)
HCT VFR BLD CALC: 24.5 % — LOW (ref 42–52)
HGB BLD-MCNC: 7.7 G/DL — LOW (ref 14–18)
MAGNESIUM SERPL-MCNC: 1.9 MG/DL — SIGNIFICANT CHANGE UP (ref 1.8–2.6)
MCHC RBC-ENTMCNC: 26.6 PG — LOW (ref 27–31)
MCHC RBC-ENTMCNC: 31.4 G/DL — LOW (ref 32–36)
MCV RBC AUTO: 84.8 FL — SIGNIFICANT CHANGE UP (ref 80–94)
PLATELET # BLD AUTO: 381 K/UL — SIGNIFICANT CHANGE UP (ref 150–400)
POTASSIUM SERPL-MCNC: 4.2 MMOL/L — SIGNIFICANT CHANGE UP (ref 3.5–5.3)
POTASSIUM SERPL-SCNC: 4.2 MMOL/L — SIGNIFICANT CHANGE UP (ref 3.5–5.3)
RBC # BLD: 2.89 M/UL — LOW (ref 4.6–6.2)
RBC # FLD: 13.8 % — SIGNIFICANT CHANGE UP (ref 11–15.6)
SODIUM SERPL-SCNC: 126 MMOL/L — LOW (ref 135–145)
TYPE + AB SCN PNL BLD: SIGNIFICANT CHANGE UP
VANCOMYCIN TROUGH SERPL-MCNC: 14.2 UG/ML — SIGNIFICANT CHANGE UP (ref 10–20)
VANCOMYCIN TROUGH SERPL-MCNC: 32 UG/ML — CRITICAL HIGH (ref 10–20)
WBC # BLD: 4.4 K/UL — LOW (ref 4.8–10.8)
WBC # FLD AUTO: 4.4 K/UL — LOW (ref 4.8–10.8)

## 2018-12-14 PROCEDURE — 99223 1ST HOSP IP/OBS HIGH 75: CPT

## 2018-12-14 PROCEDURE — 99231 SBSQ HOSP IP/OBS SF/LOW 25: CPT | Mod: 24

## 2018-12-14 PROCEDURE — 99233 SBSQ HOSP IP/OBS HIGH 50: CPT

## 2018-12-14 PROCEDURE — 99232 SBSQ HOSP IP/OBS MODERATE 35: CPT

## 2018-12-14 RX ORDER — SODIUM CHLORIDE 9 MG/ML
2 INJECTION INTRAMUSCULAR; INTRAVENOUS; SUBCUTANEOUS THREE TIMES A DAY
Refills: 0 | Status: DISCONTINUED | OUTPATIENT
Start: 2018-12-14 | End: 2019-01-02

## 2018-12-14 RX ADMIN — Medication 150 MICROGRAM(S): at 05:09

## 2018-12-14 RX ADMIN — SODIUM CHLORIDE 2 GRAM(S): 9 INJECTION INTRAMUSCULAR; INTRAVENOUS; SUBCUTANEOUS at 23:03

## 2018-12-14 RX ADMIN — CEFEPIME 100 MILLIGRAM(S): 1 INJECTION, POWDER, FOR SOLUTION INTRAMUSCULAR; INTRAVENOUS at 20:43

## 2018-12-14 RX ADMIN — SODIUM CHLORIDE 1 GRAM(S): 9 INJECTION INTRAMUSCULAR; INTRAVENOUS; SUBCUTANEOUS at 05:09

## 2018-12-14 RX ADMIN — ENOXAPARIN SODIUM 40 MILLIGRAM(S): 100 INJECTION SUBCUTANEOUS at 13:36

## 2018-12-14 RX ADMIN — ATORVASTATIN CALCIUM 40 MILLIGRAM(S): 80 TABLET, FILM COATED ORAL at 23:03

## 2018-12-14 RX ADMIN — Medication 250 MILLIGRAM(S): at 17:53

## 2018-12-14 RX ADMIN — Medication 250 MILLIGRAM(S): at 05:09

## 2018-12-14 RX ADMIN — TAMSULOSIN HYDROCHLORIDE 0.8 MILLIGRAM(S): 0.4 CAPSULE ORAL at 23:03

## 2018-12-14 RX ADMIN — OXYCODONE HYDROCHLORIDE 10 MILLIGRAM(S): 5 TABLET ORAL at 05:08

## 2018-12-14 RX ADMIN — Medication 1: at 17:54

## 2018-12-14 RX ADMIN — GABAPENTIN 300 MILLIGRAM(S): 400 CAPSULE ORAL at 23:03

## 2018-12-14 RX ADMIN — GABAPENTIN 300 MILLIGRAM(S): 400 CAPSULE ORAL at 05:09

## 2018-12-14 RX ADMIN — GABAPENTIN 300 MILLIGRAM(S): 400 CAPSULE ORAL at 13:36

## 2018-12-14 RX ADMIN — Medication 25 MILLIGRAM(S): at 05:09

## 2018-12-14 RX ADMIN — SODIUM CHLORIDE 2 GRAM(S): 9 INJECTION INTRAMUSCULAR; INTRAVENOUS; SUBCUTANEOUS at 17:54

## 2018-12-14 RX ADMIN — Medication 100 MILLIGRAM(S): at 23:02

## 2018-12-14 RX ADMIN — Medication 100 MILLIGRAM(S): at 05:09

## 2018-12-14 RX ADMIN — CEFEPIME 100 MILLIGRAM(S): 1 INJECTION, POWDER, FOR SOLUTION INTRAMUSCULAR; INTRAVENOUS at 05:08

## 2018-12-14 RX ADMIN — OXYCODONE HYDROCHLORIDE 10 MILLIGRAM(S): 5 TABLET ORAL at 05:40

## 2018-12-14 RX ADMIN — SENNA PLUS 2 TABLET(S): 8.6 TABLET ORAL at 23:03

## 2018-12-14 RX ADMIN — Medication 100 MILLIGRAM(S): at 13:36

## 2018-12-14 NOTE — PROGRESS NOTE ADULT - ASSESSMENT
72 y/o male with PMHx of HTN, BPH, DM, Hypothyroid, and OA was sent to the ED from [facility] due to recurrent fevers (T-max 102) and back pain of 1 day duration. Patient was recently discharge from [facility] for thoracic laminectomy and fusion.  Patient said he has been doing well since dc until yesterday when he started having recurrent fevers and back pain. Patient has no   trauma/fall, neck pain, HA, numbness, tingling, bowel/urinary incontinence, chills, chest pain, shortness of breath, palpitation, cough, nausea/vomiting, abdominal pain. (   IMAGING WITH  WHAT APPEARS TO BE POST OP SEROMA  BLOOD CX X2neg so far   PT NON TOXIC BUT   UNFORTUNATELY PT WITH HARDWARE IN PLACE AND THEREFORE WILL CONTINUE EMPRIC ABX   FOR NOW WILL D/W ORHTO

## 2018-12-14 NOTE — PROGRESS NOTE ADULT - ATTENDING COMMENTS
A lengthy conversation with this patient with regard to conservative management of this postop seroma. There is no role signs of sepsis exceptions no purulence if drainage ceases I recommend transfer back to subacute rehabilitation early next week if over the weekend he still has persistent drainage right ray recommend a brief I&D earlier in the week such as Monday. Patient be followed over the weekend to assess here at [facility]

## 2018-12-14 NOTE — PROGRESS NOTE ADULT - SUBJECTIVE AND OBJECTIVE BOX
[facility]  INFECTIOUS DISEASES AND INTERNAL MEDICINE at [place]  =======================================================  Wellington Collier MD  Diplomates American Board of Internal Medicine and Infectious Diseases  =======================================================    TIMUR LOUIS [id number]    Follow up: back pain SEROMA  PT AFEBRILE     Allergies:  No Known Allergies      Medications:  acetaminophen   Tablet .. 650 milliGRAM(s) Oral every 6 hours PRN  atorvastatin 40 milliGRAM(s) Oral at bedtime  cefepime   IVPB 2000 milliGRAM(s) IV Intermittent every 12 hours  dextrose 40% Gel 15 Gram(s) Oral once PRN  dextrose 5%. 1000 milliLiter(s) IV Continuous <Continuous>  dextrose 50% Injectable 12.5 Gram(s) IV Push once  dextrose 50% Injectable 25 Gram(s) IV Push once  dextrose 50% Injectable 25 Gram(s) IV Push once  docusate sodium 100 milliGRAM(s) Oral three times a day  enoxaparin Injectable 40 milliGRAM(s) SubCutaneous daily  gabapentin 300 milliGRAM(s) Oral three times a day  glucagon  Injectable 1 milliGRAM(s) IntraMuscular once PRN  insulin lispro (HumaLOG) corrective regimen sliding scale   SubCutaneous three times a day before meals  levothyroxine 150 MICROGram(s) Oral daily  methocarbamol 500 milliGRAM(s) Oral every 6 hours PRN  metoprolol succinate ER 25 milliGRAM(s) Oral daily  oxyCODONE    IR 10 milliGRAM(s) Oral every 4 hours PRN  senna 2 Tablet(s) Oral at bedtime  sodium chloride 1 Gram(s) Oral three times a day  tamsulosin 0.8 milliGRAM(s) Oral at bedtime  vancomycin  IVPB 1000 milliGRAM(s) IV Intermittent every 8 hours    SOCIAL       FAMILY   FAMILY HISTORY:  Family history of diabetes mellitus (Father)    REVIEW OF SYSTEMS:  CONSTITUTIONAL:  No Fever or chills  HEENT:   No diplopia or blurred vision.  No earache, sore throat or runny nose.  CARDIOVASCULAR:  No pressure, squeezing, strangling, tightness, heaviness or aching about the chest, neck, axilla or epigastrium.  RESPIRATORY:  No cough, shortness of breath, PND or orthopnea.  GASTROINTESTINAL:  No nausea, vomiting or diarrhea.  GENITOURINARY:  No dysuria, frequency or urgency. No Blood in urine  MUSCULOSKELETAL:   AS PER HPI  SKIN:  No change in skin, hair or nails.  NEUROLOGIC:  No paresthesias, fasciculations, seizures or weakness.  PSYCHIATRIC:  No disorder of thought or mood.  ENDOCRINE:  No heat or cold intolerance, polyuria or polydipsia.  HEMATOLOGICAL:  No easy bruising or bleeding.            Physical Exam:   Vital Signs Last 24 Hrs  T(C): 37.5 (14 Dec 2018 04:30), Max: 37.5 (14 Dec 2018 04:30)  T(F): 99.5 (14 Dec 2018 04:30), Max: 99.5 (14 Dec 2018 04:30)  HR: 89 (14 Dec 2018 04:30) (89 - 89)  BP: 116/61 (14 Dec 2018 04:30) (116/61 - 136/70)  BP(mean): --  RR: 18 (13 Dec 2018 21:02) (18 - 18)  SpO2: --    GEN: NAD, pleasant  HEENT: normocephalic and atraumatic. EOMI. MANUEL.    NECK: Supple. No carotid bruits.  No lymphadenopathy or thyromegaly.  LUNGS: Clear to auscultation.  HEART: Regular rate and rhythm without murmur.  ABDOMEN: Soft, nontender, and nondistended.  Positive bowel sounds.    : No CVA tenderness  EXTREMITIES: Without any cyanosis, clubbing, rash, lesions or edema.  MSK: DRESSING PLACE  NEUROLOGIC: Cranial nerves II through XII are grossly intact.  PSYCHIATRIC: Appropriate affect .  SKIN: No ulceration or induration present.        Labs:                         7.7    4.4   )-----------( 381      ( 14 Dec 2018 06:17 )             24.5   12-14    126<L>  |  88<L>  |  7.0<L>  ----------------------------<  113  4.2   |  28.0  |  0.49<L>    Ca    7.8<L>      14 Dec 2018 06:17  Phos  3.6     12-13  Mg     1.9     12-14         PTT - ( 11 Dec 2018 21:34 )  PTT:32.1 sec  Urinalysis Basic - ( 12 Dec 2018 01:38 )         POCT Blood Glucose.: 125 mg/dL (13 Dec 2018 11:28)  POCT Blood Glucose.: 178 mg/dL (13 Dec 2018 07:46)  POCT Blood Glucose.: 213 mg/dL (12 Dec 2018 17:49)        RECENT CULTURES:  12-12 @ 01:39 .Urine     No growth        12-05 @ 11:40 .Blood     No growth at 5 days.        12-04 @ 15:14 .Urine     Culture grew 3 or more types of organisms which indicate  collection contamination; consider recollection only if clinically  indicated.  Results called to tata jacobo        12-02 @ 19:56 .Urine Klebsiella pneumoniae  Morganella morganii    10,000 - 49,000 CFU/mL Morganella morganii  10,000 - 49,000 CFU/mL Klebsiella pneumoniae        12-02 @ 10:35 .Blood     No growth at 5 days.

## 2018-12-14 NOTE — CONSULT NOTE ADULT - SUBJECTIVE AND OBJECTIVE BOX
[facility] DIVISION OF KIDNEY DISEASES AND HYPERTENSION -- INITIAL CONSULT NOTE  --------------------------------------------------------------------------------  HPI:  74 y/o male with PMHx of HTN, BPH, DM, Hypothyroid, and OA was sent to the ED from [facility] due to recurrent fevers (T-max 102) and back pain of 1 day duration. Patient was recently discharge from [facility] for thoracic laminectomy and fusion.  Patient said he has been doing well since dc until yesterday when he started having recurrent fevers and back pain. Patient has no   trauma/fall, neck pain, HA, numbness, tingling, bowel/urinary incontinence, chills, chest pain, shortness of breath, palpitation, cough, nausea/vomiting, abdominal pain.   Now with hyponatremia; worsening today;    PAST HISTORY  --------------------------------------------------------------------------------  PAST MEDICAL & SURGICAL HISTORY:  BPH (benign prostatic hyperplasia)  HTN (hypertension)  Hypothyroid  Dyslipidemia  OA (osteoarthritis)  Diabetes  S/P laminectomy  S/P hernia repair  S/P hip replacement: R    FAMILY HISTORY:  Family history of diabetes mellitus (Father)    PAST SOCIAL HISTORY:    ALLERGIES & MEDICATIONS  --------------------------------------------------------------------------------  Allergies    No Known Allergies    Intolerances      Standing Inpatient Medications  atorvastatin 40 milliGRAM(s) Oral at bedtime  cefepime   IVPB 2000 milliGRAM(s) IV Intermittent every 12 hours  dextrose 5%. 1000 milliLiter(s) IV Continuous <Continuous>  dextrose 50% Injectable 12.5 Gram(s) IV Push once  dextrose 50% Injectable 25 Gram(s) IV Push once  dextrose 50% Injectable 25 Gram(s) IV Push once  docusate sodium 100 milliGRAM(s) Oral three times a day  enoxaparin Injectable 40 milliGRAM(s) SubCutaneous daily  gabapentin 300 milliGRAM(s) Oral three times a day  insulin lispro (HumaLOG) corrective regimen sliding scale   SubCutaneous three times a day before meals  levothyroxine 150 MICROGram(s) Oral daily  metoprolol succinate ER 25 milliGRAM(s) Oral daily  senna 2 Tablet(s) Oral at bedtime  sodium chloride 2 Gram(s) Oral three times a day  tamsulosin 0.8 milliGRAM(s) Oral at bedtime  vancomycin  IVPB 1000 milliGRAM(s) IV Intermittent every 8 hours    PRN Inpatient Medications  acetaminophen   Tablet .. 650 milliGRAM(s) Oral every 6 hours PRN  dextrose 40% Gel 15 Gram(s) Oral once PRN  glucagon  Injectable 1 milliGRAM(s) IntraMuscular once PRN  methocarbamol 500 milliGRAM(s) Oral every 6 hours PRN  oxyCODONE    IR 10 milliGRAM(s) Oral every 4 hours PRN      REVIEW OF SYSTEMS  --------------------------------------------------------------------------------  Gen: No weight changes, fatigue, fevers/chills, weakness  Skin: No rashes  Head/Eyes/Ears/Mouth: No headache; Normal hearing; Normal vision w/o blurriness; No sinus pain/discomfort, sore throat  Respiratory: No dyspnea, cough, wheezing, hemoptysis  CV: No chest pain, PND, orthopnea  GI: No abdominal pain, diarrhea, constipation, nausea, vomiting, melena, hematochezia  : No increased frequency, dysuria, hematuria, nocturia  MSK: No joint pain/swelling; no back pain; no edema  Neuro: No dizziness/lightheadedness, weakness, seizures, numbness, tingling  Heme: No easy bruising or bleeding  Endo: No heat/cold intolerance  Psych: No significant nervousness, anxiety, stress, depression    All other systems were reviewed and are negative, except as noted.    VITALS/PHYSICAL EXAM  --------------------------------------------------------------------------------  T(C): 37.4 (12-14-18 @ 10:45), Max: 37.5 (12-14-18 @ 04:30)  HR: 82 (12-14-18 @ 10:45) (82 - 89)  BP: 123/50 (12-14-18 @ 10:45) (116/61 - 136/70)  RR: 17 (12-14-18 @ 10:45) (17 - 18)  SpO2: --  Wt(kg): --        12-13-18 @ 07:01  -  12-14-18 @ 07:00  --------------------------------------------------------  IN: 1390 mL / OUT: 750 mL / NET: 640 mL      Physical Exam:  	Gen: NAD, well-appearing  	HEENT: PERRL, supple neck, clear oropharynx  	Pulm: CTA B/L  	CV: RRR, S1S2; no rub  	Back: No spinal or CVA tenderness; no sacral edema  	Abd: +BS, soft, nontender/nondistended  	: No suprapubic tenderness  	UE: Warm, FROM, no clubbing, intact strength; no edema; no asterixis  	LE: Warm, FROM, no clubbing, intact strength; no edema  	Neuro: No focal deficits, intact gait  	Psych: Normal affect and mood  	Skin: Warm, without rashes  	Vascular access:    LABS/STUDIES  --------------------------------------------------------------------------------              7.7    4.4   >-----------<  381      [12-14-18 @ 06:17]              24.5     126  |  88  |  7.0  ----------------------------<  113      [12-14-18 @ 06:17]  4.2   |  28.0  |  0.49        Ca     7.8     [12-14-18 @ 06:17]      Mg     1.9     [12-14-18 @ 06:17]      Phos  3.6     [12-13-18 @ 07:39]            Creatinine Trend:  SCr 0.49 [12-14 @ 06:17]  SCr 0.55 [12-13 @ 07:39]  SCr 0.55 [12-11 @ 21:34]  SCr 0.47 [12-10 @ 06:21]  SCr 0.52 [12-09 @ 07:31]    Urinalysis - [12-13-18 @ 17:06]      Color Yellow / Appearance Clear / SG 1.020 / pH 5.0      Gluc Negative / Ketone Negative  / Bili Negative / Urobili 4       Blood Trace / Protein 30 / Leuk Est Negative / Nitrite Negative      RBC 0-2 / WBC 0-2 / Hyaline  / Gran  / Sq Epi  / Non Sq Epi Occasional / Bacteria Few    Urine Sodium 36      [12-13-18 @ 22:43]  Urine Potassium 16      [12-13-18 @ 22:43]  Urine Osmolality 285      [12-13-18 @ 22:43]    Vitamin D (25OH) 17.8      [12-06-18 @ 17:36]  HbA1c 7.5      [11-28-18 @ 06:08]  TSH 16.19      [12-06-18 @ 07:59]      BELLA: titer 1:80, pattern Homogeneous      [11-27-18 @ 22:20]  ANCA: cANCA Negative, pANCA Negative, atypical ANCA Negative      [11-29-18 @ 23:33]  Syphilis Screen (Treponema Pallidum Ab) Negative      [11-27-18 @ 22:20]  Immunofixation Serum:   No Monoclonal Band Identified      [11-27-18 @ 22:20]

## 2018-12-14 NOTE — PHARMACOTHERAPY INTERVENTION NOTE - COMMENTS
Trough level ordered for 12/14 @1pm (draw 1 hour before giving 2pm dose).    Levels drawn:  12/13 @ 12:56 - 11.7

## 2018-12-14 NOTE — PROGRESS NOTE ADULT - SUBJECTIVE AND OBJECTIVE BOX
INTERVAL HPI/OVERNIGHT EVENTS:    CC: anemia, hyponatremia, fever, post op seroma, hypertension, hypothyroid    No overnight events, dressing changes by ortho, denies leg weakness, incontinence. Discussed at length with patient regarding Hb, agreed to transfusion.    Vital Signs Last 24 Hrs  T(C): 37.4 (14 Dec 2018 10:45), Max: 37.5 (14 Dec 2018 04:30)  T(F): 99.4 (14 Dec 2018 10:45), Max: 99.5 (14 Dec 2018 04:30)  HR: 82 (14 Dec 2018 10:45) (82 - 89)  BP: 123/50 (14 Dec 2018 10:45) (116/61 - 136/70)  BP(mean): --  RR: 17 (14 Dec 2018 10:45) (17 - 18)  SpO2: --    PHYSICAL EXAM:    GENERAL: Not in distress, alert  CHEST/LUNG: b/l air entry, clear  HEART: Regular   ABDOMEN: Soft, BS+  EXTREMITIES:  no edema, tenderness.    MEDICATIONS  (STANDING):  atorvastatin 40 milliGRAM(s) Oral at bedtime  cefepime   IVPB 2000 milliGRAM(s) IV Intermittent every 12 hours  dextrose 5%. 1000 milliLiter(s) (50 mL/Hr) IV Continuous <Continuous>  dextrose 50% Injectable 12.5 Gram(s) IV Push once  dextrose 50% Injectable 25 Gram(s) IV Push once  dextrose 50% Injectable 25 Gram(s) IV Push once  docusate sodium 100 milliGRAM(s) Oral three times a day  enoxaparin Injectable 40 milliGRAM(s) SubCutaneous daily  gabapentin 300 milliGRAM(s) Oral three times a day  insulin lispro (HumaLOG) corrective regimen sliding scale   SubCutaneous three times a day before meals  levothyroxine 150 MICROGram(s) Oral daily  metoprolol succinate ER 25 milliGRAM(s) Oral daily  senna 2 Tablet(s) Oral at bedtime  sodium chloride 2 Gram(s) Oral three times a day  tamsulosin 0.8 milliGRAM(s) Oral at bedtime  vancomycin  IVPB 1000 milliGRAM(s) IV Intermittent every 8 hours    MEDICATIONS  (PRN):  acetaminophen   Tablet .. 650 milliGRAM(s) Oral every 6 hours PRN Temp greater or equal to 38C (100.4F), Moderate Pain (4 - 6)  dextrose 40% Gel 15 Gram(s) Oral once PRN Blood Glucose LESS THAN 70 milliGRAM(s)/deciliter  glucagon  Injectable 1 milliGRAM(s) IntraMuscular once PRN Glucose LESS THAN 70 milligrams/deciliter  methocarbamol 500 milliGRAM(s) Oral every 6 hours PRN Muscle Spasm  oxyCODONE    IR 10 milliGRAM(s) Oral every 4 hours PRN Severe Pain (7 - 10)      Allergies    No Known Allergies    Intolerances          LABS:                          7.7    4.4   )-----------( 381      ( 14 Dec 2018 06:17 )             24.5     12-14    126<L>  |  88<L>  |  7.0<L>  ----------------------------<  113  4.2   |  28.0  |  0.49<L>    Ca    7.8<L>      14 Dec 2018 06:17  Phos  3.6     12-13  Mg     1.9     -14        Urinalysis Basic - ( 13 Dec 2018 17:06 )    Color: Yellow / Appearance: Clear / S.020 / pH: x  Gluc: x / Ketone: Negative  / Bili: Negative / Urobili: 4 mg/dL   Blood: x / Protein: 30 mg/dL / Nitrite: Negative   Leuk Esterase: Negative / RBC: 0-2 /HPF / WBC 0-2   Sq Epi: x / Non Sq Epi: Occasional / Bacteria: Few        RADIOLOGY & ADDITIONAL TESTS:

## 2018-12-14 NOTE — PROGRESS NOTE ADULT - SUBJECTIVE AND OBJECTIVE BOX
Found patient laying in bed comfortably.  Had patient turn to his side to evaluate his dressing.  Dressing in place, no signs of active drainage.  Dressing removed some drainage noted to the proximal end of the dressing.  New dressing applied.  Patient returned to laying comfortably in bed.

## 2018-12-14 NOTE — CONSULT NOTE ADULT - ASSESSMENT
SIADH    Hypothyroidism,    1L fluid restriction; salt tabs inc to 2gm TID for now    hossein Maddox

## 2018-12-14 NOTE — PROGRESS NOTE ADULT - ASSESSMENT
73 yr old male s/p T11/T10 laminectomy, T10-L1 fusion POD #13, hypertension, diabetes mellitus, hypothyroid was sent in from [facility] for fever and back pain. He was recently discharged from [facility] after surgery. Noted to have fever 102. He was evaluated by orthopedics in ED given recent surgery and CT of thoracolumbar spine with contrast was done, which showed post surgical changes and multiple foci of air, fluid and soft tissue edema. ID was consulted and he was started on Vancomycin and Cefepime. Orthopedics advised local wound care and possible seroma drainage if no improvement. Nephrology consulted for hyponatremia. Advised fluid restriction and increased salt tabs to 2 tabs three times a day. He was noted to Hb 7.7, was given 1 unit PRBC.

## 2018-12-14 NOTE — PROGRESS NOTE ADULT - SUBJECTIVE AND OBJECTIVE BOX
Pt Name: TIMUR LOUIS    MRN: [id number]    Patient is a 73 year old Male status post T11/T10 laminectomy, T10-L1 fusion on 11/28/18.   Patient seen resting comfortably in bed in NAD.    Denies chest pain, shortness of breath, subjective fevers or chills, numbness or tingling, abdominal pain, nausea, vomiting.   No new complaints    PAST MEDICAL & SURGICAL HISTORY:  PAST MEDICAL & SURGICAL HISTORY:  BPH (benign prostatic hyperplasia)  HTN (hypertension)  Hypothyroid  Dyslipidemia  OA (osteoarthritis)  Diabetes  S/P laminectomy  S/P hernia repair  S/P hip replacement: R    PHYSICAL EXAM:    Vital Signs Last 24 Hrs  T(C): 37.5 (14 Dec 2018 04:30), Max: 37.5 (14 Dec 2018 04:30)  T(F): 99.5 (14 Dec 2018 04:30), Max: 99.5 (14 Dec 2018 04:30)  HR: 89 (14 Dec 2018 04:30) (89 - 89)  BP: 116/61 (14 Dec 2018 04:30) (116/61 - 136/70)  BP(mean): --  RR: 18 (13 Dec 2018 21:02) (18 - 18)  SpO2: --    Appearance: Alert, responsive, in no acute distress.    Skin: + Dressing C/D/I. Dressing removed. Proximal incision with granulation tissue, No active drainage or discharge. No erythema. New betadine soaked guaze dressing placed    Vascular: 2+ distal radial/ dorsalis pedis pulse bilaterally. Cap refill < 2 sec. No signs of venous  insufficiency  or stasis. No extremity ulcerations. No cyanosis.    Musculoskeletal:         Left Upper Extremity: Atraumatic with normal alignment NROM. No crepitus. No bony tenderness.        Right Upper Extremity: Atraumatic with normal alignment NROM. No crepitus. No bony tenderness.        Left Lower Extremity: Atraumatic with normal alignment NROM. No crepitus. No bony tenderness.        Right Lower Extremity: Atraumatic with normal alignment NROM. No crepitus. No bony tenderness.     Neurological: Sensation to light touch grossly intact to bilateral lower extremities             Motor exam:          Upper extremities - 5/5 hand  strength bilateral, 5/5 biceps flexion bilateral, 5/5 triceps extension bilateral, 5/5 shoulder flexion bilateral           Lower extremities  -  5/5 plantar/dorsiflexion bilateral, 4/5 knee flexion bilateral, 3+/5 hip flexion bilateral    A/P:  Pt is a  73y Male s/p T11/ T10 laminectomy, T10-L1 fusion POD #16    PLAN:   -ID note appreciated  -PT/OT, weight bearing as tolerated with TLSO for comfort  -Continue to monitor wound/ back dressings  -Possible surgical intervention early next week if wound continues to drain  -Continue care as per primary team

## 2018-12-15 LAB
ANION GAP SERPL CALC-SCNC: 10 MMOL/L — SIGNIFICANT CHANGE UP (ref 5–17)
BUN SERPL-MCNC: 4 MG/DL — LOW (ref 8–20)
CALCIUM SERPL-MCNC: 8.2 MG/DL — LOW (ref 8.6–10.2)
CHLORIDE SERPL-SCNC: 92 MMOL/L — LOW (ref 98–107)
CO2 SERPL-SCNC: 28 MMOL/L — SIGNIFICANT CHANGE UP (ref 22–29)
CREAT SERPL-MCNC: 0.38 MG/DL — LOW (ref 0.5–1.3)
GLUCOSE BLDC GLUCOMTR-MCNC: 151 MG/DL — HIGH (ref 70–99)
GLUCOSE BLDC GLUCOMTR-MCNC: 155 MG/DL — HIGH (ref 70–99)
GLUCOSE BLDC GLUCOMTR-MCNC: 223 MG/DL — HIGH (ref 70–99)
GLUCOSE BLDC GLUCOMTR-MCNC: 249 MG/DL — HIGH (ref 70–99)
GLUCOSE SERPL-MCNC: 134 MG/DL — HIGH (ref 70–115)
HCT VFR BLD CALC: 29.1 % — LOW (ref 42–52)
HGB BLD-MCNC: 9.5 G/DL — LOW (ref 14–18)
MAGNESIUM SERPL-MCNC: 1.8 MG/DL — SIGNIFICANT CHANGE UP (ref 1.6–2.6)
MCHC RBC-ENTMCNC: 27.4 PG — SIGNIFICANT CHANGE UP (ref 27–31)
MCHC RBC-ENTMCNC: 32.6 G/DL — SIGNIFICANT CHANGE UP (ref 32–36)
MCV RBC AUTO: 83.9 FL — SIGNIFICANT CHANGE UP (ref 80–94)
PHOSPHATE SERPL-MCNC: 2.6 MG/DL — SIGNIFICANT CHANGE UP (ref 2.4–4.7)
PLATELET # BLD AUTO: 335 K/UL — SIGNIFICANT CHANGE UP (ref 150–400)
POTASSIUM SERPL-MCNC: 4.2 MMOL/L — SIGNIFICANT CHANGE UP (ref 3.5–5.3)
POTASSIUM SERPL-SCNC: 4.2 MMOL/L — SIGNIFICANT CHANGE UP (ref 3.5–5.3)
RBC # BLD: 3.47 M/UL — LOW (ref 4.6–6.2)
RBC # FLD: 14 % — SIGNIFICANT CHANGE UP (ref 11–15.6)
SODIUM SERPL-SCNC: 130 MMOL/L — LOW (ref 135–145)
VANCOMYCIN TROUGH SERPL-MCNC: 16.5 UG/ML — SIGNIFICANT CHANGE UP (ref 10–20)
WBC # BLD: 4.3 K/UL — LOW (ref 4.8–10.8)
WBC # FLD AUTO: 4.3 K/UL — LOW (ref 4.8–10.8)

## 2018-12-15 PROCEDURE — 99233 SBSQ HOSP IP/OBS HIGH 50: CPT

## 2018-12-15 PROCEDURE — 99233 SBSQ HOSP IP/OBS HIGH 50: CPT | Mod: 24

## 2018-12-15 PROCEDURE — 99232 SBSQ HOSP IP/OBS MODERATE 35: CPT

## 2018-12-15 RX ORDER — POTASSIUM CHLORIDE 20 MEQ
20 PACKET (EA) ORAL DAILY
Refills: 0 | Status: DISCONTINUED | OUTPATIENT
Start: 2018-12-15 | End: 2018-12-15

## 2018-12-15 RX ADMIN — Medication 250 MILLIGRAM(S): at 14:11

## 2018-12-15 RX ADMIN — GABAPENTIN 300 MILLIGRAM(S): 400 CAPSULE ORAL at 22:07

## 2018-12-15 RX ADMIN — CEFEPIME 100 MILLIGRAM(S): 1 INJECTION, POWDER, FOR SOLUTION INTRAMUSCULAR; INTRAVENOUS at 05:59

## 2018-12-15 RX ADMIN — Medication 100 MILLIGRAM(S): at 06:00

## 2018-12-15 RX ADMIN — Medication 250 MILLIGRAM(S): at 22:10

## 2018-12-15 RX ADMIN — GABAPENTIN 300 MILLIGRAM(S): 400 CAPSULE ORAL at 06:00

## 2018-12-15 RX ADMIN — Medication 2: at 17:31

## 2018-12-15 RX ADMIN — Medication 150 MICROGRAM(S): at 05:59

## 2018-12-15 RX ADMIN — Medication 100 MILLIGRAM(S): at 14:05

## 2018-12-15 RX ADMIN — Medication 250 MILLIGRAM(S): at 07:06

## 2018-12-15 RX ADMIN — Medication 250 MILLIGRAM(S): at 00:59

## 2018-12-15 RX ADMIN — Medication 25 MILLIGRAM(S): at 05:59

## 2018-12-15 RX ADMIN — SODIUM CHLORIDE 2 GRAM(S): 9 INJECTION INTRAMUSCULAR; INTRAVENOUS; SUBCUTANEOUS at 22:07

## 2018-12-15 RX ADMIN — TAMSULOSIN HYDROCHLORIDE 0.8 MILLIGRAM(S): 0.4 CAPSULE ORAL at 22:07

## 2018-12-15 RX ADMIN — GABAPENTIN 300 MILLIGRAM(S): 400 CAPSULE ORAL at 14:05

## 2018-12-15 RX ADMIN — SODIUM CHLORIDE 2 GRAM(S): 9 INJECTION INTRAMUSCULAR; INTRAVENOUS; SUBCUTANEOUS at 14:06

## 2018-12-15 RX ADMIN — SENNA PLUS 2 TABLET(S): 8.6 TABLET ORAL at 22:08

## 2018-12-15 RX ADMIN — ENOXAPARIN SODIUM 40 MILLIGRAM(S): 100 INJECTION SUBCUTANEOUS at 14:04

## 2018-12-15 RX ADMIN — Medication 100 MILLIGRAM(S): at 22:07

## 2018-12-15 RX ADMIN — SODIUM CHLORIDE 2 GRAM(S): 9 INJECTION INTRAMUSCULAR; INTRAVENOUS; SUBCUTANEOUS at 06:00

## 2018-12-15 RX ADMIN — Medication 1: at 08:05

## 2018-12-15 RX ADMIN — Medication 2: at 12:24

## 2018-12-15 RX ADMIN — CEFEPIME 100 MILLIGRAM(S): 1 INJECTION, POWDER, FOR SOLUTION INTRAMUSCULAR; INTRAVENOUS at 17:31

## 2018-12-15 RX ADMIN — ATORVASTATIN CALCIUM 40 MILLIGRAM(S): 80 TABLET, FILM COATED ORAL at 22:07

## 2018-12-15 NOTE — PROGRESS NOTE ADULT - ASSESSMENT
73 yr old male s/p T11/T10 laminectomy, T10-L1 fusion POD #13, hypertension, diabetes mellitus, hypothyroid was sent in from [facility] for fever and back pain. He was recently discharged from [facility] after surgery. Noted to have fever 102. He was evaluated by orthopedics in ED given recent surgery and CT of thoracolumbar spine with contrast was done, which showed post surgical changes and multiple foci of air, fluid and soft tissue edema. ID was consulted and he was started on Vancomycin and Cefepime. Orthopedics advised local wound care and possible seroma drainage if no improvement. Nephrology consulted for hyponatremia. Advised fluid restriction and increased salt tabs to 2 tabs three times a day. He was noted to Hb 7.7, was given 1 unit PRBC.

## 2018-12-15 NOTE — PROGRESS NOTE ADULT - SUBJECTIVE AND OBJECTIVE BOX
TIMUR LOUIS    [id number]    73y      Male    Patient is a 73y old  Male who presents with a chief complaint of Fever and back pain (15 Dec 2018 11:56)      INTERVAL HPI/OVERNIGHT EVENTS:    Patient has no more back pain or fever, denies nausea, vomiting, dizziness, chest pain, sob.      REVIEW OF SYSTEMS:    CONSTITUTIONAL: No fever, weight loss, or fatigue  RESPIRATORY: No cough, No shortness of breath  CARDIOVASCULAR: No chest pain, palpitations  GASTROINTESTINAL: No abdominal, No nausea, vomiting  NEUROLOGICAL: No headaches,  loss of strength.  MISCELLANEOUS: No joint swelling or pain       Vital Signs Last 24 Hrs  T(C): 37.1 (15 Dec 2018 09:09), Max: 37.4 (14 Dec 2018 22:50)  T(F): 98.8 (15 Dec 2018 09:09), Max: 99.4 (14 Dec 2018 22:50)  HR: 88 (15 Dec 2018 09:55) (82 - 90)  BP: 161/67 (15 Dec 2018 09:55) (131/57 - 175/84)  RR: 17 (15 Dec 2018 09:09) (17 - 18)  SpO2: 98% (15 Dec 2018 09:09) (96% - 98%)    PHYSICAL EXAM:    GENERAL: Elderly male looking comfortable  HEENT: PERRL, +EOMI  NECK: soft, Supple, No JVD,   CHEST/LUNG: Decrease air entry bilaterally; No wheezing  HEART: S1S2+, Regular rate and rhythm; No murmurs  ABDOMEN: Soft, Nontender, Nondistended; Bowel sounds present  EXTREMITIES:  1+ Peripheral Pulses, No edema  SKIN: No rashes or lesions  NEURO: AAOX3, no focal deficits, no motor r sensory loss  PSYCH: normal mood  Unable to do back exam as patient is not cooperating       LABS:                        9.5    4.3   )-----------( 335      ( 15 Dec 2018 06:44 )             29.1     12-15    130<L>  |  92<L>  |  4.0<L>  ----------------------------<  134<H>  4.2   |  28.0  |  0.38<L>    Ca    8.2<L>      15 Dec 2018 06:44  Phos  2.6     12-15  Mg     1.8     12-15        Urinalysis Basic - ( 13 Dec 2018 17:06 )    Color: Yellow / Appearance: Clear / S.020 / pH: x  Gluc: x / Ketone: Negative  / Bili: Negative / Urobili: 4 mg/dL   Blood: x / Protein: 30 mg/dL / Nitrite: Negative   Leuk Esterase: Negative / RBC: 0-2 /HPF / WBC 0-2   Sq Epi: x / Non Sq Epi: Occasional / Bacteria: Few          I&O's Summary    14 Dec 2018 07:01  -  15 Dec 2018 07:00  --------------------------------------------------------  IN: 720 mL / OUT: 0 mL / NET: 720 mL        MEDICATIONS  (STANDING):  atorvastatin 40 milliGRAM(s) Oral at bedtime  cefepime   IVPB 2000 milliGRAM(s) IV Intermittent every 12 hours  dextrose 5%. 1000 milliLiter(s) (50 mL/Hr) IV Continuous <Continuous>  dextrose 50% Injectable 12.5 Gram(s) IV Push once  dextrose 50% Injectable 25 Gram(s) IV Push once  dextrose 50% Injectable 25 Gram(s) IV Push once  docusate sodium 100 milliGRAM(s) Oral three times a day  enoxaparin Injectable 40 milliGRAM(s) SubCutaneous daily  gabapentin 300 milliGRAM(s) Oral three times a day  insulin lispro (HumaLOG) corrective regimen sliding scale   SubCutaneous three times a day before meals  levothyroxine 150 MICROGram(s) Oral daily  metoprolol succinate ER 25 milliGRAM(s) Oral daily  senna 2 Tablet(s) Oral at bedtime  sodium chloride 2 Gram(s) Oral three times a day  tamsulosin 0.8 milliGRAM(s) Oral at bedtime  vancomycin  IVPB 1000 milliGRAM(s) IV Intermittent every 8 hours    MEDICATIONS  (PRN):  acetaminophen   Tablet .. 650 milliGRAM(s) Oral every 6 hours PRN Temp greater or equal to 38C (100.4F), Moderate Pain (4 - 6)  dextrose 40% Gel 15 Gram(s) Oral once PRN Blood Glucose LESS THAN 70 milliGRAM(s)/deciliter  glucagon  Injectable 1 milliGRAM(s) IntraMuscular once PRN Glucose LESS THAN 70 milligrams/deciliter  methocarbamol 500 milliGRAM(s) Oral every 6 hours PRN Muscle Spasm  oxyCODONE    IR 10 milliGRAM(s) Oral every 4 hours PRN Severe Pain (7 - 10)

## 2018-12-15 NOTE — PROGRESS NOTE ADULT - PROBLEM SELECTOR PLAN 1
Continue antibiotics per ID, blood cultures has been negative so for, Local wound care by orthopedics for seroma seen on imaging, Possible OR early this week, will follow along.

## 2018-12-15 NOTE — PROGRESS NOTE ADULT - SUBJECTIVE AND OBJECTIVE BOX
Orthopedic follow up.    Patient recently seen by attending.  Lumbar Dressing recently changed.  New dressing with no saturation.  patient with no complaints of acute motor or sensory changes.    - will continue to monitor

## 2018-12-15 NOTE — PROGRESS NOTE ADULT - ASSESSMENT
73 yr old AA  male s/p T11/T10 laminectomy, T10-L1 fusion POD #13, hypertension, diabetes mellitus, hypothyroid was sent in from [facility] for fever and back pain.     He was recently discharged from [facility] after surgery.     Noted to have fever, T  102 F. He was evaluated by orthopedics in ED , given recent surgery and CT of thoracolumbar spine with contrast was done, which showed post surgical changes and multiple foci of air, fluid and soft tissue edema. ID was consulted and he was started on Vancomycin and Cefepime. Orthopedics advised local wound care and possible seroma drainage if no improvement.   He was noted to  have  Hgb 7.7 gms., was Transfused  1 unit PRBC.    Continue antibiotics , Possible OR early this week.       HTN , On Metoprolol,     DM Management,    Hyponatremia - Low solute intake,    C/W Oral Nacl * Kcl, Oral FR,    Avoid Hypotonic IVF - pre Surgery, if planned,    Please call as needed,    Ty,

## 2018-12-15 NOTE — PROGRESS NOTE ADULT - PROBLEM SELECTOR PLAN 2
Patient is on Metoprolol ER 25mg daily, BP seems elevated, could be due to pain, will control pain, if pain is controlled and BP is still up, will increase metoprolol to 50mg.

## 2018-12-15 NOTE — PROGRESS NOTE ADULT - PROBLEM SELECTOR PLAN 7
Increase sodium chloride tabs, fluid restriction, sodium is coming up, renal eval noted, monitor BMP.

## 2018-12-15 NOTE — PROGRESS NOTE ADULT - SUBJECTIVE AND OBJECTIVE BOX
Patient seen and examined    I&O's Summary    14 Dec 2018 07:01  -  15 Dec 2018 07:00  --------------------------------------------------------  IN: 720 mL / OUT: 0 mL / NET: 720 mL    REVIEW OF SYSTEMS:    CONSTITUTIONAL: No F/C  RESPIRATORY: No cough or SOB  CARDIOVASCULAR: No CP/palpitations,    GASTROINTESTINAL: No abdominal pain , NVD   GENITOURINARY: No UTI sx  NEUROLOGICAL: No headaches/wk/numbness  MUSCULOSKELETAL:  No joint pain/swelling; No LBP  EXTREMITIES : no swelling,    Vital Signs Last 24 Hrs  T(C): 36.9 (15 Dec 2018 05:57), Max: 37.4 (14 Dec 2018 10:45)  T(F): 98.5 (15 Dec 2018 05:57), Max: 99.4 (14 Dec 2018 10:45)  HR: 85 (15 Dec 2018 05:57) (80 - 87)  BP: 172/84 (15 Dec 2018 05:57) (120/51 - 172/84)  BP(mean): --  RR: 18 (15 Dec 2018 05:57) (17 - 18)  SpO2: 96% (14 Dec 2018 22:50) (96% - 98%)    PHYSICAL EXAM:    GENERAL: NAD,   EYES:  conjunctiva and sclera clear  NECK: Supple, No JVD/Bruit  NERVOUS SYSTEM:  A/O x3,   CHEST:  CTA ,No rales or rhonchi  HEART:  RRR, No murmurs  ABDOMEN: Soft, NT/ND BS+  EXTREMITIES:  No Edema;  SKIN: No rashes    LABS:                        9.5    4.3   )-----------( 335      ( 15 Dec 2018 06:44 )             29.1     12-15    130<L>  |  92<L>  |  4.0<L>  ----------------------------<  134<H>  4.2   |  28.0  |  0.38<L>    Ca    8.2<L>      15 Dec 2018 06:44  Phos  2.6     12-15  Mg     1.8     12-15    MEDICATIONS  (STANDING):  acetaminophen   Tablet .. PRN  atorvastatin  cefepime   IVPB  dextrose 40% Gel PRN  dextrose 5%.  dextrose 50% Injectable  dextrose 50% Injectable  dextrose 50% Injectable  docusate sodium  enoxaparin Injectable  gabapentin  glucagon  Injectable PRN  insulin lispro (HumaLOG) corrective regimen sliding scale  levothyroxine  methocarbamol PRN  metoprolol succinate ER  oxyCODONE    IR PRN  senna  sodium chloride  tamsulosin  vancomycin  IVPB

## 2018-12-15 NOTE — PROGRESS NOTE ADULT - SUBJECTIVE AND OBJECTIVE BOX
The patient remains afebrile at this point in time. He is no acute 24-hour complaints. Patient states his lumbar pain is well controlled and he has no neurologic complaints and is not worsened since his revision laminectomy.    His dressing was changed there is some saturation there is a few cc of expressible serosanguineous drainage a new clean dressing was applied.    The patient is status post-lumbar laminectomy I would continue prophylactic antibiotics secondary to the spinal instrumentation that the patient has including risk factors such as diabetes etc. Heart encouraged the patient to be out of bed with all meals I think mobilization will be overall very beneficial for Mr. Platt at this point in time we'll continue to do aggressive dressing changes. Patient be followed on a daily basis we can hopefully get him to subacute rehabilitation once his incision is clean dry and intact with no drainage

## 2018-12-16 LAB
ANION GAP SERPL CALC-SCNC: 9 MMOL/L — SIGNIFICANT CHANGE UP (ref 5–17)
BUN SERPL-MCNC: 5 MG/DL — LOW (ref 8–20)
CALCIUM SERPL-MCNC: 8.2 MG/DL — LOW (ref 8.6–10.2)
CHLORIDE SERPL-SCNC: 91 MMOL/L — LOW (ref 98–107)
CO2 SERPL-SCNC: 30 MMOL/L — HIGH (ref 22–29)
CREAT SERPL-MCNC: 0.41 MG/DL — LOW (ref 0.5–1.3)
CULTURE RESULTS: SIGNIFICANT CHANGE UP
CULTURE RESULTS: SIGNIFICANT CHANGE UP
GLUCOSE BLDC GLUCOMTR-MCNC: 159 MG/DL — HIGH (ref 70–99)
GLUCOSE BLDC GLUCOMTR-MCNC: 177 MG/DL — HIGH (ref 70–99)
GLUCOSE BLDC GLUCOMTR-MCNC: 202 MG/DL — HIGH (ref 70–99)
GLUCOSE BLDC GLUCOMTR-MCNC: 222 MG/DL — HIGH (ref 70–99)
GLUCOSE SERPL-MCNC: 185 MG/DL — HIGH (ref 70–115)
HCT VFR BLD CALC: 29.8 % — LOW (ref 42–52)
HGB BLD-MCNC: 9.7 G/DL — LOW (ref 14–18)
MCHC RBC-ENTMCNC: 27.6 PG — SIGNIFICANT CHANGE UP (ref 27–31)
MCHC RBC-ENTMCNC: 32.6 G/DL — SIGNIFICANT CHANGE UP (ref 32–36)
MCV RBC AUTO: 84.7 FL — SIGNIFICANT CHANGE UP (ref 80–94)
PLATELET # BLD AUTO: 337 K/UL — SIGNIFICANT CHANGE UP (ref 150–400)
POTASSIUM SERPL-MCNC: 4.2 MMOL/L — SIGNIFICANT CHANGE UP (ref 3.5–5.3)
POTASSIUM SERPL-SCNC: 4.2 MMOL/L — SIGNIFICANT CHANGE UP (ref 3.5–5.3)
RBC # BLD: 3.52 M/UL — LOW (ref 4.6–6.2)
RBC # FLD: 14.1 % — SIGNIFICANT CHANGE UP (ref 11–15.6)
SODIUM SERPL-SCNC: 130 MMOL/L — LOW (ref 135–145)
SPECIMEN SOURCE: SIGNIFICANT CHANGE UP
SPECIMEN SOURCE: SIGNIFICANT CHANGE UP
WBC # BLD: 3.8 K/UL — LOW (ref 4.8–10.8)
WBC # FLD AUTO: 3.8 K/UL — LOW (ref 4.8–10.8)

## 2018-12-16 PROCEDURE — 99233 SBSQ HOSP IP/OBS HIGH 50: CPT | Mod: 24

## 2018-12-16 PROCEDURE — 99232 SBSQ HOSP IP/OBS MODERATE 35: CPT

## 2018-12-16 RX ADMIN — TAMSULOSIN HYDROCHLORIDE 0.8 MILLIGRAM(S): 0.4 CAPSULE ORAL at 21:42

## 2018-12-16 RX ADMIN — ENOXAPARIN SODIUM 40 MILLIGRAM(S): 100 INJECTION SUBCUTANEOUS at 12:08

## 2018-12-16 RX ADMIN — CEFEPIME 100 MILLIGRAM(S): 1 INJECTION, POWDER, FOR SOLUTION INTRAMUSCULAR; INTRAVENOUS at 05:14

## 2018-12-16 RX ADMIN — Medication 100 MILLIGRAM(S): at 21:42

## 2018-12-16 RX ADMIN — CEFEPIME 100 MILLIGRAM(S): 1 INJECTION, POWDER, FOR SOLUTION INTRAMUSCULAR; INTRAVENOUS at 17:37

## 2018-12-16 RX ADMIN — SODIUM CHLORIDE 2 GRAM(S): 9 INJECTION INTRAMUSCULAR; INTRAVENOUS; SUBCUTANEOUS at 21:42

## 2018-12-16 RX ADMIN — Medication 250 MILLIGRAM(S): at 16:02

## 2018-12-16 RX ADMIN — SODIUM CHLORIDE 2 GRAM(S): 9 INJECTION INTRAMUSCULAR; INTRAVENOUS; SUBCUTANEOUS at 16:03

## 2018-12-16 RX ADMIN — SENNA PLUS 2 TABLET(S): 8.6 TABLET ORAL at 21:42

## 2018-12-16 RX ADMIN — SODIUM CHLORIDE 2 GRAM(S): 9 INJECTION INTRAMUSCULAR; INTRAVENOUS; SUBCUTANEOUS at 05:14

## 2018-12-16 RX ADMIN — Medication 250 MILLIGRAM(S): at 21:48

## 2018-12-16 RX ADMIN — Medication 2: at 12:08

## 2018-12-16 RX ADMIN — GABAPENTIN 300 MILLIGRAM(S): 400 CAPSULE ORAL at 05:14

## 2018-12-16 RX ADMIN — GABAPENTIN 300 MILLIGRAM(S): 400 CAPSULE ORAL at 16:03

## 2018-12-16 RX ADMIN — GABAPENTIN 300 MILLIGRAM(S): 400 CAPSULE ORAL at 21:42

## 2018-12-16 RX ADMIN — Medication 2: at 17:37

## 2018-12-16 RX ADMIN — Medication 150 MICROGRAM(S): at 05:14

## 2018-12-16 RX ADMIN — Medication 100 MILLIGRAM(S): at 16:03

## 2018-12-16 RX ADMIN — Medication 25 MILLIGRAM(S): at 05:14

## 2018-12-16 RX ADMIN — Medication 250 MILLIGRAM(S): at 05:14

## 2018-12-16 RX ADMIN — Medication 1: at 08:47

## 2018-12-16 RX ADMIN — ATORVASTATIN CALCIUM 40 MILLIGRAM(S): 80 TABLET, FILM COATED ORAL at 21:43

## 2018-12-16 NOTE — PROGRESS NOTE ADULT - SUBJECTIVE AND OBJECTIVE BOX
TIMUR LOUIS    [id number]    73y      Male    Patient is a 73y old  Male who presents with a chief complaint of Fever and back pain (16 Dec 2018 11:20)      INTERVAL HPI/OVERNIGHT EVENTS:    Patient is feeling ok, he has no more back pain or fever, denies nausea, vomiting, dizziness, chest pain, sob.      REVIEW OF SYSTEMS:    CONSTITUTIONAL: No fever, some fatigue  RESPIRATORY: No cough, No shortness of breath  CARDIOVASCULAR: No chest pain, palpitations  GASTROINTESTINAL: No abdominal, No nausea, vomiting  NEUROLOGICAL: No headaches,  loss of strength.  MISCELLANEOUS: Back pain is better.       Vital Signs Last 24 Hrs  T(C): 36.8 (16 Dec 2018 04:45), Max: 36.9 (15 Dec 2018 22:06)  T(F): 98.3 (16 Dec 2018 04:45), Max: 98.4 (15 Dec 2018 22:06)  HR: 92 (16 Dec 2018 04:45) (85 - 92)  BP: 117/70 (16 Dec 2018 04:45) (117/70 - 147/72)  RR: 20 (16 Dec 2018 04:45) (18 - 20)  SpO2: 99% (16 Dec 2018 04:45) (98% - 99%)    PHYSICAL EXAM:    GENERAL: Elderly male looking comfortable  HEENT: PERRL, +EOMI  NECK: soft, Supple, No JVD,   CHEST/LUNG: Decrease air entry bilaterally; No wheezing  HEART: S1S2+, Regular rate and rhythm; No murmurs  ABDOMEN: Soft, Nontender, Nondistended; Bowel sounds present  EXTREMITIES:  1+ Peripheral Pulses, No edema  SKIN: No rashes or lesions  NEURO: AAOX3, no focal deficits, no motor r sensory loss  PSYCH: normal mood  Back: There is swelling in the middle of the back with surgical scar in the center of the back, there is small opening on the top of the scar with some serous fluid coming out.         LABS:                        9.7    3.8   )-----------( 337      ( 16 Dec 2018 10:55 )             29.8     12-16    130<L>  |  91<L>  |  5.0<L>  ----------------------------<  185<H>  4.2   |  30.0<H>  |  0.41<L>    Ca    8.2<L>      16 Dec 2018 10:55  Phos  2.6     12-15  Mg     1.8     12-15        MEDICATIONS  (STANDING):  atorvastatin 40 milliGRAM(s) Oral at bedtime  cefepime   IVPB 2000 milliGRAM(s) IV Intermittent every 12 hours  dextrose 5%. 1000 milliLiter(s) (50 mL/Hr) IV Continuous <Continuous>  dextrose 50% Injectable 12.5 Gram(s) IV Push once  dextrose 50% Injectable 25 Gram(s) IV Push once  dextrose 50% Injectable 25 Gram(s) IV Push once  docusate sodium 100 milliGRAM(s) Oral three times a day  enoxaparin Injectable 40 milliGRAM(s) SubCutaneous daily  gabapentin 300 milliGRAM(s) Oral three times a day  insulin lispro (HumaLOG) corrective regimen sliding scale   SubCutaneous three times a day before meals  levothyroxine 150 MICROGram(s) Oral daily  metoprolol succinate ER 25 milliGRAM(s) Oral daily  senna 2 Tablet(s) Oral at bedtime  sodium chloride 2 Gram(s) Oral three times a day  tamsulosin 0.8 milliGRAM(s) Oral at bedtime  vancomycin  IVPB 1000 milliGRAM(s) IV Intermittent every 8 hours    MEDICATIONS  (PRN):  acetaminophen   Tablet .. 650 milliGRAM(s) Oral every 6 hours PRN Temp greater or equal to 38C (100.4F), Moderate Pain (4 - 6)  dextrose 40% Gel 15 Gram(s) Oral once PRN Blood Glucose LESS THAN 70 milliGRAM(s)/deciliter  glucagon  Injectable 1 milliGRAM(s) IntraMuscular once PRN Glucose LESS THAN 70 milligrams/deciliter  methocarbamol 500 milliGRAM(s) Oral every 6 hours PRN Muscle Spasm  oxyCODONE    IR 10 milliGRAM(s) Oral every 4 hours PRN Severe Pain (7 - 10)

## 2018-12-16 NOTE — PROGRESS NOTE ADULT - ASSESSMENT
73 yr old male s/p T11/T10 laminectomy, T10-L1 fusion POD #13, hypertension, diabetes mellitus, hypothyroid was sent in from [facility] for fever and back pain. He was recently discharged from [facility] after surgery. Noted to have fever 102. He was evaluated by orthopedics in ED given recent surgery and CT of thoracolumbar spine with contrast was done, which showed post surgical changes and multiple foci of air, fluid and soft tissue edema. ID was consulted and he was started on Vancomycin and Cefepime. Orthopedics advised local wound care and possible seroma drainage if no improvement. Nephrology consulted for hyponatremia. Advised fluid restriction and increased salt tabs to 2 tabs three times a day. He was noted to Hb 7.7, was given 1 unit PRBC, Hb came up to 9.5

## 2018-12-16 NOTE — PROGRESS NOTE ADULT - PROBLEM SELECTOR PLAN 1
Continue antibiotics per ID, blood cultures has been negative so for, Local wound care by orthopedics for seroma seen on imaging, Ortho will further decided to if patient will be taken to the oral or long course of antibiotics, ID is on board, has no more fever.

## 2018-12-16 NOTE — PROGRESS NOTE ADULT - SUBJECTIVE AND OBJECTIVE BOX
Patient seen and eval at bedside. Patient has no complaints, pain well controlled. Patient does note numbness in feet beginning today.    Vital Signs Last 24 Hrs  T(C): 36.8 (16 Dec 2018 04:45), Max: 36.9 (15 Dec 2018 22:06)  T(F): 98.3 (16 Dec 2018 04:45), Max: 98.4 (15 Dec 2018 22:06)  HR: 92 (16 Dec 2018 04:45) (85 - 92)  BP: 117/70 (16 Dec 2018 04:45) (117/70 - 147/72)  BP(mean): --  RR: 20 (16 Dec 2018 04:45) (18 - 20)  SpO2: 99% (16 Dec 2018 04:45) (98% - 99%)    PE: NAD, alert awake  Back incision mostly well healed however proximal portion measuring about 1 inch still open, no active drainage or bleeding noted, + fluid collection with fluctuance noted under incision mid-back  Betadine dressing placed over proximal incision. No signs of infection  HF intact right 4/5, 2/5 left  Quad intact right 4/5, 2/5 left  Hamstrings 3/5 B/L  TA 5/5 B/L, GS 5/5 B/L  EHL 1/5 left, 3/5 right  Sensation diminished DP/Tib distrib B/L, SILT s/s/SP distrib B/L  DP pulses 2+ b/l, calf soft, NT B/L  Condom catheter placed                          9.7    3.8   )-----------( 337      ( 16 Dec 2018 10:55 )             29.8     A/P: s/p lami/fusion 11/28/18 with draining incision site, improving   ·	Betadine dressing changes PRN  ·	PT/OT  ·	pain control  ·	Will follow

## 2018-12-16 NOTE — PROGRESS NOTE ADULT - SUBJECTIVE AND OBJECTIVE BOX
24-hour course Mr. Platt remains benign at this point in time here he is resting comfortably easily arousable he has no complaints    Physical exa dressing is clean dry and intact this is a dressing applied yesterday. There is no serous sanguinousdrainage. This dressing will be changed ithe early a.m. hours of 1217.    Impression status post thoracic or revision thoracic laminectomy secondary to thoracic myelopathy.  His plan d is clean dry and intact coue IV antibiosis once he has no drainage from his thoracic spine the incision is closed he'll be transferred again back to subacute rehabilitation to start aggressive PT OT gait and balance training etc.

## 2018-12-16 NOTE — PROGRESS NOTE ADULT - PROBLEM SELECTOR PLAN 2
Patient is on Metoprolol ER 25mg daily, BP seems elevated, could be due to pain, will control pain, if pain is controlled and BP is still up, will increase metoprolol to 50mg, right now BP is in acceptable.

## 2018-12-17 LAB
ANION GAP SERPL CALC-SCNC: 12 MMOL/L — SIGNIFICANT CHANGE UP (ref 5–17)
BUN SERPL-MCNC: 4 MG/DL — LOW (ref 8–20)
CALCIUM SERPL-MCNC: 8.1 MG/DL — LOW (ref 8.6–10.2)
CHLORIDE SERPL-SCNC: 90 MMOL/L — LOW (ref 98–107)
CO2 SERPL-SCNC: 26 MMOL/L — SIGNIFICANT CHANGE UP (ref 22–29)
CREAT SERPL-MCNC: 0.42 MG/DL — LOW (ref 0.5–1.3)
GLUCOSE BLDC GLUCOMTR-MCNC: 153 MG/DL — HIGH (ref 70–99)
GLUCOSE BLDC GLUCOMTR-MCNC: 181 MG/DL — HIGH (ref 70–99)
GLUCOSE BLDC GLUCOMTR-MCNC: 199 MG/DL — HIGH (ref 70–99)
GLUCOSE BLDC GLUCOMTR-MCNC: 213 MG/DL — HIGH (ref 70–99)
GLUCOSE SERPL-MCNC: 151 MG/DL — HIGH (ref 70–115)
HCT VFR BLD CALC: 30.4 % — LOW (ref 42–52)
HGB BLD-MCNC: 9.8 G/DL — LOW (ref 14–18)
MCHC RBC-ENTMCNC: 26.9 PG — LOW (ref 27–31)
MCHC RBC-ENTMCNC: 32.2 G/DL — SIGNIFICANT CHANGE UP (ref 32–36)
MCV RBC AUTO: 83.5 FL — SIGNIFICANT CHANGE UP (ref 80–94)
PLATELET # BLD AUTO: 403 K/UL — HIGH (ref 150–400)
POTASSIUM SERPL-MCNC: 4.1 MMOL/L — SIGNIFICANT CHANGE UP (ref 3.5–5.3)
POTASSIUM SERPL-SCNC: 4.1 MMOL/L — SIGNIFICANT CHANGE UP (ref 3.5–5.3)
RBC # BLD: 3.64 M/UL — LOW (ref 4.6–6.2)
RBC # FLD: 13.5 % — SIGNIFICANT CHANGE UP (ref 11–15.6)
SODIUM SERPL-SCNC: 128 MMOL/L — LOW (ref 135–145)
WBC # BLD: 5.5 K/UL — SIGNIFICANT CHANGE UP (ref 4.8–10.8)
WBC # FLD AUTO: 5.5 K/UL — SIGNIFICANT CHANGE UP (ref 4.8–10.8)

## 2018-12-17 PROCEDURE — 99233 SBSQ HOSP IP/OBS HIGH 50: CPT

## 2018-12-17 PROCEDURE — 99233 SBSQ HOSP IP/OBS HIGH 50: CPT | Mod: 24

## 2018-12-17 RX ORDER — METOPROLOL TARTRATE 50 MG
50 TABLET ORAL
Refills: 0 | Status: DISCONTINUED | OUTPATIENT
Start: 2018-12-17 | End: 2018-12-19

## 2018-12-17 RX ADMIN — ENOXAPARIN SODIUM 40 MILLIGRAM(S): 100 INJECTION SUBCUTANEOUS at 11:49

## 2018-12-17 RX ADMIN — Medication 50 MILLIGRAM(S): at 16:55

## 2018-12-17 RX ADMIN — SODIUM CHLORIDE 2 GRAM(S): 9 INJECTION INTRAMUSCULAR; INTRAVENOUS; SUBCUTANEOUS at 14:28

## 2018-12-17 RX ADMIN — CEFEPIME 100 MILLIGRAM(S): 1 INJECTION, POWDER, FOR SOLUTION INTRAMUSCULAR; INTRAVENOUS at 17:14

## 2018-12-17 RX ADMIN — GABAPENTIN 300 MILLIGRAM(S): 400 CAPSULE ORAL at 05:08

## 2018-12-17 RX ADMIN — ATORVASTATIN CALCIUM 40 MILLIGRAM(S): 80 TABLET, FILM COATED ORAL at 23:06

## 2018-12-17 RX ADMIN — Medication 100 MILLIGRAM(S): at 14:28

## 2018-12-17 RX ADMIN — SODIUM CHLORIDE 2 GRAM(S): 9 INJECTION INTRAMUSCULAR; INTRAVENOUS; SUBCUTANEOUS at 05:07

## 2018-12-17 RX ADMIN — Medication 1: at 09:15

## 2018-12-17 RX ADMIN — GABAPENTIN 300 MILLIGRAM(S): 400 CAPSULE ORAL at 14:28

## 2018-12-17 RX ADMIN — Medication 25 MILLIGRAM(S): at 05:07

## 2018-12-17 RX ADMIN — Medication 250 MILLIGRAM(S): at 05:07

## 2018-12-17 RX ADMIN — Medication 100 MILLIGRAM(S): at 05:07

## 2018-12-17 RX ADMIN — TAMSULOSIN HYDROCHLORIDE 0.8 MILLIGRAM(S): 0.4 CAPSULE ORAL at 23:09

## 2018-12-17 RX ADMIN — GABAPENTIN 300 MILLIGRAM(S): 400 CAPSULE ORAL at 23:09

## 2018-12-17 RX ADMIN — Medication 2: at 11:48

## 2018-12-17 RX ADMIN — Medication 150 MICROGRAM(S): at 05:07

## 2018-12-17 RX ADMIN — Medication 250 MILLIGRAM(S): at 23:06

## 2018-12-17 RX ADMIN — CEFEPIME 100 MILLIGRAM(S): 1 INJECTION, POWDER, FOR SOLUTION INTRAMUSCULAR; INTRAVENOUS at 05:08

## 2018-12-17 RX ADMIN — Medication 1: at 17:11

## 2018-12-17 RX ADMIN — Medication 250 MILLIGRAM(S): at 14:26

## 2018-12-17 NOTE — PHYSICAL THERAPY INITIAL EVALUATION ADULT - ADDITIONAL COMMENTS
Pt lives in a Condo with his wife. 1 step to enter without handrails, no steps inside. Pt was only able to ambulate very short distances with RW and was primarily w/c bound 6 weeks prior to surgery. Pt has been non ambulatory since surgery and was not in [facility] long enough to participate with therapy. Pt owns RW, Shower chair, Commode, w/c.

## 2018-12-17 NOTE — PHYSICAL THERAPY INITIAL EVALUATION ADULT - LEVEL OF INDEPENDENCE: SIT/STAND, REHAB EVAL
Pt able to clear buttocks from EOB but unable to extend Bilateral knees and hips to stand fully upright/maximum assist (25% patients effort)

## 2018-12-17 NOTE — PROGRESS NOTE ADULT - PROBLEM SELECTOR PLAN 1
Continue antibiotics per ID, blood cultures has been negative so for, Local wound care by orthopedics for seroma seen on imaging, Ortho will further decided to if patient will be taken to the oral or long course of antibiotics, ID is on board

## 2018-12-17 NOTE — PROGRESS NOTE ADULT - SUBJECTIVE AND OBJECTIVE BOX
TIMUR LOUIS    [id number]    History: 73y Male is status post thoracolumbar fusion 11/28/18. Patient is doing well and is comfortable. The patient's pain is controlled using the prescribed pain medications. The patient is participating in physical therapy. Denies nausea, vomiting, chest pain, shortness of breath, abdominal pain or fever. No new complaints.                            9.7    3.8   )-----------( 337      ( 16 Dec 2018 10:55 )             29.8     12-16    130<L>  |  91<L>  |  5.0<L>  ----------------------------<  185<H>  4.2   |  30.0<H>  |  0.41<L>    Ca    8.2<L>      16 Dec 2018 10:55        MEDICATIONS  (STANDING):  atorvastatin 40 milliGRAM(s) Oral at bedtime  cefepime   IVPB 2000 milliGRAM(s) IV Intermittent every 12 hours  dextrose 5%. 1000 milliLiter(s) (50 mL/Hr) IV Continuous <Continuous>  dextrose 50% Injectable 12.5 Gram(s) IV Push once  dextrose 50% Injectable 25 Gram(s) IV Push once  dextrose 50% Injectable 25 Gram(s) IV Push once  docusate sodium 100 milliGRAM(s) Oral three times a day  enoxaparin Injectable 40 milliGRAM(s) SubCutaneous daily  gabapentin 300 milliGRAM(s) Oral three times a day  insulin lispro (HumaLOG) corrective regimen sliding scale   SubCutaneous three times a day before meals  levothyroxine 150 MICROGram(s) Oral daily  metoprolol succinate ER 25 milliGRAM(s) Oral daily  senna 2 Tablet(s) Oral at bedtime  sodium chloride 2 Gram(s) Oral three times a day  tamsulosin 0.8 milliGRAM(s) Oral at bedtime  vancomycin  IVPB 1000 milliGRAM(s) IV Intermittent every 8 hours    MEDICATIONS  (PRN):  acetaminophen   Tablet .. 650 milliGRAM(s) Oral every 6 hours PRN Temp greater or equal to 38C (100.4F), Moderate Pain (4 - 6)  dextrose 40% Gel 15 Gram(s) Oral once PRN Blood Glucose LESS THAN 70 milliGRAM(s)/deciliter  glucagon  Injectable 1 milliGRAM(s) IntraMuscular once PRN Glucose LESS THAN 70 milligrams/deciliter  methocarbamol 500 milliGRAM(s) Oral every 6 hours PRN Muscle Spasm  oxyCODONE    IR 10 milliGRAM(s) Oral every 4 hours PRN Severe Pain (7 - 10)      Physical exam:       Thoracolumbar region: Betadine soaked gauze dressing in place. Proximal aspect of incision continues to drain serosanguinous fluid. No erythema no purulent drainage.     b/l LE:  The calf is supple nontender. Sensation to light touch is grossly intact distally. Extensor hallucis longus and flexor hallucis longus are intact. No foot drop. 2+ dorsalis pedis pulse. Capillary refill is less than 2 seconds.     Primary Orthopedic Assessment:  • 73y Male is status post thoracolumbar fusion 11/28/18    Secondary  Orthopedic Assessment(s):   •     Secondary  Medical Assessment(s):   •     Plan:   • Pain control as clinically indicated  • DVT prophylaxis as prescribed, including use of compression devices and ankle pumps  • Continue physical therapy  • Out of bed as tolerated with assistance of a walker  • Incentive spirometry encouraged  • Continue to monitor wound drainage  • f/u Dr. Gray plan

## 2018-12-17 NOTE — PHYSICAL THERAPY INITIAL EVALUATION ADULT - CRITERIA FOR SKILLED THERAPEUTIC INTERVENTIONS
[facility]/impairments found/anticipated equipment needs at discharge/anticipated discharge recommendation

## 2018-12-17 NOTE — OCCUPATIONAL THERAPY INITIAL EVALUATION ADULT - ADDITIONAL COMMENTS
Prior to admission, pt was at [facility] for therapy; pt reports that 6 weeks prior to surgery, pt was functioning mostly at a wheelchair level  Pt owns a RW, wheelchair, commode  Pt has tub with curtain  Pt is right handed

## 2018-12-17 NOTE — OCCUPATIONAL THERAPY INITIAL EVALUATION ADULT - PLANNED THERAPY INTERVENTIONS, OT EVAL
ADL retraining/balance training/bed mobility training/motor coordination training/neuromuscular re-education/strengthening/transfer training

## 2018-12-17 NOTE — PROGRESS NOTE ADULT - ASSESSMENT
73 yr old male s/p T11/T10 laminectomy, T10-L1 fusion, hypertension, diabetes mellitus, hypothyroid was sent in from [facility] for fever and back pain. He was recently discharged from [facility] after surgery. Noted to have fever 102. He was evaluated by orthopedics in ED given recent surgery and CT of thoracolumbar spine with contrast was done, which showed post surgical changes and multiple foci of air, fluid and soft tissue edema. ID was consulted and he was started on Vancomycin and Cefepime. Orthopedics advised local wound care and possible seroma drainage if no improvement. Nephrology consulted for hyponatremia. Advised fluid restriction and increased salt tabs to 2 tabs three times a day. He was noted to Hb 7.7, was given 1 unit PRBC, Hb came up to 9.5

## 2018-12-17 NOTE — PROGRESS NOTE ADULT - ASSESSMENT
74 y/o male with PMHx of HTN, BPH, DM, Hypothyroid, and OA was sent to the ED from [facility] due to recurrent fevers (T-max 102) and back pain of 1 day duration. Patient was recently discharge from [facility] for thoracic laminectomy and fusion.  Patient said he has been doing well since dc until yesterday when he started having recurrent fevers and back pain. Patient has no   trauma/fall, neck pain, HA, numbness, tingling, bowel/urinary incontinence, chills, chest pain, shortness of breath, palpitation, cough, nausea/vomiting, abdominal pain. (   IMAGING WITH  WHAT APPEARS TO BE POST OP SEROMA  BLOOD CX X2neg so far   PT NON TOXIC BUT   UNFORTUNATELY PT WITH HARDWARE IN PLACE AND placed on  EMPRIC ABX    WILL D/W ORTHO   ABOUT DURATION OF THERAPY AND FURHTER MANAGEMENT

## 2018-12-17 NOTE — PHYSICAL THERAPY INITIAL EVALUATION ADULT - PERTINENT HX OF CURRENT PROBLEM, REHAB EVAL
73 yr old male s/p T11/T10 laminectomy, T10-L1 fusion, hypertension, diabetes mellitus, hypothyroid was sent in from [facility] for fever and back pain. He was recently discharged to [facility] from [facility] after surgery.

## 2018-12-17 NOTE — PROGRESS NOTE ADULT - SUBJECTIVE AND OBJECTIVE BOX
seen for fevers, recent orthopedic surgery    no acute complaints  ros otherwise negative  denies pain, numbness or muscle weakness    MEDICATIONS  (STANDING):  atorvastatin 40 milliGRAM(s) Oral at bedtime  cefepime   IVPB 2000 milliGRAM(s) IV Intermittent every 12 hours  dextrose 5%. 1000 milliLiter(s) (50 mL/Hr) IV Continuous <Continuous>  dextrose 50% Injectable 12.5 Gram(s) IV Push once  dextrose 50% Injectable 25 Gram(s) IV Push once  dextrose 50% Injectable 25 Gram(s) IV Push once  docusate sodium 100 milliGRAM(s) Oral three times a day  enoxaparin Injectable 40 milliGRAM(s) SubCutaneous daily  gabapentin 300 milliGRAM(s) Oral three times a day  insulin lispro (HumaLOG) corrective regimen sliding scale   SubCutaneous three times a day before meals  levothyroxine 150 MICROGram(s) Oral daily  metoprolol succinate ER 25 milliGRAM(s) Oral daily  senna 2 Tablet(s) Oral at bedtime  sodium chloride 2 Gram(s) Oral three times a day  tamsulosin 0.8 milliGRAM(s) Oral at bedtime  vancomycin  IVPB 1000 milliGRAM(s) IV Intermittent every 8 hours    MEDICATIONS  (PRN):  acetaminophen   Tablet .. 650 milliGRAM(s) Oral every 6 hours PRN Temp greater or equal to 38C (100.4F), Moderate Pain (4 - 6)  dextrose 40% Gel 15 Gram(s) Oral once PRN Blood Glucose LESS THAN 70 milliGRAM(s)/deciliter  glucagon  Injectable 1 milliGRAM(s) IntraMuscular once PRN Glucose LESS THAN 70 milligrams/deciliter  methocarbamol 500 milliGRAM(s) Oral every 6 hours PRN Muscle Spasm  oxyCODONE    IR 10 milliGRAM(s) Oral every 4 hours PRN Severe Pain (7 - 10)      Allergies    No Known Allergies    Vital Signs Last 24 Hrs  T(C): 36.8 (17 Dec 2018 04:52), Max: 37.2 (16 Dec 2018 15:56)  T(F): 98.2 (17 Dec 2018 04:52), Max: 99 (16 Dec 2018 15:56)  HR: 92 (17 Dec 2018 04:52) (77 - 92)  BP: 135/73 (17 Dec 2018 04:52) (135/73 - 167/79)  BP(mean): --  RR: 19 (17 Dec 2018 04:52) (19 - 20)  SpO2: 97% (16 Dec 2018 21:44) (96% - 97%)    PHYSICAL EXAM:    GENERAL: NAD  CHEST/LUNG: Clear to percussion bilaterally  HEART: Regular rate and rhythm; S1 S2  ABDOMEN: Soft, Nontender, Nondistended; Bowel sounds present  EXTREMITIES:  no edema  : + [place] catheter  NERVOUS SYSTEM:  Alert & Oriented X3, nonfocal  MSK: seroma thoracic spine.  clean dry bandage over incision site.  old incision site well healed    LABS:                        9.8    5.5   )-----------( 403      ( 17 Dec 2018 07:30 )             30.4     12-17    128<L>  |  90<L>  |  4.0<L>  ----------------------------<  151<H>  4.1   |  26.0  |  0.42<L>    Ca    8.1<L>      17 Dec 2018 07:30            CAPILLARY BLOOD GLUCOSE      POCT Blood Glucose.: 181 mg/dL (17 Dec 2018 08:46)  POCT Blood Glucose.: 159 mg/dL (16 Dec 2018 23:02)  POCT Blood Glucose.: 202 mg/dL (16 Dec 2018 17:15)  POCT Blood Glucose.: 222 mg/dL (16 Dec 2018 11:25)        RADIOLOGY & ADDITIONAL TESTS:

## 2018-12-17 NOTE — PROGRESS NOTE ADULT - ATTENDING COMMENTS
Patient's incision is examined there is no expressible drainage at this point in time wound is re\re cleansed with Betadine a dry dressing was applied continue with conservative care it is no significant drainage of the next few days we can consider discharging to subacute rehabilitation.

## 2018-12-17 NOTE — PROGRESS NOTE ADULT - SUBJECTIVE AND OBJECTIVE BOX
[facility]  INFECTIOUS DISEASES AND INTERNAL MEDICINE at [place]  =======================================================  Wellington Collier MD  Diplomates American Board of Internal Medicine and Infectious Diseases  =======================================================    TIMUR LOUIS [id number]    Follow up: back pain SEROMA  PT AFEBRILE non toxic    Allergies:  No Known Allergies      Medications:  acetaminophen   Tablet .. 650 milliGRAM(s) Oral every 6 hours PRN  atorvastatin 40 milliGRAM(s) Oral at bedtime  cefepime   IVPB 2000 milliGRAM(s) IV Intermittent every 12 hours  dextrose 40% Gel 15 Gram(s) Oral once PRN  dextrose 5%. 1000 milliLiter(s) IV Continuous <Continuous>  dextrose 50% Injectable 12.5 Gram(s) IV Push once  dextrose 50% Injectable 25 Gram(s) IV Push once  dextrose 50% Injectable 25 Gram(s) IV Push once  docusate sodium 100 milliGRAM(s) Oral three times a day  enoxaparin Injectable 40 milliGRAM(s) SubCutaneous daily  gabapentin 300 milliGRAM(s) Oral three times a day  glucagon  Injectable 1 milliGRAM(s) IntraMuscular once PRN  insulin lispro (HumaLOG) corrective regimen sliding scale   SubCutaneous three times a day before meals  levothyroxine 150 MICROGram(s) Oral daily  methocarbamol 500 milliGRAM(s) Oral every 6 hours PRN  metoprolol succinate ER 25 milliGRAM(s) Oral daily  oxyCODONE    IR 10 milliGRAM(s) Oral every 4 hours PRN  senna 2 Tablet(s) Oral at bedtime  sodium chloride 1 Gram(s) Oral three times a day  tamsulosin 0.8 milliGRAM(s) Oral at bedtime  vancomycin  IVPB 1000 milliGRAM(s) IV Intermittent every 8 hours    SOCIAL       FAMILY   FAMILY HISTORY:  Family history of diabetes mellitus (Father)    REVIEW OF SYSTEMS:  CONSTITUTIONAL:  No Fever or chills  HEENT:   No diplopia or blurred vision.  No earache, sore throat or runny nose.  CARDIOVASCULAR:  No pressure, squeezing, strangling, tightness, heaviness or aching about the chest, neck, axilla or epigastrium.  RESPIRATORY:  No cough, shortness of breath, PND or orthopnea.  GASTROINTESTINAL:  No nausea, vomiting or diarrhea.  GENITOURINARY:  No dysuria, frequency or urgency. No Blood in urine  MUSCULOSKELETAL:   AS PER HPI  SKIN:  No change in skin, hair or nails.  NEUROLOGIC:  No paresthesias, fasciculations, seizures or weakness.  PSYCHIATRIC:  No disorder of thought or mood.  ENDOCRINE:  No heat or cold intolerance, polyuria or polydipsia.  HEMATOLOGICAL:  No easy bruising or bleeding.            Physical Exam:   Vital Signs Last 24 Hrs  T(C): 36.8 (17 Dec 2018 04:52), Max: 37.2 (16 Dec 2018 15:56)  T(F): 98.2 (17 Dec 2018 04:52), Max: 99 (16 Dec 2018 15:56)  HR: 92 (17 Dec 2018 04:52) (77 - 92)  BP: 135/73 (17 Dec 2018 04:52) (135/73 - 167/79)  BP(mean): --  RR: 19 (17 Dec 2018 04:52) (19 - 20)  SpO2: 97% (16 Dec 2018 21:44) (96% - 97%)    GEN: NAD, pleasant  HEENT: normocephalic and atraumatic. EOMI. MANUEL.    NECK: Supple. No carotid bruits.  No lymphadenopathy or thyromegaly.  LUNGS: Clear to auscultation.  HEART: Regular rate and rhythm without murmur.  ABDOMEN: Soft, nontender, and nondistended.  Positive bowel sounds.    : No CVA tenderness  EXTREMITIES: Without any cyanosis, clubbing, rash, lesions or edema.  MSK: DRESSING PLACE  NEUROLOGIC: Cranial nerves II through XII are grossly intact.  PSYCHIATRIC: Appropriate affect .  SKIN: No ulceration or induration present.        Labs:                                         9.8    5.5   )-----------( 403      ( 17 Dec 2018 07:30 )             30.4   12-17    128<L>  |  90<L>  |  4.0<L>  ----------------------------<  151<H>  4.1   |  26.0  |  0.42<L>    Ca    8.1<L>      17 Dec 2018 07:30         PTT - ( 11 Dec 2018 21:34 )  PTT:32.1 sec  Urinalysis Basic - ( 12 Dec 2018 01:38 )         POCT Blood Glucose.: 125 mg/dL (13 Dec 2018 11:28)  POCT Blood Glucose.: 178 mg/dL (13 Dec 2018 07:46)  POCT Blood Glucose.: 213 mg/dL (12 Dec 2018 17:49)        RECENT CULTURES:  12-12 @ 01:39 .Urine     No growth        12-05 @ 11:40 .Blood     No growth at 5 days.        12-04 @ 15:14 .Urine     Culture grew 3 or more types of organisms which indicate  collection contamination; consider recollection only if clinically  indicated.  Results called to tata jacobo        12-02 @ 19:56 .Urine Klebsiella pneumoniae  Morganella morganii    10,000 - 49,000 CFU/mL Morganella morganii  10,000 - 49,000 CFU/mL Klebsiella pneumoniae        12-02 @ 10:35 .Blood     No growth at 5 days.

## 2018-12-17 NOTE — PHYSICAL THERAPY INITIAL EVALUATION ADULT - GENERAL OBSERVATIONS, REHAB EVAL
Pt received supine in bed, TLSO donned at doffed at EOB, no c/o pain, pt agreeable to PT Pt received supine in bed + barreto, TLSO donned at doffed at EOB, no c/o pain, pt agreeable to PT

## 2018-12-18 LAB
ANION GAP SERPL CALC-SCNC: 13 MMOL/L — SIGNIFICANT CHANGE UP (ref 5–17)
BASOPHILS # BLD AUTO: 0 K/UL — SIGNIFICANT CHANGE UP (ref 0–0.2)
BASOPHILS NFR BLD AUTO: 1.1 % — SIGNIFICANT CHANGE UP (ref 0–2)
BUN SERPL-MCNC: 5 MG/DL — LOW (ref 8–20)
CALCIUM SERPL-MCNC: 8.2 MG/DL — LOW (ref 8.6–10.2)
CHLORIDE SERPL-SCNC: 90 MMOL/L — LOW (ref 98–107)
CO2 SERPL-SCNC: 26 MMOL/L — SIGNIFICANT CHANGE UP (ref 22–29)
CREAT SERPL-MCNC: 0.44 MG/DL — LOW (ref 0.5–1.3)
CRP SERPL-MCNC: 4 MG/DL — HIGH (ref 0–0.4)
EOSINOPHIL # BLD AUTO: 0.3 K/UL — SIGNIFICANT CHANGE UP (ref 0–0.5)
EOSINOPHIL NFR BLD AUTO: 6.4 % — HIGH (ref 0–5)
ERYTHROCYTE [SEDIMENTATION RATE] IN BLOOD: 55 MM/HR — HIGH (ref 0–20)
GLUCOSE BLDC GLUCOMTR-MCNC: 145 MG/DL — HIGH (ref 70–99)
GLUCOSE BLDC GLUCOMTR-MCNC: 149 MG/DL — HIGH (ref 70–99)
GLUCOSE BLDC GLUCOMTR-MCNC: 212 MG/DL — HIGH (ref 70–99)
GLUCOSE BLDC GLUCOMTR-MCNC: 213 MG/DL — HIGH (ref 70–99)
GLUCOSE BLDC GLUCOMTR-MCNC: 225 MG/DL — HIGH (ref 70–99)
GLUCOSE SERPL-MCNC: 135 MG/DL — HIGH (ref 70–115)
HCT VFR BLD CALC: 30.7 % — LOW (ref 42–52)
HGB BLD-MCNC: 9.9 G/DL — LOW (ref 14–18)
LYMPHOCYTES # BLD AUTO: 0.8 K/UL — LOW (ref 1–4.8)
LYMPHOCYTES # BLD AUTO: 17.7 % — LOW (ref 20–55)
MCHC RBC-ENTMCNC: 26.9 PG — LOW (ref 27–31)
MCHC RBC-ENTMCNC: 32.2 G/DL — SIGNIFICANT CHANGE UP (ref 32–36)
MCV RBC AUTO: 83.4 FL — SIGNIFICANT CHANGE UP (ref 80–94)
MONOCYTES # BLD AUTO: 0.6 K/UL — SIGNIFICANT CHANGE UP (ref 0–0.8)
MONOCYTES NFR BLD AUTO: 12.5 % — HIGH (ref 3–10)
NEUTROPHILS # BLD AUTO: 2.7 K/UL — SIGNIFICANT CHANGE UP (ref 1.8–8)
NEUTROPHILS NFR BLD AUTO: 62.1 % — SIGNIFICANT CHANGE UP (ref 37–73)
PLATELET # BLD AUTO: 320 K/UL — SIGNIFICANT CHANGE UP (ref 150–400)
POTASSIUM SERPL-MCNC: 4.6 MMOL/L — SIGNIFICANT CHANGE UP (ref 3.5–5.3)
POTASSIUM SERPL-SCNC: 4.6 MMOL/L — SIGNIFICANT CHANGE UP (ref 3.5–5.3)
PROCALCITONIN SERPL-MCNC: 0.05 NG/ML — HIGH (ref 0–0.04)
RBC # BLD: 3.68 M/UL — LOW (ref 4.6–6.2)
RBC # FLD: 13.7 % — SIGNIFICANT CHANGE UP (ref 11–15.6)
SODIUM SERPL-SCNC: 129 MMOL/L — LOW (ref 135–145)
WBC # BLD: 4.4 K/UL — LOW (ref 4.8–10.8)
WBC # FLD AUTO: 4.4 K/UL — LOW (ref 4.8–10.8)

## 2018-12-18 PROCEDURE — 99233 SBSQ HOSP IP/OBS HIGH 50: CPT | Mod: 24

## 2018-12-18 PROCEDURE — 99232 SBSQ HOSP IP/OBS MODERATE 35: CPT

## 2018-12-18 PROCEDURE — 99233 SBSQ HOSP IP/OBS HIGH 50: CPT

## 2018-12-18 RX ORDER — OXYCODONE HYDROCHLORIDE 5 MG/1
5 TABLET ORAL EVERY 6 HOURS
Refills: 0 | Status: DISCONTINUED | OUTPATIENT
Start: 2018-12-18 | End: 2018-12-19

## 2018-12-18 RX ORDER — PANTOPRAZOLE SODIUM 20 MG/1
40 TABLET, DELAYED RELEASE ORAL
Refills: 0 | Status: DISCONTINUED | OUTPATIENT
Start: 2018-12-18 | End: 2019-01-02

## 2018-12-18 RX ORDER — SENNA PLUS 8.6 MG/1
2 TABLET ORAL AT BEDTIME
Refills: 0 | Status: DISCONTINUED | OUTPATIENT
Start: 2018-12-18 | End: 2019-01-02

## 2018-12-18 RX ORDER — SACCHAROMYCES BOULARDII 250 MG
250 POWDER IN PACKET (EA) ORAL
Refills: 0 | Status: DISCONTINUED | OUTPATIENT
Start: 2018-12-18 | End: 2019-01-02

## 2018-12-18 RX ADMIN — SODIUM CHLORIDE 2 GRAM(S): 9 INJECTION INTRAMUSCULAR; INTRAVENOUS; SUBCUTANEOUS at 22:14

## 2018-12-18 RX ADMIN — GABAPENTIN 300 MILLIGRAM(S): 400 CAPSULE ORAL at 22:14

## 2018-12-18 RX ADMIN — ENOXAPARIN SODIUM 40 MILLIGRAM(S): 100 INJECTION SUBCUTANEOUS at 13:06

## 2018-12-18 RX ADMIN — GABAPENTIN 300 MILLIGRAM(S): 400 CAPSULE ORAL at 17:57

## 2018-12-18 RX ADMIN — OXYCODONE HYDROCHLORIDE 10 MILLIGRAM(S): 5 TABLET ORAL at 11:23

## 2018-12-18 RX ADMIN — SODIUM CHLORIDE 2 GRAM(S): 9 INJECTION INTRAMUSCULAR; INTRAVENOUS; SUBCUTANEOUS at 18:29

## 2018-12-18 RX ADMIN — Medication 250 MILLIGRAM(S): at 15:02

## 2018-12-18 RX ADMIN — Medication 2: at 18:03

## 2018-12-18 RX ADMIN — SODIUM CHLORIDE 2 GRAM(S): 9 INJECTION INTRAMUSCULAR; INTRAVENOUS; SUBCUTANEOUS at 06:45

## 2018-12-18 RX ADMIN — SODIUM CHLORIDE 2 GRAM(S): 9 INJECTION INTRAMUSCULAR; INTRAVENOUS; SUBCUTANEOUS at 00:53

## 2018-12-18 RX ADMIN — Medication 50 MILLIGRAM(S): at 06:50

## 2018-12-18 RX ADMIN — CEFEPIME 100 MILLIGRAM(S): 1 INJECTION, POWDER, FOR SOLUTION INTRAMUSCULAR; INTRAVENOUS at 18:14

## 2018-12-18 RX ADMIN — GABAPENTIN 300 MILLIGRAM(S): 400 CAPSULE ORAL at 06:45

## 2018-12-18 RX ADMIN — Medication 250 MILLIGRAM(S): at 22:14

## 2018-12-18 RX ADMIN — ATORVASTATIN CALCIUM 40 MILLIGRAM(S): 80 TABLET, FILM COATED ORAL at 22:14

## 2018-12-18 RX ADMIN — Medication 50 MILLIGRAM(S): at 17:57

## 2018-12-18 RX ADMIN — Medication 250 MILLIGRAM(S): at 06:44

## 2018-12-18 RX ADMIN — CEFEPIME 100 MILLIGRAM(S): 1 INJECTION, POWDER, FOR SOLUTION INTRAMUSCULAR; INTRAVENOUS at 06:44

## 2018-12-18 RX ADMIN — Medication 2: at 12:10

## 2018-12-18 RX ADMIN — Medication 250 MILLIGRAM(S): at 17:57

## 2018-12-18 RX ADMIN — TAMSULOSIN HYDROCHLORIDE 0.8 MILLIGRAM(S): 0.4 CAPSULE ORAL at 22:14

## 2018-12-18 RX ADMIN — Medication 150 MICROGRAM(S): at 06:44

## 2018-12-18 NOTE — PROGRESS NOTE ADULT - SUBJECTIVE AND OBJECTIVE BOX
[facility]  INFECTIOUS DISEASES AND INTERNAL MEDICINE at [place]  =======================================================  Wellington Collier MD  Diplomates American Board of Internal Medicine and Infectious Diseases  =======================================================    TIMUR LOUIS [id number]    Follow up: back pain SEROMA  PT AFEBRILE non toxic    Allergies:  No Known Allergies      Medications:  acetaminophen   Tablet .. 650 milliGRAM(s) Oral every 6 hours PRN  atorvastatin 40 milliGRAM(s) Oral at bedtime  cefepime   IVPB 2000 milliGRAM(s) IV Intermittent every 12 hours  dextrose 40% Gel 15 Gram(s) Oral once PRN  dextrose 5%. 1000 milliLiter(s) IV Continuous <Continuous>  dextrose 50% Injectable 12.5 Gram(s) IV Push once  dextrose 50% Injectable 25 Gram(s) IV Push once  dextrose 50% Injectable 25 Gram(s) IV Push once  docusate sodium 100 milliGRAM(s) Oral three times a day  enoxaparin Injectable 40 milliGRAM(s) SubCutaneous daily  gabapentin 300 milliGRAM(s) Oral three times a day  glucagon  Injectable 1 milliGRAM(s) IntraMuscular once PRN  insulin lispro (HumaLOG) corrective regimen sliding scale   SubCutaneous three times a day before meals  levothyroxine 150 MICROGram(s) Oral daily  methocarbamol 500 milliGRAM(s) Oral every 6 hours PRN  metoprolol succinate ER 25 milliGRAM(s) Oral daily  oxyCODONE    IR 10 milliGRAM(s) Oral every 4 hours PRN  senna 2 Tablet(s) Oral at bedtime  sodium chloride 1 Gram(s) Oral three times a day  tamsulosin 0.8 milliGRAM(s) Oral at bedtime  vancomycin  IVPB 1000 milliGRAM(s) IV Intermittent every 8 hours    SOCIAL       FAMILY   FAMILY HISTORY:  Family history of diabetes mellitus (Father)    REVIEW OF SYSTEMS:  CONSTITUTIONAL:  No Fever or chills  HEENT:   No diplopia or blurred vision.  No earache, sore throat or runny nose.  CARDIOVASCULAR:  No pressure, squeezing, strangling, tightness, heaviness or aching about the chest, neck, axilla or epigastrium.  RESPIRATORY:  No cough, shortness of breath, PND or orthopnea.  GASTROINTESTINAL:  No nausea, vomiting or diarrhea.  GENITOURINARY:  No dysuria, frequency or urgency. No Blood in urine  MUSCULOSKELETAL:   AS PER HPI  SKIN:  No change in skin, hair or nails.  NEUROLOGIC:  No paresthesias, fasciculations, seizures or weakness.  PSYCHIATRIC:  No disorder of thought or mood.  ENDOCRINE:  No heat or cold intolerance, polyuria or polydipsia.  HEMATOLOGICAL:  No easy bruising or bleeding.            Physical Exam:   V Vital Signs Last 24 Hrs  T(C): 36.7 (18 Dec 2018 10:35), Max: 36.8 (17 Dec 2018 15:33)  T(F): 98 (18 Dec 2018 10:35), Max: 98.3 (17 Dec 2018 15:33)  HR: 80 (18 Dec 2018 10:35) (80 - 98)  BP: 134/73 (18 Dec 2018 10:35) (124/70 - 169/92)  BP(mean): --  RR: 18 (18 Dec 2018 10:35) (18 - 18)  SpO2: 94% (18 Dec 2018 10:35) (94% - 94%)    GEN: NAD, pleasant  HEENT: normocephalic and atraumatic. EOMI. MANUEL.    NECK: Supple. No carotid bruits.  No lymphadenopathy or thyromegaly.  LUNGS: Clear to auscultation.  HEART: Regular rate and rhythm without murmur.  ABDOMEN: Soft, nontender, and nondistended.  Positive bowel sounds.    : No CVA tenderness  EXTREMITIES: Without any cyanosis, clubbing, rash, lesions or edema.  MSK: DRESSING PLACE  NEUROLOGIC: Cranial nerves II through XII are grossly intact.  PSYCHIATRIC: Appropriate affect .  SKIN: No ulceration or induration present.        Labs:                                                     9.9    4.4   )-----------( 320      ( 18 Dec 2018 07:56 )             30.7   12-18    129<L>  |  90<L>  |  5.0<L>  ----------------------------<  135<H>  4.6   |  26.0  |  0.44<L>    Ca    8.2<L>      18 Dec 2018 07:56            RECENT CULTURES:  12-12 @ 01:39 .Urine     No growth        12-05 @ 11:40 .Blood     No growth at 5 days.        12-04 @ 15:14 .Urine     Culture grew 3 or more types of organisms which indicate  collection contamination; consider recollection only if clinically  indicated.  Results called to tata jacobo        12-02 @ 19:56 .Urine Klebsiella pneumoniae  Morganella morganii    10,000 - 49,000 CFU/mL Morganella morganii  10,000 - 49,000 CFU/mL Klebsiella pneumoniae        12-02 @ 10:35 .Blood     No growth at 5 days.

## 2018-12-18 NOTE — PHARMACOTHERAPY INTERVENTION NOTE - COMMENTS
Vancomycin trough ordered for 5:00 on 12/19/2018, will adjust dose if needed to maintain a trough level in the 15-20 range

## 2018-12-18 NOTE — PROGRESS NOTE ADULT - ASSESSMENT
73 yr old male s/p T11/T10 laminectomy, T10-L1 fusion, hypertension, diabetes mellitus, hypothyroid was sent in from [facility] for fever and back pain. He was recently discharged from [facility] after surgery. Noted to have fever 102. He was evaluated by orthopedics in ED given recent surgery and CT of thoracolumbar spine with contrast was done, which showed post surgical changes and multiple foci of air, fluid and soft tissue edema. ID was consulted and he was started on Vancomycin and Cefepime. Orthopedics advised local wound care and possible seroma drainage if no improvement. Nephrology consulted for hyponatremia. Advised fluid restriction and increased salt tabs to 2 tabs three times a day. He was noted to Hb 7.7, was given 1 unit PRBC, Hb came up to 9.5      Post op seroma from thoracolumbar fusion:  possibly infected as no other sources of infection.      c/w IV abx per ID, change to PO on discharge     wound care per orthopedics    SIADH: hyponatremia--fluid restriction, NaCl tabs    HTN: increase lopressor    anemia: stable post transfusion.    DM2: raiss, diabetic diet    Hypothyroid: synthroid    PPx: protonix and lovenox.

## 2018-12-18 NOTE — PROGRESS NOTE ADULT - SUBJECTIVE AND OBJECTIVE BOX
seen for seroma    complaining of back pain with brace, + loose stool from laxatives  ros otherwise negative     MEDICATIONS  (STANDING):  atorvastatin 40 milliGRAM(s) Oral at bedtime  cefepime   IVPB 2000 milliGRAM(s) IV Intermittent every 12 hours  dextrose 5%. 1000 milliLiter(s) (50 mL/Hr) IV Continuous <Continuous>  dextrose 50% Injectable 12.5 Gram(s) IV Push once  dextrose 50% Injectable 25 Gram(s) IV Push once  dextrose 50% Injectable 25 Gram(s) IV Push once  enoxaparin Injectable 40 milliGRAM(s) SubCutaneous daily  gabapentin 300 milliGRAM(s) Oral three times a day  insulin lispro (HumaLOG) corrective regimen sliding scale   SubCutaneous three times a day before meals  levothyroxine 150 MICROGram(s) Oral daily  metoprolol tartrate 50 milliGRAM(s) Oral two times a day  senna 2 Tablet(s) Oral at bedtime  sodium chloride 2 Gram(s) Oral three times a day  tamsulosin 0.8 milliGRAM(s) Oral at bedtime  vancomycin  IVPB 1000 milliGRAM(s) IV Intermittent every 8 hours    MEDICATIONS  (PRN):  acetaminophen   Tablet .. 650 milliGRAM(s) Oral every 6 hours PRN Temp greater or equal to 38C (100.4F), Moderate Pain (4 - 6)  dextrose 40% Gel 15 Gram(s) Oral once PRN Blood Glucose LESS THAN 70 milliGRAM(s)/deciliter  glucagon  Injectable 1 milliGRAM(s) IntraMuscular once PRN Glucose LESS THAN 70 milligrams/deciliter  methocarbamol 500 milliGRAM(s) Oral every 6 hours PRN Muscle Spasm  oxyCODONE    IR 10 milliGRAM(s) Oral every 4 hours PRN Severe Pain (7 - 10)      Allergies    No Known Allergies    Vital Signs Last 24 Hrs  T(C): 36.7 (18 Dec 2018 10:35), Max: 36.8 (17 Dec 2018 15:33)  T(F): 98 (18 Dec 2018 10:35), Max: 98.3 (17 Dec 2018 15:33)  HR: 80 (18 Dec 2018 10:35) (80 - 102)  BP: 134/73 (18 Dec 2018 10:35) (99/57 - 169/92)  BP(mean): --  RR: 18 (18 Dec 2018 10:35) (18 - 20)  SpO2: 94% (18 Dec 2018 10:35) (94% - 94%)    PHYSICAL EXAM:    GENERAL: NAD  CHEST/LUNG: Clear to percussion bilaterall  HEART: Regular rate and rhythm; S1 S2;  ABDOMEN: Soft, Nontender, Nondistended; Bowel sounds present  EXTREMITIES: no edema  NERVOUS SYSTEM:  Alert & Oriented X3, nonfocal    LABS:                        9.9    4.4   )-----------( 320      ( 18 Dec 2018 07:56 )             30.7     12-18    129<L>  |  90<L>  |  5.0<L>  ----------------------------<  135<H>  4.6   |  26.0  |  0.44<L>    Ca    8.2<L>      18 Dec 2018 07:56            CAPILLARY BLOOD GLUCOSE      POCT Blood Glucose.: 225 mg/dL (18 Dec 2018 10:31)  POCT Blood Glucose.: 145 mg/dL (18 Dec 2018 08:03)  POCT Blood Glucose.: 153 mg/dL (17 Dec 2018 23:05)  POCT Blood Glucose.: 199 mg/dL (17 Dec 2018 17:09)  POCT Blood Glucose.: 213 mg/dL (17 Dec 2018 11:44)        RADIOLOGY & ADDITIONAL TESTS:

## 2018-12-18 NOTE — PROGRESS NOTE ADULT - ASSESSMENT
74 y/o male with PMHx of HTN, BPH, DM, Hypothyroid, and OA was sent to the ED from [facility] due to recurrent fevers (T-max 102) and back pain of 1 day duration. Patient was recently discharge from [facility] for thoracic laminectomy and fusion.  Patient said he has been doing well since dc until yesterday when he started having recurrent fevers and back pain. Patient has no   trauma/fall, neck pain, HA, numbness, tingling, bowel/urinary incontinence, chills, chest pain, shortness of breath, palpitation, cough, nausea/vomiting, abdominal pain. (   IMAGING WITH  WHAT APPEARS TO BE POST OP SEROMA  BLOOD CX X2neg so far   PT NON TOXIC BUT   UNFORTUNATELY PT WITH HARDWARE IN PLACE AND placed on  EMPRIC ABX     D/W ORTHO   ABOUT DURATION OF THERAPY   WILL CONTINUE [facility] THEN TRANSITION TO ORAL TO COMPLETE 2 WEEKS

## 2018-12-18 NOTE — PROGRESS NOTE ADULT - ATTENDING COMMENTS
dressing was changed and incision cleansed with betadine, no expressible drainage at this time / 1800. if no drainage for next 24-48 hours pt may be d/c at that point

## 2018-12-19 LAB
GLUCOSE BLDC GLUCOMTR-MCNC: 119 MG/DL — HIGH (ref 70–99)
GLUCOSE BLDC GLUCOMTR-MCNC: 150 MG/DL — HIGH (ref 70–99)
GLUCOSE BLDC GLUCOMTR-MCNC: 192 MG/DL — HIGH (ref 70–99)
GLUCOSE BLDC GLUCOMTR-MCNC: 200 MG/DL — HIGH (ref 70–99)
VANCOMYCIN TROUGH SERPL-MCNC: 26.7 UG/ML — CRITICAL HIGH (ref 10–20)

## 2018-12-19 PROCEDURE — 99232 SBSQ HOSP IP/OBS MODERATE 35: CPT

## 2018-12-19 PROCEDURE — 99233 SBSQ HOSP IP/OBS HIGH 50: CPT | Mod: 24

## 2018-12-19 RX ORDER — METOPROLOL TARTRATE 50 MG
25 TABLET ORAL
Refills: 0 | Status: DISCONTINUED | OUTPATIENT
Start: 2018-12-19 | End: 2019-01-02

## 2018-12-19 RX ORDER — TRAMADOL HYDROCHLORIDE 50 MG/1
25 TABLET ORAL
Refills: 0 | Status: DISCONTINUED | OUTPATIENT
Start: 2018-12-19 | End: 2018-12-19

## 2018-12-19 RX ORDER — ACETAMINOPHEN 500 MG
650 TABLET ORAL EVERY 6 HOURS
Refills: 0 | Status: DISCONTINUED | OUTPATIENT
Start: 2018-12-19 | End: 2019-01-02

## 2018-12-19 RX ORDER — VANCOMYCIN HCL 1 G
1000 VIAL (EA) INTRAVENOUS EVERY 12 HOURS
Refills: 0 | Status: DISCONTINUED | OUTPATIENT
Start: 2018-12-20 | End: 2018-12-21

## 2018-12-19 RX ADMIN — Medication 250 MILLIGRAM(S): at 17:05

## 2018-12-19 RX ADMIN — SODIUM CHLORIDE 2 GRAM(S): 9 INJECTION INTRAMUSCULAR; INTRAVENOUS; SUBCUTANEOUS at 14:17

## 2018-12-19 RX ADMIN — ENOXAPARIN SODIUM 40 MILLIGRAM(S): 100 INJECTION SUBCUTANEOUS at 11:20

## 2018-12-19 RX ADMIN — CEFEPIME 100 MILLIGRAM(S): 1 INJECTION, POWDER, FOR SOLUTION INTRAMUSCULAR; INTRAVENOUS at 06:10

## 2018-12-19 RX ADMIN — PANTOPRAZOLE SODIUM 40 MILLIGRAM(S): 20 TABLET, DELAYED RELEASE ORAL at 05:56

## 2018-12-19 RX ADMIN — Medication 1: at 12:39

## 2018-12-19 RX ADMIN — Medication 250 MILLIGRAM(S): at 05:55

## 2018-12-19 RX ADMIN — SODIUM CHLORIDE 2 GRAM(S): 9 INJECTION INTRAMUSCULAR; INTRAVENOUS; SUBCUTANEOUS at 05:55

## 2018-12-19 RX ADMIN — GABAPENTIN 300 MILLIGRAM(S): 400 CAPSULE ORAL at 14:17

## 2018-12-19 RX ADMIN — TAMSULOSIN HYDROCHLORIDE 0.8 MILLIGRAM(S): 0.4 CAPSULE ORAL at 21:51

## 2018-12-19 RX ADMIN — GABAPENTIN 300 MILLIGRAM(S): 400 CAPSULE ORAL at 05:55

## 2018-12-19 RX ADMIN — SODIUM CHLORIDE 2 GRAM(S): 9 INJECTION INTRAMUSCULAR; INTRAVENOUS; SUBCUTANEOUS at 21:52

## 2018-12-19 RX ADMIN — Medication 25 MILLIGRAM(S): at 17:05

## 2018-12-19 RX ADMIN — Medication 650 MILLIGRAM(S): at 11:23

## 2018-12-19 RX ADMIN — GABAPENTIN 300 MILLIGRAM(S): 400 CAPSULE ORAL at 21:52

## 2018-12-19 RX ADMIN — CEFEPIME 100 MILLIGRAM(S): 1 INJECTION, POWDER, FOR SOLUTION INTRAMUSCULAR; INTRAVENOUS at 17:06

## 2018-12-19 RX ADMIN — Medication 650 MILLIGRAM(S): at 12:20

## 2018-12-19 RX ADMIN — Medication 50 MILLIGRAM(S): at 05:56

## 2018-12-19 RX ADMIN — Medication 150 MICROGRAM(S): at 05:56

## 2018-12-19 RX ADMIN — ATORVASTATIN CALCIUM 40 MILLIGRAM(S): 80 TABLET, FILM COATED ORAL at 21:51

## 2018-12-19 NOTE — PROGRESS NOTE ADULT - SUBJECTIVE AND OBJECTIVE BOX
seen for seroma.    no acute complaints.  ros negative.  sedated with 10mg oxycodone.  pain mostly when wearing brace      MEDICATIONS  (STANDING):  atorvastatin 40 milliGRAM(s) Oral at bedtime  cefepime   IVPB 2000 milliGRAM(s) IV Intermittent every 12 hours  dextrose 5%. 1000 milliLiter(s) (50 mL/Hr) IV Continuous <Continuous>  dextrose 50% Injectable 12.5 Gram(s) IV Push once  dextrose 50% Injectable 25 Gram(s) IV Push once  dextrose 50% Injectable 25 Gram(s) IV Push once  enoxaparin Injectable 40 milliGRAM(s) SubCutaneous daily  gabapentin 300 milliGRAM(s) Oral three times a day  insulin lispro (HumaLOG) corrective regimen sliding scale   SubCutaneous three times a day before meals  levothyroxine 150 MICROGram(s) Oral daily  metoprolol tartrate 50 milliGRAM(s) Oral two times a day  pantoprazole    Tablet 40 milliGRAM(s) Oral before breakfast  saccharomyces boulardii 250 milliGRAM(s) Oral two times a day  sodium chloride 2 Gram(s) Oral three times a day  tamsulosin 0.8 milliGRAM(s) Oral at bedtime    MEDICATIONS  (PRN):  acetaminophen   Tablet .. 650 milliGRAM(s) Oral every 6 hours PRN Temp greater or equal to 38C (100.4F), Mild Pain (1 - 3)  dextrose 40% Gel 15 Gram(s) Oral once PRN Blood Glucose LESS THAN 70 milliGRAM(s)/deciliter  glucagon  Injectable 1 milliGRAM(s) IntraMuscular once PRN Glucose LESS THAN 70 milligrams/deciliter  methocarbamol 500 milliGRAM(s) Oral every 6 hours PRN Muscle Spasm  senna 2 Tablet(s) Oral at bedtime PRN Constipation  traMADol 25 milliGRAM(s) Oral four times a day PRN mod to severe pain      Allergies    No Known Allergies    Vital Signs Last 24 Hrs  T(C): 36.7 (19 Dec 2018 05:30), Max: 36.8 (18 Dec 2018 20:40)  T(F): 98 (19 Dec 2018 05:30), Max: 98.2 (18 Dec 2018 20:40)  HR: 89 (19 Dec 2018 05:30) (80 - 89)  BP: 119/84 (19 Dec 2018 05:30) (105/61 - 134/73)  BP(mean): --  RR: 20 (19 Dec 2018 05:30) (18 - 20)  SpO2: 97% (18 Dec 2018 20:40) (94% - 97%)    PHYSICAL EXAM:    GENERAL: NAD  CHEST/LUNG: ctab  HEART: Regular rate and rhythm; S1 S2  ABDOMEN: Soft, Nontender, Bowel sounds present  EXTREMITIES: no edema  MSK: serosanguinous drainage from seroma thoracic without pain  NERVOUS SYSTEM:  Alert & Oriented X3, nonfocal    LABS:                        9.9    4.4   )-----------( 320      ( 18 Dec 2018 07:56 )             30.7     12-18    129<L>  |  90<L>  |  5.0<L>  ----------------------------<  135<H>  4.6   |  26.0  |  0.44<L>    Ca    8.2<L>      18 Dec 2018 07:56            CAPILLARY BLOOD GLUCOSE      POCT Blood Glucose.: 119 mg/dL (19 Dec 2018 08:01)  POCT Blood Glucose.: 149 mg/dL (18 Dec 2018 21:18)  POCT Blood Glucose.: 212 mg/dL (18 Dec 2018 17:09)  POCT Blood Glucose.: 213 mg/dL (18 Dec 2018 11:51)  POCT Blood Glucose.: 225 mg/dL (18 Dec 2018 10:31)        RADIOLOGY & ADDITIONAL TESTS:

## 2018-12-19 NOTE — PROGRESS NOTE ADULT - ATTENDING COMMENTS
dressing changed by myself with improving expressable drainage. cont to observe here at [facility] until drainage resolves

## 2018-12-19 NOTE — PROGRESS NOTE ADULT - SUBJECTIVE AND OBJECTIVE BOX
Pt Name: TIMUR LOUIS    MRN: [id number]      History: 73y Male is status post thoracolumbar fusion 11/28/18. Patient is doing well and is comfortable in bed.  Increased drowsiness with answering questions on exam. Increased discomfort with changing positions in bed.  The patient's pain is controlled using the prescribed pain medications. The patient is participating in physical therapy. Denies nausea, vomiting, chest pain, shortness of breath, abdominal pain or fever. There are no radicular symptoms.  No new complaints.  His pain medication was decreased from 10mg to 5mg due to his increased drowsiness as per medicine team.        PAST MEDICAL & SURGICAL HISTORY:  PAST MEDICAL & SURGICAL HISTORY:  BPH (benign prostatic hyperplasia)  HTN (hypertension)  Hypothyroid  Dyslipidemia  OA (osteoarthritis)  Diabetes  S/P laminectomy  S/P hernia repair  S/P hip replacement: R      Allergies: No Known Allergies      Medications: acetaminophen   Tablet .. 650 milliGRAM(s) Oral every 6 hours PRN  atorvastatin 40 milliGRAM(s) Oral at bedtime  cefepime   IVPB 2000 milliGRAM(s) IV Intermittent every 12 hours  dextrose 40% Gel 15 Gram(s) Oral once PRN  dextrose 5%. 1000 milliLiter(s) IV Continuous <Continuous>  dextrose 50% Injectable 12.5 Gram(s) IV Push once  dextrose 50% Injectable 25 Gram(s) IV Push once  dextrose 50% Injectable 25 Gram(s) IV Push once  enoxaparin Injectable 40 milliGRAM(s) SubCutaneous daily  gabapentin 300 milliGRAM(s) Oral three times a day  glucagon  Injectable 1 milliGRAM(s) IntraMuscular once PRN  insulin lispro (HumaLOG) corrective regimen sliding scale   SubCutaneous three times a day before meals  levothyroxine 150 MICROGram(s) Oral daily  methocarbamol 500 milliGRAM(s) Oral every 6 hours PRN  metoprolol tartrate 50 milliGRAM(s) Oral two times a day  oxyCODONE    IR 5 milliGRAM(s) Oral every 6 hours PRN  pantoprazole    Tablet 40 milliGRAM(s) Oral before breakfast  saccharomyces boulardii 250 milliGRAM(s) Oral two times a day  senna 2 Tablet(s) Oral at bedtime PRN  sodium chloride 2 Gram(s) Oral three times a day  tamsulosin 0.8 milliGRAM(s) Oral at bedtime  vancomycin  IVPB 1000 milliGRAM(s) IV Intermittent every 8 hours                          9.9    4.4   )-----------( 320      ( 18 Dec 2018 07:56 )             30.7     12-18    129<L>  |  90<L>  |  5.0<L>  ----------------------------<  135<H>  4.6   |  26.0  |  0.44<L>    Ca    8.2<L>      18 Dec 2018 07:56        PHYSICAL EXAM:    Vital Signs Last 24 Hrs  T(C): 36.7 (19 Dec 2018 05:30), Max: 36.8 (18 Dec 2018 20:40)  T(F): 98 (19 Dec 2018 05:30), Max: 98.2 (18 Dec 2018 20:40)  HR: 89 (19 Dec 2018 05:30) (80 - 89)  BP: 119/84 (19 Dec 2018 05:30) (105/61 - 134/73)  BP(mean): --  RR: 20 (19 Dec 2018 05:30) (18 - 20)  SpO2: 97% (18 Dec 2018 20:40) (94% - 97%)  Daily     Daily       Appearance: Alert, responsive, in no acute distress.    Thoracolumbar region: Gauze dressing in place which is clean, dry and intact. No active drainage at this time from the proximal incision; no expressible drainage from incision site. No erythema no purulent drainage.  Incision cleaned with Betadine and gauze.  Dressing removed and replaced with gauze and tegaderms. The calf is supple nontender bilaterally.  Sensation to light touch is grossly intact distally. Extensor hallucis longus and flexor hallucis longus are intact. No foot drop. 2+ dorsalis pedis pulse. Capillary refill is less than 2 seconds.         A/P:  73y Male is status post thoracolumbar fusion 11/28/18    PLAN:   * No active drainage this morning  * Medicine note appreciated  * ID note appreciated  * Continue physical therapy and out of bed with use of walker  * Pain control as clinically indicated  * DVTP  * Incentive spirometry encouraged  * Continue to monitor wound drainage/ daily dressing changes  * Discharge - as per DB on 12/18/18 - if no drainage for next 24-48 hours patient may be d/c at that point . Pt Name: TIMUR LOUIS    MRN: [id number]      History: 73y Male is status post thoracolumbar fusion 11/28/18. Patient is doing well and is comfortable in bed.  Increased drowsiness with answering questions on exam. Increased discomfort with changing positions in bed.  The patient's pain is controlled using the prescribed pain medications. The patient is participating in physical therapy. Denies nausea, vomiting, chest pain, shortness of breath, abdominal pain or fever. There are no radicular symptoms.  No new complaints.  His pain medication was decreased from 10mg to 5mg due to his increased drowsiness as per medicine team.        PAST MEDICAL & SURGICAL HISTORY:  PAST MEDICAL & SURGICAL HISTORY:  BPH (benign prostatic hyperplasia)  HTN (hypertension)  Hypothyroid  Dyslipidemia  OA (osteoarthritis)  Diabetes  S/P laminectomy  S/P hernia repair  S/P hip replacement: R      Allergies: No Known Allergies      Medications: acetaminophen   Tablet .. 650 milliGRAM(s) Oral every 6 hours PRN  atorvastatin 40 milliGRAM(s) Oral at bedtime  cefepime   IVPB 2000 milliGRAM(s) IV Intermittent every 12 hours  dextrose 40% Gel 15 Gram(s) Oral once PRN  dextrose 5%. 1000 milliLiter(s) IV Continuous <Continuous>  dextrose 50% Injectable 12.5 Gram(s) IV Push once  dextrose 50% Injectable 25 Gram(s) IV Push once  dextrose 50% Injectable 25 Gram(s) IV Push once  enoxaparin Injectable 40 milliGRAM(s) SubCutaneous daily  gabapentin 300 milliGRAM(s) Oral three times a day  glucagon  Injectable 1 milliGRAM(s) IntraMuscular once PRN  insulin lispro (HumaLOG) corrective regimen sliding scale   SubCutaneous three times a day before meals  levothyroxine 150 MICROGram(s) Oral daily  methocarbamol 500 milliGRAM(s) Oral every 6 hours PRN  metoprolol tartrate 50 milliGRAM(s) Oral two times a day  oxyCODONE    IR 5 milliGRAM(s) Oral every 6 hours PRN  pantoprazole    Tablet 40 milliGRAM(s) Oral before breakfast  saccharomyces boulardii 250 milliGRAM(s) Oral two times a day  senna 2 Tablet(s) Oral at bedtime PRN  sodium chloride 2 Gram(s) Oral three times a day  tamsulosin 0.8 milliGRAM(s) Oral at bedtime  vancomycin  IVPB 1000 milliGRAM(s) IV Intermittent every 8 hours                          9.9    4.4   )-----------( 320      ( 18 Dec 2018 07:56 )             30.7     12-18    129<L>  |  90<L>  |  5.0<L>  ----------------------------<  135<H>  4.6   |  26.0  |  0.44<L>    Ca    8.2<L>      18 Dec 2018 07:56        PHYSICAL EXAM:    Vital Signs Last 24 Hrs  T(C): 36.7 (19 Dec 2018 05:30), Max: 36.8 (18 Dec 2018 20:40)  T(F): 98 (19 Dec 2018 05:30), Max: 98.2 (18 Dec 2018 20:40)  HR: 89 (19 Dec 2018 05:30) (80 - 89)  BP: 119/84 (19 Dec 2018 05:30) (105/61 - 134/73)  BP(mean): --  RR: 20 (19 Dec 2018 05:30) (18 - 20)  SpO2: 97% (18 Dec 2018 20:40) (94% - 97%)  Daily     Daily       Appearance: Alert, responsive, in no acute distress.    Thoracolumbar region: Gauze dressing in place which is clean, dry and intact. No active drainage at this time from the proximal incision; no expressible drainage from incision site. No erythema no purulent drainage.  Incision cleaned with Betadine and gauze.  Dressing removed and replaced with gauze and tegaderms. The calf is supple nontender bilaterally.  No foot drop. 2+ dorsalis pedis pulse. Capillary refill is less than 2 seconds.   Decreased sensation along the right lateral/medial calf, otherwise sensation grossly intact        A/P:  73y Male is status post thoracolumbar fusion 11/28/18    PLAN:   * No active drainage this morning  * Medicine note appreciated  * ID note appreciated  * Continue physical therapy and out of bed with use of walker  * Pain control as clinically indicated  * DVTP  * Incentive spirometry encouraged  * Continue to monitor wound drainage/ daily dressing changes  * Discharge - as per DB on 12/18/18 - if no drainage for next 24-48 hours patient may be d/c at that point . Pt Name: TIMUR LOUIS    MRN: [id number]      History: 73y Male is status post thoracolumbar fusion 11/28/18. Patient is doing well and is comfortable in bed.  Increased drowsiness with answering questions on exam. Increased discomfort with changing positions in bed.  The patient's pain is controlled using the prescribed pain medications. The patient is participating in physical therapy. Denies nausea, vomiting, chest pain, shortness of breath, abdominal pain or fever. There are no radicular symptoms.  No new complaints.  His pain medication was decreased from 10mg to 5mg due to his increased drowsiness as per medicine team.        PAST MEDICAL & SURGICAL HISTORY:  PAST MEDICAL & SURGICAL HISTORY:  BPH (benign prostatic hyperplasia)  HTN (hypertension)  Hypothyroid  Dyslipidemia  OA (osteoarthritis)  Diabetes  S/P laminectomy  S/P hernia repair  S/P hip replacement: R      Allergies: No Known Allergies      Medications: acetaminophen   Tablet .. 650 milliGRAM(s) Oral every 6 hours PRN  atorvastatin 40 milliGRAM(s) Oral at bedtime  cefepime   IVPB 2000 milliGRAM(s) IV Intermittent every 12 hours  dextrose 40% Gel 15 Gram(s) Oral once PRN  dextrose 5%. 1000 milliLiter(s) IV Continuous <Continuous>  dextrose 50% Injectable 12.5 Gram(s) IV Push once  dextrose 50% Injectable 25 Gram(s) IV Push once  dextrose 50% Injectable 25 Gram(s) IV Push once  enoxaparin Injectable 40 milliGRAM(s) SubCutaneous daily  gabapentin 300 milliGRAM(s) Oral three times a day  glucagon  Injectable 1 milliGRAM(s) IntraMuscular once PRN  insulin lispro (HumaLOG) corrective regimen sliding scale   SubCutaneous three times a day before meals  levothyroxine 150 MICROGram(s) Oral daily  methocarbamol 500 milliGRAM(s) Oral every 6 hours PRN  metoprolol tartrate 50 milliGRAM(s) Oral two times a day  oxyCODONE    IR 5 milliGRAM(s) Oral every 6 hours PRN  pantoprazole    Tablet 40 milliGRAM(s) Oral before breakfast  saccharomyces boulardii 250 milliGRAM(s) Oral two times a day  senna 2 Tablet(s) Oral at bedtime PRN  sodium chloride 2 Gram(s) Oral three times a day  tamsulosin 0.8 milliGRAM(s) Oral at bedtime  vancomycin  IVPB 1000 milliGRAM(s) IV Intermittent every 8 hours                          9.9    4.4   )-----------( 320      ( 18 Dec 2018 07:56 )             30.7     12-18    129<L>  |  90<L>  |  5.0<L>  ----------------------------<  135<H>  4.6   |  26.0  |  0.44<L>    Ca    8.2<L>      18 Dec 2018 07:56        PHYSICAL EXAM:    Vital Signs Last 24 Hrs  T(C): 36.7 (19 Dec 2018 05:30), Max: 36.8 (18 Dec 2018 20:40)  T(F): 98 (19 Dec 2018 05:30), Max: 98.2 (18 Dec 2018 20:40)  HR: 89 (19 Dec 2018 05:30) (80 - 89)  BP: 119/84 (19 Dec 2018 05:30) (105/61 - 134/73)  BP(mean): --  RR: 20 (19 Dec 2018 05:30) (18 - 20)  SpO2: 97% (18 Dec 2018 20:40) (94% - 97%)  Daily     Daily       Appearance: Alert, responsive, in no acute distress.    Thoracolumbar region: Gauze dressing in place which is clean, dry and intact. No active drainage at this time from the proximal incision; no expressible drainage from incision site. No erythema no purulent drainage.  Incision cleaned with Betadine and gauze.  Dressing removed and replaced with gauze and tegaderms. The calf is supple nontender bilaterally.  No foot drop. 2+ dorsalis pedis pulse. Capillary refill is less than 2 seconds.   Decreased sensation along the right lateral/medial calf, otherwise sensation grossly intact.         Lower extremities  -  5/5 plantar/dorsiflexion bilateral, 4/5 knee flexion bilateral, 3+/5 hip flexion bilateral        A/P:  73y Male is status post thoracolumbar fusion 11/28/18    PLAN:   * No active drainage this morning  * Medicine note appreciated  * ID note appreciated  * Continue physical therapy and out of bed with use of walker  * Pain control as clinically indicated  * DVTP  * Incentive spirometry encouraged  * Continue to monitor wound drainage/ daily dressing changes  * Discharge - as per DB on 12/18/18 - if no drainage for next 24-48 hours patient may be d/c at that point . Pt Name: TIMUR LOUIS    MRN: [id number]      History: 73y Male is status post thoracolumbar fusion 11/28/18. Patient is doing well and is comfortable in bed.  Increased drowsiness with answering questions on exam. Increased discomfort with changing positions in bed.  The patient's pain is controlled using the prescribed pain medications. The patient is participating in physical therapy. Denies nausea, vomiting, chest pain, shortness of breath, abdominal pain or fever. There are no radicular symptoms.  No new complaints.  His pain medication was decreased from 10mg to 5mg due to his increased drowsiness as per medicine team.        PAST MEDICAL & SURGICAL HISTORY:  PAST MEDICAL & SURGICAL HISTORY:  BPH (benign prostatic hyperplasia)  HTN (hypertension)  Hypothyroid  Dyslipidemia  OA (osteoarthritis)  Diabetes  S/P laminectomy  S/P hernia repair  S/P hip replacement: R      Allergies: No Known Allergies      Medications: acetaminophen   Tablet .. 650 milliGRAM(s) Oral every 6 hours PRN  atorvastatin 40 milliGRAM(s) Oral at bedtime  cefepime   IVPB 2000 milliGRAM(s) IV Intermittent every 12 hours  dextrose 40% Gel 15 Gram(s) Oral once PRN  dextrose 5%. 1000 milliLiter(s) IV Continuous <Continuous>  dextrose 50% Injectable 12.5 Gram(s) IV Push once  dextrose 50% Injectable 25 Gram(s) IV Push once  dextrose 50% Injectable 25 Gram(s) IV Push once  enoxaparin Injectable 40 milliGRAM(s) SubCutaneous daily  gabapentin 300 milliGRAM(s) Oral three times a day  glucagon  Injectable 1 milliGRAM(s) IntraMuscular once PRN  insulin lispro (HumaLOG) corrective regimen sliding scale   SubCutaneous three times a day before meals  levothyroxine 150 MICROGram(s) Oral daily  methocarbamol 500 milliGRAM(s) Oral every 6 hours PRN  metoprolol tartrate 50 milliGRAM(s) Oral two times a day  oxyCODONE    IR 5 milliGRAM(s) Oral every 6 hours PRN  pantoprazole    Tablet 40 milliGRAM(s) Oral before breakfast  saccharomyces boulardii 250 milliGRAM(s) Oral two times a day  senna 2 Tablet(s) Oral at bedtime PRN  sodium chloride 2 Gram(s) Oral three times a day  tamsulosin 0.8 milliGRAM(s) Oral at bedtime  vancomycin  IVPB 1000 milliGRAM(s) IV Intermittent every 8 hours                          9.9    4.4   )-----------( 320      ( 18 Dec 2018 07:56 )             30.7     12-18    129<L>  |  90<L>  |  5.0<L>  ----------------------------<  135<H>  4.6   |  26.0  |  0.44<L>    Ca    8.2<L>      18 Dec 2018 07:56        PHYSICAL EXAM:    Vital Signs Last 24 Hrs  T(C): 36.7 (19 Dec 2018 05:30), Max: 36.8 (18 Dec 2018 20:40)  T(F): 98 (19 Dec 2018 05:30), Max: 98.2 (18 Dec 2018 20:40)  HR: 89 (19 Dec 2018 05:30) (80 - 89)  BP: 119/84 (19 Dec 2018 05:30) (105/61 - 134/73)  BP(mean): --  RR: 20 (19 Dec 2018 05:30) (18 - 20)  SpO2: 97% (18 Dec 2018 20:40) (94% - 97%)  Daily     Daily       Appearance: Alert, responsive, in no acute distress.    Thoracolumbar region: Gauze dressing in place which is clean, dry and intact. No active drainage at this time from the proximal incision; no expressible drainage from incision site at this time. No erythema no purulent drainage.  Incision cleaned with Betadine and gauze.  Dressing removed and replaced with gauze and tegaderms. The calf is supple nontender bilaterally.  No foot drop. 2+ dorsalis pedis pulse. Capillary refill is less than 2 seconds.   Decreased sensation along the right lateral/medial calf, otherwise sensation grossly intact.         Lower extremities  -  5/5 plantar/dorsiflexion bilateral, 4/5 knee flexion bilateral, 3+/5 hip flexion bilateral        A/P:  73y Male is status post thoracolumbar fusion 11/28/18    PLAN:   * Medicine note appreciated  * ID note appreciated  * Continue physical therapy and out of bed with use of walker  * Pain control as clinically indicated  * DVTP  * Incentive spirometry encouraged  * Continue to monitor wound drainage/ daily dressing changes  * Discharge - as per DB on 12/18/18 - if no drainage for next 24-48 hours patient may be d/c at that point .

## 2018-12-19 NOTE — PROGRESS NOTE ADULT - ASSESSMENT
73 yr old male s/p T11/T10 laminectomy, T10-L1 fusion, hypertension, diabetes mellitus, hypothyroid was sent in from [facility] for fever and back pain. He was recently discharged from [facility] after surgery. Noted to have fever 102. He was evaluated by orthopedics in ED given recent surgery and CT of thoracolumbar spine with contrast was done, which showed post surgical changes and multiple foci of air, fluid and soft tissue edema. ID was consulted and he was started on Vancomycin and Cefepime. Orthopedics advised local wound care and possible seroma drainage if no improvement. Nephrology consulted for hyponatremia. Advised fluid restriction and increased salt tabs to 2 tabs three times a day. He was noted to Hb 7.7, was given 1 unit PRBC, Hb came up to 9.5      Post op seroma from thoracolumbar fusion:  possibly infected as no other sources of infection.      c/w IV abx per ID, change to PO on discharge--total 2 weeks     wound care per orthopedics    SIADH: hyponatremia--fluid restriction, NaCl tabs    HTN: stable on higher dose of lopressor    anemia: stable post transfusion.    DM2: raiss, diabetic diet    Hypothyroid: synthroid    pain :  change oxycodone to tramadol prn    PPx: protonix and lovenox.    labs in am

## 2018-12-20 LAB
ANION GAP SERPL CALC-SCNC: 8 MMOL/L — SIGNIFICANT CHANGE UP (ref 5–17)
BASOPHILS # BLD AUTO: 0 K/UL — SIGNIFICANT CHANGE UP (ref 0–0.2)
BASOPHILS NFR BLD AUTO: 0.6 % — SIGNIFICANT CHANGE UP (ref 0–2)
BUN SERPL-MCNC: 13 MG/DL — SIGNIFICANT CHANGE UP (ref 8–20)
CALCIUM SERPL-MCNC: 8.1 MG/DL — LOW (ref 8.6–10.2)
CHLORIDE SERPL-SCNC: 96 MMOL/L — LOW (ref 98–107)
CO2 SERPL-SCNC: 28 MMOL/L — SIGNIFICANT CHANGE UP (ref 22–29)
CREAT SERPL-MCNC: 1.21 MG/DL — SIGNIFICANT CHANGE UP (ref 0.5–1.3)
CRP SERPL-MCNC: 5 MG/DL — HIGH (ref 0–0.4)
EOSINOPHIL # BLD AUTO: 0.2 K/UL — SIGNIFICANT CHANGE UP (ref 0–0.5)
EOSINOPHIL NFR BLD AUTO: 5 % — SIGNIFICANT CHANGE UP (ref 0–5)
ERYTHROCYTE [SEDIMENTATION RATE] IN BLOOD: 55 MM/HR — HIGH (ref 0–20)
GLUCOSE BLDC GLUCOMTR-MCNC: 125 MG/DL — HIGH (ref 70–99)
GLUCOSE BLDC GLUCOMTR-MCNC: 169 MG/DL — HIGH (ref 70–99)
GLUCOSE BLDC GLUCOMTR-MCNC: 217 MG/DL — HIGH (ref 70–99)
GLUCOSE BLDC GLUCOMTR-MCNC: 221 MG/DL — HIGH (ref 70–99)
GLUCOSE SERPL-MCNC: 117 MG/DL — HIGH (ref 70–115)
HCT VFR BLD CALC: 30.5 % — LOW (ref 42–52)
HGB BLD-MCNC: 9.6 G/DL — LOW (ref 14–18)
LYMPHOCYTES # BLD AUTO: 0.7 K/UL — LOW (ref 1–4.8)
LYMPHOCYTES # BLD AUTO: 13.9 % — LOW (ref 20–55)
MCHC RBC-ENTMCNC: 26.7 PG — LOW (ref 27–31)
MCHC RBC-ENTMCNC: 31.5 G/DL — LOW (ref 32–36)
MCV RBC AUTO: 85 FL — SIGNIFICANT CHANGE UP (ref 80–94)
MONOCYTES # BLD AUTO: 0.7 K/UL — SIGNIFICANT CHANGE UP (ref 0–0.8)
MONOCYTES NFR BLD AUTO: 13.5 % — HIGH (ref 3–10)
NEUTROPHILS # BLD AUTO: 3.4 K/UL — SIGNIFICANT CHANGE UP (ref 1.8–8)
NEUTROPHILS NFR BLD AUTO: 66.6 % — SIGNIFICANT CHANGE UP (ref 37–73)
PLATELET # BLD AUTO: 332 K/UL — SIGNIFICANT CHANGE UP (ref 150–400)
POTASSIUM SERPL-MCNC: 4.3 MMOL/L — SIGNIFICANT CHANGE UP (ref 3.5–5.3)
POTASSIUM SERPL-SCNC: 4.3 MMOL/L — SIGNIFICANT CHANGE UP (ref 3.5–5.3)
RBC # BLD: 3.59 M/UL — LOW (ref 4.6–6.2)
RBC # FLD: 13.9 % — SIGNIFICANT CHANGE UP (ref 11–15.6)
SODIUM SERPL-SCNC: 132 MMOL/L — LOW (ref 135–145)
VANCOMYCIN TROUGH SERPL-MCNC: 15.9 UG/ML — SIGNIFICANT CHANGE UP (ref 10–20)
WBC # BLD: 5 K/UL — SIGNIFICANT CHANGE UP (ref 4.8–10.8)
WBC # FLD AUTO: 5 K/UL — SIGNIFICANT CHANGE UP (ref 4.8–10.8)

## 2018-12-20 PROCEDURE — 99232 SBSQ HOSP IP/OBS MODERATE 35: CPT

## 2018-12-20 RX ADMIN — GABAPENTIN 300 MILLIGRAM(S): 400 CAPSULE ORAL at 22:48

## 2018-12-20 RX ADMIN — Medication 250 MILLIGRAM(S): at 09:39

## 2018-12-20 RX ADMIN — Medication 150 MICROGRAM(S): at 06:32

## 2018-12-20 RX ADMIN — SODIUM CHLORIDE 2 GRAM(S): 9 INJECTION INTRAMUSCULAR; INTRAVENOUS; SUBCUTANEOUS at 22:48

## 2018-12-20 RX ADMIN — GABAPENTIN 300 MILLIGRAM(S): 400 CAPSULE ORAL at 14:56

## 2018-12-20 RX ADMIN — ATORVASTATIN CALCIUM 40 MILLIGRAM(S): 80 TABLET, FILM COATED ORAL at 22:48

## 2018-12-20 RX ADMIN — ENOXAPARIN SODIUM 40 MILLIGRAM(S): 100 INJECTION SUBCUTANEOUS at 11:41

## 2018-12-20 RX ADMIN — SODIUM CHLORIDE 2 GRAM(S): 9 INJECTION INTRAMUSCULAR; INTRAVENOUS; SUBCUTANEOUS at 06:31

## 2018-12-20 RX ADMIN — SODIUM CHLORIDE 2 GRAM(S): 9 INJECTION INTRAMUSCULAR; INTRAVENOUS; SUBCUTANEOUS at 14:56

## 2018-12-20 RX ADMIN — PANTOPRAZOLE SODIUM 40 MILLIGRAM(S): 20 TABLET, DELAYED RELEASE ORAL at 06:33

## 2018-12-20 RX ADMIN — CEFEPIME 100 MILLIGRAM(S): 1 INJECTION, POWDER, FOR SOLUTION INTRAMUSCULAR; INTRAVENOUS at 06:31

## 2018-12-20 RX ADMIN — Medication 25 MILLIGRAM(S): at 17:42

## 2018-12-20 RX ADMIN — CEFEPIME 100 MILLIGRAM(S): 1 INJECTION, POWDER, FOR SOLUTION INTRAMUSCULAR; INTRAVENOUS at 17:42

## 2018-12-20 RX ADMIN — Medication 250 MILLIGRAM(S): at 17:42

## 2018-12-20 RX ADMIN — Medication 250 MILLIGRAM(S): at 06:33

## 2018-12-20 RX ADMIN — Medication 25 MILLIGRAM(S): at 06:33

## 2018-12-20 RX ADMIN — Medication 250 MILLIGRAM(S): at 22:47

## 2018-12-20 RX ADMIN — GABAPENTIN 300 MILLIGRAM(S): 400 CAPSULE ORAL at 06:32

## 2018-12-20 RX ADMIN — Medication 2: at 11:41

## 2018-12-20 RX ADMIN — TAMSULOSIN HYDROCHLORIDE 0.8 MILLIGRAM(S): 0.4 CAPSULE ORAL at 22:48

## 2018-12-20 RX ADMIN — Medication 1: at 16:29

## 2018-12-20 NOTE — PROGRESS NOTE ADULT - SUBJECTIVE AND OBJECTIVE BOX
seen for seroma    no acute complaints  ros negative  pain controlled     MEDICATIONS  (STANDING):  atorvastatin 40 milliGRAM(s) Oral at bedtime  cefepime   IVPB 2000 milliGRAM(s) IV Intermittent every 12 hours  dextrose 5%. 1000 milliLiter(s) (50 mL/Hr) IV Continuous <Continuous>  dextrose 50% Injectable 12.5 Gram(s) IV Push once  dextrose 50% Injectable 25 Gram(s) IV Push once  dextrose 50% Injectable 25 Gram(s) IV Push once  enoxaparin Injectable 40 milliGRAM(s) SubCutaneous daily  gabapentin 300 milliGRAM(s) Oral three times a day  insulin lispro (HumaLOG) corrective regimen sliding scale   SubCutaneous three times a day before meals  levothyroxine 150 MICROGram(s) Oral daily  metoprolol tartrate 25 milliGRAM(s) Oral two times a day  pantoprazole    Tablet 40 milliGRAM(s) Oral before breakfast  saccharomyces boulardii 250 milliGRAM(s) Oral two times a day  sodium chloride 2 Gram(s) Oral three times a day  tamsulosin 0.8 milliGRAM(s) Oral at bedtime  vancomycin  IVPB 1000 milliGRAM(s) IV Intermittent every 12 hours    MEDICATIONS  (PRN):  acetaminophen   Tablet .. 650 milliGRAM(s) Oral every 6 hours PRN Temp greater or equal to 38C (100.4F), Mild Pain (1 - 3)  dextrose 40% Gel 15 Gram(s) Oral once PRN Blood Glucose LESS THAN 70 milliGRAM(s)/deciliter  glucagon  Injectable 1 milliGRAM(s) IntraMuscular once PRN Glucose LESS THAN 70 milligrams/deciliter  methocarbamol 500 milliGRAM(s) Oral every 6 hours PRN Muscle Spasm  senna 2 Tablet(s) Oral at bedtime PRN Constipation  traMADol 25 milliGRAM(s) Oral four times a day PRN mod to severe pain      Allergies    No Known Allergies      Vital Signs Last 24 Hrs  T(C): 36.7 (20 Dec 2018 04:00), Max: 36.9 (19 Dec 2018 15:43)  T(F): 98 (20 Dec 2018 04:00), Max: 98.5 (19 Dec 2018 15:43)  HR: 87 (20 Dec 2018 04:00) (81 - 87)  BP: 143/62 (20 Dec 2018 04:00) (94/56 - 143/62)  BP(mean): --  RR: 20 (20 Dec 2018 04:00) (19 - 20)  SpO2: 94% (20 Dec 2018 04:00) (94% - 96%)    PHYSICAL EXAM:    GENERAL: NAD  CHEST/LUNG: Clear to percussion bilaterally  HEART: Regular rate and rhythm; S1 S2;  ABDOMEN: Soft, Nontender, Nondistended; Bowel sounds present  EXTREMITIES: no edema  : [place] catheter   NERVOUS SYSTEM:  Alert & Oriented X3 nonfocal    LABS:                        9.6    5.0   )-----------( 332      ( 20 Dec 2018 08:31 )             30.5     12-20    132<L>  |  96<L>  |  13.0  ----------------------------<  117<H>  4.3   |  28.0  |  1.21    Ca    8.1<L>      20 Dec 2018 08:31            CAPILLARY BLOOD GLUCOSE      POCT Blood Glucose.: 125 mg/dL (20 Dec 2018 08:23)  POCT Blood Glucose.: 200 mg/dL (19 Dec 2018 21:48)  POCT Blood Glucose.: 150 mg/dL (19 Dec 2018 17:24)  POCT Blood Glucose.: 192 mg/dL (19 Dec 2018 11:51)        RADIOLOGY & ADDITIONAL TESTS:

## 2018-12-20 NOTE — PROGRESS NOTE ADULT - ASSESSMENT
73 yr old male s/p T11/T10 laminectomy, T10-L1 fusion, hypertension, diabetes mellitus, hypothyroid was sent in from [facility] for fever and back pain. He was recently discharged from [facility] after surgery. Noted to have fever 102. He was evaluated by orthopedics in ED given recent surgery and CT of thoracolumbar spine with contrast was done, which showed post surgical changes and multiple foci of air, fluid and soft tissue edema. ID was consulted and he was started on Vancomycin and Cefepime. Orthopedics advised local wound care and possible seroma drainage if no improvement. Nephrology consulted for hyponatremia. Advised fluid restriction and increased salt tabs to 2 tabs three times a day. He was noted to Hb 7.7, was given 1 unit PRBC, Hb came up to 9.5      Post op seroma from thoracolumbar fusion:  possibly infected as no other sources of infection.      c/w IV abx per ID, change to PO on discharge--total 2 weeks     wound care per orthopedics    SIADH: hyponatremia--NaCl tabs    mild CLARI:  trend,  liberalize fluid intake.    HTN: stable on lopressor     anemia: stable post transfusion.    DM2: raiss, diabetic diet    Hypothyroid: synthroid    pain :  change oxycodone to tramadol prn, tylenol prn  (oxycodone caused excessive sedation)    PPx: protonix and lovenox.

## 2018-12-21 LAB
ANION GAP SERPL CALC-SCNC: 10 MMOL/L — SIGNIFICANT CHANGE UP (ref 5–17)
BUN SERPL-MCNC: 14 MG/DL — SIGNIFICANT CHANGE UP (ref 8–20)
CALCIUM SERPL-MCNC: 7.7 MG/DL — LOW (ref 8.6–10.2)
CHLORIDE SERPL-SCNC: 97 MMOL/L — LOW (ref 98–107)
CO2 SERPL-SCNC: 26 MMOL/L — SIGNIFICANT CHANGE UP (ref 22–29)
CREAT SERPL-MCNC: 1.34 MG/DL — HIGH (ref 0.5–1.3)
GLUCOSE BLDC GLUCOMTR-MCNC: 124 MG/DL — HIGH (ref 70–99)
GLUCOSE BLDC GLUCOMTR-MCNC: 170 MG/DL — HIGH (ref 70–99)
GLUCOSE BLDC GLUCOMTR-MCNC: 175 MG/DL — HIGH (ref 70–99)
GLUCOSE BLDC GLUCOMTR-MCNC: 192 MG/DL — HIGH (ref 70–99)
GLUCOSE SERPL-MCNC: 119 MG/DL — HIGH (ref 70–115)
POTASSIUM SERPL-MCNC: 4.5 MMOL/L — SIGNIFICANT CHANGE UP (ref 3.5–5.3)
POTASSIUM SERPL-SCNC: 4.5 MMOL/L — SIGNIFICANT CHANGE UP (ref 3.5–5.3)
SODIUM SERPL-SCNC: 133 MMOL/L — LOW (ref 135–145)
VANCOMYCIN TROUGH SERPL-MCNC: 23.5 UG/ML — HIGH (ref 10–20)

## 2018-12-21 PROCEDURE — 99231 SBSQ HOSP IP/OBS SF/LOW 25: CPT | Mod: 24

## 2018-12-21 PROCEDURE — 99232 SBSQ HOSP IP/OBS MODERATE 35: CPT

## 2018-12-21 RX ORDER — VANCOMYCIN HCL 1 G
750 VIAL (EA) INTRAVENOUS EVERY 12 HOURS
Refills: 0 | Status: DISCONTINUED | OUTPATIENT
Start: 2018-12-21 | End: 2018-12-24

## 2018-12-21 RX ORDER — CEFEPIME 1 G/1
2000 INJECTION, POWDER, FOR SOLUTION INTRAMUSCULAR; INTRAVENOUS
Refills: 0 | Status: DISCONTINUED | OUTPATIENT
Start: 2018-12-22 | End: 2019-01-02

## 2018-12-21 RX ADMIN — CEFEPIME 100 MILLIGRAM(S): 1 INJECTION, POWDER, FOR SOLUTION INTRAMUSCULAR; INTRAVENOUS at 05:52

## 2018-12-21 RX ADMIN — ATORVASTATIN CALCIUM 40 MILLIGRAM(S): 80 TABLET, FILM COATED ORAL at 21:43

## 2018-12-21 RX ADMIN — Medication 1: at 12:11

## 2018-12-21 RX ADMIN — Medication 250 MILLIGRAM(S): at 08:31

## 2018-12-21 RX ADMIN — ENOXAPARIN SODIUM 40 MILLIGRAM(S): 100 INJECTION SUBCUTANEOUS at 12:11

## 2018-12-21 RX ADMIN — Medication 1: at 16:26

## 2018-12-21 RX ADMIN — Medication 25 MILLIGRAM(S): at 16:26

## 2018-12-21 RX ADMIN — GABAPENTIN 300 MILLIGRAM(S): 400 CAPSULE ORAL at 21:43

## 2018-12-21 RX ADMIN — Medication 25 MILLIGRAM(S): at 05:49

## 2018-12-21 RX ADMIN — SODIUM CHLORIDE 2 GRAM(S): 9 INJECTION INTRAMUSCULAR; INTRAVENOUS; SUBCUTANEOUS at 05:53

## 2018-12-21 RX ADMIN — PANTOPRAZOLE SODIUM 40 MILLIGRAM(S): 20 TABLET, DELAYED RELEASE ORAL at 05:53

## 2018-12-21 RX ADMIN — TAMSULOSIN HYDROCHLORIDE 0.8 MILLIGRAM(S): 0.4 CAPSULE ORAL at 21:43

## 2018-12-21 RX ADMIN — Medication 150 MICROGRAM(S): at 05:52

## 2018-12-21 RX ADMIN — Medication 250 MILLIGRAM(S): at 05:53

## 2018-12-21 RX ADMIN — SODIUM CHLORIDE 2 GRAM(S): 9 INJECTION INTRAMUSCULAR; INTRAVENOUS; SUBCUTANEOUS at 16:26

## 2018-12-21 RX ADMIN — Medication 250 MILLIGRAM(S): at 16:26

## 2018-12-21 RX ADMIN — GABAPENTIN 300 MILLIGRAM(S): 400 CAPSULE ORAL at 05:52

## 2018-12-21 RX ADMIN — SODIUM CHLORIDE 2 GRAM(S): 9 INJECTION INTRAMUSCULAR; INTRAVENOUS; SUBCUTANEOUS at 21:43

## 2018-12-21 RX ADMIN — GABAPENTIN 300 MILLIGRAM(S): 400 CAPSULE ORAL at 13:11

## 2018-12-21 NOTE — PROGRESS NOTE ADULT - SUBJECTIVE AND OBJECTIVE BOX
[facility]  INFECTIOUS DISEASES AND INTERNAL MEDICINE at [place]  =======================================================  Wellington Collier MD  Diplomates American Board of Internal Medicine and Infectious Diseases  =======================================================    TIMUR LOUIS [id number]    Follow up: back pain SEROMA  PT AFEBRILE non toxic    Allergies:  No Known Allergies      Medications:  acetaminophen   Tablet .. 650 milliGRAM(s) Oral every 6 hours PRN  atorvastatin 40 milliGRAM(s) Oral at bedtime  cefepime   IVPB 2000 milliGRAM(s) IV Intermittent every 12 hours  dextrose 40% Gel 15 Gram(s) Oral once PRN  dextrose 5%. 1000 milliLiter(s) IV Continuous <Continuous>  dextrose 50% Injectable 12.5 Gram(s) IV Push once  dextrose 50% Injectable 25 Gram(s) IV Push once  dextrose 50% Injectable 25 Gram(s) IV Push once  docusate sodium 100 milliGRAM(s) Oral three times a day  enoxaparin Injectable 40 milliGRAM(s) SubCutaneous daily  gabapentin 300 milliGRAM(s) Oral three times a day  glucagon  Injectable 1 milliGRAM(s) IntraMuscular once PRN  insulin lispro (HumaLOG) corrective regimen sliding scale   SubCutaneous three times a day before meals  levothyroxine 150 MICROGram(s) Oral daily  methocarbamol 500 milliGRAM(s) Oral every 6 hours PRN  metoprolol succinate ER 25 milliGRAM(s) Oral daily  oxyCODONE    IR 10 milliGRAM(s) Oral every 4 hours PRN  senna 2 Tablet(s) Oral at bedtime  sodium chloride 1 Gram(s) Oral three times a day  tamsulosin 0.8 milliGRAM(s) Oral at bedtime  vancomycin  IVPB 1000 milliGRAM(s) IV Intermittent every 8 hours    SOCIAL       FAMILY   FAMILY HISTORY:  Family history of diabetes mellitus (Father)    REVIEW OF SYSTEMS:  CONSTITUTIONAL:  No Fever or chills  HEENT:   No diplopia or blurred vision.  No earache, sore throat or runny nose.  CARDIOVASCULAR:  No pressure, squeezing, strangling, tightness, heaviness or aching about the chest, neck, axilla or epigastrium.  RESPIRATORY:  No cough, shortness of breath, PND or orthopnea.  GASTROINTESTINAL:  No nausea, vomiting or diarrhea.  GENITOURINARY:  No dysuria, frequency or urgency. No Blood in urine  MUSCULOSKELETAL:   AS PER HPI  SKIN:  No change in skin, hair or nails.  NEUROLOGIC:  No paresthesias, fasciculations, seizures or weakness.  PSYCHIATRIC:  No disorder of thought or mood.  ENDOCRINE:  No heat or cold intolerance, polyuria or polydipsia.  HEMATOLOGICAL:  No easy bruising or bleeding.            Physical Exam:    Vital Signs Last 24 Hrs  T(C): 37.5 (21 Dec 2018 11:09), Max: 37.5 (21 Dec 2018 11:09)  T(F): 99.5 (21 Dec 2018 11:09), Max: 99.5 (21 Dec 2018 11:09)  HR: 86 (21 Dec 2018 11:09) (78 - 93)  BP: 149/77 (21 Dec 2018 11:09) (117/63 - 164/79)  BP(mean): --  RR: 18 (21 Dec 2018 11:09) (18 - 20)  SpO2: --    GEN: NAD, pleasant  HEENT: normocephalic and atraumatic. EOMI. MANUEL.    NECK: Supple. No carotid bruits.  No lymphadenopathy or thyromegaly.  LUNGS: Clear to auscultation.  HEART: Regular rate and rhythm without murmur.  ABDOMEN: Soft, nontender, and nondistended.  Positive bowel sounds.    : No CVA tenderness  EXTREMITIES: Without any cyanosis, clubbing, rash, lesions or edema.  MSK: DRESSING PLACE  NEUROLOGIC: Cranial nerves II through XII are grossly intact.  PSYCHIATRIC: Appropriate affect .  SKIN: No ulceration or induration present.        Labs:                                                               9.6    5.0   )-----------( 332      ( 20 Dec 2018 08:31 )             30.5   12-21    133<L>  |  97<L>  |  14.0  ----------------------------<  119<H>  4.5   |  26.0  |  1.34<H>    Ca    7.7<L>      21 Dec 2018 07:28      No growth        12-05 @ 11:40 .Blood     No growth at 5 days.        12-04 @ 15:14 .Urine     Culture grew 3 or more types of organisms which indicate  collection contamination; consider recollection only if clinically  indicated.  Results called to tata jacobo        12-02 @ 19:56 .Urine Klebsiella pneumoniae  [place]ella [place]    10,000 - 49,000 CFU/mL [place]ella [place]  10,000 - 49,000 CFU/mL Klebsiella pneumoniae        12-02 @ 10:35 .Blood     No growth at 5 days.

## 2018-12-21 NOTE — PROGRESS NOTE ADULT - SUBJECTIVE AND OBJECTIVE BOX
Patient seen and examined at bedside. comfortable in bed, pain controlled. No complaints at this time.    Vital Signs Last 24 Hrs  T(C): 37.1 (21 Dec 2018 05:31), Max: 37.1 (21 Dec 2018 05:31)  T(F): 98.8 (21 Dec 2018 05:31), Max: 98.8 (21 Dec 2018 05:31)  HR: 87 (21 Dec 2018 05:31) (78 - 93)  BP: 117/63 (21 Dec 2018 05:31) (90/50 - 164/79)  BP(mean): --  RR: 20 (21 Dec 2018 05:31) (19 - 20)  SpO2: 95% (20 Dec 2018 11:11) (95% - 95%)    Thoracolumbar region: Gauze dressing in place, stained with betadine. No active drainage at this time from the proximal incision; no expressible drainage from incision site at this time. No erythema no purulent drainage. The calf is supple nontender bilaterally.  No foot drop. 2+ dorsalis pedis pulse. Capillary refill is less than 2 seconds.          Lower extremities  -  5/5 plantar/dorsiflexion bilateral, 3+/5 hip flexion bilateral    A/P: 73 y.o M s/p lami/fusion  D/W Dr. Gray, d/c betadine continue dry dressing changes  will re eval dressing tomorrow  DVTP  cont care primary team

## 2018-12-21 NOTE — PHARMACOTHERAPY INTERVENTION NOTE - COMMENTS
Vancomycin dose lowered to 750mg every 12 hours preemptively due to renal impairment.   SCr: 1.34 today (baseline 0.44), Albumin 3.1    Trough level ordered for 12/21 @9pm (draw 1 hour before 10pm dose). HOLD FOR TROUGH LEVEL > 21

## 2018-12-21 NOTE — PROGRESS NOTE ADULT - SUBJECTIVE AND OBJECTIVE BOX
TIMUR LOUIS    [id number]    73y      Male    CC:     INTERVAL HPI/OVERNIGHT EVENTS:  73 yr old male s/p T11/T10 laminectomy, T10-L1 fusion, hypertension, diabetes mellitus, hypothyroid was sent in from [facility] for fever and back pain. He was recently discharged from [facility] after surgery. Noted to have fever 102. He was evaluated by orthopedics in ED given recent surgery and CT of thoracolumbar spine with contrast was done, which showed post surgical changes and multiple foci of air, fluid and soft tissue edema. ID was consulted and he was started on Vancomycin and Cefepime. Orthopedics advised local wound care and possible seroma drainage if no improvement. Nephrology consulted for hyponatremia. Advised fluid restriction and increased salt tabs to 2 tabs three times a day. He was noted to Hb 7.7, was given 1 unit PRBC, Hb came up to 9.5    today pt denies any pain, no sob no n/v or new events    REVIEW OF SYSTEMS:    CONSTITUTIONAL: No fever, weight loss, or fatigue  RESPIRATORY: No cough, wheezing, hemoptysis; No shortness of breath  CARDIOVASCULAR: No chest pain, palpitations  GASTROINTESTINAL: No abdominal or epigastric pain. No nausea, vomiting      Vital Signs Last 24 Hrs  T(C): 37.1 (21 Dec 2018 05:31), Max: 37.1 (21 Dec 2018 05:31)  T(F): 98.8 (21 Dec 2018 05:31), Max: 98.8 (21 Dec 2018 05:31)  HR: 87 (21 Dec 2018 05:31) (78 - 93)  BP: 117/63 (21 Dec 2018 05:31) (117/63 - 164/79)  BP(mean): --  RR: 20 (21 Dec 2018 05:31) (20 - 20)  SpO2: --    PHYSICAL EXAM:    GENERAL: NAD, well-groomed  HEENT: PERRL, +EOMI  CHEST/LUNG: Clear to auscultation bilaterally; No wheezing  HEART: S1S2+, Regular rate and rhythm; No murmurs  ABDOMEN: Soft, Nontender, Nondistended; Bowel sounds present  EXTREMITIES:  2+ Peripheral Pulses, No clubbing, cyanosis, or edema    LABS:                        9.6    5.0   )-----------( 332      ( 20 Dec 2018 08:31 )             30.5     12-21    133<L>  |  97<L>  |  14.0  ----------------------------<  119<H>  4.5   |  26.0  |  1.34<H>    Ca    7.7<L>      21 Dec 2018 07:28              MEDICATIONS  (STANDING):  atorvastatin 40 milliGRAM(s) Oral at bedtime  dextrose 5%. 1000 milliLiter(s) (50 mL/Hr) IV Continuous <Continuous>  dextrose 50% Injectable 12.5 Gram(s) IV Push once  dextrose 50% Injectable 25 Gram(s) IV Push once  dextrose 50% Injectable 25 Gram(s) IV Push once  enoxaparin Injectable 40 milliGRAM(s) SubCutaneous daily  gabapentin 300 milliGRAM(s) Oral three times a day  insulin lispro (HumaLOG) corrective regimen sliding scale   SubCutaneous three times a day before meals  levothyroxine 150 MICROGram(s) Oral daily  metoprolol tartrate 25 milliGRAM(s) Oral two times a day  pantoprazole    Tablet 40 milliGRAM(s) Oral before breakfast  saccharomyces boulardii 250 milliGRAM(s) Oral two times a day  sodium chloride 2 Gram(s) Oral three times a day  tamsulosin 0.8 milliGRAM(s) Oral at bedtime  vancomycin  IVPB 750 milliGRAM(s) IV Intermittent every 12 hours    MEDICATIONS  (PRN):  acetaminophen   Tablet .. 650 milliGRAM(s) Oral every 6 hours PRN Temp greater or equal to 38C (100.4F), Mild Pain (1 - 3)  dextrose 40% Gel 15 Gram(s) Oral once PRN Blood Glucose LESS THAN 70 milliGRAM(s)/deciliter  glucagon  Injectable 1 milliGRAM(s) IntraMuscular once PRN Glucose LESS THAN 70 milligrams/deciliter  methocarbamol 500 milliGRAM(s) Oral every 6 hours PRN Muscle Spasm  senna 2 Tablet(s) Oral at bedtime PRN Constipation  traMADol 25 milliGRAM(s) Oral four times a day PRN mod to severe pain      RADIOLOGY & ADDITIONAL TESTS:  reviewed

## 2018-12-21 NOTE — PROGRESS NOTE ADULT - ASSESSMENT
72 y/o male with PMHx of HTN, BPH, DM, Hypothyroid, and OA was sent to the ED from [facility] due to recurrent fevers (T-max 102) and back pain of 1 day duration. Patient was recently discharge from [facility] for thoracic laminectomy and fusion.  Patient said he has been doing well since dc until yesterday when he started having recurrent fevers and back pain. Patient has no   trauma/fall, neck pain, HA, numbness, tingling, bowel/urinary incontinence, chills, chest pain, shortness of breath, palpitation, cough, nausea/vomiting, abdominal pain. (   IMAGING WITH  WHAT APPEARS TO BE POST OP SEROMA  BLOOD CX X2neg so far   PT NON TOXIC BUT   UNFORTUNATELY PT WITH HARDWARE IN PLACE AND placed on  EMPRIC ABX     D/W ORTHO   ABOUT DURATION OF THERAPY   WILL CONTINUE [facility] THEN TRANSITION TO ORAL TO COMPLETE 2 WEEKS  D/C AS PER ORTHO

## 2018-12-21 NOTE — PROGRESS NOTE ADULT - ASSESSMENT
73 yr old male s/p T11/T10 laminectomy, T10-L1 fusion, hypertension, diabetes mellitus, hypothyroid was sent in from [facility] for fever and back pain. He was recently discharged from [facility] after surgery. Noted to have fever 102. He was evaluated by orthopedics in ED given recent surgery and CT of thoracolumbar spine with contrast was done, which showed post surgical changes and multiple foci of air, fluid and soft tissue edema. ID was consulted and he was started on Vancomycin and Cefepime. Orthopedics advised local wound care and possible seroma drainage if no improvement. Nephrology consulted for hyponatremia. Advised fluid restriction and increased salt tabs to 2 tabs three times a day. He was noted to Hb 7.7, was given 1 unit PRBC, Hb came up to 9.5      1) Post op seroma from thoracolumbar fusion:  possibly infected as no other sources of infection.   - c/w IV abx per ID, change to PO on discharge for total 2 weeks  - wound care per orthopedics  - ortho wants pt to complete abx in house to monitor     2) SIADH: hyponatremia  - NaCl tabs    3) Mild CLARI  - trend and monitor    4) HTN  - stable on lopressor     5) Anemia  - stable post transfusion  - monitor    6) DM2  - CSI and finger stick monitoring     7) Hypothyroid  - c/w synthroid    8) Prophylactic measure  - DVT ppx: Lovenox SQ

## 2018-12-22 LAB
ANION GAP SERPL CALC-SCNC: 8 MMOL/L — SIGNIFICANT CHANGE UP (ref 5–17)
BUN SERPL-MCNC: 15 MG/DL — SIGNIFICANT CHANGE UP (ref 8–20)
CALCIUM SERPL-MCNC: 8.2 MG/DL — LOW (ref 8.6–10.2)
CHLORIDE SERPL-SCNC: 98 MMOL/L — SIGNIFICANT CHANGE UP (ref 98–107)
CO2 SERPL-SCNC: 28 MMOL/L — SIGNIFICANT CHANGE UP (ref 22–29)
CREAT SERPL-MCNC: 1.24 MG/DL — SIGNIFICANT CHANGE UP (ref 0.5–1.3)
GLUCOSE BLDC GLUCOMTR-MCNC: 125 MG/DL — HIGH (ref 70–99)
GLUCOSE BLDC GLUCOMTR-MCNC: 191 MG/DL — HIGH (ref 70–99)
GLUCOSE BLDC GLUCOMTR-MCNC: 196 MG/DL — HIGH (ref 70–99)
GLUCOSE BLDC GLUCOMTR-MCNC: 272 MG/DL — HIGH (ref 70–99)
GLUCOSE SERPL-MCNC: 122 MG/DL — HIGH (ref 70–115)
HCT VFR BLD CALC: 28.3 % — LOW (ref 42–52)
HGB BLD-MCNC: 9 G/DL — LOW (ref 14–18)
MAGNESIUM SERPL-MCNC: 1.8 MG/DL — SIGNIFICANT CHANGE UP (ref 1.6–2.6)
MCHC RBC-ENTMCNC: 27.3 PG — SIGNIFICANT CHANGE UP (ref 27–31)
MCHC RBC-ENTMCNC: 31.8 G/DL — LOW (ref 32–36)
MCV RBC AUTO: 85.8 FL — SIGNIFICANT CHANGE UP (ref 80–94)
PLATELET # BLD AUTO: 393 K/UL — SIGNIFICANT CHANGE UP (ref 150–400)
POTASSIUM SERPL-MCNC: 4.5 MMOL/L — SIGNIFICANT CHANGE UP (ref 3.5–5.3)
POTASSIUM SERPL-SCNC: 4.5 MMOL/L — SIGNIFICANT CHANGE UP (ref 3.5–5.3)
RBC # BLD: 3.3 M/UL — LOW (ref 4.6–6.2)
RBC # FLD: 13.7 % — SIGNIFICANT CHANGE UP (ref 11–15.6)
SODIUM SERPL-SCNC: 134 MMOL/L — LOW (ref 135–145)
WBC # BLD: 5.2 K/UL — SIGNIFICANT CHANGE UP (ref 4.8–10.8)
WBC # FLD AUTO: 5.2 K/UL — SIGNIFICANT CHANGE UP (ref 4.8–10.8)

## 2018-12-22 PROCEDURE — 99232 SBSQ HOSP IP/OBS MODERATE 35: CPT

## 2018-12-22 RX ADMIN — Medication 25 MILLIGRAM(S): at 05:36

## 2018-12-22 RX ADMIN — Medication 3: at 17:14

## 2018-12-22 RX ADMIN — SODIUM CHLORIDE 2 GRAM(S): 9 INJECTION INTRAMUSCULAR; INTRAVENOUS; SUBCUTANEOUS at 14:38

## 2018-12-22 RX ADMIN — GABAPENTIN 300 MILLIGRAM(S): 400 CAPSULE ORAL at 14:37

## 2018-12-22 RX ADMIN — Medication 250 MILLIGRAM(S): at 05:36

## 2018-12-22 RX ADMIN — ENOXAPARIN SODIUM 40 MILLIGRAM(S): 100 INJECTION SUBCUTANEOUS at 12:12

## 2018-12-22 RX ADMIN — Medication 250 MILLIGRAM(S): at 22:03

## 2018-12-22 RX ADMIN — TAMSULOSIN HYDROCHLORIDE 0.8 MILLIGRAM(S): 0.4 CAPSULE ORAL at 22:03

## 2018-12-22 RX ADMIN — Medication 1: at 12:12

## 2018-12-22 RX ADMIN — GABAPENTIN 300 MILLIGRAM(S): 400 CAPSULE ORAL at 22:03

## 2018-12-22 RX ADMIN — SODIUM CHLORIDE 2 GRAM(S): 9 INJECTION INTRAMUSCULAR; INTRAVENOUS; SUBCUTANEOUS at 05:36

## 2018-12-22 RX ADMIN — Medication 150 MICROGRAM(S): at 05:36

## 2018-12-22 RX ADMIN — CEFEPIME 100 MILLIGRAM(S): 1 INJECTION, POWDER, FOR SOLUTION INTRAMUSCULAR; INTRAVENOUS at 05:35

## 2018-12-22 RX ADMIN — GABAPENTIN 300 MILLIGRAM(S): 400 CAPSULE ORAL at 05:36

## 2018-12-22 RX ADMIN — Medication 250 MILLIGRAM(S): at 10:05

## 2018-12-22 RX ADMIN — Medication 250 MILLIGRAM(S): at 17:13

## 2018-12-22 RX ADMIN — Medication 25 MILLIGRAM(S): at 17:13

## 2018-12-22 RX ADMIN — PANTOPRAZOLE SODIUM 40 MILLIGRAM(S): 20 TABLET, DELAYED RELEASE ORAL at 05:36

## 2018-12-22 RX ADMIN — ATORVASTATIN CALCIUM 40 MILLIGRAM(S): 80 TABLET, FILM COATED ORAL at 22:03

## 2018-12-22 RX ADMIN — SODIUM CHLORIDE 2 GRAM(S): 9 INJECTION INTRAMUSCULAR; INTRAVENOUS; SUBCUTANEOUS at 22:03

## 2018-12-22 NOTE — PROGRESS NOTE ADULT - SUBJECTIVE AND OBJECTIVE BOX
Orthopedic PA Note   Dressing with clear serous discharge, changed dressing, will monitor  attending aware

## 2018-12-22 NOTE — PHARMACOTHERAPY INTERVENTION NOTE - COMMENTS
Continue vancomycin 750mg every 12 hours. Already adjusted yesterday in anticipation of supratherapeutic trough 23.5 while on 1g every 12 hours.    Next level ordered for 12/23 @9pm (draw 1 hour before giving 10pm dose)

## 2018-12-22 NOTE — DIETITIAN INITIAL EVALUATION ADULT. - PERTINENT LABORATORY DATA
12-22 Na134 mmol/L<L> Glu 122 mg/dL<H> K+ 4.5 mmol/L Cr  1.24 mg/dL BUN 15.0 mg/dL Phos n/a   Alb n/a   PAB n/a

## 2018-12-22 NOTE — PROGRESS NOTE ADULT - SUBJECTIVE AND OBJECTIVE BOX
TIMUR LOUIS  ----------------------------------------  The patient was seen and evaluated for seroma.  The patient is in no acute distress.  Denied any chest pain, palpitations, dyspnea, or abdominal pain.  Reports some back pain.    Vital Signs Last 24 Hrs  T(C): 36.1 (22 Dec 2018 10:26), Max: 37.5 (21 Dec 2018 16:08)  T(F): 97 (22 Dec 2018 10:26), Max: 99.5 (21 Dec 2018 16:08)  HR: 87 (22 Dec 2018 10:26) (83 - 87)  BP: 132/94 (22 Dec 2018 10:26) (116/75 - 164/76)  BP(mean): --  RR: 18 (22 Dec 2018 10:26) (18 - 20)  SpO2: 94% (22 Dec 2018 10:26) (94% - 97%)    CAPILLARY BLOOD GLUCOSE  POCT Blood Glucose.: 196 mg/dL (22 Dec 2018 12:03)  POCT Blood Glucose.: 125 mg/dL (22 Dec 2018 08:02)  POCT Blood Glucose.: 175 mg/dL (21 Dec 2018 21:45)  POCT Blood Glucose.: 192 mg/dL (21 Dec 2018 16:04)    PHYSICAL EXAMINATION:  ----------------------------------------  General appearance: No acute distress, Awake, Alert  HEENT: Normocephalic, Atraumatic, Conjunctiva clear, EOMI  Neck: Supple, No JVD, No tenderness  Lungs: Clear to auscultation, Breath sound equal bilaterally, No wheezes, No rales  Cardiovascular: S1S2, Regular rhythm  Abdomen: Soft, Nontender, Nondistended, No guarding/rebound, Positive bowel sounds, back dressing intact, stained  Extremities: No clubbing, No cyanosis, No edema, No calf tenderness  Neuro: Strength equal bilaterally, No tremors  Psychiatric: Appropriate mood, Normal affect    LABORATORY STUDIES:  ----------------------------------------             9.0    5.2   )-----------( 393      ( 22 Dec 2018 07:59 )             28.3     12-22    134<L>  |  98  |  15.0  ----------------------------<  122<H>  4.5   |  28.0  |  1.24    Ca    8.2<L>      22 Dec 2018 07:59  Mg     1.8     12-22    MEDICATIONS  (STANDING):  atorvastatin 40 milliGRAM(s) Oral at bedtime  cefepime   IVPB 2000 milliGRAM(s) IV Intermittent <User Schedule>  dextrose 5%. 1000 milliLiter(s) (50 mL/Hr) IV Continuous <Continuous>  dextrose 50% Injectable 12.5 Gram(s) IV Push once  dextrose 50% Injectable 25 Gram(s) IV Push once  dextrose 50% Injectable 25 Gram(s) IV Push once  enoxaparin Injectable 40 milliGRAM(s) SubCutaneous daily  gabapentin 300 milliGRAM(s) Oral three times a day  insulin lispro (HumaLOG) corrective regimen sliding scale   SubCutaneous three times a day before meals  levothyroxine 150 MICROGram(s) Oral daily  metoprolol tartrate 25 milliGRAM(s) Oral two times a day  pantoprazole    Tablet 40 milliGRAM(s) Oral before breakfast  saccharomyces boulardii 250 milliGRAM(s) Oral two times a day  sodium chloride 2 Gram(s) Oral three times a day  tamsulosin 0.8 milliGRAM(s) Oral at bedtime  vancomycin  IVPB 750 milliGRAM(s) IV Intermittent every 12 hours    MEDICATIONS  (PRN):  acetaminophen   Tablet .. 650 milliGRAM(s) Oral every 6 hours PRN Temp greater or equal to 38C (100.4F), Mild Pain (1 - 3)  dextrose 40% Gel 15 Gram(s) Oral once PRN Blood Glucose LESS THAN 70 milliGRAM(s)/deciliter  glucagon  Injectable 1 milliGRAM(s) IntraMuscular once PRN Glucose LESS THAN 70 milligrams/deciliter  methocarbamol 500 milliGRAM(s) Oral every 6 hours PRN Muscle Spasm  senna 2 Tablet(s) Oral at bedtime PRN Constipation  traMADol 25 milliGRAM(s) Oral four times a day PRN mod to severe pain      ASSESSMENT / PLAN:  ----------------------------------------  Seroma - Infectious Disease follow up noted. On cefepime and vancomycin. Blood cultures were without growth. Orthopedics follow up noted, continuing with wound care.    SIADH / Hyponatremia - Monitoring sodium levels. On sodium chloride supplementation and fluid restriction.    Anemia - prior transfusion of packed red blood cells noted. Following blood count.    Hypertension - Close blood pressure monitoring. On metoprolol.    Diabetes - Insulin coverage, close monitoring of blood glucose levels.    Hypothyroidism - On levothyroxine.

## 2018-12-23 LAB
ANION GAP SERPL CALC-SCNC: 10 MMOL/L — SIGNIFICANT CHANGE UP (ref 5–17)
BUN SERPL-MCNC: 16 MG/DL — SIGNIFICANT CHANGE UP (ref 8–20)
CALCIUM SERPL-MCNC: 8 MG/DL — LOW (ref 8.6–10.2)
CHLORIDE SERPL-SCNC: 100 MMOL/L — SIGNIFICANT CHANGE UP (ref 98–107)
CO2 SERPL-SCNC: 27 MMOL/L — SIGNIFICANT CHANGE UP (ref 22–29)
CREAT SERPL-MCNC: 1.29 MG/DL — SIGNIFICANT CHANGE UP (ref 0.5–1.3)
GLUCOSE BLDC GLUCOMTR-MCNC: 146 MG/DL — HIGH (ref 70–99)
GLUCOSE BLDC GLUCOMTR-MCNC: 163 MG/DL — HIGH (ref 70–99)
GLUCOSE BLDC GLUCOMTR-MCNC: 195 MG/DL — HIGH (ref 70–99)
GLUCOSE BLDC GLUCOMTR-MCNC: 214 MG/DL — HIGH (ref 70–99)
GLUCOSE SERPL-MCNC: 131 MG/DL — HIGH (ref 70–115)
POTASSIUM SERPL-MCNC: 4.5 MMOL/L — SIGNIFICANT CHANGE UP (ref 3.5–5.3)
POTASSIUM SERPL-SCNC: 4.5 MMOL/L — SIGNIFICANT CHANGE UP (ref 3.5–5.3)
SODIUM SERPL-SCNC: 137 MMOL/L — SIGNIFICANT CHANGE UP (ref 135–145)
VANCOMYCIN TROUGH SERPL-MCNC: 19.2 UG/ML — SIGNIFICANT CHANGE UP (ref 10–20)

## 2018-12-23 PROCEDURE — 99232 SBSQ HOSP IP/OBS MODERATE 35: CPT

## 2018-12-23 PROCEDURE — 99231 SBSQ HOSP IP/OBS SF/LOW 25: CPT | Mod: 24

## 2018-12-23 RX ADMIN — Medication 25 MILLIGRAM(S): at 17:14

## 2018-12-23 RX ADMIN — PANTOPRAZOLE SODIUM 40 MILLIGRAM(S): 20 TABLET, DELAYED RELEASE ORAL at 05:44

## 2018-12-23 RX ADMIN — GABAPENTIN 300 MILLIGRAM(S): 400 CAPSULE ORAL at 05:45

## 2018-12-23 RX ADMIN — Medication 250 MILLIGRAM(S): at 05:44

## 2018-12-23 RX ADMIN — Medication 250 MILLIGRAM(S): at 23:52

## 2018-12-23 RX ADMIN — Medication 2: at 11:48

## 2018-12-23 RX ADMIN — Medication 250 MILLIGRAM(S): at 11:48

## 2018-12-23 RX ADMIN — Medication 25 MILLIGRAM(S): at 05:44

## 2018-12-23 RX ADMIN — SODIUM CHLORIDE 2 GRAM(S): 9 INJECTION INTRAMUSCULAR; INTRAVENOUS; SUBCUTANEOUS at 05:44

## 2018-12-23 RX ADMIN — ENOXAPARIN SODIUM 40 MILLIGRAM(S): 100 INJECTION SUBCUTANEOUS at 11:48

## 2018-12-23 RX ADMIN — SODIUM CHLORIDE 2 GRAM(S): 9 INJECTION INTRAMUSCULAR; INTRAVENOUS; SUBCUTANEOUS at 13:57

## 2018-12-23 RX ADMIN — Medication 150 MICROGRAM(S): at 05:44

## 2018-12-23 RX ADMIN — ATORVASTATIN CALCIUM 40 MILLIGRAM(S): 80 TABLET, FILM COATED ORAL at 21:28

## 2018-12-23 RX ADMIN — CEFEPIME 100 MILLIGRAM(S): 1 INJECTION, POWDER, FOR SOLUTION INTRAMUSCULAR; INTRAVENOUS at 05:44

## 2018-12-23 RX ADMIN — GABAPENTIN 300 MILLIGRAM(S): 400 CAPSULE ORAL at 13:57

## 2018-12-23 RX ADMIN — Medication 1: at 17:15

## 2018-12-23 RX ADMIN — SODIUM CHLORIDE 2 GRAM(S): 9 INJECTION INTRAMUSCULAR; INTRAVENOUS; SUBCUTANEOUS at 21:28

## 2018-12-23 RX ADMIN — TAMSULOSIN HYDROCHLORIDE 0.8 MILLIGRAM(S): 0.4 CAPSULE ORAL at 21:28

## 2018-12-23 RX ADMIN — GABAPENTIN 300 MILLIGRAM(S): 400 CAPSULE ORAL at 21:28

## 2018-12-23 RX ADMIN — Medication 250 MILLIGRAM(S): at 17:14

## 2018-12-23 NOTE — PROGRESS NOTE ADULT - SUBJECTIVE AND OBJECTIVE BOX
Patient seen and eval at bedside. Patient defecated. Patient denies any significant symptom changes. Denies CP, SOB, dizziness. Condom catheter placed.     Vital Signs Last 24 Hrs  T(C): 36.8 (23 Dec 2018 04:09), Max: 37.3 (22 Dec 2018 20:35)  T(F): 98.2 (23 Dec 2018 04:09), Max: 99.1 (22 Dec 2018 20:35)  HR: 89 (23 Dec 2018 04:09) (87 - 89)  BP: 155/77 (23 Dec 2018 04:09) (132/94 - 155/77)  RR: 20 (23 Dec 2018 04:09) (18 - 20)  SpO2: 96% (22 Dec 2018 20:35) (94% - 96%)    PE: NAD, alert awake  Back dressing saturated serosangenous drainage, dressing removed, no active drainage noted. Small proximal wound mid-back cleaned with betadine swab and dry gauze. Clean dry bulky gauze dressing placed.   Patient exam unchanged, gross weaknss LEs however quad/hamstrings/TA/GS intact B/L  Sensation diminished B/L feet but intact to light touch, lowerlegs and thigh sensation intact to LT B/L  Calf soft, NT b/l                          9.0    5.2   )-----------( 393      ( 22 Dec 2018 07:59 )             28.3       A/P: s/p lami/fusion 11/28/18 with draining seroma   ·	Cont dry dressing changes as needed  ·	Pain control  ·	DVT propx: Lov  ·	PT - WBAT, patient needs to get OOB with PT and up in chair  ·	Cont care as per primary team

## 2018-12-23 NOTE — PROGRESS NOTE ADULT - ATTENDING COMMENTS
Patient seen and examined this morning hours of 12.3 approximately 9 AM physician status change the dressing that had saturation. I discussed with Mr. Platt multiple times with regard to return to the OR for irrigation potential dead space management and the cerumen management patient wishes to avoid returning to the OR I do think it's reasonable to continue dry dressing changes at this point in time given the fact that there is no sepsis etc. Patient may continue to have dressing changes done on a daily basis. In a few more days if it is still persistent remaining urgent and pushed a little harder for possible operative management debridement dead space management closer etc. Patient be continued to be followed.

## 2018-12-23 NOTE — PROGRESS NOTE ADULT - SUBJECTIVE AND OBJECTIVE BOX
TIMUR LOUIS  ----------------------------------------  The patient was seen and evaluated for hyponatremia.  The patient is in no acute distress.  Denied any chest pain, palpitations, dyspnea, or abdominal pain.  Reports some back discomfort.    Vital Signs Last 24 Hrs  T(C): 36.8 (23 Dec 2018 10:59), Max: 37.3 (22 Dec 2018 20:35)  T(F): 98.2 (23 Dec 2018 10:59), Max: 99.1 (22 Dec 2018 20:35)  HR: 92 (23 Dec 2018 10:59) (87 - 92)  BP: 104/65 (23 Dec 2018 10:59) (104/65 - 155/77)  BP(mean): --  RR: 20 (23 Dec 2018 10:59) (20 - 20)  SpO2: 96% (22 Dec 2018 20:35) (96% - 96%)    CAPILLARY BLOOD GLUCOSE  POCT Blood Glucose.: 214 mg/dL (23 Dec 2018 11:47)  POCT Blood Glucose.: 146 mg/dL (23 Dec 2018 08:12)  POCT Blood Glucose.: 191 mg/dL (22 Dec 2018 22:02)  POCT Blood Glucose.: 272 mg/dL (22 Dec 2018 17:06)    PHYSICAL EXAMINATION:  ----------------------------------------  General appearance: No acute distress, Awake, Alert  HEENT: Normocephalic, Atraumatic, Conjunctiva clear, EOMI  Neck: Supple, No JVD, No tenderness  Lungs: Clear to auscultation, Breath sound equal bilaterally, No wheezes, No rales  Cardiovascular: S1S2, Regular rhythm  Abdomen: Soft, Nontender, Nondistended, No guarding/rebound, Positive bowel sounds  Extremities: No clubbing, No cyanosis, No edema, No calf tenderness  Neuro: Strength equal bilaterally, No tremors  Psychiatric: Appropriate mood, Normal affect    LABORATORY STUDIES:  ----------------------------------------             9.0    5.2   )-----------( 393      ( 22 Dec 2018 07:59 )             28.3     12-23    137  |  100  |  16.0  ----------------------------<  131<H>  4.5   |  27.0  |  1.29    Ca    8.0<L>      23 Dec 2018 07:25  Mg     1.8     12-22    MEDICATIONS  (STANDING):  atorvastatin 40 milliGRAM(s) Oral at bedtime  cefepime   IVPB 2000 milliGRAM(s) IV Intermittent <User Schedule>  dextrose 5%. 1000 milliLiter(s) (50 mL/Hr) IV Continuous <Continuous>  dextrose 50% Injectable 12.5 Gram(s) IV Push once  dextrose 50% Injectable 25 Gram(s) IV Push once  dextrose 50% Injectable 25 Gram(s) IV Push once  enoxaparin Injectable 40 milliGRAM(s) SubCutaneous daily  gabapentin 300 milliGRAM(s) Oral three times a day  insulin lispro (HumaLOG) corrective regimen sliding scale   SubCutaneous three times a day before meals  levothyroxine 150 MICROGram(s) Oral daily  metoprolol tartrate 25 milliGRAM(s) Oral two times a day  pantoprazole    Tablet 40 milliGRAM(s) Oral before breakfast  saccharomyces boulardii 250 milliGRAM(s) Oral two times a day  sodium chloride 2 Gram(s) Oral three times a day  tamsulosin 0.8 milliGRAM(s) Oral at bedtime  vancomycin  IVPB 750 milliGRAM(s) IV Intermittent every 12 hours    MEDICATIONS  (PRN):  acetaminophen   Tablet .. 650 milliGRAM(s) Oral every 6 hours PRN Temp greater or equal to 38C (100.4F), Mild Pain (1 - 3)  dextrose 40% Gel 15 Gram(s) Oral once PRN Blood Glucose LESS THAN 70 milliGRAM(s)/deciliter  glucagon  Injectable 1 milliGRAM(s) IntraMuscular once PRN Glucose LESS THAN 70 milligrams/deciliter  methocarbamol 500 milliGRAM(s) Oral every 6 hours PRN Muscle Spasm  senna 2 Tablet(s) Oral at bedtime PRN Constipation  traMADol 25 milliGRAM(s) Oral four times a day PRN mod to severe pain      ASSESSMENT / PLAN:  ----------------------------------------  Seroma - On cefepime and vancomycin. Blood cultures were without growth. Orthopedics follow up noted, continuing with dressing changes for now. The patient is hesitant to pursue debridement of the area.    SIADH / Hyponatremia - Improved sodium levels. On sodium chloride supplementation and fluid restriction.    Anemia - prior transfusion of packed red blood cells noted. Following blood count.    Hypertension - Close blood pressure monitoring. On metoprolol.    Diabetes - Insulin coverage, close monitoring of blood glucose levels.    Hypothyroidism - On levothyroxine. TIMUR LOUIS  ----------------------------------------  The patient was seen and evaluated for hyponatremia.  The patient is in no acute distress.  Denied any chest pain, palpitations, dyspnea, or abdominal pain.  Reports some back discomfort.    Vital Signs Last 24 Hrs  T(C): 36.8 (23 Dec 2018 10:59), Max: 37.3 (22 Dec 2018 20:35)  T(F): 98.2 (23 Dec 2018 10:59), Max: 99.1 (22 Dec 2018 20:35)  HR: 92 (23 Dec 2018 10:59) (87 - 92)  BP: 104/65 (23 Dec 2018 10:59) (104/65 - 155/77)  BP(mean): --  RR: 20 (23 Dec 2018 10:59) (20 - 20)  SpO2: 96% (22 Dec 2018 20:35) (96% - 96%)    CAPILLARY BLOOD GLUCOSE  POCT Blood Glucose.: 214 mg/dL (23 Dec 2018 11:47)  POCT Blood Glucose.: 146 mg/dL (23 Dec 2018 08:12)  POCT Blood Glucose.: 191 mg/dL (22 Dec 2018 22:02)  POCT Blood Glucose.: 272 mg/dL (22 Dec 2018 17:06)    PHYSICAL EXAMINATION:  ----------------------------------------  General appearance: No acute distress, Awake, Alert  HEENT: Normocephalic, Atraumatic, Conjunctiva clear, EOMI  Neck: Supple, No JVD, No tenderness  Lungs: Clear to auscultation, Breath sound equal bilaterally, No wheezes, No rales  Cardiovascular: S1S2, Regular rhythm  Abdomen: Soft, Nontender, Nondistended, No guarding/rebound, Positive bowel sounds  Extremities: No clubbing, No cyanosis, No edema, No calf tenderness  Neuro: Strength equal bilaterally, No tremors  Psychiatric: Appropriate mood, Normal affect    LABORATORY STUDIES:  ----------------------------------------             9.0    5.2   )-----------( 393      ( 22 Dec 2018 07:59 )             28.3     12-23    137  |  100  |  16.0  ----------------------------<  131<H>  4.5   |  27.0  |  1.29    Ca    8.0<L>      23 Dec 2018 07:25  Mg     1.8     12-22    MEDICATIONS  (STANDING):  atorvastatin 40 milliGRAM(s) Oral at bedtime  cefepime   IVPB 2000 milliGRAM(s) IV Intermittent <User Schedule>  dextrose 5%. 1000 milliLiter(s) (50 mL/Hr) IV Continuous <Continuous>  dextrose 50% Injectable 12.5 Gram(s) IV Push once  dextrose 50% Injectable 25 Gram(s) IV Push once  dextrose 50% Injectable 25 Gram(s) IV Push once  enoxaparin Injectable 40 milliGRAM(s) SubCutaneous daily  gabapentin 300 milliGRAM(s) Oral three times a day  insulin lispro (HumaLOG) corrective regimen sliding scale   SubCutaneous three times a day before meals  levothyroxine 150 MICROGram(s) Oral daily  metoprolol tartrate 25 milliGRAM(s) Oral two times a day  pantoprazole    Tablet 40 milliGRAM(s) Oral before breakfast  saccharomyces boulardii 250 milliGRAM(s) Oral two times a day  sodium chloride 2 Gram(s) Oral three times a day  tamsulosin 0.8 milliGRAM(s) Oral at bedtime  vancomycin  IVPB 750 milliGRAM(s) IV Intermittent every 12 hours    MEDICATIONS  (PRN):  acetaminophen   Tablet .. 650 milliGRAM(s) Oral every 6 hours PRN Temp greater or equal to 38C (100.4F), Mild Pain (1 - 3)  dextrose 40% Gel 15 Gram(s) Oral once PRN Blood Glucose LESS THAN 70 milliGRAM(s)/deciliter  glucagon  Injectable 1 milliGRAM(s) IntraMuscular once PRN Glucose LESS THAN 70 milligrams/deciliter  methocarbamol 500 milliGRAM(s) Oral every 6 hours PRN Muscle Spasm  senna 2 Tablet(s) Oral at bedtime PRN Constipation  traMADol 25 milliGRAM(s) Oral four times a day PRN mod to severe pain      ASSESSMENT / PLAN:  ----------------------------------------  Seroma - On cefepime and vancomycin. Blood cultures were without growth. Orthopedics follow up noted, continuing with dressing changes for now. The patient is hesitant to pursue debridement of the area.    SIADH / Hyponatremia - Improved sodium levels. On sodium chloride supplementation and fluid restriction.    Anemia - Following blood count. Prior transfusion of packed red blood cells noted.    Hypertension - Close blood pressure monitoring. On metoprolol.    Diabetes - Insulin coverage, close monitoring of blood glucose levels.    Hypothyroidism - On levothyroxine.

## 2018-12-24 LAB
ANION GAP SERPL CALC-SCNC: 9 MMOL/L — SIGNIFICANT CHANGE UP (ref 5–17)
BUN SERPL-MCNC: 15 MG/DL — SIGNIFICANT CHANGE UP (ref 8–20)
CALCIUM SERPL-MCNC: 8.1 MG/DL — LOW (ref 8.6–10.2)
CHLORIDE SERPL-SCNC: 97 MMOL/L — LOW (ref 98–107)
CO2 SERPL-SCNC: 28 MMOL/L — SIGNIFICANT CHANGE UP (ref 22–29)
CREAT SERPL-MCNC: 1.12 MG/DL — SIGNIFICANT CHANGE UP (ref 0.5–1.3)
CRP SERPL-MCNC: 3.6 MG/DL — HIGH (ref 0–0.4)
ERYTHROCYTE [SEDIMENTATION RATE] IN BLOOD: 44 MM/HR — HIGH (ref 0–20)
GLUCOSE BLDC GLUCOMTR-MCNC: 149 MG/DL — HIGH (ref 70–99)
GLUCOSE BLDC GLUCOMTR-MCNC: 161 MG/DL — HIGH (ref 70–99)
GLUCOSE BLDC GLUCOMTR-MCNC: 183 MG/DL — HIGH (ref 70–99)
GLUCOSE BLDC GLUCOMTR-MCNC: 191 MG/DL — HIGH (ref 70–99)
GLUCOSE SERPL-MCNC: 140 MG/DL — HIGH (ref 70–115)
POTASSIUM SERPL-MCNC: 4.6 MMOL/L — SIGNIFICANT CHANGE UP (ref 3.5–5.3)
POTASSIUM SERPL-SCNC: 4.6 MMOL/L — SIGNIFICANT CHANGE UP (ref 3.5–5.3)
SODIUM SERPL-SCNC: 134 MMOL/L — LOW (ref 135–145)

## 2018-12-24 PROCEDURE — 99231 SBSQ HOSP IP/OBS SF/LOW 25: CPT | Mod: 24

## 2018-12-24 PROCEDURE — 99232 SBSQ HOSP IP/OBS MODERATE 35: CPT

## 2018-12-24 RX ORDER — VANCOMYCIN HCL 1 G
500 VIAL (EA) INTRAVENOUS EVERY 12 HOURS
Refills: 0 | Status: DISCONTINUED | OUTPATIENT
Start: 2018-12-24 | End: 2019-01-02

## 2018-12-24 RX ADMIN — Medication 250 MILLIGRAM(S): at 05:10

## 2018-12-24 RX ADMIN — SODIUM CHLORIDE 2 GRAM(S): 9 INJECTION INTRAMUSCULAR; INTRAVENOUS; SUBCUTANEOUS at 13:35

## 2018-12-24 RX ADMIN — PANTOPRAZOLE SODIUM 40 MILLIGRAM(S): 20 TABLET, DELAYED RELEASE ORAL at 05:10

## 2018-12-24 RX ADMIN — GABAPENTIN 300 MILLIGRAM(S): 400 CAPSULE ORAL at 13:35

## 2018-12-24 RX ADMIN — Medication 100 MILLIGRAM(S): at 12:12

## 2018-12-24 RX ADMIN — GABAPENTIN 300 MILLIGRAM(S): 400 CAPSULE ORAL at 05:09

## 2018-12-24 RX ADMIN — ENOXAPARIN SODIUM 40 MILLIGRAM(S): 100 INJECTION SUBCUTANEOUS at 12:13

## 2018-12-24 RX ADMIN — Medication 250 MILLIGRAM(S): at 17:31

## 2018-12-24 RX ADMIN — Medication 25 MILLIGRAM(S): at 05:09

## 2018-12-24 RX ADMIN — Medication 150 MICROGRAM(S): at 05:09

## 2018-12-24 RX ADMIN — TAMSULOSIN HYDROCHLORIDE 0.8 MILLIGRAM(S): 0.4 CAPSULE ORAL at 22:00

## 2018-12-24 RX ADMIN — SODIUM CHLORIDE 2 GRAM(S): 9 INJECTION INTRAMUSCULAR; INTRAVENOUS; SUBCUTANEOUS at 22:00

## 2018-12-24 RX ADMIN — SODIUM CHLORIDE 2 GRAM(S): 9 INJECTION INTRAMUSCULAR; INTRAVENOUS; SUBCUTANEOUS at 05:09

## 2018-12-24 RX ADMIN — Medication 1: at 08:25

## 2018-12-24 RX ADMIN — Medication 1: at 12:12

## 2018-12-24 RX ADMIN — GABAPENTIN 300 MILLIGRAM(S): 400 CAPSULE ORAL at 22:00

## 2018-12-24 RX ADMIN — Medication 100 MILLIGRAM(S): at 22:00

## 2018-12-24 RX ADMIN — ATORVASTATIN CALCIUM 40 MILLIGRAM(S): 80 TABLET, FILM COATED ORAL at 22:00

## 2018-12-24 RX ADMIN — Medication 25 MILLIGRAM(S): at 17:31

## 2018-12-24 RX ADMIN — CEFEPIME 100 MILLIGRAM(S): 1 INJECTION, POWDER, FOR SOLUTION INTRAMUSCULAR; INTRAVENOUS at 05:09

## 2018-12-24 NOTE — PROGRESS NOTE ADULT - SUBJECTIVE AND OBJECTIVE BOX
TIMUR LOUIS    [id number]    73y      Male    CC:    INTERVAL HPI/OVERNIGHT EVENTS:  73 yr old male s/p T11/T10 laminectomy, T10-L1 fusion, hypertension, diabetes mellitus, hypothyroid was sent in from [facility] for fever and back pain. He was recently discharged from [facility] after surgery. Noted to have fever 102. He was evaluated by orthopedics in ED given recent surgery and CT of thoracolumbar spine with contrast was done, which showed post surgical changes and multiple foci of air, fluid and soft tissue edema. ID was consulted and he was started on Vancomycin and Cefepime. Orthopedics advised local wound care and possible seroma drainage if no improvement. Nephrology consulted for hyponatremia. Advised fluid restriction and increased salt tabs to 2 tabs three times a day. He was noted to Hb 7.7, was given 1 unit PRBC, Hb came up to 9.5    per Ortho keep pt in hospital c/w iv abx and daily drainange, might nee to be taken back to RO in a few days.    today pt denies any sob no chest pain no n/v or abd pain    REVIEW OF SYSTEMS:    CONSTITUTIONAL: No fever, weight loss, or fatigue  RESPIRATORY: No cough, wheezing, hemoptysis; No shortness of breath  CARDIOVASCULAR: No chest pain, palpitations  GASTROINTESTINAL: No abdominal or epigastric pain. No nausea, vomiting      Vital Signs Last 24 Hrs  T(C): 36.6 (24 Dec 2018 11:00), Max: 36.9 (23 Dec 2018 16:41)  T(F): 97.9 (24 Dec 2018 11:00), Max: 98.5 (23 Dec 2018 16:41)  HR: 86 (24 Dec 2018 11:00) (86 - 88)  BP: 92/58 (24 Dec 2018 11:00) (15/- - 167/74)  BP(mean): --  RR: 18 (24 Dec 2018 11:00) (18 - 18)  SpO2: 95% (24 Dec 2018 04:08) (95% - 95%)    PHYSICAL EXAM:    GENERAL: NAD, well-groomed  HEENT: PERRL, +EOMI  CHEST/LUNG: Clear to auscultation bilaterally; No wheezing  HEART: S1S2+, Regular rate and rhythm; No murmurs  ABDOMEN: Soft, Nontender, Nondistended; Bowel sounds present  EXTREMITIES:  2+ Peripheral Pulses, No clubbing, cyanosis, or edema      LABS:    12-24    134<L>  |  97<L>  |  15.0  ----------------------------<  140<H>  4.6   |  28.0  |  1.12    Ca    8.1<L>      24 Dec 2018 08:44      MEDICATIONS  (STANDING):  atorvastatin 40 milliGRAM(s) Oral at bedtime  cefepime   IVPB 2000 milliGRAM(s) IV Intermittent <User Schedule>  dextrose 5%. 1000 milliLiter(s) (50 mL/Hr) IV Continuous <Continuous>  dextrose 50% Injectable 12.5 Gram(s) IV Push once  dextrose 50% Injectable 25 Gram(s) IV Push once  dextrose 50% Injectable 25 Gram(s) IV Push once  enoxaparin Injectable 40 milliGRAM(s) SubCutaneous daily  gabapentin 300 milliGRAM(s) Oral three times a day  insulin lispro (HumaLOG) corrective regimen sliding scale   SubCutaneous three times a day before meals  levothyroxine 150 MICROGram(s) Oral daily  metoprolol tartrate 25 milliGRAM(s) Oral two times a day  pantoprazole    Tablet 40 milliGRAM(s) Oral before breakfast  saccharomyces boulardii 250 milliGRAM(s) Oral two times a day  sodium chloride 2 Gram(s) Oral three times a day  tamsulosin 0.8 milliGRAM(s) Oral at bedtime  vancomycin  IVPB 500 milliGRAM(s) IV Intermittent every 12 hours    MEDICATIONS  (PRN):  acetaminophen   Tablet .. 650 milliGRAM(s) Oral every 6 hours PRN Temp greater or equal to 38C (100.4F), Mild Pain (1 - 3)  dextrose 40% Gel 15 Gram(s) Oral once PRN Blood Glucose LESS THAN 70 milliGRAM(s)/deciliter  glucagon  Injectable 1 milliGRAM(s) IntraMuscular once PRN Glucose LESS THAN 70 milligrams/deciliter  methocarbamol 500 milliGRAM(s) Oral every 6 hours PRN Muscle Spasm  senna 2 Tablet(s) Oral at bedtime PRN Constipation  traMADol 25 milliGRAM(s) Oral four times a day PRN mod to severe pain

## 2018-12-24 NOTE — PROGRESS NOTE ADULT - SUBJECTIVE AND OBJECTIVE BOX
ORTHO-SPINE POST-OP PROGRESS NOTE:      [id number]    TIMUR LOUIS      PROCEDURE: status post thoracolumbar fusion 11/28/18.    DOS: 11/28/18       SUBJECTIVE: History: 73y Male is status post thoracolumbar fusion 11/28/18 with persistent wound drainage and readmitted on 12/12/2018 for fever. Patient is doing well and is comfortable.  Patient reports of mild discomfort with motion that is controlled by pain medications. Patient denies of acute sensory or motor changes.                             9.0    5.2   )-----------( 393      ( 22 Dec 2018 07:59 )             28.3         I&O's Detail    23 Dec 2018 07:01  -  24 Dec 2018 07:00  --------------------------------------------------------  IN:    Oral Fluid: 320 mL    Solution: 250 mL    Solution: 50 mL  Total IN: 620 mL    OUT:    Incontinent per Condom Catheter: 1300 mL  Total OUT: 1300 mL    Total NET: -680 mL            acetaminophen   Tablet .. 650 milliGRAM(s) Oral every 6 hours PRN  atorvastatin 40 milliGRAM(s) Oral at bedtime  cefepime   IVPB 2000 milliGRAM(s) IV Intermittent <User Schedule>  dextrose 40% Gel 15 Gram(s) Oral once PRN  dextrose 5%. 1000 milliLiter(s) IV Continuous <Continuous>  dextrose 50% Injectable 12.5 Gram(s) IV Push once  dextrose 50% Injectable 25 Gram(s) IV Push once  dextrose 50% Injectable 25 Gram(s) IV Push once  enoxaparin Injectable 40 milliGRAM(s) SubCutaneous daily  gabapentin 300 milliGRAM(s) Oral three times a day  glucagon  Injectable 1 milliGRAM(s) IntraMuscular once PRN  insulin lispro (HumaLOG) corrective regimen sliding scale   SubCutaneous three times a day before meals  levothyroxine 150 MICROGram(s) Oral daily  methocarbamol 500 milliGRAM(s) Oral every 6 hours PRN  metoprolol tartrate 25 milliGRAM(s) Oral two times a day  pantoprazole    Tablet 40 milliGRAM(s) Oral before breakfast  saccharomyces boulardii 250 milliGRAM(s) Oral two times a day  senna 2 Tablet(s) Oral at bedtime PRN  sodium chloride 2 Gram(s) Oral three times a day  tamsulosin 0.8 milliGRAM(s) Oral at bedtime  traMADol 25 milliGRAM(s) Oral four times a day PRN  vancomycin  IVPB 750 milliGRAM(s) IV Intermittent every 12 hours        T(C): 36.8 (12-24-18 @ 04:08), Max: 36.9 (12-23-18 @ 16:41)  HR: 88 (12-24-18 @ 04:08) (88 - 92)  BP: 15/- (12-24-18 @ 04:08) (15/- - 167/74)  RR: 18 (12-24-18 @ 04:08) (18 - 20)  SpO2: 95% (12-24-18 @ 04:08) (95% - 95%)  Wt(kg): --      PHYSICAL EXAM:     Constitutional: Alert, responsive, in no acute distress.     Thoracolumbar region: Gauze dressing in place which is saturated with clear discharge. + active clear drainage from proximal wound incision; no expressible drainage from incision site at this time. No erythema no purulent drainage.     MS:  The calf is supple nontender bilaterally.  No foot drop. 2+ dorsalis pedis pulse. Capillary refill is less than 2 seconds.     SENSATION Decreased sensation along the right lateral/medial calf, otherwise sensation grossly intact.         Lower extremities  -  5/5 plantar/dorsiflexion bilateral, 4/5 knee flexion bilateral, 3+/5 hip flexion bilateral    A/P :  69y Male S/P status post thoracolumbar fusion 11/28/18 with persistent wound drainage     -  Pain control  -  DVT ppx: [x]SCDs   [x] Pharmacolgic    -  PT and out of bed today  -  Dispo: [facility] when medical stable  -  Incision cleaned with Betadine and gauze.  Dressing removed and replaced with gauze and island dressings.  - continue IV ABX per medicine ORTHO-SPINE POST-OP PROGRESS NOTE:      [id number]    TIMUR LOUIS      PROCEDURE: status post thoracolumbar fusion 11/28/18.    DOS: 11/28/18       SUBJECTIVE: History: 73y Male is status post thoracolumbar fusion 11/28/18 with persistent wound drainage and readmitted on 12/12/2018 for fever. Patient is doing well and is comfortable.  Patient reports of mild discomfort with motion that is controlled by pain medications. Patient denies of acute sensory or motor changes.                             9.0    5.2   )-----------( 393      ( 22 Dec 2018 07:59 )             28.3         I&O's Detail    23 Dec 2018 07:01  -  24 Dec 2018 07:00  --------------------------------------------------------  IN:    Oral Fluid: 320 mL    Solution: 250 mL    Solution: 50 mL  Total IN: 620 mL    OUT:    Incontinent per Condom Catheter: 1300 mL  Total OUT: 1300 mL    Total NET: -680 mL            acetaminophen   Tablet .. 650 milliGRAM(s) Oral every 6 hours PRN  atorvastatin 40 milliGRAM(s) Oral at bedtime  cefepime   IVPB 2000 milliGRAM(s) IV Intermittent <User Schedule>  dextrose 40% Gel 15 Gram(s) Oral once PRN  dextrose 5%. 1000 milliLiter(s) IV Continuous <Continuous>  dextrose 50% Injectable 12.5 Gram(s) IV Push once  dextrose 50% Injectable 25 Gram(s) IV Push once  dextrose 50% Injectable 25 Gram(s) IV Push once  enoxaparin Injectable 40 milliGRAM(s) SubCutaneous daily  gabapentin 300 milliGRAM(s) Oral three times a day  glucagon  Injectable 1 milliGRAM(s) IntraMuscular once PRN  insulin lispro (HumaLOG) corrective regimen sliding scale   SubCutaneous three times a day before meals  levothyroxine 150 MICROGram(s) Oral daily  methocarbamol 500 milliGRAM(s) Oral every 6 hours PRN  metoprolol tartrate 25 milliGRAM(s) Oral two times a day  pantoprazole    Tablet 40 milliGRAM(s) Oral before breakfast  saccharomyces boulardii 250 milliGRAM(s) Oral two times a day  senna 2 Tablet(s) Oral at bedtime PRN  sodium chloride 2 Gram(s) Oral three times a day  tamsulosin 0.8 milliGRAM(s) Oral at bedtime  traMADol 25 milliGRAM(s) Oral four times a day PRN  vancomycin  IVPB 750 milliGRAM(s) IV Intermittent every 12 hours        T(C): 36.8 (12-24-18 @ 04:08), Max: 36.9 (12-23-18 @ 16:41)  HR: 88 (12-24-18 @ 04:08) (88 - 92)  BP: 15/- (12-24-18 @ 04:08) (15/- - 167/74)  RR: 18 (12-24-18 @ 04:08) (18 - 20)  SpO2: 95% (12-24-18 @ 04:08) (95% - 95%)  Wt(kg): --      PHYSICAL EXAM:     Constitutional: Alert, responsive, in no acute distress.     Thoracolumbar region: Gauze dressing in place which is saturated with S-S discharge. + active S-S drainage from proximal wound incision; no expressible drainage from incision site at this time. No erythema no purulent drainage.     MS:  The calf is supple nontender bilaterally.  No foot drop. 2+ dorsalis pedis pulse. Capillary refill is less than 2 seconds.     SENSATION Decreased sensation along the right lateral/medial calf, otherwise sensation grossly intact.         Lower extremities  -  5/5 plantar/dorsiflexion bilateral, 4/5 knee flexion bilateral, 3+/5 hip flexion bilateral    A/P :  69y Male S/P status post thoracolumbar fusion 11/28/18 with persistent wound drainage     -  Pain control  -  DVT ppx: [x]SCDs   [x] Pharmacolgic    -  PT and out of bed today  -  Dispo: [facility] when medical stable  -  Incision cleaned with Betadine and gauze.  Dressing removed and replaced with gauze and island dressings.  - continue IV ABX per medicine

## 2018-12-24 NOTE — PROGRESS NOTE ADULT - ASSESSMENT
73 yr old male s/p T11/T10 laminectomy, T10-L1 fusion, hypertension, diabetes mellitus, hypothyroid was sent in from [facility] for fever and back pain. He was recently discharged from [facility] after surgery. Noted to have fever 102. He was evaluated by orthopedics in ED given recent surgery and CT of thoracolumbar spine with contrast was done, which showed post surgical changes and multiple foci of air, fluid and soft tissue edema. ID was consulted and he was started on Vancomycin and Cefepime. Orthopedics advised local wound care and possible seroma drainage if no improvement. Nephrology consulted for hyponatremia. Advised fluid restriction and increased salt tabs to 2 tabs three times a day. He was noted to Hb 7.7, was given 1 unit PRBC, Hb came up to 9.5    per Ortho keep pt in hospital c/w iv abx and daily drainange, might nee to be taken back to RO in a few days.    1) Post op seroma from thoracolumbar fusion:  possibly infected as no other sources of infection.   - c/w IV abx   - wound care per orthopedics  - ortho wants pt to complete abx in house to monitor    2) SIADH: hyponatremia  - NaCl tabs    3) Mild CLARI: resolved    4) HTN  - on lopressor     5) Anemia  - stable post transfusion  - monitor    6) DM2  - CSI and finger stick monitoring     7) Hypothyroid  - c/w synthroid    8) Prophylactic measure  - DVT ppx: Lovenox SQ

## 2018-12-24 NOTE — PHARMACOTHERAPY INTERVENTION NOTE - COMMENTS
Vanco dose decreased to 500mg every 12 hours. Trough last night was drawn 2 hours late and resulted at 19.2. Would have been higher if drawn on time at 9pm.    Levels drawn:  12/23 @ 23:17 - 19.2    Doses given:  12/22 @ 10:05, 22:03  12/23 @ 11:48, 23:52

## 2018-12-24 NOTE — PROGRESS NOTE ADULT - ATTENDING COMMENTS
Patient was examined on 1224 at approximately 9 AM there is persistent expressible serosanguineous drainage. Which is expected given his revision thoracolumbar laminectomy as well as a nonrepairable CSF leak. Again my justification for maintaining this gentleman in the hospital as I do not think subacute rehabilitation is capable of daily dressing changes and monitoring this gentleman. I would recommend operative management in a few days if this continues in order for dead space management. Patient remains afebrile this point in time has no elevated white blood cell count and then a repeat an ESR and CRP to make sure that we stranding in the right direction. I'm going to continue with dry dressing changes conservative management at this point in time I discussed with nursing staff as well as [facility] to sternum and needs to be mobilized aggressively out of chair PT OT for mE strength training mobilization etc. Patient was examined on 1224 at approximately 9 AM there is persistent expressible serosanguineous drainage. Which is expected given his revision thoracolumbar laminectomy as well as a nonrepairable CSF leak. Again my justification for maintaining this gentleman in the hospital as I do not think subacute rehabilitation is capable of daily dressing changes and monitoring this gentleman. I would recommend operative management in a few days if this continues in order for dead space management. Patient remains afebrile this point in time has no elevated white blood cell count and then a repeat an ESR and CRP to make sure that we stranding in the right direction. I'm going to continue with dry dressing changes conservative management at this point in time I discussed with nursing staff as well as [facility] to sternum and needs to be mobilized aggressively out of chair PT OT for mE strength training mobilization etc. Pt aware I will be on Vu for x mas and new years 2018 and will be followed by PA staff and I will be contacted daily.

## 2018-12-25 LAB
ANION GAP SERPL CALC-SCNC: 9 MMOL/L — SIGNIFICANT CHANGE UP (ref 5–17)
BUN SERPL-MCNC: 16 MG/DL — SIGNIFICANT CHANGE UP (ref 8–20)
CALCIUM SERPL-MCNC: 8.3 MG/DL — LOW (ref 8.6–10.2)
CHLORIDE SERPL-SCNC: 98 MMOL/L — SIGNIFICANT CHANGE UP (ref 98–107)
CO2 SERPL-SCNC: 28 MMOL/L — SIGNIFICANT CHANGE UP (ref 22–29)
CREAT SERPL-MCNC: 1.16 MG/DL — SIGNIFICANT CHANGE UP (ref 0.5–1.3)
GLUCOSE BLDC GLUCOMTR-MCNC: 132 MG/DL — HIGH (ref 70–99)
GLUCOSE BLDC GLUCOMTR-MCNC: 180 MG/DL — HIGH (ref 70–99)
GLUCOSE BLDC GLUCOMTR-MCNC: 186 MG/DL — HIGH (ref 70–99)
GLUCOSE SERPL-MCNC: 126 MG/DL — HIGH (ref 70–115)
HCT VFR BLD CALC: 29 % — LOW (ref 42–52)
HGB BLD-MCNC: 9.3 G/DL — LOW (ref 14–18)
MAGNESIUM SERPL-MCNC: 1.6 MG/DL — SIGNIFICANT CHANGE UP (ref 1.6–2.6)
MCHC RBC-ENTMCNC: 27.3 PG — SIGNIFICANT CHANGE UP (ref 27–31)
MCHC RBC-ENTMCNC: 32.1 G/DL — SIGNIFICANT CHANGE UP (ref 32–36)
MCV RBC AUTO: 85 FL — SIGNIFICANT CHANGE UP (ref 80–94)
PLATELET # BLD AUTO: 338 K/UL — SIGNIFICANT CHANGE UP (ref 150–400)
POTASSIUM SERPL-MCNC: 4.4 MMOL/L — SIGNIFICANT CHANGE UP (ref 3.5–5.3)
POTASSIUM SERPL-SCNC: 4.4 MMOL/L — SIGNIFICANT CHANGE UP (ref 3.5–5.3)
RBC # BLD: 3.41 M/UL — LOW (ref 4.6–6.2)
RBC # FLD: 13.8 % — SIGNIFICANT CHANGE UP (ref 11–15.6)
SODIUM SERPL-SCNC: 135 MMOL/L — SIGNIFICANT CHANGE UP (ref 135–145)
VANCOMYCIN TROUGH SERPL-MCNC: 18.5 UG/ML — SIGNIFICANT CHANGE UP (ref 10–20)
WBC # BLD: 4.1 K/UL — LOW (ref 4.8–10.8)
WBC # FLD AUTO: 4.1 K/UL — LOW (ref 4.8–10.8)

## 2018-12-25 PROCEDURE — 99233 SBSQ HOSP IP/OBS HIGH 50: CPT

## 2018-12-25 PROCEDURE — 99231 SBSQ HOSP IP/OBS SF/LOW 25: CPT | Mod: 24

## 2018-12-25 RX ADMIN — Medication 250 MILLIGRAM(S): at 05:37

## 2018-12-25 RX ADMIN — Medication 1: at 17:39

## 2018-12-25 RX ADMIN — PANTOPRAZOLE SODIUM 40 MILLIGRAM(S): 20 TABLET, DELAYED RELEASE ORAL at 05:37

## 2018-12-25 RX ADMIN — GABAPENTIN 300 MILLIGRAM(S): 400 CAPSULE ORAL at 05:37

## 2018-12-25 RX ADMIN — Medication 100 MILLIGRAM(S): at 09:25

## 2018-12-25 RX ADMIN — SODIUM CHLORIDE 2 GRAM(S): 9 INJECTION INTRAMUSCULAR; INTRAVENOUS; SUBCUTANEOUS at 05:37

## 2018-12-25 RX ADMIN — ENOXAPARIN SODIUM 40 MILLIGRAM(S): 100 INJECTION SUBCUTANEOUS at 11:36

## 2018-12-25 RX ADMIN — Medication 250 MILLIGRAM(S): at 17:39

## 2018-12-25 RX ADMIN — Medication 1: at 11:36

## 2018-12-25 RX ADMIN — Medication 25 MILLIGRAM(S): at 17:39

## 2018-12-25 RX ADMIN — CEFEPIME 100 MILLIGRAM(S): 1 INJECTION, POWDER, FOR SOLUTION INTRAMUSCULAR; INTRAVENOUS at 05:37

## 2018-12-25 RX ADMIN — Medication 25 MILLIGRAM(S): at 05:37

## 2018-12-25 RX ADMIN — Medication 150 MICROGRAM(S): at 05:37

## 2018-12-25 RX ADMIN — GABAPENTIN 300 MILLIGRAM(S): 400 CAPSULE ORAL at 15:20

## 2018-12-25 NOTE — PROGRESS NOTE ADULT - ASSESSMENT
74 y/o male with PMHx of HTN, BPH, DM, Hypothyroid, and OA was sent to the ED from [facility] due to recurrent fevers (T-max 102) and back pain of 1 day duration. Patient was recently discharge from [facility] for thoracic laminectomy and fusion.  Patient said he has been doing well since dc until yesterday when he started having recurrent fevers and back pain. Patient has no   trauma/fall, neck pain, HA, numbness, tingling, bowel/urinary incontinence, chills, chest pain, shortness of breath, palpitation, cough, nausea/vomiting, abdominal pain. (   IMAGING WITH  WHAT APPEARS TO BE POST OP SEROMA  BLOOD CX X2neg so far   PT NON TOXIC BUT   UNFORTUNATELY PT WITH HARDWARE IN PLACE AND placed on  EMPRIC ABX      PT NON TOXIC  ON ZOSYN VANCO   WILL D/W ORTHO

## 2018-12-25 NOTE — PROGRESS NOTE ADULT - ASSESSMENT
73 yr old male s/p T11/T10 laminectomy, T10-L1 fusion, hypertension, diabetes mellitus, hypothyroid was sent in from [facility] for fever and back pain. He was recently discharged from [facility] after surgery. Noted to have fever 102. He was evaluated by orthopedics in ED given recent surgery and CT of thoracolumbar spine with contrast was done, which showed post surgical changes and multiple foci of air, fluid and soft tissue edema. ID was consulted and he was started on Vancomycin and Cefepime. Orthopedics advised local wound care and possible seroma drainage if no improvement. Nephrology consulted for hyponatremia. Advised fluid restriction and increased salt tabs to 2 tabs three times a day. He was noted to Hb 7.7, was given 1 unit PRBC, Hb came up to 9.5    per Ortho keep pt in hospital c/w iv abx and daily drainange, might nee to be taken back to RO in a few days.    1) Post op seroma from thoracolumbar fusion:  possibly infected as no other sources of infection.   - c/w IV abx   - wound care per orthopedics  - ortho wants pt to complete abx in house to monitor    2) SIADH: hyponatremia  - NaCl tabs    3) Mild CLARI: resolved    4) HTN  - on lopressor     5) Anemia  - stable post transfusion  - monitor    6) DM2  - CSI and finger stick monitoring     7) Hypothyroid  - c/w synthroid    8) Prophylactic measure  - DVT ppx: Lovenox SQ 73 yr old male s/p T11/T10 laminectomy, T10-L1 fusion, hypertension, diabetes mellitus, hypothyroid was sent in from [facility] for fever and back pain. He was recently discharged from [facility] after surgery. Noted to have fever 102. He was evaluated by orthopedics in ED given recent surgery and CT of thoracolumbar spine with contrast was done, which showed post surgical changes and multiple foci of air, fluid and soft tissue edema. ID was consulted and he was started on Vancomycin and Cefepime. Orthopedics advised local wound care and possible seroma drainage if no improvement. Nephrology consulted for hyponatremia. Advised fluid restriction and increased salt tabs to 2 tabs three times a day. He was noted to Hb 7.7, was given 1 unit PRBC, Hb came up to 9.5    per Ortho keep pt in hospital c/w iv abx and daily drainange, might nee to be taken back to RO in a few days.    1) Post op seroma from thoracolumbar fusion:  possibly infected as no other sources of infection.   - c/w IV abx; appreciate ID follow up  - wound care per orthopedics as noted above  - ortho wants pt to complete abx in house to monitor    2) SIADH: hyponatremia - resolved  - NaCl tabs    3) Mild CLARI: resolved    4) HTN - controlled  - on lopressor     5) Anemia  - stable post transfusion  - monitor    6) DM2 - controlled (FS 140s-180s)  - CSI and finger stick monitoring     7) Hypothyroid  - c/w synthroid    8) Prophylactic measure  - DVT ppx: Lovenox SQ

## 2018-12-25 NOTE — PROGRESS NOTE ADULT - SUBJECTIVE AND OBJECTIVE BOX
[facility]  INFECTIOUS DISEASES AND INTERNAL MEDICINE at [place]  =======================================================  Wellington Collier MD  Diplomates American Board of Internal Medicine and Infectious Diseases  =======================================================    TIMUR LOUIS [id number]    Follow up: back pain SEROMA  PT AFEBRILE non toxic    Allergies:  No Known Allergies      Medications:  acetaminophen   Tablet .. 650 milliGRAM(s) Oral every 6 hours PRN  atorvastatin 40 milliGRAM(s) Oral at bedtime  cefepime   IVPB 2000 milliGRAM(s) IV Intermittent every 12 hours  dextrose 40% Gel 15 Gram(s) Oral once PRN  dextrose 5%. 1000 milliLiter(s) IV Continuous <Continuous>  dextrose 50% Injectable 12.5 Gram(s) IV Push once  dextrose 50% Injectable 25 Gram(s) IV Push once  dextrose 50% Injectable 25 Gram(s) IV Push once  docusate sodium 100 milliGRAM(s) Oral three times a day  enoxaparin Injectable 40 milliGRAM(s) SubCutaneous daily  gabapentin 300 milliGRAM(s) Oral three times a day  glucagon  Injectable 1 milliGRAM(s) IntraMuscular once PRN  insulin lispro (HumaLOG) corrective regimen sliding scale   SubCutaneous three times a day before meals  levothyroxine 150 MICROGram(s) Oral daily  methocarbamol 500 milliGRAM(s) Oral every 6 hours PRN  metoprolol succinate ER 25 milliGRAM(s) Oral daily  oxyCODONE    IR 10 milliGRAM(s) Oral every 4 hours PRN  senna 2 Tablet(s) Oral at bedtime  sodium chloride 1 Gram(s) Oral three times a day  tamsulosin 0.8 milliGRAM(s) Oral at bedtime  vancomycin  IVPB 1000 milliGRAM(s) IV Intermittent every 8 hours    SOCIAL       FAMILY   FAMILY HISTORY:  Family history of diabetes mellitus (Father)    REVIEW OF SYSTEMS:  CONSTITUTIONAL:  No Fever or chills  HEENT:   No diplopia or blurred vision.  No earache, sore throat or runny nose.  CARDIOVASCULAR:  No pressure, squeezing, strangling, tightness, heaviness or aching about the chest, neck, axilla or epigastrium.  RESPIRATORY:  No cough, shortness of breath, PND or orthopnea.  GASTROINTESTINAL:  No nausea, vomiting or diarrhea.  GENITOURINARY:  No dysuria, frequency or urgency. No Blood in urine  MUSCULOSKELETAL:   AS PER HPI  SKIN:  No change in skin, hair or nails.  NEUROLOGIC:  No paresthesias, fasciculations, seizures or weakness.  PSYCHIATRIC:  No disorder of thought or mood.  ENDOCRINE:  No heat or cold intolerance, polyuria or polydipsia.  HEMATOLOGICAL:  No easy bruising or bleeding.            Physical Exam:                     Vital Signs Last 24 Hrs  T(C): 36.8 (25 Dec 2018 11:09), Max: 37.1 (24 Dec 2018 21:59)  T(F): 98.3 (25 Dec 2018 11:09), Max: 98.8 (24 Dec 2018 21:59)  HR: 95 (25 Dec 2018 11:09) (89 - 95)  BP: 146/72 (25 Dec 2018 11:09) (146/72 - 158/69)  BP(mean): --  RR: 18 (25 Dec 2018 11:09) (18 - 20)  SpO2: 99% (25 Dec 2018 11:09) (95% - 99%)    GEN: NAD, pleasant  HEENT: normocephalic and atraumatic. EOMI. MANUEL.    NECK: Supple. No carotid bruits.  No lymphadenopathy or thyromegaly.  LUNGS: Clear to auscultation.  HEART: Regular rate and rhythm without murmur.  ABDOMEN: Soft, nontender, and nondistended.  Positive bowel sounds.    : No CVA tenderness  EXTREMITIES: Without any cyanosis, clubbing, rash, lesions or edema.  MSK: DRESSING PLACE  NEUROLOGIC: Cranial nerves II through XII are grossly intact.  PSYCHIATRIC: Appropriate affect .  SKIN: No ulceration or induration present.        Labs:                                                          9.3    4.1   )-----------( 338      ( 25 Dec 2018 06:34 )             29.0   12-25    135  |  98  |  16.0  ----------------------------<  126<H>  4.4   |  28.0  |  1.16    Ca    8.3<L>      25 Dec 2018 06:34  Mg     1.6     12-25 12-04 @ 15:14 .Urine     Culture grew 3 or more types of organisms which indicate  collection contamination; consider recollection only if clinically  indicated.  Results called to tata jacobo        12-02 @ 19:56 .Urine Klebsiella pneumoniae  Morganella morganii    10,000 - 49,000 CFU/mL Morganella morganii  10,000 - 49,000 CFU/mL Klebsiella pneumoniae        12-02 @ 10:35 .Blood     No growth at 5 days.

## 2018-12-25 NOTE — PROGRESS NOTE ADULT - SUBJECTIVE AND OBJECTIVE BOX
CC:   HPI:  72 y/o male with PMHx of HTN, BPH, DM, Hypothyroid, and OA was sent to the ED from [facility] due to recurrent fevers (T-max 102) and back pain of 1 day duration. Patient was recently discharge from [facility] for thoracic laminectomy and fusion.  Patient said he has been doing well since dc until yesterday when he started having recurrent fevers and back pain. Patient has no   trauma/fall, neck pain, HA, numbness, tingling, bowel/urinary incontinence, chills, chest pain, shortness of breath, palpitation, cough, nausea/vomiting, abdominal pain. (12 Dec 2018 05:20)    REVIEW OF SYSTEMS:    Patient denied fever, chills, abdominal pain, nausea, vomiting, cough, shortness of breath, chest pain or palpitations    Vital Signs Last 24 Hrs  T(C): 36.8 (25 Dec 2018 11:09), Max: 37.1 (24 Dec 2018 21:59)  T(F): 98.3 (25 Dec 2018 11:09), Max: 98.8 (24 Dec 2018 21:59)  HR: 95 (25 Dec 2018 11:09) (89 - 95)  BP: 146/72 (25 Dec 2018 11:09) (146/72 - 158/69)  BP(mean): --  RR: 18 (25 Dec 2018 11:09) (18 - 20)  SpO2: 99% (25 Dec 2018 11:09) (95% - 99%)I&O's Summary    24 Dec 2018 07:01  -  25 Dec 2018 07:00  --------------------------------------------------------  IN: 340 mL / OUT: 1375 mL / NET: -1035 mL    CAPILLARY BLOOD GLUCOSE    PHYSICAL EXAM:  GENERAL: NAD, well-groomed  HEENT: PERRL, +EOMI, anicteric, no [place]  NECK: Supple, No JVD   CHEST/LUNG: CTA bilaterally; Normal effort  HEART: S1S2 Normal intensity, no murmurs, gallops or rubs noted  ABDOMEN: Soft, BS Normoactive, NT, ND, no HSM noted  EXTREMITIES:  2+ radial and DP pulses noted, no clubbing, cyanosis, or edema noted, FROM x 4  SKIN: No rashes or lesions noted  NEURO: A&Ox3, no focal deficits noted, CN II-XII intact  PSYCH: normal mood and affect; insight/judgement appropriate  LABS:                        9.3    4.1   )-----------( 338      ( 25 Dec 2018 06:34 )             29.0     12-25    135  |  98  |  16.0  ----------------------------<  126<H>  4.4   |  28.0  |  1.16    Ca    8.3<L>      25 Dec 2018 06:34  Mg     1.6     12-25          RADIOLOGY & ADDITIONAL TESTS:    MEDICATIONS:  MEDICATIONS  (STANDING):  atorvastatin 40 milliGRAM(s) Oral at bedtime  cefepime   IVPB 2000 milliGRAM(s) IV Intermittent <User Schedule>  dextrose 5%. 1000 milliLiter(s) (50 mL/Hr) IV Continuous <Continuous>  dextrose 50% Injectable 12.5 Gram(s) IV Push once  dextrose 50% Injectable 25 Gram(s) IV Push once  dextrose 50% Injectable 25 Gram(s) IV Push once  enoxaparin Injectable 40 milliGRAM(s) SubCutaneous daily  gabapentin 300 milliGRAM(s) Oral three times a day  insulin lispro (HumaLOG) corrective regimen sliding scale   SubCutaneous three times a day before meals  levothyroxine 150 MICROGram(s) Oral daily  metoprolol tartrate 25 milliGRAM(s) Oral two times a day  pantoprazole    Tablet 40 milliGRAM(s) Oral before breakfast  saccharomyces boulardii 250 milliGRAM(s) Oral two times a day  sodium chloride 2 Gram(s) Oral three times a day  tamsulosin 0.8 milliGRAM(s) Oral at bedtime  vancomycin  IVPB 500 milliGRAM(s) IV Intermittent every 12 hours    MEDICATIONS  (PRN):  acetaminophen   Tablet .. 650 milliGRAM(s) Oral every 6 hours PRN Temp greater or equal to 38C (100.4F), Mild Pain (1 - 3)  dextrose 40% Gel 15 Gram(s) Oral once PRN Blood Glucose LESS THAN 70 milliGRAM(s)/deciliter  glucagon  Injectable 1 milliGRAM(s) IntraMuscular once PRN Glucose LESS THAN 70 milligrams/deciliter  methocarbamol 500 milliGRAM(s) Oral every 6 hours PRN Muscle Spasm  senna 2 Tablet(s) Oral at bedtime PRN Constipation  traMADol 25 milliGRAM(s) Oral four times a day PRN mod to severe pain CC: Patient was c/o his back being wet from drainage from his surgical wound    Initial HPI:  74 y/o male with PMHx of HTN, BPH, DM, Hypothyroid, and OA was sent to the ED from [facility] due to recurrent fevers (T-max 102) and back pain of 1 day duration. Patient was recently discharge from [facility] for thoracic laminectomy and fusion.  Patient said he has been doing well since dc until yesterday when he started having recurrent fevers and back pain. Patient has no   trauma/fall, neck pain, HA, numbness, tingling, bowel/urinary incontinence, chills, chest pain, shortness of breath, palpitation, cough, nausea/vomiting, abdominal pain. (12 Dec 2018 05:20)    REVIEW OF SYSTEMS:    Patient denied fever, chills, abdominal pain, nausea, vomiting, cough, shortness of breath, chest pain or palpitations    Vital Signs Last 24 Hrs  T(C): 36.8 (25 Dec 2018 11:09), Max: 37.1 (24 Dec 2018 21:59)  T(F): 98.3 (25 Dec 2018 11:09), Max: 98.8 (24 Dec 2018 21:59)  HR: 95 (25 Dec 2018 11:09) (89 - 95)  BP: 146/72 (25 Dec 2018 11:09) (146/72 - 158/69)  BP(mean): --  RR: 18 (25 Dec 2018 11:09) (18 - 20)  SpO2: 99% (25 Dec 2018 11:09) (95% - 99%)I&O's Summary    24 Dec 2018 07:01  -  25 Dec 2018 07:00  --------------------------------------------------------  IN: 340 mL / OUT: 1375 mL / NET: -1035 mL    CAPILLARY BLOOD GLUCOSE    PHYSICAL EXAM:  GENERAL: NAD  HEENT: +EOMI, anicteric, no [place]  NECK: Supple, No JVD   CHEST/LUNG: CTA bilaterally; Normal effort; decreased air entry bilaterally  HEART: S1S2 decreased intensity, no murmurs, gallops or rubs noted  ABDOMEN: Soft, BS Normoactive, NT, ND, no HSM noted  EXTREMITIES:  1+ radial and DP pulses noted, no clubbing, cyanosis, or edema noted; limited ROM bilateral lower exts  SKIN: No rashes or lesions noted; unable to examine patient's back today  NEURO: A&Ox3, CN II-XII intact  PSYCH: normal mood and affect; insight/judgement appropriate    LABS:                        9.3    4.1   )-----------( 338      ( 25 Dec 2018 06:34 )             29.0     12-25    135  |  98  |  16.0  ----------------------------<  126<H>  4.4   |  28.0  |  1.16    Ca    8.3<L>      25 Dec 2018 06:34  Mg     1.6     12-25          RADIOLOGY & ADDITIONAL TESTS:    MEDICATIONS:  MEDICATIONS  (STANDING):  atorvastatin 40 milliGRAM(s) Oral at bedtime  cefepime   IVPB 2000 milliGRAM(s) IV Intermittent <User Schedule>  dextrose 5%. 1000 milliLiter(s) (50 mL/Hr) IV Continuous <Continuous>  dextrose 50% Injectable 12.5 Gram(s) IV Push once  dextrose 50% Injectable 25 Gram(s) IV Push once  dextrose 50% Injectable 25 Gram(s) IV Push once  enoxaparin Injectable 40 milliGRAM(s) SubCutaneous daily  gabapentin 300 milliGRAM(s) Oral three times a day  insulin lispro (HumaLOG) corrective regimen sliding scale   SubCutaneous three times a day before meals  levothyroxine 150 MICROGram(s) Oral daily  metoprolol tartrate 25 milliGRAM(s) Oral two times a day  pantoprazole    Tablet 40 milliGRAM(s) Oral before breakfast  saccharomyces boulardii 250 milliGRAM(s) Oral two times a day  sodium chloride 2 Gram(s) Oral three times a day  tamsulosin 0.8 milliGRAM(s) Oral at bedtime  vancomycin  IVPB 500 milliGRAM(s) IV Intermittent every 12 hours    MEDICATIONS  (PRN):  acetaminophen   Tablet .. 650 milliGRAM(s) Oral every 6 hours PRN Temp greater or equal to 38C (100.4F), Mild Pain (1 - 3)  dextrose 40% Gel 15 Gram(s) Oral once PRN Blood Glucose LESS THAN 70 milliGRAM(s)/deciliter  glucagon  Injectable 1 milliGRAM(s) IntraMuscular once PRN Glucose LESS THAN 70 milligrams/deciliter  methocarbamol 500 milliGRAM(s) Oral every 6 hours PRN Muscle Spasm  senna 2 Tablet(s) Oral at bedtime PRN Constipation  traMADol 25 milliGRAM(s) Oral four times a day PRN mod to severe pain

## 2018-12-26 LAB
ANION GAP SERPL CALC-SCNC: 11 MMOL/L — SIGNIFICANT CHANGE UP (ref 5–17)
BUN SERPL-MCNC: 19 MG/DL — SIGNIFICANT CHANGE UP (ref 8–20)
CALCIUM SERPL-MCNC: 8.1 MG/DL — LOW (ref 8.6–10.2)
CHLORIDE SERPL-SCNC: 92 MMOL/L — LOW (ref 98–107)
CO2 SERPL-SCNC: 28 MMOL/L — SIGNIFICANT CHANGE UP (ref 22–29)
CREAT SERPL-MCNC: 1.36 MG/DL — HIGH (ref 0.5–1.3)
GLUCOSE BLDC GLUCOMTR-MCNC: 130 MG/DL — HIGH (ref 70–99)
GLUCOSE BLDC GLUCOMTR-MCNC: 178 MG/DL — HIGH (ref 70–99)
GLUCOSE BLDC GLUCOMTR-MCNC: 187 MG/DL — HIGH (ref 70–99)
GLUCOSE SERPL-MCNC: 189 MG/DL — HIGH (ref 70–115)
HCT VFR BLD CALC: 29.9 % — LOW (ref 42–52)
HGB BLD-MCNC: 9.6 G/DL — LOW (ref 14–18)
MAGNESIUM SERPL-MCNC: 1.6 MG/DL — LOW (ref 1.8–2.6)
MCHC RBC-ENTMCNC: 27.3 PG — SIGNIFICANT CHANGE UP (ref 27–31)
MCHC RBC-ENTMCNC: 32.1 G/DL — SIGNIFICANT CHANGE UP (ref 32–36)
MCV RBC AUTO: 84.9 FL — SIGNIFICANT CHANGE UP (ref 80–94)
PLATELET # BLD AUTO: 386 K/UL — SIGNIFICANT CHANGE UP (ref 150–400)
POTASSIUM SERPL-MCNC: 4.6 MMOL/L — SIGNIFICANT CHANGE UP (ref 3.5–5.3)
POTASSIUM SERPL-SCNC: 4.6 MMOL/L — SIGNIFICANT CHANGE UP (ref 3.5–5.3)
RBC # BLD: 3.52 M/UL — LOW (ref 4.6–6.2)
RBC # FLD: 13.8 % — SIGNIFICANT CHANGE UP (ref 11–15.6)
SODIUM SERPL-SCNC: 131 MMOL/L — LOW (ref 135–145)
WBC # BLD: 4.6 K/UL — LOW (ref 4.8–10.8)
WBC # FLD AUTO: 4.6 K/UL — LOW (ref 4.8–10.8)

## 2018-12-26 PROCEDURE — 99232 SBSQ HOSP IP/OBS MODERATE 35: CPT

## 2018-12-26 PROCEDURE — 99231 SBSQ HOSP IP/OBS SF/LOW 25: CPT | Mod: 24

## 2018-12-26 RX ORDER — SODIUM CHLORIDE 9 MG/ML
500 INJECTION INTRAMUSCULAR; INTRAVENOUS; SUBCUTANEOUS ONCE
Refills: 0 | Status: COMPLETED | OUTPATIENT
Start: 2018-12-26 | End: 2018-12-26

## 2018-12-26 RX ADMIN — Medication 100 MILLIGRAM(S): at 11:06

## 2018-12-26 RX ADMIN — GABAPENTIN 300 MILLIGRAM(S): 400 CAPSULE ORAL at 05:35

## 2018-12-26 RX ADMIN — SODIUM CHLORIDE 1000 MILLILITER(S): 9 INJECTION INTRAMUSCULAR; INTRAVENOUS; SUBCUTANEOUS at 12:34

## 2018-12-26 RX ADMIN — Medication 100 MILLIGRAM(S): at 00:09

## 2018-12-26 RX ADMIN — GABAPENTIN 300 MILLIGRAM(S): 400 CAPSULE ORAL at 00:09

## 2018-12-26 RX ADMIN — CEFEPIME 100 MILLIGRAM(S): 1 INJECTION, POWDER, FOR SOLUTION INTRAMUSCULAR; INTRAVENOUS at 05:44

## 2018-12-26 RX ADMIN — GABAPENTIN 300 MILLIGRAM(S): 400 CAPSULE ORAL at 14:47

## 2018-12-26 RX ADMIN — Medication 150 MICROGRAM(S): at 05:35

## 2018-12-26 RX ADMIN — Medication 1: at 17:18

## 2018-12-26 RX ADMIN — Medication 250 MILLIGRAM(S): at 17:18

## 2018-12-26 RX ADMIN — Medication 25 MILLIGRAM(S): at 05:35

## 2018-12-26 RX ADMIN — Medication 1: at 12:35

## 2018-12-26 RX ADMIN — Medication 250 MILLIGRAM(S): at 05:35

## 2018-12-26 RX ADMIN — SENNA PLUS 2 TABLET(S): 8.6 TABLET ORAL at 00:09

## 2018-12-26 RX ADMIN — Medication 100 MILLIGRAM(S): at 23:33

## 2018-12-26 RX ADMIN — SODIUM CHLORIDE 2 GRAM(S): 9 INJECTION INTRAMUSCULAR; INTRAVENOUS; SUBCUTANEOUS at 17:18

## 2018-12-26 RX ADMIN — Medication 25 MILLIGRAM(S): at 17:18

## 2018-12-26 RX ADMIN — ENOXAPARIN SODIUM 40 MILLIGRAM(S): 100 INJECTION SUBCUTANEOUS at 12:35

## 2018-12-26 RX ADMIN — GABAPENTIN 300 MILLIGRAM(S): 400 CAPSULE ORAL at 23:32

## 2018-12-26 RX ADMIN — PANTOPRAZOLE SODIUM 40 MILLIGRAM(S): 20 TABLET, DELAYED RELEASE ORAL at 05:35

## 2018-12-26 RX ADMIN — TAMSULOSIN HYDROCHLORIDE 0.8 MILLIGRAM(S): 0.4 CAPSULE ORAL at 23:31

## 2018-12-26 RX ADMIN — TAMSULOSIN HYDROCHLORIDE 0.8 MILLIGRAM(S): 0.4 CAPSULE ORAL at 00:10

## 2018-12-26 RX ADMIN — SODIUM CHLORIDE 2 GRAM(S): 9 INJECTION INTRAMUSCULAR; INTRAVENOUS; SUBCUTANEOUS at 23:30

## 2018-12-26 RX ADMIN — ATORVASTATIN CALCIUM 40 MILLIGRAM(S): 80 TABLET, FILM COATED ORAL at 23:31

## 2018-12-26 RX ADMIN — ATORVASTATIN CALCIUM 40 MILLIGRAM(S): 80 TABLET, FILM COATED ORAL at 00:10

## 2018-12-26 NOTE — PROGRESS NOTE ADULT - ATTENDING COMMENTS
changed dressing with persistent s-s drainage and sub Q seroma. if persistent will evacuate seroma in OR next wednesday.

## 2018-12-26 NOTE — PROGRESS NOTE ADULT - SUBJECTIVE AND OBJECTIVE BOX
TIMUR LOUIS    [id number]    73y      Male    CC: Patient was c/o his back being wet from drainage from his surgical wound    INTERVAL HPI/OVERNIGHT EVENTS:  73 yr old male s/p T11/T10 laminectomy, T10-L1 fusion, hypertension, diabetes mellitus, hypothyroid was sent in from [facility] for fever and back pain. He was recently discharged from [facility] after surgery. Noted to have fever 102. He was evaluated by orthopedics in ED given recent surgery and CT of thoracolumbar spine with contrast was done, which showed post surgical changes and multiple foci of air, fluid and soft tissue edema. ID was consulted and he was started on Vancomycin and Cefepime. Orthopedics advised local wound care and possible seroma drainage if no improvement. Nephrology consulted for hyponatremia. Advised fluid restriction and increased salt tabs to 2 tabs three times a day. He was noted to Hb 7.7, was given 1 unit PRBC, Hb came up to 9.5    per Ortho keep pt in hospital c/w iv abx and daily drainage might need to be taken back to OR.    today pt denies any sob no chest pain no n/v or abd pain    REVIEW OF SYSTEMS:    CONSTITUTIONAL: No fever, weight loss, or fatigue  RESPIRATORY: No cough, wheezing, hemoptysis; No shortness of breath  CARDIOVASCULAR: No chest pain, palpitations  GASTROINTESTINAL: No abdominal or epigastric pain. No nausea, vomiting          Vital Signs Last 24 Hrs  T(C): 36.8 (26 Dec 2018 04:15), Max: 37.1 (25 Dec 2018 22:30)  T(F): 98.3 (26 Dec 2018 04:15), Max: 98.8 (25 Dec 2018 22:30)  HR: 99 (26 Dec 2018 04:15) (91 - 99)  BP: 159/75 (26 Dec 2018 04:15) (147/81 - 162/89)  BP(mean): --  RR: 18 (26 Dec 2018 04:15) (18 - 18)  SpO2: 99% (26 Dec 2018 04:15) (95% - 99%)    PHYSICAL EXAM:  GENERAL: NAD, well-groomed  HEENT: PERRL, +EOMI  CHEST/LUNG: Clear to auscultation bilaterally; No wheezing  HEART: S1S2+, Regular rate and rhythm; No murmurs  ABDOMEN: Soft, Nontender, Nondistended; Bowel sounds present  EXTREMITIES:  2+ Peripheral Pulses, No clubbing, cyanosis, or edema      LABS:                        9.3    4.1   )-----------( 338      ( 25 Dec 2018 06:34 )             29.0     12-25    135  |  98  |  16.0  ----------------------------<  126<H>  4.4   |  28.0  |  1.16    Ca    8.3<L>      25 Dec 2018 06:34  Mg     1.6     12-25        MEDICATIONS  (STANDING):  atorvastatin 40 milliGRAM(s) Oral at bedtime  cefepime   IVPB 2000 milliGRAM(s) IV Intermittent <User Schedule>  dextrose 5%. 1000 milliLiter(s) (50 mL/Hr) IV Continuous <Continuous>  dextrose 50% Injectable 12.5 Gram(s) IV Push once  dextrose 50% Injectable 25 Gram(s) IV Push once  dextrose 50% Injectable 25 Gram(s) IV Push once  enoxaparin Injectable 40 milliGRAM(s) SubCutaneous daily  gabapentin 300 milliGRAM(s) Oral three times a day  insulin lispro (HumaLOG) corrective regimen sliding scale   SubCutaneous three times a day before meals  levothyroxine 150 MICROGram(s) Oral daily  metoprolol tartrate 25 milliGRAM(s) Oral two times a day  pantoprazole    Tablet 40 milliGRAM(s) Oral before breakfast  saccharomyces boulardii 250 milliGRAM(s) Oral two times a day  sodium chloride 2 Gram(s) Oral three times a day  tamsulosin 0.8 milliGRAM(s) Oral at bedtime  vancomycin  IVPB 500 milliGRAM(s) IV Intermittent every 12 hours    MEDICATIONS  (PRN):  acetaminophen   Tablet .. 650 milliGRAM(s) Oral every 6 hours PRN Temp greater or equal to 38C (100.4F), Mild Pain (1 - 3)  dextrose 40% Gel 15 Gram(s) Oral once PRN Blood Glucose LESS THAN 70 milliGRAM(s)/deciliter  glucagon  Injectable 1 milliGRAM(s) IntraMuscular once PRN Glucose LESS THAN 70 milligrams/deciliter  methocarbamol 500 milliGRAM(s) Oral every 6 hours PRN Muscle Spasm  senna 2 Tablet(s) Oral at bedtime PRN Constipation  traMADol 25 milliGRAM(s) Oral four times a day PRN mod to severe pain      RADIOLOGY & ADDITIONAL TESTS:

## 2018-12-26 NOTE — PROGRESS NOTE ADULT - ASSESSMENT
73 yr old male s/p T11/T10 laminectomy, T10-L1 fusion, hypertension, diabetes mellitus, hypothyroid was sent in from [facility] for fever and back pain. He was recently discharged from [facility] after surgery. Noted to have fever 102. He was evaluated by orthopedics in ED given recent surgery and CT of thoracolumbar spine with contrast was done, which showed post surgical changes and multiple foci of air, fluid and soft tissue edema. ID was consulted and he was started on Vancomycin and Cefepime. Orthopedics advised local wound care and possible seroma drainage if no improvement. Nephrology consulted for hyponatremia. Advised fluid restriction and increased salt tabs to 2 tabs three times a day. He was noted to Hb 7.7, was given 1 unit PRBC, Hb came up to 9.5    per Ortho keep pt in hospital c/w iv abx and daily drainage might need to be taken back to OR.    1) Post op seroma from thoracolumbar fusion:  possibly infected as no other sources of infection.   - c/w IV abx; appreciate ID follow up  - wound care per orthopedics as noted above  - ortho wants pt to complete abx in house to monitor    2) SIADH: hyponatremia - resolved  - NaCl tabs    3) Mild CLARI: resolved    4) HTN - controlled  - on lopressor     5) Anemia  - stable post transfusion  - monitor    6) DM2 - controlled (FS 140s-180s)  - CSI and finger stick monitoring     7) Hypothyroid  - c/w synthroid    8) Prophylactic measure  - DVT ppx: Lovenox SQ

## 2018-12-26 NOTE — PROVIDER CONTACT NOTE (OTHER) - BACKGROUND
S/p ortho surgery.   On IV ABT.   PO pain management
Admitted for infection FeVer, recent ortho procedure.

## 2018-12-26 NOTE — PROVIDER CONTACT NOTE (OTHER) - ASSESSMENT
Pt appeared drowsy than usual.   VSS /63, P 67, o2 sat 94 RA  able to arouse.
Pt not in distress. Denies any dizziness and chest pain.

## 2018-12-26 NOTE — PROGRESS NOTE ADULT - SUBJECTIVE AND OBJECTIVE BOX
ORTHO-SPINE POST-OP PROGRESS NOTE:    [id number]    TIMUR LOUIS    PROCEDURE: status post thoracolumbar fusion 11/28/18.    DOS: 11/28/18     SUBJECTIVE: History: 73y Male is status post thoracolumbar fusion 11/28/18 with persistent wound drainage and readmitted on 12/12/2018 for fever. Patient is doing well and is comfortable. Sitting up in bed and eating breakfast . Patient denies any acute sensory or motor changes. No new complaints    Vital Signs Last 24 Hrs  T(C): 36.8 (26 Dec 2018 04:15), Max: 37.1 (25 Dec 2018 22:30)  T(F): 98.3 (26 Dec 2018 04:15), Max: 98.8 (25 Dec 2018 22:30)  HR: 99 (26 Dec 2018 04:15) (91 - 99)  BP: 159/75 (26 Dec 2018 04:15) (146/72 - 162/89)  BP(mean): --  RR: 18 (26 Dec 2018 04:15) (18 - 18)  SpO2: 99% (26 Dec 2018 04:15) (95% - 99%)    PHYSICAL EXAM:     Constitutional: Alert, responsive, in no acute distress.     Thoracolumbar region: Gauze dressing in place which is saturated with Serosanguinous discharge. + active Serosanguinous drainage from proximal wound incision. No erythema no purulent drainage.     MS:  The calf is supple nontender bilaterally.  No foot drop. 2+ dorsalis pedis pulse. Capillary refill is less than 2 seconds.     SENSATION Decreased sensation along the right lateral/medial calf, otherwise sensation grossly intact.         Lower extremities  -  5/5 plantar/dorsiflexion bilateral, 4/5 knee flexion bilateral, 3+/5 hip flexion bilateral    A/P :  69y Male S/P status post thoracolumbar fusion 11/28/18 with persistent wound drainage     -  Pain control  -  DVT ppx: [x]SCDs   [x] Pharmacologic    -  Continue PT   -  Dispo: [facility] when medically stable  -  Daily dressing changes  -  continue IV ABX per medicine

## 2018-12-26 NOTE — PROGRESS NOTE ADULT - SUBJECTIVE AND OBJECTIVE BOX
PA - Note    Went to see patient to evaluate dressing and drainage from his Lumbar Wound.  With the help of a Nurses Aide patient was turned on his side to evaluate Dressing.  Dressing was saturated with serous drainage.  Some wetness noted to bedding.  New Dressing applied to wound area.  Area of fluid collection was appreciated.  Patient was returned to a comfortable position.  Will re-evaluate dressing and drainage in 12 hours.

## 2018-12-27 LAB
ANION GAP SERPL CALC-SCNC: 10 MMOL/L — SIGNIFICANT CHANGE UP (ref 5–17)
BUN SERPL-MCNC: 17 MG/DL — SIGNIFICANT CHANGE UP (ref 8–20)
CALCIUM SERPL-MCNC: 8.4 MG/DL — LOW (ref 8.6–10.2)
CHLORIDE SERPL-SCNC: 96 MMOL/L — LOW (ref 98–107)
CO2 SERPL-SCNC: 28 MMOL/L — SIGNIFICANT CHANGE UP (ref 22–29)
CREAT SERPL-MCNC: 1.29 MG/DL — SIGNIFICANT CHANGE UP (ref 0.5–1.3)
GLUCOSE BLDC GLUCOMTR-MCNC: 123 MG/DL — HIGH (ref 70–99)
GLUCOSE BLDC GLUCOMTR-MCNC: 137 MG/DL — HIGH (ref 70–99)
GLUCOSE BLDC GLUCOMTR-MCNC: 162 MG/DL — HIGH (ref 70–99)
GLUCOSE BLDC GLUCOMTR-MCNC: 196 MG/DL — HIGH (ref 70–99)
GLUCOSE SERPL-MCNC: 188 MG/DL — HIGH (ref 70–115)
MAGNESIUM SERPL-MCNC: 1.7 MG/DL — SIGNIFICANT CHANGE UP (ref 1.6–2.6)
POTASSIUM SERPL-MCNC: 4.2 MMOL/L — SIGNIFICANT CHANGE UP (ref 3.5–5.3)
POTASSIUM SERPL-SCNC: 4.2 MMOL/L — SIGNIFICANT CHANGE UP (ref 3.5–5.3)
SODIUM SERPL-SCNC: 134 MMOL/L — LOW (ref 135–145)
VANCOMYCIN TROUGH SERPL-MCNC: 17.5 UG/ML — SIGNIFICANT CHANGE UP (ref 10–20)

## 2018-12-27 PROCEDURE — 99232 SBSQ HOSP IP/OBS MODERATE 35: CPT

## 2018-12-27 RX ORDER — MAGNESIUM SULFATE 500 MG/ML
2 VIAL (ML) INJECTION ONCE
Refills: 0 | Status: COMPLETED | OUTPATIENT
Start: 2018-12-27 | End: 2018-12-27

## 2018-12-27 RX ADMIN — Medication 25 MILLIGRAM(S): at 17:14

## 2018-12-27 RX ADMIN — PANTOPRAZOLE SODIUM 40 MILLIGRAM(S): 20 TABLET, DELAYED RELEASE ORAL at 06:22

## 2018-12-27 RX ADMIN — Medication 250 MILLIGRAM(S): at 06:22

## 2018-12-27 RX ADMIN — Medication 1: at 11:50

## 2018-12-27 RX ADMIN — Medication 150 MICROGRAM(S): at 06:22

## 2018-12-27 RX ADMIN — CEFEPIME 100 MILLIGRAM(S): 1 INJECTION, POWDER, FOR SOLUTION INTRAMUSCULAR; INTRAVENOUS at 06:22

## 2018-12-27 RX ADMIN — Medication 50 GRAM(S): at 11:50

## 2018-12-27 RX ADMIN — Medication 1: at 17:14

## 2018-12-27 RX ADMIN — GABAPENTIN 300 MILLIGRAM(S): 400 CAPSULE ORAL at 22:20

## 2018-12-27 RX ADMIN — GABAPENTIN 300 MILLIGRAM(S): 400 CAPSULE ORAL at 14:03

## 2018-12-27 RX ADMIN — GABAPENTIN 300 MILLIGRAM(S): 400 CAPSULE ORAL at 06:23

## 2018-12-27 RX ADMIN — Medication 25 MILLIGRAM(S): at 06:23

## 2018-12-27 RX ADMIN — SODIUM CHLORIDE 2 GRAM(S): 9 INJECTION INTRAMUSCULAR; INTRAVENOUS; SUBCUTANEOUS at 06:22

## 2018-12-27 RX ADMIN — TAMSULOSIN HYDROCHLORIDE 0.8 MILLIGRAM(S): 0.4 CAPSULE ORAL at 22:20

## 2018-12-27 RX ADMIN — ENOXAPARIN SODIUM 40 MILLIGRAM(S): 100 INJECTION SUBCUTANEOUS at 11:50

## 2018-12-27 RX ADMIN — ATORVASTATIN CALCIUM 40 MILLIGRAM(S): 80 TABLET, FILM COATED ORAL at 22:25

## 2018-12-27 RX ADMIN — SODIUM CHLORIDE 2 GRAM(S): 9 INJECTION INTRAMUSCULAR; INTRAVENOUS; SUBCUTANEOUS at 14:03

## 2018-12-27 RX ADMIN — SODIUM CHLORIDE 2 GRAM(S): 9 INJECTION INTRAMUSCULAR; INTRAVENOUS; SUBCUTANEOUS at 22:21

## 2018-12-27 RX ADMIN — Medication 250 MILLIGRAM(S): at 17:14

## 2018-12-27 RX ADMIN — Medication 100 MILLIGRAM(S): at 22:21

## 2018-12-27 NOTE — PROGRESS NOTE ADULT - SUBJECTIVE AND OBJECTIVE BOX
Ortho Post Op Check    Name: TIMUR LOUIS    MR #: [id number]    Procedure: s/p Multilevel Thoracolumbar fusion POD#29  Surgeon: Dr Gray    Pt comfortable, pain controlled. Expresses frustration with continued draining from back  Denies CP, SOB, N/V     General Exam:  Vital Signs Last 24 Hrs  T(C): 36.5 (12-27-18 @ 05:11), Max: 36.5 (12-27-18 @ 05:11)  T(F): 97.7 (12-27-18 @ 05:11), Max: 97.7 (12-27-18 @ 05:11)  HR: 75 (12-27-18 @ 05:11) (75 - 75)  BP: 143/67 (12-27-18 @ 05:11) (143/67 - 143/67)  BP(mean): --  RR: 18 (12-27-18 @ 05:11) (18 - 18)  SpO2: --    General: Pt Alert and oriented, NAD, controlled pain.  Back Dressings saturated with serous drainage. No bleeding noted.  Back incision cleansed with betadine and new sterile dressings applied.   Pulses: 2+ dorsalis pedis pulse. Cap refill < 2 sec.  Sensation: Grossly intact to light touch without deficit.                          9.6    4.6   )-----------( 386      ( 26 Dec 2018 13:23 )             29.9   26 Dec 2018 13:23    131    |  92     |  19.0   ----------------------------<  189    4.6     |  28.0   |  1.36     Mg     1.6       26 Dec 2018 13:23      MEDICATIONS  (STANDING):  atorvastatin 40 milliGRAM(s) Oral at bedtime  cefepime   IVPB 2000 milliGRAM(s) IV Intermittent <User Schedule>  dextrose 5%. 1000 milliLiter(s) (50 mL/Hr) IV Continuous <Continuous>  dextrose 50% Injectable 12.5 Gram(s) IV Push once  dextrose 50% Injectable 25 Gram(s) IV Push once  dextrose 50% Injectable 25 Gram(s) IV Push once  enoxaparin Injectable 40 milliGRAM(s) SubCutaneous daily  gabapentin 300 milliGRAM(s) Oral three times a day  insulin lispro (HumaLOG) corrective regimen sliding scale   SubCutaneous three times a day before meals  levothyroxine 150 MICROGram(s) Oral daily  metoprolol tartrate 25 milliGRAM(s) Oral two times a day  pantoprazole    Tablet 40 milliGRAM(s) Oral before breakfast  saccharomyces boulardii 250 milliGRAM(s) Oral two times a day  sodium chloride 2 Gram(s) Oral three times a day  tamsulosin 0.8 milliGRAM(s) Oral at bedtime  vancomycin  IVPB 500 milliGRAM(s) IV Intermittent every 12 hours    MEDICATIONS  (PRN):  acetaminophen   Tablet .. 650 milliGRAM(s) Oral every 6 hours PRN Temp greater or equal to 38C (100.4F), Mild Pain (1 - 3)  dextrose 40% Gel 15 Gram(s) Oral once PRN Blood Glucose LESS THAN 70 milliGRAM(s)/deciliter  glucagon  Injectable 1 milliGRAM(s) IntraMuscular once PRN Glucose LESS THAN 70 milligrams/deciliter  methocarbamol 500 milliGRAM(s) Oral every 6 hours PRN Muscle Spasm  senna 2 Tablet(s) Oral at bedtime PRN Constipation        A/P: 73yMale POD#29 s/p multilevel thoracolumbar fusion   - Patient continues to drain from spine incision. BID dressing changes by Ortho PAs will continue. Patient is NOT to be DC'd until incision is dry for 48 hours. Seems likely the patient will be returning to OR next week with Dr Gray  - Pain Control  - DVT ppx: lovenox  - Weight bearing status: WBAT

## 2018-12-27 NOTE — PROGRESS NOTE ADULT - ASSESSMENT
73 yr old male s/p T11/T10 laminectomy, T10-L1 fusion, hypertension, diabetes mellitus, hypothyroid was sent in from [facility] for fever and back pain. He was recently discharged from [facility] after surgery. Noted to have fever 102. He was evaluated by orthopedics in ED given recent surgery and CT of thoracolumbar spine with contrast was done, which showed post surgical changes and multiple foci of air, fluid and soft tissue edema. ID was consulted and he was started on Vancomycin and Cefepime. Orthopedics advised local wound care and possible seroma drainage if no improvement. Nephrology consulted for hyponatremia. Advised fluid restriction and increased salt tabs to 2 tabs three times a day. He was noted to Hb 7.7, was given 1 unit PRBC, Hb came up to 9.5    per Ortho keep pt in hospital c/w iv abx and daily drainage might need to be taken back to OR.    1) Post op seroma from thoracolumbar fusion:  possibly infected as no other sources of infection.   - c/w IV abx; appreciate ID follow up  - wound care per orthopedics as noted above  - ortho wants pt to complete abx in house to monitor    2) SIADH: hyponatremia -  - NaCl tabs  - monitor levels    3) Hypomagnesemia  - replace and monitor    4) Mild CLARI: resolved    5) HTN - controlled  - on lopressor     6) Anemia  - stable post transfusion  - monitor    7) DM2 - controlled (FS 140s-180s)  - CSI and finger stick monitoring     8) Hypothyroid  - c/w synthroid    9) Prophylactic measure  - DVT ppx: Lovenox SQ

## 2018-12-27 NOTE — PROGRESS NOTE ADULT - SUBJECTIVE AND OBJECTIVE BOX
TIMUR LOUIS    [id number]    73y      Male    CC: Patient was c/o his back being wet from drainage from his surgical wound    INTERVAL HPI/OVERNIGHT EVENTS:  73 yr old male s/p T11/T10 laminectomy, T10-L1 fusion, hypertension, diabetes mellitus, hypothyroid was sent in from [facility] for fever and back pain. He was recently discharged from [facility] after surgery. Noted to have fever 102. He was evaluated by orthopedics in ED given recent surgery and CT of thoracolumbar spine with contrast was done, which showed post surgical changes and multiple foci of air, fluid and soft tissue edema. ID was consulted and he was started on Vancomycin and Cefepime. Orthopedics advised local wound care and possible seroma drainage if no improvement. Nephrology consulted for hyponatremia. Advised fluid restriction and increased salt tabs to 2 tabs three times a day. He was noted to Hb 7.7, was given 1 unit PRBC, Hb came up to 9.5    per Ortho keep pt in hospital c/w iv abx and daily drainage might need to be taken back to OR.    today pt denies any sob no chest pain no n/v or abd pain    REVIEW OF SYSTEMS:    CONSTITUTIONAL: No fever, weight loss, or fatigue  RESPIRATORY: No cough, wheezing, hemoptysis; No shortness of breath  CARDIOVASCULAR: No chest pain, palpitations  GASTROINTESTINAL: No abdominal or epigastric pain. No nausea, vomiting      Vital Signs Last 24 Hrs  T(C): 36.8 (27 Dec 2018 11:18), Max: 37.2 (26 Dec 2018 15:43)  T(F): 98.3 (27 Dec 2018 11:18), Max: 98.9 (26 Dec 2018 15:43)  HR: 77 (27 Dec 2018 11:18) (75 - 98)  BP: 129/77 (27 Dec 2018 11:18) (82/52 - 155/81)  BP(mean): --  RR: 17 (27 Dec 2018 11:18) (17 - 20)  SpO2: 97% (27 Dec 2018 11:18) (96% - 97%)    PHYSICAL EXAM:    GENERAL: NAD, well-groomed  HEENT: PERRL, +EOMI  CHEST/LUNG: Clear to auscultation bilaterally; No wheezing  HEART: S1S2+, Regular rate and rhythm; No murmurs  ABDOMEN: Soft, Nontender, Nondistended; Bowel sounds present  EXTREMITIES:  2+ Peripheral Pulses, No clubbing, cyanosis, or edema      LABS:                        9.6    4.6   )-----------( 386      ( 26 Dec 2018 13:23 )             29.9     12-26    131<L>  |  92<L>  |  19.0  ----------------------------<  189<H>  4.6   |  28.0  |  1.36<H>    Ca    8.1<L>      26 Dec 2018 13:23  Mg     1.6     12-26        MEDICATIONS  (STANDING):  atorvastatin 40 milliGRAM(s) Oral at bedtime  cefepime   IVPB 2000 milliGRAM(s) IV Intermittent <User Schedule>  dextrose 5%. 1000 milliLiter(s) (50 mL/Hr) IV Continuous <Continuous>  dextrose 50% Injectable 12.5 Gram(s) IV Push once  dextrose 50% Injectable 25 Gram(s) IV Push once  dextrose 50% Injectable 25 Gram(s) IV Push once  enoxaparin Injectable 40 milliGRAM(s) SubCutaneous daily  gabapentin 300 milliGRAM(s) Oral three times a day  insulin lispro (HumaLOG) corrective regimen sliding scale   SubCutaneous three times a day before meals  levothyroxine 150 MICROGram(s) Oral daily  metoprolol tartrate 25 milliGRAM(s) Oral two times a day  pantoprazole    Tablet 40 milliGRAM(s) Oral before breakfast  saccharomyces boulardii 250 milliGRAM(s) Oral two times a day  sodium chloride 2 Gram(s) Oral three times a day  tamsulosin 0.8 milliGRAM(s) Oral at bedtime  vancomycin  IVPB 500 milliGRAM(s) IV Intermittent every 12 hours    MEDICATIONS  (PRN):  acetaminophen   Tablet .. 650 milliGRAM(s) Oral every 6 hours PRN Temp greater or equal to 38C (100.4F), Mild Pain (1 - 3)  dextrose 40% Gel 15 Gram(s) Oral once PRN Blood Glucose LESS THAN 70 milliGRAM(s)/deciliter  glucagon  Injectable 1 milliGRAM(s) IntraMuscular once PRN Glucose LESS THAN 70 milligrams/deciliter  methocarbamol 500 milliGRAM(s) Oral every 6 hours PRN Muscle Spasm  senna 2 Tablet(s) Oral at bedtime PRN Constipation

## 2018-12-27 NOTE — PROGRESS NOTE ADULT - SUBJECTIVE AND OBJECTIVE BOX
PA - Note    Went to see patient to evaluate dressing and drainage from his Lumbar Wound.  With the help of a Nurses  patient was turned on his side to evaluate Dressing.  Dressing was saturated with serous drainage. New Dressing applied to wound area.  Area of fluid collection was appreciated.  Patient was returned to a comfortable position.  Will re-evaluate dressing and drainage in 12 hours.

## 2018-12-28 LAB
ANION GAP SERPL CALC-SCNC: 11 MMOL/L — SIGNIFICANT CHANGE UP (ref 5–17)
BUN SERPL-MCNC: 14 MG/DL — SIGNIFICANT CHANGE UP (ref 8–20)
CALCIUM SERPL-MCNC: 8.2 MG/DL — LOW (ref 8.6–10.2)
CHLORIDE SERPL-SCNC: 98 MMOL/L — SIGNIFICANT CHANGE UP (ref 98–107)
CO2 SERPL-SCNC: 28 MMOL/L — SIGNIFICANT CHANGE UP (ref 22–29)
CREAT SERPL-MCNC: 1.17 MG/DL — SIGNIFICANT CHANGE UP (ref 0.5–1.3)
CRP SERPL-MCNC: 3.3 MG/DL — HIGH (ref 0–0.4)
ERYTHROCYTE [SEDIMENTATION RATE] IN BLOOD: 58 MM/HR — HIGH (ref 0–20)
GLUCOSE BLDC GLUCOMTR-MCNC: 102 MG/DL — HIGH (ref 70–99)
GLUCOSE BLDC GLUCOMTR-MCNC: 120 MG/DL — HIGH (ref 70–99)
GLUCOSE BLDC GLUCOMTR-MCNC: 171 MG/DL — HIGH (ref 70–99)
GLUCOSE BLDC GLUCOMTR-MCNC: 194 MG/DL — HIGH (ref 70–99)
GLUCOSE SERPL-MCNC: 106 MG/DL — SIGNIFICANT CHANGE UP (ref 70–115)
MAGNESIUM SERPL-MCNC: 1.9 MG/DL — SIGNIFICANT CHANGE UP (ref 1.8–2.6)
POTASSIUM SERPL-MCNC: 4.1 MMOL/L — SIGNIFICANT CHANGE UP (ref 3.5–5.3)
POTASSIUM SERPL-SCNC: 4.1 MMOL/L — SIGNIFICANT CHANGE UP (ref 3.5–5.3)
SODIUM SERPL-SCNC: 137 MMOL/L — SIGNIFICANT CHANGE UP (ref 135–145)

## 2018-12-28 PROCEDURE — 99232 SBSQ HOSP IP/OBS MODERATE 35: CPT

## 2018-12-28 RX ADMIN — ENOXAPARIN SODIUM 40 MILLIGRAM(S): 100 INJECTION SUBCUTANEOUS at 12:54

## 2018-12-28 RX ADMIN — SODIUM CHLORIDE 2 GRAM(S): 9 INJECTION INTRAMUSCULAR; INTRAVENOUS; SUBCUTANEOUS at 21:15

## 2018-12-28 RX ADMIN — ATORVASTATIN CALCIUM 40 MILLIGRAM(S): 80 TABLET, FILM COATED ORAL at 21:15

## 2018-12-28 RX ADMIN — SODIUM CHLORIDE 2 GRAM(S): 9 INJECTION INTRAMUSCULAR; INTRAVENOUS; SUBCUTANEOUS at 12:54

## 2018-12-28 RX ADMIN — Medication 150 MICROGRAM(S): at 05:45

## 2018-12-28 RX ADMIN — PANTOPRAZOLE SODIUM 40 MILLIGRAM(S): 20 TABLET, DELAYED RELEASE ORAL at 05:45

## 2018-12-28 RX ADMIN — CEFEPIME 100 MILLIGRAM(S): 1 INJECTION, POWDER, FOR SOLUTION INTRAMUSCULAR; INTRAVENOUS at 06:28

## 2018-12-28 RX ADMIN — GABAPENTIN 300 MILLIGRAM(S): 400 CAPSULE ORAL at 21:15

## 2018-12-28 RX ADMIN — Medication 100 MILLIGRAM(S): at 10:52

## 2018-12-28 RX ADMIN — TAMSULOSIN HYDROCHLORIDE 0.8 MILLIGRAM(S): 0.4 CAPSULE ORAL at 21:15

## 2018-12-28 RX ADMIN — Medication 1: at 12:54

## 2018-12-28 RX ADMIN — SODIUM CHLORIDE 2 GRAM(S): 9 INJECTION INTRAMUSCULAR; INTRAVENOUS; SUBCUTANEOUS at 05:45

## 2018-12-28 RX ADMIN — Medication 250 MILLIGRAM(S): at 17:55

## 2018-12-28 RX ADMIN — Medication 100 MILLIGRAM(S): at 21:15

## 2018-12-28 RX ADMIN — Medication 25 MILLIGRAM(S): at 05:45

## 2018-12-28 RX ADMIN — GABAPENTIN 300 MILLIGRAM(S): 400 CAPSULE ORAL at 12:54

## 2018-12-28 RX ADMIN — Medication 250 MILLIGRAM(S): at 05:45

## 2018-12-28 RX ADMIN — Medication 1: at 17:54

## 2018-12-28 RX ADMIN — GABAPENTIN 300 MILLIGRAM(S): 400 CAPSULE ORAL at 05:45

## 2018-12-28 RX ADMIN — Medication 25 MILLIGRAM(S): at 17:55

## 2018-12-28 NOTE — PROGRESS NOTE ADULT - SUBJECTIVE AND OBJECTIVE BOX
Patient encountered for dressing change    dressing saturated with serous staining  dressing removed. no active drainage. no erythema  new dressing applied    cont BID dressing changes

## 2018-12-28 NOTE — PROGRESS NOTE ADULT - SUBJECTIVE AND OBJECTIVE BOX
· Subjective and Objective: 	  Ortho Post Op Check    Name: TIMUR LOUIS    MR #: [id number]    Procedure: s/p Multilevel Thoracolumbar fusion POD#30  Surgeon: Dr Gray    Pt comfortable, pain controlled. Expresses frustration with continued draining from back  Denies CP, SOB, N/V     General Exam:  Vital Signs Last 24 Hrs  T(C): 36.5 (12-27-18 @ 05:11), Max: 36.5 (12-27-18 @ 05:11)  T(F): 97.7 (12-27-18 @ 05:11), Max: 97.7 (12-27-18 @ 05:11)  HR: 75 (12-27-18 @ 05:11) (75 - 75)  BP: 143/67 (12-27-18 @ 05:11) (143/67 - 143/67)  BP(mean): --  RR: 18 (12-27-18 @ 05:11) (18 - 18)  SpO2: --    General: Pt Alert and oriented, NAD, controlled pain.  Back Dressings saturated with serous drainage. No bleeding noted.  Back incision cleansed with betadine and new sterile dressings applied last night.  Dressing is moist but not saturated.    Pulses: 2+ dorsalis pedis pulse. Cap refill < 2 sec.  Sensation: Grossly intact to light touch without deficit.                          9.6    4.6   )-----------( 386      ( 26 Dec 2018 13:23 )             29.9   26 Dec 2018 13:23    131    |  92     |  19.0   ----------------------------<  189    4.6     |  28.0   |  1.36     Mg     1.6       26 Dec 2018 13:23      MEDICATIONS  (STANDING):  atorvastatin 40 milliGRAM(s) Oral at bedtime  cefepime   IVPB 2000 milliGRAM(s) IV Intermittent <User Schedule>  dextrose 5%. 1000 milliLiter(s) (50 mL/Hr) IV Continuous <Continuous>  dextrose 50% Injectable 12.5 Gram(s) IV Push once  dextrose 50% Injectable 25 Gram(s) IV Push once  dextrose 50% Injectable 25 Gram(s) IV Push once  enoxaparin Injectable 40 milliGRAM(s) SubCutaneous daily  gabapentin 300 milliGRAM(s) Oral three times a day  insulin lispro (HumaLOG) corrective regimen sliding scale   SubCutaneous three times a day before meals  levothyroxine 150 MICROGram(s) Oral daily  metoprolol tartrate 25 milliGRAM(s) Oral two times a day  pantoprazole    Tablet 40 milliGRAM(s) Oral before breakfast  saccharomyces boulardii 250 milliGRAM(s) Oral two times a day  sodium chloride 2 Gram(s) Oral three times a day  tamsulosin 0.8 milliGRAM(s) Oral at bedtime  vancomycin  IVPB 500 milliGRAM(s) IV Intermittent every 12 hours    MEDICATIONS  (PRN):  acetaminophen   Tablet .. 650 milliGRAM(s) Oral every 6 hours PRN Temp greater or equal to 38C (100.4F), Mild Pain (1 - 3)  dextrose 40% Gel 15 Gram(s) Oral once PRN Blood Glucose LESS THAN 70 milliGRAM(s)/deciliter  glucagon  Injectable 1 milliGRAM(s) IntraMuscular once PRN Glucose LESS THAN 70 milligrams/deciliter  methocarbamol 500 milliGRAM(s) Oral every 6 hours PRN Muscle Spasm  senna 2 Tablet(s) Oral at bedtime PRN Constipation        A/P: 73yMale POD#29 s/p multilevel thoracolumbar fusion   - Patient continues to drain from spine incision. BID dressing changes by Ortho PAs will continue. Patient will return to OR Wednesday 1/2/19 for irrigation and debridement of seroma, exploration spinal fusion.  Patient is aware.  Medical optimization will be appreciated.   - Pain Control  - DVT ppx: lovenox  - Weight bearing status: WBAT · Subjective and Objective: 	  Ortho Post Op Check    Name: TIMUR LOUIS    MR #: [id number]    Procedure: s/p Multilevel Thoracolumbar fusion POD#30  Surgeon: Dr Gray    Pt comfortable, pain controlled. Expresses frustration with continued draining from back  Denies CP, SOB, N/V     General Exam:  Vital Signs Last 24 Hrs  T(C): 36.5 (12-27-18 @ 05:11), Max: 36.5 (12-27-18 @ 05:11)  T(F): 97.7 (12-27-18 @ 05:11), Max: 97.7 (12-27-18 @ 05:11)  HR: 75 (12-27-18 @ 05:11) (75 - 75)  BP: 143/67 (12-27-18 @ 05:11) (143/67 - 143/67)  BP(mean): --  RR: 18 (12-27-18 @ 05:11) (18 - 18)  SpO2: --    General: Pt Alert and oriented, NAD, controlled pain.  Back Dressings saturated with serous drainage. No bleeding noted.  Back incision cleansed with betadine and new sterile dressings applied last night.  Dressing is moist but not saturated.    Pulses: 2+ dorsalis pedis pulse. Cap refill < 2 sec.  Sensation: Grossly intact to light touch without deficit.                          9.6    4.6   )-----------( 386      ( 26 Dec 2018 13:23 )             29.9   26 Dec 2018 13:23    131    |  92     |  19.0   ----------------------------<  189    4.6     |  28.0   |  1.36     Mg     1.6       26 Dec 2018 13:23      MEDICATIONS  (STANDING):  atorvastatin 40 milliGRAM(s) Oral at bedtime  cefepime   IVPB 2000 milliGRAM(s) IV Intermittent <User Schedule>  dextrose 5%. 1000 milliLiter(s) (50 mL/Hr) IV Continuous <Continuous>  dextrose 50% Injectable 12.5 Gram(s) IV Push once  dextrose 50% Injectable 25 Gram(s) IV Push once  dextrose 50% Injectable 25 Gram(s) IV Push once  enoxaparin Injectable 40 milliGRAM(s) SubCutaneous daily  gabapentin 300 milliGRAM(s) Oral three times a day  insulin lispro (HumaLOG) corrective regimen sliding scale   SubCutaneous three times a day before meals  levothyroxine 150 MICROGram(s) Oral daily  metoprolol tartrate 25 milliGRAM(s) Oral two times a day  pantoprazole    Tablet 40 milliGRAM(s) Oral before breakfast  saccharomyces boulardii 250 milliGRAM(s) Oral two times a day  sodium chloride 2 Gram(s) Oral three times a day  tamsulosin 0.8 milliGRAM(s) Oral at bedtime  vancomycin  IVPB 500 milliGRAM(s) IV Intermittent every 12 hours    MEDICATIONS  (PRN):  acetaminophen   Tablet .. 650 milliGRAM(s) Oral every 6 hours PRN Temp greater or equal to 38C (100.4F), Mild Pain (1 - 3)  dextrose 40% Gel 15 Gram(s) Oral once PRN Blood Glucose LESS THAN 70 milliGRAM(s)/deciliter  glucagon  Injectable 1 milliGRAM(s) IntraMuscular once PRN Glucose LESS THAN 70 milligrams/deciliter  methocarbamol 500 milliGRAM(s) Oral every 6 hours PRN Muscle Spasm  senna 2 Tablet(s) Oral at bedtime PRN Constipation        A/P: 73yMale POD#29 s/p multilevel thoracolumbar fusion   - Patient continues to drain from spine incision. BID dressing changes by Ortho PAs will continue. Patient will return to OR Wednesday 1/2/19 for irrigation and debridement of seroma, exploration spinal fusion.  Patient is aware.  Medical optimization will be appreciated.   - Pain Control  - DVT ppx: lovenox  - follow ESR/CRP trend, ordered today.   - Weight bearing status: WBAT

## 2018-12-28 NOTE — PROGRESS NOTE ADULT - SUBJECTIVE AND OBJECTIVE BOX
TIMUR LOUIS    [id number]    73y      Male    CC: Patient was c/o his back being wet from drainage from his surgical wound    INTERVAL HPI/OVERNIGHT EVENTS:  73 yr old male s/p T11/T10 laminectomy, T10-L1 fusion, hypertension, diabetes mellitus, hypothyroid was sent in from [facility] for fever and back pain. He was recently discharged from [facility] after surgery. Noted to have fever 102. He was evaluated by orthopedics in ED given recent surgery and CT of thoracolumbar spine with contrast was done, which showed post surgical changes and multiple foci of air, fluid and soft tissue edema. ID was consulted and he was started on Vancomycin and Cefepime. Orthopedics advised local wound care and possible seroma drainage if no improvement. Nephrology consulted for hyponatremia. Advised fluid restriction and increased salt tabs to 2 tabs three times a day. He was noted to Hb 7.7, was given 1 unit PRBC, Hb came up to 9.5    per Ortho keep pt in hospital c/w iv abx and a lot of serous drainage daily might need to be taken back to OR in the next few days per ortho    today pt denies any sob no chest pain no n/v or abd pain    REVIEW OF SYSTEMS:    CONSTITUTIONAL: No fever, weight loss, or fatigue  RESPIRATORY: No cough, wheezing, hemoptysis; No shortness of breath  CARDIOVASCULAR: No chest pain, palpitations  GASTROINTESTINAL: No abdominal or epigastric pain. No nausea, vomiting        Vital Signs Last 24 Hrs  T(C): 36.9 (28 Dec 2018 10:48), Max: 37 (27 Dec 2018 16:27)  T(F): 98.4 (28 Dec 2018 10:48), Max: 98.6 (27 Dec 2018 16:27)  HR: 90 (28 Dec 2018 10:48) (77 - 93)  BP: 181/88 (28 Dec 2018 10:48) (129/77 - 181/88)  BP(mean): --  RR: 20 (28 Dec 2018 10:48) (17 - 20)  SpO2: 97% (28 Dec 2018 05:16) (97% - 97%)    PHYSICAL EXAM:    GENERAL: NAD, well-groomed  HEENT: PERRL, +EOMI  CHEST/LUNG: Clear to auscultation bilaterally; No wheezing  HEART: S1S2+, Regular rate and rhythm; No murmurs  ABDOMEN: Soft, Nontender, Nondistended; Bowel sounds present  EXTREMITIES:  2+ Peripheral Pulses, No clubbing, cyanosis, or edema      LABS:                        9.6    4.6   )-----------( 386      ( 26 Dec 2018 13:23 )             29.9     12-28    137  |  98  |  14.0  ----------------------------<  106  4.1   |  28.0  |  1.17    Ca    8.2<L>      28 Dec 2018 08:06  Mg     1.9     12-28      MEDICATIONS  (STANDING):  atorvastatin 40 milliGRAM(s) Oral at bedtime  cefepime   IVPB 2000 milliGRAM(s) IV Intermittent <User Schedule>  dextrose 5%. 1000 milliLiter(s) (50 mL/Hr) IV Continuous <Continuous>  dextrose 50% Injectable 12.5 Gram(s) IV Push once  dextrose 50% Injectable 25 Gram(s) IV Push once  dextrose 50% Injectable 25 Gram(s) IV Push once  enoxaparin Injectable 40 milliGRAM(s) SubCutaneous daily  gabapentin 300 milliGRAM(s) Oral three times a day  insulin lispro (HumaLOG) corrective regimen sliding scale   SubCutaneous three times a day before meals  levothyroxine 150 MICROGram(s) Oral daily  metoprolol tartrate 25 milliGRAM(s) Oral two times a day  pantoprazole    Tablet 40 milliGRAM(s) Oral before breakfast  saccharomyces boulardii 250 milliGRAM(s) Oral two times a day  sodium chloride 2 Gram(s) Oral three times a day  tamsulosin 0.8 milliGRAM(s) Oral at bedtime  vancomycin  IVPB 500 milliGRAM(s) IV Intermittent every 12 hours    MEDICATIONS  (PRN):  acetaminophen   Tablet .. 650 milliGRAM(s) Oral every 6 hours PRN Temp greater or equal to 38C (100.4F), Mild Pain (1 - 3)  dextrose 40% Gel 15 Gram(s) Oral once PRN Blood Glucose LESS THAN 70 milliGRAM(s)/deciliter  glucagon  Injectable 1 milliGRAM(s) IntraMuscular once PRN Glucose LESS THAN 70 milligrams/deciliter  methocarbamol 500 milliGRAM(s) Oral every 6 hours PRN Muscle Spasm  senna 2 Tablet(s) Oral at bedtime PRN Constipation

## 2018-12-28 NOTE — PROGRESS NOTE ADULT - ASSESSMENT
73 yr old male s/p T11/T10 laminectomy, T10-L1 fusion, hypertension, diabetes mellitus, hypothyroid was sent in from [facility] for fever and back pain. He was recently discharged from [facility] after surgery. Noted to have fever 102. He was evaluated by orthopedics in ED given recent surgery and CT of thoracolumbar spine with contrast was done, which showed post surgical changes and multiple foci of air, fluid and soft tissue edema. ID was consulted and he was started on Vancomycin and Cefepime. Orthopedics advised local wound care and possible seroma drainage if no improvement. Nephrology consulted for hyponatremia. Advised fluid restriction and increased salt tabs to 2 tabs three times a day. He was noted to Hb 7.7, was given 1 unit PRBC, Hb came up to 9.5    per Ortho keep pt in hospital c/w iv abx and a lot of serous drainage daily might need to be taken back to OR in the next few days per ortho    1) Post op seroma from thoracolumbar fusion:  possibly infected as no other sources of infection.   - c/w IV abx; appreciate ID follow up  - wound care per orthopedics as noted above  - ortho wants pt to complete abx in house to monitor due to large serous drainage daily might need to be taken back to OR in the next few days per ortho    2) SIADH: hyponatremia - stable  - NaCl tabs  - monitor levels    3) Hypomagnesemia: resolved  - monitor    4) Mild CLARI: resolved    5) HTN - controlled  - on lopressor     6) Anemia  - stable post transfusion  - monitor    7) DM2 - controlled (FS 140s-180s)  - CSI and finger stick monitoring     8) Hypothyroid  - c/w synthroid    9) Prophylactic measure  - DVT ppx: Lovenox SQ

## 2018-12-29 LAB
ANION GAP SERPL CALC-SCNC: 9 MMOL/L — SIGNIFICANT CHANGE UP (ref 5–17)
BUN SERPL-MCNC: 13 MG/DL — SIGNIFICANT CHANGE UP (ref 8–20)
CALCIUM SERPL-MCNC: 8.4 MG/DL — LOW (ref 8.6–10.2)
CHLORIDE SERPL-SCNC: 97 MMOL/L — LOW (ref 98–107)
CO2 SERPL-SCNC: 30 MMOL/L — HIGH (ref 22–29)
CREAT SERPL-MCNC: 1.19 MG/DL — SIGNIFICANT CHANGE UP (ref 0.5–1.3)
GLUCOSE BLDC GLUCOMTR-MCNC: 127 MG/DL — HIGH (ref 70–99)
GLUCOSE BLDC GLUCOMTR-MCNC: 141 MG/DL — HIGH (ref 70–99)
GLUCOSE BLDC GLUCOMTR-MCNC: 187 MG/DL — HIGH (ref 70–99)
GLUCOSE BLDC GLUCOMTR-MCNC: 197 MG/DL — HIGH (ref 70–99)
GLUCOSE SERPL-MCNC: 111 MG/DL — SIGNIFICANT CHANGE UP (ref 70–115)
HCT VFR BLD CALC: 29.4 % — LOW (ref 42–52)
HGB BLD-MCNC: 9.6 G/DL — LOW (ref 14–18)
MAGNESIUM SERPL-MCNC: 1.7 MG/DL — SIGNIFICANT CHANGE UP (ref 1.6–2.6)
MCHC RBC-ENTMCNC: 27.7 PG — SIGNIFICANT CHANGE UP (ref 27–31)
MCHC RBC-ENTMCNC: 32.7 G/DL — SIGNIFICANT CHANGE UP (ref 32–36)
MCV RBC AUTO: 84.7 FL — SIGNIFICANT CHANGE UP (ref 80–94)
PLATELET # BLD AUTO: 343 K/UL — SIGNIFICANT CHANGE UP (ref 150–400)
POTASSIUM SERPL-MCNC: 4 MMOL/L — SIGNIFICANT CHANGE UP (ref 3.5–5.3)
POTASSIUM SERPL-SCNC: 4 MMOL/L — SIGNIFICANT CHANGE UP (ref 3.5–5.3)
RBC # BLD: 3.47 M/UL — LOW (ref 4.6–6.2)
RBC # FLD: 13.5 % — SIGNIFICANT CHANGE UP (ref 11–15.6)
SODIUM SERPL-SCNC: 136 MMOL/L — SIGNIFICANT CHANGE UP (ref 135–145)
VANCOMYCIN TROUGH SERPL-MCNC: 14.9 UG/ML — SIGNIFICANT CHANGE UP (ref 10–20)
WBC # BLD: 4.6 K/UL — LOW (ref 4.8–10.8)
WBC # FLD AUTO: 4.6 K/UL — LOW (ref 4.8–10.8)

## 2018-12-29 PROCEDURE — 99232 SBSQ HOSP IP/OBS MODERATE 35: CPT

## 2018-12-29 RX ORDER — DOXAZOSIN MESYLATE 4 MG
2 TABLET ORAL AT BEDTIME
Refills: 0 | Status: DISCONTINUED | OUTPATIENT
Start: 2018-12-29 | End: 2019-01-02

## 2018-12-29 RX ADMIN — GABAPENTIN 300 MILLIGRAM(S): 400 CAPSULE ORAL at 06:09

## 2018-12-29 RX ADMIN — CEFEPIME 100 MILLIGRAM(S): 1 INJECTION, POWDER, FOR SOLUTION INTRAMUSCULAR; INTRAVENOUS at 06:07

## 2018-12-29 RX ADMIN — Medication 250 MILLIGRAM(S): at 17:02

## 2018-12-29 RX ADMIN — Medication 25 MILLIGRAM(S): at 17:02

## 2018-12-29 RX ADMIN — TAMSULOSIN HYDROCHLORIDE 0.8 MILLIGRAM(S): 0.4 CAPSULE ORAL at 22:11

## 2018-12-29 RX ADMIN — SODIUM CHLORIDE 2 GRAM(S): 9 INJECTION INTRAMUSCULAR; INTRAVENOUS; SUBCUTANEOUS at 22:10

## 2018-12-29 RX ADMIN — Medication 100 MILLIGRAM(S): at 11:59

## 2018-12-29 RX ADMIN — PANTOPRAZOLE SODIUM 40 MILLIGRAM(S): 20 TABLET, DELAYED RELEASE ORAL at 06:09

## 2018-12-29 RX ADMIN — Medication 150 MICROGRAM(S): at 06:09

## 2018-12-29 RX ADMIN — SODIUM CHLORIDE 2 GRAM(S): 9 INJECTION INTRAMUSCULAR; INTRAVENOUS; SUBCUTANEOUS at 06:09

## 2018-12-29 RX ADMIN — SODIUM CHLORIDE 2 GRAM(S): 9 INJECTION INTRAMUSCULAR; INTRAVENOUS; SUBCUTANEOUS at 13:36

## 2018-12-29 RX ADMIN — Medication 2 MILLIGRAM(S): at 22:11

## 2018-12-29 RX ADMIN — Medication 250 MILLIGRAM(S): at 06:09

## 2018-12-29 RX ADMIN — Medication 25 MILLIGRAM(S): at 06:09

## 2018-12-29 RX ADMIN — Medication 1: at 17:02

## 2018-12-29 RX ADMIN — GABAPENTIN 300 MILLIGRAM(S): 400 CAPSULE ORAL at 22:11

## 2018-12-29 RX ADMIN — ATORVASTATIN CALCIUM 40 MILLIGRAM(S): 80 TABLET, FILM COATED ORAL at 22:10

## 2018-12-29 RX ADMIN — ENOXAPARIN SODIUM 40 MILLIGRAM(S): 100 INJECTION SUBCUTANEOUS at 12:02

## 2018-12-29 RX ADMIN — Medication 100 MILLIGRAM(S): at 22:10

## 2018-12-29 RX ADMIN — GABAPENTIN 300 MILLIGRAM(S): 400 CAPSULE ORAL at 13:36

## 2018-12-29 NOTE — PROGRESS NOTE ADULT - SUBJECTIVE AND OBJECTIVE BOX
Patient seen and examined at bedside. comfortable in bed, pain controlled. Denies acute motor/sensory changes.    Vital Signs Last 24 Hrs  T(C): 37 (29 Dec 2018 04:03), Max: 37 (29 Dec 2018 04:03)  T(F): 98.6 (29 Dec 2018 04:03), Max: 98.6 (29 Dec 2018 04:03)  HR: 89 (29 Dec 2018 04:03) (89 - 89)  BP: 161/75 (29 Dec 2018 04:03) (159/84 - 171/82)  BP(mean): --  RR: 18 (29 Dec 2018 04:03) (18 - 20)  SpO2: 97% (29 Dec 2018 04:03) (95% - 97%)    General: Pt Alert and oriented, NAD, controlled pain.  Back Dressings saturated with serous drainage. No bleeding noted.  Dressing removed, + slow serous oozing noted from proximal aspect of incision site. No erythema   Pulses: 2+ dorsalis pedis pulse. Cap refill < 2 sec.  Sensation: Grossly intact to light touch without deficit.    A/P: A/P: 73yMale POD#31 s/p multilevel thoracolumbar fusion   cont BID dressing changes  DVTP  plan for OR with Dr. Gray 1/2/19

## 2018-12-29 NOTE — PROGRESS NOTE ADULT - ASSESSMENT
74 y/o male with PMHx of HTN, BPH, DM, Hypothyroid, and OA was sent to the ED from [facility] due to recurrent fevers (T-max 102) and back pain of 1 day duration. Patient was recently discharge from [facility] for thoracic laminectomy and fusion.  Patient said he has been doing well since dc until yesterday when he started having recurrent fevers and back pain. Patient has no   trauma/fall, neck pain, HA, numbness, tingling, bowel/urinary incontinence, chills, chest pain, shortness of breath, palpitation, cough, nausea/vomiting, abdominal pain. (   IMAGING WITH  WHAT APPEARS TO BE POST OP SEROMA  BLOOD CX X 2neg so far   PT NON TOXIC BUT   UNFORTUNATELY PT WITH HARDWARE IN PLACE AND placed on  EMPRIC ABX      PT NON TOXIC  wound still with DRAINAGE  ON ZOSYN VANCO   FOR OR NEXT WEEK

## 2018-12-29 NOTE — PROGRESS NOTE ADULT - ASSESSMENT
73 yr old male s/p T11/T10 laminectomy, T10-L1 fusion, hypertension, diabetes mellitus, hypothyroid was sent in from [facility] for fever and back pain. He was recently discharged from [facility] after surgery. Noted to have fever 102. He was evaluated by orthopedics in ED given recent surgery and CT of thoracolumbar spine with contrast was done, which showed post surgical changes and multiple foci of air, fluid and soft tissue edema. ID was consulted and he was started on Vancomycin and Cefepime. Orthopedics advised local wound care and possible seroma drainage if no improvement. Nephrology consulted for hyponatremia. Advised fluid restriction and increased salt tabs to 2 tabs three times a day. He was noted to Hb 7.7, was given 1 unit PRBC, Hb came up to 9.5    per Ortho keep pt in hospital c/w iv abx and a lot of serous drainage daily might need to be taken back to OR in the next few days per ortho    1) Post op seroma from thoracolumbar fusion:    - possibly infected   - c/w IV abx; appreciate ID follow up  - wound care per orthopedics as noted above  - ortho wants pt to complete abx in house to monitor due to large serous drainage daily might need to be taken back to OR in the next few days per ortho ? Wednesday  - H/H stable @9. transfuse PRN     2) SIADH: hyponatremia - stable  - NaCl tabs  - monitor levels    3) Hypomagnesemia: resolved  - monitor    4) Mild CLARI: resolved    5) HTN - controlled  - on lopressor     6) Anemia  - stable post transfusion  - monitor    7) DM2 - controlled (FS 140s-180s)  - CSI and finger stick monitoring     8) Hypothyroid  - c/w synthroid    9) Prophylactic measure  - DVT ppx: Lovenox SQ    10) BPH symptomatic  - Condom cath, flomax high dose 73 yr old male s/p T11/T10 laminectomy, T10-L1 fusion, hypertension, diabetes mellitus, hypothyroid was sent in from [facility] for fever and back pain. He was recently discharged from [facility] after surgery. Noted to have fever 102. He was evaluated by orthopedics in ED given recent surgery and CT of thoracolumbar spine with contrast was done, which showed post surgical changes and multiple foci of air, fluid and soft tissue edema. ID was consulted and he was started on Vancomycin and Cefepime. Orthopedics advised local wound care and possible seroma drainage if no improvement. Nephrology consulted for hyponatremia. Advised fluid restriction and increased salt tabs to 2 tabs three times a day. He was noted to Hb 7.7, was given 1 unit PRBC, Hb came up to 9.5    per Ortho keep pt in hospital c/w iv abx and a lot of serous drainage daily might need to be taken back to OR in the next few days per ortho    1) Post op seroma from thoracolumbar fusion:    - possibly infected   - c/w IV abx; appreciate ID follow up  - wound care per orthopedics as noted above  - ortho wants pt to complete abx in house to monitor due to large serous drainage daily might need to be taken back to OR in the next few days per ortho ? Wednesday  - H/H stable @9. transfuse PRN     2) SIADH: hyponatremia - stable  - NaCl tabs  - monitor levels    3) Hypomagnesemia: resolved  - monitor    4) Mild CLARI: resolved    5) HTN - uncontrolled  - adding cardura for BPH.  - monitor bP on same  - on lopressor     6) Anemia  - stable post transfusion  - monitor    7) DM2 - controlled (FS 140s-180s)  - CSI and finger stick monitoring     8) Hypothyroid  - c/w synthroid    9) Prophylactic measure  - DVT ppx: Lovenox SQ    10) BPH symptomatic  - Condom cath, flomax high dose

## 2018-12-29 NOTE — PROGRESS NOTE ADULT - SUBJECTIVE AND OBJECTIVE BOX
Patient seen and examined at bedside. comfortable in bed, denies acute motor/sensory changes. no complaints at this time.    Vital Signs Last 24 Hrs  T(C): 36.7 (29 Dec 2018 16:17), Max: 37 (29 Dec 2018 04:03)  T(F): 98.1 (29 Dec 2018 16:17), Max: 98.6 (29 Dec 2018 04:03)  HR: 90 (29 Dec 2018 16:17) (89 - 92)  BP: 144/66 (29 Dec 2018 16:17) (144/66 - 168/78)  BP(mean): --  RR: 18 (29 Dec 2018 12:00) (18 - 18)  SpO2: 97% (29 Dec 2018 16:17) (95% - 97%)    Back: dressing saturated with serosanguinous staining, removed. slow serous oozing noted from proximal aspect of incision  LE: SILT B/L. 4+/5 dorsi/plantarflexion RLE, 4/5 dorsi/plantarflexion LLE. 3/5 HF B/L. Ext warm cap refill brisk B/L. Calf soft NT B/L.    A/P: 73 y.o M s/p lami/fusion  - cont BID dressing changes  - DVTP  - plan for OR with Dr. Gray 1/2/19

## 2018-12-29 NOTE — PROGRESS NOTE ADULT - SUBJECTIVE AND OBJECTIVE BOX
HEALTH ISSUES - PROBLEM Dx:    CC- s/p spinal fusion- seroma    INTERVAL HPI/OVERNIGHT EVENTS:    pt comfortable  wants to cont condom cath  is pain free  says he is unable to ambulate without assistance. needs 3 person assist    REVIEW OF SYSTEMS:    urinary freq + urge + retention - fever - surg site serosanguinous oozing +     Vital Signs Last 24 Hrs  T(C): 36.7 (29 Dec 2018 16:17), Max: 37 (29 Dec 2018 04:03)  T(F): 98.1 (29 Dec 2018 16:17), Max: 98.6 (29 Dec 2018 04:03)  HR: 90 (29 Dec 2018 16:17) (89 - 92)  BP: 144/66 (29 Dec 2018 16:17) (144/66 - 171/82)  BP(mean): --  RR: 18 (29 Dec 2018 12:00) (18 - 20)  SpO2: 97% (29 Dec 2018 16:17) (95% - 97%)    PHYSICAL EXAM-  GEN: NAD, pleasant  NECK: Supple. No JVD  LUNGS: Clear to auscultation.  HEART: Regular rate and rhythm without murmur.  ABDOMEN: Soft, nontender, and nondistended.  Positive bowel sounds.  condom cath +  EXTREMITIES: Without any cyanosis, clubbing, rash, lesions or edema.  MSK: back- surg incisional dressing serosanguinous oozing +  NEUROLOGIC: Cranial nerves II through XII are grossly intact.  PSYCHIATRIC: Appropriate affect .  SKIN: No ulceration or induration present.    LABS:                        9.6    4.6   )-----------( 343      ( 29 Dec 2018 09:24 )             29.4     12-29    136  |  97<L>  |  13.0  ----------------------------<  111  4.0   |  30.0<H>  |  1.19    Ca    8.4<L>      29 Dec 2018 09:24  Mg     1.7     12-29    Assessment and Plan

## 2018-12-29 NOTE — PROGRESS NOTE ADULT - SUBJECTIVE AND OBJECTIVE BOX
[facility]  INFECTIOUS DISEASES AND INTERNAL MEDICINE at [place]  =======================================================  Wellington Collier MD  Diplomates American Board of Internal Medicine and Infectious Diseases  =======================================================    TIMUR LOUIS [id number]    Follow up: back pain SEROMA  PT AFEBRILE non toxic    Allergies:  No Known Allergies      Medications:  acetaminophen   Tablet .. 650 milliGRAM(s) Oral every 6 hours PRN  atorvastatin 40 milliGRAM(s) Oral at bedtime  cefepime   IVPB 2000 milliGRAM(s) IV Intermittent every 12 hours  dextrose 40% Gel 15 Gram(s) Oral once PRN  dextrose 5%. 1000 milliLiter(s) IV Continuous <Continuous>  dextrose 50% Injectable 12.5 Gram(s) IV Push once  dextrose 50% Injectable 25 Gram(s) IV Push once  dextrose 50% Injectable 25 Gram(s) IV Push once  docusate sodium 100 milliGRAM(s) Oral three times a day  enoxaparin Injectable 40 milliGRAM(s) SubCutaneous daily  gabapentin 300 milliGRAM(s) Oral three times a day  glucagon  Injectable 1 milliGRAM(s) IntraMuscular once PRN  insulin lispro (HumaLOG) corrective regimen sliding scale   SubCutaneous three times a day before meals  levothyroxine 150 MICROGram(s) Oral daily  methocarbamol 500 milliGRAM(s) Oral every 6 hours PRN  metoprolol succinate ER 25 milliGRAM(s) Oral daily  oxyCODONE    IR 10 milliGRAM(s) Oral every 4 hours PRN  senna 2 Tablet(s) Oral at bedtime  sodium chloride 1 Gram(s) Oral three times a day  tamsulosin 0.8 milliGRAM(s) Oral at bedtime  vancomycin  IVPB 1000 milliGRAM(s) IV Intermittent every 8 hours    SOCIAL       FAMILY   FAMILY HISTORY:  Family history of diabetes mellitus (Father)    REVIEW OF SYSTEMS:  CONSTITUTIONAL:  No Fever or chills  HEENT:   No diplopia or blurred vision.  No earache, sore throat or runny nose.  CARDIOVASCULAR:  No pressure, squeezing, strangling, tightness, heaviness or aching about the chest, neck, axilla or epigastrium.  RESPIRATORY:  No cough, shortness of breath, PND or orthopnea.  GASTROINTESTINAL:  No nausea, vomiting or diarrhea.  GENITOURINARY:  No dysuria, frequency or urgency. No Blood in urine  MUSCULOSKELETAL:   AS PER HPI  SKIN:  No change in skin, hair or nails.  NEUROLOGIC:  No paresthesias, fasciculations, seizures or weakness.  PSYCHIATRIC:  No disorder of thought or mood.  ENDOCRINE:  No heat or cold intolerance, polyuria or polydipsia.  HEMATOLOGICAL:  No easy bruising or bleeding.            Physical Exam:                       Vital Signs Last 24 Hrs  T(C): 37 (29 Dec 2018 04:03), Max: 37 (29 Dec 2018 04:03)  T(F): 98.6 (29 Dec 2018 04:03), Max: 98.6 (29 Dec 2018 04:03)  HR: 89 (29 Dec 2018 04:03) (89 - 90)  BP: 161/75 (29 Dec 2018 04:03) (159/84 - 181/88)  BP(mean): --  RR: 18 (29 Dec 2018 04:03) (18 - 20)  SpO2: 97% (29 Dec 2018 04:03) (95% - 97%)    GEN: NAD, pleasant  HEENT: normocephalic and atraumatic. EOMI. MANUEL.    NECK: Supple. No carotid bruits.  No lymphadenopathy or thyromegaly.  LUNGS: Clear to auscultation.  HEART: Regular rate and rhythm without murmur.  ABDOMEN: Soft, nontender, and nondistended.  Positive bowel sounds.    : No CVA tenderness  EXTREMITIES: Without any cyanosis, clubbing, rash, lesions or edema.  MSK: INCISION CLEAN SMALL Ar EA OF DRAINAGE  ARE SURROUNDING INCISION  SOFT NONTENDER NO ODOR  NEUROLOGIC: Cranial nerves II through XII are grossly intact.  PSYCHIATRIC: Appropriate affect .  SKIN: No ulceration or induration present.        Labs:                                                                  9.6    4.6   )-----------( 343      ( 29 Dec 2018 09:24 )             29.4   12-29    136  |  97<L>  |  13.0  ----------------------------<  111  4.0   |  30.0<H>  |  1.19    Ca    8.4<L>      29 Dec 2018 09:24  Mg     1.7     12-29 12-04 @ 15:14 .Urine     Culture grew 3 or more types of organisms which indicate  collection contamination; consider recollection only if clinically  indicated.  Results called to tata jacobo        12-02 @ 19:56 .Urine Klebsiella pneumoniae  Morganella morganii    10,000 - 49,000 CFU/mL Morganella morganii  10,000 - 49,000 CFU/mL Klebsiella pneumoniae        12-02 @ 10:35 .Blood     No growth at 5 days.

## 2018-12-30 LAB
GLUCOSE BLDC GLUCOMTR-MCNC: 139 MG/DL — HIGH (ref 70–99)
GLUCOSE BLDC GLUCOMTR-MCNC: 150 MG/DL — HIGH (ref 70–99)
GLUCOSE BLDC GLUCOMTR-MCNC: 161 MG/DL — HIGH (ref 70–99)
GLUCOSE BLDC GLUCOMTR-MCNC: 264 MG/DL — HIGH (ref 70–99)

## 2018-12-30 PROCEDURE — 99232 SBSQ HOSP IP/OBS MODERATE 35: CPT

## 2018-12-30 RX ADMIN — Medication 25 MILLIGRAM(S): at 07:09

## 2018-12-30 RX ADMIN — GABAPENTIN 300 MILLIGRAM(S): 400 CAPSULE ORAL at 22:25

## 2018-12-30 RX ADMIN — PANTOPRAZOLE SODIUM 40 MILLIGRAM(S): 20 TABLET, DELAYED RELEASE ORAL at 07:09

## 2018-12-30 RX ADMIN — GABAPENTIN 300 MILLIGRAM(S): 400 CAPSULE ORAL at 15:26

## 2018-12-30 RX ADMIN — TAMSULOSIN HYDROCHLORIDE 0.8 MILLIGRAM(S): 0.4 CAPSULE ORAL at 22:25

## 2018-12-30 RX ADMIN — Medication 250 MILLIGRAM(S): at 17:24

## 2018-12-30 RX ADMIN — ENOXAPARIN SODIUM 40 MILLIGRAM(S): 100 INJECTION SUBCUTANEOUS at 11:25

## 2018-12-30 RX ADMIN — Medication 1: at 17:24

## 2018-12-30 RX ADMIN — Medication 150 MICROGRAM(S): at 07:09

## 2018-12-30 RX ADMIN — Medication 25 MILLIGRAM(S): at 17:24

## 2018-12-30 RX ADMIN — SODIUM CHLORIDE 2 GRAM(S): 9 INJECTION INTRAMUSCULAR; INTRAVENOUS; SUBCUTANEOUS at 15:26

## 2018-12-30 RX ADMIN — SODIUM CHLORIDE 2 GRAM(S): 9 INJECTION INTRAMUSCULAR; INTRAVENOUS; SUBCUTANEOUS at 07:08

## 2018-12-30 RX ADMIN — SODIUM CHLORIDE 2 GRAM(S): 9 INJECTION INTRAMUSCULAR; INTRAVENOUS; SUBCUTANEOUS at 22:25

## 2018-12-30 RX ADMIN — GABAPENTIN 300 MILLIGRAM(S): 400 CAPSULE ORAL at 07:09

## 2018-12-30 RX ADMIN — CEFEPIME 100 MILLIGRAM(S): 1 INJECTION, POWDER, FOR SOLUTION INTRAMUSCULAR; INTRAVENOUS at 07:09

## 2018-12-30 RX ADMIN — Medication 2 MILLIGRAM(S): at 22:25

## 2018-12-30 RX ADMIN — Medication 3: at 11:25

## 2018-12-30 RX ADMIN — Medication 100 MILLIGRAM(S): at 10:50

## 2018-12-30 RX ADMIN — Medication 250 MILLIGRAM(S): at 07:09

## 2018-12-30 RX ADMIN — ATORVASTATIN CALCIUM 40 MILLIGRAM(S): 80 TABLET, FILM COATED ORAL at 22:26

## 2018-12-30 RX ADMIN — Medication 100 MILLIGRAM(S): at 22:26

## 2018-12-30 NOTE — PROGRESS NOTE ADULT - ASSESSMENT
73 yr old male s/p T11/T10 laminectomy, T10-L1 fusion, hypertension, diabetes mellitus, hypothyroid was sent in from [facility] for fever and back pain. He was recently discharged from [facility] after surgery. Noted to have fever 102. He was evaluated by orthopedics in ED given recent surgery and CT of thoracolumbar spine with contrast was done, which showed post surgical changes and multiple foci of air, fluid and soft tissue edema. ID was consulted and he was started on Vancomycin and Cefepime. Orthopedics advised local wound care and possible seroma drainage if no improvement. Nephrology consulted for hyponatremia. Advised fluid restriction and increased salt tabs to 2 tabs three times a day. He was noted to Hb 7.7, was given 1 unit PRBC, Hb came up to 9.5    per Ortho keep pt in hospital c/w iv abx and a lot of serous drainage daily might need to be taken back to OR in the next few days per ortho    1) Post op seroma from thoracolumbar fusion:    - possibly infected   - c/w IV abx; appreciate ID follow up  - wound care per orthopedics as noted above  - ortho wants pt to complete abx in house to monitor due to large serous drainage daily might need to be taken back to OR in the next few days per ortho on 1/2/18  - H/H stable @9. transfuse PRN     2) SIADH: hyponatremia - stable  - NaCl tabs  - monitor levels    3) Hypomagnesemia: resolved  - monitor    4) Mild CLARI: resolved    5) HTN - now controlled  - after adding cardura for BPH.  - monitor bP on same  - on lopressor     6) Anemia  - stable post transfusion  - monitor    7) DM2 - controlled (FS 140s-180s)  - CSI and finger stick monitoring     8) Hypothyroid  - c/w synthroid    9) Prophylactic measure  - DVT ppx: Lovenox SQ    10) BPH symptomatic  - Condom cath, flomax high dose. on cardura

## 2018-12-30 NOTE — PROGRESS NOTE ADULT - SUBJECTIVE AND OBJECTIVE BOX
Patient seen and examined at bedside.   Resting comfortably in bed   Denies acute motor/sensory changes.   No complaints at this time.    Vital Signs Last 24 Hrs  T(C): 36.8 (30 Dec 2018 05:02), Max: 36.9 (29 Dec 2018 12:00)  T(F): 98.3 (30 Dec 2018 05:02), Max: 98.5 (29 Dec 2018 20:15)  HR: 91 (30 Dec 2018 05:02) (81 - 92)  BP: 122/71 (30 Dec 2018 05:02) (122/71 - 168/78)  BP(mean): --  RR: 18 (29 Dec 2018 12:00) (18 - 18)  SpO2: 98% (29 Dec 2018 20:15) (97% - 98%)    Back: dressing noted with mild to moderate serosanguinous staining, removed. Minimal serous drainage noted from proximal aspect of incision  New dressing applied  LE: SILT B/L. 4+/5 dorsi/plantarflexion RLE, 4/5 dorsi/plantarflexion LLE. 3/5 HF B/L. Ext warm cap refill brisk B/L. Calf soft NT B/L.    A/P: 73 y.o M s/p lami/ fusion    - cont BID dressing changes as needed  - DVTP as prescribed with use of compression devices  - plan for OR with Dr. Gray later this week  - continue PT  - continue care per primary team

## 2018-12-30 NOTE — PROGRESS NOTE ADULT - SUBJECTIVE AND OBJECTIVE BOX
HEALTH ISSUES - PROBLEM Dx:  CC- s/p spinal fusion- seroma    INTERVAL HPI/OVERNIGHT EVENTS:    pt comfortable  is pain free  says he is unable to ambulate without assistance. needs 3 person assist    REVIEW OF SYSTEMS:    urinary freq + urge + retention - fever - surg site serosanguinous oozing +     Vital Signs Last 24 Hrs  T(C): 36.8 (30 Dec 2018 05:02), Max: 36.9 (29 Dec 2018 12:00)  T(F): 98.3 (30 Dec 2018 05:02), Max: 98.5 (29 Dec 2018 20:15)  HR: 91 (30 Dec 2018 05:02) (81 - 92)  BP: 122/71 (30 Dec 2018 05:02) (122/71 - 168/78)  BP(mean): --  RR: 18 (29 Dec 2018 12:00) (18 - 18)  SpO2: 98% (29 Dec 2018 20:15) (97% - 98%)    PHYSICAL EXAM-  GEN: NAD, pleasant  NECK: Supple. No JVD  LUNGS: Clear to auscultation.  HEART: Regular rate and rhythm without murmur.  ABDOMEN: Soft, nontender, and nondistended.  Positive bowel sounds.  condom cath +  EXTREMITIES: Without any cyanosis, clubbing, rash, lesions or edema.  MSK: back- surg incisional dressing serosanguinous oozing +  NEUROLOGIC: Cranial nerves II through XII are grossly intact.  PSYCHIATRIC: Appropriate affect .  SKIN: No ulceration or induration present.    LABS:                        9.6    4.6   )-----------( 343      ( 29 Dec 2018 09:24 )             29.4     12-29    136  |  97<L>  |  13.0  ----------------------------<  111  4.0   |  30.0<H>  |  1.19    Ca    8.4<L>      29 Dec 2018 09:24  Mg     1.7     12-29    Assessment and Plan

## 2018-12-31 LAB
GLUCOSE BLDC GLUCOMTR-MCNC: 133 MG/DL — HIGH (ref 70–99)
GLUCOSE BLDC GLUCOMTR-MCNC: 159 MG/DL — HIGH (ref 70–99)
GLUCOSE BLDC GLUCOMTR-MCNC: 179 MG/DL — HIGH (ref 70–99)
GLUCOSE BLDC GLUCOMTR-MCNC: 194 MG/DL — HIGH (ref 70–99)
VANCOMYCIN TROUGH SERPL-MCNC: 15.6 UG/ML — SIGNIFICANT CHANGE UP (ref 10–20)

## 2018-12-31 PROCEDURE — 99232 SBSQ HOSP IP/OBS MODERATE 35: CPT

## 2018-12-31 RX ORDER — SODIUM CHLORIDE 9 MG/ML
1000 INJECTION, SOLUTION INTRAVENOUS
Refills: 0 | Status: DISCONTINUED | OUTPATIENT
Start: 2019-01-01 | End: 2019-01-02

## 2018-12-31 RX ORDER — BUPIVACAINE 13.3 MG/ML
20 INJECTION, SUSPENSION, LIPOSOMAL INFILTRATION ONCE
Refills: 0 | Status: DISCONTINUED | OUTPATIENT
Start: 2019-01-02 | End: 2019-01-02

## 2018-12-31 RX ORDER — ENOXAPARIN SODIUM 100 MG/ML
40 INJECTION SUBCUTANEOUS
Refills: 0 | Status: COMPLETED | OUTPATIENT
Start: 2018-12-31 | End: 2018-12-31

## 2018-12-31 RX ADMIN — Medication 1: at 08:27

## 2018-12-31 RX ADMIN — Medication 250 MILLIGRAM(S): at 05:47

## 2018-12-31 RX ADMIN — GABAPENTIN 300 MILLIGRAM(S): 400 CAPSULE ORAL at 05:47

## 2018-12-31 RX ADMIN — Medication 250 MILLIGRAM(S): at 17:42

## 2018-12-31 RX ADMIN — ENOXAPARIN SODIUM 40 MILLIGRAM(S): 100 INJECTION SUBCUTANEOUS at 12:05

## 2018-12-31 RX ADMIN — SODIUM CHLORIDE 2 GRAM(S): 9 INJECTION INTRAMUSCULAR; INTRAVENOUS; SUBCUTANEOUS at 14:02

## 2018-12-31 RX ADMIN — Medication 100 MILLIGRAM(S): at 12:04

## 2018-12-31 RX ADMIN — Medication 25 MILLIGRAM(S): at 17:42

## 2018-12-31 RX ADMIN — SODIUM CHLORIDE 2 GRAM(S): 9 INJECTION INTRAMUSCULAR; INTRAVENOUS; SUBCUTANEOUS at 05:47

## 2018-12-31 RX ADMIN — Medication 1: at 12:05

## 2018-12-31 RX ADMIN — PANTOPRAZOLE SODIUM 40 MILLIGRAM(S): 20 TABLET, DELAYED RELEASE ORAL at 05:48

## 2018-12-31 RX ADMIN — CEFEPIME 100 MILLIGRAM(S): 1 INJECTION, POWDER, FOR SOLUTION INTRAMUSCULAR; INTRAVENOUS at 05:47

## 2018-12-31 RX ADMIN — GABAPENTIN 300 MILLIGRAM(S): 400 CAPSULE ORAL at 14:02

## 2018-12-31 RX ADMIN — Medication 25 MILLIGRAM(S): at 05:47

## 2018-12-31 RX ADMIN — Medication 150 MICROGRAM(S): at 05:47

## 2018-12-31 NOTE — PROGRESS NOTE ADULT - ATTENDING COMMENTS
A long discussion was had with the patient regarding surgical plan. Patient is aware that surgery is elective in nature and is choosing to proceed with surgery. Risks, benefits, alternatives were discussed and all questions, comments and concerns were answered to the patient's satisfaction. The statistical probability of improvement was discussed at length as well as post surgical course.  Literature was provided regarding the specific type of surgery as well as a written surgical consent which the patient will need to sign and return to the office prior to surgical date.    If patient is a smoker, discontinuation of smoking was advised and must be accomplished 6-8 weeks prior to surgery date.  Patient was advised that help with quitting smoking is available through [facility] Smoker's Quit Line and phone number/website was provided, or patient can ask assistance from primary care provider.  Elective surgery will not be performed unless patient complies with this policy.   Patients can be presented to the operating suite on January for second 2019 for evacuation of the seroma potential drain placement vancomycin placement as well. Risks questions, concerns or discussed after a lengthy period of nonoperative management and dressing changes I do believe this is indicated at this point in time. Patient is agreeable with it irrigation debridement of the thoracic/thoracic or lumbar postop seroma

## 2018-12-31 NOTE — PROGRESS NOTE ADULT - ASSESSMENT
74 y/o male with PMHx of HTN, BPH, DM, Hypothyroid, and OA was sent to the ED from [facility] due to recurrent fevers (T-max 102) and back pain of 1 day duration. Patient was recently discharge from [facility] for thoracic laminectomy and fusion.  Patient said he has been doing well since dc until yesterday when he started having recurrent fevers and back pain. Patient had no trauma/fall, neck pain, HA, numbness, tingling, bowel/urinary incontinence, chills, chest pain, shortness of breath, palpitation, cough, nausea/vomiting, abdominal pain.  It could be seroma or hematoma but cannot rule out Abscess.     - Blood cultures negative  - For surgical intervention on 1/2  - Please send for cultures after drainage or in case hardware removal   - Will continue empric cefepime 2gm daily and vancomycin 500mg q12h   - Will follow up vanco trough levels.   - Will switch ABx regimen based on culture results  - Possibly needs at least 6 weeks of treatment.   Will follow.

## 2018-12-31 NOTE — PROGRESS NOTE ADULT - ASSESSMENT
73 yr old male s/p T11/T10 laminectomy, T10-L1 fusion, hypertension, diabetes mellitus, hypothyroid was sent in from [facility] for fever and back pain. He was recently discharged from [facility] after surgery. Noted to have fever 102. He was evaluated by orthopedics in ED given recent surgery and CT of thoracolumbar spine with contrast was done, which showed post surgical changes and multiple foci of air, fluid and soft tissue edema. ID was consulted and he was started on Vancomycin and Cefepime. Orthopedics advised local wound care and possible seroma drainage if no improvement. Nephrology consulted for hyponatremia. Advised fluid restriction and increased salt tabs to 2 tabs three times a day. He was noted to Hb 7.7, was given 1 unit PRBC, Hb came up to 9.5    1) Post op seroma from thoracolumbar fusion:    - possibly infected   - c/w IV abx- per ID  - wound care per orthopedics as noted above  - ortho wants pt to complete abx in house to monitor due to large serous drainage daily might need to be taken back to OR in the next few days per ortho on 1/2/18  - H/H stable @9. transfuse PRN     2) SIADH: hyponatremia - stable  - NaCl tabs  - monitor levels    3) Hypomagnesemia: resolved  - monitor    4) Mild CLARI: resolved    5) HTN - now controlled  - after adding cardura for BPH.  - monitor bP on same  - on lopressor     6) Anemia  - stable post transfusion  - monitor    7) DM2 - controlled (FS 140s-180s)  - CSI and finger stick monitoring     8) Hypothyroid  - c/w synthroid    9) Prophylactic measure  - DVT ppx: Lovenox SQ    10) BPH symptomatic  - Condom cath, flomax high dose. on cardura

## 2018-12-31 NOTE — PROGRESS NOTE ADULT - SUBJECTIVE AND OBJECTIVE BOX
PA note: Present at patients bedside for pm dressing change.     Dressing: Current dressing with moderate serosanguinous staining. No active draining noted when dressing removed. New dressing applied.

## 2018-12-31 NOTE — PROGRESS NOTE ADULT - SUBJECTIVE AND OBJECTIVE BOX
· Subjective and Objective: 	  Patient seen and examined at bedside.   Resting comfortably in bed, mild headache upon sitting upright rates 2/10 and does not prevent ambulation with assistance.  No change in bowel or bladder.  Back pain controlled.   Denies acute motor/sensory changes.   No complaints at this time.    Vital Signs Last 24 Hrs  T(C): 37.2 (31 Dec 2018 04:00), Max: 37.2 (30 Dec 2018 22:57)  T(F): 98.9 (31 Dec 2018 04:00), Max: 98.9 (30 Dec 2018 22:57)  HR: 104 (31 Dec 2018 04:00) (84 - 104)  BP: 143/74 (31 Dec 2018 04:00) (116/64 - 166/74)  BP(mean): --  RR: 18 (31 Dec 2018 04:00) (18 - 18)  SpO2: 96% (31 Dec 2018 04:00) (95% - 96%)    Constitutional: Non toxic in appearance  Psychiatric: Alert and oriented x 3  Chest: No audible wheeze  Back: dressing noted with  moderate serosanguinous active draining when dressing changed, removed. There is an area of granuloma at the superior aspect and the mid aspect of incision.    New dressing applied  LE: SILT B/L. 4+/5 dorsi/plantarflexion RLE, 4/5 dorsi/plantarflexion LLE. 3/5 HF B/L. Ext warm cap refill brisk B/L. Calf soft NT B/L.    ESR 12/20- 55, 12/24-44  CRP 12/20-5, 12/24-3.6      A/P: 73 y.o M s/p lami/ fusion, seroma    - cont BID dressing changes as needed  - DVTP as prescribed with use of compression devices  - plan for OR with Dr. Gray 1/2/18  - Medical clearance for OR will be appreciated  - Watch CBC, if H/H below 9/24, consider transfusion prior to OR   - continue PT WBAT  - ESR/CRP trending down, continue IV ABX per ID.    - continue care per primary team  - Pre op orders done, will need to order NPO AFTER MN Tomorrow, 1 gm vanco on call to OR Wednesday 1/2/18 and will skip 10 pm dose

## 2018-12-31 NOTE — PROGRESS NOTE ADULT - SUBJECTIVE AND OBJECTIVE BOX
[facility]  INFECTIOUS DISEASES AND INTERNAL MEDICINE at [place]  =======================================================  Wellington Collier MD  Diplomates American Board of Internal Medicine and Infectious Diseases  =======================================================    TIMUR LOUIS [id number]    Follow up: back pain with seroma seen in imaging  Afebrile and non toxic    Allergies:  No Known Allergies      Medications:  cefepime   IVPB 2000 milliGRAM(s) IV Intermittent every 12 hours  vancomycin  IVPB 1000 milliGRAM(s) IV Intermittent every 8 hours     FAMILY HISTORY:  Family history of diabetes mellitus (Father)    REVIEW OF SYSTEMS:  CONSTITUTIONAL:  No Fever or chills  HEENT:   No diplopia or blurred vision.  No earache, sore throat or runny nose.  CARDIOVASCULAR:  No chest pain   RESPIRATORY:  No cough, shortness of breath, PND or orthopnea.  GASTROINTESTINAL:  No nausea, vomiting or diarrhea.  GENITOURINARY:  No dysuria, frequency or urgency. No Blood in urine  MUSCULOSKELETAL:   AS PER HPI  SKIN:  No change in skin, hair or nails.  NEUROLOGIC:  No paresthesias, fasciculations, seizures or weakness. backache   PSYCHIATRIC:  No disorder of thought or mood.  ENDOCRINE:  No heat or cold intolerance, polyuria or polydipsia.  HEMATOLOGICAL:  No easy bruising or bleeding.     Physical Exam:  Vital Signs Last 24 Hrs  T(C): 36.7 (31 Dec 2018 11:56), Max: 37.2 (30 Dec 2018 22:57)  T(F): 98 (31 Dec 2018 11:56), Max: 98.9 (30 Dec 2018 22:57)  HR: 97 (31 Dec 2018 11:56) (86 - 104)  BP: 117/77 (31 Dec 2018 11:56) (117/77 - 166/74)  BP(mean): --  RR: 18 (31 Dec 2018 11:56) (18 - 18)  SpO2: 95% (31 Dec 2018 11:56) (95% - 96%)  GEN: NAD, pleasant  HEENT: normocephalic and atraumatic. EOMI. MANUEL.    NECK: Supple. No carotid bruits.  No lymphadenopathy or thyromegaly.  LUNGS: Clear to auscultation.  HEART: Regular rate and rhythm without murmur.  ABDOMEN: Soft, nontender, and nondistended.  Positive bowel sounds.    : No CVA tenderness  EXTREMITIES: Without any cyanosis, clubbing, rash, lesions or edema.  MSK: Healing wound, clean with small area of opening and discharge, no tenderness or cellulitis.   NEUROLOGIC: grossly intact.  PSYCHIATRIC: Appropriate affect .  SKIN: No ulceration or induration present.    Labs:             9.6    4.6   )-----------( 343      ( 29 Dec 2018 09:24 )             29.4   12-29    136  |  97<L>  |  13.0  ----------------------------<  111  4.0   |  30.0<H>  |  1.19    Ca    8.4<L>      29 Dec 2018 09:24  Mg     1.7     12-29    12-04 @ 15:14 .Urine     Culture grew 3 or more types of organisms which indicate  collection contamination; consider recollection only if clinically  indicated.  Results called to tata jacobo    12-02 @ 19:56 .Urine Klebsiella pneumoniae  Morganella morganii    10,000 - 49,000 CFU/mL Morganella morganii  10,000 - 49,000 CFU/mL Klebsiella pneumoniae    12-02 @ 10:35 .Blood     No growth at 5 days.    Imaging and data reveiwed.  CT 12/12  IMPRESSION:   Status post posterior surgical fusion hardware at T10-L1 and bilateral   T12-L1 laminectomies. Interval widening of the anterior disc space at   T11-T12 since thoracolumbar CT of 11/27/18.  Nonspecific posterior paraspinal 6.2 x 4.0 x 16.5 cm fluid collection   extending from T9 - L2, with multiple foci of air and peripheral   enhancement, which may represent postsurgical seroma/hematoma although   abscess cannot be fully excluded. No other fluid collection.  Remaining findings are unchanged.

## 2018-12-31 NOTE — PROGRESS NOTE ADULT - SUBJECTIVE AND OBJECTIVE BOX
The patient is a 73y old male who presents with a complaint of not healing well after a spinal   fusion 9 days ago.  He said he can't walk and they need to take another look.  He is scheduled   to have an IRRIGATION AND DEBRIDEMENT, EXPLORATION SPINAL FUSION THORACOLUMBAR  SPINE.             (31 Dec 2018 20:48)      PAST MEDICAL HISTORY:  Diabetes x 20 years  Hypertension x 15 years  Hypothyroid  Hyperlipidemia  Osteoarthritis  Sleep Apnea - uses a mouth piece rather than a CPAP machine    PAST SURGICAL HISTORY:  Spinal Fusion 9 days ago  Right inguinal hernia repair - 30 years ago  Right hip replacement 3 years ago  Thyroid surgery    MEDICATIONS  (STANDING):  atorvastatin 40 milliGRAM(s) Oral at bedtime  cefepime   IVPB 2000 milliGRAM(s) IV Intermittent <User Schedule>  dextrose 5%. 1000 milliLiter(s) (50 mL/Hr) IV Continuous <Continuous>  dextrose 50% Injectable 12.5 Gram(s) IV Push once  dextrose 50% Injectable 25 Gram(s) IV Push once  dextrose 50% Injectable 25 Gram(s) IV Push once  doxazosin 2 milliGRAM(s) Oral at bedtime  gabapentin 300 milliGRAM(s) Oral three times a day  insulin lispro (HumaLOG) corrective regimen sliding scale   SubCutaneous three times a day before meals  levothyroxine 150 MICROGram(s) Oral daily  metoprolol tartrate 25 milliGRAM(s) Oral two times a day  pantoprazole    Tablet 40 milliGRAM(s) Oral before breakfast  saccharomyces boulardii 250 milliGRAM(s) Oral two times a day  sodium chloride 2 Gram(s) Oral three times a day  tamsulosin 0.8 milliGRAM(s) Oral at bedtime  vancomycin  IVPB 500 milliGRAM(s) IV Intermittent every 12 hours    MEDICATIONS  (PRN):  acetaminophen   Tablet .. 650 milliGRAM(s) Oral every 6 hours PRN Temp greater or equal to 38C (100.4F), Mild Pain (1 - 3)  dextrose 40% Gel 15 Gram(s) Oral once PRN Blood Glucose LESS THAN 70 milliGRAM(s)/deciliter  glucagon  Injectable 1 milliGRAM(s) IntraMuscular once PRN Glucose LESS THAN 70 milligrams/deciliter  methocarbamol 500 milliGRAM(s) Oral every 6 hours PRN Muscle Spasm  senna 2 Tablet(s) Oral at bedtime PRN Constipation      Allergies:    No Known Allergies      SOCIAL HISTORY:  The patient quit drinking alcohol 45 years ago.  He quit smoking 30 years                               ago after smoking 2 ppd x 25 years.  He never used illicit drugs.    PT/INR - ( 11 Dec 2018 21:34 )   PT: 18.1 sec;   INR: 1.55 ratio         PTT - ( 11 Dec 2018 21:34 )  PTT:32.1 sec    EKG:  -  12/14/2018  Sinus tachycardia  Low voltage QRS Inferolateral leads  Abnormal ECG    TT ECHO:  -  none    MR THORACIC SPINE  -  11/27/2018  IMPRESSION: Mild cord compression at T11/T12, unchanged. No cord signal   abnormality. Limited study secondary to motion artifact.     MR CERVICAL SPINE  -  11/27/2018  IMPRESSION:      Asymmetric to the right discherniation at C2/C3 mildly flattening   the ventral aspect of the cord. No cord signal abnormality. Additional   mild multilevel discogenic degenerative changes.    MR LUMBAR SPINE  -  11/26/2018  IMPRESSION:  Osteophyte and dorsal ligamentous ossification notable for spinal cord   compression at T11-12 with less significant findings elsewhere. No acute   findings    ASA # =  3   Mallampati # = 3

## 2018-12-31 NOTE — PROGRESS NOTE ADULT - SUBJECTIVE AND OBJECTIVE BOX
HEALTH ISSUES - PROBLEM Dx:    CC- s/p spinal fusion- seroma    INTERVAL HPI/OVERNIGHT EVENTS:    pt comfortable  is pain free    REVIEW OF SYSTEMS:    urinary freq + urge + retention - fever - surg site serosanguinous oozing +     Vital Signs Last 24 Hrs  T(C): 36.7 (31 Dec 2018 11:56), Max: 37.2 (30 Dec 2018 22:57)  T(F): 98 (31 Dec 2018 11:56), Max: 98.9 (30 Dec 2018 22:57)  HR: 97 (31 Dec 2018 11:56) (86 - 104)  BP: 117/77 (31 Dec 2018 11:56) (117/77 - 166/74)  BP(mean): --  RR: 18 (31 Dec 2018 11:56) (18 - 18)  SpO2: 95% (31 Dec 2018 11:56) (95% - 96%)    PHYSICAL EXAM-  GEN: NAD, pleasant  NECK: Supple. No JVD  LUNGS: Clear to auscultation.  HEART: Regular rate and rhythm without murmur.  ABDOMEN: Soft, nontender, and nondistended.  Positive bowel sounds.  condom cath +  EXTREMITIES: Without any cyanosis, clubbing, rash, lesions or edema.  MSK: back- surg incisional dressing serosanguinous oozing +  NEUROLOGIC: Cranial nerves II through XII are grossly intact.  PSYCHIATRIC: Appropriate affect .  SKIN: No ulceration or induration present.    LABS:    Assessment and Plan

## 2019-01-01 ENCOUNTER — TRANSCRIPTION ENCOUNTER (OUTPATIENT)
Age: 74
End: 2019-01-01

## 2019-01-01 LAB
BLD GP AB SCN SERPL QL: SIGNIFICANT CHANGE UP
GLUCOSE BLDC GLUCOMTR-MCNC: 133 MG/DL — HIGH (ref 70–99)
GLUCOSE BLDC GLUCOMTR-MCNC: 133 MG/DL — HIGH (ref 70–99)
GLUCOSE BLDC GLUCOMTR-MCNC: 135 MG/DL — HIGH (ref 70–99)
GLUCOSE BLDC GLUCOMTR-MCNC: 279 MG/DL — HIGH (ref 70–99)
HCT VFR BLD CALC: 29.6 % — LOW (ref 42–52)
HGB BLD-MCNC: 9.2 G/DL — LOW (ref 14–18)
INR BLD: 1.4 RATIO — HIGH (ref 0.88–1.16)
MCHC RBC-ENTMCNC: 26.9 PG — LOW (ref 27–31)
MCHC RBC-ENTMCNC: 31.1 G/DL — LOW (ref 32–36)
MCV RBC AUTO: 86.5 FL — SIGNIFICANT CHANGE UP (ref 80–94)
PLATELET # BLD AUTO: 324 K/UL — SIGNIFICANT CHANGE UP (ref 150–400)
PROTHROM AB SERPL-ACNC: 16.3 SEC — HIGH (ref 10–12.9)
RBC # BLD: 3.42 M/UL — LOW (ref 4.6–6.2)
RBC # FLD: 14.1 % — SIGNIFICANT CHANGE UP (ref 11–15.6)
TYPE + AB SCN PNL BLD: SIGNIFICANT CHANGE UP
WBC # BLD: 6.2 K/UL — SIGNIFICANT CHANGE UP (ref 4.8–10.8)
WBC # FLD AUTO: 6.2 K/UL — SIGNIFICANT CHANGE UP (ref 4.8–10.8)

## 2019-01-01 PROCEDURE — 99232 SBSQ HOSP IP/OBS MODERATE 35: CPT

## 2019-01-01 PROCEDURE — 99231 SBSQ HOSP IP/OBS SF/LOW 25: CPT | Mod: 24

## 2019-01-01 RX ADMIN — Medication 2 MILLIGRAM(S): at 22:18

## 2019-01-01 RX ADMIN — SODIUM CHLORIDE 2 GRAM(S): 9 INJECTION INTRAMUSCULAR; INTRAVENOUS; SUBCUTANEOUS at 05:48

## 2019-01-01 RX ADMIN — ATORVASTATIN CALCIUM 40 MILLIGRAM(S): 80 TABLET, FILM COATED ORAL at 22:19

## 2019-01-01 RX ADMIN — CEFEPIME 100 MILLIGRAM(S): 1 INJECTION, POWDER, FOR SOLUTION INTRAMUSCULAR; INTRAVENOUS at 05:49

## 2019-01-01 RX ADMIN — Medication 150 MICROGRAM(S): at 05:49

## 2019-01-01 RX ADMIN — GABAPENTIN 300 MILLIGRAM(S): 400 CAPSULE ORAL at 05:49

## 2019-01-01 RX ADMIN — Medication 3: at 11:56

## 2019-01-01 RX ADMIN — GABAPENTIN 300 MILLIGRAM(S): 400 CAPSULE ORAL at 14:45

## 2019-01-01 RX ADMIN — TAMSULOSIN HYDROCHLORIDE 0.8 MILLIGRAM(S): 0.4 CAPSULE ORAL at 00:42

## 2019-01-01 RX ADMIN — GABAPENTIN 300 MILLIGRAM(S): 400 CAPSULE ORAL at 22:18

## 2019-01-01 RX ADMIN — ATORVASTATIN CALCIUM 40 MILLIGRAM(S): 80 TABLET, FILM COATED ORAL at 00:39

## 2019-01-01 RX ADMIN — SODIUM CHLORIDE 2 GRAM(S): 9 INJECTION INTRAMUSCULAR; INTRAVENOUS; SUBCUTANEOUS at 14:45

## 2019-01-01 RX ADMIN — Medication 2 MILLIGRAM(S): at 00:39

## 2019-01-01 RX ADMIN — Medication 25 MILLIGRAM(S): at 05:49

## 2019-01-01 RX ADMIN — Medication 100 MILLIGRAM(S): at 00:44

## 2019-01-01 RX ADMIN — Medication 250 MILLIGRAM(S): at 17:04

## 2019-01-01 RX ADMIN — PANTOPRAZOLE SODIUM 40 MILLIGRAM(S): 20 TABLET, DELAYED RELEASE ORAL at 08:21

## 2019-01-01 RX ADMIN — SODIUM CHLORIDE 2 GRAM(S): 9 INJECTION INTRAMUSCULAR; INTRAVENOUS; SUBCUTANEOUS at 00:41

## 2019-01-01 RX ADMIN — Medication 100 MILLIGRAM(S): at 11:56

## 2019-01-01 RX ADMIN — Medication 25 MILLIGRAM(S): at 17:04

## 2019-01-01 RX ADMIN — GABAPENTIN 300 MILLIGRAM(S): 400 CAPSULE ORAL at 00:39

## 2019-01-01 RX ADMIN — SODIUM CHLORIDE 2 GRAM(S): 9 INJECTION INTRAMUSCULAR; INTRAVENOUS; SUBCUTANEOUS at 22:18

## 2019-01-01 RX ADMIN — TAMSULOSIN HYDROCHLORIDE 0.8 MILLIGRAM(S): 0.4 CAPSULE ORAL at 22:18

## 2019-01-01 RX ADMIN — Medication 250 MILLIGRAM(S): at 05:49

## 2019-01-01 NOTE — PROGRESS NOTE ADULT - ASSESSMENT
73 yr old male s/p T11/T10 laminectomy, T10-L1 fusion, hypertension, diabetes mellitus, hypothyroid was sent in from [facility] for fever and back pain. He was recently discharged from [facility] after surgery. Noted to have fever 102. He was evaluated by orthopedics in ED given recent surgery and CT of thoracolumbar spine with contrast was done, which showed post surgical changes and multiple foci of air, fluid and soft tissue edema. ID was consulted and he was started on Vancomycin and Cefepime. Orthopedics advised local wound care and possible seroma drainage if no improvement. Nephrology consulted for hyponatremia. Advised fluid restriction and increased salt tabs to 2 tabs three times a day. He was noted to Hb 7.7, was given 1 unit PRBC, Hb came up to 9.5    1) Post op seroma from thoracolumbar fusion:    - possibly infected   - c/w IV abx- per ID  - wound care per orthopedics as noted above  - ortho wants pt to complete abx in house to monitor due to large serous drainage daily might need to be taken back to OR in the next few days per ortho on 1/2/18  - H/H stable @9. transfuse PRN   - Cont current antibiotic regimen. further changes will be done according to OR C/S. anticipating total 6 weeks of antibiotics per ID.    2) SIADH: hyponatremia - stable  - NaCl tabs  - monitor levels    3) Hypomagnesemia: resolved  - monitor    4) Mild CLARI: resolved    5) HTN - now controlled  - after adding cardura for BPH.  - monitor bP on same  - on lopressor     6) Anemia  - stable post transfusion  - monitor    7) DM2 - controlled (FS 140s-180s)  - CSI and finger stick monitoring     8) Hypothyroid  - c/w synthroid    9) Prophylactic measure  - DVT ppx: Lovenox SQ    10) BPH symptomatic  - Condom cath, flomax high dose. on cardura    All comorbidities compensated/ controlled. Stable H/H and therapeutic levels of antibiotics. Patient has no clear contraindication to undergo Planned Surgery.

## 2019-01-01 NOTE — PROGRESS NOTE ADULT - SUBJECTIVE AND OBJECTIVE BOX
[facility]  INFECTIOUS DISEASES AND INTERNAL MEDICINE at [place]  =======================================================  Wellington Collier MD  Diplomates American Board of Internal Medicine and Infectious Diseases  =======================================================    HERIBERTO CANDIDOTOYA [id number]    Follow up: back pain with seroma seen in imaging   Afebrile and non toxic, back wound with some discharge.     Allergies:  No Known Allergies    Medications:  cefepime   IVPB 2000 milliGRAM(s) IV Intermittent every 12 hours  vancomycin  IVPB 1000 milliGRAM(s) IV Intermittent every 8 hours     FAMILY HISTORY:  Family history of diabetes mellitus (Father)    REVIEW OF SYSTEMS:  CONSTITUTIONAL:  No Fever or chills  HEENT:   No diplopia or blurred vision.  No earache, sore throat or runny nose.  CARDIOVASCULAR:  No chest pain   RESPIRATORY:  No cough, shortness of breath, PND or orthopnea.  GASTROINTESTINAL:  No nausea, vomiting or diarrhea.  GENITOURINARY:  No dysuria, frequency or urgency. No Blood in urine  MUSCULOSKELETAL:   AS PER HPI  SKIN:  No change in skin, hair or nails.  NEUROLOGIC:  No paresthesias, fasciculations, seizures or weakness. backache   PSYCHIATRIC:  No disorder of thought or mood.  ENDOCRINE:  No heat or cold intolerance, polyuria or polydipsia.  HEMATOLOGICAL:  No easy bruising or bleeding.     Physical Exam:  Vital Signs Last 24 Hrs  T(C): 37.2 (01 Jan 2019 12:03), Max: 37.2 (01 Jan 2019 12:03)  T(F): 98.9 (01 Jan 2019 12:03), Max: 98.9 (01 Jan 2019 12:03)  HR: 85 (01 Jan 2019 12:03) (85 - 98)  BP: 113/56 (01 Jan 2019 12:03) (113/56 - 148/49)  RR: 18 (01 Jan 2019 12:03) (18 - 19)  SpO2: 95% (31 Dec 2018 21:38) (95% - 95%)  GEN: NAD, pleasant  HEENT: normocephalic and atraumatic. EOMI. MANUEL.    NECK: Supple. No carotid bruits.  No lymphadenopathy or thyromegaly.  LUNGS: Clear to auscultation.  HEART: Regular rate and rhythm without murmur.  ABDOMEN: Soft, nontender, and nondistended.  Positive bowel sounds.    : No CVA tenderness  EXTREMITIES: Without any cyanosis, clubbing, rash, lesions or edema.  MSK: Healing wound, clean with small area of opening and discharge, no tenderness or cellulitis.   NEUROLOGIC: grossly intact.  PSYCHIATRIC: Appropriate affect .  SKIN: No ulceration or induration present.    Labs:  PT/INR - ( 01 Jan 2019 07:45 )   PT: 16.3 sec;   INR: 1.40 ratio      RECENT CULTURES:  None    Imaging and data reveiwed.  CT 12/12  IMPRESSION:   Status post posterior surgical fusion hardware at T10-L1 and bilateral   T12-L1 laminectomies. Interval widening of the anterior disc space at   T11-T12 since thoracolumbar CT of 11/27/18.  Nonspecific posterior paraspinal 6.2 x 4.0 x 16.5 cm fluid collection   extending from T9 - L2, with multiple foci of air and peripheral   enhancement, which may represent postsurgical seroma/hematoma although   abscess cannot be fully excluded. No other fluid collection.  Remaining findings are unchanged.

## 2019-01-01 NOTE — PROGRESS NOTE ADULT - SUBJECTIVE AND OBJECTIVE BOX
Patient seen and examined at bedside. comfortable in bed, denies acute motor/sensory changes. No complaints at this time.    Vital Signs Last 24 Hrs  T(C): 36.7 (01 Jan 2019 04:04), Max: 37.1 (31 Dec 2018 21:38)  T(F): 98 (01 Jan 2019 04:04), Max: 98.7 (31 Dec 2018 21:38)  HR: 98 (01 Jan 2019 04:04) (88 - 98)  BP: 117/67 (01 Jan 2019 04:04) (117/67 - 148/49)  BP(mean): --  RR: 19 (31 Dec 2018 21:38) (18 - 19)  SpO2: 95% (31 Dec 2018 21:38) (95% - 95%)    Constitutional: Non toxic in appearance  Psychiatric: Alert and oriented x 3  Chest: No audible wheeze  Back: dressing noted saturated with serosanguinous staining. + active slow oozing from proximal aspect of incision site. no erythema  New dressing applied  LE: SILT B/L. 4+/5 dorsi/plantarflexion RLE, 4/5 dorsi/plantarflexion LLE. 3/5 HF B/L. Ext warm cap refill brisk B/L. Calf soft NT B/L.        A/P: 73 y.o M s/p lami/ fusion, seroma    - cont BID dressing changes as needed  - DVTP as prescribed with use of compression devices  - plan for OR with Dr. Gray 1/2/18  - Medical clearance for OR requested  - continue PT WBAT  - continue care per primary team

## 2019-01-01 NOTE — PROGRESS NOTE ADULT - SUBJECTIVE AND OBJECTIVE BOX
PA note- Dressing change    Lumbar dressing removed. Moderate amount of active serosanguinous drainage from the proximal incision. New dressing applied.

## 2019-01-01 NOTE — PROGRESS NOTE ADULT - ASSESSMENT
74 y/o male with PMHx of HTN, BPH, DM, Hypothyroid, and OA was sent to the ED from [facility] due to recurrent fevers (T-max 102) and back pain of 1 day duration. Patient was recently discharge from [facility] for thoracic laminectomy and fusion.  Patient said he has been doing well since dc until yesterday when he started having recurrent fevers and back pain. Patient had no trauma/fall, neck pain, HA, numbness, tingling, bowel/urinary incontinence, chills, chest pain, shortness of breath, palpitation, cough, nausea/vomiting, abdominal pain.  It could be seroma or hematoma but cannot rule out Abscess.     - Blood cultures negative  - For surgical intervention on 1/2  - Please send for cultures after drainage or in case hardware removal   - Will continue empiric cefepime 2gm daily and vancomycin 500mg q12h   - Will follow up vanco trough levels.   - Will switch ABx regimen based on culture results  - Possibly needs at least 6 weeks of treatment.   Will follow.

## 2019-01-01 NOTE — PROGRESS NOTE ADULT - SUBJECTIVE AND OBJECTIVE BOX
HEALTH ISSUES - PROBLEM Dx:    CC- s/p spinal fusion- seroma    INTERVAL HPI/OVERNIGHT EVENTS:    pt comfortable  is pain free    REVIEW OF SYSTEMS:    urinary freq + urge + retention - fever - surg site serosanguinous oozing +     Vital Signs Last 24 Hrs  T(C): 36.7 (01 Jan 2019 04:04), Max: 37.1 (31 Dec 2018 21:38)  T(F): 98 (01 Jan 2019 04:04), Max: 98.7 (31 Dec 2018 21:38)  HR: 98 (01 Jan 2019 04:04) (88 - 98)  BP: 117/67 (01 Jan 2019 04:04) (117/67 - 148/49)  BP(mean): --  RR: 19 (31 Dec 2018 21:38) (18 - 19)  SpO2: 95% (31 Dec 2018 21:38) (95% - 95%)    PHYSICAL EXAM-  GEN: NAD, pleasant  NECK: Supple. No JVD  LUNGS: Clear to auscultation.  HEART: Regular rate and rhythm without murmur.  ABDOMEN: Soft, nontender, and nondistended.  Positive bowel sounds.  condom cath +  EXTREMITIES: Without any cyanosis, clubbing, rash, lesions or edema.  MSK: back- surg incisional dressing serosanguinous oozing +  NEUROLOGIC: Cranial nerves II through XII are grossly intact. 4+/5 dorsi/plantarflexion RLE, 4/5 dorsi/plantarflexion LLE. 3/5 HF B/L. Ext warm. no calf tenderness  PSYCHIATRIC: Appropriate affect .  SKIN: No ulceration or induration present.    LABS:    PT/INR - ( 01 Jan 2019 07:45 )   PT: 16.3 sec;   INR: 1.40 ratio       Assessment and Plan

## 2019-01-02 LAB
ANION GAP SERPL CALC-SCNC: 12 MMOL/L — SIGNIFICANT CHANGE UP (ref 5–17)
APTT BLD: 32.5 SEC — SIGNIFICANT CHANGE UP (ref 27.5–36.3)
BUN SERPL-MCNC: 14 MG/DL — SIGNIFICANT CHANGE UP (ref 8–20)
CALCIUM SERPL-MCNC: 8.1 MG/DL — LOW (ref 8.6–10.2)
CHLORIDE SERPL-SCNC: 97 MMOL/L — LOW (ref 98–107)
CO2 SERPL-SCNC: 27 MMOL/L — SIGNIFICANT CHANGE UP (ref 22–29)
CREAT SERPL-MCNC: 1.35 MG/DL — HIGH (ref 0.5–1.3)
GLUCOSE BLDC GLUCOMTR-MCNC: 113 MG/DL — HIGH (ref 70–99)
GLUCOSE BLDC GLUCOMTR-MCNC: 118 MG/DL — HIGH (ref 70–99)
GLUCOSE BLDC GLUCOMTR-MCNC: 124 MG/DL — HIGH (ref 70–99)
GLUCOSE BLDC GLUCOMTR-MCNC: 140 MG/DL — HIGH (ref 70–99)
GLUCOSE SERPL-MCNC: 118 MG/DL — HIGH (ref 70–115)
HCT VFR BLD CALC: 28.6 % — LOW (ref 42–52)
HCT VFR BLD CALC: 31.4 % — LOW (ref 42–52)
HGB BLD-MCNC: 8.8 G/DL — LOW (ref 14–18)
HGB BLD-MCNC: 9.9 G/DL — LOW (ref 14–18)
INR BLD: 1.43 RATIO — HIGH (ref 0.88–1.16)
MCHC RBC-ENTMCNC: 26.5 PG — LOW (ref 27–31)
MCHC RBC-ENTMCNC: 30.8 G/DL — LOW (ref 32–36)
MCV RBC AUTO: 86.1 FL — SIGNIFICANT CHANGE UP (ref 80–94)
PLATELET # BLD AUTO: 296 K/UL — SIGNIFICANT CHANGE UP (ref 150–400)
POTASSIUM SERPL-MCNC: 4.2 MMOL/L — SIGNIFICANT CHANGE UP (ref 3.5–5.3)
POTASSIUM SERPL-SCNC: 4.2 MMOL/L — SIGNIFICANT CHANGE UP (ref 3.5–5.3)
PROTHROM AB SERPL-ACNC: 16.6 SEC — HIGH (ref 10–12.9)
RBC # BLD: 3.32 M/UL — LOW (ref 4.6–6.2)
RBC # FLD: 13.9 % — SIGNIFICANT CHANGE UP (ref 11–15.6)
SODIUM SERPL-SCNC: 136 MMOL/L — SIGNIFICANT CHANGE UP (ref 135–145)
WBC # BLD: 5.9 K/UL — SIGNIFICANT CHANGE UP (ref 4.8–10.8)
WBC # FLD AUTO: 5.9 K/UL — SIGNIFICANT CHANGE UP (ref 4.8–10.8)

## 2019-01-02 PROCEDURE — 10140 I&D HMTMA SEROMA/FLUID COLLJ: CPT | Mod: 78

## 2019-01-02 PROCEDURE — 99232 SBSQ HOSP IP/OBS MODERATE 35: CPT

## 2019-01-02 RX ORDER — ONDANSETRON 8 MG/1
4 TABLET, FILM COATED ORAL EVERY 8 HOURS
Refills: 0 | Status: DISCONTINUED | OUTPATIENT
Start: 2019-01-02 | End: 2019-01-02

## 2019-01-02 RX ORDER — CEFEPIME 1 G/1
2000 INJECTION, POWDER, FOR SOLUTION INTRAMUSCULAR; INTRAVENOUS DAILY
Refills: 0 | Status: DISCONTINUED | OUTPATIENT
Start: 2019-01-02 | End: 2019-01-04

## 2019-01-02 RX ORDER — MORPHINE SULFATE 50 MG/1
2 CAPSULE, EXTENDED RELEASE ORAL EVERY 6 HOURS
Refills: 0 | Status: DISCONTINUED | OUTPATIENT
Start: 2019-01-02 | End: 2019-01-02

## 2019-01-02 RX ORDER — HYDROMORPHONE HYDROCHLORIDE 2 MG/ML
0.5 INJECTION INTRAMUSCULAR; INTRAVENOUS; SUBCUTANEOUS
Refills: 0 | Status: DISCONTINUED | OUTPATIENT
Start: 2019-01-02 | End: 2019-01-02

## 2019-01-02 RX ORDER — SODIUM CHLORIDE 9 MG/ML
2 INJECTION INTRAMUSCULAR; INTRAVENOUS; SUBCUTANEOUS THREE TIMES A DAY
Refills: 0 | Status: DISCONTINUED | OUTPATIENT
Start: 2019-01-02 | End: 2019-01-07

## 2019-01-02 RX ORDER — TRAMADOL HYDROCHLORIDE 50 MG/1
25 TABLET ORAL ONCE
Refills: 0 | Status: DISCONTINUED | OUTPATIENT
Start: 2019-01-02 | End: 2019-01-02

## 2019-01-02 RX ORDER — SODIUM CHLORIDE 9 MG/ML
1000 INJECTION, SOLUTION INTRAVENOUS
Refills: 0 | Status: DISCONTINUED | OUTPATIENT
Start: 2019-01-02 | End: 2019-01-04

## 2019-01-02 RX ORDER — ENOXAPARIN SODIUM 100 MG/ML
40 INJECTION SUBCUTANEOUS EVERY 24 HOURS
Refills: 0 | Status: DISCONTINUED | OUTPATIENT
Start: 2019-01-04 | End: 2019-01-10

## 2019-01-02 RX ORDER — ONDANSETRON 8 MG/1
4 TABLET, FILM COATED ORAL EVERY 6 HOURS
Refills: 0 | Status: DISCONTINUED | OUTPATIENT
Start: 2019-01-03 | End: 2019-01-10

## 2019-01-02 RX ORDER — OXYCODONE HYDROCHLORIDE 5 MG/1
5 TABLET ORAL EVERY 4 HOURS
Refills: 0 | Status: DISCONTINUED | OUTPATIENT
Start: 2019-01-02 | End: 2019-01-09

## 2019-01-02 RX ORDER — METOPROLOL TARTRATE 50 MG
25 TABLET ORAL
Refills: 0 | Status: DISCONTINUED | OUTPATIENT
Start: 2019-01-03 | End: 2019-01-07

## 2019-01-02 RX ORDER — VANCOMYCIN HCL 1 G
500 VIAL (EA) INTRAVENOUS EVERY 12 HOURS
Refills: 0 | Status: DISCONTINUED | OUTPATIENT
Start: 2019-01-02 | End: 2019-01-03

## 2019-01-02 RX ORDER — SACCHAROMYCES BOULARDII 250 MG
250 POWDER IN PACKET (EA) ORAL
Refills: 0 | Status: DISCONTINUED | OUTPATIENT
Start: 2019-01-02 | End: 2019-01-10

## 2019-01-02 RX ORDER — ACETAMINOPHEN 500 MG
975 TABLET ORAL EVERY 6 HOURS
Refills: 0 | Status: COMPLETED | OUTPATIENT
Start: 2019-01-03 | End: 2019-01-05

## 2019-01-02 RX ORDER — DOXAZOSIN MESYLATE 4 MG
2 TABLET ORAL AT BEDTIME
Refills: 0 | Status: DISCONTINUED | OUTPATIENT
Start: 2019-01-02 | End: 2019-01-10

## 2019-01-02 RX ORDER — ATORVASTATIN CALCIUM 80 MG/1
40 TABLET, FILM COATED ORAL AT BEDTIME
Refills: 0 | Status: DISCONTINUED | OUTPATIENT
Start: 2019-01-02 | End: 2019-01-10

## 2019-01-02 RX ORDER — GABAPENTIN 400 MG/1
300 CAPSULE ORAL THREE TIMES A DAY
Refills: 0 | Status: DISCONTINUED | OUTPATIENT
Start: 2019-01-02 | End: 2019-01-10

## 2019-01-02 RX ORDER — SODIUM CHLORIDE 9 MG/ML
1000 INJECTION, SOLUTION INTRAVENOUS
Refills: 0 | Status: DISCONTINUED | OUTPATIENT
Start: 2019-01-02 | End: 2019-01-02

## 2019-01-02 RX ORDER — INSULIN LISPRO 100/ML
VIAL (ML) SUBCUTANEOUS
Refills: 0 | Status: DISCONTINUED | OUTPATIENT
Start: 2019-01-02 | End: 2019-01-10

## 2019-01-02 RX ORDER — PANTOPRAZOLE SODIUM 20 MG/1
40 TABLET, DELAYED RELEASE ORAL
Refills: 0 | Status: DISCONTINUED | OUTPATIENT
Start: 2019-01-03 | End: 2019-01-10

## 2019-01-02 RX ORDER — DEXTROSE 50 % IN WATER 50 %
12.5 SYRINGE (ML) INTRAVENOUS ONCE
Refills: 0 | Status: DISCONTINUED | OUTPATIENT
Start: 2019-01-03 | End: 2019-01-10

## 2019-01-02 RX ORDER — MAGNESIUM HYDROXIDE 400 MG/1
30 TABLET, CHEWABLE ORAL EVERY 12 HOURS
Refills: 0 | Status: DISCONTINUED | OUTPATIENT
Start: 2019-01-03 | End: 2019-01-08

## 2019-01-02 RX ORDER — TAMSULOSIN HYDROCHLORIDE 0.4 MG/1
0.8 CAPSULE ORAL AT BEDTIME
Refills: 0 | Status: DISCONTINUED | OUTPATIENT
Start: 2019-01-02 | End: 2019-01-10

## 2019-01-02 RX ORDER — LEVOTHYROXINE SODIUM 125 MCG
150 TABLET ORAL DAILY
Refills: 0 | Status: DISCONTINUED | OUTPATIENT
Start: 2019-01-02 | End: 2019-01-10

## 2019-01-02 RX ORDER — OXYCODONE HYDROCHLORIDE 5 MG/1
10 TABLET ORAL EVERY 4 HOURS
Refills: 0 | Status: DISCONTINUED | OUTPATIENT
Start: 2019-01-02 | End: 2019-01-02

## 2019-01-02 RX ORDER — DOCUSATE SODIUM 100 MG
100 CAPSULE ORAL THREE TIMES A DAY
Refills: 0 | Status: DISCONTINUED | OUTPATIENT
Start: 2019-01-03 | End: 2019-01-08

## 2019-01-02 RX ORDER — GLUCAGON INJECTION, SOLUTION 0.5 MG/.1ML
1 INJECTION, SOLUTION SUBCUTANEOUS ONCE
Refills: 0 | Status: DISCONTINUED | OUTPATIENT
Start: 2019-01-03 | End: 2019-01-10

## 2019-01-02 RX ORDER — METHOCARBAMOL 500 MG/1
500 TABLET, FILM COATED ORAL EVERY 6 HOURS
Refills: 0 | Status: DISCONTINUED | OUTPATIENT
Start: 2019-01-03 | End: 2019-01-10

## 2019-01-02 RX ORDER — SENNA PLUS 8.6 MG/1
2 TABLET ORAL AT BEDTIME
Refills: 0 | Status: DISCONTINUED | OUTPATIENT
Start: 2019-01-02 | End: 2019-01-08

## 2019-01-02 RX ADMIN — TRAMADOL HYDROCHLORIDE 25 MILLIGRAM(S): 50 TABLET ORAL at 22:12

## 2019-01-02 RX ADMIN — Medication 100 MILLIGRAM(S): at 12:23

## 2019-01-02 RX ADMIN — SODIUM CHLORIDE 80 MILLILITER(S): 9 INJECTION, SOLUTION INTRAVENOUS at 00:19

## 2019-01-02 RX ADMIN — CEFEPIME 100 MILLIGRAM(S): 1 INJECTION, POWDER, FOR SOLUTION INTRAMUSCULAR; INTRAVENOUS at 13:03

## 2019-01-02 RX ADMIN — GABAPENTIN 300 MILLIGRAM(S): 400 CAPSULE ORAL at 13:09

## 2019-01-02 RX ADMIN — SODIUM CHLORIDE 80 MILLILITER(S): 9 INJECTION, SOLUTION INTRAVENOUS at 21:50

## 2019-01-02 RX ADMIN — Medication 150 MICROGRAM(S): at 06:26

## 2019-01-02 RX ADMIN — SODIUM CHLORIDE 2 GRAM(S): 9 INJECTION INTRAMUSCULAR; INTRAVENOUS; SUBCUTANEOUS at 22:12

## 2019-01-02 RX ADMIN — GABAPENTIN 300 MILLIGRAM(S): 400 CAPSULE ORAL at 22:11

## 2019-01-02 RX ADMIN — Medication 25 MILLIGRAM(S): at 06:26

## 2019-01-02 RX ADMIN — ATORVASTATIN CALCIUM 40 MILLIGRAM(S): 80 TABLET, FILM COATED ORAL at 22:11

## 2019-01-02 RX ADMIN — TRAMADOL HYDROCHLORIDE 25 MILLIGRAM(S): 50 TABLET ORAL at 22:45

## 2019-01-02 RX ADMIN — SODIUM CHLORIDE 2 GRAM(S): 9 INJECTION INTRAMUSCULAR; INTRAVENOUS; SUBCUTANEOUS at 13:09

## 2019-01-02 RX ADMIN — SENNA PLUS 2 TABLET(S): 8.6 TABLET ORAL at 22:12

## 2019-01-02 RX ADMIN — CEFEPIME 100 MILLIGRAM(S): 1 INJECTION, POWDER, FOR SOLUTION INTRAMUSCULAR; INTRAVENOUS at 06:30

## 2019-01-02 RX ADMIN — Medication 250 MILLIGRAM(S): at 06:26

## 2019-01-02 RX ADMIN — Medication 100 MILLIGRAM(S): at 00:18

## 2019-01-02 RX ADMIN — Medication 2 MILLIGRAM(S): at 22:11

## 2019-01-02 RX ADMIN — TAMSULOSIN HYDROCHLORIDE 0.8 MILLIGRAM(S): 0.4 CAPSULE ORAL at 22:11

## 2019-01-02 RX ADMIN — GABAPENTIN 300 MILLIGRAM(S): 400 CAPSULE ORAL at 06:27

## 2019-01-02 RX ADMIN — CEFEPIME 100 MILLIGRAM(S): 1 INJECTION, POWDER, FOR SOLUTION INTRAMUSCULAR; INTRAVENOUS at 06:26

## 2019-01-02 RX ADMIN — SODIUM CHLORIDE 80 MILLILITER(S): 9 INJECTION, SOLUTION INTRAVENOUS at 12:24

## 2019-01-02 RX ADMIN — SODIUM CHLORIDE 2 GRAM(S): 9 INJECTION INTRAMUSCULAR; INTRAVENOUS; SUBCUTANEOUS at 06:26

## 2019-01-02 NOTE — PROGRESS NOTE ADULT - SUBJECTIVE AND OBJECTIVE BOX
TIMUR [id number]    73yMale  Fever  Family history of diabetes mellitus (Father)  Family history of diabetes mellitus  Handoff  MEWS Score  BPH (benign prostatic hyperplasia)  HTN (hypertension)  Hypothyroid  Dyslipidemia  OA (osteoarthritis)  Diabetes  Seroma due to trauma  Seroma due to trauma  Fever  Anemia  Hypomagnesemia  Hyponatremia  BPH (benign prostatic hyperplasia)  Diabetes  Dyslipidemia  Hypothyroid  HTN (hypertension)  Fever  Seroma evacuation  S/P laminectomy  S/P hernia repair  S/P hip replacement      STATUS POST: Irrigation and debridement of lumbar seroma  SUBJECTIVE: Patient seen and examined doing well    Back Pain controlled    OBJECTIVE:   T(C): 37.4 (01-02-19 @ 14:54), Max: 37.4 (01-02-19 @ 14:54)  HR: 94 (01-02-19 @ 14:54) (88 - 101)  BP: 163/67 (01-02-19 @ 14:54) (129/66 - 163/67)  RR: 16 (01-02-19 @ 14:54) (16 - 20)  SpO2: 100% (01-02-19 @ 14:54) (96% - 100%)   Constitutional:Pleasant in no acute distress  Psych:A&Ox3  Abdominal: soft and supple non distended  Lympatics: no pretibial pitting edema  Spine:          Dressing:  clean/dry/intact                            HV drain in place, patent               Sensation:           [x ] Upper extremity          grossly intact manually         [ x] Lower extremity           grossly nonfocal manually, does have 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 4+/5 dorsi/plantarflexion RLE, 4/5 dorsi/plantarflexion LLE. 3/5 HF B/L.         [x] warm well perfused; capillary refill <3 seconds                A/P : 73yMale   S/P Irrigation and debridement lumbar seroma  POD#0  -    Pain control per primary team  -    DVT ppx: [ x]SCDs[ x] Pharmacolgic lovenox daily starting tomorrow and until  ambulating freely, then asa 325 mg bid to complete 28 days post op prophylaxis  -    Periop abx:    Vanco   -    Check AM labs    -    Monitor Drain Output, can discontinue drain when output equal or less than 10 cc/hr/12 hr.  change dressing when drain pulled and as needed, honeycomb dressing.  -    Resume home meds  -PT WBAT, balance and gait  - LSO with OOB for comfort is not mandatory  -Admit back to medicine team, Detailed voice mail left for Dr. Bridges on her personal cell  post operative regarding patient status, my phone number left if needed.    - probable MIMI 48-72 hours  - Follow culture results from OR.

## 2019-01-02 NOTE — PHARMACOTHERAPY INTERVENTION NOTE - COMMENTS
vancomycin trough entered for 23:00 on 1/2/2018, will adjust dose to maintain a trough between 15-20

## 2019-01-02 NOTE — PROGRESS NOTE ADULT - SUBJECTIVE AND OBJECTIVE BOX
HEALTH ISSUES - PROBLEM Dx:    CC- s/p spinal fusion- seroma    INTERVAL HPI/OVERNIGHT EVENTS:    pt comfortable  is pain free  awaits OR today    REVIEW OF SYSTEMS:    urinary freq + urge + retention - fever - surg site serosanguinous oozing +     Vital Signs Last 24 Hrs  T(C): 37.1 (02 Jan 2019 13:05), Max: 37.1 (01 Jan 2019 16:15)  T(F): 98.7 (02 Jan 2019 13:05), Max: 98.8 (01 Jan 2019 16:15)  HR: 88 (02 Jan 2019 13:05) (88 - 101)  BP: 131/76 (02 Jan 2019 13:05) (129/66 - 144/69)  BP(mean): --  RR: 20 (02 Jan 2019 13:05) (18 - 20)  SpO2: 96% (02 Jan 2019 13:05) (96% - 96%)    PHYSICAL EXAM-  GEN: NAD, pleasant  NECK: Supple. No JVD  LUNGS: Clear to auscultation.  HEART: Regular rate and rhythm without murmur.  ABDOMEN: Soft, nontender, and nondistended. Positive bowel sounds. condom cath +  EXTREMITIES: Without any cyanosis, clubbing, rash, lesions or edema.  MSK: back- surg incisional dressing serosanguinous oozing +  NEUROLOGIC: Cranial nerves II through XII are grossly intact. 4+/5 dorsi/plantarflexion RLE, 4/5 dorsi/plantarflexion LLE. 3/5 HF B/L. Ext warm. no calf tenderness  PSYCHIATRIC: Appropriate affect .  SKIN: No ulceration or induration present.    LABS:                        8.8    5.9   )-----------( 296      ( 02 Jan 2019 05:59 )             28.6     01-02    136  |  97<L>  |  14.0  ----------------------------<  118<H>  4.2   |  27.0  |  1.35<H>    Ca    8.1<L>      02 Jan 2019 05:59      PT/INR - ( 02 Jan 2019 05:59 )   PT: 16.6 sec;   INR: 1.43 ratio         PTT - ( 02 Jan 2019 05:59 )  PTT:32.5 sec    Assessment and Plan

## 2019-01-02 NOTE — PROGRESS NOTE ADULT - ASSESSMENT
73 yr old male s/p T11/T10 laminectomy, T10-L1 fusion, hypertension, diabetes mellitus, hypothyroid was sent in from [facility] for fever and back pain. He was recently discharged from [facility] after surgery. Noted to have fever 102. He was evaluated by orthopedics in ED given recent surgery and CT of thoracolumbar spine with contrast was done, which showed post surgical changes and multiple foci of air, fluid and soft tissue edema. ID was consulted and he was started on Vancomycin and Cefepime. Orthopedics advised local wound care and possible seroma drainage if no improvement. Nephrology consulted for hyponatremia. Advised fluid restriction and increased salt tabs to 2 tabs three times a day. He was noted to Hb 7.7, was given 1 unit PRBC, Hb came up to 9.5    1) Post op seroma from thoracolumbar fusion:    - possibly infected   - c/w IV abx- per ID  - wound care per orthopedics as noted above  - OR today  - H/H stable @9. transfuse PRN   - Cont current antibiotic regimen. further changes will be done according to OR C/S. anticipating total 6 weeks of antibiotics per ID.    2) SIADH: hyponatremia - stable  - NaCl tabs  - monitor levels    3) Hypomagnesemia: resolved  - monitor    4) Mild CLARI: resolved    5) HTN - now controlled  - after adding cardura for BPH.  - monitor bP on same  - on lopressor     6) Anemia  - stable post transfusion  - monitor    7) DM2 - controlled (FS 140s-180s)  - CSI and finger stick monitoring     8) Hypothyroid  - c/w synthroid    9) Prophylactic measure  - DVT ppx: Lovenox SQ    10) BPH symptomatic  - Condom cath, flomax high dose. on cardura

## 2019-01-03 LAB
ANION GAP SERPL CALC-SCNC: 11 MMOL/L — SIGNIFICANT CHANGE UP (ref 5–17)
BUN SERPL-MCNC: 14 MG/DL — SIGNIFICANT CHANGE UP (ref 8–20)
CALCIUM SERPL-MCNC: 8 MG/DL — LOW (ref 8.6–10.2)
CHLORIDE SERPL-SCNC: 98 MMOL/L — SIGNIFICANT CHANGE UP (ref 98–107)
CO2 SERPL-SCNC: 28 MMOL/L — SIGNIFICANT CHANGE UP (ref 22–29)
CREAT SERPL-MCNC: 1.23 MG/DL — SIGNIFICANT CHANGE UP (ref 0.5–1.3)
GLUCOSE BLDC GLUCOMTR-MCNC: 105 MG/DL — HIGH (ref 70–99)
GLUCOSE BLDC GLUCOMTR-MCNC: 135 MG/DL — HIGH (ref 70–99)
GLUCOSE BLDC GLUCOMTR-MCNC: 142 MG/DL — HIGH (ref 70–99)
GLUCOSE BLDC GLUCOMTR-MCNC: 148 MG/DL — HIGH (ref 70–99)
GLUCOSE SERPL-MCNC: 95 MG/DL — SIGNIFICANT CHANGE UP (ref 70–115)
GRAM STN FLD: SIGNIFICANT CHANGE UP
GRAM STN FLD: SIGNIFICANT CHANGE UP
HCT VFR BLD CALC: 27.4 % — LOW (ref 42–52)
HGB BLD-MCNC: 8.5 G/DL — LOW (ref 14–18)
MCHC RBC-ENTMCNC: 26.6 PG — LOW (ref 27–31)
MCHC RBC-ENTMCNC: 31 G/DL — LOW (ref 32–36)
MCV RBC AUTO: 85.6 FL — SIGNIFICANT CHANGE UP (ref 80–94)
PLATELET # BLD AUTO: 279 K/UL — SIGNIFICANT CHANGE UP (ref 150–400)
POTASSIUM SERPL-MCNC: 4.1 MMOL/L — SIGNIFICANT CHANGE UP (ref 3.5–5.3)
POTASSIUM SERPL-SCNC: 4.1 MMOL/L — SIGNIFICANT CHANGE UP (ref 3.5–5.3)
RBC # BLD: 3.2 M/UL — LOW (ref 4.6–6.2)
RBC # FLD: 13.7 % — SIGNIFICANT CHANGE UP (ref 11–15.6)
SODIUM SERPL-SCNC: 137 MMOL/L — SIGNIFICANT CHANGE UP (ref 135–145)
SPECIMEN SOURCE: SIGNIFICANT CHANGE UP
SPECIMEN SOURCE: SIGNIFICANT CHANGE UP
VANCOMYCIN TROUGH SERPL-MCNC: 26.2 UG/ML — CRITICAL HIGH (ref 10–20)
VANCOMYCIN TROUGH SERPL-MCNC: 27.7 UG/ML — CRITICAL HIGH (ref 10–20)
WBC # BLD: 5.6 K/UL — SIGNIFICANT CHANGE UP (ref 4.8–10.8)
WBC # FLD AUTO: 5.6 K/UL — SIGNIFICANT CHANGE UP (ref 4.8–10.8)

## 2019-01-03 PROCEDURE — 99232 SBSQ HOSP IP/OBS MODERATE 35: CPT

## 2019-01-03 RX ORDER — VANCOMYCIN HCL 1 G
750 VIAL (EA) INTRAVENOUS EVERY 24 HOURS
Refills: 0 | Status: DISCONTINUED | OUTPATIENT
Start: 2019-01-03 | End: 2019-01-03

## 2019-01-03 RX ORDER — VANCOMYCIN HCL 1 G
750 VIAL (EA) INTRAVENOUS EVERY 24 HOURS
Refills: 0 | Status: DISCONTINUED | OUTPATIENT
Start: 2019-01-04 | End: 2019-01-04

## 2019-01-03 RX ADMIN — CEFEPIME 100 MILLIGRAM(S): 1 INJECTION, POWDER, FOR SOLUTION INTRAMUSCULAR; INTRAVENOUS at 12:05

## 2019-01-03 RX ADMIN — Medication 975 MILLIGRAM(S): at 18:59

## 2019-01-03 RX ADMIN — Medication 100 MILLIGRAM(S): at 21:36

## 2019-01-03 RX ADMIN — Medication 2 MILLIGRAM(S): at 21:36

## 2019-01-03 RX ADMIN — Medication 150 MICROGRAM(S): at 06:25

## 2019-01-03 RX ADMIN — TAMSULOSIN HYDROCHLORIDE 0.8 MILLIGRAM(S): 0.4 CAPSULE ORAL at 21:36

## 2019-01-03 RX ADMIN — SODIUM CHLORIDE 2 GRAM(S): 9 INJECTION INTRAMUSCULAR; INTRAVENOUS; SUBCUTANEOUS at 06:25

## 2019-01-03 RX ADMIN — GABAPENTIN 300 MILLIGRAM(S): 400 CAPSULE ORAL at 16:12

## 2019-01-03 RX ADMIN — SODIUM CHLORIDE 2 GRAM(S): 9 INJECTION INTRAMUSCULAR; INTRAVENOUS; SUBCUTANEOUS at 16:14

## 2019-01-03 RX ADMIN — Medication 100 MILLIGRAM(S): at 16:13

## 2019-01-03 RX ADMIN — Medication 250 MILLIGRAM(S): at 06:23

## 2019-01-03 RX ADMIN — ATORVASTATIN CALCIUM 40 MILLIGRAM(S): 80 TABLET, FILM COATED ORAL at 21:36

## 2019-01-03 RX ADMIN — Medication 25 MILLIGRAM(S): at 06:27

## 2019-01-03 RX ADMIN — Medication 25 MILLIGRAM(S): at 18:58

## 2019-01-03 RX ADMIN — Medication 975 MILLIGRAM(S): at 18:02

## 2019-01-03 RX ADMIN — Medication 975 MILLIGRAM(S): at 12:05

## 2019-01-03 RX ADMIN — Medication 975 MILLIGRAM(S): at 13:02

## 2019-01-03 RX ADMIN — GABAPENTIN 300 MILLIGRAM(S): 400 CAPSULE ORAL at 21:36

## 2019-01-03 RX ADMIN — GABAPENTIN 300 MILLIGRAM(S): 400 CAPSULE ORAL at 06:25

## 2019-01-03 RX ADMIN — Medication 250 MILLIGRAM(S): at 18:59

## 2019-01-03 RX ADMIN — SODIUM CHLORIDE 2 GRAM(S): 9 INJECTION INTRAMUSCULAR; INTRAVENOUS; SUBCUTANEOUS at 21:36

## 2019-01-03 NOTE — PROGRESS NOTE ADULT - SUBJECTIVE AND OBJECTIVE BOX
Pt Name: TIMUR LOUIS    MRN: [id number]      STATUS POST: Irrigation and debridement of lumbar seroma 1/2/19  SUBJECTIVE: Patient seen and examined bedside.  Nurse called this morning stating dressing was saturated.  Patient states back pain is controlled.  He denies numbness/tingling/pain/weakness down his lower extremities.  He denies N/V/D, CP, SOB, fevers/chills.  He has not other complaints at this time          PAST MEDICAL & SURGICAL HISTORY:  PAST MEDICAL & SURGICAL HISTORY:  BPH (benign prostatic hyperplasia)  HTN (hypertension)  Hypothyroid  Dyslipidemia  OA (osteoarthritis)  Diabetes  S/P laminectomy  S/P hernia repair  S/P hip replacement: R      Allergies: No Known Allergies      Medications: acetaminophen   Tablet .. 975 milliGRAM(s) Oral every 6 hours  aluminum hydroxide/magnesium hydroxide/simethicone Suspension 30 milliLiter(s) Oral every 12 hours PRN  atorvastatin 40 milliGRAM(s) Oral at bedtime  cefepime   IVPB 2000 milliGRAM(s) IV Intermittent daily  dextrose 50% Injectable 12.5 Gram(s) IV Push once  docusate sodium 100 milliGRAM(s) Oral three times a day  doxazosin 2 milliGRAM(s) Oral at bedtime  gabapentin 300 milliGRAM(s) Oral three times a day  glucagon  Injectable 1 milliGRAM(s) IntraMuscular once PRN  insulin lispro (HumaLOG) corrective regimen sliding scale   SubCutaneous three times a day before meals  levothyroxine 150 MICROGram(s) Oral daily  magnesium hydroxide Suspension 30 milliLiter(s) Oral every 12 hours PRN  methocarbamol 500 milliGRAM(s) Oral every 6 hours PRN  metoprolol tartrate 25 milliGRAM(s) Oral two times a day  morphine  - Injectable 2 milliGRAM(s) IV Push every 6 hours PRN  ondansetron Injectable 4 milliGRAM(s) IV Push every 6 hours PRN  oxyCODONE    IR 5 milliGRAM(s) Oral every 4 hours PRN  oxyCODONE    IR 10 milliGRAM(s) Oral every 4 hours PRN  pantoprazole    Tablet 40 milliGRAM(s) Oral before breakfast  saccharomyces boulardii 250 milliGRAM(s) Oral two times a day  senna 2 Tablet(s) Oral at bedtime PRN  sodium chloride 2 Gram(s) Oral three times a day  sodium chloride 0.45%. 1000 milliLiter(s) IV Continuous <Continuous>  tamsulosin 0.8 milliGRAM(s) Oral at bedtime  vancomycin  IVPB 750 milliGRAM(s) IV Intermittent every 24 hours        Ambulation: Walking With Walker [X]                           8.5    5.6   )-----------( 279      ( 03 Jan 2019 07:57 )             27.4     01-03    137  |  98  |  14.0  ----------------------------<  95  4.1   |  28.0  |  1.23    Ca    8.0<L>      03 Jan 2019 07:57            PHYSICAL EXAM:    Vital Signs Last 24 Hrs  T(C): 37.1 (03 Jan 2019 04:35), Max: 37.4 (02 Jan 2019 14:54)  T(F): 98.7 (03 Jan 2019 04:35), Max: 99.3 (02 Jan 2019 14:54)  HR: 96 (03 Jan 2019 04:35) (88 - 104)  BP: 122/61 (03 Jan 2019 04:35) (122/61 - 163/74)  BP(mean): --  RR: 20 (03 Jan 2019 04:35) (8 - 24)  SpO2: 100% (02 Jan 2019 21:19) (96% - 100%)  Daily     Daily     Appearance: Alert, responsive, in no acute distress.  Back: Dressing noted saturated through to bed with serosanguinous and bloody staining. (+) Active slow oozing from proximal aspect of incision site; serosanguinous fluid.  Attempted to express fluid from wound into drain; no active drainage in hemovac drain.  No erythema noted around the incision site.  Sutures intact.  Incision was placed with Xeroform and new dressing which is clean, dry and intact  LE: SILT B/L. 4+/5 dorsi/plantarflexion RLE, 4/5 dorsi/plantarflexion LLE. 3/5 HF B/L. Ext warm cap refill brisk B/L. Calf soft NT B/L.      A/P:  Pt is a  73y Male with Irrigation and debridement of lumbar seroma 1/2/19; POD #1    PLAN:   * DVTP -  SCDs,  Pharmacolgic - Lovenox daily starting tomorrow and until ambulating freely, then asa 325 mg bid to complete 28 days post op prophylaxis  * Daily dressing changes/as needed if becomes saturated  * Monitor Drain Output - can discontinue drain when output equal or less than 10 cc/hr/12 hr.  Apply honeycomb dressing when drain pulled and change as needed  * PT WBAT, balance and gait  * LSO with OOB for comfort is not   * Follow up OR cultures  * Continue care as per primary team  * Discharge planning - probable [facility] in 48-72 hours

## 2019-01-03 NOTE — PROVIDER CONTACT NOTE (CRITICAL VALUE NOTIFICATION) - ACTION/TREATMENT ORDERED:
hold 6am dose, pharmacy notified to order next trough, continue to monitor pt
dose held
Pt. on zosyn and vanco

## 2019-01-03 NOTE — PHARMACOTHERAPY INTERVENTION NOTE - COMMENTS
Vancomycin trough above the desired range, dose decreased to 750mg q24h, repeat trough ordered for 1 hour prior to the next scheduled dose

## 2019-01-03 NOTE — PROGRESS NOTE ADULT - ASSESSMENT
74 y/o male with PMHx of HTN, BPH, DM, Hypothyroid, and OA was sent to the ED from [facility] due to recurrent fevers (T-max 102) and back pain of 1 day duration. Patient was recently discharge from [facility] for thoracic laminectomy and fusion.  Patient said he has been doing well since dc until yesterday when he started having recurrent fevers and back pain. Patient had no trauma/fall, neck pain, HA, numbness, tingling, bowel/urinary incontinence, chills, chest pain, shortness of breath, palpitation, cough, nausea/vomiting, abdominal pain.  It could be seroma or hematoma but cannot rule out Abscess.     - Blood cultures negative  - POD#1, doing well   - Culture from OR so far neg gram stain and culture   - Will continue empiric cefepime 2gm daily and vancomycin 500mg q12h   - Will follow up vanco trough levels. keep 15 to 20  - Will switch ABx regimen based on culture results  - Possibly needs at least 6 weeks of treatment if cultures positive.   Will follow.

## 2019-01-03 NOTE — PROGRESS NOTE ADULT - ASSESSMENT
73 yr old male s/p T11/T10 laminectomy, T10-L1 fusion, hypertension, diabetes mellitus, hypothyroid was sent in from [facility] for fever and back pain. He was recently discharged from [facility] after surgery. Noted to have fever 102. He was evaluated by orthopedics in ED given recent surgery and CT of thoracolumbar spine with contrast was done, which showed post surgical changes and multiple foci of air, fluid and soft tissue edema. ID was consulted and he was started on Vancomycin and Cefepime. Orthopedics advised local wound care and possible seroma drainage if no improvement. Nephrology consulted for hyponatremia. Advised fluid restriction and increased salt tabs to 2 tabs three times a day. He was noted to Hb 7.7, was given 1 unit PRBC, Hb came up to 9.5    1) Post op seroma from thoracolumbar fusion s/p seroma evacuation:    - c/w IV abx- per ID  - wound care per orthopedics as noted above  - OR c/s testing  - Cont current antibiotic regimen. further changes will be done according to OR C/S. anticipating total 6 weeks of antibiotics per ID.    2) SIADH: hyponatremia - stable  - NaCl tabs  - monitor levels    3) Hypomagnesemia: resolved  - monitor    4) Mild CLARI: resolved    5) HTN - now controlled  - after adding cardura for BPH.  - monitor bP on same  - on lopressor     6) Anemia due to chronic blood loss  -  2 PRBC today  - monitor    7) DM2 - controlled (FS 140s-180s)  - CSI and finger stick monitoring     8) Hypothyroid  - c/w synthroid    9) Prophylactic measure  - DVT ppx: Lovenox SQ    10) BPH symptomatic  - Condom cath, flomax high dose. on cardura

## 2019-01-03 NOTE — PROGRESS NOTE ADULT - SUBJECTIVE AND OBJECTIVE BOX
HEALTH ISSUES - PROBLEM Dx:    CC- s/p spinal fusion- seroma    INTERVAL HPI/OVERNIGHT EVENTS:    s/p seroma evacaution with EBL 50 ml    REVIEW OF SYSTEMS:    urinary freq + urge + retention - fever - surg site serosanguinous oozing +     Vital Signs Last 24 Hrs  T(C): 36.9 (03 Jan 2019 15:15), Max: 37.1 (03 Jan 2019 04:35)  T(F): 98.4 (03 Jan 2019 15:15), Max: 98.7 (03 Jan 2019 04:35)  HR: 94 (03 Jan 2019 15:15) (90 - 104)  BP: 150/65 (03 Jan 2019 15:15) (93/59 - 150/65)  BP(mean): --  RR: 18 (03 Jan 2019 15:15) (18 - 21)  SpO2: 97% (03 Jan 2019 15:15) (96% - 100%)    PHYSICAL EXAM-  GEN: NAD, pleasant  NECK: Supple. No JVD  LUNGS: Clear to auscultation.  HEART: Regular rate and rhythm without murmur.  ABDOMEN: Soft, nontender, and nondistended. Positive bowel sounds. condom cath +  EXTREMITIES: Without any cyanosis, clubbing, rash, lesions or edema.  MSK: back- surg incisional dressing serosanguinous oozing + accordion drain empty  NEUROLOGIC: Cranial nerves II through XII are grossly intact. 4+/5 dorsi/plantarflexion RLE, 4/5 dorsi/plantarflexion LLE. 3/5 HF B/L. Ext warm. no calf tenderness  PSYCHIATRIC: Appropriate affect .    LABS:                        8.5    5.6   )-----------( 279      ( 03 Jan 2019 07:57 )             27.4     01-03    137  |  98  |  14.0  ----------------------------<  95  4.1   |  28.0  |  1.23    Ca    8.0<L>      03 Jan 2019 07:57      PT/INR - ( 02 Jan 2019 05:59 )   PT: 16.6 sec;   INR: 1.43 ratio         PTT - ( 02 Jan 2019 05:59 )  PTT:32.5 sec        Assessment and Plan

## 2019-01-03 NOTE — PROGRESS NOTE ADULT - SUBJECTIVE AND OBJECTIVE BOX
[facility]  INFECTIOUS DISEASES AND INTERNAL MEDICINE at [place]  =======================================================  Wellington Collier MD  Diplomates American Board of Internal Medicine and Infectious Diseases  =======================================================    TIMUR LOUIS [id number]    Follow up: back pain with seroma seen in imaging s/p surgical debridement on 1/2.   Afebrile and non toxic, back wound with some serosanguinous discharge and drain.     Allergies:  No Known Allergies    Medications:  cefepime   IVPB 2000 milliGRAM(s) IV Intermittent every 12 hours  vancomycin  IVPB 1000 milliGRAM(s) IV Intermittent every 8 hours     FAMILY HISTORY:  Family history of diabetes mellitus (Father)    REVIEW OF SYSTEMS:  CONSTITUTIONAL:  No Fever or chills  HEENT:   No diplopia or blurred vision.  No earache, sore throat or runny nose.  CARDIOVASCULAR:  No chest pain   RESPIRATORY:  No cough, shortness of breath, PND or orthopnea.  GASTROINTESTINAL:  No nausea, vomiting or diarrhea.  GENITOURINARY:  No dysuria, frequency or urgency. No Blood in urine  MUSCULOSKELETAL:   AS PER HPI  SKIN:  No change in skin, hair or nails.  NEUROLOGIC:  No paresthesias, fasciculations, seizures or weakness. backache   PSYCHIATRIC:  No disorder of thought or mood.  ENDOCRINE:  No heat or cold intolerance, polyuria or polydipsia.  HEMATOLOGICAL:  No easy bruising or bleeding.     Physical Exam:  Vital Signs Last 24 Hrs  T(C): 36.9 (03 Jan 2019 11:01), Max: 37.4 (02 Jan 2019 14:54)  T(F): 98.4 (03 Jan 2019 11:01), Max: 99.3 (02 Jan 2019 14:54)  HR: 90 (03 Jan 2019 11:01) (88 - 104)  BP: 116/64 (03 Jan 2019 11:01) (93/59 - 163/74)  RR: 18 (03 Jan 2019 11:01) (8 - 24)  SpO2: 96% (03 Jan 2019 11:01) (96% - 100%)  GEN: NAD, pleasant  HEENT: normocephalic and atraumatic. EOMI. MANUEL.    NECK: Supple. No carotid bruits.  No lymphadenopathy or thyromegaly.  LUNGS: Clear to auscultation.  HEART: Regular rate and rhythm without murmur.  ABDOMEN: Soft, nontender, and nondistended.  Positive bowel sounds.    : No CVA tenderness  EXTREMITIES: Without any cyanosis, clubbing, rash, lesions or edema.  MSK: no tenderness or cellulitis. drain in place, dressing wet with serosanguinous fluid  NEUROLOGIC: grossly intact.  PSYCHIATRIC: Appropriate affect .  SKIN: No ulceration or induration present.    Labs:  01-03    137  |  98  |  14.0  ----------------------------<  95  4.1   |  28.0  |  1.23    Ca    8.0<L>      03 Jan 2019 07:57                      8.5    5.6   )-----------( 279      ( 03 Jan 2019 07:57 )             27.4     PT/INR - ( 02 Jan 2019 05:59 )   PT: 16.6 sec;   INR: 1.43 ratio    PTT - ( 02 Jan 2019 05:59 )  PTT:32.5 sec    RECENT CULTURES:  01-02 @ 21:56 .Surgical Swab lumbar spine (swabs)       Few White blood cells  No organisms seen      Imaging and data reveiwed.  CT 12/12  IMPRESSION:   Status post posterior surgical fusion hardware at T10-L1 and bilateral   T12-L1 laminectomies. Interval widening of the anterior disc space at   T11-T12 since thoracolumbar CT of 11/27/18.  Nonspecific posterior paraspinal 6.2 x 4.0 x 16.5 cm fluid collection   extending from T9 - L2, with multiple foci of air and peripheral   enhancement, which may represent postsurgical seroma/hematoma although   abscess cannot be fully excluded. No other fluid collection.  Remaining findings are unchanged.

## 2019-01-03 NOTE — PROGRESS NOTE ADULT - ATTENDING COMMENTS
Drains one communicates and reviewed. A staff seems to be called. We'll try her on quadrant observation etc. continue with IV antibiosis I think because of this the sutures for this gentleman continue with dry dressing changes as well.

## 2019-01-04 LAB
ANION GAP SERPL CALC-SCNC: 12 MMOL/L — SIGNIFICANT CHANGE UP (ref 5–17)
BUN SERPL-MCNC: 13 MG/DL — SIGNIFICANT CHANGE UP (ref 8–20)
CALCIUM SERPL-MCNC: 7.9 MG/DL — LOW (ref 8.6–10.2)
CHLORIDE SERPL-SCNC: 100 MMOL/L — SIGNIFICANT CHANGE UP (ref 98–107)
CK SERPL-CCNC: 105 U/L — SIGNIFICANT CHANGE UP (ref 30–200)
CO2 SERPL-SCNC: 26 MMOL/L — SIGNIFICANT CHANGE UP (ref 22–29)
CREAT SERPL-MCNC: 1.34 MG/DL — HIGH (ref 0.5–1.3)
CRP SERPL-MCNC: 9 MG/DL — HIGH (ref 0–0.4)
ERYTHROCYTE [SEDIMENTATION RATE] IN BLOOD: 55 MM/HR — HIGH (ref 0–20)
GLUCOSE BLDC GLUCOMTR-MCNC: 110 MG/DL — HIGH (ref 70–99)
GLUCOSE BLDC GLUCOMTR-MCNC: 76 MG/DL — SIGNIFICANT CHANGE UP (ref 70–99)
GLUCOSE BLDC GLUCOMTR-MCNC: 82 MG/DL — SIGNIFICANT CHANGE UP (ref 70–99)
GLUCOSE BLDC GLUCOMTR-MCNC: 87 MG/DL — SIGNIFICANT CHANGE UP (ref 70–99)
GLUCOSE SERPL-MCNC: 110 MG/DL — SIGNIFICANT CHANGE UP (ref 70–115)
HCT VFR BLD CALC: 29.3 % — LOW (ref 42–52)
HGB BLD-MCNC: 9.3 G/DL — LOW (ref 14–18)
MCHC RBC-ENTMCNC: 27.1 PG — SIGNIFICANT CHANGE UP (ref 27–31)
MCHC RBC-ENTMCNC: 31.7 G/DL — LOW (ref 32–36)
MCV RBC AUTO: 85.4 FL — SIGNIFICANT CHANGE UP (ref 80–94)
NIGHT BLUE STAIN TISS: SIGNIFICANT CHANGE UP
PLATELET # BLD AUTO: 305 K/UL — SIGNIFICANT CHANGE UP (ref 150–400)
POTASSIUM SERPL-MCNC: 4.1 MMOL/L — SIGNIFICANT CHANGE UP (ref 3.5–5.3)
POTASSIUM SERPL-SCNC: 4.1 MMOL/L — SIGNIFICANT CHANGE UP (ref 3.5–5.3)
RBC # BLD: 3.43 M/UL — LOW (ref 4.6–6.2)
RBC # FLD: 13.6 % — SIGNIFICANT CHANGE UP (ref 11–15.6)
SODIUM SERPL-SCNC: 138 MMOL/L — SIGNIFICANT CHANGE UP (ref 135–145)
SPECIMEN SOURCE: SIGNIFICANT CHANGE UP
VANCOMYCIN TROUGH SERPL-MCNC: 20.8 UG/ML — HIGH (ref 10–20)
WBC # BLD: 10.9 K/UL — HIGH (ref 4.8–10.8)
WBC # FLD AUTO: 10.9 K/UL — HIGH (ref 4.8–10.8)

## 2019-01-04 PROCEDURE — 99232 SBSQ HOSP IP/OBS MODERATE 35: CPT

## 2019-01-04 RX ORDER — ERTAPENEM SODIUM 1 G/1
1000 INJECTION, POWDER, LYOPHILIZED, FOR SOLUTION INTRAMUSCULAR; INTRAVENOUS EVERY 24 HOURS
Refills: 0 | Status: DISCONTINUED | OUTPATIENT
Start: 2019-01-04 | End: 2019-01-10

## 2019-01-04 RX ORDER — DAPTOMYCIN 500 MG/10ML
600 INJECTION, POWDER, LYOPHILIZED, FOR SOLUTION INTRAVENOUS EVERY 24 HOURS
Refills: 0 | Status: DISCONTINUED | OUTPATIENT
Start: 2019-01-04 | End: 2019-01-10

## 2019-01-04 RX ADMIN — Medication 100 MILLIGRAM(S): at 13:50

## 2019-01-04 RX ADMIN — Medication 975 MILLIGRAM(S): at 13:50

## 2019-01-04 RX ADMIN — SODIUM CHLORIDE 2 GRAM(S): 9 INJECTION INTRAMUSCULAR; INTRAVENOUS; SUBCUTANEOUS at 22:10

## 2019-01-04 RX ADMIN — Medication 975 MILLIGRAM(S): at 00:33

## 2019-01-04 RX ADMIN — Medication 150 MICROGRAM(S): at 06:52

## 2019-01-04 RX ADMIN — SODIUM CHLORIDE 2 GRAM(S): 9 INJECTION INTRAMUSCULAR; INTRAVENOUS; SUBCUTANEOUS at 06:52

## 2019-01-04 RX ADMIN — GABAPENTIN 300 MILLIGRAM(S): 400 CAPSULE ORAL at 13:50

## 2019-01-04 RX ADMIN — Medication 2 MILLIGRAM(S): at 22:09

## 2019-01-04 RX ADMIN — Medication 250 MILLIGRAM(S): at 17:37

## 2019-01-04 RX ADMIN — Medication 100 MILLIGRAM(S): at 22:10

## 2019-01-04 RX ADMIN — Medication 975 MILLIGRAM(S): at 23:45

## 2019-01-04 RX ADMIN — SODIUM CHLORIDE 2 GRAM(S): 9 INJECTION INTRAMUSCULAR; INTRAVENOUS; SUBCUTANEOUS at 13:50

## 2019-01-04 RX ADMIN — Medication 250 MILLIGRAM(S): at 06:52

## 2019-01-04 RX ADMIN — Medication 975 MILLIGRAM(S): at 06:51

## 2019-01-04 RX ADMIN — PANTOPRAZOLE SODIUM 40 MILLIGRAM(S): 20 TABLET, DELAYED RELEASE ORAL at 06:53

## 2019-01-04 RX ADMIN — Medication 975 MILLIGRAM(S): at 07:00

## 2019-01-04 RX ADMIN — GABAPENTIN 300 MILLIGRAM(S): 400 CAPSULE ORAL at 22:10

## 2019-01-04 RX ADMIN — Medication 975 MILLIGRAM(S): at 17:37

## 2019-01-04 RX ADMIN — ENOXAPARIN SODIUM 40 MILLIGRAM(S): 100 INJECTION SUBCUTANEOUS at 06:51

## 2019-01-04 RX ADMIN — DAPTOMYCIN 124 MILLIGRAM(S): 500 INJECTION, POWDER, LYOPHILIZED, FOR SOLUTION INTRAVENOUS at 23:45

## 2019-01-04 RX ADMIN — Medication 25 MILLIGRAM(S): at 06:52

## 2019-01-04 RX ADMIN — ATORVASTATIN CALCIUM 40 MILLIGRAM(S): 80 TABLET, FILM COATED ORAL at 22:09

## 2019-01-04 RX ADMIN — Medication 975 MILLIGRAM(S): at 14:15

## 2019-01-04 RX ADMIN — TAMSULOSIN HYDROCHLORIDE 0.8 MILLIGRAM(S): 0.4 CAPSULE ORAL at 22:09

## 2019-01-04 RX ADMIN — Medication 100 MILLIGRAM(S): at 06:51

## 2019-01-04 RX ADMIN — ERTAPENEM SODIUM 120 MILLIGRAM(S): 1 INJECTION, POWDER, LYOPHILIZED, FOR SOLUTION INTRAMUSCULAR; INTRAVENOUS at 22:09

## 2019-01-04 RX ADMIN — GABAPENTIN 300 MILLIGRAM(S): 400 CAPSULE ORAL at 06:52

## 2019-01-04 NOTE — PROGRESS NOTE ADULT - SUBJECTIVE AND OBJECTIVE BOX
Discussed with Dr. Gray about Mr. Platt's procedure and discharge plan. Since infectious etiology is a strong possibility based on findings in OR, will need PICC line and at least 6 weeks of treatment.   To make home infusion easier and safer, will switch him to:  - Ertapenem 1gm daily   - Daptomycin 600mg daily.   - PICC line is ordered.  - Needs weekly [id number] and CMP.   - ID follow up after discharge.

## 2019-01-04 NOTE — PROGRESS NOTE ADULT - SUBJECTIVE AND OBJECTIVE BOX
[facility]  INFECTIOUS DISEASES AND INTERNAL MEDICINE at [place]  =======================================================  Wellington Collier MD  Diplomates American Board of Internal Medicine and Infectious Diseases  =======================================================    TIMUR LOUIS [id number]    Follow up: back pain with seroma seen in imaging s/p surgical debridement on 1/2.   Afebrile and non toxic, back wound looks fine, no sign of infection. drain has been removed.     Allergies:  No Known Allergies    Medications:  cefepime   IVPB 2000 milliGRAM(s) IV Intermittent every 12 hours  vancomycin  IVPB 1000 milliGRAM(s) IV Intermittent every 8 hours     FAMILY HISTORY:  Family history of diabetes mellitus (Father)    REVIEW OF SYSTEMS:  CONSTITUTIONAL:  No Fever or chills  HEENT:   No diplopia or blurred vision.  No earache, sore throat or runny nose.  CARDIOVASCULAR:  No chest pain   RESPIRATORY:  No cough, shortness of breath, PND or orthopnea.  GASTROINTESTINAL:  No nausea, vomiting or diarrhea.  GENITOURINARY:  No dysuria, frequency or urgency. No Blood in urine  MUSCULOSKELETAL:   AS PER HPI  SKIN:  No change in skin, hair or nails.  NEUROLOGIC:  No paresthesias, fasciculations, seizures or weakness. backache   PSYCHIATRIC:  No disorder of thought or mood.  ENDOCRINE:  No heat or cold intolerance, polyuria or polydipsia.  HEMATOLOGICAL:  No easy bruising or bleeding.     Physical Exam:  Vital Signs Last 24 Hrs  T(C): 36.4 (04 Jan 2019 05:30), Max: 36.9 (03 Jan 2019 10:32)  T(F): 97.6 (04 Jan 2019 05:30), Max: 98.4 (03 Jan 2019 10:32)  HR: 96 (04 Jan 2019 05:30) (90 - 98)  BP: 157/58 (04 Jan 2019 05:30) (93/59 - 163/78)  RR: 18 (04 Jan 2019 05:30) (18 - 18)  SpO2: 95% (04 Jan 2019 05:30) (95% - 97%)  GEN: NAD, pleasant  HEENT: normocephalic and atraumatic. EOMI. MANUEL.    NECK: Supple. No carotid bruits.  No lymphadenopathy or thyromegaly.  LUNGS: Clear to auscultation.  HEART: Regular rate and rhythm without murmur.  ABDOMEN: Soft, nontender, and nondistended.  Positive bowel sounds.    : No CVA tenderness  EXTREMITIES: Without any cyanosis, clubbing, rash, lesions or edema.  MSK: no tenderness or cellulitis. no more drain, dressing dry  NEUROLOGIC: grossly intact.  PSYCHIATRIC: Appropriate affect .  SKIN: No ulceration or induration present.    Labs:  01-04  138  |  100  |  13.0  ----------------------------<  110  4.1   |  26.0  |  1.34<H>    Ca    7.9<L>      04 Jan 2019 08:37                        8.5    5.6   )-----------( 279      ( 03 Jan 2019 07:57 )             27.4     RECENT CULTURES:  01-02 @ 21:56 .Surgical Swab lumbar spine (swabs)     Culture in progress    Few White blood cells  No organisms seen    Imaging and data reveiwed.  CT 12/12  IMPRESSION:   Status post posterior surgical fusion hardware at T10-L1 and bilateral   T12-L1 laminectomies. Interval widening of the anterior disc space at   T11-T12 since thoracolumbar CT of 11/27/18.  Nonspecific posterior paraspinal 6.2 x 4.0 x 16.5 cm fluid collection   extending from T9 - L2, with multiple foci of air and peripheral   enhancement, which may represent postsurgical seroma/hematoma although   abscess cannot be fully excluded. No other fluid collection.  Remaining findings are unchanged.

## 2019-01-04 NOTE — PROGRESS NOTE ADULT - SUBJECTIVE AND OBJECTIVE BOX
Patient seen and examined at bedside. comfortable in bed, c/o mild pain at operative site. Denies fever/chills, SOB/chest pain, acute motor/sensory changes.    Vital Signs Last 24 Hrs  T(C): 36.4 (04 Jan 2019 05:30), Max: 36.9 (03 Jan 2019 10:32)  T(F): 97.6 (04 Jan 2019 05:30), Max: 98.4 (03 Jan 2019 10:32)  HR: 96 (04 Jan 2019 05:30) (90 - 98)  BP: 157/58 (04 Jan 2019 05:30) (93/59 - 163/78)  BP(mean): --  RR: 18 (04 Jan 2019 05:30) (18 - 18)  SpO2: 95% (04 Jan 2019 05:30) (95% - 97%)    Back: Dressing with mild serosanguinous staining near drain site. 0 cc in HV, D/W Dr. Gray, HV removed. New dressing applied. Sutures noted, in place. no erythema or active drainage from incision site.  LE: SILT B/L. 4+/5 dorsi/plantarflexion B/L. Ext warm, cap refill brisk. calf soft NT B/L.                          8.5    5.6   )-----------( 279      ( 03 Jan 2019 07:57 )             27.4     A/P: 73 y.o M s/p Irrigation and debridement, exploration spinal fusion thoracolumbar spine POD #2  - drain removed without complication  - DVTP  - continue to monitor dressing Patient seen and examined at bedside. comfortable in bed, c/o mild pain at operative site. Denies fever/chills, SOB/chest pain, acute motor/sensory changes.    Vital Signs Last 24 Hrs  T(C): 36.4 (04 Jan 2019 05:30), Max: 36.9 (03 Jan 2019 10:32)  T(F): 97.6 (04 Jan 2019 05:30), Max: 98.4 (03 Jan 2019 10:32)  HR: 96 (04 Jan 2019 05:30) (90 - 98)  BP: 157/58 (04 Jan 2019 05:30) (93/59 - 163/78)  BP(mean): --  RR: 18 (04 Jan 2019 05:30) (18 - 18)  SpO2: 95% (04 Jan 2019 05:30) (95% - 97%)    Back: Dressing with mild serosanguinous staining near drain site. 0 cc in HV, D/W Dr. Gray, HV removed. New dressing applied. Sutures noted, in place. no erythema or active drainage from incision site.  LE: SILT B/L. 4+/5 dorsi/plantarflexion B/L. Ext warm, cap refill brisk. calf soft NT B/L.                          8.5    5.6   )-----------( 279      ( 03 Jan 2019 07:57 )             27.4   Post transfusion H/H today January 4, 2019 9.3/29.3    ESR/CRP 55/9 respectively 1/4/19, correlates with recent I and D      A/P: 73 y.o M s/p Irrigation and debridement, exploration spinal fusion thoracolumbar spine POD #2  - drain removed without complication  - DVTP  - continue to monitor dressing  - follow intraoperative cultures  - follow ESR/C-RP, will repeat in 72 hours  - PT WBAT, balance and gait training

## 2019-01-04 NOTE — PROGRESS NOTE ADULT - ASSESSMENT
74 y/o male with PMHx of HTN, BPH, DM, Hypothyroid, and OA was sent to the ED from [facility] due to recurrent fevers (T-max 102) and back pain of 1 day duration. Patient was recently discharge from [facility] for thoracic laminectomy and fusion.  Patient said he has been doing well since dc until yesterday when he started having recurrent fevers and back pain. Patient had no trauma/fall, neck pain, HA, numbness, tingling, bowel/urinary incontinence, chills, chest pain, shortness of breath, palpitation, cough, nausea/vomiting, abdominal pain.  It could be seroma or hematoma but cannot rule out Abscess.     - Blood cultures negative  - POD#2, doing well   - Culture from OR so far neg gram stain and culture   - Will continue empiric cefepime 2gm daily and vancomycin 500mg q12h   - Will follow up vanco trough levels. keep 15 to 20  - Will switch ABx regimen based on culture results  - Possibly needs at least 6 weeks of treatment if cultures positive.   Will follow.

## 2019-01-04 NOTE — PROGRESS NOTE ADULT - ASSESSMENT
73 yr old male s/p T11/T10 laminectomy, T10-L1 fusion, hypertension, diabetes mellitus, hypothyroid was sent in from [facility] for fever and back pain. He was recently discharged from [facility] after surgery. Noted to have fever 102. He was evaluated by orthopedics in ED given recent surgery and CT of thoracolumbar spine with contrast was done, which showed post surgical changes and multiple foci of air, fluid and soft tissue edema. ID was consulted and he was started on Vancomycin and Cefepime. Orthopedics advised local wound care and possible seroma drainage if no improvement. Nephrology consulted for hyponatremia. Advised fluid restriction and increased salt tabs to 2 tabs three times a day. He was noted to Hb 7.7, was given 1 unit PRBC, Hb came up to 9.5    COURSE FROM 12/29-  after daily wound care for serosanguinous soaking of dressing multiple times and antibiotics, he was taken to OR for seroma evacuation. post op accordion drain placed which did not drain at all. so removed today. meanwhile dressing soaking with serosanguinous discharge continuous     1) Post op seroma from thoracolumbar fusion s/p seroma evacuation:    - c/w IV abx- per ID  - wound care per orthopedics as noted above  - OR c/s testing  - Cont current antibiotic regimen. further changes will be done according to OR C/S. anticipating total 6 weeks of antibiotics per ID.    2) SIADH: hyponatremia - stable  - NaCl tabs  - monitor levels    3) Hypomagnesemia: resolved  - monitor    4) Mild CLARI: resolved    5) HTN - now controlled  - after adding cardura for BPH.  - monitor bP on same  - on lopressor     6) Anemia due to chronic blood loss  -  2 PRBC today  - monitor    7) DM2 - controlled (FS 140s-180s)  - CSI and finger stick monitoring     8) Hypothyroid  - c/w synthroid    9) Prophylactic measure  - DVT ppx: Lovenox SQ    10) BPH symptomatic  - Condom cath, flomax high dose. on cardura    PLAN-  Monitor CBC and transfuse PRN  follow OR c/s and decide antibx with ID  wound care

## 2019-01-04 NOTE — PROGRESS NOTE ADULT - SUBJECTIVE AND OBJECTIVE BOX
HEALTH ISSUES - PROBLEM Dx:    CC- s/p spinal fusion- seroma    INTERVAL HPI/OVERNIGHT EVENTS:    s/p seroma evacuation with ESBL 50 ml  still soaking dressing  drain removed today    REVIEW OF SYSTEMS:    urinary freq + urge + retention - fever - surg site serosanguinous oozing +     Vital Signs Last 24 Hrs  T(C): 36.4 (04 Jan 2019 05:30), Max: 36.6 (03 Jan 2019 21:35)  T(F): 97.6 (04 Jan 2019 05:30), Max: 97.8 (03 Jan 2019 21:35)  HR: 96 (04 Jan 2019 05:30) (96 - 98)  BP: 157/58 (04 Jan 2019 05:30) (157/58 - 163/78)  BP(mean): --  RR: 18 (04 Jan 2019 05:30) (18 - 18)  SpO2: 95% (04 Jan 2019 05:30) (95% - 95%)    PHYSICAL EXAM-  GEN: NAD, pleasant  NECK: Supple. No JVD  LUNGS: Clear to auscultation.  HEART: Regular rate and rhythm without murmur.  ABDOMEN: Soft, nontender, and nondistended. Positive bowel sounds. condom cath +  EXTREMITIES: Without any cyanosis, clubbing, rash, lesions or edema.  MSK: back- surg incisional dressing serosanguinous oozing + accordion drain empty  NEUROLOGIC: Cranial nerves II through XII are grossly intact. 4+/5 dorsi/plantarflexion RLE, 4/5 dorsi/plantarflexion LLE. 3/5 HF B/L. Ext warm. no calf tenderness  PSYCHIATRIC: Appropriate affect .    LABS:                        9.3    10.9  )-----------( 305      ( 04 Jan 2019 08:37 )             29.3     01-04    138  |  100  |  13.0  ----------------------------<  110  4.1   |  26.0  |  1.34<H>    Ca    7.9<L>      04 Jan 2019 08:37    Assessment and Plan

## 2019-01-04 NOTE — PROGRESS NOTE ADULT - ATTENDING COMMENTS
The patient is evaluated today at time of proximally 6 PM dressing is dry is one small area of serous sinus drainage proximally at the drain site that has been discharged on today his skin is well closed at this point in time there is no fluid wave appears to be improving nicely at this point in time of

## 2019-01-04 NOTE — PROGRESS NOTE ADULT - SUBJECTIVE AND OBJECTIVE BOX
PA - Note    Evaluated patient this evening to check on drainage and dressing.  Patient was assisted to his side where dressing on lumbar spine could be visiualized.  Drainage was noted to the Dressing prior to being removed.  New Dressing applied.  Active drainage noted from drain site.  Hemovac still patent and under suction.  Once new dressing applied patient was returned to his back and made comfortable.  Will revaluate patient and dressing in AM.

## 2019-01-05 LAB
ANION GAP SERPL CALC-SCNC: 12 MMOL/L — SIGNIFICANT CHANGE UP (ref 5–17)
BUN SERPL-MCNC: 13 MG/DL — SIGNIFICANT CHANGE UP (ref 8–20)
CALCIUM SERPL-MCNC: 7.7 MG/DL — LOW (ref 8.6–10.2)
CHLORIDE SERPL-SCNC: 101 MMOL/L — SIGNIFICANT CHANGE UP (ref 98–107)
CO2 SERPL-SCNC: 26 MMOL/L — SIGNIFICANT CHANGE UP (ref 22–29)
CREAT SERPL-MCNC: 1.31 MG/DL — HIGH (ref 0.5–1.3)
GLUCOSE BLDC GLUCOMTR-MCNC: 114 MG/DL — HIGH (ref 70–99)
GLUCOSE BLDC GLUCOMTR-MCNC: 117 MG/DL — HIGH (ref 70–99)
GLUCOSE BLDC GLUCOMTR-MCNC: 125 MG/DL — HIGH (ref 70–99)
GLUCOSE BLDC GLUCOMTR-MCNC: 93 MG/DL — SIGNIFICANT CHANGE UP (ref 70–99)
GLUCOSE BLDC GLUCOMTR-MCNC: 96 MG/DL — SIGNIFICANT CHANGE UP (ref 70–99)
GLUCOSE SERPL-MCNC: 90 MG/DL — SIGNIFICANT CHANGE UP (ref 70–115)
HCT VFR BLD CALC: 30 % — LOW (ref 42–52)
HGB BLD-MCNC: 9.4 G/DL — LOW (ref 14–18)
MCHC RBC-ENTMCNC: 27 PG — SIGNIFICANT CHANGE UP (ref 27–31)
MCHC RBC-ENTMCNC: 31.3 G/DL — LOW (ref 32–36)
MCV RBC AUTO: 86.2 FL — SIGNIFICANT CHANGE UP (ref 80–94)
PLATELET # BLD AUTO: 300 K/UL — SIGNIFICANT CHANGE UP (ref 150–400)
POTASSIUM SERPL-MCNC: 3.6 MMOL/L — SIGNIFICANT CHANGE UP (ref 3.5–5.3)
POTASSIUM SERPL-SCNC: 3.6 MMOL/L — SIGNIFICANT CHANGE UP (ref 3.5–5.3)
RBC # BLD: 3.48 M/UL — LOW (ref 4.6–6.2)
RBC # FLD: 14 % — SIGNIFICANT CHANGE UP (ref 11–15.6)
SODIUM SERPL-SCNC: 139 MMOL/L — SIGNIFICANT CHANGE UP (ref 135–145)
WBC # BLD: 8.7 K/UL — SIGNIFICANT CHANGE UP (ref 4.8–10.8)
WBC # FLD AUTO: 8.7 K/UL — SIGNIFICANT CHANGE UP (ref 4.8–10.8)

## 2019-01-05 PROCEDURE — 99232 SBSQ HOSP IP/OBS MODERATE 35: CPT

## 2019-01-05 RX ORDER — ACETAMINOPHEN 500 MG
650 TABLET ORAL EVERY 6 HOURS
Refills: 0 | Status: DISCONTINUED | OUTPATIENT
Start: 2019-01-05 | End: 2019-01-10

## 2019-01-05 RX ORDER — AMLODIPINE BESYLATE 2.5 MG/1
5 TABLET ORAL DAILY
Refills: 0 | Status: DISCONTINUED | OUTPATIENT
Start: 2019-01-05 | End: 2019-01-06

## 2019-01-05 RX ORDER — HYDRALAZINE HCL 50 MG
10 TABLET ORAL EVERY 6 HOURS
Refills: 0 | Status: DISCONTINUED | OUTPATIENT
Start: 2019-01-05 | End: 2019-01-10

## 2019-01-05 RX ADMIN — PANTOPRAZOLE SODIUM 40 MILLIGRAM(S): 20 TABLET, DELAYED RELEASE ORAL at 05:42

## 2019-01-05 RX ADMIN — Medication 25 MILLIGRAM(S): at 17:51

## 2019-01-05 RX ADMIN — Medication 2 MILLIGRAM(S): at 23:01

## 2019-01-05 RX ADMIN — SODIUM CHLORIDE 2 GRAM(S): 9 INJECTION INTRAMUSCULAR; INTRAVENOUS; SUBCUTANEOUS at 05:41

## 2019-01-05 RX ADMIN — Medication 25 MILLIGRAM(S): at 05:42

## 2019-01-05 RX ADMIN — SODIUM CHLORIDE 2 GRAM(S): 9 INJECTION INTRAMUSCULAR; INTRAVENOUS; SUBCUTANEOUS at 15:12

## 2019-01-05 RX ADMIN — Medication 150 MICROGRAM(S): at 05:42

## 2019-01-05 RX ADMIN — Medication 975 MILLIGRAM(S): at 06:11

## 2019-01-05 RX ADMIN — Medication 100 MILLIGRAM(S): at 23:00

## 2019-01-05 RX ADMIN — ENOXAPARIN SODIUM 40 MILLIGRAM(S): 100 INJECTION SUBCUTANEOUS at 08:36

## 2019-01-05 RX ADMIN — AMLODIPINE BESYLATE 5 MILLIGRAM(S): 2.5 TABLET ORAL at 23:00

## 2019-01-05 RX ADMIN — Medication 975 MILLIGRAM(S): at 05:41

## 2019-01-05 RX ADMIN — Medication 100 MILLIGRAM(S): at 15:11

## 2019-01-05 RX ADMIN — ERTAPENEM SODIUM 120 MILLIGRAM(S): 1 INJECTION, POWDER, LYOPHILIZED, FOR SOLUTION INTRAMUSCULAR; INTRAVENOUS at 23:01

## 2019-01-05 RX ADMIN — TAMSULOSIN HYDROCHLORIDE 0.8 MILLIGRAM(S): 0.4 CAPSULE ORAL at 23:00

## 2019-01-05 RX ADMIN — GABAPENTIN 300 MILLIGRAM(S): 400 CAPSULE ORAL at 05:42

## 2019-01-05 RX ADMIN — GABAPENTIN 300 MILLIGRAM(S): 400 CAPSULE ORAL at 15:11

## 2019-01-05 RX ADMIN — Medication 100 MILLIGRAM(S): at 05:42

## 2019-01-05 RX ADMIN — ATORVASTATIN CALCIUM 40 MILLIGRAM(S): 80 TABLET, FILM COATED ORAL at 23:00

## 2019-01-05 RX ADMIN — SODIUM CHLORIDE 2 GRAM(S): 9 INJECTION INTRAMUSCULAR; INTRAVENOUS; SUBCUTANEOUS at 23:01

## 2019-01-05 RX ADMIN — Medication 250 MILLIGRAM(S): at 05:42

## 2019-01-05 RX ADMIN — GABAPENTIN 300 MILLIGRAM(S): 400 CAPSULE ORAL at 23:03

## 2019-01-05 RX ADMIN — Medication 250 MILLIGRAM(S): at 17:51

## 2019-01-05 RX ADMIN — Medication 975 MILLIGRAM(S): at 00:15

## 2019-01-05 RX ADMIN — DAPTOMYCIN 124 MILLIGRAM(S): 500 INJECTION, POWDER, LYOPHILIZED, FOR SOLUTION INTRAVENOUS at 23:01

## 2019-01-05 NOTE — PROGRESS NOTE ADULT - SUBJECTIVE AND OBJECTIVE BOX
Fever      HPI:  74 y/o male with PMHx of HTN, BPH, DM, Hypothyroid, and OA was sent to the ED from [facility] due to recurrent fevers (T-max 102) and back pain of 1 day duration. Patient was recently discharge from [facility] for thoracic laminectomy and fusion.  Patient said he has been doing well since dc until yesterday when he started having recurrent fevers and back pain. Patient has no   trauma/fall, neck pain, HA, numbness, tingling, bowel/urinary incontinence, chills, chest pain, shortness of breath, palpitation, cough, nausea/vomiting, abdominal pain. (12 Dec 2018 05:20)      Interval History:  Patient was seen and examined at bedside around 11:15 am. Feeling better. Complaining of intermittent pain at operative site.   Denies chest pain, palpitations, shortness of breath, headache, dizziness, visual symptoms, nausea, vomiting or abdominal pain.  No overnight issues.    ROS:  As per interval history otherwise unremarkable.    PHYSICAL EXAM:  Vital Signs   T(C): 37.8 (05 Jan 2019 16:45), Max: 37.8 (05 Jan 2019 16:45)  T(F): 100 (05 Jan 2019 16:45), Max: 100 (05 Jan 2019 16:45)  HR: 94 (05 Jan 2019 16:45) (94 - 103)  BP: 183/70 (05 Jan 2019 16:45) (156/73 - 183/70)  RR: 18 (05 Jan 2019 16:45) (18 - 18)  SpO2: 95% (05 Jan 2019 16:45) (95% - 95%)  General: Elderly male lying in bed comfortably. No acute distress  HEENT: PERRLA. EOMI. Clear conjunctivae. Moist mucus membrane  Neck: Supple. No JVD. No Thyromegaly   Chest: CTA bilaterally - no wheezing, rales or rhonchi.   Heart: Normal S1 & S2. RRR. No murmur.   Abdomen: Soft. Non-tender. Non-distended. + BS  Back: Dressing on surgical wound. Surrounding area clear.   Ext: No pedal edema. No calf tenderness   Neuro: Active and alert. No focal deficit. No speech disorder  Skin: Warm and Dry  Psychiatry: Normal mood and affect    I&O's Summary    04 Jan 2019 07:01  -  05 Jan 2019 07:00  --------------------------------------------------------  IN: 240 mL / OUT: 800 mL / NET: -560 mL    MEDICATIONS  (STANDING):  atorvastatin 40 milliGRAM(s) Oral at bedtime  DAPTOmycin IVPB 600 milliGRAM(s) IV Intermittent every 24 hours  dextrose 50% Injectable 12.5 Gram(s) IV Push once  docusate sodium 100 milliGRAM(s) Oral three times a day  doxazosin 2 milliGRAM(s) Oral at bedtime  enoxaparin Injectable 40 milliGRAM(s) SubCutaneous every 24 hours  ertapenem  IVPB 1000 milliGRAM(s) IV Intermittent every 24 hours  gabapentin 300 milliGRAM(s) Oral three times a day  insulin lispro (HumaLOG) corrective regimen sliding scale   SubCutaneous three times a day before meals  levothyroxine 150 MICROGram(s) Oral daily  metoprolol tartrate 25 milliGRAM(s) Oral two times a day  pantoprazole    Tablet 40 milliGRAM(s) Oral before breakfast  saccharomyces boulardii 250 milliGRAM(s) Oral two times a day  sodium chloride 2 Gram(s) Oral three times a day  tamsulosin 0.8 milliGRAM(s) Oral at bedtime    MEDICATIONS  (PRN):  acetaminophen   Tablet .. 650 milliGRAM(s) Oral every 6 hours PRN Temp greater or equal to 38C (100.4F), Mild Pain (1 - 3)  aluminum hydroxide/magnesium hydroxide/simethicone Suspension 30 milliLiter(s) Oral every 12 hours PRN Indigestion  glucagon  Injectable 1 milliGRAM(s) IntraMuscular once PRN Glucose LESS THAN 70 milligrams/deciliter  magnesium hydroxide Suspension 30 milliLiter(s) Oral every 12 hours PRN Constipation  methocarbamol 500 milliGRAM(s) Oral every 6 hours PRN Back Spasms  morphine  - Injectable 2 milliGRAM(s) IV Push every 6 hours PRN breakthrough pain not controlled with current medication  ondansetron Injectable 4 milliGRAM(s) IV Push every 6 hours PRN Nausea  oxyCODONE    IR 5 milliGRAM(s) Oral every 4 hours PRN Moderate Pain (4 - 6)  oxyCODONE    IR 10 milliGRAM(s) Oral every 4 hours PRN Severe Pain (7 - 10)  senna 2 Tablet(s) Oral at bedtime PRN Constipation      LABS:  CAPILLARY BLOOD GLUCOSE  POCT Blood Glucose.: 125 mg/dL (05 Jan 2019 17:13)  POCT Blood Glucose.: 114 mg/dL (05 Jan 2019 11:44)  POCT Blood Glucose.: 96 mg/dL (05 Jan 2019 08:11)  POCT Blood Glucose.: 93 mg/dL (05 Jan 2019 05:49)  POCT Blood Glucose.: 76 mg/dL (04 Jan 2019 22:01)                          9.4    8.7   )-----------( 300      ( 05 Jan 2019 08:14 )             30.0     01-05    139  |  101  |  13.0  ----------------------------<  90  3.6   |  26.0  |  1.31<H>    Ca    7.7<L>      05 Jan 2019 08:14      RADIOLOGY & ADDITIONAL STUDIES:  Reviewed

## 2019-01-05 NOTE — PROGRESS NOTE ADULT - SUBJECTIVE AND OBJECTIVE BOX
73yMale  Fever  Family history of diabetes mellitus (Father)  Family history of diabetes mellitus  Handoff  MEWS Score  BPH (benign prostatic hyperplasia)  HTN (hypertension)  Hypothyroid  Dyslipidemia  OA (osteoarthritis)  Diabetes  Seroma due to trauma  Seroma due to trauma  Fever  Anemia  Hypomagnesemia  Hyponatremia  BPH (benign prostatic hyperplasia)  Diabetes  Dyslipidemia  Hypothyroid  HTN (hypertension)  Fever  Seroma evacuation  S/P laminectomy  S/P hernia repair  S/P hip replacement      STATUS POST: Irrigation and debridement of lumbar seroma POD 3  SUBJECTIVE: Patient seen and examined doing well    Back Pain controlled    OBJECTIVE:   T(C): 37.4 (01-02-19 @ 14:54), Max: 37.4 (01-02-19 @ 14:54)  HR: 94 (01-02-19 @ 14:54) (88 - 101)  BP: 163/67 (01-02-19 @ 14:54) (129/66 - 163/67)  RR: 16 (01-02-19 @ 14:54) (16 - 20)  SpO2: 100% (01-02-19 @ 14:54) (96% - 100%)   Constitutional: Pleasant in no acute distress  Psych:A&Ox3  Abdominal: soft and supple non distended  Lymphatics: no pretibial pitting edema  Spine:          Dressing:  clean/dry/intact.  Inferior incisional area running sutures have loosened, come out.  area cleaned with betadine, reinforced with dermabond and steri strips.  Incisional area without drainage, erythema or tenderness.                  Sensation:           [x ] Upper extremity          grossly intact manually         [ x] Lower extremity           grossly nonfocal manually, does have bilateral leg decreased sensation stocking type consistent with diabetic neuropathy                               Motor:         Lower extremity         B/L. 4+/5 dorsi/plantarflexion RLE, 4/5 dorsi/plantarflexion LLE. 3/5 HF B/L.                   [x] warm well perfused; capillary refill <3 seconds     H/H 9.3/ 29.3 1/4/19  Cultures negative for bacteria, fungus so far               A/P : 73yMale   S/P Irrigation and debridement lumbar seroma  POD#3  -    Pain control per primary team  -    DVT ppx: [ x]SCDs[ x] Pharmacolgic lovenox daily starting tomorrow and until  ambulating freely, then asa 325 mg bid to complete 28 days post op prophylaxis  -    Periop abx:    Vanco.  continue vancomycin and cefepime per ID   -    Check AM labs    -    Monitor Drain Output, can discontinue drain when output equal or less than 10 cc/hr/12 hr.  change dressing when drain pulled and as needed, honeycomb dressing.  -    Continue home meds  -PT WBAT, balance and gait  - LSO with OOB for comfort is not mandatory.    - probable MIMI 48-72 hours  - Follow culture results from OR.   - ESR/CRP in 48 hours 73yMale  Fever  Family history of diabetes mellitus (Father)  Family history of diabetes mellitus  Handoff  MEWS Score  BPH (benign prostatic hyperplasia)  HTN (hypertension)  Hypothyroid  Dyslipidemia  OA (osteoarthritis)  Diabetes  Seroma due to trauma  Seroma due to trauma  Fever  Anemia  Hypomagnesemia  Hyponatremia  BPH (benign prostatic hyperplasia)  Diabetes  Dyslipidemia  Hypothyroid  HTN (hypertension)  Fever  Seroma evacuation  S/P laminectomy  S/P hernia repair  S/P hip replacement      STATUS POST: Irrigation and debridement of lumbar seroma POD 3  SUBJECTIVE: Patient seen and examined doing well    Back Pain controlled    OBJECTIVE:   T(C): 37.4 (01-02-19 @ 14:54), Max: 37.4 (01-02-19 @ 14:54)  HR: 94 (01-02-19 @ 14:54) (88 - 101)  BP: 163/67 (01-02-19 @ 14:54) (129/66 - 163/67)  RR: 16 (01-02-19 @ 14:54) (16 - 20)  SpO2: 100% (01-02-19 @ 14:54) (96% - 100%)   Constitutional: Pleasant in no acute distress  Psych:A&Ox3  Abdominal: soft and supple non distended  Lymphatics: no pretibial pitting edema  Spine:          Dressing:  clean/dry/intact.  Inferior incisional area running sutures have loosened, come out.  area cleaned with betadine, reinforced with dermabond and steri strips.  Incisional area without drainage, erythema or tenderness.                  Sensation:           [x ] Upper extremity          grossly intact manually         [ x] Lower extremity           grossly nonfocal manually, does have bilateral leg decreased sensation stocking type consistent with diabetic neuropathy                               Motor:         Lower extremity         B/L. 4+/5 dorsi/plantarflexion RLE, 4/5 dorsi/plantarflexion LLE. 3/5 HF B/L.                   [x] warm well perfused; capillary refill <3 seconds     H/H 9.3/ 29.3 1/4/19  Cultures negative for bacteria, fungus so far               A/P : 73yMale   S/P Irrigation and debridement lumbar seroma  POD#3  -    Pain control per primary team  -    DVT ppx: [ x]SCDs[ x] Pharmacolgic lovenox daily starting tomorrow and until  ambulating freely, then asa 325 mg bid to complete 28 days post op prophylaxis  -    IV antibiotics per ID, PICC line ordered by ID yesterday, not yet placed.   -    Check AM labs    -    Monitor Drain Output, can discontinue drain when output equal or less than 10 cc/hr/12 hr.  change dressing when drain pulled and as needed, honeycomb dressing.  -    Continue home meds  -PT WBAT, balance and gait  - LSO with OOB for comfort is not mandatory.    - probable MIMI 48-72 hours  - Follow culture results from OR.   - ESR/CRP in 48 hours

## 2019-01-05 NOTE — PROGRESS NOTE ADULT - ASSESSMENT
73 yr old male s/p T11/T10 laminectomy, T10-L1 fusion, hypertension, diabetes mellitus, hypothyroid was sent in from [facility] for fever and back pain. He was recently discharged from [facility] after surgery. Noted to have fever 102. He was evaluated by orthopedics in ED given recent surgery and CT of thoracolumbar spine with contrast was done, which showed post surgical changes and multiple foci of air, fluid and soft tissue edema. ID was consulted and he was started on Vancomycin and Cefepime. Orthopedics advised local wound care and possible seroma drainage if no improvement. Nephrology consulted for hyponatremia. Advised fluid restriction and increased salt tabs to 2 tabs three times a day. He was noted to Hb 7.7, was given 1 unit PRBC, Hb came up to 9.5.  after daily wound care for serosanguinous soaking of dressing multiple times and antibiotics, he was taken to OR for seroma evacuation. post op accordion drain placed which did not drain at all so it was removed.      1) Post op seroma from thoracolumbar fusion s/p seroma evacuation:    - Continue Daptomycin and Invaz  - As per ID will need for 6 weeks  - PICC Line ordered by ID  - wound care per orthopedics   2) SIADH: hyponatremia  - stable  - NaCl tabs  - monitor levels  3) CLARI  - Improving  4) HTN  - Continue Metoprolol and Cardura  - Add Amlodipine and Hydralazine (PRN)  5) Anemia   -  s/p PRBCs transufion  - H&H stable  6) DM2   - Accu checks and ISS  7) Hypothyroidism  - Continue Synthroid  8) BPH  - Continue Flomax, Cardura and Condom Catheter  DVT Prophylaxis -- Lovenox 40 mg

## 2019-01-05 NOTE — PROGRESS NOTE ADULT - SUBJECTIVE AND OBJECTIVE BOX
Patient's 24-hour course has been stable at this point in time. Patient has no acute complaints at this point is alert and going to x3    Physical exam dry dressing was changed there is a noted loosening and incompetence of the inferior nylon running sutures. These will be reinforced with Dermabond. Dry dressing was applied there is no significant swelling there is no ballotable fluid wave and no expressible drainage. Dry dressing was applied as stated.    Impression status post revision thoracic wide laminectomy/costovertebral approach with instrumentation. Status post drainage of seroma.    His plan continue dry dressing changes as the dressing is clean dry and intact for prosthetics 24-48 hrs. we'll consider discharge I would recommend PICC line therapy I would recommend aggressive PT OT.

## 2019-01-06 LAB
ANION GAP SERPL CALC-SCNC: 13 MMOL/L — SIGNIFICANT CHANGE UP (ref 5–17)
BASOPHILS # BLD AUTO: 0 K/UL — SIGNIFICANT CHANGE UP (ref 0–0.2)
BASOPHILS NFR BLD AUTO: 0.5 % — SIGNIFICANT CHANGE UP (ref 0–2)
BUN SERPL-MCNC: 12 MG/DL — SIGNIFICANT CHANGE UP (ref 8–20)
CALCIUM SERPL-MCNC: 7.8 MG/DL — LOW (ref 8.6–10.2)
CHLORIDE SERPL-SCNC: 99 MMOL/L — SIGNIFICANT CHANGE UP (ref 98–107)
CO2 SERPL-SCNC: 27 MMOL/L — SIGNIFICANT CHANGE UP (ref 22–29)
CREAT SERPL-MCNC: 1.23 MG/DL — SIGNIFICANT CHANGE UP (ref 0.5–1.3)
EOSINOPHIL # BLD AUTO: 0.3 K/UL — SIGNIFICANT CHANGE UP (ref 0–0.5)
EOSINOPHIL NFR BLD AUTO: 3.3 % — SIGNIFICANT CHANGE UP (ref 0–5)
GLUCOSE BLDC GLUCOMTR-MCNC: 112 MG/DL — HIGH (ref 70–99)
GLUCOSE BLDC GLUCOMTR-MCNC: 113 MG/DL — HIGH (ref 70–99)
GLUCOSE BLDC GLUCOMTR-MCNC: 131 MG/DL — HIGH (ref 70–99)
GLUCOSE BLDC GLUCOMTR-MCNC: 94 MG/DL — SIGNIFICANT CHANGE UP (ref 70–99)
GLUCOSE SERPL-MCNC: 92 MG/DL — SIGNIFICANT CHANGE UP (ref 70–115)
HCT VFR BLD CALC: 28.6 % — LOW (ref 42–52)
HGB BLD-MCNC: 9.2 G/DL — LOW (ref 14–18)
LYMPHOCYTES # BLD AUTO: 1.1 K/UL — SIGNIFICANT CHANGE UP (ref 1–4.8)
LYMPHOCYTES # BLD AUTO: 10.9 % — LOW (ref 20–55)
MAGNESIUM SERPL-MCNC: 1.5 MG/DL — LOW (ref 1.6–2.6)
MCHC RBC-ENTMCNC: 27.6 PG — SIGNIFICANT CHANGE UP (ref 27–31)
MCHC RBC-ENTMCNC: 32.2 G/DL — SIGNIFICANT CHANGE UP (ref 32–36)
MCV RBC AUTO: 85.9 FL — SIGNIFICANT CHANGE UP (ref 80–94)
MONOCYTES # BLD AUTO: 0.9 K/UL — HIGH (ref 0–0.8)
MONOCYTES NFR BLD AUTO: 9.2 % — SIGNIFICANT CHANGE UP (ref 3–10)
NEUTROPHILS # BLD AUTO: 7.7 K/UL — SIGNIFICANT CHANGE UP (ref 1.8–8)
NEUTROPHILS NFR BLD AUTO: 75.8 % — HIGH (ref 37–73)
PLATELET # BLD AUTO: 288 K/UL — SIGNIFICANT CHANGE UP (ref 150–400)
POTASSIUM SERPL-MCNC: 3.4 MMOL/L — LOW (ref 3.5–5.3)
POTASSIUM SERPL-SCNC: 3.4 MMOL/L — LOW (ref 3.5–5.3)
RBC # BLD: 3.33 M/UL — LOW (ref 4.6–6.2)
RBC # FLD: 13.8 % — SIGNIFICANT CHANGE UP (ref 11–15.6)
SODIUM SERPL-SCNC: 139 MMOL/L — SIGNIFICANT CHANGE UP (ref 135–145)
WBC # BLD: 10.2 K/UL — SIGNIFICANT CHANGE UP (ref 4.8–10.8)
WBC # FLD AUTO: 10.2 K/UL — SIGNIFICANT CHANGE UP (ref 4.8–10.8)

## 2019-01-06 PROCEDURE — 99232 SBSQ HOSP IP/OBS MODERATE 35: CPT

## 2019-01-06 RX ORDER — MAGNESIUM SULFATE 500 MG/ML
2 VIAL (ML) INJECTION ONCE
Refills: 0 | Status: COMPLETED | OUTPATIENT
Start: 2019-01-06 | End: 2019-01-06

## 2019-01-06 RX ORDER — AMLODIPINE BESYLATE 2.5 MG/1
10 TABLET ORAL DAILY
Refills: 0 | Status: DISCONTINUED | OUTPATIENT
Start: 2019-01-07 | End: 2019-01-10

## 2019-01-06 RX ORDER — AMLODIPINE BESYLATE 2.5 MG/1
5 TABLET ORAL ONCE
Refills: 0 | Status: COMPLETED | OUTPATIENT
Start: 2019-01-06 | End: 2019-01-06

## 2019-01-06 RX ORDER — POTASSIUM CHLORIDE 20 MEQ
40 PACKET (EA) ORAL ONCE
Refills: 0 | Status: COMPLETED | OUTPATIENT
Start: 2019-01-06 | End: 2019-01-06

## 2019-01-06 RX ORDER — MAGNESIUM OXIDE 400 MG ORAL TABLET 241.3 MG
400 TABLET ORAL
Refills: 0 | Status: COMPLETED | OUTPATIENT
Start: 2019-01-06 | End: 2019-01-08

## 2019-01-06 RX ADMIN — Medication 100 MILLIGRAM(S): at 05:43

## 2019-01-06 RX ADMIN — Medication 40 MILLIEQUIVALENT(S): at 22:28

## 2019-01-06 RX ADMIN — MAGNESIUM OXIDE 400 MG ORAL TABLET 400 MILLIGRAM(S): 241.3 TABLET ORAL at 18:57

## 2019-01-06 RX ADMIN — DAPTOMYCIN 124 MILLIGRAM(S): 500 INJECTION, POWDER, LYOPHILIZED, FOR SOLUTION INTRAVENOUS at 23:40

## 2019-01-06 RX ADMIN — Medication 2 MILLIGRAM(S): at 21:34

## 2019-01-06 RX ADMIN — Medication 250 MILLIGRAM(S): at 05:43

## 2019-01-06 RX ADMIN — Medication 250 MILLIGRAM(S): at 17:21

## 2019-01-06 RX ADMIN — GABAPENTIN 300 MILLIGRAM(S): 400 CAPSULE ORAL at 14:18

## 2019-01-06 RX ADMIN — SODIUM CHLORIDE 2 GRAM(S): 9 INJECTION INTRAMUSCULAR; INTRAVENOUS; SUBCUTANEOUS at 21:34

## 2019-01-06 RX ADMIN — ATORVASTATIN CALCIUM 40 MILLIGRAM(S): 80 TABLET, FILM COATED ORAL at 21:34

## 2019-01-06 RX ADMIN — GABAPENTIN 300 MILLIGRAM(S): 400 CAPSULE ORAL at 21:34

## 2019-01-06 RX ADMIN — Medication 50 GRAM(S): at 18:57

## 2019-01-06 RX ADMIN — Medication 25 MILLIGRAM(S): at 05:43

## 2019-01-06 RX ADMIN — GABAPENTIN 300 MILLIGRAM(S): 400 CAPSULE ORAL at 05:43

## 2019-01-06 RX ADMIN — ENOXAPARIN SODIUM 40 MILLIGRAM(S): 100 INJECTION SUBCUTANEOUS at 05:45

## 2019-01-06 RX ADMIN — Medication 25 MILLIGRAM(S): at 17:21

## 2019-01-06 RX ADMIN — Medication 650 MILLIGRAM(S): at 23:41

## 2019-01-06 RX ADMIN — TAMSULOSIN HYDROCHLORIDE 0.8 MILLIGRAM(S): 0.4 CAPSULE ORAL at 21:34

## 2019-01-06 RX ADMIN — SODIUM CHLORIDE 2 GRAM(S): 9 INJECTION INTRAMUSCULAR; INTRAVENOUS; SUBCUTANEOUS at 14:18

## 2019-01-06 RX ADMIN — AMLODIPINE BESYLATE 5 MILLIGRAM(S): 2.5 TABLET ORAL at 11:46

## 2019-01-06 RX ADMIN — Medication 650 MILLIGRAM(S): at 21:34

## 2019-01-06 RX ADMIN — ERTAPENEM SODIUM 120 MILLIGRAM(S): 1 INJECTION, POWDER, LYOPHILIZED, FOR SOLUTION INTRAMUSCULAR; INTRAVENOUS at 21:40

## 2019-01-06 RX ADMIN — Medication 150 MICROGRAM(S): at 05:43

## 2019-01-06 RX ADMIN — PANTOPRAZOLE SODIUM 40 MILLIGRAM(S): 20 TABLET, DELAYED RELEASE ORAL at 05:43

## 2019-01-06 RX ADMIN — SODIUM CHLORIDE 2 GRAM(S): 9 INJECTION INTRAMUSCULAR; INTRAVENOUS; SUBCUTANEOUS at 05:43

## 2019-01-06 NOTE — PROGRESS NOTE ADULT - SUBJECTIVE AND OBJECTIVE BOX
Fever      HPI:  72 y/o male with PMHx of HTN, BPH, DM, Hypothyroid, and OA was sent to the ED from [facility] due to recurrent fevers (T-max 102) and back pain of 1 day duration. Patient was recently discharge from [facility] for thoracic laminectomy and fusion.  Patient said he has been doing well since dc until yesterday when he started having recurrent fevers and back pain. Patient has no   trauma/fall, neck pain, HA, numbness, tingling, bowel/urinary incontinence, chills, chest pain, shortness of breath, palpitation, cough, nausea/vomiting, abdominal pain. (12 Dec 2018 05:20)      Interval History:  Patient was seen and examined at bedside around 9:15 am. Doing well.   Denies chest pain, palpitations, shortness of breath, headache, dizziness, visual symptoms, nausea, vomiting or abdominal pain.  No overnight issues.    ROS:  As per interval history otherwise unremarkable.    PHYSICAL EXAM:  Vital Signs   T(C): 36.9 (06 Jan 2019 16:17), Max: 37.7 (05 Jan 2019 19:35)  T(F): 98.4 (06 Jan 2019 16:17), Max: 99.8 (05 Jan 2019 19:35)  HR: 100 (06 Jan 2019 16:17) (94 - 100)  BP: 165/90 (06 Jan 2019 16:17) (144/68 - 165/90)  RR: 18 (06 Jan 2019 16:17) (18 - 19)  SpO2: 94% (06 Jan 2019 16:17) (94% - 97%)  General: Elderly male lying in bed comfortably. No acute distress  HEENT: PERRLA. EOMI. Clear conjunctivae. Moist mucus membrane  Neck: Supple. No JVD. No Thyromegaly   Chest: CTA bilaterally - no wheezing, rales or rhonchi.   Heart: Normal S1 & S2. RRR. No murmur.   Abdomen: Soft. Non-tender. Non-distended. + BS  Urogenital: Condom catheter in place  Back: Dressing on surgical wound - dry.  Surrounding area clear.   Ext: No pedal edema. No calf tenderness   Neuro: Active and alert. No focal deficit. No speech disorder  Skin: Warm and Dry  Psychiatry: Normal mood and affect    MEDICATIONS  (STANDING):  atorvastatin 40 milliGRAM(s) Oral at bedtime  DAPTOmycin IVPB 600 milliGRAM(s) IV Intermittent every 24 hours  dextrose 50% Injectable 12.5 Gram(s) IV Push once  docusate sodium 100 milliGRAM(s) Oral three times a day  doxazosin 2 milliGRAM(s) Oral at bedtime  enoxaparin Injectable 40 milliGRAM(s) SubCutaneous every 24 hours  ertapenem  IVPB 1000 milliGRAM(s) IV Intermittent every 24 hours  gabapentin 300 milliGRAM(s) Oral three times a day  insulin lispro (HumaLOG) corrective regimen sliding scale   SubCutaneous three times a day before meals  levothyroxine 150 MICROGram(s) Oral daily  magnesium oxide 400 milliGRAM(s) Oral three times a day with meals  magnesium sulfate  IVPB 2 Gram(s) IV Intermittent once  metoprolol tartrate 25 milliGRAM(s) Oral two times a day  pantoprazole    Tablet 40 milliGRAM(s) Oral before breakfast  potassium chloride   Powder 40 milliEquivalent(s) Oral once  saccharomyces boulardii 250 milliGRAM(s) Oral two times a day  sodium chloride 2 Gram(s) Oral three times a day  tamsulosin 0.8 milliGRAM(s) Oral at bedtime    MEDICATIONS  (PRN):  acetaminophen   Tablet .. 650 milliGRAM(s) Oral every 6 hours PRN Temp greater or equal to 38C (100.4F), Mild Pain (1 - 3)  aluminum hydroxide/magnesium hydroxide/simethicone Suspension 30 milliLiter(s) Oral every 12 hours PRN Indigestion  glucagon  Injectable 1 milliGRAM(s) IntraMuscular once PRN Glucose LESS THAN 70 milligrams/deciliter  hydrALAZINE Injectable 10 milliGRAM(s) IV Push every 6 hours PRN If SBP > 160  magnesium hydroxide Suspension 30 milliLiter(s) Oral every 12 hours PRN Constipation  methocarbamol 500 milliGRAM(s) Oral every 6 hours PRN Back Spasms  morphine  - Injectable 2 milliGRAM(s) IV Push every 6 hours PRN breakthrough pain not controlled with current medication  ondansetron Injectable 4 milliGRAM(s) IV Push every 6 hours PRN Nausea  oxyCODONE    IR 5 milliGRAM(s) Oral every 4 hours PRN Moderate Pain (4 - 6)  oxyCODONE    IR 10 milliGRAM(s) Oral every 4 hours PRN Severe Pain (7 - 10)  senna 2 Tablet(s) Oral at bedtime PRN Constipation    LABS:  CAPILLARY BLOOD GLUCOSE  POCT Blood Glucose.: 113 mg/dL (06 Jan 2019 17:15)  POCT Blood Glucose.: 112 mg/dL (06 Jan 2019 12:11)  POCT Blood Glucose.: 94 mg/dL (06 Jan 2019 08:26)  POCT Blood Glucose.: 117 mg/dL (05 Jan 2019 21:51)                        9.2    10.2  )-----------( 288      ( 06 Jan 2019 07:51 )             28.6     01-06    139  |  99  |  12.0  ----------------------------<  92  3.4<L>   |  27.0  |  1.23    Ca    7.8<L>      06 Jan 2019 07:51  Mg     1.5     01-06      Blood Culture: 01-03 @ 19:19  Organism --  Gram Stain Blood -- Gram Stain --  Specimen Source .Tissue lumbar spine  Culture-Blood --    01-02 @ 21:56  Organism --  Gram Stain Blood -- Gram Stain   Few White blood cells  No organisms seen  Specimen Source .Surgical Swab lumbar spine (swabs)  Culture-Blood --    RADIOLOGY & ADDITIONAL STUDIES:  Reviewed

## 2019-01-06 NOTE — PROGRESS NOTE ADULT - SUBJECTIVE AND OBJECTIVE BOX
Very pleasant gentleman for her course is unremarkable. He is approximately 4 days status post drainage of a postoperative seroma. Irrigation debridement and primary closure was performed dressing that was applied yesterday is still intact as per nursing. The patient is pleasant no acute distress at this point in time.    Physical exam dressing is clean dry and intact there is no acute neurologic deficits he still does have this diminished function of his left lower extremity as well as sensory changes. Sensory changes changes are chronic wanted diabetic neuropathy standpoint.    Impression status post costovertebral thoracic revision laminectomy instrumented fusion. He is also status post drainage of seroma. If his dressing remained clean dry and intact for approximately 24-48 hours we'll consider transfer to subacute rehabilitation.

## 2019-01-07 LAB
-  AMIKACIN: SIGNIFICANT CHANGE UP
-  AMPICILLIN/SULBACTAM: SIGNIFICANT CHANGE UP
-  CEFEPIME: SIGNIFICANT CHANGE UP
-  CEFTAZIDIME: SIGNIFICANT CHANGE UP
-  CIPROFLOXACIN: SIGNIFICANT CHANGE UP
-  GENTAMICIN: SIGNIFICANT CHANGE UP
-  LEVOFLOXACIN: SIGNIFICANT CHANGE UP
-  MEROPENEM: SIGNIFICANT CHANGE UP
-  TOBRAMYCIN: SIGNIFICANT CHANGE UP
-  TRIMETHOPRIM/SULFAMETHOXAZOLE: SIGNIFICANT CHANGE UP
ANION GAP SERPL CALC-SCNC: 13 MMOL/L — SIGNIFICANT CHANGE UP (ref 5–17)
BUN SERPL-MCNC: 11 MG/DL — SIGNIFICANT CHANGE UP (ref 8–20)
CA-I BLD-SCNC: 1.07 MMOL/L — LOW (ref 1.12–1.3)
CALCIUM SERPL-MCNC: 7.7 MG/DL — LOW (ref 8.6–10.2)
CHLORIDE SERPL-SCNC: 97 MMOL/L — LOW (ref 98–107)
CO2 SERPL-SCNC: 27 MMOL/L — SIGNIFICANT CHANGE UP (ref 22–29)
CREAT SERPL-MCNC: 1.18 MG/DL — SIGNIFICANT CHANGE UP (ref 0.5–1.3)
CRP SERPL-MCNC: 8.9 MG/DL — HIGH (ref 0–0.4)
ERYTHROCYTE [SEDIMENTATION RATE] IN BLOOD: 46 MM/HR — HIGH (ref 0–20)
GLUCOSE BLDC GLUCOMTR-MCNC: 102 MG/DL — HIGH (ref 70–99)
GLUCOSE BLDC GLUCOMTR-MCNC: 131 MG/DL — HIGH (ref 70–99)
GLUCOSE BLDC GLUCOMTR-MCNC: 137 MG/DL — HIGH (ref 70–99)
GLUCOSE BLDC GLUCOMTR-MCNC: 143 MG/DL — HIGH (ref 70–99)
GLUCOSE SERPL-MCNC: 103 MG/DL — SIGNIFICANT CHANGE UP (ref 70–115)
MAGNESIUM SERPL-MCNC: 1.8 MG/DL — SIGNIFICANT CHANGE UP (ref 1.6–2.6)
METHOD TYPE: SIGNIFICANT CHANGE UP
POTASSIUM SERPL-MCNC: 3.8 MMOL/L — SIGNIFICANT CHANGE UP (ref 3.5–5.3)
POTASSIUM SERPL-SCNC: 3.8 MMOL/L — SIGNIFICANT CHANGE UP (ref 3.5–5.3)
SODIUM SERPL-SCNC: 137 MMOL/L — SIGNIFICANT CHANGE UP (ref 135–145)

## 2019-01-07 PROCEDURE — 99232 SBSQ HOSP IP/OBS MODERATE 35: CPT

## 2019-01-07 RX ORDER — SODIUM CHLORIDE 9 MG/ML
1 INJECTION INTRAMUSCULAR; INTRAVENOUS; SUBCUTANEOUS THREE TIMES A DAY
Refills: 0 | Status: DISCONTINUED | OUTPATIENT
Start: 2019-01-08 | End: 2019-01-10

## 2019-01-07 RX ORDER — METOPROLOL TARTRATE 50 MG
50 TABLET ORAL
Refills: 0 | Status: DISCONTINUED | OUTPATIENT
Start: 2019-01-07 | End: 2019-01-10

## 2019-01-07 RX ORDER — CALCIUM GLUCONATE 100 MG/ML
1 VIAL (ML) INTRAVENOUS ONCE
Refills: 0 | Status: COMPLETED | OUTPATIENT
Start: 2019-01-07 | End: 2019-01-07

## 2019-01-07 RX ADMIN — AMLODIPINE BESYLATE 10 MILLIGRAM(S): 2.5 TABLET ORAL at 05:43

## 2019-01-07 RX ADMIN — Medication 100 MILLIGRAM(S): at 22:28

## 2019-01-07 RX ADMIN — Medication 250 MILLIGRAM(S): at 05:44

## 2019-01-07 RX ADMIN — ATORVASTATIN CALCIUM 40 MILLIGRAM(S): 80 TABLET, FILM COATED ORAL at 22:28

## 2019-01-07 RX ADMIN — Medication 250 MILLIGRAM(S): at 18:03

## 2019-01-07 RX ADMIN — PANTOPRAZOLE SODIUM 40 MILLIGRAM(S): 20 TABLET, DELAYED RELEASE ORAL at 05:43

## 2019-01-07 RX ADMIN — MAGNESIUM OXIDE 400 MG ORAL TABLET 400 MILLIGRAM(S): 241.3 TABLET ORAL at 12:05

## 2019-01-07 RX ADMIN — Medication 200 GRAM(S): at 13:37

## 2019-01-07 RX ADMIN — GABAPENTIN 300 MILLIGRAM(S): 400 CAPSULE ORAL at 13:37

## 2019-01-07 RX ADMIN — GABAPENTIN 300 MILLIGRAM(S): 400 CAPSULE ORAL at 05:43

## 2019-01-07 RX ADMIN — ERTAPENEM SODIUM 120 MILLIGRAM(S): 1 INJECTION, POWDER, LYOPHILIZED, FOR SOLUTION INTRAMUSCULAR; INTRAVENOUS at 22:27

## 2019-01-07 RX ADMIN — TAMSULOSIN HYDROCHLORIDE 0.8 MILLIGRAM(S): 0.4 CAPSULE ORAL at 22:27

## 2019-01-07 RX ADMIN — Medication 2 MILLIGRAM(S): at 22:28

## 2019-01-07 RX ADMIN — SODIUM CHLORIDE 2 GRAM(S): 9 INJECTION INTRAMUSCULAR; INTRAVENOUS; SUBCUTANEOUS at 05:43

## 2019-01-07 RX ADMIN — ENOXAPARIN SODIUM 40 MILLIGRAM(S): 100 INJECTION SUBCUTANEOUS at 05:46

## 2019-01-07 RX ADMIN — MAGNESIUM OXIDE 400 MG ORAL TABLET 400 MILLIGRAM(S): 241.3 TABLET ORAL at 08:34

## 2019-01-07 RX ADMIN — GABAPENTIN 300 MILLIGRAM(S): 400 CAPSULE ORAL at 22:28

## 2019-01-07 RX ADMIN — Medication 150 MICROGRAM(S): at 05:43

## 2019-01-07 RX ADMIN — MAGNESIUM OXIDE 400 MG ORAL TABLET 400 MILLIGRAM(S): 241.3 TABLET ORAL at 18:03

## 2019-01-07 RX ADMIN — Medication 50 MILLIGRAM(S): at 18:03

## 2019-01-07 RX ADMIN — Medication 25 MILLIGRAM(S): at 05:43

## 2019-01-07 NOTE — PROGRESS NOTE ADULT - SUBJECTIVE AND OBJECTIVE BOX
Pt Name: TIMUR LOUIS    MRN: [id number]      Patient is a 73 year old Male status post Irrigation and debridement, exploration spinal fusion thoracolumbar spine POD #5. Patient seen and examined at bedside. No significant events reported overnight. Patient comfortable in bed. Denies back pain at this time. Denies acute motor or sensory loss.  Denies fever/chills, SOB/chest pain.     PAST MEDICAL & SURGICAL HISTORY:  PAST MEDICAL & SURGICAL HISTORY:  BPH (benign prostatic hyperplasia)  HTN (hypertension)  Hypothyroid  Dyslipidemia  OA (osteoarthritis)  Diabetes  S/P laminectomy  S/P hernia repair  S/P hip replacement: R    Allergies: No Known Allergies    Medications: acetaminophen   Tablet .. 650 milliGRAM(s) Oral every 6 hours PRN  aluminum hydroxide/magnesium hydroxide/simethicone Suspension 30 milliLiter(s) Oral every 12 hours PRN  amLODIPine   Tablet 10 milliGRAM(s) Oral daily  atorvastatin 40 milliGRAM(s) Oral at bedtime  DAPTOmycin IVPB 600 milliGRAM(s) IV Intermittent every 24 hours  dextrose 50% Injectable 12.5 Gram(s) IV Push once  docusate sodium 100 milliGRAM(s) Oral three times a day  doxazosin 2 milliGRAM(s) Oral at bedtime  enoxaparin Injectable 40 milliGRAM(s) SubCutaneous every 24 hours  ertapenem  IVPB 1000 milliGRAM(s) IV Intermittent every 24 hours  gabapentin 300 milliGRAM(s) Oral three times a day  glucagon  Injectable 1 milliGRAM(s) IntraMuscular once PRN  hydrALAZINE Injectable 10 milliGRAM(s) IV Push every 6 hours PRN  insulin lispro (HumaLOG) corrective regimen sliding scale   SubCutaneous three times a day before meals  levothyroxine 150 MICROGram(s) Oral daily  magnesium hydroxide Suspension 30 milliLiter(s) Oral every 12 hours PRN  magnesium oxide 400 milliGRAM(s) Oral three times a day with meals  methocarbamol 500 milliGRAM(s) Oral every 6 hours PRN  metoprolol tartrate 25 milliGRAM(s) Oral two times a day  morphine  - Injectable 2 milliGRAM(s) IV Push every 6 hours PRN  ondansetron Injectable 4 milliGRAM(s) IV Push every 6 hours PRN  oxyCODONE    IR 5 milliGRAM(s) Oral every 4 hours PRN  oxyCODONE    IR 10 milliGRAM(s) Oral every 4 hours PRN  pantoprazole    Tablet 40 milliGRAM(s) Oral before breakfast  saccharomyces boulardii 250 milliGRAM(s) Oral two times a day  senna 2 Tablet(s) Oral at bedtime PRN  sodium chloride 2 Gram(s) Oral three times a day  tamsulosin 0.8 milliGRAM(s) Oral at bedtime                          9.2    10.2  )-----------( 288      ( 06 Jan 2019 07:51 )             28.6     01-07    137  |  97<L>  |  11.0  ----------------------------<  103  3.8   |  27.0  |  1.18    Ca    7.7<L>      07 Jan 2019 07:33  Mg     1.8     01-07        PHYSICAL EXAM:    Vital Signs Last 24 Hrs  T(C): 37.2 (07 Jan 2019 04:58), Max: 38 (06 Jan 2019 20:46)  T(F): 99 (07 Jan 2019 04:58), Max: 100.4 (06 Jan 2019 20:46)  HR: 97 (07 Jan 2019 04:58) (94 - 100)  BP: 150/70 (07 Jan 2019 04:58) (144/68 - 165/90)  BP(mean): --  RR: 18 (07 Jan 2019 04:58) (18 - 19)  SpO2: 94% (06 Jan 2019 16:17) (94% - 97%)  Daily     Daily     Appearance: Alert, responsive, resting in bed,  in no acute distress.    Back: Dressing clean, dry, intact. No erythema, no active drainage, no staining noted.   Lower extremities: SILT B/L. 4+/5 dorsiflexion/plantarflexion bilaterally. Extremity warm, cap refill brisk. Calf soft NT B/L.    A/P: 73 y.o M s/p Irrigation and debridement, exploration spinal fusion thoracolumbar spine POD #5    -DVTp: 40mgQD until ambulating well  - PT WBAT, balance and gait training, encourage OOB  -Pain control  -Continue to monitor back dressing  -Continue IV abx  -OR cultures pending Pt Name: TIMUR LOUIS    MRN: [id number]      Patient is a 73 year old Male status post Irrigation and debridement, exploration spinal fusion thoracolumbar spine POD #5. Patient seen and examined at bedside. No significant events reported overnight. Patient comfortable in bed. Denies back pain at this time. Denies acute motor or sensory loss.  Denies fever/chills, SOB/chest pain.     PAST MEDICAL & SURGICAL HISTORY:  PAST MEDICAL & SURGICAL HISTORY:  BPH (benign prostatic hyperplasia)  HTN (hypertension)  Hypothyroid  Dyslipidemia  OA (osteoarthritis)  Diabetes  S/P laminectomy  S/P hernia repair  S/P hip replacement: R    Allergies: No Known Allergies    Medications: acetaminophen   Tablet .. 650 milliGRAM(s) Oral every 6 hours PRN  aluminum hydroxide/magnesium hydroxide/simethicone Suspension 30 milliLiter(s) Oral every 12 hours PRN  amLODIPine   Tablet 10 milliGRAM(s) Oral daily  atorvastatin 40 milliGRAM(s) Oral at bedtime  DAPTOmycin IVPB 600 milliGRAM(s) IV Intermittent every 24 hours  dextrose 50% Injectable 12.5 Gram(s) IV Push once  docusate sodium 100 milliGRAM(s) Oral three times a day  doxazosin 2 milliGRAM(s) Oral at bedtime  enoxaparin Injectable 40 milliGRAM(s) SubCutaneous every 24 hours  ertapenem  IVPB 1000 milliGRAM(s) IV Intermittent every 24 hours  gabapentin 300 milliGRAM(s) Oral three times a day  glucagon  Injectable 1 milliGRAM(s) IntraMuscular once PRN  hydrALAZINE Injectable 10 milliGRAM(s) IV Push every 6 hours PRN  insulin lispro (HumaLOG) corrective regimen sliding scale   SubCutaneous three times a day before meals  levothyroxine 150 MICROGram(s) Oral daily  magnesium hydroxide Suspension 30 milliLiter(s) Oral every 12 hours PRN  magnesium oxide 400 milliGRAM(s) Oral three times a day with meals  methocarbamol 500 milliGRAM(s) Oral every 6 hours PRN  metoprolol tartrate 25 milliGRAM(s) Oral two times a day  morphine  - Injectable 2 milliGRAM(s) IV Push every 6 hours PRN  ondansetron Injectable 4 milliGRAM(s) IV Push every 6 hours PRN  oxyCODONE    IR 5 milliGRAM(s) Oral every 4 hours PRN  oxyCODONE    IR 10 milliGRAM(s) Oral every 4 hours PRN  pantoprazole    Tablet 40 milliGRAM(s) Oral before breakfast  saccharomyces boulardii 250 milliGRAM(s) Oral two times a day  senna 2 Tablet(s) Oral at bedtime PRN  sodium chloride 2 Gram(s) Oral three times a day  tamsulosin 0.8 milliGRAM(s) Oral at bedtime                          9.2    10.2  )-----------( 288      ( 06 Jan 2019 07:51 )             28.6     01-07    137  |  97<L>  |  11.0  ----------------------------<  103  3.8   |  27.0  |  1.18    Ca    7.7<L>      07 Jan 2019 07:33  Mg     1.8     01-07        PHYSICAL EXAM:    Vital Signs Last 24 Hrs  T(C): 37.2 (07 Jan 2019 04:58), Max: 38 (06 Jan 2019 20:46)  T(F): 99 (07 Jan 2019 04:58), Max: 100.4 (06 Jan 2019 20:46)  HR: 97 (07 Jan 2019 04:58) (94 - 100)  BP: 150/70 (07 Jan 2019 04:58) (144/68 - 165/90)  BP(mean): --  RR: 18 (07 Jan 2019 04:58) (18 - 19)  SpO2: 94% (06 Jan 2019 16:17) (94% - 97%)  Daily     Daily     Appearance: Alert, responsive, resting in bed,  in no acute distress.    Back: Dressing clean, dry, intact. No erythema, no active drainage, no staining noted.   Lower extremities: SILT B/L. 4+/5 dorsiflexion/plantarflexion bilaterally. Extremity warm, cap refill brisk. Calf soft NT B/L.    A/P: 73 y.o M s/p Irrigation and debridement, exploration spinal fusion thoracolumbar spine POD #5    -DVTp: 40mgQD until ambulating well  - PT WBAT, balance and gait training, encourage OOB  -Pain control  -Continue to monitor back dressing  -Continue IV abx  -OR cultures- acinobacter lwoffi- to be followed by ID, PICC ordered and pending, check ESR/CRP today

## 2019-01-07 NOTE — PROGRESS NOTE ADULT - ATTENDING COMMENTS
Patient was examined today at approximately 2:30 on January 7, 2019 I. agree with the above mentioned note that his dressings clean dry and intact. There is no ballotable fluid wave over the fluid collection/seroma palpable at this point in time. If he continues to maintain a well-healed incision with no significant fluid collection we can start planning subacute rehabilitation patient also need a PICC line for this seroma/positive culture

## 2019-01-07 NOTE — PROGRESS NOTE ADULT - ASSESSMENT
73 yr old male s/p T11/T10 laminectomy, T10-L1 fusion, hypertension, diabetes mellitus, hypothyroid was sent in from [facility] for fever and back pain. He was recently discharged from [facility] after surgery. Noted to have fever 102. He was evaluated by orthopedics in ED given recent surgery and CT of thoracolumbar spine with contrast was done, which showed post surgical changes and multiple foci of air, fluid and soft tissue edema. ID was consulted and he was started on Vancomycin and Cefepime. Orthopedics advised local wound care and possible seroma drainage if no improvement. Nephrology consulted for hyponatremia. Advised fluid restriction and increased salt tabs to 2 tabs three times a day. He was noted to Hb 7.7, was given 1 unit PRBC, Hb came up to 9.5.  after daily wound care for serosanguinous soaking of dressing multiple times and antibiotics, he was taken to OR for seroma evacuation. post op accordion drain placed which did not drain at all so it was removed.      1) Post op seroma from thoracolumbar fusion s/p seroma evacuation on 1/2/19  - Continue Daptomycin and Invanz  - As per ID will need for 6 weeks  - PICC Line ordered by ID  - wound care per orthopedics   2) SIADH: hyponatremia  - stable  - NaCl tabs  - monitor levels  3) CLARI  - Improving  4) HTN  - Continue Amlodipine and Cardura  - Increase Metoprolol to 50 mg BID  - Hydralazine (PRN)  5) Anemia   -  s/p PRBCs transufion  - H&H stable  6) DM2   - Accu checks and ISS  7) Hypothyroidism  - Continue Synthroid  8) BPH  - Continue Flomax, Cardura and Condom Catheter  9) Hypokalemia and Hypomagnesemia  - Replaced  DVT Prophylaxis -- Lovenox 40 mg

## 2019-01-07 NOTE — PROVIDER CONTACT NOTE (OTHER) - REASON
Confirm Release of Hold on P.T. Order
pt appears Drowsy more than usual after receiving 10 mg  Oxycodone IR for back pain
BP 82/52 HR 64c manual

## 2019-01-07 NOTE — PROVIDER CONTACT NOTE (OTHER) - SITUATION
Pt chart reviewed, contents noted, P.T. order has a hold on it at this time, please confirm if hold should remain, P.T. will wait pending confirmation of this by ortho team.
pt appears Drowsy more than usual after receiving 10 mg  Oxycodone IR for back pain
Pt BP 82/52 with HR 64

## 2019-01-07 NOTE — PROGRESS NOTE ADULT - SUBJECTIVE AND OBJECTIVE BOX
Fever      HPI:  72 y/o male with PMHx of HTN, BPH, DM, Hypothyroid, and OA was sent to the ED from [facility] due to recurrent fevers (T-max 102) and back pain of 1 day duration. Patient was recently discharge from [facility] for thoracic laminectomy and fusion.  Patient said he has been doing well since dc until yesterday when he started having recurrent fevers and back pain. Patient has no   trauma/fall, neck pain, HA, numbness, tingling, bowel/urinary incontinence, chills, chest pain, shortness of breath, palpitation, cough, nausea/vomiting, abdominal pain. (12 Dec 2018 05:20)      Interval History:  Patient was seen and examined at bedside. Doing well.   Denies chest pain, palpitations, shortness of breath, headache, dizziness, visual symptoms, nausea, vomiting or abdominal pain.  No overnight issues.    ROS:  As per interval history otherwise unremarkable.    PHYSICAL EXAM:  Vital Signs   T(C): 37.2 (07 Jan 2019 04:58), Max: 38 (06 Jan 2019 20:46)  T(F): 99 (07 Jan 2019 04:58), Max: 100.4 (06 Jan 2019 20:46)  HR: 97 (07 Jan 2019 04:58) (97 - 100)  BP: 150/70 (07 Jan 2019 04:58) (150/70 - 165/90)  RR: 18 (07 Jan 2019 04:58) (18 - 18)  SpO2: 94% (06 Jan 2019 16:17) (94% - 94%)  General: Elderly male lying in bed comfortably. No acute distress  HEENT: PERRLA. EOMI. Clear conjunctivae. Moist mucus membrane  Neck: Supple. No JVD. No Thyromegaly   Chest: CTA bilaterally - no wheezing, rales or rhonchi.   Heart: Normal S1 & S2. RRR. No murmur.   Abdomen: Soft. Non-tender. Non-distended. + BS  Urogenital: Condom catheter in place  Back: Dressing on surgical wound - dry.  Surrounding area clear.   Ext: No pedal edema. No calf tenderness   Neuro: Active and alert. No focal deficit. No speech disorder  Skin: Warm and Dry  Psychiatry: Normal mood and affect    MEDICATIONS  (STANDING):  amLODIPine   Tablet 10 milliGRAM(s) Oral daily  atorvastatin 40 milliGRAM(s) Oral at bedtime  DAPTOmycin IVPB 600 milliGRAM(s) IV Intermittent every 24 hours  dextrose 50% Injectable 12.5 Gram(s) IV Push once  docusate sodium 100 milliGRAM(s) Oral three times a day  doxazosin 2 milliGRAM(s) Oral at bedtime  enoxaparin Injectable 40 milliGRAM(s) SubCutaneous every 24 hours  ertapenem  IVPB 1000 milliGRAM(s) IV Intermittent every 24 hours  gabapentin 300 milliGRAM(s) Oral three times a day  insulin lispro (HumaLOG) corrective regimen sliding scale   SubCutaneous three times a day before meals  levothyroxine 150 MICROGram(s) Oral daily  magnesium oxide 400 milliGRAM(s) Oral three times a day with meals  metoprolol tartrate 25 milliGRAM(s) Oral two times a day  pantoprazole    Tablet 40 milliGRAM(s) Oral before breakfast  saccharomyces boulardii 250 milliGRAM(s) Oral two times a day  sodium chloride 2 Gram(s) Oral three times a day  tamsulosin 0.8 milliGRAM(s) Oral at bedtime    MEDICATIONS  (PRN):  acetaminophen   Tablet .. 650 milliGRAM(s) Oral every 6 hours PRN Temp greater or equal to 38C (100.4F), Mild Pain (1 - 3)  aluminum hydroxide/magnesium hydroxide/simethicone Suspension 30 milliLiter(s) Oral every 12 hours PRN Indigestion  glucagon  Injectable 1 milliGRAM(s) IntraMuscular once PRN Glucose LESS THAN 70 milligrams/deciliter  hydrALAZINE Injectable 10 milliGRAM(s) IV Push every 6 hours PRN If SBP > 160  magnesium hydroxide Suspension 30 milliLiter(s) Oral every 12 hours PRN Constipation  methocarbamol 500 milliGRAM(s) Oral every 6 hours PRN Back Spasms  morphine  - Injectable 2 milliGRAM(s) IV Push every 6 hours PRN breakthrough pain not controlled with current medication  ondansetron Injectable 4 milliGRAM(s) IV Push every 6 hours PRN Nausea  oxyCODONE    IR 5 milliGRAM(s) Oral every 4 hours PRN Moderate Pain (4 - 6)  oxyCODONE    IR 10 milliGRAM(s) Oral every 4 hours PRN Severe Pain (7 - 10)  senna 2 Tablet(s) Oral at bedtime PRN Constipation    LABS:                        9.2    10.2  )-----------( 288      ( 06 Jan 2019 07:51 )             28.6     01-07    137  |  97<L>  |  11.0  ----------------------------<  103  3.8   |  27.0  |  1.18    Ca    7.7<L>      07 Jan 2019 07:33  Mg     1.8     01-07    Blood Culture: 01-03 @ 19:19  Organism --  Gram Stain Blood -- Gram Stain --  Specimen Source .Tissue lumbar spine  Culture-Blood --    01-02 @ 21:56  Organism Acinetobacter lwoffi  Gram Stain Blood -- Gram Stain   Few White blood cells  No organisms seen  Specimen Source .Surgical Swab lumbar spine (swabs)  Culture-Blood --    RADIOLOGY & ADDITIONAL STUDIES:  Reviewed

## 2019-01-08 LAB
-  AMIKACIN: SIGNIFICANT CHANGE UP
-  AMPICILLIN/SULBACTAM: SIGNIFICANT CHANGE UP
-  AMPICILLIN: SIGNIFICANT CHANGE UP
-  AZTREONAM: SIGNIFICANT CHANGE UP
-  CEFAZOLIN: SIGNIFICANT CHANGE UP
-  CEFEPIME: SIGNIFICANT CHANGE UP
-  CEFOXITIN: SIGNIFICANT CHANGE UP
-  CEFTRIAXONE: SIGNIFICANT CHANGE UP
-  CIPROFLOXACIN: SIGNIFICANT CHANGE UP
-  ERTAPENEM: SIGNIFICANT CHANGE UP
-  GENTAMICIN: SIGNIFICANT CHANGE UP
-  IMIPENEM: SIGNIFICANT CHANGE UP
-  LEVOFLOXACIN: SIGNIFICANT CHANGE UP
-  MEROPENEM: SIGNIFICANT CHANGE UP
-  PIPERACILLIN/TAZOBACTAM: SIGNIFICANT CHANGE UP
-  TOBRAMYCIN: SIGNIFICANT CHANGE UP
-  TRIMETHOPRIM/SULFAMETHOXAZOLE: SIGNIFICANT CHANGE UP
CULTURE RESULTS: SIGNIFICANT CHANGE UP
CULTURE RESULTS: SIGNIFICANT CHANGE UP
GLUCOSE BLDC GLUCOMTR-MCNC: 112 MG/DL — HIGH (ref 70–99)
GLUCOSE BLDC GLUCOMTR-MCNC: 145 MG/DL — HIGH (ref 70–99)
GLUCOSE BLDC GLUCOMTR-MCNC: 148 MG/DL — HIGH (ref 70–99)
GLUCOSE BLDC GLUCOMTR-MCNC: 152 MG/DL — HIGH (ref 70–99)
METHOD TYPE: SIGNIFICANT CHANGE UP
ORGANISM # SPEC MICROSCOPIC CNT: SIGNIFICANT CHANGE UP
SPECIMEN SOURCE: SIGNIFICANT CHANGE UP
SPECIMEN SOURCE: SIGNIFICANT CHANGE UP

## 2019-01-08 PROCEDURE — 99232 SBSQ HOSP IP/OBS MODERATE 35: CPT

## 2019-01-08 PROCEDURE — 71045 X-RAY EXAM CHEST 1 VIEW: CPT | Mod: 26

## 2019-01-08 PROCEDURE — 36569 INSJ PICC 5 YR+ W/O IMAGING: CPT

## 2019-01-08 PROCEDURE — 71045 X-RAY EXAM CHEST 1 VIEW: CPT | Mod: 26,77

## 2019-01-08 RX ORDER — CALCIUM GLUCONATE 100 MG/ML
2 VIAL (ML) INTRAVENOUS ONCE
Refills: 0 | Status: COMPLETED | OUTPATIENT
Start: 2019-01-08 | End: 2019-01-08

## 2019-01-08 RX ADMIN — TAMSULOSIN HYDROCHLORIDE 0.8 MILLIGRAM(S): 0.4 CAPSULE ORAL at 21:14

## 2019-01-08 RX ADMIN — MAGNESIUM OXIDE 400 MG ORAL TABLET 400 MILLIGRAM(S): 241.3 TABLET ORAL at 11:59

## 2019-01-08 RX ADMIN — SODIUM CHLORIDE 1 GRAM(S): 9 INJECTION INTRAMUSCULAR; INTRAVENOUS; SUBCUTANEOUS at 15:47

## 2019-01-08 RX ADMIN — GABAPENTIN 300 MILLIGRAM(S): 400 CAPSULE ORAL at 21:14

## 2019-01-08 RX ADMIN — ATORVASTATIN CALCIUM 40 MILLIGRAM(S): 80 TABLET, FILM COATED ORAL at 21:15

## 2019-01-08 RX ADMIN — MAGNESIUM OXIDE 400 MG ORAL TABLET 400 MILLIGRAM(S): 241.3 TABLET ORAL at 10:59

## 2019-01-08 RX ADMIN — GABAPENTIN 300 MILLIGRAM(S): 400 CAPSULE ORAL at 15:47

## 2019-01-08 RX ADMIN — DAPTOMYCIN 124 MILLIGRAM(S): 500 INJECTION, POWDER, LYOPHILIZED, FOR SOLUTION INTRAVENOUS at 23:12

## 2019-01-08 RX ADMIN — ENOXAPARIN SODIUM 40 MILLIGRAM(S): 100 INJECTION SUBCUTANEOUS at 05:46

## 2019-01-08 RX ADMIN — MAGNESIUM OXIDE 400 MG ORAL TABLET 400 MILLIGRAM(S): 241.3 TABLET ORAL at 17:08

## 2019-01-08 RX ADMIN — Medication 250 MILLIGRAM(S): at 05:45

## 2019-01-08 RX ADMIN — ERTAPENEM SODIUM 120 MILLIGRAM(S): 1 INJECTION, POWDER, LYOPHILIZED, FOR SOLUTION INTRAMUSCULAR; INTRAVENOUS at 21:14

## 2019-01-08 RX ADMIN — Medication 50 MILLIGRAM(S): at 05:45

## 2019-01-08 RX ADMIN — Medication 150 MICROGRAM(S): at 05:45

## 2019-01-08 RX ADMIN — Medication 200 GRAM(S): at 13:00

## 2019-01-08 RX ADMIN — Medication 50 MILLIGRAM(S): at 17:08

## 2019-01-08 RX ADMIN — DAPTOMYCIN 124 MILLIGRAM(S): 500 INJECTION, POWDER, LYOPHILIZED, FOR SOLUTION INTRAVENOUS at 01:13

## 2019-01-08 RX ADMIN — AMLODIPINE BESYLATE 10 MILLIGRAM(S): 2.5 TABLET ORAL at 05:45

## 2019-01-08 RX ADMIN — SODIUM CHLORIDE 1 GRAM(S): 9 INJECTION INTRAMUSCULAR; INTRAVENOUS; SUBCUTANEOUS at 05:45

## 2019-01-08 RX ADMIN — PANTOPRAZOLE SODIUM 40 MILLIGRAM(S): 20 TABLET, DELAYED RELEASE ORAL at 05:45

## 2019-01-08 RX ADMIN — Medication 250 MILLIGRAM(S): at 17:09

## 2019-01-08 RX ADMIN — SODIUM CHLORIDE 1 GRAM(S): 9 INJECTION INTRAMUSCULAR; INTRAVENOUS; SUBCUTANEOUS at 21:15

## 2019-01-08 RX ADMIN — Medication 2 MILLIGRAM(S): at 21:15

## 2019-01-08 RX ADMIN — Medication 100 MILLIGRAM(S): at 05:45

## 2019-01-08 RX ADMIN — GABAPENTIN 300 MILLIGRAM(S): 400 CAPSULE ORAL at 05:45

## 2019-01-08 RX ADMIN — Medication 1: at 17:08

## 2019-01-08 NOTE — PROCEDURE NOTE - NSPROCDETAILS_GEN_ALL_CORE
location identified, draped/prepped, sterile technique used/sterile dressing applied/sterile technique, catheter placed/ultrasound guidance

## 2019-01-08 NOTE — PROGRESS NOTE ADULT - ASSESSMENT
74 y/o male with PMHx of HTN, BPH, DM, Hypothyroid, and OA was sent to the ED from [facility] due to recurrent fevers (T-max 102) and back pain of 1 day duration. Patient was recently discharge from [facility] for thoracic laminectomy and fusion.  Patient said he has been doing well since dc until yesterday when he started having recurrent fevers and back pain. s/p surgery and I&D on 1/2, as per Dr. Gray looked like pus and infection.     - Blood cultures negative  - Culture from OR neg  - Ertapenem 1gm daily   - Daptomycin 600mg daily.   - PICC line is placed  - Total 6 weeks treatment, starting from the day of surgery so 2/12 would be last day.   - Needs weekly [id number] and CMP.   - ID follow up after discharge.     Will sign off please call with any question.

## 2019-01-08 NOTE — PROGRESS NOTE ADULT - SUBJECTIVE AND OBJECTIVE BOX
· Subjective and Objective: 	  Pt Name: TIMUR LOUIS    MRN: [id number]      Patient is a 73 year old Male status post Irrigation and debridement, exploration spinal fusion thoracolumbar spine POD #6. Patient seen and examined at bedside. No significant events reported overnight. Patient comfortable in bed. Denies back pain at this time. Denies acute loss in strength or sensation of lower extremities.  Denies fever/chills, SOB/chest pain.   He states he hasn't gotten out of bed since surgery.  I coached him that he must get out of bed in order to improve medically and physically.     PAST MEDICAL & SURGICAL HISTORY:  PAST MEDICAL & SURGICAL HISTORY:  BPH (benign prostatic hyperplasia)  HTN (hypertension)  Hypothyroid  Dyslipidemia  OA (osteoarthritis)  Diabetes  S/P laminectomy  S/P hernia repair  S/P hip replacement: R    Allergies: No Known Allergies    Medications: acetaminophen   Tablet .. 650 milliGRAM(s) Oral every 6 hours PRN  aluminum hydroxide/magnesium hydroxide/simethicone Suspension 30 milliLiter(s) Oral every 12 hours PRN  amLODIPine   Tablet 10 milliGRAM(s) Oral daily  atorvastatin 40 milliGRAM(s) Oral at bedtime  DAPTOmycin IVPB 600 milliGRAM(s) IV Intermittent every 24 hours  dextrose 50% Injectable 12.5 Gram(s) IV Push once  docusate sodium 100 milliGRAM(s) Oral three times a day  doxazosin 2 milliGRAM(s) Oral at bedtime  enoxaparin Injectable 40 milliGRAM(s) SubCutaneous every 24 hours  ertapenem  IVPB 1000 milliGRAM(s) IV Intermittent every 24 hours  gabapentin 300 milliGRAM(s) Oral three times a day  glucagon  Injectable 1 milliGRAM(s) IntraMuscular once PRN  hydrALAZINE Injectable 10 milliGRAM(s) IV Push every 6 hours PRN  insulin lispro (HumaLOG) corrective regimen sliding scale   SubCutaneous three times a day before meals  levothyroxine 150 MICROGram(s) Oral daily  magnesium hydroxide Suspension 30 milliLiter(s) Oral every 12 hours PRN  magnesium oxide 400 milliGRAM(s) Oral three times a day with meals  methocarbamol 500 milliGRAM(s) Oral every 6 hours PRN  metoprolol tartrate 25 milliGRAM(s) Oral two times a day  morphine  - Injectable 2 milliGRAM(s) IV Push every 6 hours PRN  ondansetron Injectable 4 milliGRAM(s) IV Push every 6 hours PRN  oxyCODONE    IR 5 milliGRAM(s) Oral every 4 hours PRN  oxyCODONE    IR 10 milliGRAM(s) Oral every 4 hours PRN  pantoprazole    Tablet 40 milliGRAM(s) Oral before breakfast  saccharomyces boulardii 250 milliGRAM(s) Oral two times a day  senna 2 Tablet(s) Oral at bedtime PRN  sodium chloride 2 Gram(s) Oral three times a day  tamsulosin 0.8 milliGRAM(s) Oral at bedtime                PHYSICAL EXAM:    Vital Signs Last 24 Hrs  T(C): 37.2 (07 Jan 2019 04:58), Max: 38 (06 Jan 2019 20:46)  T(F): 99 (07 Jan 2019 04:58), Max: 100.4 (06 Jan 2019 20:46)  HR: 97 (07 Jan 2019 04:58) (94 - 100)  BP: 150/70 (07 Jan 2019 04:58) (144/68 - 165/90)  BP(mean): --  RR: 18 (07 Jan 2019 04:58) (18 - 19)  SpO2: 94% (06 Jan 2019 16:17) (94% - 97%)  Daily     Daily     Appearance: Alert, responsive, resting in bed,  in no acute distress.    Back: Dressing clean, dry, intact. No erythema, no active drainage, no staining noted.  Balottable fluid/seroma.  Dressing changed without event.  Skin sutures in place. Steri strips/dermabond present distal incision and intact/no drainage.   Lower extremities: SILT B/L. 4+/5 dorsiflexion/plantarflexion bilaterally. Extremity warm, cap refill brisk. Calf soft NT B/L.    H/H 9.2/28.9 1/6/19    ESR 46 (1/7/19)       55 (1/4/19)  CRP 8.9 (1/7/19)      9  (1/4/19)    A/P: 73 y.o M s/p Irrigation and debridement, exploration spinal fusion thoracolumbar spine POD #5    -DVTp: 40mgQD until ambulating well  - PT WBAT, balance and gait training, encourage OOB  -Pain control  -Continue to monitor back dressing  -Continue IV abx Per ID  -OR cultures- acinobacter lwoffi- to be followed by ID, PICC ordered and pending today  - Ortho stable for discharge to [facility] after PICC placement, Follow in office in 2 weeks at which time skin sutures will be removed.  Continue daily DSD, steri         strips will fall off on their own. · Subjective and Objective: 	  Pt Name: TIMUR LOUIS    MRN: [id number]      Patient is a 73 year old Male status post Irrigation and debridement, exploration spinal fusion thoracolumbar spine POD #6. Patient seen and examined at bedside. No significant events reported overnight. Patient comfortable in bed. Denies back pain at this time. Denies acute loss in strength or sensation of lower extremities.  Denies fever/chills, SOB/chest pain.   He states he hasn't gotten out of bed since surgery.  I coached him that he must get out of bed in order to improve medically and physically.     PAST MEDICAL & SURGICAL HISTORY:  PAST MEDICAL & SURGICAL HISTORY:  BPH (benign prostatic hyperplasia)  HTN (hypertension)  Hypothyroid  Dyslipidemia  OA (osteoarthritis)  Diabetes  S/P laminectomy  S/P hernia repair  S/P hip replacement: R    Allergies: No Known Allergies    Medications: acetaminophen   Tablet .. 650 milliGRAM(s) Oral every 6 hours PRN  aluminum hydroxide/magnesium hydroxide/simethicone Suspension 30 milliLiter(s) Oral every 12 hours PRN  amLODIPine   Tablet 10 milliGRAM(s) Oral daily  atorvastatin 40 milliGRAM(s) Oral at bedtime  DAPTOmycin IVPB 600 milliGRAM(s) IV Intermittent every 24 hours  dextrose 50% Injectable 12.5 Gram(s) IV Push once  docusate sodium 100 milliGRAM(s) Oral three times a day  doxazosin 2 milliGRAM(s) Oral at bedtime  enoxaparin Injectable 40 milliGRAM(s) SubCutaneous every 24 hours  ertapenem  IVPB 1000 milliGRAM(s) IV Intermittent every 24 hours  gabapentin 300 milliGRAM(s) Oral three times a day  glucagon  Injectable 1 milliGRAM(s) IntraMuscular once PRN  hydrALAZINE Injectable 10 milliGRAM(s) IV Push every 6 hours PRN  insulin lispro (HumaLOG) corrective regimen sliding scale   SubCutaneous three times a day before meals  levothyroxine 150 MICROGram(s) Oral daily  magnesium hydroxide Suspension 30 milliLiter(s) Oral every 12 hours PRN  magnesium oxide 400 milliGRAM(s) Oral three times a day with meals  methocarbamol 500 milliGRAM(s) Oral every 6 hours PRN  metoprolol tartrate 25 milliGRAM(s) Oral two times a day  morphine  - Injectable 2 milliGRAM(s) IV Push every 6 hours PRN  ondansetron Injectable 4 milliGRAM(s) IV Push every 6 hours PRN  oxyCODONE    IR 5 milliGRAM(s) Oral every 4 hours PRN  oxyCODONE    IR 10 milliGRAM(s) Oral every 4 hours PRN  pantoprazole    Tablet 40 milliGRAM(s) Oral before breakfast  saccharomyces boulardii 250 milliGRAM(s) Oral two times a day  senna 2 Tablet(s) Oral at bedtime PRN  sodium chloride 2 Gram(s) Oral three times a day  tamsulosin 0.8 milliGRAM(s) Oral at bedtime                PHYSICAL EXAM:    Vital Signs Last 24 Hrs  T(C): 37.2 (07 Jan 2019 04:58), Max: 38 (06 Jan 2019 20:46)  T(F): 99 (07 Jan 2019 04:58), Max: 100.4 (06 Jan 2019 20:46)  HR: 97 (07 Jan 2019 04:58) (94 - 100)  BP: 150/70 (07 Jan 2019 04:58) (144/68 - 165/90)  BP(mean): --  RR: 18 (07 Jan 2019 04:58) (18 - 19)  SpO2: 94% (06 Jan 2019 16:17) (94% - 97%)  Daily     Daily     Appearance: Alert, responsive, resting in bed,  in no acute distress.    Back: Dressing clean, dry, intact. No erythema, no active drainage, no staining noted.  Balottable fluid/seroma.  Dressing changed without event.  Skin sutures in place. Steri strips/dermabond present distal incision and intact/no drainage.   Lower extremities: SILT B/L. 4+/5 dorsiflexion/plantarflexion bilaterally. Extremity warm, cap refill brisk. Calf soft NT B/L.    H/H 9.2/28.9 1/6/19    ESR 46 (1/7/19)       55 (1/4/19)  CRP 8.9 (1/7/19)      9  (1/4/19)    A/P: 73 y.o M s/p Irrigation and debridement, exploration spinal fusion thoracolumbar spine POD #5    -DVTp: 40mgQD until ambulating well  - PT WBAT, balance and gait training, encourage OOB  -Pain control  -Continue to monitor back dressing  -Continue IV abx Per ID  -OR cultures- acinobacter lwoffi- to be followed by ID, PICC ordered and pending today.  ESR/CRP decreasing since prior to surgery.   - Ortho stable for discharge to [facility] after PICC placement, Follow in office in 2 weeks at which time skin sutures will be removed.  Continue daily DSD, steri         strips will fall off on their own. · Subjective and Objective: 	  Pt Name: TIMUR LOUIS    MRN: [id number]      Patient is a 73 year old Male status post Irrigation and debridement, exploration spinal fusion thoracolumbar spine POD #6. Patient seen and examined at bedside. No significant events reported overnight. Patient comfortable in bed. Denies back pain at this time. Denies acute loss in strength or sensation of lower extremities.  Denies fever/chills, SOB/chest pain.   He states he hasn't gotten out of bed since surgery.  I coached him that he must get out of bed in order to improve medically and physically.     PAST MEDICAL & SURGICAL HISTORY:  PAST MEDICAL & SURGICAL HISTORY:  BPH (benign prostatic hyperplasia)  HTN (hypertension)  Hypothyroid  Dyslipidemia  OA (osteoarthritis)  Diabetes  S/P laminectomy  S/P hernia repair  S/P hip replacement: R    Allergies: No Known Allergies    Medications: acetaminophen   Tablet .. 650 milliGRAM(s) Oral every 6 hours PRN  aluminum hydroxide/magnesium hydroxide/simethicone Suspension 30 milliLiter(s) Oral every 12 hours PRN  amLODIPine   Tablet 10 milliGRAM(s) Oral daily  atorvastatin 40 milliGRAM(s) Oral at bedtime  DAPTOmycin IVPB 600 milliGRAM(s) IV Intermittent every 24 hours  dextrose 50% Injectable 12.5 Gram(s) IV Push once  docusate sodium 100 milliGRAM(s) Oral three times a day  doxazosin 2 milliGRAM(s) Oral at bedtime  enoxaparin Injectable 40 milliGRAM(s) SubCutaneous every 24 hours  ertapenem  IVPB 1000 milliGRAM(s) IV Intermittent every 24 hours  gabapentin 300 milliGRAM(s) Oral three times a day  glucagon  Injectable 1 milliGRAM(s) IntraMuscular once PRN  hydrALAZINE Injectable 10 milliGRAM(s) IV Push every 6 hours PRN  insulin lispro (HumaLOG) corrective regimen sliding scale   SubCutaneous three times a day before meals  levothyroxine 150 MICROGram(s) Oral daily  magnesium hydroxide Suspension 30 milliLiter(s) Oral every 12 hours PRN  magnesium oxide 400 milliGRAM(s) Oral three times a day with meals  methocarbamol 500 milliGRAM(s) Oral every 6 hours PRN  metoprolol tartrate 25 milliGRAM(s) Oral two times a day  morphine  - Injectable 2 milliGRAM(s) IV Push every 6 hours PRN  ondansetron Injectable 4 milliGRAM(s) IV Push every 6 hours PRN  oxyCODONE    IR 5 milliGRAM(s) Oral every 4 hours PRN  oxyCODONE    IR 10 milliGRAM(s) Oral every 4 hours PRN  pantoprazole    Tablet 40 milliGRAM(s) Oral before breakfast  saccharomyces boulardii 250 milliGRAM(s) Oral two times a day  senna 2 Tablet(s) Oral at bedtime PRN  sodium chloride 2 Gram(s) Oral three times a day  tamsulosin 0.8 milliGRAM(s) Oral at bedtime                PHYSICAL EXAM:    Vital Signs Last 24 Hrs  T(C): 37.2 (07 Jan 2019 04:58), Max: 38 (06 Jan 2019 20:46)  T(F): 99 (07 Jan 2019 04:58), Max: 100.4 (06 Jan 2019 20:46)  HR: 97 (07 Jan 2019 04:58) (94 - 100)  BP: 150/70 (07 Jan 2019 04:58) (144/68 - 165/90)  BP(mean): --  RR: 18 (07 Jan 2019 04:58) (18 - 19)  SpO2: 94% (06 Jan 2019 16:17) (94% - 97%)  Daily     Daily     Appearance: Alert, responsive, resting in bed,  in no acute distress.    Back: Dressing clean, dry, intact. No erythema, no active drainage, no staining noted.  Balottable fluid/seroma.  Dressing changed without event.  Skin sutures in place. Steri strips/dermabond present distal incision and intact/no drainage.   Lower extremities: SILT B/L. 4+/5 dorsiflexion/plantarflexion bilaterally. Extremity warm, cap refill brisk. Calf soft NT B/L.    H/H 9.2/28.9 1/6/19    ESR 46 (1/7/19)       55 (1/4/19)  CRP 8.9 (1/7/19)      9  (1/4/19)    A/P: 73 y.o M s/p Irrigation and debridement, exploration spinal fusion thoracolumbar spine POD #5    -DVTp: 40mgQD until ambulating well  - PT WBAT, balance and gait training, encourage OOB  -Pain control  -Continue to monitor back dressing  -Continue IV abx Per ID  -OR cultures- acinobacter lwoffi- to be followed by ID, PICC ordered and pending today.  ESR/CRP decreasing since prior to surgery.   - Ortho stable for discharge to [facility] after PICC placement, Follow in office in 2 weeks at which time skin sutures will be removed.  Continue daily DSD, steri         strips will fall off on their own.       Addendum at 1300 on 1/8/19:  It has come to our attention through the patient's family that there is concern regarding fecal frequency and incontinence that has been going on several weeks.  This symptom is not from a spine etiology as there was no compression upon the neural elements regarding his seroma as visualized on radiology study and during Irrigation and Debridement surgery.  The symptom is multifactorial and should be worked up/treated by the admitting/ Medicine service prior to discharge, as the symptom can further complicate wound healing if left unchecked.  We will communicate this to the Medicine Attending.

## 2019-01-08 NOTE — PROGRESS NOTE ADULT - ATTENDING COMMENTS
Patient was supposed to be discharged today. But he and his wife mentioned today that he has stool incontinence for 2 weeks. Started after surgery on 11/28/18. He has chronic urinary incontinence but as per him stool incontinence is new.  Discussed with Ortho and as per them it is unlikely from cord compression.  Patient does have loose bowel movement but no diarrhea. He is on antibiotics. Will monitor for now. If he develops diarrhea then will send stool for C. Diff. Stool softeners held.  Discharge held. Will monitor for next 24 to 48 hours.

## 2019-01-08 NOTE — PROGRESS NOTE ADULT - ATTENDING COMMENTS
Discuss potential for her fecal incontinence with this patient's spine as being one of them however patient states his peroneals sensation is well maintained he has to urge to void as the urge to have a bowel movement. This point in time I do believe it's probably more lmultifactorial. Continue with daily dressing changes as well as bowel and bladder changing training. Stopping a stool softener also be an option to help control his bowel movements at this point in time. Continue with observation for the next 2448 hrs. the family is agreeable to transfer to subacute rehabilitation I do think we'll be reasonable I also think we'll get his bowel habits and somewhat improved.

## 2019-01-08 NOTE — PROGRESS NOTE ADULT - ASSESSMENT
73 yr old male s/p T11/T10 laminectomy, T10-L1 fusion, hypertension, diabetes mellitus, hypothyroid was sent in from [facility] for fever and back pain. He was recently discharged from [facility] after surgery. Noted to have fever 102. He was evaluated by orthopedics in ED given recent surgery and CT of thoracolumbar spine with contrast was done, which showed post surgical changes and multiple foci of air, fluid and soft tissue edema. ID was consulted and he was started on Vancomycin and Cefepime. Orthopedics advised local wound care and possible seroma drainage if no improvement. Nephrology consulted for hyponatremia. Advised fluid restriction and increased salt tabs to 2 tabs three times a day. He was noted to Hb 7.7, was given 1 unit PRBC, Hb came up to 9.5.  after daily wound care for serosanguinous soaking of dressing multiple times and antibiotics, he was taken to OR for seroma evacuation. post op accordion drain placed which did not drain at all so it was removed.      1) Post op seroma from thoracolumbar fusion s/p seroma evacuation on 1/2/19  - Continue Daptomycin and Invanz  - As per ID will need for 6 weeks (last day 2/12/19)  - PICC Line was placed this am  - wound care recommendations appreciated by Orthopedics    2) SIADH: hyponatremia  - stable  - NaCl tabs  - monitor levels  3) CLARI  - Improving  4) HTN  - Continue Metoprolol, Amlodipine and Cardura  - Hydralazine (PRN)  5) Anemia   -  s/p PRBCs transufion  - H&H stable  6) DM2   - Accu checks and ISS  7) Hypothyroidism  - Continue Synthroid  8) BPH  - Continue Flomax and Cardura  9) Hypocalcemia  - Calcium Gluconate 2 gm x 1  DVT Prophylaxis -- Lovenox 40 mg

## 2019-01-08 NOTE — PROGRESS NOTE ADULT - SUBJECTIVE AND OBJECTIVE BOX
[facility]  INFECTIOUS DISEASES AND INTERNAL MEDICINE at [place]  =======================================================  Wellington Collier MD  Diplomates American Board of Internal Medicine and Infectious Diseases  =======================================================    LOUIS TIMUR [id number]    Follow up: back pain with collection seen in imaging s/p surgical debridement on 1/2.   Afebrile and non toxic, back wound looks fine, no sign of infection. drain has been removed.     Allergies:  No Known Allergies    Medications:  Daptomycin  Ertapenem      FAMILY HISTORY:  Family history of diabetes mellitus (Father)    REVIEW OF SYSTEMS:  CONSTITUTIONAL:  No Fever or chills  HEENT:   No diplopia or blurred vision.  No earache, sore throat or runny nose.  CARDIOVASCULAR:  No chest pain   RESPIRATORY:  No cough, shortness of breath, PND or orthopnea.  GASTROINTESTINAL:  No nausea, vomiting or diarrhea.  GENITOURINARY:  No dysuria, frequency or urgency. No Blood in urine  MUSCULOSKELETAL:   back pain   SKIN:  No change in skin, hair or nails.  NEUROLOGIC:  No paresthesias, fasciculations, seizures or weakness. backache   PSYCHIATRIC:  No disorder of thought or mood.  ENDOCRINE:  No heat or cold intolerance, polyuria or polydipsia.  HEMATOLOGICAL:  No easy bruising or bleeding.     Physical Exam:  Vital Signs Last 24 Hrs  T(C): 36.6 (08 Jan 2019 09:44), Max: 37.5 (07 Jan 2019 21:02)  T(F): 97.8 (08 Jan 2019 09:44), Max: 99.5 (07 Jan 2019 21:02)  HR: 96 (08 Jan 2019 09:44) (91 - 103)  BP: 126/76 (08 Jan 2019 09:44) (126/76 - 167/82)  RR: 18 (08 Jan 2019 09:44) (18 - 20)  SpO2: 96% (08 Jan 2019 09:44) (95% - 96%)  GEN: NAD, pleasant  HEENT: normocephalic and atraumatic. EOMI. MANUEL.    NECK: Supple. No carotid bruits.  No lymphadenopathy or thyromegaly.  LUNGS: Clear to auscultation.  HEART: Regular rate and rhythm without murmur.  ABDOMEN: Soft, nontender, and nondistended.  Positive bowel sounds.    : No CVA tenderness  EXTREMITIES: Without any cyanosis, clubbing, rash, lesions or edema.  MSK: no tenderness or cellulitis. no more drain, dressing dry  NEUROLOGIC: grossly intact.  PSYCHIATRIC: Appropriate affect .  SKIN: No ulceration or induration present.    Labs:  01-07    137  |  97<L>  |  11.0  ----------------------------<  103  3.8   |  27.0  |  1.18    Ca    7.7<L>      07 Jan 2019 07:33  Mg     1.8     01-07    RECENT CULTURES:  01-03 @ 19:19 .Tissue lumbar spine       01-02 @ 21:56 .Surgical Swab lumbar spine (swabs) Acinetobacter lwoffi  Enterobacter cloacae    No growth at 5 days.    Few White blood cells  No organisms seen    Imaging and data reveiwed.  CT 12/12  IMPRESSION:   Status post posterior surgical fusion hardware at T10-L1 and bilateral   T12-L1 laminectomies. Interval widening of the anterior disc space at   T11-T12 since thoracolumbar CT of 11/27/18.  Nonspecific posterior paraspinal 6.2 x 4.0 x 16.5 cm fluid collection   extending from T9 - L2, with multiple foci of air and peripheral   enhancement, which may represent postsurgical seroma/hematoma although   abscess cannot be fully excluded. No other fluid collection.  Remaining findings are unchanged.

## 2019-01-08 NOTE — PROGRESS NOTE ADULT - SUBJECTIVE AND OBJECTIVE BOX
Fever      HPI:  72 y/o male with PMHx of HTN, BPH, DM, Hypothyroid, and OA was sent to the ED from [facility] due to recurrent fevers (T-max 102) and back pain of 1 day duration. Patient was recently discharge from [facility] for thoracic laminectomy and fusion.  Patient said he has been doing well since dc until yesterday when he started having recurrent fevers and back pain. Patient has no   trauma/fall, neck pain, HA, numbness, tingling, bowel/urinary incontinence, chills, chest pain, shortness of breath, palpitation, cough, nausea/vomiting, abdominal pain. (12 Dec 2018 05:20)      Interval History:  Patient was seen and examined at bedside around 9 am. Feeling well.   Denies chest pain, palpitations, shortness of breath, headache, dizziness, visual symptoms, nausea, vomiting or abdominal pain.    ROS:  As per interval history otherwise unremarkable.    PHYSICAL EXAM:  Vital Signs   T(C): 36.8 (08 Jan 2019 15:45), Max: 37.5 (07 Jan 2019 21:02)  T(F): 98.2 (08 Jan 2019 15:45), Max: 99.5 (07 Jan 2019 21:02)  HR: 96 (08 Jan 2019 15:45) (96 - 97)  BP: 156/70 (08 Jan 2019 15:45) (126/76 - 167/82)  RR: 20 (08 Jan 2019 15:45) (18 - 20)  SpO2: 96% (08 Jan 2019 09:44) (96% - 96%)  General: Elderly male lying in bed comfortably. No acute distress  HEENT: PERRLA. EOMI. Clear conjunctivae. Moist mucus membrane  Neck: Supple. No JVD. No Thyromegaly   Chest: CTA bilaterally - no wheezing, rales or rhonchi.   Heart: Normal S1 & S2. RRR. No murmur.   Abdomen: Soft. Non-tender. Non-distended. + BS  Urogenital: Condom catheter in place  Back: Dressing on surgical wound - dry.  Surrounding area clear.   Ext: No pedal edema. No calf tenderness   Neuro: Active and alert. No focal deficit. No speech disorder  Skin: Warm and Dry  Psychiatry: Normal mood and affect    MEDICATIONS  (STANDING):  amLODIPine   Tablet 10 milliGRAM(s) Oral daily  atorvastatin 40 milliGRAM(s) Oral at bedtime  DAPTOmycin IVPB 600 milliGRAM(s) IV Intermittent every 24 hours  dextrose 50% Injectable 12.5 Gram(s) IV Push once  doxazosin 2 milliGRAM(s) Oral at bedtime  enoxaparin Injectable 40 milliGRAM(s) SubCutaneous every 24 hours  ertapenem  IVPB 1000 milliGRAM(s) IV Intermittent every 24 hours  gabapentin 300 milliGRAM(s) Oral three times a day  insulin lispro (HumaLOG) corrective regimen sliding scale   SubCutaneous three times a day before meals  levothyroxine 150 MICROGram(s) Oral daily  metoprolol tartrate 50 milliGRAM(s) Oral two times a day  pantoprazole    Tablet 40 milliGRAM(s) Oral before breakfast  saccharomyces boulardii 250 milliGRAM(s) Oral two times a day  sodium chloride 1 Gram(s) Oral three times a day  tamsulosin 0.8 milliGRAM(s) Oral at bedtime    MEDICATIONS  (PRN):  acetaminophen   Tablet .. 650 milliGRAM(s) Oral every 6 hours PRN Temp greater or equal to 38C (100.4F), Mild Pain (1 - 3)  aluminum hydroxide/magnesium hydroxide/simethicone Suspension 30 milliLiter(s) Oral every 12 hours PRN Indigestion  glucagon  Injectable 1 milliGRAM(s) IntraMuscular once PRN Glucose LESS THAN 70 milligrams/deciliter  hydrALAZINE Injectable 10 milliGRAM(s) IV Push every 6 hours PRN If SBP > 160  magnesium hydroxide Suspension 30 milliLiter(s) Oral every 12 hours PRN Constipation  methocarbamol 500 milliGRAM(s) Oral every 6 hours PRN Back Spasms  morphine  - Injectable 2 milliGRAM(s) IV Push every 6 hours PRN breakthrough pain not controlled with current medication  ondansetron Injectable 4 milliGRAM(s) IV Push every 6 hours PRN Nausea  oxyCODONE    IR 5 milliGRAM(s) Oral every 4 hours PRN Moderate Pain (4 - 6)  oxyCODONE    IR 10 milliGRAM(s) Oral every 4 hours PRN Severe Pain (7 - 10)  senna 2 Tablet(s) Oral at bedtime PRN Constipation    LABS:    01-07    137  |  97<L>  |  11.0  ----------------------------<  103  3.8   |  27.0  |  1.18    Ca    7.7<L>      07 Jan 2019 07:33  Mg     1.8     01-07    Blood Culture: 01-03 @ 19:19  Organism --  Gram Stain Blood -- Gram Stain --  Specimen Source .Tissue lumbar spine  Culture-Blood --    RADIOLOGY & ADDITIONAL STUDIES:  Reviewed

## 2019-01-09 LAB
GLUCOSE BLDC GLUCOMTR-MCNC: 100 MG/DL — HIGH (ref 70–99)
GLUCOSE BLDC GLUCOMTR-MCNC: 101 MG/DL — HIGH (ref 70–99)
GLUCOSE BLDC GLUCOMTR-MCNC: 123 MG/DL — HIGH (ref 70–99)
GLUCOSE BLDC GLUCOMTR-MCNC: 123 MG/DL — HIGH (ref 70–99)

## 2019-01-09 PROCEDURE — 99232 SBSQ HOSP IP/OBS MODERATE 35: CPT

## 2019-01-09 RX ADMIN — Medication 150 MICROGRAM(S): at 06:00

## 2019-01-09 RX ADMIN — ERTAPENEM SODIUM 120 MILLIGRAM(S): 1 INJECTION, POWDER, LYOPHILIZED, FOR SOLUTION INTRAMUSCULAR; INTRAVENOUS at 21:44

## 2019-01-09 RX ADMIN — GABAPENTIN 300 MILLIGRAM(S): 400 CAPSULE ORAL at 06:00

## 2019-01-09 RX ADMIN — Medication 50 MILLIGRAM(S): at 06:00

## 2019-01-09 RX ADMIN — Medication 50 MILLIGRAM(S): at 17:58

## 2019-01-09 RX ADMIN — GABAPENTIN 300 MILLIGRAM(S): 400 CAPSULE ORAL at 21:43

## 2019-01-09 RX ADMIN — SODIUM CHLORIDE 1 GRAM(S): 9 INJECTION INTRAMUSCULAR; INTRAVENOUS; SUBCUTANEOUS at 06:00

## 2019-01-09 RX ADMIN — Medication 2 MILLIGRAM(S): at 21:43

## 2019-01-09 RX ADMIN — SODIUM CHLORIDE 1 GRAM(S): 9 INJECTION INTRAMUSCULAR; INTRAVENOUS; SUBCUTANEOUS at 21:43

## 2019-01-09 RX ADMIN — ATORVASTATIN CALCIUM 40 MILLIGRAM(S): 80 TABLET, FILM COATED ORAL at 21:43

## 2019-01-09 RX ADMIN — OXYCODONE HYDROCHLORIDE 5 MILLIGRAM(S): 5 TABLET ORAL at 16:11

## 2019-01-09 RX ADMIN — OXYCODONE HYDROCHLORIDE 5 MILLIGRAM(S): 5 TABLET ORAL at 16:53

## 2019-01-09 RX ADMIN — PANTOPRAZOLE SODIUM 40 MILLIGRAM(S): 20 TABLET, DELAYED RELEASE ORAL at 06:00

## 2019-01-09 RX ADMIN — ENOXAPARIN SODIUM 40 MILLIGRAM(S): 100 INJECTION SUBCUTANEOUS at 06:00

## 2019-01-09 RX ADMIN — TAMSULOSIN HYDROCHLORIDE 0.8 MILLIGRAM(S): 0.4 CAPSULE ORAL at 21:43

## 2019-01-09 RX ADMIN — Medication 250 MILLIGRAM(S): at 17:59

## 2019-01-09 RX ADMIN — Medication 250 MILLIGRAM(S): at 06:00

## 2019-01-09 RX ADMIN — GABAPENTIN 300 MILLIGRAM(S): 400 CAPSULE ORAL at 16:11

## 2019-01-09 RX ADMIN — SODIUM CHLORIDE 1 GRAM(S): 9 INJECTION INTRAMUSCULAR; INTRAVENOUS; SUBCUTANEOUS at 16:12

## 2019-01-09 RX ADMIN — AMLODIPINE BESYLATE 10 MILLIGRAM(S): 2.5 TABLET ORAL at 06:00

## 2019-01-09 NOTE — PROGRESS NOTE ADULT - ATTENDING COMMENTS
Patient was seen and examined this afternoon at approximately noon on January 9, 2019. Posterior incision was cleansed with Betadine dry dressing was applied there is no expressible drainage is no palpable fluid wave over all incisions to be seems to be healing very very nicely. His known new neurologic complaints she has no loose bowel movements today he has had no unexpected voids of stool as of today. At this point in time I do believe he'll be reasonable to be discharged to subacute rehabilitation on 110 2019. I do think he be more of an appropriate candidate for acute rehabilitation and/or spine rehabilitation. Patient is going to require aggressive m strength gait and balance training strength training etc.

## 2019-01-09 NOTE — PROGRESS NOTE ADULT - ATTENDING COMMENTS
Patient was supposed to be discharged on 1/8/19. But he and his wife mentioned on the day of discharge that he has stool incontinence for 2 weeks. Started after surgery on 11/28/18. He has chronic urinary incontinence but as per him stool incontinence is new.  Discussed with Ortho and as per them it is unlikely from cord compression.  Patient does have loose bowel movement but no diarrhea. He is on antibiotics. Will monitor for now. If he develops diarrhea then will send stool for C. Diff. Stool softeners held.   Discharge held. Will monitor for next 24 hours more.

## 2019-01-09 NOTE — PROGRESS NOTE ADULT - ASSESSMENT
73 yr old male s/p T11/T10 laminectomy, T10-L1 fusion, hypertension, diabetes mellitus, hypothyroid was sent in from [facility] for fever and back pain. He was recently discharged from [facility] after surgery. Noted to have fever 102. He was evaluated by orthopedics in ED given recent surgery and CT of thoracolumbar spine with contrast was done, which showed post surgical changes and multiple foci of air, fluid and soft tissue edema. ID was consulted and he was started on Vancomycin and Cefepime. Orthopedics advised local wound care and possible seroma drainage if no improvement. Nephrology consulted for hyponatremia. Advised fluid restriction and increased salt tabs to 2 tabs three times a day. He was noted to Hb 7.7, was given 1 unit PRBC, Hb came up to 9.5.  after daily wound care for serosanguinous soaking of dressing multiple times and antibiotics, he was taken to OR for seroma evacuation. post op accordion drain placed which did not drain at all so it was removed.      1) Post op seroma from thoracolumbar fusion s/p seroma evacuation on 1/2/19  - Continue Daptomycin and Invanz  - As per ID will need for 6 weeks (last day 2/12/19)  - PICC Line was placed yesterday  - wound care recommendations appreciated by Orthopedics    2) SIADH: hyponatremia  - stable  - NaCl tabs  3) CLARI  - Improving  4) HTN  - Continue Metoprolol, Amlodipine and Cardura  - Hydralazine (PRN)  5) Anemia   -  s/p PRBCs transufion  - H&H stable  6) DM2   - Accu checks and ISS  7) Hypothyroidism  - Continue Synthroid  8) BPH  - Continue Flomax and Cardura  9) Hypocalcemia  - Replaced  DVT Prophylaxis -- Lovenox 40 mg

## 2019-01-09 NOTE — PROGRESS NOTE ADULT - SUBJECTIVE AND OBJECTIVE BOX
Pt Name: TIMUR LOUIS    MRN: [id number]    Patient is a 73 year old Male status post Irrigation and debridement, exploration spinal fusion thoracolumbar spine POD #7. Patient seen and examined at bedside. No significant events reported overnight. Patient comfortable in bed. Denies back pain at this time. Denies acute loss in strength or sensation of lower extremities.  Denies fever/chills, SOB/chest pain.   He states he still has yet to get out of bed since surgery. Patient is aware it is necessary to get up and out of bed in order to improve medically and physically. No new orthopedic complaints.           PAST MEDICAL & SURGICAL HISTORY:  PAST MEDICAL & SURGICAL HISTORY:  BPH (benign prostatic hyperplasia)  HTN (hypertension)  Hypothyroid  Dyslipidemia  OA (osteoarthritis)  Diabetes  S/P laminectomy  S/P hernia repair  S/P hip replacement: R      Allergies: No Known Allergies      Medications: acetaminophen   Tablet .. 650 milliGRAM(s) Oral every 6 hours PRN  amLODIPine   Tablet 10 milliGRAM(s) Oral daily  atorvastatin 40 milliGRAM(s) Oral at bedtime  DAPTOmycin IVPB 600 milliGRAM(s) IV Intermittent every 24 hours  dextrose 50% Injectable 12.5 Gram(s) IV Push once  doxazosin 2 milliGRAM(s) Oral at bedtime  enoxaparin Injectable 40 milliGRAM(s) SubCutaneous every 24 hours  ertapenem  IVPB 1000 milliGRAM(s) IV Intermittent every 24 hours  gabapentin 300 milliGRAM(s) Oral three times a day  glucagon  Injectable 1 milliGRAM(s) IntraMuscular once PRN  hydrALAZINE Injectable 10 milliGRAM(s) IV Push every 6 hours PRN  insulin lispro (HumaLOG) corrective regimen sliding scale   SubCutaneous three times a day before meals  levothyroxine 150 MICROGram(s) Oral daily  methocarbamol 500 milliGRAM(s) Oral every 6 hours PRN  metoprolol tartrate 50 milliGRAM(s) Oral two times a day  morphine  - Injectable 2 milliGRAM(s) IV Push every 6 hours PRN  ondansetron Injectable 4 milliGRAM(s) IV Push every 6 hours PRN  oxyCODONE    IR 5 milliGRAM(s) Oral every 4 hours PRN  oxyCODONE    IR 10 milliGRAM(s) Oral every 4 hours PRN  pantoprazole    Tablet 40 milliGRAM(s) Oral before breakfast  saccharomyces boulardii 250 milliGRAM(s) Oral two times a day  sodium chloride 1 Gram(s) Oral three times a day  tamsulosin 0.8 milliGRAM(s) Oral at bedtime    PHYSICAL EXAM:    Vital Signs Last 24 Hrs  T(C): 37.4 (2019 05:13), Max: 37.6 (2019 21:09)  T(F): 99.3 (2019 05:13), Max: 99.7 (2019 21:09)  HR: 98 (2019 05:13) (96 - 100)  BP: 199/63 (2019 05:13) (126/76 - 199/63)  BP(mean): --  RR: 20 (2019 05:13) (18 - 20)  SpO2: 95% (2019 21:09) (95% - 96%)  Daily     Daily Weight in k.5 (2019 05:13)    Appearance: Alert, responsive, in no acute distress.    Back: Dressing clean, dry, intact. No erythema, no active drainage, no staining noted.    Lower extremities: SILT B/L. 4+/5 dorsiflexion/plantarflexion bilaterally. Extremity warm, cap refill brisk. Calf soft NT B/L.    /P: 73 y.o M s/p Irrigation and debridement, exploration spinal fusion thoracolumbar spine POD #7    -DVTp: 40mgQD until ambulating well  - PT WBAT, balance and gait training, encourage OOB  -Pain control  -Continue IV abx Per ID  -PICC placed  -Ortho stable for discharge to [facility].  Follow in office in 2 weeks at which time skin sutures will be removed.  Continue daily DSD, steri strips will fall off on their own.

## 2019-01-10 ENCOUNTER — TRANSCRIPTION ENCOUNTER (OUTPATIENT)
Age: 74
End: 2019-01-10

## 2019-01-10 VITALS
OXYGEN SATURATION: 97 % | RESPIRATION RATE: 18 BRPM | TEMPERATURE: 99 F | HEART RATE: 86 BPM | DIASTOLIC BLOOD PRESSURE: 70 MMHG | SYSTOLIC BLOOD PRESSURE: 124 MMHG

## 2019-01-10 LAB
GLUCOSE BLDC GLUCOMTR-MCNC: 112 MG/DL — HIGH (ref 70–99)
GLUCOSE BLDC GLUCOMTR-MCNC: 79 MG/DL — SIGNIFICANT CHANGE UP (ref 70–99)
GLUCOSE BLDC GLUCOMTR-MCNC: 93 MG/DL — SIGNIFICANT CHANGE UP (ref 70–99)

## 2019-01-10 PROCEDURE — 71045 X-RAY EXAM CHEST 1 VIEW: CPT | Mod: 26

## 2019-01-10 PROCEDURE — 99239 HOSP IP/OBS DSCHRG MGMT >30: CPT

## 2019-01-10 RX ORDER — AMLODIPINE BESYLATE 2.5 MG/1
1 TABLET ORAL
Qty: 0 | Refills: 0 | DISCHARGE
Start: 2019-01-10

## 2019-01-10 RX ORDER — ACETAMINOPHEN 500 MG
2 TABLET ORAL
Qty: 0 | Refills: 0 | DISCHARGE
Start: 2019-01-10

## 2019-01-10 RX ORDER — GABAPENTIN 400 MG/1
1 CAPSULE ORAL
Qty: 0 | Refills: 0 | DISCHARGE
Start: 2019-01-10

## 2019-01-10 RX ORDER — ERTAPENEM SODIUM 1 G/1
1 INJECTION, POWDER, LYOPHILIZED, FOR SOLUTION INTRAMUSCULAR; INTRAVENOUS
Qty: 0 | Refills: 0 | DISCHARGE
Start: 2019-01-10 | End: 2019-02-12

## 2019-01-10 RX ORDER — METOPROLOL TARTRATE 50 MG
1 TABLET ORAL
Qty: 0 | Refills: 0 | DISCHARGE
Start: 2019-01-10

## 2019-01-10 RX ORDER — INSULIN LISPRO 100/ML
2 VIAL (ML) SUBCUTANEOUS
Qty: 10 | Refills: 0
Start: 2019-01-10

## 2019-01-10 RX ORDER — PANTOPRAZOLE SODIUM 20 MG/1
1 TABLET, DELAYED RELEASE ORAL
Qty: 0 | Refills: 0 | DISCHARGE
Start: 2019-01-10

## 2019-01-10 RX ORDER — DOXAZOSIN MESYLATE 4 MG
1 TABLET ORAL
Qty: 0 | Refills: 0 | DISCHARGE
Start: 2019-01-10

## 2019-01-10 RX ORDER — DAPTOMYCIN 500 MG/10ML
600 INJECTION, POWDER, LYOPHILIZED, FOR SOLUTION INTRAVENOUS
Qty: 0 | Refills: 0 | DISCHARGE
Start: 2019-01-10 | End: 2019-01-12

## 2019-01-10 RX ORDER — SACCHAROMYCES BOULARDII 250 MG
1 POWDER IN PACKET (EA) ORAL
Qty: 0 | Refills: 0 | DISCHARGE
Start: 2019-01-10

## 2019-01-10 RX ORDER — TAMSULOSIN HYDROCHLORIDE 0.4 MG/1
2 CAPSULE ORAL
Qty: 0 | Refills: 0 | DISCHARGE
Start: 2019-01-10

## 2019-01-10 RX ADMIN — SODIUM CHLORIDE 1 GRAM(S): 9 INJECTION INTRAMUSCULAR; INTRAVENOUS; SUBCUTANEOUS at 06:23

## 2019-01-10 RX ADMIN — Medication 150 MICROGRAM(S): at 06:23

## 2019-01-10 RX ADMIN — AMLODIPINE BESYLATE 10 MILLIGRAM(S): 2.5 TABLET ORAL at 06:24

## 2019-01-10 RX ADMIN — Medication 250 MILLIGRAM(S): at 06:23

## 2019-01-10 RX ADMIN — Medication 50 MILLIGRAM(S): at 06:24

## 2019-01-10 RX ADMIN — GABAPENTIN 300 MILLIGRAM(S): 400 CAPSULE ORAL at 13:06

## 2019-01-10 RX ADMIN — GABAPENTIN 300 MILLIGRAM(S): 400 CAPSULE ORAL at 06:23

## 2019-01-10 RX ADMIN — PANTOPRAZOLE SODIUM 40 MILLIGRAM(S): 20 TABLET, DELAYED RELEASE ORAL at 06:24

## 2019-01-10 RX ADMIN — DAPTOMYCIN 124 MILLIGRAM(S): 500 INJECTION, POWDER, LYOPHILIZED, FOR SOLUTION INTRAVENOUS at 00:34

## 2019-01-10 RX ADMIN — SODIUM CHLORIDE 1 GRAM(S): 9 INJECTION INTRAMUSCULAR; INTRAVENOUS; SUBCUTANEOUS at 13:07

## 2019-01-10 RX ADMIN — ENOXAPARIN SODIUM 40 MILLIGRAM(S): 100 INJECTION SUBCUTANEOUS at 06:24

## 2019-01-10 NOTE — DISCHARGE NOTE ADULT - PATIENT PORTAL LINK FT
You can access the [facility] Patient Portal, offered by [facility], by registering with the following website: [email or web address]

## 2019-01-10 NOTE — PROGRESS NOTE ADULT - PROVIDER SPECIALTY LIST ADULT
Anesthesia
Hospitalist
Infectious Disease
Internal Medicine
Nephrology
Nephrology
Orthopedics
Hospitalist
Hospitalist
Orthopedics
Orthopedics
Infectious Disease
Hospitalist
Hospitalist

## 2019-01-10 NOTE — PROGRESS NOTE ADULT - SUBJECTIVE AND OBJECTIVE BOX
Patient seen and eval at bedside. Patient states he has not experienced any changes and denies any pain. Denies CP, SOB, dizziness.    Vital Signs Last 24 Hrs  T(C): 36.5 (10 Blayne 2019 05:07), Max: 37.2 (09 Jan 2019 21:34)  T(F): 97.7 (10 Blayne 2019 05:07), Max: 99 (09 Jan 2019 21:34)  HR: 90 (10 Blayne 2019 05:07) (88 - 94)  BP: 127/68 (10 Blayne 2019 05:07) (104/63 - 149/74)  RR: 20 (10 Blayne 2019 05:07) (19 - 20)  SpO2: 95% (09 Jan 2019 21:34) (94% - 95%)    PE: NAD, alert awake  Back dressing C/D/I, no drainage or bleeding noted  Left HF 2/5 Quad 3/5 Hamstring 4-/5  Right HF 3+/5 Quad 3+/5 Hamstring 4/5  TA/GS intact B/L, EHL/FHL 0/5 B/L  Gross SILT L2-S2 B/L however diminished B/L feet R>L  Calf soft, NT B/L    Culture - Acid Fast - Tissue w/Smear (01.03.19 @ 19:19)    Specimen Source: .Tissue lumbar spine    Acid Fast Bacilli Smear:   No acid fast bacilli seen by fluorochrome stain    Culture - Fungal, Tissue (01.02.19 @ 21:56)    Specimen Source: .Tissue lumbar spine    Culture Results:   No growth to date  Culture in progress    Culture - Tissue with Gram Stain (01.02.19 @ 21:56)    -  Trimethoprim/Sulfamethoxazole: S <=2/38    -  Trimethoprim/Sulfamethoxazole: S <=2/38    -  Tobramycin: S <=4    -  Tobramycin: S <=4    -  Piperacillin/Tazobactam: S <=16    -  Meropenem: S <=1    -  Meropenem: S <=1    -  Levofloxacin: S <=2    -  Levofloxacin: S <=2    -  Imipenem: S <=1    -  Ertapenem: S <=1    -  Gentamicin: S <=4    -  Gentamicin: S <=4    -  Ciprofloxacin: S <=1    -  Ciprofloxacin: S <=1    -  Ceftriaxone: R >32 Enterobacter, Citrobacter, and Serratia may develop resistance during prolonged therapy    -  Cefazolin: R >16    -  Cefoxitin: R >16    -  Ceftazidime: S <=1    -  Cefepime: S <=4    -  Cefepime: S 8    -  Aztreonam: R >16    -  Ampicillin: R >16 These ampicillin results predict results for amoxicillin    -  Ampicillin/Sulbactam: S <=8/4    -  Ampicillin/Sulbactam: R >16/8    Gram Stain:   Few White blood cells  No organisms seen    -  Amikacin: S <=16    -  Amikacin: S <=16    Specimen Source: .Tissue lumbar spine    Culture Results:   Growth in fluid media only Acinetobacter lwoffii  Growth in fluid media only Enterobacter cloacae    Organism Identification: Acinetobacter lwoffi  Enterobacter cloacae    Organism: Acinetobacter lwoffi    Organism: Enterobacter cloacae    Method Type: NAKIA    Method Type: NAKIA    A/P: s/p I&D, exploration, debridement of spinal fusion thoracolumbar spine POD#8, tissue culture + Acinetobacter lwoffi, Enterobacter cloacae in flud media    ·	Incision site dressing C/D/I, will monitor for drainage  ·	DVT propx: Lov/SCDs  ·	PT -WBAT  ·	pain control as clinically indicated  ·	PICC line  ·	Abx as per ID  ·	Cont care as per primary team

## 2019-01-10 NOTE — CHART NOTE - NSCHARTNOTEFT_GEN_A_CORE
Source: Patient [x ]  Family [ ]   other [ ]    Current Diet: Consistent CHO    Patient reports [ ] nausea  [ ] vomiting [ ] diarrhea [ ] constipation  [ ]chewing problems [ ] swallowing issues  [ ] other: no issues    PO intake:  < 50% [ ]   50-75%  [ ]   %  [x ]  other :    Source for PO intake [x ] Patient [ ] family [ ] chart [ ] staff [ ] other    Enteral /Parenteral Nutrition:     Current Weight: 12/26 77kg, 1/2 73.3kg, 1/9 71.5kg  down 13# if accurate    % Weight Change     Pertinent Medications: MEDICATIONS  (STANDING):  amLODIPine   Tablet 10 milliGRAM(s) Oral daily  atorvastatin 40 milliGRAM(s) Oral at bedtime  DAPTOmycin IVPB 600 milliGRAM(s) IV Intermittent every 24 hours  dextrose 50% Injectable 12.5 Gram(s) IV Push once  doxazosin 2 milliGRAM(s) Oral at bedtime  enoxaparin Injectable 40 milliGRAM(s) SubCutaneous every 24 hours  ertapenem  IVPB 1000 milliGRAM(s) IV Intermittent every 24 hours  gabapentin 300 milliGRAM(s) Oral three times a day  insulin lispro (HumaLOG) corrective regimen sliding scale   SubCutaneous three times a day before meals  levothyroxine 150 MICROGram(s) Oral daily  metoprolol tartrate 50 milliGRAM(s) Oral two times a day  pantoprazole    Tablet 40 milliGRAM(s) Oral before breakfast  saccharomyces boulardii 250 milliGRAM(s) Oral two times a day  sodium chloride 1 Gram(s) Oral three times a day  tamsulosin 0.8 milliGRAM(s) Oral at bedtime    MEDICATIONS  (PRN):  acetaminophen   Tablet .. 650 milliGRAM(s) Oral every 6 hours PRN Temp greater or equal to 38C (100.4F), Mild Pain (1 - 3)  glucagon  Injectable 1 milliGRAM(s) IntraMuscular once PRN Glucose LESS THAN 70 milligrams/deciliter  hydrALAZINE Injectable 10 milliGRAM(s) IV Push every 6 hours PRN If SBP > 160  methocarbamol 500 milliGRAM(s) Oral every 6 hours PRN Back Spasms  morphine  - Injectable 2 milliGRAM(s) IV Push every 6 hours PRN breakthrough pain not controlled with current medication  ondansetron Injectable 4 milliGRAM(s) IV Push every 6 hours PRN Nausea  oxyCODONE    IR 5 milliGRAM(s) Oral every 4 hours PRN Moderate Pain (4 - 6)  oxyCODONE    IR 10 milliGRAM(s) Oral every 4 hours PRN Severe Pain (7 - 10)    Pertinent Labs:         Skin:     Nutrition focused physical exam conducted - found signs of malnutrition [x ]absent [ ]present    Subcutaneous fat loss: [ ] Orbital fat pads region, [ ]Buccal fat region, [ ]Triceps region,  [ ]Ribs region    Muscle wasting: [ ]Temples region, [ ]Clavicle region, [ ]Shoulder region, [ ]Scapula region, [ ]Interosseous region,  [ ]thigh region, [ ]Calf region    Estimated Needs:   [x ] no change since previous assessment  [ ] recalculated:     Current Nutrition Diagnosis: Pt with Impaired nutrient utilization related to endocrine dysfunction, anemia as evidenced by low H&H, A1C 7.5H, continuous high blood sugars. Pt continues to eat well. Pt has no questions regarding diet.       Recommendations: continue diet as ordered    Monitoring and Evaluation:   [x ] PO intake [x ] Tolerance to diet prescription [X] Weights  [X] Follow up per protocol [X] Labs:
asked to evaluate R picc line.  read as in the [facility]  on measurement the picc needed to be pulled back ~3cm  3 cm pulled back under sterile conditions  After xray - showed picc to be in SVC  Can use picc and DC patient  awaiting official reading of cxr
pt s/p lami/fusion lumbar fusion 11/28. Dressing removed, small dime size area of serous staining with minimal blood staining.   No active drainage noted. No expressible drainage from incision site at this time. No erythema no purulent drainage. New dry dressing placed.  Will continue to monitor drainage with dressing changes once to twice daily.
Source: Patient [ ]  Family [ ]   other [x ]EMR    Current Diet: consistent CHO    PO intake:  < 50% [ ]   50-75%  [ ]   %  [x ]  other :    Source for PO intake [ ] Patient [ ] family [x ] chart [ ] staff [ ] other    Enteral /Parenteral Nutrition:     Current Weight: 12/11 97.1kg, 12/26 77kg    % Weight Change     Pertinent Medications: MEDICATIONS  (STANDING):  atorvastatin 40 milliGRAM(s) Oral at bedtime  cefepime   IVPB 2000 milliGRAM(s) IV Intermittent <User Schedule>  dextrose 5%. 1000 milliLiter(s) (50 mL/Hr) IV Continuous <Continuous>  dextrose 50% Injectable 12.5 Gram(s) IV Push once  dextrose 50% Injectable 25 Gram(s) IV Push once  dextrose 50% Injectable 25 Gram(s) IV Push once  doxazosin 2 milliGRAM(s) Oral at bedtime  enoxaparin Injectable 40 milliGRAM(s) SubCutaneous <User Schedule>  gabapentin 300 milliGRAM(s) Oral three times a day  insulin lispro (HumaLOG) corrective regimen sliding scale   SubCutaneous three times a day before meals  levothyroxine 150 MICROGram(s) Oral daily  metoprolol tartrate 25 milliGRAM(s) Oral two times a day  pantoprazole    Tablet 40 milliGRAM(s) Oral before breakfast  saccharomyces boulardii 250 milliGRAM(s) Oral two times a day  sodium chloride 2 Gram(s) Oral three times a day  tamsulosin 0.8 milliGRAM(s) Oral at bedtime  vancomycin  IVPB 500 milliGRAM(s) IV Intermittent every 12 hours    MEDICATIONS  (PRN):  acetaminophen   Tablet .. 650 milliGRAM(s) Oral every 6 hours PRN Temp greater or equal to 38C (100.4F), Mild Pain (1 - 3)  dextrose 40% Gel 15 Gram(s) Oral once PRN Blood Glucose LESS THAN 70 milliGRAM(s)/deciliter  glucagon  Injectable 1 milliGRAM(s) IntraMuscular once PRN Glucose LESS THAN 70 milligrams/deciliter  methocarbamol 500 milliGRAM(s) Oral every 6 hours PRN Muscle Spasm  senna 2 Tablet(s) Oral at bedtime PRN Constipation    Pertinent Labs:         Skin:     Nutrition focused physical exam not conducted - found signs of malnutrition [ ]absent [ ]present    Subcutaneous fat loss: [ ] Orbital fat pads region, [ ]Buccal fat region, [ ]Triceps region,  [ ]Ribs region    Muscle wasting: [ ]Temples region, [ ]Clavicle region, [ ]Shoulder region, [ ]Scapula region, [ ]Interosseous region,  [ ]thigh region, [ ]Calf region    Estimated Needs:   [x ] no change since previous assessment  [ ] recalculated:     Current Nutrition Diagnosis: Pt with Impaired nutrient utilization related to endocrine dysfunction, anemia as evidenced by low H&H, A1C 7.5H      Recommendations: Continue diet as ordered    Monitoring and Evaluation:   [x ] PO intake [x ] Tolerance to diet prescription [X] Weights  [X] Follow up per protocol [X] Labs:

## 2019-01-10 NOTE — DISCHARGE NOTE ADULT - ADDITIONAL INSTRUCTIONS
- From Ortho: Dr. Gray - Follow in office in 2 weeks at which time skin sutures will be removed.  Continue daily DSD, steri strips will fall off on their own.  - Needs weekly RLP-UAB-OI-CBC and CMP.  - Follow up with PMD in 1 week.  - Follow up with ID: Dr. Collier in 2 weeks.  - Continue PT at [facility].

## 2019-01-10 NOTE — DISCHARGE NOTE ADULT - CARE PROVIDER_API CALL
Ollie Gray (), Orthopaedic Surgery  [address]  [place] [address]  Phone: [phone]  Fax: [phone]    Karen Collier (MD), Infectious Disease; Internal Medicine  [address]  [address]  [place] [address]  Phone: [phone]  Fax: [phone]

## 2019-01-10 NOTE — DISCHARGE NOTE ADULT - PLAN OF CARE
Complete resolution Continue antibiotics until 2/12/19.  Follow up with Ortho: Dr. Gray in 2 weeks.  Follow up with ID: Dr. Collier in 2 weeks.  Continue PT at [facility]. Continue Insulin as per sliding scale.   Follow up with PMD in 1 week. Continue medications as prescribed.  Follow up with PMD in 1 week.

## 2019-01-10 NOTE — DISCHARGE NOTE ADULT - CARE PLAN
Principal Discharge DX:	Postoperative seroma of musculoskeletal structure after musculoskeletal procedure  Goal:	Complete resolution  Assessment and plan of treatment:	Continue antibiotics until 2/12/19.  Follow up with Ortho: Dr. Gray in 2 weeks.  Follow up with ID: Dr. Collier in 2 weeks.  Continue PT at [facility].  Secondary Diagnosis:	Diabetes  Assessment and plan of treatment:	Continue Insulin as per sliding scale.   Follow up with PMD in 1 week.  Secondary Diagnosis:	HTN (hypertension)  Assessment and plan of treatment:	Continue medications as prescribed.  Follow up with PMD in 1 week.  Secondary Diagnosis:	Dyslipidemia  Assessment and plan of treatment:	Continue medications as prescribed.  Follow up with PMD in 1 week.  Secondary Diagnosis:	Hypothyroid  Assessment and plan of treatment:	Continue medications as prescribed.  Follow up with PMD in 1 week.  Secondary Diagnosis:	Hyponatremia  Assessment and plan of treatment:	Continue medications as prescribed.  Follow up with PMD in 1 week.

## 2019-01-10 NOTE — DISCHARGE NOTE ADULT - MEDICATION SUMMARY - MEDICATIONS TO TAKE
I will START or STAY ON the medications listed below when I get home from the hospital:    oxyCODONE 5 mg oral tablet  -- 1 tab(s) by mouth every 6 hours, As Needed - 6) for moderate pain  -- Indication: For Moderate Pain    oxyCODONE 10 mg oral tablet  -- 1 tab(s) by mouth every 6 hours, As Needed - 10) for severe pain  -- Indication: For Severe Pain    acetaminophen 325 mg oral tablet  -- 2 tab(s) by mouth every 6 hours, As needed, Temp greater or equal to 38C (100.4F), Mild Pain (1 - 3)  -- Indication: For Mild Pain or Fever    Flomax 0.4 mg oral capsule  -- 2 cap(s) by mouth once a day (at bedtime)  -- Indication: For BPH (benign prostatic hyperplasia)    doxazosin 2 mg oral tablet  -- 1 tab(s) by mouth once a day (at bedtime)  -- Indication: For BPH (benign prostatic hyperplasia)    enoxaparin  -- 40 milligram(s) subcutaneous once a day until he is ambulating well.   -- Indication: For DVT Prophylaxis    gabapentin 300 mg oral capsule  -- 1 cap(s) by mouth 3 times a day  -- Indication: For Neuropathy    HumaLOG 100 units/mL injectable solution  -- 4 times a day (before meals and at bedtime)  151 - 200 2 units.   201 - 250 4 units.   251 - 300 6 units.   301 - 350 8 units.   351 - 400 10 units > 400 call MD   -- Indication: For Diabetes    atorvastatin 40 mg oral tablet  -- 1 tab(s) by mouth once a day  -- Indication: For HLD    Metoprolol Tartrate 50 mg oral tablet  -- 1 tab(s) by mouth 2 times a day  -- Indication: For HTN (hypertension)    amLODIPine 10 mg oral tablet  -- 1 tab(s) by mouth once a day  -- Indication: For HTN (hypertension)    ertapenem 1 g injection  -- 1 gram(s) injectable once a day until 2/12/19.  -- Indication: For Post Op Seroma    senna oral tablet  -- 2 tab(s) by mouth once a day (at bedtime), As Needed for constipation  -- Indication: For Constipation    sodium chloride 1 g oral tablet  -- 1 tab(s) by mouth 3 times a day  -- Indication: For Hyponatremia    DAPTOmycin 500 mg intravenous injection  -- 600 milligram(s) intravenous every 24 hours until 2/12/19.  -- Indication: For Post Op Seroma    methocarbamol 500 mg oral tablet  -- 1 tab(s) by mouth every 6 hours, As needed, Back Spasms  -- Indication: For Back Spasm    Restasis 0.05% ophthalmic emulsion  -- 1 drop(s) to each affected eye every 12 hours  -- Indication: For Eye Drops    saccharomyces boulardii lyo 250 mg oral capsule  -- 1 cap(s) by mouth 2 times a day  -- Indication: For C. Diff Prophylaxis    pantoprazole 40 mg oral delayed release tablet  -- 1 tab(s) by mouth once a day (before a meal)  -- Indication: For GERD    levothyroxine 150 mcg (0.15 mg) oral tablet  -- 1 tab(s) by mouth once a day  -- Indication: For Hypothyroidism

## 2019-01-10 NOTE — DISCHARGE NOTE ADULT - CARE PROVIDERS DIRECT ADDRESSES
[email or web address]

## 2019-01-10 NOTE — DISCHARGE NOTE ADULT - HOSPITAL COURSE
73 yr old male s/p T11/T10 laminectomy, T10-L1 fusion, hypertension, diabetes mellitus, hypothyroid was sent in from [facility] for fever and back pain. He was recently discharged from [facility] after surgery. Noted to have fever 102. He was evaluated by orthopedics in ED given recent surgery and CT of thoracolumbar spine with contrast was done, which showed post surgical changes and multiple foci of air, fluid and soft tissue edema. ID was consulted and he was started on Vancomycin and Cefepime. Orthopedics advised local wound care and possible seroma drainage if no improvement. Nephrology consulted for hyponatremia. Advised fluid restriction and increased salt tabs to 2 tabs three times a day. He was noted to Hb 7.7, was given 1 unit PRBC, Hb came up to 9.5.  After daily wound care for serosanguinous soaking of dressing multiple times and antibiotics, he was taken to OR for seroma evacuation. post op accordion drain placed which did not drain at all so it was removed.      1) Post op seroma from thoracolumbar fusion s/p seroma evacuation on 1/2/19  - Continue Daptomycin and Invanz  - As per ID will need for 6 weeks (last day 2/12/19)  - PICC Line was placed  - wound care recommendations appreciated by Orthopedics    2) SIADH: hyponatremia  - stable  - NaCl tabs  3) CLARI  - Improving  4) HTN  - Continue Metoprolol, Amlodipine and Cardura  - Hydralazine (PRN)  5) Anemia   -  s/p PRBCs transufion  - H&H stable  6) DM2   - Accu checks and ISS  7) Hypothyroidism  - Continue Synthroid  8) BPH  - Continue Flomax and Cardura  - Follow up with Urology as an outpatient  9) Hypocalcemia  - Replaced  DVT Prophylaxis -- Lovenox 40 mg    Patient was supposed to be discharged on 1/8/19. But he and his wife mentioned on the day of discharge that he has stool incontinence for 2 weeks. Started after surgery on 11/28/18. He has chronic urinary incontinence but as per him stool incontinence is new.  Discussed with Ortho and as per them it is unlikely from cord compression. No saddle anesthesia. He feels urge to go.   Patient had loose bowel movement but no diarrhea. He was on stool softeners which were held and his symptoms improved. He needs bowel training.   He is doing well and is stable for discharge.  Time spent: 47 minutes 73 yr old male s/p T11/T10 laminectomy, T10-L1 fusion, hypertension, diabetes mellitus, hypothyroid was sent in from [facility] [place] for fever and back pain. He was recently discharged from [facility] after surgery. Noted to have fever 102. He was evaluated by orthopedics in ED given recent surgery and CT of thoracolumbar spine with contrast was done, which showed post surgical changes and multiple foci of air, fluid and soft tissue edema. ID was consulted and he was started on Vancomycin and Cefepime. Orthopedics advised local wound care and possible seroma drainage if no improvement. Nephrology consulted for hyponatremia. Advised fluid restriction and increased salt tabs to 2 tabs three times a day. He was noted to Hb 7.7, was given 1 unit PRBC, Hb came up to 9.5.  After daily wound care for serosanguinous soaking of dressing multiple times and antibiotics, he was taken to OR for seroma evacuation. post op accordion drain placed which did not drain at all so it was removed.      1) Post op seroma from thoracolumbar fusion s/p seroma evacuation on 1/2/19  - Continue Daptomycin and Invanz  - As per ID will need for 6 weeks (last day 2/12/19)  - PICC Line was placed  - wound care recommendations appreciated by Orthopedics    2) SIADH: hyponatremia  - stable  - NaCl tabs  3) CLARI  - Improving  4) HTN  - Continue Metoprolol, Amlodipine and Cardura  - Hydralazine (PRN)  5) Anemia   -  s/p PRBCs transufion  - H&H stable  6) DM2   - Accu checks and ISS  7) Hypothyroidism  - Continue Synthroid  8) BPH  - Continue Flomax and Cardura  - Follow up with Urology as an outpatient  9) Hypocalcemia  - Replaced  DVT Prophylaxis -- Lovenox 40 mg    Patient was supposed to be discharged on 1/8/19. But he and his wife mentioned on the day of discharge that he has stool incontinence for 2 weeks. Started after surgery on 11/28/18. He has chronic urinary incontinence but as per him stool incontinence is new.  Discussed with Ortho and as per them it is unlikely from cord compression. No saddle anesthesia. He feels urge to go.   Patient had loose bowel movement but no diarrhea. He was on stool softeners which were held and his symptoms improved. He needs bowel training.   He is doing well and is stable for discharge.    Vital Signs   T(C): 37.1 (10 Blayne 2019 11:34), Max: 37.2 (09 Jan 2019 21:34)  T(F): 98.7 (10 Blayne 2019 11:34), Max: 99 (09 Jan 2019 21:34)  HR: 88 (10 Blayne 2019 11:34) (88 - 94)  BP: 116/59 (10 Blayne 2019 11:34) (107/63 - 149/74)  RR: 19 (10 Blayne 2019 11:34) (19 - 20)  SpO2: 96% (10 Blayne 2019 11:34) (94% - 96%)  General: Elderly male lying in bed comfortably. No acute distress  HEENT: PERRLA. EOMI. Clear conjunctivae. Moist mucus membrane  Neck: Supple. No JVD. No Thyromegaly   Chest: CTA bilaterally - no wheezing, rales or rhonchi.   Heart: Normal S1 & S2. RRR. No murmur.   Abdomen: Soft. Non-tender. Non-distended. + BS  Back: Dressing on surgical wound - dry.  Surrounding area clear.   Ext: No pedal edema. No calf tenderness   Neuro: Active and alert. No focal deficit. No speech disorder  Skin: Warm and Dry  Psychiatry: Normal mood and affect    Time spent: 47 minutes

## 2019-01-14 ENCOUNTER — APPOINTMENT (OUTPATIENT)
Dept: ORTHOPEDIC SURGERY | Facility: CLINIC | Age: 74
End: 2019-01-14

## 2019-01-22 NOTE — ED PROVIDER NOTE - PMH
BPH (benign prostatic hyperplasia)    Diabetes    Dyslipidemia    HTN (hypertension)    Hypothyroid    OA (osteoarthritis) 0

## 2019-01-24 ENCOUNTER — APPOINTMENT (OUTPATIENT)
Dept: ORTHOPEDIC SURGERY | Facility: CLINIC | Age: 74
End: 2019-01-24
Payer: MEDICARE

## 2019-01-24 VITALS
HEART RATE: 98 BPM | BODY MASS INDEX: 33.34 KG/M2 | SYSTOLIC BLOOD PRESSURE: 97 MMHG | HEIGHT: 68 IN | DIASTOLIC BLOOD PRESSURE: 59 MMHG | WEIGHT: 220 LBS

## 2019-01-24 PROCEDURE — 99024 POSTOP FOLLOW-UP VISIT: CPT

## 2019-01-24 NOTE — HISTORY OF PRESENT ILLNESS
[Clean/Dry/Intact] : clean, dry and intact [Healed] : healed [Chills] : no chills [Fever] : no fever [Erythema] : not erythematous [Discharge] : absent of discharge [Swelling] : not swollen [Dehiscence] : not dehisced [de-identified] : s/p evacuation of seroma, lumbar irrigation and excisional debridement. DOS: 1/2/19. s/p revision laminectomy and fusion T10-L1 for progressive thoracic myelopathy. DOS: 11/28/18.  [de-identified] : 72 y/o M with hx of DM, s/p evacuation of seroma, lumbar irrigation and excisional debridement. DOS: 1/2/19. s/p revision laminectomy and fusion T10-L1 for progressive thoracic myelopathy. DOS: 11/28/18.  He is currently in PT, doing every day. He states he is still in pain. He denies any numbness or tingling. He states his legs are becoming weak. He states the pain medication provides some relief. He cannot walk. He is still septic, and currently has a PICC line with abx and will  will see Infectious Disease for sepsis managemnt. His wife reports he had fever a few days ago but does not know if documented as such at rehab   [de-identified] : constitutional- No acute distress. seated in a wheelchair. \par neurologic- decrease in sensation on the LLE compared to the right. \par skin- incision dry clean and intact. the incision was well healed.\par musculoskeletal - motor strength is 3/5 b/l, L>R\par The surgical incision site(s) was clean, dry and intact and healed.\par \par Sutures were removed, and a new dressing was applied. Seroma to proximal (2cm in diameter) and distal (3cm in diameter) incision. No active drainage. [de-identified] : xray from outside radiology - thoracolumbar xray T10, T11, T12, L1 fusion with Hardware in good alignment  [de-identified] : s/p evacuation of seroma, lumbar irrigation and excisional debridement. DOS: 1/2/19. s/p revision laminectomy and fusion T10-L1 for progressive thoracic myelopathy. DOS: 11/28/18.  [de-identified] : 74 y/o M s/p evacuation of seroma, lumbar irrigation and excisional debridement. DOS: 1/2/19. s/p revision laminectomy and fusion T10-L1 for progressive thoracic myelopathy. DOS: 11/28/18. Continue PT prescription to optimize strength and function for UE and LE strengthening, and gait and balance training. Pt can return for repeat clinical assessment for wound check of seroma in about 2 weeks / prn.

## 2019-01-24 NOTE — ADDENDUM
[FreeTextEntry1] : Documented by Bob Polanco acting as a scribe for WYATT Walton on 01/24/2019 .\par All medical record entries made by the Scribe were at my, WYATT Walton, direction and personally dictated by me on 01/24/2019 . I have reviewed the chart and agree that the record accurately reflects my personal performance of the history, physical exam, assessment and plan. I have also personally directed, reviewed, and agreed with the chart.

## 2019-01-24 NOTE — HISTORY OF PRESENT ILLNESS
[Clean/Dry/Intact] : clean, dry and intact [Healed] : healed [Chills] : no chills [Fever] : no fever [Erythema] : not erythematous [Discharge] : absent of discharge [Swelling] : not swollen [Dehiscence] : not dehisced [de-identified] : s/p evacuation of seroma, lumbar irrigation and excisional debridement. DOS: 1/2/19. s/p revision laminectomy and fusion T10-L1 for progressive thoracic myelopathy. DOS: 11/28/18.  [de-identified] : 72 y/o M with hx of DM, s/p evacuation of seroma, lumbar irrigation and excisional debridement. DOS: 1/2/19. s/p revision laminectomy and fusion T10-L1 for progressive thoracic myelopathy. DOS: 11/28/18.  He is currently in PT, doing every day. He states he is still in pain. He denies any numbness or tingling. He states his legs are becoming weak. He states the pain medication provides some relief. He cannot walk. He is still septic, and currently has a PICC line with abx and will  will see Infectious Disease for sepsis managemnt. His wife reports he had fever a few days ago but does not know if documented as such at rehab   [de-identified] : constitutional- No acute distress. seated in a wheelchair. \par neurologic- decrease in sensation on the LLE compared to the right. \par skin- incision dry clean and intact. the incision was well healed.\par musculoskeletal - motor strength is 3/5 b/l, L>R\par The surgical incision site(s) was clean, dry and intact and healed.\par \par Sutures were removed, and a new dressing was applied. Seroma to proximal (2cm in diameter) and distal (3cm in diameter) incision. No active drainage. [de-identified] : xray from outside radiology - thoracolumbar xray T10, T11, T12, L1 fusion with Hardware in good alignment  [de-identified] : s/p evacuation of seroma, lumbar irrigation and excisional debridement. DOS: 1/2/19. s/p revision laminectomy and fusion T10-L1 for progressive thoracic myelopathy. DOS: 11/28/18.  [de-identified] : 72 y/o M s/p evacuation of seroma, lumbar irrigation and excisional debridement. DOS: 1/2/19. s/p revision laminectomy and fusion T10-L1 for progressive thoracic myelopathy. DOS: 11/28/18. Continue PT prescription to optimize strength and function for UE and LE strengthening, and gait and balance training. Pt can return for repeat clinical assessment for wound check of seroma in about 2 weeks / prn.

## 2019-01-24 NOTE — HISTORY OF PRESENT ILLNESS
[Clean/Dry/Intact] : clean, dry and intact [Healed] : healed [Chills] : no chills [Fever] : no fever [Erythema] : not erythematous [Discharge] : absent of discharge [Swelling] : not swollen [Dehiscence] : not dehisced [de-identified] : s/p evacuation of seroma, lumbar irrigation and excisional debridement. DOS: 1/2/19. s/p revision laminectomy and fusion T10-L1 for progressive thoracic myelopathy. DOS: 11/28/18.  [de-identified] : 72 y/o M with hx of DM, s/p evacuation of seroma, lumbar irrigation and excisional debridement. DOS: 1/2/19. s/p revision laminectomy and fusion T10-L1 for progressive thoracic myelopathy. DOS: 11/28/18.  He is currently in PT, doing every day. He states he is still in pain. He denies any numbness or tingling. He states his legs are becoming weak. He states the pain medication provides some relief. He cannot walk. He is still septic, and currently has a PICC line with abx and will  will see Infectious Disease for sepsis managemnt. His wife reports he had fever a few days ago but does not know if documented as such at rehab   [de-identified] : constitutional- No acute distress. seated in a wheelchair. \par neurologic- decrease in sensation on the LLE compared to the right. \par skin- incision dry clean and intact. the incision was well healed.\par musculoskeletal - motor strength is 3/5 b/l, L>R\par The surgical incision site(s) was clean, dry and intact and healed.\par \par Sutures were removed, and a new dressing was applied. Seroma to proximal (2cm in diameter) and distal (3cm in diameter) incision. No active drainage. [de-identified] : xray from outside radiology - thoracolumbar xray T10, T11, T12, L1 fusion with Hardware in good alignment  [de-identified] : s/p evacuation of seroma, lumbar irrigation and excisional debridement. DOS: 1/2/19. s/p revision laminectomy and fusion T10-L1 for progressive thoracic myelopathy. DOS: 11/28/18.  [de-identified] : 72 y/o M s/p evacuation of seroma, lumbar irrigation and excisional debridement. DOS: 1/2/19. s/p revision laminectomy and fusion T10-L1 for progressive thoracic myelopathy. DOS: 11/28/18. Continue PT prescription to optimize strength and function for UE and LE strengthening, and gait and balance training. Pt can return for repeat clinical assessment for wound check of seroma in about 2 weeks / prn.

## 2019-01-25 LAB
CULTURE RESULTS: SIGNIFICANT CHANGE UP
SPECIMEN SOURCE: SIGNIFICANT CHANGE UP

## 2019-01-30 ENCOUNTER — APPOINTMENT (OUTPATIENT)
Dept: INTERNAL MEDICINE | Facility: CLINIC | Age: 74
End: 2019-01-30
Payer: MEDICARE

## 2019-01-30 VITALS
SYSTOLIC BLOOD PRESSURE: 116 MMHG | WEIGHT: 220 LBS | BODY MASS INDEX: 33.34 KG/M2 | HEIGHT: 68 IN | DIASTOLIC BLOOD PRESSURE: 43 MMHG

## 2019-01-30 DIAGNOSIS — T84.50XD INFECTION AND INFLAMMATORY REACTION DUE TO UNSPECIFIED INTERNAL JOINT PROSTHESIS, SUBSEQUENT ENCOUNTER: ICD-10-CM

## 2019-01-30 PROCEDURE — 99213 OFFICE O/P EST LOW 20 MIN: CPT

## 2019-01-30 NOTE — PHYSICAL EXAM
[General Appearance - Alert] : alert [General Appearance - In No Acute Distress] : in no acute distress [Sclera] : the sclera and conjunctiva were normal [PERRL With Normal Accommodation] : pupils were equal in size, round, reactive to light [Extraocular Movements] : extraocular movements were intact [Outer Ear] : the ears and nose were normal in appearance [Oropharynx] : the oropharynx was normal with no thrush [Neck Appearance] : the appearance of the neck was normal [Neck Cervical Mass (___cm)] : no neck mass was observed [Jugular Venous Distention Increased] : there was no jugular-venous distention [Thyroid Diffuse Enlargement] : the thyroid was not enlarged [Auscultation Breath Sounds / Voice Sounds] : lungs were clear to auscultation bilaterally [Heart Rate And Rhythm] : heart rate was normal and rhythm regular [Heart Sounds] : normal S1 and S2 [Bowel Sounds] : normal bowel sounds [Abdomen Soft] : soft [Abdomen Tenderness] : non-tender [] : no hepato-splenomegaly [Abdomen Mass (___ Cm)] : no abdominal mass palpated [Costovertebral Angle Tenderness] : no CVA tenderness [No Palpable Adenopathy] : no palpable adenopathy [FreeTextEntry1] : well healed mid back wound, scar looks fine. no tenderness  [Oriented To Time, Place, And Person] : oriented to person, place, and time

## 2019-01-30 NOTE — REVIEW OF SYSTEMS
[Feeling Tired] : feeling tired [Negative] : Heme/Lymph [FreeTextEntry9] : still feels weak and unable to walk

## 2019-01-30 NOTE — HISTORY OF PRESENT ILLNESS
[FreeTextEntry1] : 74 y/o male with PMHx of HTN, BPH, DM, Hypothyroid, and OA was admitted to [facility] 12/16/18 to 1/10/2019 from [facility] due to recurrent fevers (T-max 102) and back pain of 1 day duration. He recently had thoracic laminectomy and fusion.  Work up and Thoracic spine CT showed nonspecific posterior paraspinal 6.2 x 4.0 x 16.5 cm fluid collection \par extending from T9 - L2, s/p surgery and I&D on 1/2, as per Dr. Gray looked like pus and infection but culture was negative. \par He was started on Ertapenem 1gm daily and Daptomycin 600mg daily to complete 6 weeks of treatment. Last day would be 2/12. \par On 1/7 had ESR=46 and CRP=8.9. \par

## 2019-01-30 NOTE — ASSESSMENT
[FreeTextEntry1] : - WIll make an appt in [facility] for PICC replacement since it is clogged. \par - has a left hand peripheral line that is working. \par - Will cont daptomycin and ertapenem to complete 6 weeks until 2/12. \par - Will follow up weekly labs and ESR and CRP \par - will see him in one month.

## 2019-02-08 ENCOUNTER — APPOINTMENT (OUTPATIENT)
Dept: ORTHOPEDIC SURGERY | Facility: CLINIC | Age: 74
End: 2019-02-08
Payer: MEDICARE

## 2019-02-08 PROCEDURE — 99024 POSTOP FOLLOW-UP VISIT: CPT

## 2019-02-11 NOTE — HISTORY OF PRESENT ILLNESS
[Clean/Dry/Intact] : clean, dry and intact [Healed] : healed [Doing Well] : is doing well [Excellent Pain Control] : has excellent pain control [No Sign of Infection] : is showing no signs of infection [Chills] : no chills [Fever] : no fever [Erythema] : not erythematous [Discharge] : absent of discharge [Swelling] : not swollen [Dehiscence] : not dehisced [de-identified] : s/p evacuation of seroma, lumbar irrigation and excisional debridement. DOS: 1/2/19. s/p revision laminectomy and fusion T10-L1 for progressive thoracic myelopathy. DOS: 11/28/18.  [de-identified] : 72 y/o M with hx of DM, s/p evacuation of seroma, lumbar irrigation and excisional debridement. DOS: 1/2/19. s/p revision laminectomy and fusion T10-L1 for progressive thoracic myelopathy. DOS: 11/28/18. Pt is still in [facility]. He is not walking. [de-identified] : constitutional- No acute distress. presents in a wheelchair. \par skin- incision dry clean and intact. the incision was well healed.\par The surgical incision site(s) was clean, dry and intact and healed.\par \par Incision was cleansed with ChloraPrep. incomplete lower thoracic myelopathic patient. pt has been progressively declining. motor strength in LE is 2/5.  [de-identified] : No new images were reviewed today.  [de-identified] : s/p evacuation of seroma, lumbar irrigation and excisional debridement. DOS: 1/2/19. s/p revision laminectomy and fusion T10-L1 for progressive thoracic myelopathy. DOS: 11/28/18.  [de-identified] : 74 y/o M s/p evacuation of seroma, lumbar irrigation and excisional debridement. DOS: 1/2/19. s/p revision laminectomy and fusion T10-L1 for progressive thoracic myelopathy. DOS: 11/28/18. Pt has no restrictions with physical therapy. Emphasize LE and UE strength training. Pt should continue to work on transfers. I think it is important to start to formulate a discharge plan for this patient with a director or care coordinator from [facility].  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 The patient will follow up in 4-6 weeks for a repeat clinical assessment.

## 2019-02-11 NOTE — ADDENDUM
[FreeTextEntry1] : Documented by Bob Polanco acting as a scribe for Dr. Ollie Gray on 02/08/2019 . All medical record entries made by the Scribe were at my, Dr. Ollie Gray, direction and personally dictated by me on 02/08/2019 . I have reviewed the chart and agree that the record accurately reflects my personal performance of the history, physical exam, assessment and plan. I have also personally directed, reviewed, and agreed with the chart.

## 2019-02-22 ENCOUNTER — APPOINTMENT (OUTPATIENT)
Dept: INTERNAL MEDICINE | Facility: CLINIC | Age: 74
End: 2019-02-22

## 2019-02-23 LAB
CULTURE RESULTS: SIGNIFICANT CHANGE UP
SPECIMEN SOURCE: SIGNIFICANT CHANGE UP

## 2019-03-08 ENCOUNTER — APPOINTMENT (OUTPATIENT)
Dept: ORTHOPEDIC SURGERY | Facility: CLINIC | Age: 74
End: 2019-03-08
Payer: MEDICARE

## 2019-03-08 VITALS
BODY MASS INDEX: 30.31 KG/M2 | HEIGHT: 68 IN | DIASTOLIC BLOOD PRESSURE: 56 MMHG | SYSTOLIC BLOOD PRESSURE: 95 MMHG | HEART RATE: 89 BPM | WEIGHT: 200 LBS

## 2019-03-08 PROCEDURE — 99214 OFFICE O/P EST MOD 30 MIN: CPT

## 2019-03-08 NOTE — ADDENDUM
[FreeTextEntry1] : Documented by Bob Polanco acting as a scribe for Dr. Ollie Gray on 03/08/2019 . All medical record entries made by the Scribe were at my, Dr. Ollie Gray, direction and personally dictated by me on 03/08/2019 . I have reviewed the chart and agree that the record accurately reflects my personal performance of the history, physical exam, assessment and plan. I have also personally directed, reviewed, and agreed with the chart.

## 2019-03-08 NOTE — HISTORY OF PRESENT ILLNESS
[Clean/Dry/Intact] : clean, dry and intact [Healed] : healed [Doing Well] : is doing well [Excellent Pain Control] : has excellent pain control [No Sign of Infection] : is showing no signs of infection [Chills] : no chills [Fever] : no fever [Erythema] : not erythematous [Discharge] : absent of discharge [Swelling] : not swollen [Dehiscence] : not dehisced [de-identified] : s/p evacuation of seroma, lumbar irrigation and excisional debridement. DOS: 1/2/19. s/p revision laminectomy and fusion T10-L1 for progressive thoracic myelopathy. DOS: 11/28/18.  [de-identified] : 72 y/o M with hx of DM, s/p evacuation of seroma, lumbar irrigation and excisional debridement. DOS: 1/2/19. s/p revision laminectomy and fusion T10-L1 for progressive thoracic myelopathy. DOS: 11/28/18. Presents in f/u to progressive thoracic myelopathy that has rendered him in a wheelchair. Stable progressive LE weakness. He is currently at [facility] re[facility], and working on transfers etc.  [de-identified] : constitutional- No acute distress\par skin- incision dry clean and intact. the incision was well healed.\par The surgical incision site(s) was clean, dry and intact and healed.\par \par sitting in a wheelchair. abduction pillow for proper leg positioning in wheelchair. LE motor strength is diminished at approx 2 to 3+ [de-identified] : No new images were reviewed today.  [de-identified] : s/p evacuation of seroma, lumbar irrigation and excisional debridement. DOS: 1/2/19. s/p revision laminectomy and fusion T10-L1 for progressive thoracic myelopathy. DOS: 11/28/18.  [de-identified] : 74 y/o M s/p evacuation of seroma, lumbar irrigation and excisional debridement. DOS: 1/2/19. s/p revision laminectomy and fusion T10-L1 for progressive thoracic myelopathy. DOS: 11/28/18. I have recommended that the pt continue with a conservative treatment plan. continue with rehab efforts at [facility]. I have recommended physiatry f/u for transfer of care recommendations for his home as well as additional rehab efforts. From a surgical standpoint, pt can follow up in 3 months.

## 2019-05-22 ENCOUNTER — OUTPATIENT (OUTPATIENT)
Dept: OUTPATIENT SERVICES | Facility: HOSPITAL | Age: 74
LOS: 1 days | End: 2019-05-22
Payer: MEDICARE

## 2019-05-22 DIAGNOSIS — Z51.89 ENCOUNTER FOR OTHER SPECIFIED AFTERCARE: ICD-10-CM

## 2019-05-22 DIAGNOSIS — Z98.89 OTHER SPECIFIED POSTPROCEDURAL STATES: Chronic | ICD-10-CM

## 2019-05-22 DIAGNOSIS — Z96.60 PRESENCE OF UNSPECIFIED ORTHOPEDIC JOINT IMPLANT: Chronic | ICD-10-CM

## 2019-06-11 ENCOUNTER — APPOINTMENT (OUTPATIENT)
Dept: ORTHOPEDIC SURGERY | Facility: CLINIC | Age: 74
End: 2019-06-11
Payer: MEDICARE

## 2019-06-11 VITALS
SYSTOLIC BLOOD PRESSURE: 107 MMHG | BODY MASS INDEX: 30.31 KG/M2 | HEART RATE: 77 BPM | WEIGHT: 200 LBS | HEIGHT: 68 IN | DIASTOLIC BLOOD PRESSURE: 54 MMHG

## 2019-06-11 DIAGNOSIS — R26.81 UNSTEADINESS ON FEET: ICD-10-CM

## 2019-06-11 PROCEDURE — 72070 X-RAY EXAM THORAC SPINE 2VWS: CPT

## 2019-06-11 PROCEDURE — 99214 OFFICE O/P EST MOD 30 MIN: CPT

## 2019-06-11 PROCEDURE — 72100 X-RAY EXAM L-S SPINE 2/3 VWS: CPT

## 2019-06-11 NOTE — DISCUSSION/SUMMARY
[de-identified] : I have recommended that the pt continue with a conservative treatment plan. Pt has been referred to physical therapy for decreased pain modalities, core strengthening modalities, soft tissue modalities, and physical modalities. Pt was provided with information to follow up with Dr. Tomi Dial to optimize mobilization. The patient will follow up in 3-4 months for a repeat clinical assessment / PRN.

## 2019-06-11 NOTE — PHYSICAL EXAM
[Obese] : obese [Poor Appearance] : well-appearing [Acute Distress] : not in acute distress [de-identified] : CONSTITUTIONAL: The patient is a very pleasant individual who is well-nourished and who appears stated age. presents in a wheelchair.\par PSYCHIATRIC: The patient is alert and oriented X 3 and in no apparent distress, and participates with orthopedic evaluation well.\par HEAD: Atraumatic and is nonsyndromic in appearance.\par EENT: No visible thyromegaly, EOMI.\par RESPIRATORY: Respiratory rate is regular, not dyspneic on examination.\par LYMPHATICS: There is no inguinal lymphadenopathy\par INTEGUMENTARY: Skin is clean, dry, and intact about the bilateral lower extremities and lumbar spine.\par VASCULAR: There is brisk capillary refill about the bilateral lower extremities.\par NEUROLOGIC: There are no pathologic reflexes. There is no decrease in sensation of the bilateral lower extremities on Wartenberg pinwheel examination. Deep tendon reflexes are well maintained at 2+/4 of the bilateral lower extremities and are symmetric.\par MUSCULOSKELETAL: There is no visible muscular atrophy. Manual motor strength is well maintained in the bilateral lower extremities. Range of motion of lumbar spine is well maintained. The patient ambulates in a non-myelopathic manner. Negative tension sign and straight leg raise bilaterally. Quad extension, ankle dorsiflexion, EHL, plantar flexion, and ankle eversion are well preserved. Normal secondary orthopaedic exam of bilateral hips, greater trochanteric area, knees and ankles \par \par LE motor strength is diminished at approx 2 to 3+.  [de-identified] : X-ray of the lumbar spine taken today shows s/p laminectomy.\par \par X-ray of the thoracic spine taken today shows s/p revision laminectomy and fusion.

## 2019-06-11 NOTE — HISTORY OF PRESENT ILLNESS
[de-identified] : 72 y/o M with hx of DM, s/p evacuation of seroma, lumbar irrigation and excisional debridement. DOS: 1/2/19. s/p revision laminectomy and fusion T10-L1 for progressive thoracic myelopathy. DOS: 11/28/18. He states he has an ulcer on the bottom of his right foot, managed by podiatry. He sits with his right leg internally rotated. [Ataxia] : no ataxia [Loss of Dexterity] : good dexterity [Incontinence] : no incontinence [Urinary Ret.] : no urinary retention

## 2019-06-11 NOTE — ADDENDUM
[FreeTextEntry1] : Documented by Bob Polanco acting as a scribe for Dr. Ollie Gray on 06/11/2019 . All medical record entries made by the Scribe were at my, Dr. Ollie Gray, direction and personally dictated by me on 06/11/2019 . I have reviewed the chart and agree that the record accurately reflects my personal performance of the history, physical exam, assessment and plan. I have also personally directed, reviewed, and agreed with the chart.

## 2019-09-13 ENCOUNTER — APPOINTMENT (OUTPATIENT)
Dept: ORTHOPEDIC SURGERY | Facility: CLINIC | Age: 74
End: 2019-09-13
Payer: MEDICARE

## 2019-09-23 ENCOUNTER — OTHER (OUTPATIENT)
Age: 74
End: 2019-09-23

## 2019-10-08 ENCOUNTER — APPOINTMENT (OUTPATIENT)
Dept: ORTHOPEDIC SURGERY | Facility: CLINIC | Age: 74
End: 2019-10-08
Payer: MEDICARE

## 2019-10-08 VITALS
SYSTOLIC BLOOD PRESSURE: 110 MMHG | WEIGHT: 173 LBS | BODY MASS INDEX: 26.22 KG/M2 | DIASTOLIC BLOOD PRESSURE: 55 MMHG | HEART RATE: 74 BPM | HEIGHT: 68 IN

## 2019-10-08 DIAGNOSIS — G60.9 HEREDITARY AND IDIOPATHIC NEUROPATHY, UNSPECIFIED: ICD-10-CM

## 2019-10-08 PROCEDURE — 72100 X-RAY EXAM L-S SPINE 2/3 VWS: CPT

## 2019-10-08 PROCEDURE — 99214 OFFICE O/P EST MOD 30 MIN: CPT

## 2019-10-08 NOTE — ADDENDUM
[FreeTextEntry1] : Documented by Trinh Srivastava acting as a scribe for Dr. Ollie Gray. 10/08/2019\par \par All medical record entries made by the Scribe were at my, Dr. Ollie Gray, direction and personally dictated by me on 10/08/2019. I have reviewed the chart and agree that the record accurately reflects my personal performance of the history, physical exam, assessment and plan. I have also personally directed, reviewed, and agreed with the chart.

## 2019-10-08 NOTE — PHYSICAL EXAM
[Obese] : obese [Poor Appearance] : well-appearing [Acute Distress] : not in acute distress [de-identified] : CONSTITUTIONAL: The patient is a very pleasant individual who is well-nourished and who appears stated age. Presents in a wheelchair.\par PSYCHIATRIC: The patient is alert and oriented X 3 and in no apparent distress, and participates with orthopedic evaluation well.\par HEAD: Atraumatic and is nonsyndromic in appearance.\par EENT: No visible thyromegaly, EOMI.\par RESPIRATORY: Respiratory rate is regular, not dyspneic on examination.\par LYMPHATICS: There is no inguinal lymphadenopathy\par INTEGUMENTARY: Skin is clean, dry, and intact about the bilateral lower extremities and lumbar spine.\par VASCULAR: There is brisk capillary refill about the bilateral lower extremities.\par NEUROLOGIC: There are no pathologic reflexes. There is no decrease in sensation of the bilateral lower extremities on Wartenberg pinwheel examination. Deep tendon reflexes are well maintained at 2+/4 of the bilateral lower extremities and are symmetric.\par MUSCULOSKELETAL: There is no visible muscular atrophy. Manual motor strength is well maintained in the bilateral lower extremities. Range of motion of lumbar spine is well maintained. The patient ambulates in a non-myelopathic manner. Negative tension sign and straight leg raise bilaterally. Quad extension, ankle dorsiflexion, EHL, plantar flexion, and ankle eversion are well preserved. Normal secondary orthopaedic exam of bilateral hips, greater trochanteric area, knees and ankles \par \par LE motor strength is diminished at approx 2 to 3+.- unchanged from prior to surgical state.  [de-identified] : Xray of the lumbar spine taken today shows s/p thoracic fusion unchanged from previous imaging.

## 2019-10-08 NOTE — DISCUSSION/SUMMARY
[de-identified] : I have recommended that the pt continue with a conservative treatment plan. Pt has been referred to physical therapy for decreased pain modalities, core strengthening modalities and physical modalities. The pt has also been referred to physiatry for transfer training, etc. Pt will fu in approx. 4-6 months with regard to his incomplete spinal cord injury.

## 2019-10-08 NOTE — HISTORY OF PRESENT ILLNESS
[de-identified] : 72 y/o M with hx of DM, s/p evacuation of seroma, lumbar irrigation and excisional debridement. DOS: 1/2/19. s/p revision laminectomy and fusion T10-L1. 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 DOS: 11/28/18. He states he has an ulcer on the bottom of his right foot, managed by podiatry. He sits with his right leg internally rotated. Pt has an aid and is pending initiation of PT and is wishing to get in sooner. [Incontinence] : no incontinence [Loss of Dexterity] : good dexterity [Ataxia] : no ataxia [Urinary Ret.] : no urinary retention

## 2020-01-21 ENCOUNTER — APPOINTMENT (OUTPATIENT)
Dept: MRI IMAGING | Facility: CLINIC | Age: 75
End: 2020-01-21
Payer: MEDICARE

## 2020-01-21 ENCOUNTER — OUTPATIENT (OUTPATIENT)
Dept: OUTPATIENT SERVICES | Facility: HOSPITAL | Age: 75
LOS: 1 days | End: 2020-01-21

## 2020-01-21 DIAGNOSIS — Z00.8 ENCOUNTER FOR OTHER GENERAL EXAMINATION: ICD-10-CM

## 2020-01-21 DIAGNOSIS — Z96.60 PRESENCE OF UNSPECIFIED ORTHOPEDIC JOINT IMPLANT: Chronic | ICD-10-CM

## 2020-01-21 DIAGNOSIS — Z98.89 OTHER SPECIFIED POSTPROCEDURAL STATES: Chronic | ICD-10-CM

## 2020-01-21 PROCEDURE — 70553 MRI BRAIN STEM W/O & W/DYE: CPT | Mod: 26

## 2020-02-28 ENCOUNTER — APPOINTMENT (OUTPATIENT)
Dept: ORTHOPEDIC SURGERY | Facility: CLINIC | Age: 75
End: 2020-02-28
Payer: MEDICARE

## 2020-02-28 VITALS
DIASTOLIC BLOOD PRESSURE: 75 MMHG | WEIGHT: 173 LBS | HEART RATE: 8 BPM | SYSTOLIC BLOOD PRESSURE: 139 MMHG | HEIGHT: 68 IN | BODY MASS INDEX: 26.22 KG/M2

## 2020-02-28 DIAGNOSIS — M70.61 TROCHANTERIC BURSITIS, RIGHT HIP: ICD-10-CM

## 2020-02-28 PROCEDURE — 72080 X-RAY EXAM THORACOLMB 2/> VW: CPT | Mod: 59

## 2020-02-28 PROCEDURE — 72100 X-RAY EXAM L-S SPINE 2/3 VWS: CPT

## 2020-02-28 PROCEDURE — 99215 OFFICE O/P EST HI 40 MIN: CPT

## 2020-02-28 NOTE — ADDENDUM
[FreeTextEntry1] : Documented by Trinh Srivastava acting as a scribe for Dr. Ollie Gray. 02/28/2020\par \par All medical record entries made by the Scribe were at my, Dr. Ollie Gray, direction and personally dictated by me on 02/28/2020. I have reviewed the chart and agree that the record accurately reflects my personal performance of the history, physical exam, assessment and plan. I have also personally directed, reviewed, and agreed with the chart.

## 2020-02-28 NOTE — DISCUSSION/SUMMARY
[de-identified] : Pt will be sent for an outpatient AP Pelvis x ray since one was not able to be obtained in office today. He should continue with PT for decreased pain and increased function. He may follow up here after outpatient xray is obtained for repeat clinical evaluation and review. \par \par Patient with multiple medical comorbidity increasing the risk of perioperative and postoperative complications as well as diminished spine outcomes as per the current medical literature. These include but are not limited to obesity, anxiety/depression, cardiac illness, kidney disease, peripheral vascular disease, history of cancer, COPD, dysmetabolic syndrome including but not limited to diabetes, hyperlipidemia, hypertension. Patient is being referred back to primary care provider for medical optimization, as well as other appropriate specialists as needed for optimization prior to spine surgery. \par

## 2020-02-28 NOTE — HISTORY OF PRESENT ILLNESS
[de-identified] : 75 y/o M with hx of DM, s/p evacuation of seroma, lumbar irrigation and excisional debridement. DOS: 1/2/19. Pt is s/p revision laminectomy and fusion T10-L1. 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 DOS: 11/28/18. He is s/p R THR done by Dr. Keane in 2014 and wants to know if his leg pain and weakness if hip related or related to his spine. He presents ambulating in a wheelchair.  [Ataxia] : no ataxia [Incontinence] : no incontinence [Urinary Ret.] : no urinary retention [Loss of Dexterity] : good dexterity

## 2020-02-28 NOTE — PHYSICAL EXAM
[Obese] : obese [Poor Appearance] : well-appearing [Acute Distress] : not in acute distress [de-identified] : CONSTITUTIONAL: The patient is a very pleasant individual who is well-nourished and who appears stated age. Presents in a wheelchair.\par PSYCHIATRIC: The patient is alert and oriented X 3 and in no apparent distress, and participates with orthopedic evaluation well.\par HEAD: Atraumatic and is nonsyndromic in appearance.\par EENT: No visible thyromegaly, EOMI.\par RESPIRATORY: Respiratory rate is regular, not dyspneic on examination.\par LYMPHATICS: There is no inguinal lymphadenopathy\par INTEGUMENTARY: Skin is clean, dry, and intact about the bilateral lower extremities and lumbar spine.\par VASCULAR: There is brisk capillary refill about the bilateral lower extremities.\par NEUROLOGIC: There are no pathologic reflexes. There is no decrease in sensation of the bilateral lower extremities on Wartenberg pinwheel examination. Deep tendon reflexes are well maintained at 2+/4 of the bilateral lower extremities and are symmetric.\par MUSCULOSKELETAL: Range of motion of lumbar spine is well maintained. Negative tension sign and straight leg raise bilaterally. Quad extension, ankle dorsiflexion, EHL, plantar flexion, and ankle eversion are well preserved.\par LE motor strength is essentially 0/5.- unchanged from prior to surgical state.  [de-identified] : Xray of the lumbar spine taken today shows s/p thoracolumbar fusion essentially unchanged from last x ray in October.

## 2020-05-27 ENCOUNTER — APPOINTMENT (OUTPATIENT)
Dept: ORTHOPEDIC SURGERY | Facility: CLINIC | Age: 75
End: 2020-05-27
Payer: MEDICARE

## 2020-05-27 VITALS
WEIGHT: 173 LBS | HEIGHT: 67.5 IN | HEART RATE: 103 BPM | DIASTOLIC BLOOD PRESSURE: 74 MMHG | BODY MASS INDEX: 26.84 KG/M2 | SYSTOLIC BLOOD PRESSURE: 128 MMHG

## 2020-05-27 DIAGNOSIS — Z47.1 AFTERCARE FOLLOWING JOINT REPLACEMENT SURGERY: ICD-10-CM

## 2020-05-27 PROCEDURE — 99214 OFFICE O/P EST MOD 30 MIN: CPT

## 2020-05-27 PROCEDURE — 73502 X-RAY EXAM HIP UNI 2-3 VIEWS: CPT | Mod: RT

## 2020-05-27 NOTE — PHYSICAL EXAM
[de-identified] : General: obese, but well-appearing and not in acute distress. \par Gait: ambulates with walker, but normal. \par GENERAL APPEARANCE: Well nourished and hydrated, pleasant, alert, and oriented x 3. Appears their stated age. \par HEENT: Normocephalic, extraocular eye motion intact. Nasal septum midline. Oral cavity clear. External auditory canal clear. \par RESPIRATORY: Breath sounds clear and audible in all lobes. No wheezing, No accessory muscle use.\par CARDIOVASCULAR: No apparent abnormalities. No lower leg edema. No varicosities. Pedal pulses are palpable.\par NEUROLOGIC: Sensation is normal, no muscle weakness in the upper or lower extremities.\par DERMATOLOGIC: No apparent skin lesions, moist, warm, no rash.\par SPINE: Cervical spine appears normal and moves freely; thoracic spine appears normal and moves freely; lumbosacral spine appears normal and moves freely, normal, nontender.\par MUSCULOSKELETAL: Hands, wrists, and elbows are normal and move freely, shoulders are normal and move freely.\par \par Right hip exam shows healed incision and passive ROM without pain \par He has marked weakness in both lower extremities with the inability to actively extend at the knee.\par He is in a wheelchair and currently unable to ambulate\par  [de-identified] : X-rays of the right total hip arthroplasty show the implants in good position no sign of wear or subsidence or loosening.  no dislocation\par  [Wheelchair] : uses a wheelchair

## 2020-05-27 NOTE — HISTORY OF PRESENT ILLNESS
[0] : a current pain level of 0/10 [de-identified] : TIMUR is a 74 year old male who presents today for followup evaluation of left hip pain.\par pt had left EMMA DOS 2/11/2014\par Pt has been having trouble with walking due to B/L LE weakness\par he has extensive hx of lower back surgery and myelopathy.\par  \par he uses wheel chair for mobility and need assistant for transfer.\par pt is currently in PT and trying to gain ability to walk again \par the PT noted his right leg was turning inward, so sent to us for evaluation of dislocation\par \par pt denies pain in the hip

## 2020-05-27 NOTE — RETURN TO WORK/SCHOOL
[FreeTextEntry1] : The patient is a 74-year-old male status post right total hip arthroplasty.  He can pursue physical therapy without restrictions from the standpoint of his right hip.  We evaluated his right hip today there is no dislocation.  The weakness and the contracture is secondary to his myelopathy.  He can use any machines that you feel could facilitate his ability to return to ambulatory status.

## 2020-05-27 NOTE — DISCUSSION/SUMMARY
[de-identified] :  this is a 74-year-old male with severe myelopathy.  The patient's been unable to ambulate and is trying to pursue physical therapy but they had concern because of his contracture in the right lower leg that he may have developed a posterior lateral hip dislocation.  his x-ray reveals no dislocation implants are in good alignment. we cleared him for return to physical therapy. \par f/u PRN

## 2020-06-04 ENCOUNTER — APPOINTMENT (OUTPATIENT)
Dept: PHYSICAL MEDICINE AND REHAB | Facility: CLINIC | Age: 75
End: 2020-06-04
Payer: MEDICARE

## 2020-06-04 VITALS
BODY MASS INDEX: 26.84 KG/M2 | DIASTOLIC BLOOD PRESSURE: 74 MMHG | SYSTOLIC BLOOD PRESSURE: 116 MMHG | TEMPERATURE: 98.2 F | WEIGHT: 173 LBS | HEIGHT: 67.5 IN

## 2020-06-04 DIAGNOSIS — M86.60 OTHER CHRONIC OSTEOMYELITIS, UNSPECIFIED SITE: ICD-10-CM

## 2020-06-04 DIAGNOSIS — Z78.9 OTHER REDUCED MOBILITY: ICD-10-CM

## 2020-06-04 DIAGNOSIS — Z74.09 OTHER REDUCED MOBILITY: ICD-10-CM

## 2020-06-04 DIAGNOSIS — M41.9 SCOLIOSIS, UNSPECIFIED: ICD-10-CM

## 2020-06-04 PROCEDURE — 99214 OFFICE O/P EST MOD 30 MIN: CPT

## 2020-06-04 NOTE — PHYSICAL EXAM
[de-identified] : No respiratory distress [Normal] : Oriented to person, place, and time, insight and judgement were intact and the affect was normal [de-identified] : Thoracolumbar scoliosis, Left pelvic obliquity, BLE paraparesis with decreased PROM, BUE - no focal deficits [de-identified] : BLE edema [de-identified] : Nondistended

## 2020-06-04 NOTE — HISTORY OF PRESENT ILLNESS
[FreeTextEntry1] : 74M presents for a WC evaluation. He has been non-ambulatory for over a year related to a right foot ulceration with osteomyelitis and being non-weight bearing. Patient also has had back surgery to his thoracic spine and also suffers from scoliosis. \par \par His primary mode of ambulation and mobility is a WC. His current WC is not custom and is causing significant pain. It also does not assist with maintaining an appropriate posture for sitting and performing ADLs. He is unable to utilize other forms of assistive devices due to his aforementioned conditions.

## 2020-06-04 NOTE — REVIEW OF SYSTEMS
[Patient Intake Form Reviewed] : Patient intake form was reviewed [Fatigue] : fatigue [Joint Pain] : joint pain [Vision Problems] : vision problems [Muscle Pain] : muscle pain [Joint Stiffness] : joint stiffness [Difficulty Walking] : difficulty walking [Muscle Weakness] : muscle weakness [Skin Wound] : skin wound [Negative] : Heme/Lymph

## 2021-01-28 ENCOUNTER — APPOINTMENT (OUTPATIENT)
Dept: ORTHOPEDIC SURGERY | Facility: CLINIC | Age: 76
End: 2021-01-28
Payer: MEDICARE

## 2021-01-28 VITALS
HEART RATE: 81 BPM | WEIGHT: 175 LBS | SYSTOLIC BLOOD PRESSURE: 128 MMHG | BODY MASS INDEX: 26.52 KG/M2 | DIASTOLIC BLOOD PRESSURE: 69 MMHG | HEIGHT: 68 IN

## 2021-01-28 DIAGNOSIS — I25.10 ATHEROSCLEROTIC HEART DISEASE OF NATIVE CORONARY ARTERY W/OUT ANGINA PECTORIS: ICD-10-CM

## 2021-01-28 DIAGNOSIS — E11.9 TYPE 2 DIABETES MELLITUS W/OUT COMPLICATIONS: ICD-10-CM

## 2021-01-28 PROCEDURE — 72080 X-RAY EXAM THORACOLMB 2/> VW: CPT

## 2021-01-28 PROCEDURE — 99215 OFFICE O/P EST HI 40 MIN: CPT

## 2021-01-28 NOTE — PHYSICAL EXAM
[Obese] : not obese [Poor Appearance] : well-appearing [Acute Distress] : not in acute distress [de-identified] : CONSTITUTIONAL: The patient is a very pleasant individual who is well-nourished and who appears stated age.\par PSYCHIATRIC: The patient is alert and oriented X 3 and in no apparent distress, and participates with orthopedic evaluation well.\par HEAD: Atraumatic and is nonsyndromic in appearance.\par EENT: No visible thyromegaly, EOMI.\par RESPIRATORY: Respiratory rate is regular, not dyspneic on examination.\par LYMPHATICS: There is no inguinal lymphadenopathy\par INTEGUMENTARY: Skin is clean, dry, and intact about the bilateral lower extremities and lumbar spine.\par VASCULAR: There is brisk capillary refill about the bilateral lower extremities.\par NEUROLOGIC: There are no pathologic reflexes. There is no decrease in sensation of the bilateral lower extremities on Wartenberg pinwheel examination. Deep tendon reflexes are well maintained at 2+/4 of the bilateral lower extremities and are symmetric.\par MUSCULOSKELETAL: There is no visible muscular atrophy. Manual motor strength is well maintained in the bilateral lower extremities. Range of motion of lumbar spine is well maintained. The patient ambulates in a non-myelopathic manner. Negative tension sign and straight leg raise bilaterally. Quad extension, ankle dorsiflexion, EHL, plantar flexion, and ankle eversion are well preserved. Normal secondary orthopaedic exam of bilateral hips, greater trochanteric area, knees and ankles. Sign and symptoms of neurogenic claudication  [de-identified] : X-rays of the thoracolumbar spine show well-positioned thoracolumbar instrumentation.

## 2021-01-28 NOTE — DISCUSSION/SUMMARY
[de-identified] : An MRI has been ordered and is medically necessary due to persistent pain, failure of conservative measures, to ensure no worsening cervical stenosis, and for possible surgeries to address his cervical myelopathy. MRI will help guide treatment plan, possible surgical intervention vs injection therapy with pain management. The patient will follow up after the MRI results have been obtained. \par \par Based on his wheelchair bound state I believe a lot of his pain to be UE complaints. \par \par Patient with multiple medical comorbidity increasing the risk of perioperative and postoperative complications as well as diminished spine outcomes as per the current medical literature. These include but are not limited to obesity, anxiety/depression, cardiac illness, kidney disease, peripheral vascular disease, history of cancer, COPD, dysmetabolic syndrome including but not limited to diabetes, hyperlipidemia, hypertension. Patient is being referred back to primary care provider for medical optimization, as well as other appropriate specialists as needed for optimization prior to spine surgery. \par \par \par Cervical x-rays will be obtained at next clinical assessment

## 2021-01-28 NOTE — DISCUSSION/SUMMARY
[de-identified] : An MRI has been ordered and is medically necessary due to persistent pain, failure of conservative measures, to ensure no worsening cervical stenosis, and for possible surgeries to address his cervical myelopathy. MRI will help guide treatment plan, possible surgical intervention vs injection therapy with pain management. The patient will follow up after the MRI results have been obtained. \par \par Based on his wheelchair bound state I believe a lot of his pain to be UE complaints. \par \par Patient with multiple medical comorbidity increasing the risk of perioperative and postoperative complications as well as diminished spine outcomes as per the current medical literature. These include but are not limited to obesity, anxiety/depression, cardiac illness, kidney disease, peripheral vascular disease, history of cancer, COPD, dysmetabolic syndrome including but not limited to diabetes, hyperlipidemia, hypertension. Patient is being referred back to primary care provider for medical optimization, as well as other appropriate specialists as needed for optimization prior to spine surgery. \par \par \par Cervical x-rays will be obtained at next clinical assessment

## 2021-01-28 NOTE — PHYSICAL EXAM
[Obese] : not obese [Poor Appearance] : well-appearing [Acute Distress] : not in acute distress [de-identified] : CONSTITUTIONAL: The patient is a very pleasant individual who is well-nourished and who appears stated age.\par PSYCHIATRIC: The patient is alert and oriented X 3 and in no apparent distress, and participates with orthopedic evaluation well.\par HEAD: Atraumatic and is nonsyndromic in appearance.\par EENT: No visible thyromegaly, EOMI.\par RESPIRATORY: Respiratory rate is regular, not dyspneic on examination.\par LYMPHATICS: There is no inguinal lymphadenopathy\par INTEGUMENTARY: Skin is clean, dry, and intact about the bilateral lower extremities and lumbar spine.\par VASCULAR: There is brisk capillary refill about the bilateral lower extremities.\par NEUROLOGIC: There are no pathologic reflexes. There is no decrease in sensation of the bilateral lower extremities on Wartenberg pinwheel examination. Deep tendon reflexes are well maintained at 2+/4 of the bilateral lower extremities and are symmetric.\par MUSCULOSKELETAL: There is no visible muscular atrophy. Manual motor strength is well maintained in the bilateral lower extremities. Range of motion of lumbar spine is well maintained. The patient ambulates in a non-myelopathic manner. Negative tension sign and straight leg raise bilaterally. Quad extension, ankle dorsiflexion, EHL, plantar flexion, and ankle eversion are well preserved. Normal secondary orthopaedic exam of bilateral hips, greater trochanteric area, knees and ankles. Sign and symptoms of neurogenic claudication  [de-identified] : X-rays of the thoracolumbar spine show well-positioned thoracolumbar instrumentation.

## 2021-01-28 NOTE — HISTORY OF PRESENT ILLNESS
[de-identified] : 75 year old M Presents for follow up evaluation of thoracic myelopathy, patient is S/p decompression surgery and is in a wheelchair secondary to his thoracic myelopathy.  [Ataxia] : no ataxia [Incontinence] : no incontinence [Loss of Dexterity] : good dexterity [Urinary Ret.] : no urinary retention

## 2021-01-28 NOTE — ADDENDUM
[FreeTextEntry1] : Documented by Blaze Blackwell acting as a scribe for Dr. Ollie Gray on 01/28/2021. All medical record entries made by the Scribe were at my, Dr. Ollie Gray, direction and personally dictated by me on 01/28/2021 . I have reviewed the chart and agree that the record accurately reflects my personal performance of the history, physical exam, assessment and plan. I have also personally directed, reviewed, and agreed with the chart.

## 2021-01-28 NOTE — HISTORY OF PRESENT ILLNESS
[de-identified] : 75 year old M Presents for follow up evaluation of thoracic myelopathy, patient is S/p decompression surgery and is in a wheelchair secondary to his thoracic myelopathy.  [Ataxia] : no ataxia [Incontinence] : no incontinence [Loss of Dexterity] : good dexterity [Urinary Ret.] : no urinary retention

## 2021-02-09 ENCOUNTER — APPOINTMENT (OUTPATIENT)
Dept: MRI IMAGING | Facility: CLINIC | Age: 76
End: 2021-02-09
Payer: MEDICARE

## 2021-02-09 ENCOUNTER — OUTPATIENT (OUTPATIENT)
Dept: OUTPATIENT SERVICES | Facility: HOSPITAL | Age: 76
LOS: 1 days | End: 2021-02-09

## 2021-02-09 DIAGNOSIS — Z96.60 PRESENCE OF UNSPECIFIED ORTHOPEDIC JOINT IMPLANT: Chronic | ICD-10-CM

## 2021-02-09 DIAGNOSIS — Z98.89 OTHER SPECIFIED POSTPROCEDURAL STATES: Chronic | ICD-10-CM

## 2021-02-09 DIAGNOSIS — M47.14 OTHER SPONDYLOSIS WITH MYELOPATHY, THORACIC REGION: ICD-10-CM

## 2021-02-09 PROCEDURE — 72141 MRI NECK SPINE W/O DYE: CPT | Mod: 26

## 2021-03-04 ENCOUNTER — APPOINTMENT (OUTPATIENT)
Dept: ORTHOPEDIC SURGERY | Facility: CLINIC | Age: 76
End: 2021-03-04
Payer: MEDICARE

## 2021-03-04 VITALS
HEART RATE: 75 BPM | SYSTOLIC BLOOD PRESSURE: 123 MMHG | DIASTOLIC BLOOD PRESSURE: 68 MMHG | BODY MASS INDEX: 26.52 KG/M2 | HEIGHT: 68 IN | WEIGHT: 175 LBS

## 2021-03-04 DIAGNOSIS — M48.061 SPINAL STENOSIS, LUMBAR REGION WITHOUT NEUROGENIC CLAUDICATION: ICD-10-CM

## 2021-03-04 DIAGNOSIS — Z80.9 FAMILY HISTORY OF MALIGNANT NEOPLASM, UNSPECIFIED: ICD-10-CM

## 2021-03-04 DIAGNOSIS — Z83.3 FAMILY HISTORY OF DIABETES MELLITUS: ICD-10-CM

## 2021-03-04 DIAGNOSIS — R26.9 UNSPECIFIED ABNORMALITIES OF GAIT AND MOBILITY: ICD-10-CM

## 2021-03-04 PROCEDURE — 99213 OFFICE O/P EST LOW 20 MIN: CPT

## 2021-03-04 PROCEDURE — 99215 OFFICE O/P EST HI 40 MIN: CPT

## 2021-03-04 PROCEDURE — 72040 X-RAY EXAM NECK SPINE 2-3 VW: CPT

## 2021-03-04 NOTE — ADDENDUM
[FreeTextEntry1] : Documented by Blaze Blackwell acting as a scribe for Dr. Ollie Gray on 03/04/2021. All medical record entries made by the Scribe were at my, Dr. Ollie Gray, direction and personally dictated by me on 03/04/2021 . I have reviewed the chart and agree that the record accurately reflects my personal performance of the history, physical exam, assessment and plan. I have also personally directed, reviewed, and agreed with the chart.

## 2021-03-04 NOTE — HISTORY OF PRESENT ILLNESS
[de-identified] : 75 year old M Presents with a cervical MRI, she is having worsening UE paresthesias. She is wheelchair bound due to thoracic myelopathy and lumbar stenosis. No acute changes.  [Ataxia] : no ataxia [Incontinence] : no incontinence [Loss of Dexterity] : good dexterity [Urinary Ret.] : no urinary retention

## 2021-03-04 NOTE — DISCUSSION/SUMMARY
[de-identified] : Patient with multiple medical comorbidity increasing the risk of perioperative and postoperative complications as well as diminished spine outcomes as per the current medical literature.  These include but are not limited to obesity, anxiety/depression, cardiac illness, kidney disease, peripheral vascular disease, history of cancer, COPD, dysmetabolic syndrome including but not limited to diabetes, hyperlipidemia, hypertension.  Patient is being referred back to primary care provider for medical optimization, as well as other appropriate specialists as needed for optimization prior to spine surgery. \par \par Based on no myelopathy changes I would not recommend a lumbar laminectomy and fusion. He has a complex history of a incomplete spinal injury. I have recommended that the patient continue with a conservative treatment plan. The patient will follow up in 6 months for a repeat clinical assessment. I will only operate if his myelomalacia worsens and there are imaging  indications.

## 2021-03-04 NOTE — PHYSICAL EXAM
[Poor Appearance] : well-appearing [Acute Distress] : not in acute distress [Obese] : not obese [de-identified] : CONSTITUTIONAL: Patient is a very pleasant individual who is well-nourished and appears stated age. \par PSYCHIATRIC: Alert and oriented times three and in no apparent distress, and participates with orthopedic evaluation well.\par HEAD: Atraumatic and nonsyndromic in appearance.\par EENT: No thyromegaly, EOMI.\par RESPIRATORY: Respiratory rate is regular, not dyspneic on examination.\par LYMPHATICS: There is no cervical or axillary lymphadenopathy.\par INTEGUMENTARY: Skin is clean, dry, and intact about the bilateral upper extremities and cervical spine. \par VASCULAR: There is brisk capillary refill about the bilateral upper extremities and radial pulses are 2/4. \par NEUROLOGIC: Negative L'hirmitte, negative Spurling’s sign. Deep tendon reflexes are well-maintained at +2/4 of the bilateral upper extremities and are symmetric. No Gi's, LE hyperreflexia which may be stemming from thoracic myelopathy. \par MUSCULOSKELETAL: There is no visible muscular atrophy.  Normal secondary orthopaedic exam of bilateral shoulders, elbows and hands. Elbow flexion and extension, wrist extension, finger flexion and abduction are well maintained. , biceps, and triceps is 4 - 4+ /5  [de-identified] : MRI of the cervical spine taken at [facility] demonstrates congenital cervical stenosis, no katie myelomalacia changes. \par \par x-ray of the cervical spine is reviewed it shows significant anteriorDISH

## 2021-03-25 NOTE — PROGRESS NOTE ADULT - PROBLEM/PLAN-2
DISPLAY PLAN FREE TEXT Scribe Attestation (For Scribes USE Only)... Attending Attestation (For Attendings USE Only).../Scribe Attestation (For Scribes USE Only)...

## 2021-04-07 NOTE — DISCHARGE NOTE ADULT - NURSING SECTION COMPLETE
The left coronary artery was selectively engaged and injected. Multiple views of the injected vessel were taken.  Patient/Caregiver provided printed discharge information.

## 2021-08-20 ENCOUNTER — APPOINTMENT (OUTPATIENT)
Dept: ORTHOPEDIC SURGERY | Facility: CLINIC | Age: 76
End: 2021-08-20
Payer: MEDICARE

## 2021-08-20 VITALS
WEIGHT: 175 LBS | DIASTOLIC BLOOD PRESSURE: 56 MMHG | BODY MASS INDEX: 26.52 KG/M2 | HEIGHT: 68 IN | SYSTOLIC BLOOD PRESSURE: 104 MMHG | HEART RATE: 88 BPM

## 2021-08-20 DIAGNOSIS — M47.14 OTHER SPONDYLOSIS WITH MYELOPATHY, THORACIC REGION: ICD-10-CM

## 2021-08-20 PROCEDURE — 99215 OFFICE O/P EST HI 40 MIN: CPT

## 2021-08-20 RX ORDER — GABAPENTIN 100 MG/1
100 CAPSULE ORAL
Qty: 90 | Refills: 1 | Status: ACTIVE | COMMUNITY
Start: 2021-08-20 | End: 1900-01-01

## 2021-08-20 NOTE — PHYSICAL EXAM
[Poor Appearance] : well-appearing [Acute Distress] : not in acute distress [Obese] : not obese [de-identified] : CONSTITUTIONAL: Patient is a very pleasant individual who is well-nourished and appears stated age. \par PSYCHIATRIC: Alert and oriented times three and in no apparent distress, and participates with orthopedic evaluation well.\par HEAD: Atraumatic and nonsyndromic in appearance.\par EENT: No thyromegaly, EOMI.\par RESPIRATORY: Respiratory rate is regular, not dyspneic on examination.\par LYMPHATICS: There is no cervical or axillary lymphadenopathy.\par INTEGUMENTARY: Skin is clean, dry, and intact about the bilateral upper extremities and cervical spine. \par VASCULAR: There is brisk capillary refill about the bilateral upper extremities and radial pulses are 2/4. \par NEUROLOGIC: Negative L'hirmitte, negative Spurling’s sign. Deep tendon reflexes are well-maintained at +2/4 of the bilateral upper extremities and are symmetric. No Gi's, LE hyperreflexia which may be stemming from thoracic myelopathy. \par MUSCULOSKELETAL: There is no visible muscular atrophy.  Normal secondary orthopaedic exam of bilateral shoulders, elbows and hands. Elbow flexion and extension, wrist extension, finger flexion and abduction are well maintained. , biceps, and triceps is 4 - 4+ /5  [de-identified] : MRI of the cervical spine taken at [facility] demonstrates congenital cervical stenosis, no katie myelomalacia changes. \par \par Xray of a cervical spine taken 03- demonstrates DISH

## 2021-08-20 NOTE — ADDENDUM
[FreeTextEntry1] : Documented by Blaze Blackwell acting as a scribe for Dr. Ollie Gray on 08/20/2021. All medical record entries made by the Scribe were at my, Dr. Ollie Gray, direction and personally dictated by me on 08/20/2021 . I have reviewed the chart and agree that the record accurately reflects my personal performance of the history, physical exam, assessment and plan. I have also personally directed, reviewed, and agreed with the chart.

## 2021-08-20 NOTE — DISCUSSION/SUMMARY
[de-identified] : We talked about the nature of the condition and treatment options. Anticipatory guidance regarding thoracic myelopathy was given. Patient will be provided Gabapentin 100MG TID. Patient has been referred to physical therapy for decreased pain modalities, stretching and strengthening modalities, soft tissue modalities, and physical modalities. The patient will follow up in 6 months for a repeat clinical assessment. \par \par Prior to appointment and during encounter with patient extensive medical records were reviewed including but not limited to, hospital records, out patient records, imaging results, and lab data. During this appointment the patient was examined, diagnoses were discussed and explained in a face to face manner. In addition extensive time was spent reviewing aforementioned diagnostic studies. Counseling including abnormal image results, differential diagnoses, treatment options, risk and benefits, lifestyle changes, current condition, and current medications was performed. Patient's comments, questions, and concerns were address and patient verbalized understanding. Based on this patient's presentation at our office, which is an orthopedic spine surgeon's office, this patient inherently / intrinsically has a risk, however minute, of developing  issues such as Cauda equina syndrome, bowel and bladder changes, or progression of motor or neurological deficits such as paralysis which may be permanent. \par \par Patient with multiple medical comorbidity increasing the risk of perioperative and postoperative complications as well as diminished spine outcomes as per the current medical literature.  These include but are not limited to obesity, anxiety/depression, cardiac illness, kidney disease, peripheral vascular disease, history of cancer, COPD, dysmetabolic syndrome including but not limited to diabetes, hyperlipidemia, hypertension.  Patient is being referred back to primary care provider for medical optimization, as well as other appropriate specialists as needed for optimization prior to spine surgery.

## 2021-08-20 NOTE — HISTORY OF PRESENT ILLNESS
[de-identified] : 75 year old M Presents for evaluation of chronic myelopathic changes. Presents with his care giver and states he is stable. We discussed his LE weakness that I do not think is going to improve at this point in time. He is wheelchair bound due to thoracic myelopathy and lumbar stenosis. No acute changes.  [Ataxia] : no ataxia [Incontinence] : no incontinence [Loss of Dexterity] : good dexterity [Urinary Ret.] : no urinary retention

## 2021-09-17 ENCOUNTER — APPOINTMENT (OUTPATIENT)
Dept: ORTHOPEDIC SURGERY | Facility: CLINIC | Age: 76
End: 2021-09-17
Payer: MEDICARE

## 2021-09-17 VITALS
HEIGHT: 68 IN | HEART RATE: 105 BPM | DIASTOLIC BLOOD PRESSURE: 63 MMHG | WEIGHT: 175 LBS | SYSTOLIC BLOOD PRESSURE: 120 MMHG | BODY MASS INDEX: 26.52 KG/M2

## 2021-09-17 DIAGNOSIS — M54.12 RADICULOPATHY, CERVICAL REGION: ICD-10-CM

## 2021-09-17 DIAGNOSIS — Z86.39 PERSONAL HISTORY OF OTHER ENDOCRINE, NUTRITIONAL AND METABOLIC DISEASE: ICD-10-CM

## 2021-09-17 DIAGNOSIS — S24.153A: ICD-10-CM

## 2021-09-17 DIAGNOSIS — M54.2 CERVICALGIA: ICD-10-CM

## 2021-09-17 PROCEDURE — 99214 OFFICE O/P EST MOD 30 MIN: CPT

## 2021-09-17 NOTE — DISCUSSION/SUMMARY
[de-identified] : We talked about the nature of the condition and treatment options. Anticipatory guidance regarding Posterior cervical myositis was given. Patient has been referred to physical therapy for decreased pain modalities, cervical stretching, soft tissue modalities, and physical modalities. We also spoke about the benefits of using heat, application of ice to the area 2-3x daily for 20 minutes after periods of activity, and Bengay cream. Patient has been referred to physical therapy for decreased pain modalities, cervical stretching, soft tissue modalities, and physical modalities. If these fail we can consider pain management referral. We also discussed depression secondary to chronic pain, follow up with primary care for depression screenings. The patient will follow up in 6 months for a repeat clinical assessment. \par \par Prior to appointment and during encounter with patient extensive medical records were reviewed including but not limited to, hospital records, out patient records, imaging results, and lab data. During this appointment the patient was examined, diagnoses were discussed and explained in a face to face manner. In addition extensive time was spent reviewing aforementioned diagnostic studies. Counseling including abnormal image results, differential diagnoses, treatment options, risk and benefits, lifestyle changes, current condition, and current medications was performed. Patient's comments, questions, and concerns were address and patient verbalized understanding. Based on this patient's presentation at our office, which is an orthopedic spine surgeon's office, this patient inherently / intrinsically has a risk, however minute, of developing  issues such as Cauda equina syndrome, bowel and bladder changes, or progression of motor or neurological deficits such as paralysis which may be permanent.

## 2021-09-17 NOTE — PHYSICAL EXAM
[Poor Appearance] : well-appearing [Acute Distress] : not in acute distress [Obese] : not obese [de-identified] : CONSTITUTIONAL: Patient is a very pleasant individual who is well-nourished and appears stated age. \par PSYCHIATRIC: Alert and oriented times three and in no apparent distress, and participates with orthopedic evaluation well.\par HEAD: Atraumatic and nonsyndromic in appearance.\par EENT: No thyromegaly, EOMI.\par RESPIRATORY: Respiratory rate is regular, not dyspneic on examination.\par LYMPHATICS: There is no cervical or axillary lymphadenopathy.\par INTEGUMENTARY: Skin is clean, dry, and intact about the bilateral upper extremities and cervical spine. \par VASCULAR: There is brisk capillary refill about the bilateral upper extremities and radial pulses are 2/4. \par NEUROLOGIC: Negative L'hirmitte, negative Spurling’s sign. Deep tendon reflexes are well-maintained at +2/4 of the bilateral upper extremities and are symmetric. No Gi's, LE hyperreflexia which may be stemming from thoracic myelopathy. \par MUSCULOSKELETAL: There is no visible muscular atrophy.  Normal secondary orthopaedic exam of bilateral shoulders, elbows and hands. Elbow flexion and extension, wrist extension, finger flexion and abduction are well maintained. , biceps, and triceps is 4 - 4+ /5. Posterior cervical myositis [de-identified] : MRI of the cervical spine taken at [facility] demonstrates congenital cervical stenosis, no katie myelomalacia changes. \par \par Xray of a cervical spine taken 03- demonstrates DISH

## 2021-09-17 NOTE — HISTORY OF PRESENT ILLNESS
[de-identified] : 76 year old M Presents for follow up evaluation of an acute exacerbation of chronic cervical pain. He is inquiring about PT.  [Ataxia] : no ataxia [Incontinence] : no incontinence [Loss of Dexterity] : good dexterity [Urinary Ret.] : no urinary retention

## 2021-09-17 NOTE — ADDENDUM
[FreeTextEntry1] : Documented by Blaze Blackwell acting as a scribe for Dr. Ollie Gray on 09/17/2021. All medical record entries made by the Scribe were at my, Dr. Ollie Gray, direction and personally dictated by me on 09/17/2021 . I have reviewed the chart and agree that the record accurately reflects my personal performance of the history, physical exam, assessment and plan. I have also personally directed, reviewed, and agreed with the chart.

## 2021-09-20 NOTE — PROGRESS NOTE ADULT - PROBLEM/PLAN-1
The ABCs of the Annual Wellness Visit  Subsequent Medicare Wellness Visit    Chief Complaint   Patient presents with   • Medicare Wellness-subsequent     6 month check with fasting lab      Subjective    History of Present Illness:  Arelis Johnson is a 80 y.o. female who presents for a Subsequent Medicare Wellness Visit.    The following portions of the patient's history were reviewed and   updated as appropriate: allergies, current medications, past family history, past medical history, past social history, past surgical history and problem list.    Compared to one year ago, the patient feels her physical   health is the same.    Compared to one year ago, the patient feels her mental   health is the same.    Recent Hospitalizations:  She was not admitted to the hospital during the last year.       Current Medical Providers:  Patient Care Team:  Amanda Felix APRN as PCP - General (Family Medicine)  Heriberto Singer MD as Consulting Physician (Orthopedic Surgery)  Sly Saleh MD as Consulting Physician (Cardiology)  Shashank Mckeon MD as Consulting Physician (Cardiology)    Outpatient Medications Prior to Visit   Medication Sig Dispense Refill   • Calcium Carb-Cholecalciferol (CALCIUM + D3) 600-200 MG-UNIT tablet Take 1 tablet by mouth Daily.     • dilTIAZem CD (CARDIZEM CD) 180 MG 24 hr capsule TAKE ONE CAPSULE (BY MOUTH) EACH DAY 90 capsule 3   • furosemide (LASIX) 40 MG tablet TAKE 1 TABLET BY MOUTH DAILY. 90 tablet 4   • metFORMIN ER (GLUCOPHAGE-XR) 500 MG 24 hr tablet TAKE 1 TABLET BY MOUTH DAILY WITH BREAKFAST. 90 tablet 0   • metoprolol tartrate (LOPRESSOR) 50 MG tablet Take 1 tablet by mouth 2 (Two) Times a Day. 180 tablet 3   • ramipril (ALTACE) 5 MG capsule TAKE 1 CAPSULE BY MOUTH DAILY. 90 capsule 3   • simvastatin (ZOCOR) 40 MG tablet Take 1 tablet by mouth Daily. 90 tablet 3   • warfarin (COUMADIN) 4 MG tablet Take one tablet (4 mg) by mouth on Sun, Tues, and Thurs, and take  "one-half tablet (2 mg) by mouth all other days or as directed. 65 tablet 1     No facility-administered medications prior to visit.       No opioid medication identified on active medication list. I have reviewed chart for other potential  high risk medication/s and harmful drug interactions in the elderly.          Aspirin is not on active medication list.  Aspirin use is not indicated based on review of current medical condition/s. Risk of harm outweighs potential benefits.  .    Patient Active Problem List   Diagnosis   • IFG (impaired fasting glucose)   • Hypertension   • Hyperlipidemia   • Diabetes mellitus (CMS/HCC)   • Hearing loss of right ear due to cerumen impaction   • Permanent atrial fibrillation (CMS/HCC)   • Chronic anticoagulation     Advance Care Planning  Advance Directive is not on file.  ACP discussion was held with the patient during this visit. Patient has an advance directive (not in EMR), copy requested.          Objective    Vitals:    09/20/21 0903   BP: 120/70   Pulse: 68   Resp: 22   Temp: 97.3 °F (36.3 °C)   Weight: 80.7 kg (178 lb)   Height: 165 cm (64.96\")   PainSc: 0-No pain     BMI Readings from Last 1 Encounters:   09/20/21 29.66 kg/m²   BMI is above normal parameters. Recommendations include: educational material    Does the patient have evidence of cognitive impairment? No    Physical Exam            HEALTH RISK ASSESSMENT    Smoking Status:  Social History     Tobacco Use   Smoking Status Never Smoker   Smokeless Tobacco Never Used     Alcohol Consumption:  Social History     Substance and Sexual Activity   Alcohol Use No     Fall Risk Screen:    KVNGADI Fall Risk Assessment was completed, and patient is at LOW risk for falls.Assessment completed on:9/20/2021    Depression Screening:  PHQ-2/PHQ-9 Depression Screening 9/20/2021   Little interest or pleasure in doing things 0   Feeling down, depressed, or hopeless 0   Trouble falling or staying asleep, or sleeping too much - "   Feeling tired or having little energy -   Poor appetite or overeating -   Feeling bad about yourself - or that you are a failure or have let yourself or your family down -   Trouble concentrating on things, such as reading the newspaper or watching television -   Moving or speaking so slowly that other people could have noticed. Or the opposite - being so fidgety or restless that you have been moving around a lot more than usual -   Thoughts that you would be better off dead, or of hurting yourself in some way -   Total Score 0   If you checked off any problems, how difficult have these problems made it for you to do your work, take care of things at home, or get along with other people? -       Health Habits and Functional and Cognitive Screening:  Functional & Cognitive Status 9/20/2021   Do you have difficulty preparing food and eating? No   Do you have difficulty bathing yourself, getting dressed or grooming yourself? No   Do you have difficulty using the toilet? No   Do you have difficulty moving around from place to place? No   Do you have trouble with steps or getting out of a bed or a chair? -   Current Diet Well Balanced Diet   Dental Exam Up to date   Eye Exam Up to date   Exercise (times per week) 5 times per week   Current Exercises Include Treadmill;Walking   Current Exercise Activities Include -   Do you need help using the phone?  No   Are you deaf or do you have serious difficulty hearing?  No   Do you need help with transportation? No   Do you need help shopping? No   Do you need help preparing meals?  No   Do you need help with housework?  No   Do you need help with laundry? No   Do you need help taking your medications? No   Do you need help managing money? No   Do you ever drive or ride in a car without wearing a seat belt? -   Have you felt unusual stress, anger or loneliness in the last month? No   Who do you live with? Alone   If you need help, do you have trouble finding someone available  to you? No   Have you been bothered in the last four weeks by sexual problems? No   Do you have difficulty concentrating, remembering or making decisions? No       Age-appropriate Screening Schedule:  Refer to the list below for future screening recommendations based on patient's age, sex and/or medical conditions. Orders for these recommended tests are listed in the plan section. The patient has been provided with a written plan.    Health Maintenance   Topic Date Due   • TDAP/TD VACCINES (1 - Tdap) Never done   • ZOSTER VACCINE (2 of 3) 02/26/2011   • HEMOGLOBIN A1C  09/19/2021   • DIABETIC EYE EXAM  09/23/2021   • INFLUENZA VACCINE  10/01/2021   • LIPID PANEL  03/19/2022   • URINE MICROALBUMIN  03/19/2022   • MAMMOGRAM  08/30/2023   • DXA SCAN  08/30/2023              Assessment/Plan   CMS Preventative Services Quick Reference  Risk Factors Identified During Encounter  Cardiovascular Disease  The above risks/problems have been discussed with the patient.  Follow up actions/plans if indicated are seen below in the Assessment/Plan Section.  Pertinent information has been shared with the patient in the After Visit Summary.    Diagnoses and all orders for this visit:    1. Osteopenia, unspecified location (Primary)  -     Vitamin D 25 Hydroxy    2. Essential hypertension    3. Type 2 diabetes mellitus without complication, without long-term current use of insulin (CMS/[facility])  -     POCT glycated hemoglobin, total    4. Screening for deficiency anemia  -     CBC & Differential    5. Mixed hyperlipidemia  -     Comprehensive Metabolic Panel  -     Lipid Panel        Follow Up:   No follow-ups on file.     An After Visit Summary and PPPS were made available to the patient.                DISPLAY PLAN FREE TEXT

## 2022-03-21 ENCOUNTER — INPATIENT (INPATIENT)
Facility: HOSPITAL | Age: 77
LOS: 7 days | Discharge: NURSING, REHAB OR LONG-TERM CARE | DRG: 392 | End: 2022-03-29
Attending: HOSPITALIST | Admitting: STUDENT IN AN ORGANIZED HEALTH CARE EDUCATION/TRAINING PROGRAM
Payer: MEDICARE

## 2022-03-21 ENCOUNTER — OUTPATIENT (OUTPATIENT)
Dept: OUTPATIENT SERVICES | Facility: HOSPITAL | Age: 77
LOS: 1 days | End: 2022-03-21
Payer: MEDICARE

## 2022-03-21 VITALS
WEIGHT: 167.99 LBS | TEMPERATURE: 98 F | OXYGEN SATURATION: 98 % | SYSTOLIC BLOOD PRESSURE: 152 MMHG | HEART RATE: 77 BPM | HEIGHT: 67 IN | RESPIRATION RATE: 20 BRPM | DIASTOLIC BLOOD PRESSURE: 81 MMHG

## 2022-03-21 DIAGNOSIS — Z98.89 OTHER SPECIFIED POSTPROCEDURAL STATES: Chronic | ICD-10-CM

## 2022-03-21 DIAGNOSIS — Z96.60 PRESENCE OF UNSPECIFIED ORTHOPEDIC JOINT IMPLANT: Chronic | ICD-10-CM

## 2022-03-21 DIAGNOSIS — R13.10 DYSPHAGIA, UNSPECIFIED: ICD-10-CM

## 2022-03-21 DIAGNOSIS — R13.14 DYSPHAGIA, PHARYNGOESOPHAGEAL PHASE: ICD-10-CM

## 2022-03-21 LAB
ALBUMIN SERPL ELPH-MCNC: 3.5 G/DL — SIGNIFICANT CHANGE UP (ref 3.3–5.2)
ALP SERPL-CCNC: 190 U/L — HIGH (ref 40–120)
ALT FLD-CCNC: 25 U/L — SIGNIFICANT CHANGE UP
ANION GAP SERPL CALC-SCNC: 13 MMOL/L — SIGNIFICANT CHANGE UP (ref 5–17)
APTT BLD: 37 SEC — HIGH (ref 27.5–35.5)
AST SERPL-CCNC: 43 U/L — HIGH
BILIRUB SERPL-MCNC: 0.4 MG/DL — SIGNIFICANT CHANGE UP (ref 0.4–2)
BUN SERPL-MCNC: 18.6 MG/DL — SIGNIFICANT CHANGE UP (ref 8–20)
CALCIUM SERPL-MCNC: 8.3 MG/DL — LOW (ref 8.6–10.2)
CHLORIDE SERPL-SCNC: 102 MMOL/L — SIGNIFICANT CHANGE UP (ref 98–107)
CO2 SERPL-SCNC: 25 MMOL/L — SIGNIFICANT CHANGE UP (ref 22–29)
CREAT SERPL-MCNC: 0.81 MG/DL — SIGNIFICANT CHANGE UP (ref 0.5–1.3)
EGFR: 90 ML/MIN/1.73M2 — SIGNIFICANT CHANGE UP
GLUCOSE SERPL-MCNC: 137 MG/DL — HIGH (ref 70–99)
HCT VFR BLD CALC: 34.7 % — LOW (ref 39–50)
HGB BLD-MCNC: 10.9 G/DL — LOW (ref 13–17)
INR BLD: 1.32 RATIO — HIGH (ref 0.88–1.16)
MCHC RBC-ENTMCNC: 27.9 PG — SIGNIFICANT CHANGE UP (ref 27–34)
MCHC RBC-ENTMCNC: 31.4 GM/DL — LOW (ref 32–36)
MCV RBC AUTO: 88.7 FL — SIGNIFICANT CHANGE UP (ref 80–100)
PLATELET # BLD AUTO: 290 K/UL — SIGNIFICANT CHANGE UP (ref 150–400)
POTASSIUM SERPL-MCNC: 4 MMOL/L — SIGNIFICANT CHANGE UP (ref 3.5–5.3)
POTASSIUM SERPL-SCNC: 4 MMOL/L — SIGNIFICANT CHANGE UP (ref 3.5–5.3)
PROT SERPL-MCNC: 7.4 G/DL — SIGNIFICANT CHANGE UP (ref 6.6–8.7)
PROTHROM AB SERPL-ACNC: 15.4 SEC — HIGH (ref 10.5–13.4)
RBC # BLD: 3.91 M/UL — LOW (ref 4.2–5.8)
RBC # FLD: 15.1 % — HIGH (ref 10.3–14.5)
SODIUM SERPL-SCNC: 139 MMOL/L — SIGNIFICANT CHANGE UP (ref 135–145)
WBC # BLD: 4.79 K/UL — SIGNIFICANT CHANGE UP (ref 3.8–10.5)
WBC # FLD AUTO: 4.79 K/UL — SIGNIFICANT CHANGE UP (ref 3.8–10.5)

## 2022-03-21 PROCEDURE — 74230 X-RAY XM SWLNG FUNCJ C+: CPT | Mod: 26

## 2022-03-21 PROCEDURE — 71045 X-RAY EXAM CHEST 1 VIEW: CPT | Mod: 26

## 2022-03-21 PROCEDURE — 93010 ELECTROCARDIOGRAM REPORT: CPT

## 2022-03-21 PROCEDURE — 99223 1ST HOSP IP/OBS HIGH 75: CPT

## 2022-03-21 PROCEDURE — 99285 EMERGENCY DEPT VISIT HI MDM: CPT

## 2022-03-21 RX ORDER — SODIUM CHLORIDE 9 MG/ML
1000 INJECTION, SOLUTION INTRAVENOUS ONCE
Refills: 0 | Status: COMPLETED | OUTPATIENT
Start: 2022-03-21 | End: 2022-03-21

## 2022-03-21 RX ADMIN — SODIUM CHLORIDE 250 MILLILITER(S): 9 INJECTION, SOLUTION INTRAVENOUS at 18:53

## 2022-03-21 NOTE — SWALLOW VFSS/MBS ASSESSMENT ADULT - COMMENTS
MR head 1/21/2020, "No acute intracranial hemorrhage, mass effect, vasogenic edema, or evidence of acute territorial infarct. No abnormal parenchymal or leptomeningeal enhancement".     MR cervical spine 2/9/21: "Motion degraded exam. No discrete cervical cord signal abnormality present, which is limited in assessment secondary to motion. Likely volume loss at the level of T1 extending distally. Cervical spondylosis, most notably at at C2/C3 resulting in moderate spinal canal narrowing and at C3/C4, resulting in mild to moderate spinal canal narrowing. Findings are similar in appearance when compared with prior MRI.  Likely thickening of the posterior longitudinal ligament at the level of the C3 vertebral body resulting in mild to moderate spinal canal narrowing".

## 2022-03-21 NOTE — SWALLOW VFSS/MBS ASSESSMENT ADULT - DIAGNOSTIC IMPRESSIONS
Pt presents w/moderate oral & severe pharyngeal dysphagia. Oral stage characterized by functional PO acceptance, however decreased lingual strength (w/intermittent lingual pumping), resulting in inadequate bolus formation/cohesion/propulsion, w/subsequent min-mod increased oral transit time. Consistent premature pharyngeal entry noted variably between the mic & hypopharynx, more consistently to the pyriforms w/moderately thick liquids. No anterior loss or oral stasis visualized.    Pharyngeal phase of swallow marked by reduced contractility, decreased hyo-laryngeal elevation/excursion, incomplete epiglottic deflection and reduced upper/lower airway protection (which is not facilitated w/either compensatory postures or increasingly viscous consistencies). Moderate valleculae residue noted across trials, & trace-min on the posterior pharyngeal wall & in the pyriforms. Residue reduced with cued & independent subsequent dry swallow x 2-3. +Silent aspiration of soft & bite-sized & regular solids head neutral. Additional +silent aspiration of moderately thick liquids via tsp via chin tuck posture. +Aspiration with sensation of puree w/chin tuck. +Penetration contacting the vocal cords without clearance of puree w/L head turn & of moderately thick liquids via tsp w/R head turn. +Penetration above the cords without clearance puree head neutral/R head turn, & moderately thick liquids via tsp head neutral/L head turn. All penetration/aspiration intermittently visualized during/following the swallow w/all consistencies provided. Pt with inconsistent ability to clear penetrated/aspirated material from laryngeal vestibule despite expectoration.

## 2022-03-21 NOTE — H&P ADULT - HISTORY OF PRESENT ILLNESS
77 yo male pmh h/o back surgery, alcohol abuse sent in after abnormal barium swallow for increased risk of aspiration ; case discussed with Dr Mayorga of ent who is covering Dr Polk and recommends admission for neurology and gastroenterology evaluation pt. is a 75 yo male pmh h/o back surgery ( LS in 2016 and Thoracic spine in 2018 and after thoracic spine surgery has been in the wheel chair )  alcohol abuse stopped using alcohol 15 years ago, htn, bph , hypothyroidism who follows with ENT Dr. Boudreaux for his routine ENT visits and mentioned diffculty swallowing to both liquid and solid ongoing for more thana year as per pt's wife, so ENT did scope in the office, none was found as per pt's wife so he was sent for modified bairum swallow which was abnormal and pt. was sent by ENT to the ER for further plan. pt's wife stated that he slowly swallows his pills and food does not go down, may be some goes and rest he brings up. no cp, no sob. no n/v/d. no abd. pain, no cough, no fever.  pt. is a 75 yo male pmh h/o back surgery ( LS in 2016 and Thoracic spine in 2018 and after thoracic spine surgery has been in the wheel chair )  alcohol abuse stopped using alcohol 15 years ago, htn, bph , hypothyroidism who follows with ENT Dr. Boudreaux for his routine ENT visits and mentioned diffculty swallowing to both liquid and solid ongoing for more thana year as per pt's wife, so ENT did scope in the office, none was found as per pt's wife so he was sent for modified bairum swallow which was abnormal and pt. was sent by ENT to the ER for further plan. pt's wife stated that he slowly swallows his pills and food does not go down, may be some goes and rest he brings up. no cp, no sob. no n/v/d. no abd. pain, no cough, no fever. has left hell non healing ulcer for long time.

## 2022-03-21 NOTE — H&P ADULT - NSICDXFAMILYHX_GEN_ALL_CORE_FT
FAMILY HISTORY:  Father  Still living? No  FH: alcoholism, Age at diagnosis: Age Unknown    Mother  Still living? Unknown  Family history of diabetes mellitus (DM), Age at diagnosis: Age Unknown

## 2022-03-21 NOTE — SWALLOW VFSS/MBS ASSESSMENT ADULT - UNSUCCESSFUL STRATEGIES TRIALED DURING PROCEDURE
chin tuck/hard swallow/head turn to the right/head turn to the left/productive volitional cough following clinician cue chin tuck/head turn to the right/head turn to the left/productive volitional cough following clinician cue

## 2022-03-21 NOTE — SWALLOW VFSS/MBS ASSESSMENT ADULT - ORAL PHASE
Delayed oral transit time/Reduced anterior - posterior transport/Uncontrolled bolus / spillover in mic-pharynx/Uncontrolled bolus / spillover in hypopharynx Within functional limits/Delayed oral transit time/Reduced anterior - posterior transport/Uncontrolled bolus / spillover in hypopharynx

## 2022-03-21 NOTE — H&P ADULT - ASSESSMENT
pt. is a 75 yo male pmh h/o back surgery ( LS in 2016 and Thoracic spine in 2018 and after thoracic spine surgery has been in the wheel chair )  alcohol abuse stopped using alcohol 15 years ago, htn, bph , hypothyroidism who follows with ENT Dr. Boudreaux for his routine ENT visits and mentioned diffculty swallowing to both liquid and solid ongoing for more thana year as per pt's wife, so ENT did scope in the office, none was found as per pt's wife so he was sent for modified bairum swallow which was abnormal and pt. was sent by ENT to the ER for further plan. pt's wife stated that he slowly swallows his pills and food does not go down, may be some goes and rest he brings up. no cp, no sob. no n/v/d. no abd. pain, no cough, no fever. has left hell non healing ulcer for long time.

## 2022-03-21 NOTE — ED PROVIDER NOTE - OBJECTIVE STATEMENT
77 yo male pmh h/o back surgery, alcohol abuse sent in after abnormal bar 77 yo male pmh h/o back surgery, alcohol abuse sent in after abnormal barium swallow for increased risk of aspiration ; case discussed with Dr Mayorga of ent who is covering Dr Polk and recommends admission for neurology and gastroenterology evaluation

## 2022-03-21 NOTE — H&P ADULT - PROBLEM SELECTOR PLAN 1
npo at this point, iv fluids, Gi Consult requested for further eval and plan, PEG ? plan discussed with pt. and his wife in detail.

## 2022-03-21 NOTE — ED PROVIDER NOTE - PHYSICAL EXAMINATION
Alert, lucid, and in no apparent distress. Pt is normocephalic, atraumatic.  Pupils are equal, round, lips pink, moist mucous membranes, tongue midline. Neck supple.   Lungs clear to auscultation. Heart regular rate and rhythm, normal S1, S2,   Abdomen is soft, nontender, no pulsatile mass, no masses, no distension, no rebound.   Non-focal sensory, no dysmetria, fluent, goal directed speech. CN2 to 12 intact. Skin without rash,  No submandibular adenopathy. Normal mentation, does not appear agitated

## 2022-03-21 NOTE — SWALLOW VFSS/MBS ASSESSMENT ADULT - ROSENBEK'S PENETRATION ASPIRATION SCALE
w/chin tuck posture; 5- penetration contacting the cords without clearance w/R head turn; 3- penetration above the cords without clearance head neutral & L head turn/(8) contrast passes glottis, visible subglottic residue remains, absent patient response (aspiration) w/chin tuck posture; 5- penetration contacting the cords without clearance w/L head turn; 3- penetration above the cords without clearance w/R head turn & head neutral/(7) contrast passes glottis, visible subglottic residue remains despite patient’s response (aspiration) (8) contrast passes glottis, visible subglottic residue remains, absent patient response (aspiration)

## 2022-03-21 NOTE — SWALLOW VFSS/MBS ASSESSMENT ADULT - SLP PERTINENT HISTORY OF CURRENT PROBLEM
Pt is a 75 y/o M with PMH of DM, cervical radiculopathy, T10-T11 lami & T10-L1 fusion. Reports progressive dysphagia for the last ~2 years, states globus sensation with solids only. Denies recent respiratory/pulmonary issue, no recent weight loss.

## 2022-03-21 NOTE — SWALLOW VFSS/MBS ASSESSMENT ADULT - RECOMMENDED CONSISTENCY
NPO w/consideration for non-oral alternative means of nutrition/hydration, as per Pt/family wishes   F/u with PCP and/or Dr. Polk to determine further rxs  Rx neuro w/u due to severe dysphagia with unclear etiology

## 2022-03-21 NOTE — H&P ADULT - NSHPPHYSICALEXAM_GEN_ALL_CORE
Vital Signs Last 24 Hrs  T(C): 36.9 (21 Mar 2022 15:47), Max: 36.9 (21 Mar 2022 15:47)  T(F): 98.5 (21 Mar 2022 15:47), Max: 98.5 (21 Mar 2022 15:47)  HR: 77 (21 Mar 2022 15:47) (77 - 77)  BP: 152/81 (21 Mar 2022 15:47) (152/81 - 152/81)  BP(mean): --  RR: 20 (21 Mar 2022 15:47) (20 - 20)  SpO2: 98% (21 Mar 2022 15:47) (98% - 98%)    General: pt. in bed not in distress.  HEENT: AT, NC. PERRL. intact EOM. no throat erythema or exudate.   Neck: supple. no JVD.   Chest: CTA bilaterally  Heart: S1,S2. RRR. no heart murmur. no edema.  Abdomen: soft. non-tender. non-distended. + BS.  Ext: no calf tenderness. distal pulses intact.  spine : no pain to palpation over entire spine.   Neuro: AAO x3. no speech disorder, follows commands. chronic wheel chair bound.  Skin: left heel ulcer , round about the size of a dollar coin , somewhat dry appearing at the edges and mildly moist in the center but no foul smell or discharge noted, no warmth.   psych : mood ok, no si/hi.

## 2022-03-21 NOTE — H&P ADULT - NSICDXPASTSURGICALHX_GEN_ALL_CORE_FT
PAST SURGICAL HISTORY:  S/P hernia repair     S/P hip replacement R    S/P laminectomy lumbar and thoracic

## 2022-03-21 NOTE — H&P ADULT - NSICDXPASTMEDICALHX_GEN_ALL_CORE_FT
PAST MEDICAL HISTORY:  BPH (benign prostatic hyperplasia)     Diabetes     Dyslipidemia     HTN (hypertension)     Hypothyroid     OA (osteoarthritis)

## 2022-03-21 NOTE — SWALLOW VFSS/MBS ASSESSMENT ADULT - SLP GENERAL OBSERVATIONS
Pt recd awake/upright in wheelchair in radiology, A&A Ox3, 0/10 pain pre/post, tolerating RA no overt distress, wife present

## 2022-03-21 NOTE — ED PROVIDER NOTE - CLINICAL SUMMARY MEDICAL DECISION MAKING FREE TEXT BOX
pt with difficulty swallowing with abnormal barium swallow;  pt to be admitted for evaluation by neurology, and gastroenterology

## 2022-03-22 DIAGNOSIS — R13.10 DYSPHAGIA, UNSPECIFIED: ICD-10-CM

## 2022-03-22 DIAGNOSIS — I10 ESSENTIAL (PRIMARY) HYPERTENSION: ICD-10-CM

## 2022-03-22 DIAGNOSIS — E03.9 HYPOTHYROIDISM, UNSPECIFIED: ICD-10-CM

## 2022-03-22 DIAGNOSIS — E78.5 HYPERLIPIDEMIA, UNSPECIFIED: ICD-10-CM

## 2022-03-22 LAB
ANION GAP SERPL CALC-SCNC: 10 MMOL/L — SIGNIFICANT CHANGE UP (ref 5–17)
BUN SERPL-MCNC: 13.5 MG/DL — SIGNIFICANT CHANGE UP (ref 8–20)
CALCIUM SERPL-MCNC: 8.5 MG/DL — LOW (ref 8.6–10.2)
CHLORIDE SERPL-SCNC: 102 MMOL/L — SIGNIFICANT CHANGE UP (ref 98–107)
CO2 SERPL-SCNC: 24 MMOL/L — SIGNIFICANT CHANGE UP (ref 22–29)
CREAT SERPL-MCNC: 0.6 MG/DL — SIGNIFICANT CHANGE UP (ref 0.5–1.3)
EGFR: 99 ML/MIN/1.73M2 — SIGNIFICANT CHANGE UP
GLUCOSE SERPL-MCNC: 71 MG/DL — SIGNIFICANT CHANGE UP (ref 70–99)
POTASSIUM SERPL-MCNC: 3.8 MMOL/L — SIGNIFICANT CHANGE UP (ref 3.5–5.3)
POTASSIUM SERPL-SCNC: 3.8 MMOL/L — SIGNIFICANT CHANGE UP (ref 3.5–5.3)
SARS-COV-2 RNA SPEC QL NAA+PROBE: SIGNIFICANT CHANGE UP
SODIUM SERPL-SCNC: 136 MMOL/L — SIGNIFICANT CHANGE UP (ref 135–145)

## 2022-03-22 PROCEDURE — 73630 X-RAY EXAM OF FOOT: CPT | Mod: 26,50

## 2022-03-22 PROCEDURE — 99223 1ST HOSP IP/OBS HIGH 75: CPT

## 2022-03-22 PROCEDURE — 99233 SBSQ HOSP IP/OBS HIGH 50: CPT

## 2022-03-22 PROCEDURE — 70551 MRI BRAIN STEM W/O DYE: CPT | Mod: 26

## 2022-03-22 RX ORDER — SODIUM CHLORIDE 9 MG/ML
1000 INJECTION INTRAMUSCULAR; INTRAVENOUS; SUBCUTANEOUS
Refills: 0 | Status: COMPLETED | OUTPATIENT
Start: 2022-03-22 | End: 2022-03-22

## 2022-03-22 RX ORDER — SODIUM HYPOCHLORITE 0.125 %
1 SOLUTION, NON-ORAL MISCELLANEOUS DAILY
Refills: 0 | Status: DISCONTINUED | OUTPATIENT
Start: 2022-03-22 | End: 2022-03-29

## 2022-03-22 RX ORDER — HEPARIN SODIUM 5000 [USP'U]/ML
5000 INJECTION INTRAVENOUS; SUBCUTANEOUS EVERY 12 HOURS
Refills: 0 | Status: DISCONTINUED | OUTPATIENT
Start: 2022-03-22 | End: 2022-03-23

## 2022-03-22 RX ORDER — METOPROLOL TARTRATE 50 MG
2.5 TABLET ORAL EVERY 12 HOURS
Refills: 0 | Status: DISCONTINUED | OUTPATIENT
Start: 2022-03-22 | End: 2022-03-25

## 2022-03-22 RX ORDER — LEVOTHYROXINE SODIUM 125 MCG
75 TABLET ORAL AT BEDTIME
Refills: 0 | Status: DISCONTINUED | OUTPATIENT
Start: 2022-03-22 | End: 2022-03-25

## 2022-03-22 RX ADMIN — HEPARIN SODIUM 5000 UNIT(S): 5000 INJECTION INTRAVENOUS; SUBCUTANEOUS at 18:06

## 2022-03-22 RX ADMIN — HEPARIN SODIUM 5000 UNIT(S): 5000 INJECTION INTRAVENOUS; SUBCUTANEOUS at 05:53

## 2022-03-22 RX ADMIN — Medication 2.5 MILLIGRAM(S): at 05:51

## 2022-03-22 RX ADMIN — Medication 2.5 MILLIGRAM(S): at 18:06

## 2022-03-22 RX ADMIN — SODIUM CHLORIDE 75 MILLILITER(S): 9 INJECTION INTRAMUSCULAR; INTRAVENOUS; SUBCUTANEOUS at 01:46

## 2022-03-22 RX ADMIN — Medication 75 MICROGRAM(S): at 21:52

## 2022-03-22 NOTE — CONSULT NOTE ADULT - SUBJECTIVE AND OBJECTIVE BOX
Patient is a 76y old  Male who presents with a chief complaint of Dysphagia (22 Mar 2022 11:52)    HPI: pt. is a 75 yo male pmh h/o back surgery ( LS in 2016 and Thoracic spine in 2018 and after thoracic spine surgery has been in the wheel chair )  alcohol abuse stopped using alcohol 15 years ago, htn, bph , hypothyroidism who follows with ENT Dr. Boudreaux for his routine ENT visits and mentioned diffculty swallowing to both liquid and solid ongoing for more thana year as per pt's wife, so ENT did scope in the office, none was found as per pt's wife so he was sent for modified bairum swallow which was abnormal and pt. was sent by ENT to the ER for further plan. pt's wife stated that he slowly swallows his pills and food does not go down, may be some goes and rest he brings up. no cp, no sob. no n/v/d. no abd. pain, no cough, no fever. has left hell non healing ulcer for long time.  (21 Mar 2022 23:17)    Podiatry HPI: History as noted above. Podiatry consulted regarding a 75 yo male who is seen with a chronic left heel ulceration. Pt is accompanied by his wife at bedside. Pt states that he had the wound on the left heel for about a year, follows up with outside podiatrist, Dr. Luu for wound care. Pt also has VNS who comes every other day for dressing changes. Pt denies any pain to the left heel wound. Denies any other constitutional symptoms of N/V/C/F/Sob.       PMH: Diabetes    OA (osteoarthritis)    Dyslipidemia    Hypothyroid    HTN (hypertension)    BPH (benign prostatic hyperplasia)    Allergies: No Known Allergies    Medications: heparin   Injectable 5000 Unit(s) SubCutaneous every 12 hours  levothyroxine Injectable 75 MICROGram(s) IV Push at bedtime  metoprolol tartrate Injectable 2.5 milliGRAM(s) IV Push every 12 hours    FH: Family history of diabetes mellitus    Family history of diabetes mellitus (Father)    Family history of diabetes mellitus (DM) (Mother)    FH: alcoholism (Father)    PSX: S/P hip replacement    S/P hernia repair    S/P laminectomy    SH: Social History:  stopped smoking and etoh use 15 years ago. (21 Mar 2022 23:17)    Vital Signs Last 24 Hrs  T(C): 36.5 (22 Mar 2022 10:38), Max: 36.9 (21 Mar 2022 15:47)  T(F): 97.7 (22 Mar 2022 10:38), Max: 98.5 (21 Mar 2022 15:47)  HR: 80 (22 Mar 2022 10:38) (77 - 87)  BP: 162/83 (22 Mar 2022 10:38) (152/81 - 177/84)  BP(mean): --  RR: 18 (22 Mar 2022 10:38) (18 - 20)  SpO2: 99% (22 Mar 2022 10:38) (97% - 99%)    LABS                        10.9   4.79  )-----------( 290      ( 21 Mar 2022 17:17 )             34.7                03-22    136  |  102  |  13.5  ----------------------------<  71  3.8   |  24.0  |  0.60    Ca    8.5<L>      22 Mar 2022 07:43    TPro  7.4  /  Alb  3.5  /  TBili  0.4  /  DBili  x   /  AST  43<H>  /  ALT  25  /  AlkPhos  190<H>  03-21       WBC Count: 4.79 K/uL (03-21-22 @ 17:17)    PT/INR - ( 21 Mar 2022 18:49 )   PT: 15.4 sec;   INR: 1.32 ratio    PTT - ( 21 Mar 2022 18:49 )  PTT:37.0 sec    CAPILLARY BLOOD GLUCOSE    REVIEW OF SYSTEMS: Negative unless otherwise stated in the HPI       PHYSICAL EXAM  GEN: TIMUR LOUIS is a pleasant well-nourished, well developed 76y Male in no acute distress, alert awake, and oriented to person, place and time.   LE Focused:    Vasc: DP/PT pulses palpable, CFT brisk to distal sai, TG wnl, no edema, no erythema noted   Derm: A partial thickness wound noted to the left heel with fibro-granular wound bed, hyperkeratotic periwound, -PTB, no drainage, no malodor, no tunneling noted. No clinical signs of acute infection noted. Crusty, dried skin noted to b/l feet   Neuro: Protective sensation grossly intact b/l   MSK: Able to wiggle toes, low arch feet b/l, muscle strength 4/5       Imaging:   Pending         Assessment:  Foot ulcer    Plan:  -Pt seen and evaluated  -Labs and chart reviewed  -Left foot dressing removed and wound evaluated  -No acute signs of infection noted clinically, stable wound  -Flushed the wound with Dakin solution  -Applied aquacel, DSD to the left heel wound   -Advised pt to keep the dressing C/D/I  -Recommend CAIR boots to offload the left heel   Pod plan:  -C/w LWC every other day  -Pt stable from podiatry   -WCO placed   -Will follow while in house  -D/w and evaluated at bedside with attending Dr. Isha MONTERROSOO: Please clean the wound with Dakin solution, apply aquacel, 4x4 gauze, Abd pad, Bret and ACE to the left heel wound     Patient is a 76y old  Male who presents with a chief complaint of Dysphagia (22 Mar 2022 11:52)    HPI: pt. is a 77 yo male pmh h/o back surgery ( LS in 2016 and Thoracic spine in 2018 and after thoracic spine surgery has been in the wheel chair )  alcohol abuse stopped using alcohol 15 years ago, htn, bph , hypothyroidism who follows with ENT Dr. Boudreaux for his routine ENT visits and mentioned diffculty swallowing to both liquid and solid ongoing for more thana year as per pt's wife, so ENT did scope in the office, none was found as per pt's wife so he was sent for modified bairum swallow which was abnormal and pt. was sent by ENT to the ER for further plan. pt's wife stated that he slowly swallows his pills and food does not go down, may be some goes and rest he brings up. no cp, no sob. no n/v/d. no abd. pain, no cough, no fever. has left hell non healing ulcer for long time.  (21 Mar 2022 23:17)    Podiatry HPI: History as noted above. Podiatry consulted regarding a 77 yo male who is seen with a chronic left heel ulceration. Pt is accompanied by his wife at bedside. Pt states that he had the wound on the left heel for about a year, follows up with outside podiatrist, Dr. Luu for wound care. Pt also has VNS who comes every other day for dressing changes. Pt denies any pain to the left heel wound. Denies any other constitutional symptoms of N/V/C/F/Sob.       PMH: Diabetes    OA (osteoarthritis)    Dyslipidemia    Hypothyroid    HTN (hypertension)    BPH (benign prostatic hyperplasia)    Allergies: No Known Allergies    Medications: heparin   Injectable 5000 Unit(s) SubCutaneous every 12 hours  levothyroxine Injectable 75 MICROGram(s) IV Push at bedtime  metoprolol tartrate Injectable 2.5 milliGRAM(s) IV Push every 12 hours    FH: Family history of diabetes mellitus    Family history of diabetes mellitus (Father)    Family history of diabetes mellitus (DM) (Mother)    FH: alcoholism (Father)    PSX: S/P hip replacement    S/P hernia repair    S/P laminectomy    SH: Social History:  stopped smoking and etoh use 15 years ago. (21 Mar 2022 23:17)    Vital Signs Last 24 Hrs  T(C): 36.5 (22 Mar 2022 10:38), Max: 36.9 (21 Mar 2022 15:47)  T(F): 97.7 (22 Mar 2022 10:38), Max: 98.5 (21 Mar 2022 15:47)  HR: 80 (22 Mar 2022 10:38) (77 - 87)  BP: 162/83 (22 Mar 2022 10:38) (152/81 - 177/84)  BP(mean): --  RR: 18 (22 Mar 2022 10:38) (18 - 20)  SpO2: 99% (22 Mar 2022 10:38) (97% - 99%)    LABS                        10.9   4.79  )-----------( 290      ( 21 Mar 2022 17:17 )             34.7                03-22    136  |  102  |  13.5  ----------------------------<  71  3.8   |  24.0  |  0.60    Ca    8.5<L>      22 Mar 2022 07:43    TPro  7.4  /  Alb  3.5  /  TBili  0.4  /  DBili  x   /  AST  43<H>  /  ALT  25  /  AlkPhos  190<H>  03-21       WBC Count: 4.79 K/uL (03-21-22 @ 17:17)    PT/INR - ( 21 Mar 2022 18:49 )   PT: 15.4 sec;   INR: 1.32 ratio    PTT - ( 21 Mar 2022 18:49 )  PTT:37.0 sec    CAPILLARY BLOOD GLUCOSE    REVIEW OF SYSTEMS: Negative unless otherwise stated in the HPI       PHYSICAL EXAM  GEN: TIMUR LOUIS is a pleasant well-nourished, well developed 76y Male in no acute distress, alert awake, and oriented to person, place and time.   LE Focused:    Vasc: DP/PT pulses palpable, CFT brisk to distal sai, TG wnl, no edema, no erythema noted   Derm: A partial thickness wound noted to the left heel with fibro-granular wound bed, hyperkeratotic periwound, -PTB, no drainage, no malodor, no tunneling noted. No clinical signs of acute infection noted. Crusty, dried skin noted to b/l feet   Neuro: Protective sensation grossly intact b/l   MSK: Able to wiggle toes, low arch feet b/l, muscle strength 4/5       Imaging:   Pending         Assessment:  Foot ulcer    Plan:  -Pt seen and evaluated  -Labs and chart reviewed  -Left foot dressing removed and wound evaluated  -No acute signs of infection noted clinically, stable wound  -Flushed the wound with Dakin solution  -Applied aquacel, DSD to the left heel wound   -Advised pt to keep the dressing C/D/I  -Recommend CAIR boots to offload the left heel   Pod plan:  -Pending Xray of the foot   -C/w LWC every other day  -Pt stable from podiatry   -WCO placed   -Will follow while in house  -D/w and evaluated at bedside with attending Dr. Vieira     WCO: Please clean the wound with Dakin solution, apply aquacel, 4x4 gauze, Abd pad, Bret and ACE to the left heel wound     Patient is a 76y old  Male who presents with a chief complaint of Dysphagia (22 Mar 2022 11:52)    HPI: pt. is a 75 yo male pmh h/o back surgery ( LS in 2016 and Thoracic spine in 2018 and after thoracic spine surgery has been in the wheel chair )  alcohol abuse stopped using alcohol 15 years ago, htn, bph , hypothyroidism who follows with ENT Dr. Boudreaux for his routine ENT visits and mentioned diffculty swallowing to both liquid and solid ongoing for more thana year as per pt's wife, so ENT did scope in the office, none was found as per pt's wife so he was sent for modified bairum swallow which was abnormal and pt. was sent by ENT to the ER for further plan. pt's wife stated that he slowly swallows his pills and food does not go down, may be some goes and rest he brings up. no cp, no sob. no n/v/d. no abd. pain, no cough, no fever. has left hell non healing ulcer for long time.  (21 Mar 2022 23:17)    Podiatry HPI: History as noted above. Podiatry consulted regarding a 75 yo male who is seen with a chronic left heel ulceration. Pt is accompanied by his wife at bedside. Pt states that he had the wound on the left heel for about a year, follows up with outside podiatrist, Dr. Luu for wound care. Pt also has VNS who comes every other day for dressing changes. Pt denies any pain to the left heel wound. Denies any other constitutional symptoms of N/V/C/F/Sob.       PMH: Diabetes    OA (osteoarthritis)    Dyslipidemia    Hypothyroid    HTN (hypertension)    BPH (benign prostatic hyperplasia)    Allergies: No Known Allergies    Medications: heparin   Injectable 5000 Unit(s) SubCutaneous every 12 hours  levothyroxine Injectable 75 MICROGram(s) IV Push at bedtime  metoprolol tartrate Injectable 2.5 milliGRAM(s) IV Push every 12 hours    FH: Family history of diabetes mellitus    Family history of diabetes mellitus (Father)    Family history of diabetes mellitus (DM) (Mother)    FH: alcoholism (Father)    PSX: S/P hip replacement    S/P hernia repair    S/P laminectomy    SH: Social History:  stopped smoking and etoh use 15 years ago. (21 Mar 2022 23:17)    Vital Signs Last 24 Hrs  T(C): 36.5 (22 Mar 2022 10:38), Max: 36.9 (21 Mar 2022 15:47)  T(F): 97.7 (22 Mar 2022 10:38), Max: 98.5 (21 Mar 2022 15:47)  HR: 80 (22 Mar 2022 10:38) (77 - 87)  BP: 162/83 (22 Mar 2022 10:38) (152/81 - 177/84)  BP(mean): --  RR: 18 (22 Mar 2022 10:38) (18 - 20)  SpO2: 99% (22 Mar 2022 10:38) (97% - 99%)    LABS                        10.9   4.79  )-----------( 290      ( 21 Mar 2022 17:17 )             34.7                03-22    136  |  102  |  13.5  ----------------------------<  71  3.8   |  24.0  |  0.60    Ca    8.5<L>      22 Mar 2022 07:43    TPro  7.4  /  Alb  3.5  /  TBili  0.4  /  DBili  x   /  AST  43<H>  /  ALT  25  /  AlkPhos  190<H>  03-21       WBC Count: 4.79 K/uL (03-21-22 @ 17:17)    PT/INR - ( 21 Mar 2022 18:49 )   PT: 15.4 sec;   INR: 1.32 ratio    PTT - ( 21 Mar 2022 18:49 )  PTT:37.0 sec    CAPILLARY BLOOD GLUCOSE    REVIEW OF SYSTEMS: Negative unless otherwise stated in the HPI       PHYSICAL EXAM  GEN: TIMUR LOUIS is a pleasant well-nourished, well developed 76y Male in no acute distress, alert awake, and oriented to person, place and time.   LE Focused:    Vasc: DP/PT pulses palpable, CFT brisk to distal sai, TG wnl, no edema, no erythema noted   Derm: A partial thickness wound noted to the left heel with fibro-granular wound bed, hyperkeratotic periwound, -PTB, no drainage, no malodor, no tunneling noted. No clinical signs of acute infection noted. Crusty, dried skin noted to b/l feet   Neuro: Protective sensation grossly intact b/l   MSK: Able to wiggle toes, low arch feet b/l, muscle strength 4/5       Imaging:   Pending         Assessment:  Foot ulcer    Plan:  -Pt seen and evaluated  -Labs and chart reviewed  -Left foot dressing removed and wound evaluated  -No acute signs of infection noted clinically, stable wound  -Flushed the wound with Dakin solution  -Applied aquacel, DSD to the left heel wound   -Advised pt to keep the dressing C/D/I  -Recommend CAIR boots to offload the left heel   Pod plan:  -Pending Xray of the foot   -C/w LWC every other day  -Pt stable from podiatry   -WCO placed   -Will follow while in house  -D/w and evaluated at bedside with attending Dr. Vieira     WCO: Please clean the wound with Dakin solution, apply aquacel, 4x4 gauze, Abd pad, Bret and ACE to the left heel wound        Patient was examined.  All documentation reviewed.  Discussed pathology and treatment plan with resident.  Reviewed written documentation and agreed with the above.  Patient will be followed while in house

## 2022-03-22 NOTE — PROGRESS NOTE ADULT - ASSESSMENT
75 yo male pmh h/o back surgery ( LS in 2016 and Thoracic spine in 2018 and after thoracic spine surgery has been in the wheel chair )  alcohol abuse stopped using alcohol 15 years ago, htn, bph , hypothyroidism who follows with ENT Dr. Boudreaux for his routine ENT visits and mentioned diffculty swallowing to both liquid and solid ongoing for more thana year as per pt's wife, so ENT did scope in the office, none was found as per pt's wife so he was sent for modified bairum swallow which was abnormal and pt. was sent by ENT to the ER for further plan. pt's wife stated that he slowly swallows his pills and food does not go down, may be some goes and rest he brings up.    Dysphagia  - NPO  - GI consult appreciated  - Speech and swallow evaluated outpatient with MBS showing massive aspiration  - Neurology consulted  - IVF    Chronic left heel ulcer  - Podiatry consulted     HTN  - IV Lopressor    Hypothyroidism  - Synthroid 75mcg daily    HLD  - Statin can be restarted once NPO status resolved    DVT ppx  - Heparin SQ

## 2022-03-22 NOTE — PROGRESS NOTE ADULT - SUBJECTIVE AND OBJECTIVE BOX
Chief complaint: Dysphagia    Patient seen and examined at bedside. No acute overnight events reported. No headache, fever, chills, cough, chest pain, shortness of breath, nausea or vomiting.    Vital Signs Last 24 Hrs  T(F): 97.7 (22 Mar 2022 10:38), Max: 98.5 (21 Mar 2022 15:47)  HR: 80 (22 Mar 2022 10:38) (77 - 87)  BP: 162/83 (22 Mar 2022 10:38) (152/81 - 177/84)  RR: 18 (22 Mar 2022 10:38) (18 - 20)  SpO2: 99% (22 Mar 2022 10:38) (97% - 99%)    Physical Exam:  Constitutional: alert and oriented, in no acute distress   Neck: Soft and supple  Respiratory: Clear to auscultation bilaterally, no wheezes or crackles  Cardiovascular: Regular rate and rhyhtm, no murmurs, gallops, rubs  Gastrointestinal: Soft, non-tender to palpation, +bs  Vascular: 2+ peripheral pulses  Musculoskeletal: 5/5 strength b/l upper and lower extremities, no lower extremity edema bilaterally     Labs:                        10.9   4.79  )-----------( 290      ( 21 Mar 2022 17:17 )             34.7   03-22    136  |  102  |  13.5  ----------------------------<  71  3.8   |  24.0  |  0.60    Ca    8.5<L>      22 Mar 2022 07:43    TPro  7.4  /  Alb  3.5  /  TBili  0.4  /  DBili  x   /  AST  43<H>  /  ALT  25  /  AlkPhos  190<H>  03-21

## 2022-03-22 NOTE — CONSULT NOTE ADULT - PROBLEM SELECTOR RECOMMENDATION 9
Progressive dysphagia for the last ~2 years. Seen by ENT as outpatient. Outpatient MBS/speech and swallow eval failed as patient with inconsistent ability to clear penetrated/aspirated material from laryngeal vestibule despite expectoration. Recommending NPO w/consideration for non-oral alternative means of nutrition/hydration.    Plan:   Tentative PEG placement once cleared by Neurology.   Pending inpatient speech and swallow eval.   Continue NPO with IVF.  Continue to trend CBC, CMP Progressive dysphagia for the last ~2 years. Seen by ENT as outpatient. Outpatient MBS/speech and swallow eval failed as patient with inconsistent ability to clear penetrated/aspirated material from laryngeal vestibule despite expectoration. Recommending NPO w/consideration for non-oral alternative means of nutrition/hydration.    Plan:   Tentative PEG placement once cleared by Neurology.   Continue NPO with IVF.  Continue to trend CBC, CMP

## 2022-03-22 NOTE — CONSULT NOTE ADULT - SUBJECTIVE AND OBJECTIVE BOX
[facility]                                     Ne[facility] [place]                                 Nereida Kim, & Braulio                                  [address]                                        [place], [place], [address]                                             [phone]    CC: dysphagia  HPI:  The patient is a 76y Male who presented with dysphagia, had difficulty swallowing for at least a year.  His wife says that just before his swallowing issues started he was een at [facility] for dysathria and facial droop butr told he did not have a stroke.  No further work up was done per his wife.  A modified barium swallow study yesterday confirmed the dysphagia and he was sent to [facility] for admission and work up.  Neurology was asked to evaluate.  he denies weakness or diplopia.      PAST MEDICAL & SURGICAL HISTORY:  Diabetes    OA (osteoarthritis)    Dyslipidemia    Hypothyroid    HTN (hypertension)    BPH (benign prostatic hyperplasia)    S/P hip replacement  R    S/P hernia repair    S/P laminectomy  lumbar and thoracic        MEDICATIONS  (STANDING):  Dakins Solution - 1/4 Strength 1 Application(s) Topical daily  heparin   Injectable 5000 Unit(s) SubCutaneous every 12 hours  levothyroxine Injectable 75 MICROGram(s) IV Push at bedtime  metoprolol tartrate Injectable 2.5 milliGRAM(s) IV Push every 12 hours    MEDICATIONS  (PRN):      Allergies    No Known Allergies    Intolerances        SOCIAL HISTORY:  no tob,   no alcohol   no drugs    FAMILY HISTORY:  Family history of diabetes mellitus (DM) (Mother)    FH: alcoholism (Father)    no stroke in either parent      ROS: 14 point ROS negative other than what is present in HPI or below    Vital Signs Last 24 Hrs  T(C): 36.4 (22 Mar 2022 16:44), Max: 36.7 (22 Mar 2022 00:32)  T(F): 97.5 (22 Mar 2022 16:44), Max: 98.1 (22 Mar 2022 00:32)  HR: 80 (22 Mar 2022 10:38) (80 - 87)  BP: 162/83 (22 Mar 2022 10:38) (162/83 - 177/84)  BP(mean): --  RR: 18 (22 Mar 2022 10:38) (18 - 18)  SpO2: 99% (22 Mar 2022 10:38) (97% - 99%)      General: NAD    Detailed Neurologic Exam:    Mental status: The patient is awake and alert and has normal attention span.  The patient is oriented to self and hospital  The patient is able to name objects, follow commands, repeat sentences. No dysarthria    Cranial nerves: Pupils equal and react symmetrically to light. There is no visual field deficit to confrontation. Extraocular motion is full with no nystagmus. There is no ptosis. Facial sensation is intact. Facial musculature is symmetric. Palate elevates symmetrically. Tongue is midline.    Motor: There is normal bulk and tone.  There is no tremor.  Strength is 5/5 in the right arm and  4/5 leg.   Strength is 5/5 in the left arm and 4+/5 leg.    Sensation: Intact to light touch and pin in 4 extremities    Reflexes: 1 + throughout     Cerebellar: There is no dysmetria on finger to nose testing.    Gait : deferred    LABS:                         10.9   4.79  )-----------( 290      ( 21 Mar 2022 17:17 )             34.7       03-22    136  |  102  |  13.5  ----------------------------<  71  3.8   |  24.0  |  0.60    Ca    8.5<L>      22 Mar 2022 07:43    TPro  7.4  /  Alb  3.5  /  TBili  0.4  /  DBili  x   /  AST  43<H>  /  ALT  25  /  AlkPhos  190<H>  03-21      PT/INR - ( 21 Mar 2022 18:49 )   PT: 15.4 sec;   INR: 1.32 ratio         PTT - ( 21 Mar 2022 18:49 )  PTT:37.0 sec    RADIOLOGY & ADDITIONAL STUDIES (independently reviewed unless otherwise noted):  MBSS:  · Diagnostic Impressions	Pt presents w/moderate oral & severe pharyngeal dysphagia. Oral stage characterized by functional PO acceptance, however decreased lingual strength (w/intermittent lingual pumping), resulting in inadequate bolus formation/cohesion/propulsion, w/subsequent min-mod increased oral transit time. Consistent premature pharyngeal entry noted variably between the mic & hypopharynx, more consistently to the pyriforms w/moderately thick liquids. No anterior loss or oral stasis visualized.    Pharyngeal phase of swallow marked by reduced contractility, decreased hyo-laryngeal elevation/excursion, incomplete epiglottic deflection and reduced upper/lower airway protection (which is not facilitated w/either compensatory postures or increasingly viscous consistencies). Moderate valleculae residue noted across trials, & trace-min on the posterior pharyngeal wall & in the pyriforms. Residue reduced with cued & independent subsequent dry swallow x 2-3. +Silent aspiration of soft & bite-sized & regular solids head neutral. Additional +silent aspiration of moderately thick liquids via tsp via chin tuck posture. +Aspiration with sensation of puree w/chin tuck. +Penetration contacting the vocal cords without clearance of puree w/L head turn & of moderately thick liquids via tsp w/R head turn. +Penetration above the cords without clearance puree head neutral/R head turn, & moderately thick liquids via tsp head neutral/L head turn. All penetration/aspiration intermittently visualized during/following the swallow w/all consistencies provided. Pt with inconsistent ability to clear penetrated/aspirated material from laryngeal vestibule despite expectoration.

## 2022-03-22 NOTE — CONSULT NOTE ADULT - NS ATTEND AMEND GEN_ALL_CORE FT
Patient seen and examined today.  He has chronic dysphagia.  Evaluated by the swallow team as outpatient.  Needs neurology evaluation.  Eventual PEG after neurology assessment.  Will follow.

## 2022-03-22 NOTE — CONSULT NOTE ADULT - SUBJECTIVE AND OBJECTIVE BOX
Patient is a 76y old  Male who presents with a chief complaint of Dysphagia (21 Mar 2022 23:17)      HPI:  Patient is a 77 yo male pmh h/o back surgery ( LS in 2016 and Thoracic spine in 2018 and after thoracic spine surgery has been in the wheel chair ), alcohol abuse stopped using alcohol 15 years ago, HTN, BPH , hypothyroidism who follows with ENT Dr. Boudreaux for his routine ENT visits and mentioned diffculty swallowing to both liquid and solid ongoing for more than 1 years as per pt and his wife, so ENT did scope in the office, none was found as per pt's wife so he was sent for modified barium swallow which was abnormal and pt. was sent by ENT to the ER for further plan. pt's wife stated that he slowly swallows his pills and food does not go down, may be some goes and rest he brings up. no cp, no sob. no n/v/d. no abd. pain, no cough, no fever. has left hell non healing ulcer for long time.        PAST MEDICAL & SURGICAL HISTORY:  Diabetes    OA (osteoarthritis)    Dyslipidemia    Hypothyroid    HTN (hypertension)    BPH (benign prostatic hyperplasia)    S/P hip replacement  R    S/P hernia repair    S/P laminectomy  lumbar and thoracic        Allergies    No Known Allergies    Intolerances        MEDICATIONS  (STANDING):  heparin   Injectable 5000 Unit(s) SubCutaneous every 12 hours  levothyroxine Injectable 75 MICROGram(s) IV Push at bedtime  metoprolol tartrate Injectable 2.5 milliGRAM(s) IV Push every 12 hours    MEDICATIONS  (PRN):      Social History:    Marital Status:  (   )    (   ) Single    (   )    (  )   Occupation:   Lives with: (  ) alone  (  ) children   (  ) spouse   (  ) parents  (  ) other    Substance Use (street drugs): (  ) never used  (  ) other:  Tobacco Usage:  (   ) never smoked   (   ) former smoker   (   ) current smoker  (     ) pack year  (        ) last cigarette date  Alcohol Usage:  Sexual History:     Family History   IBD (  ) Yes   (  ) No  GI Malignancy (  )  Yes    (  ) No    Health Management     Last Colonoscopy -      (     ) Advanced Directives: (     ) None    (      ) DNR    (     ) DNI    (     ) Health Care Proxy:     Review of Systems:    General:  No wt loss, fevers, chills, night sweats, fatigue,   CV:  No pain, palpitations, hypo/hypertension  Resp:  No dyspnea, cough, tachypnea, wheezing  GI:  See HPI  :  No pain, bleeding, incontinence, nocturia  Muscle:  No pain, weakness  Neuro:  No weakness, tingling, memory problems  Psych:  No fatigue, insomnia, mood problems, depression  Endocrine:  No polyuria, polydypsia, cold/heat intolerance  Heme:  No petechiae, ecchymosis, easy bruisability  Skin:  No rash, tattoos, scars, edema      Vital Signs Last 24 Hrs  T(C): 36.6 (22 Mar 2022 04:41), Max: 36.9 (21 Mar 2022 15:47)  T(F): 97.9 (22 Mar 2022 04:41), Max: 98.5 (21 Mar 2022 15:47)  HR: 85 (22 Mar 2022 04:41) (77 - 87)  BP: 177/84 (22 Mar 2022 04:41) (152/81 - 177/84)  BP(mean): --  RR: 18 (22 Mar 2022 04:41) (18 - 20)  SpO2: 98% (22 Mar 2022 04:41) (97% - 98%)    PHYSICAL EXAM:    Constitutional: NAD  Neck: No LAD, supple  Respiratory: CTA and P  Cardiovascular: S1 and S2, RRR, no M  Gastrointestinal: BS+, soft, NT/ND, neg HSM,  Extremities: No peripheral edema, neg clubbing, cyanosis  Vascular: 2+ peripheral pulses  Neurological: A/O x 3, no focal deficits  Psychiatric: Normal mood, normal affect  Skin: No rashes        LABS:                        10.9   4.79  )-----------( 290      ( 21 Mar 2022 17:17 )             34.7     03-22    136  |  102  |  13.5  ----------------------------<  71  3.8   |  24.0  |  0.60    Ca    8.5<L>      22 Mar 2022 07:43    TPro  7.4  /  Alb  3.5  /  TBili  0.4  /  DBili  x   /  AST  43<H>  /  ALT  25  /  AlkPhos  190<H>  03-21    PT/INR - ( 21 Mar 2022 18:49 )   PT: 15.4 sec;   INR: 1.32 ratio         PTT - ( 21 Mar 2022 18:49 )  PTT:37.0 sec    LIVER FUNCTIONS - ( 21 Mar 2022 17:17 )  Alb: 3.5 g/dL / Pro: 7.4 g/dL / ALK PHOS: 190 U/L / ALT: 25 U/L / AST: 43 U/L / GGT: x             RADIOLOGY & ADDITIONAL TESTS:       Patient is a 76y old  Male who presents with a chief complaint of Dysphagia (21 Mar 2022 23:17)      HPI:  Patient is a 75 yo male pmh h/o back surgery ( LS in 2016 and Thoracic spine in 2018 and after thoracic spine surgery has been in the wheel chair ), alcohol abuse stopped using alcohol 15 years ago, HTN, BPH , hypothyroidism who follows with ENT Dr. Boudreaux for his routine ENT visits and mentioned diffculty swallowing to both liquid and solid ongoing for more than 1 years as per pt and his wife, so ENT did scope in the office, without significant findings as per pt's wife so he was sent for modified barium swallow which was abnormal and patient was sent by ENT to the ER for further plan. pt's wife stated that he slowly swallows his pills and food does not go down, with some food passing through and rest he brings up. no cp, no sob. no n/v/d. no abd. pain, no cough, no fever. has left hell non healing ulcer for long time.  GI consulted for further evaluation. Patient seen and evaluated at bedside, reporting no complaints. At this time, Denies nausea, vomiting, abdominal pain, chest pain, shortness of breath, hematemesis, hematochezia, melena. Requesting to eat food. Endorses some weight loss, unable to states amount. Patient reports prior colonoscopy "many years ago" without significant findings. Denies any prior EGD.       PAST MEDICAL & SURGICAL HISTORY:  Diabetes    OA (osteoarthritis)    Dyslipidemia    Hypothyroid    HTN (hypertension)    BPH (benign prostatic hyperplasia)    S/P hip replacement  R    S/P hernia repair    S/P laminectomy  lumbar and thoracic        Allergies    No Known Allergies    Intolerances        MEDICATIONS  (STANDING):  heparin   Injectable 5000 Unit(s) SubCutaneous every 12 hours  levothyroxine Injectable 75 MICROGram(s) IV Push at bedtime  metoprolol tartrate Injectable 2.5 milliGRAM(s) IV Push every 12 hours    MEDICATIONS  (PRN):      Social History:    Marital Status:  (   )    (   ) Single    (   )    (  )   Occupation:   Lives with: (  ) alone  (  ) children   (  ) spouse   (  ) parents  (  ) other    Substance Use (street drugs): (  ) never used  (  ) other:  Tobacco Usage:  (   ) never smoked   (   ) former smoker   (   ) current smoker  (     ) pack year  (        ) last cigarette date  Alcohol Usage:  Sexual History:     Family History   IBD (  ) Yes   (  ) No  GI Malignancy (  )  Yes    (  ) No    Health Management     Last Colonoscopy - Many years ago. w/o significant findings.       (     ) Advanced Directives: (     ) None    (      ) DNR    (     ) DNI    (     ) Health Care Proxy:     Review of Systems:    General:  No wt loss, fevers, chills, night sweats, fatigue,   CV:  No pain, palpitations, hypo/hypertension  Resp:  No dyspnea, cough, tachypnea, wheezing  GI:  See HPI  :  No pain, bleeding, incontinence, nocturia  Muscle:  No pain, weakness  Neuro:  No weakness, tingling, memory problems  Psych:  No fatigue, insomnia, mood problems, depression  Endocrine:  No polyuria, polydypsia, cold/heat intolerance  Heme:  No petechiae, ecchymosis, easy bruisability  Skin:  No rash, tattoos, scars, edema      Vital Signs Last 24 Hrs  T(C): 36.6 (22 Mar 2022 04:41), Max: 36.9 (21 Mar 2022 15:47)  T(F): 97.9 (22 Mar 2022 04:41), Max: 98.5 (21 Mar 2022 15:47)  HR: 85 (22 Mar 2022 04:41) (77 - 87)  BP: 177/84 (22 Mar 2022 04:41) (152/81 - 177/84)  BP(mean): --  RR: 18 (22 Mar 2022 04:41) (18 - 20)  SpO2: 98% (22 Mar 2022 04:41) (97% - 98%)    PHYSICAL EXAM:    Constitutional: Elderly male, NAD  Neck: No LAD, supple  Respiratory: CTA and P  Cardiovascular: S1 and S2, RRR, no M  Gastrointestinal: BS+, soft, NT/ND, neg HSM,  Extremities: No peripheral edema, neg clubbing, cyanosis  Vascular: 2+ peripheral pulses  Neurological: A/O x 3, no focal deficits  Psychiatric: Normal mood, normal affect  Skin: No rashes        LABS:                        10.9   4.79  )-----------( 290      ( 21 Mar 2022 17:17 )             34.7     03-22    136  |  102  |  13.5  ----------------------------<  71  3.8   |  24.0  |  0.60    Ca    8.5<L>      22 Mar 2022 07:43    TPro  7.4  /  Alb  3.5  /  TBili  0.4  /  DBili  x   /  AST  43<H>  /  ALT  25  /  AlkPhos  190<H>  03-21    PT/INR - ( 21 Mar 2022 18:49 )   PT: 15.4 sec;   INR: 1.32 ratio         PTT - ( 21 Mar 2022 18:49 )  PTT:37.0 sec    LIVER FUNCTIONS - ( 21 Mar 2022 17:17 )  Alb: 3.5 g/dL / Pro: 7.4 g/dL / ALK PHOS: 190 U/L / ALT: 25 U/L / AST: 43 U/L / GGT: x             RADIOLOGY & ADDITIONAL TESTS:         Patient is a 76y old  Male who presents with a chief complaint of Dysphagia (21 Mar 2022 23:17)      HPI:  Patient is a 77 yo male PMH of HTN, BPH , hypothyroidism, hernia repair 2015, cervical radiculopathy, T10-T11 lami 2016, T10-L1 fusion 2018, wheelchair bound since last surgery, ETOH abuse stopped using alcohol 15 years ago who follows with ENT Dr. Boudreaux for his routine ENT visits and mentioned difficulty swallowing to both liquid and solid ongoing for more than 2 years as per pt and his wife, so ENT did scope in the office, without significant findings as per pt's wife so he was sent for modified barium swallow which was abnormal and patient was sent by ENT to the ER for further plan. pt's wife stated that he slowly swallows his pills and food does not go down, with some food passing through and rest he brings up. no cp, no sob. no n/v/d. no abd. pain, no cough, no fever. has left hell non healing ulcer for long time.  GI consulted for further evaluation. Patient seen and evaluated at bedside, reporting no complaints. At this time, Denies nausea, vomiting, abdominal pain, chest pain, shortness of breath, hematemesis, hematochezia, melena. Requesting to eat food. Endorses some weight loss, unable to states amount. Patient reports prior colonoscopy "many years ago" without significant findings. Denies any prior EGD. MBS/speech and swallow eval failed as patient with inconsistent ability to clear penetrated/aspirated material from laryngeal vestibule despite expectoration. Recommending NPO w/consideration for non-oral alternative means of nutrition/hydration.       PAST MEDICAL & SURGICAL HISTORY:  Diabetes    OA (osteoarthritis)    Dyslipidemia    Hypothyroid    HTN (hypertension)    BPH (benign prostatic hyperplasia)    S/P hip replacement  R    S/P hernia repair    S/P laminectomy  lumbar and thoracic        Allergies    No Known Allergies    Intolerances        MEDICATIONS  (STANDING):  heparin   Injectable 5000 Unit(s) SubCutaneous every 12 hours  levothyroxine Injectable 75 MICROGram(s) IV Push at bedtime  metoprolol tartrate Injectable 2.5 milliGRAM(s) IV Push every 12 hours    MEDICATIONS  (PRN):      Social History:    Marital Status:  (   )    (   ) Single    (   )    (  )   Occupation:   Lives with: (  ) alone  (  ) children   (  ) spouse   (  ) parents  (  ) other    Substance Use (street drugs): (  ) never used  (  ) other:  Tobacco Usage:  (   ) never smoked   (   ) former smoker   (   ) current smoker  (     ) pack year  (        ) last cigarette date  Alcohol Usage:  ETOH abuse stopped using alcohol 15 years ago  Sexual History:     Family History   IBD (  ) Yes   (  ) No  GI Malignancy (  )  Yes    (  ) No    Health Management     Last Colonoscopy - Many years ago. w/o significant findings.       (     ) Advanced Directives: (     ) None    (      ) DNR    (     ) DNI    (     ) Health Care Proxy:     Review of Systems:    General:  No wt loss, fevers, chills, night sweats, fatigue,   CV:  No pain, palpitations, hypo/hypertension  Resp:  No dyspnea, cough, tachypnea, wheezing  GI:  See HPI  :  No pain, bleeding, incontinence, nocturia  Muscle:  No pain, weakness  Neuro:  No weakness, tingling, memory problems  Psych:  No fatigue, insomnia, mood problems, depression  Endocrine:  No polyuria, polydypsia, cold/heat intolerance  Heme:  No petechiae, ecchymosis, easy bruisability  Skin:  No rash, tattoos, scars, edema      Vital Signs Last 24 Hrs  T(C): 36.6 (22 Mar 2022 04:41), Max: 36.9 (21 Mar 2022 15:47)  T(F): 97.9 (22 Mar 2022 04:41), Max: 98.5 (21 Mar 2022 15:47)  HR: 85 (22 Mar 2022 04:41) (77 - 87)  BP: 177/84 (22 Mar 2022 04:41) (152/81 - 177/84)  BP(mean): --  RR: 18 (22 Mar 2022 04:41) (18 - 20)  SpO2: 98% (22 Mar 2022 04:41) (97% - 98%)    PHYSICAL EXAM:    Constitutional: Elderly male, NAD  Neck: No LAD, supple  Respiratory: CTA and P  Cardiovascular: S1 and S2, RRR, no M  Gastrointestinal: BS+, soft, NT/ND, neg HSM,  Extremities: No peripheral edema, neg clubbing, cyanosis  Vascular: 2+ peripheral pulses  Neurological: A/O x 3, no focal deficits  Psychiatric: Normal mood, normal affect  Skin: No rashes        LABS:                        10.9   4.79  )-----------( 290      ( 21 Mar 2022 17:17 )             34.7     03-22    136  |  102  |  13.5  ----------------------------<  71  3.8   |  24.0  |  0.60    Ca    8.5<L>      22 Mar 2022 07:43    TPro  7.4  /  Alb  3.5  /  TBili  0.4  /  DBili  x   /  AST  43<H>  /  ALT  25  /  AlkPhos  190<H>  03-21    PT/INR - ( 21 Mar 2022 18:49 )   PT: 15.4 sec;   INR: 1.32 ratio         PTT - ( 21 Mar 2022 18:49 )  PTT:37.0 sec    LIVER FUNCTIONS - ( 21 Mar 2022 17:17 )  Alb: 3.5 g/dL / Pro: 7.4 g/dL / ALK PHOS: 190 U/L / ALT: 25 U/L / AST: 43 U/L / GGT: x             RADIOLOGY & ADDITIONAL TESTS:         Patient is a 76y old  Male who presents with a chief complaint of Dysphagia (21 Mar 2022 23:17)      HPI:  Patient is a 75 yo male PMH of HTN, BPH , hypothyroidism, hernia repair 2015, cervical radiculopathy, T10-T11 lami 2016, T10-L1 fusion 2018, wheelchair bound since last surgery, ETOH abuse stopped using alcohol 15 years ago who follows with ENT Dr. Boudreaux for his routine ENT visits and mentioned difficulty swallowing to both liquid and solid ongoing for more than 2 years as per pt and his wife, so ENT did scope in the office, without significant findings as per pt's wife so he was sent for modified barium swallow which was abnormal and patient was sent by ENT to the ER for further plan. pt's wife stated that he slowly swallows his pills and food does not go down, with some food passing through and rest he brings up. no cp, no sob. no n/v/d. no abd. pain, no cough, no fever. has left hell non healing ulcer for long time.  GI consulted for further evaluation. Patient seen and evaluated at bedside, reporting no complaints. At this time, Denies nausea, vomiting, abdominal pain, chest pain, shortness of breath, hematemesis, hematochezia, melena. Requesting to eat food. Endorses some weight loss, unable to states amount. Patient reports prior colonoscopy "many years ago" without significant findings. Denies any prior EGD. Outpatient modified barium swallow eval failed as patient with inconsistent ability to clear penetrated/aspirated material from laryngeal vestibule despite expectoration. Recommending NPO w/consideration for non-oral alternative means of nutrition/hydration.       PAST MEDICAL & SURGICAL HISTORY:  Diabetes    OA (osteoarthritis)    Dyslipidemia    Hypothyroid    HTN (hypertension)    BPH (benign prostatic hyperplasia)    S/P hip replacement  R    S/P hernia repair    S/P laminectomy  lumbar and thoracic        Allergies    No Known Allergies    Intolerances        MEDICATIONS  (STANDING):  heparin   Injectable 5000 Unit(s) SubCutaneous every 12 hours  levothyroxine Injectable 75 MICROGram(s) IV Push at bedtime  metoprolol tartrate Injectable 2.5 milliGRAM(s) IV Push every 12 hours    MEDICATIONS  (PRN):      Social History:    Marital Status:  (   )    (   ) Single    (   )    (  )   Occupation:   Lives with: (  ) alone  (  ) children   (  ) spouse   (  ) parents  (  ) other    Substance Use (street drugs): (  ) never used  (  ) other:  Tobacco Usage:  (   ) never smoked   (   ) former smoker   (   ) current smoker  (     ) pack year  (        ) last cigarette date  Alcohol Usage:  ETOH abuse stopped using alcohol 15 years ago  Sexual History:     Family History   IBD (  ) Yes   (  ) No  GI Malignancy (  )  Yes    (  ) No    Health Management     Last Colonoscopy - Many years ago. w/o significant findings.       (     ) Advanced Directives: (     ) None    (      ) DNR    (     ) DNI    (     ) Health Care Proxy:     Review of Systems:    General:  No wt loss, fevers, chills, night sweats, fatigue,   CV:  No pain, palpitations, hypo/hypertension  Resp:  No dyspnea, cough, tachypnea, wheezing  GI:  See HPI  :  No pain, bleeding, incontinence, nocturia  Muscle:  No pain, weakness  Neuro:  No weakness, tingling, memory problems  Psych:  No fatigue, insomnia, mood problems, depression  Endocrine:  No polyuria, polydypsia, cold/heat intolerance  Heme:  No petechiae, ecchymosis, easy bruisability  Skin:  No rash, tattoos, scars, edema      Vital Signs Last 24 Hrs  T(C): 36.6 (22 Mar 2022 04:41), Max: 36.9 (21 Mar 2022 15:47)  T(F): 97.9 (22 Mar 2022 04:41), Max: 98.5 (21 Mar 2022 15:47)  HR: 85 (22 Mar 2022 04:41) (77 - 87)  BP: 177/84 (22 Mar 2022 04:41) (152/81 - 177/84)  BP(mean): --  RR: 18 (22 Mar 2022 04:41) (18 - 20)  SpO2: 98% (22 Mar 2022 04:41) (97% - 98%)    PHYSICAL EXAM:    Constitutional: Elderly male, NAD  Neck: No LAD, supple  Respiratory: CTA and P  Cardiovascular: S1 and S2, RRR, no M  Gastrointestinal: BS+, soft, NT/ND, neg HSM,  Extremities: No peripheral edema, neg clubbing, cyanosis  Vascular: 2+ peripheral pulses  Neurological: A/O x 3, no focal deficits  Psychiatric: Normal mood, normal affect  Skin: No rashes        LABS:                        10.9   4.79  )-----------( 290      ( 21 Mar 2022 17:17 )             34.7     03-22    136  |  102  |  13.5  ----------------------------<  71  3.8   |  24.0  |  0.60    Ca    8.5<L>      22 Mar 2022 07:43    TPro  7.4  /  Alb  3.5  /  TBili  0.4  /  DBili  x   /  AST  43<H>  /  ALT  25  /  AlkPhos  190<H>  03-21    PT/INR - ( 21 Mar 2022 18:49 )   PT: 15.4 sec;   INR: 1.32 ratio         PTT - ( 21 Mar 2022 18:49 )  PTT:37.0 sec    LIVER FUNCTIONS - ( 21 Mar 2022 17:17 )  Alb: 3.5 g/dL / Pro: 7.4 g/dL / ALK PHOS: 190 U/L / ALT: 25 U/L / AST: 43 U/L / GGT: x             RADIOLOGY & ADDITIONAL TESTS:

## 2022-03-22 NOTE — CONSULT NOTE ADULT - ASSESSMENT
The patient is a 76y Male who is followed by neurology because of dysphagia    Dysphagia  At least one year duration  may have followed undiagnosed stroke  he has no symptoms c/w myasthenia gravis or Parkinson's disease   will check MRI brain  Will check myasthenia labs to make sure this is not the case    may need PEG  discussed with dr Howe    will follow with you    Andrey Padron MD PhD   [id number]

## 2022-03-23 ENCOUNTER — TRANSCRIPTION ENCOUNTER (OUTPATIENT)
Age: 77
End: 2022-03-23

## 2022-03-23 LAB
ANION GAP SERPL CALC-SCNC: 14 MMOL/L — SIGNIFICANT CHANGE UP (ref 5–17)
BUN SERPL-MCNC: 13.1 MG/DL — SIGNIFICANT CHANGE UP (ref 8–20)
CALCIUM SERPL-MCNC: 8.5 MG/DL — LOW (ref 8.6–10.2)
CHLORIDE SERPL-SCNC: 99 MMOL/L — SIGNIFICANT CHANGE UP (ref 98–107)
CO2 SERPL-SCNC: 23 MMOL/L — SIGNIFICANT CHANGE UP (ref 22–29)
CREAT SERPL-MCNC: 0.63 MG/DL — SIGNIFICANT CHANGE UP (ref 0.5–1.3)
EGFR: 97 ML/MIN/1.73M2 — SIGNIFICANT CHANGE UP
GLUCOSE BLDC GLUCOMTR-MCNC: 102 MG/DL — HIGH (ref 70–99)
GLUCOSE BLDC GLUCOMTR-MCNC: 113 MG/DL — HIGH (ref 70–99)
GLUCOSE BLDC GLUCOMTR-MCNC: 51 MG/DL — CRITICAL LOW (ref 70–99)
GLUCOSE BLDC GLUCOMTR-MCNC: 53 MG/DL — CRITICAL LOW (ref 70–99)
GLUCOSE BLDC GLUCOMTR-MCNC: 57 MG/DL — LOW (ref 70–99)
GLUCOSE BLDC GLUCOMTR-MCNC: 59 MG/DL — LOW (ref 70–99)
GLUCOSE BLDC GLUCOMTR-MCNC: 60 MG/DL — LOW (ref 70–99)
GLUCOSE BLDC GLUCOMTR-MCNC: 62 MG/DL — LOW (ref 70–99)
GLUCOSE BLDC GLUCOMTR-MCNC: 73 MG/DL — SIGNIFICANT CHANGE UP (ref 70–99)
GLUCOSE BLDC GLUCOMTR-MCNC: 78 MG/DL — SIGNIFICANT CHANGE UP (ref 70–99)
GLUCOSE BLDC GLUCOMTR-MCNC: 85 MG/DL — SIGNIFICANT CHANGE UP (ref 70–99)
GLUCOSE BLDC GLUCOMTR-MCNC: 95 MG/DL — SIGNIFICANT CHANGE UP (ref 70–99)
GLUCOSE SERPL-MCNC: 57 MG/DL — LOW (ref 70–99)
HCT VFR BLD CALC: 35.5 % — LOW (ref 39–50)
HGB BLD-MCNC: 11.3 G/DL — LOW (ref 13–17)
MCHC RBC-ENTMCNC: 28 PG — SIGNIFICANT CHANGE UP (ref 27–34)
MCHC RBC-ENTMCNC: 31.8 GM/DL — LOW (ref 32–36)
MCV RBC AUTO: 88.1 FL — SIGNIFICANT CHANGE UP (ref 80–100)
PLATELET # BLD AUTO: 272 K/UL — SIGNIFICANT CHANGE UP (ref 150–400)
POTASSIUM SERPL-MCNC: 3.9 MMOL/L — SIGNIFICANT CHANGE UP (ref 3.5–5.3)
POTASSIUM SERPL-SCNC: 3.9 MMOL/L — SIGNIFICANT CHANGE UP (ref 3.5–5.3)
RBC # BLD: 4.03 M/UL — LOW (ref 4.2–5.8)
RBC # FLD: 14.6 % — HIGH (ref 10.3–14.5)
SODIUM SERPL-SCNC: 136 MMOL/L — SIGNIFICANT CHANGE UP (ref 135–145)
WBC # BLD: 4.83 K/UL — SIGNIFICANT CHANGE UP (ref 3.8–10.5)
WBC # FLD AUTO: 4.83 K/UL — SIGNIFICANT CHANGE UP (ref 3.8–10.5)

## 2022-03-23 PROCEDURE — 99233 SBSQ HOSP IP/OBS HIGH 50: CPT

## 2022-03-23 RX ORDER — DEXTROSE 50 % IN WATER 50 %
25 SYRINGE (ML) INTRAVENOUS ONCE
Refills: 0 | Status: DISCONTINUED | OUTPATIENT
Start: 2022-03-23 | End: 2022-03-29

## 2022-03-23 RX ORDER — DEXTROSE 50 % IN WATER 50 %
25 SYRINGE (ML) INTRAVENOUS ONCE
Refills: 0 | Status: COMPLETED | OUTPATIENT
Start: 2022-03-23 | End: 2022-03-23

## 2022-03-23 RX ORDER — DEXTROSE 50 % IN WATER 50 %
12.5 SYRINGE (ML) INTRAVENOUS ONCE
Refills: 0 | Status: COMPLETED | OUTPATIENT
Start: 2022-03-23 | End: 2022-03-23

## 2022-03-23 RX ORDER — CEFAZOLIN SODIUM 1 G
1000 VIAL (EA) INJECTION ONCE
Refills: 0 | Status: COMPLETED | OUTPATIENT
Start: 2022-03-24 | End: 2022-03-24

## 2022-03-23 RX ORDER — GLUCAGON INJECTION, SOLUTION 0.5 MG/.1ML
1 INJECTION, SOLUTION SUBCUTANEOUS ONCE
Refills: 0 | Status: DISCONTINUED | OUTPATIENT
Start: 2022-03-23 | End: 2022-03-29

## 2022-03-23 RX ORDER — DEXTROSE 50 % IN WATER 50 %
12.5 SYRINGE (ML) INTRAVENOUS ONCE
Refills: 0 | Status: DISCONTINUED | OUTPATIENT
Start: 2022-03-23 | End: 2022-03-29

## 2022-03-23 RX ORDER — SODIUM CHLORIDE 9 MG/ML
1000 INJECTION, SOLUTION INTRAVENOUS
Refills: 0 | Status: DISCONTINUED | OUTPATIENT
Start: 2022-03-23 | End: 2022-03-24

## 2022-03-23 RX ORDER — DEXTROSE 50 % IN WATER 50 %
15 SYRINGE (ML) INTRAVENOUS ONCE
Refills: 0 | Status: DISCONTINUED | OUTPATIENT
Start: 2022-03-23 | End: 2022-03-29

## 2022-03-23 RX ORDER — HEPARIN SODIUM 5000 [USP'U]/ML
5000 INJECTION INTRAVENOUS; SUBCUTANEOUS ONCE
Refills: 0 | Status: COMPLETED | OUTPATIENT
Start: 2022-03-23 | End: 2022-03-23

## 2022-03-23 RX ADMIN — Medication 75 MICROGRAM(S): at 21:30

## 2022-03-23 RX ADMIN — SODIUM CHLORIDE 75 MILLILITER(S): 9 INJECTION, SOLUTION INTRAVENOUS at 05:50

## 2022-03-23 RX ADMIN — HEPARIN SODIUM 5000 UNIT(S): 5000 INJECTION INTRAVENOUS; SUBCUTANEOUS at 18:10

## 2022-03-23 RX ADMIN — Medication 25 MILLILITER(S): at 01:08

## 2022-03-23 RX ADMIN — Medication 12.5 GRAM(S): at 14:54

## 2022-03-23 RX ADMIN — Medication 1 APPLICATION(S): at 14:36

## 2022-03-23 RX ADMIN — SODIUM CHLORIDE 75 MILLILITER(S): 9 INJECTION, SOLUTION INTRAVENOUS at 23:37

## 2022-03-23 RX ADMIN — HEPARIN SODIUM 5000 UNIT(S): 5000 INJECTION INTRAVENOUS; SUBCUTANEOUS at 05:42

## 2022-03-23 RX ADMIN — Medication 2.5 MILLIGRAM(S): at 18:10

## 2022-03-23 RX ADMIN — Medication 2.5 MILLIGRAM(S): at 05:41

## 2022-03-23 RX ADMIN — Medication 25 MILLILITER(S): at 05:49

## 2022-03-23 NOTE — PROGRESS NOTE ADULT - SUBJECTIVE AND OBJECTIVE BOX
[facility] Division of Hospital Medicine    Chief Complaint:  Dysphagia    SUBJECTIVE / OVERNIGHT EVENTS: patient was hypoglycemic overnight and started on D5. Patient seen and examined. Asking when PEG will be placed. Discussed MRI results.     Patient denies chest pain, SOB, abd pain, N/V, fever, chills, dysuria or any other complaints. All remainder ROS negative.     MEDICATIONS  (STANDING):  Dakins Solution - 1/4 Strength 1 Application(s) Topical daily  dextrose 5% + sodium chloride 0.9%. 1000 milliLiter(s) (75 mL/Hr) IV Continuous <Continuous>  heparin   Injectable 5000 Unit(s) SubCutaneous every 12 hours  levothyroxine Injectable 75 MICROGram(s) IV Push at bedtime  metoprolol tartrate Injectable 2.5 milliGRAM(s) IV Push every 12 hours    MEDICATIONS  (PRN):        I&O's Summary    22 Mar 2022 07:01  -  23 Mar 2022 07:00  --------------------------------------------------------  IN: 0 mL / OUT: 530 mL / NET: -530 mL        PHYSICAL EXAM:  Vital Signs Last 24 Hrs  T(C): 36.6 (23 Mar 2022 08:15), Max: 36.8 (22 Mar 2022 20:19)  T(F): 97.8 (23 Mar 2022 08:15), Max: 98.2 (22 Mar 2022 20:19)  HR: 77 (23 Mar 2022 08:15) (77 - 98)  BP: 168/79 (23 Mar 2022 08:15) (138/72 - 172/82)  BP(mean): --  RR: 18 (23 Mar 2022 08:15) (18 - 18)  SpO2: 99% (23 Mar 2022 08:15) (98% - 99%)      CONSTITUTIONAL: NAD,   ENMT: Moist oral mucosa, no pharyngeal injection or exudates  RESPIRATORY: Normal respiratory effort; lungs are clear to auscultation bilaterally  CARDIOVASCULAR: Regular rate and rhythm, normal S1 and S2, No lower extremity edema;   ABDOMEN: Nontender to palpation, normoactive bowel sounds, no rebound/guarding;   MUSCLOSKELETAL:  no clubbing or cyanosis of digits; no joint swelling or tenderness to palpation  PSYCH: A+O to person, place, and time; affect appropriate  NEUROLOGY: CN 2-12 are intact and symmetric; no gross sensory deficits;   SKIN: No rashes; no palpable lesions    LABS:                        11.3   4.83  )-----------( 272      ( 23 Mar 2022 05:32 )             35.5     03-23    136  |  99  |  13.1  ----------------------------<  57<L>  3.9   |  23.0  |  0.63    Ca    8.5<L>      23 Mar 2022 05:32    TPro  7.4  /  Alb  3.5  /  TBili  0.4  /  DBili  x   /  AST  43<H>  /  ALT  25  /  AlkPhos  190<H>  03-21    PT/INR - ( 21 Mar 2022 18:49 )   PT: 15.4 sec;   INR: 1.32 ratio         PTT - ( 21 Mar 2022 18:49 )  PTT:37.0 sec          CAPILLARY BLOOD GLUCOSE      POCT Blood Glucose.: 95 mg/dL (23 Mar 2022 06:12)  POCT Blood Glucose.: 57 mg/dL (23 Mar 2022 05:38)  POCT Blood Glucose.: 60 mg/dL (23 Mar 2022 05:37)  POCT Blood Glucose.: 113 mg/dL (23 Mar 2022 01:28)  POCT Blood Glucose.: 51 mg/dL (23 Mar 2022 00:49)  POCT Blood Glucose.: 53 mg/dL (23 Mar 2022 00:48)        RADIOLOGY & ADDITIONAL TESTS:  Results Reviewed:   Imaging Personally Reviewed:  Electrocardiogram Personally Reviewed:

## 2022-03-23 NOTE — PROGRESS NOTE ADULT - ASSESSMENT
The patient is a 76y Male who is followed by neurology because of dysphagia    Dysphagia  At least one year duration  may have followed undiagnosed stroke  he has no symptoms c/w myasthenia gravis or Parkinson's disease  MRI brain does not explain dysphagia  Will check myasthenia labs to make sure this is not the case- labs pending    may need PEG for nutrition    will follow with you    Andrey Padron MD PhD   [phone]

## 2022-03-23 NOTE — PROGRESS NOTE ADULT - ASSESSMENT
77 yo male pmh h/o back surgery ( LS in 2016 and Thoracic spine in 2018 and after thoracic spine surgery has been in the wheel chair )  alcohol abuse stopped using alcohol 15 years ago, htn, bph , hypothyroidism who follows with ENT Dr. Boudreaux for his routine ENT visits and mentioned diffculty swallowing to both liquid and solid ongoing for more thana year as per pt's wife, so ENT did scope in the office, none was found as per pt's wife so he was sent for modified bairum swallow which was abnormal and pt. was sent by ENT to the ER for further plan. pt's wife stated that he slowly swallows his pills and food does not go down, may be some goes and rest he brings up.    Dysphagia  - NPO  - GI consult appreciated, PEG placement tomorrow.   - Speech and swallow evaluated outpatient with MBS showing massive aspiration  - Neurology consulted, MRI no stroke  - IVF    Hypoglycemia due to NPO status  - D5NS IV  - monitor bg q 6 hours    Chronic left heel ulcer  - Podiatry consulted, WCO: Please clean the wound with Dakin solution, apply aquacel, 4x4 gauze, Abd pad, Bret and ACE to the left heel wound     HTN  - IV Lopressor    Hypothyroidism  - Synthroid 75mcg daily    HLD  - Statin can be restarted once NPO status resolved    DVT ppx  - Heparin SQ

## 2022-03-23 NOTE — PROGRESS NOTE ADULT - PROBLEM SELECTOR PLAN 1
Tentative PEG placement tomorrow   Continue NPO with IVF.   Continue to trend CBC, CMP.   Neuro input appreciated, MRI showed no acute infarction.

## 2022-03-23 NOTE — PROGRESS NOTE ADULT - SUBJECTIVE AND OBJECTIVE BOX
[facility]                                     Ne[facility] [place]                                 Nereida Kim, & Braulio                                  [address]                                        [place], [place], [address]                                             [phone]    CC: dysphagia  HPI:  The patient is a 76y Male who presented with dysphagia, had difficulty swallowing for at least a year.  His wife says that just before his swallowing issues started he was een at [facility] for dysathria and facial droop butr told he did not have a stroke.  No further work up was done per his wife.  A modified barium swallow study yesterday confirmed the dysphagia and he was sent to [facility] for admission and work up.  Neurology was asked to evaluate.  he denies weakness or diplopia.      Interval history: no new neuro complaints    Review of systems (neurology): Denies headache or dizziness. Denies weakness/numbness.  Denies speech/language deficits. Denies diplopia/blurred vision.  Denies confusion (+) dysphagia    MEDICATIONS  (STANDING):  Dakins Solution - 1/4 Strength 1 Application(s) Topical daily  dextrose 40% Gel 15 Gram(s) Oral once  dextrose 5% + sodium chloride 0.9%. 1000 milliLiter(s) (75 mL/Hr) IV Continuous <Continuous>  dextrose 50% Injectable 25 Gram(s) IV Push once  dextrose 50% Injectable 12.5 Gram(s) IV Push once  dextrose 50% Injectable 25 Gram(s) IV Push once  glucagon  Injectable 1 milliGRAM(s) IntraMuscular once  heparin   Injectable 5000 Unit(s) SubCutaneous once  levothyroxine Injectable 75 MICROGram(s) IV Push at bedtime  metoprolol tartrate Injectable 2.5 milliGRAM(s) IV Push every 12 hours    MEDICATIONS  (PRN):      Vital Signs Last 24 Hrs  T(C): 36.6 (23 Mar 2022 08:15), Max: 36.8 (22 Mar 2022 20:19)  T(F): 97.8 (23 Mar 2022 08:15), Max: 98.2 (22 Mar 2022 20:19)  HR: 77 (23 Mar 2022 08:15) (77 - 98)  BP: 168/79 (23 Mar 2022 08:15) (138/72 - 172/82)  BP(mean): --  RR: 18 (23 Mar 2022 08:15) (18 - 18)  SpO2: 99% (23 Mar 2022 08:15) (98% - 99%)    Detailed Neurologic Exam:    Mental status: The patient is awake and alert and has normal attention span.  The patient is oriented to self and hospital  The patient is able to name objects, follow commands, repeat sentences. No dysarthria    Cranial nerves: Pupils equal and react symmetrically to light. There is no visual field deficit to confrontation. Extraocular motion is full with no nystagmus. There is no ptosis. Facial sensation is intact. Facial musculature is symmetric. Palate elevates symmetrically. Tongue is midline.    Motor: There is normal bulk and tone.  There is no tremor.  Strength is 5/5 in the right arm and  4/5 leg.   Strength is 5/5 in the left arm and 4+/5 leg.    Sensation: Intact to light touch and pin in 4 extremities    Reflexes: 1 + throughout     Cerebellar: There is no dysmetria on finger to nose testing.    Gait : deferred    LABS:                         10.9   4.79  )-----------( 290      ( 21 Mar 2022 17:17 )             34.7       03-22    136  |  102  |  13.5  ----------------------------<  71  3.8   |  24.0  |  0.60    Ca    8.5<L>      22 Mar 2022 07:43    TPro  7.4  /  Alb  3.5  /  TBili  0.4  /  DBili  x   /  AST  43<H>  /  ALT  25  /  AlkPhos  190<H>  03-21      PT/INR - ( 21 Mar 2022 18:49 )   PT: 15.4 sec;   INR: 1.32 ratio         PTT - ( 21 Mar 2022 18:49 )  PTT:37.0 sec    RADIOLOGY & ADDITIONAL STUDIES (independently reviewed unless otherwise noted):  MRI brain- no acute CVA, mass or blood  (+) SVID and old b/l BG lacunes, left cbl stroke      MBSS:  · Diagnostic Impressions	Pt presents w/moderate oral & severe pharyngeal dysphagia. Oral stage characterized by functional PO acceptance, however decreased lingual strength (w/intermittent lingual pumping), resulting in inadequate bolus formation/cohesion/propulsion, w/subsequent min-mod increased oral transit time. Consistent premature pharyngeal entry noted variably between the mic & hypopharynx, more consistently to the pyriforms w/moderately thick liquids. No anterior loss or oral stasis visualized.    Pharyngeal phase of swallow marked by reduced contractility, decreased hyo-laryngeal elevation/excursion, incomplete epiglottic deflection and reduced upper/lower airway protection (which is not facilitated w/either compensatory postures or increasingly viscous consistencies). Moderate valleculae residue noted across trials, & trace-min on the posterior pharyngeal wall & in the pyriforms. Residue reduced with cued & independent subsequent dry swallow x 2-3. +Silent aspiration of soft & bite-sized & regular solids head neutral. Additional +silent aspiration of moderately thick liquids via tsp via chin tuck posture. +Aspiration with sensation of puree w/chin tuck. +Penetration contacting the vocal cords without clearance of puree w/L head turn & of moderately thick liquids via tsp w/R head turn. +Penetration above the cords without clearance puree head neutral/R head turn, & moderately thick liquids via tsp head neutral/L head turn. All penetration/aspiration intermittently visualized during/following the swallow w/all consistencies provided. Pt with inconsistent ability to clear penetrated/aspirated material from laryngeal vestibule despite expectoration.

## 2022-03-23 NOTE — PROVIDER CONTACT NOTE (CRITICAL VALUE NOTIFICATION) - ACTION/TREATMENT ORDERED:
25 ml of dextrose 50% ordered. Will recheck Blood Sugar after 15 minutes.
PA ordered 25ml of Dextrose 50% and NS and D5 fluids at 75cc/hr. Repeat blood sugar in 15 minutes.

## 2022-03-23 NOTE — PROGRESS NOTE ADULT - SUBJECTIVE AND OBJECTIVE BOX
Patient is a 76y old  Male who presents with a chief complaint of Dysphagia (22 Mar 2022 17:33)      INTERVAL HPI/OVERNIGHT EVENTS: Patient seen and evaluated at bedside, reporting no complaints, no overnight events, NPO, as per records 2 episodes of Hypoglycemic since last night, now started on D5NS at 75 cc/hr. Denies nausea, vomiting, abdominal pain, chest pain, shortness of breath, hematemesis, hematochezia, melena.      MEDICATIONS  (STANDING):  Dakins Solution - 1/4 Strength 1 Application(s) Topical daily  dextrose 5% + sodium chloride 0.9%. 1000 milliLiter(s) (75 mL/Hr) IV Continuous <Continuous>  heparin   Injectable 5000 Unit(s) SubCutaneous every 12 hours  levothyroxine Injectable 75 MICROGram(s) IV Push at bedtime  metoprolol tartrate Injectable 2.5 milliGRAM(s) IV Push every 12 hours    MEDICATIONS  (PRN):      Allergies    No Known Allergies    Intolerances    Review of Systems:  · ENMT: negative  · Respiratory and Thorax: negative  · Cardiovascular: negative  · Gastrointestinal: see above.  · Genitourinary:	negative  · Musculoskeletal: negative  · Neurological: negative  · Psychiatric: negative  · Hematology/Lymphatics: negative  · Endocrine: negative      Vital Signs Last 24 Hrs  T(C): 36.6 (23 Mar 2022 08:15), Max: 36.8 (22 Mar 2022 20:19)  T(F): 97.8 (23 Mar 2022 08:15), Max: 98.2 (22 Mar 2022 20:19)  HR: 77 (23 Mar 2022 08:15) (77 - 98)  BP: 168/79 (23 Mar 2022 08:15) (138/72 - 172/82)  BP(mean): --  RR: 18 (23 Mar 2022 08:15) (18 - 18)  SpO2: 99% (23 Mar 2022 08:15) (98% - 99%)    PHYSICAL EXAM:  · Constitutional: Elderly looking cachectic man, in no acute distress.   · Eyes: EOMI; PERRL; no drainage or redness  · ENMT: No oral lesions; no gross abnormalities  · Neck:	No bruits; no thyromegaly or nodules  · Back:	No deformity or limitation of movement  · Respiratory: Breath Sounds equal & clear to percussion & auscultation, no accessory muscle use  · Cardiovascular: Regular rate & rhythm, normal S1, S2; no murmurs, gallops or rubs; no S3, S4  · Gastrointestinal: Soft, non-tender, no hepatosplenomegaly, normal bowel sounds      LABS:                        11.3   4.83  )-----------( 272      ( 23 Mar 2022 05:32 )             35.5     03-23    136  |  99  |  13.1  ----------------------------<  57<L>  3.9   |  23.0  |  0.63    Ca    8.5<L>      23 Mar 2022 05:32    TPro  7.4  /  Alb  3.5  /  TBili  0.4  /  DBili  x   /  AST  43<H>  /  ALT  25  /  AlkPhos  190<H>  03-21    PT/INR - ( 21 Mar 2022 18:49 )   PT: 15.4 sec;   INR: 1.32 ratio         PTT - ( 21 Mar 2022 18:49 )  PTT:37.0 sec    LIVER FUNCTIONS - ( 21 Mar 2022 17:17 )  Alb: 3.5 g/dL / Pro: 7.4 g/dL / ALK PHOS: 190 U/L / ALT: 25 U/L / AST: 43 U/L / GGT: x             RADIOLOGY & ADDITIONAL TESTS:  < from: Xray Chest 1 View- PORTABLE-Urgent (Xray Chest 1 View- PORTABLE-Urgent .) (03.21.22 @ 17:42) >  ACC: [id number] EXAM:  XR CHEST PORTABLE URGENT 1V                          PROCEDURE DATE:  03/21/2022          INTERPRETATION:  Clinical history: 76-year-old male, difficulty   swallowing.    Portable view of the chest is compared to 1/10/2019.    FINDINGS: Normal cardiac silhouette and normal pulmonary vasculature with   no consolidation, effusion, gross adenopathy, pneumothorax or acute   osseous finding.    Right PICC line has been removed.    IMPRESSION:  No acute radiographic findings and no change, modified barium swallowing   study performed       Propranolol Pregnancy And Lactation Text: This medication is Pregnancy Category C and it isn't known if it is safe during pregnancy. It is excreted in breast milk.

## 2022-03-24 LAB
ALBUMIN SERPL ELPH-MCNC: 3.2 G/DL — LOW (ref 3.3–5.2)
ALP SERPL-CCNC: 153 U/L — HIGH (ref 40–120)
ALT FLD-CCNC: 23 U/L — SIGNIFICANT CHANGE UP
ANION GAP SERPL CALC-SCNC: 11 MMOL/L — SIGNIFICANT CHANGE UP (ref 5–17)
AST SERPL-CCNC: 40 U/L — HIGH
BILIRUB SERPL-MCNC: 0.6 MG/DL — SIGNIFICANT CHANGE UP (ref 0.4–2)
BUN SERPL-MCNC: 10.3 MG/DL — SIGNIFICANT CHANGE UP (ref 8–20)
CALCIUM SERPL-MCNC: 8.2 MG/DL — LOW (ref 8.6–10.2)
CHLORIDE SERPL-SCNC: 103 MMOL/L — SIGNIFICANT CHANGE UP (ref 98–107)
CO2 SERPL-SCNC: 25 MMOL/L — SIGNIFICANT CHANGE UP (ref 22–29)
CREAT SERPL-MCNC: 0.54 MG/DL — SIGNIFICANT CHANGE UP (ref 0.5–1.3)
EGFR: 102 ML/MIN/1.73M2 — SIGNIFICANT CHANGE UP
GLUCOSE BLDC GLUCOMTR-MCNC: 75 MG/DL — SIGNIFICANT CHANGE UP (ref 70–99)
GLUCOSE BLDC GLUCOMTR-MCNC: 81 MG/DL — SIGNIFICANT CHANGE UP (ref 70–99)
GLUCOSE BLDC GLUCOMTR-MCNC: 86 MG/DL — SIGNIFICANT CHANGE UP (ref 70–99)
GLUCOSE BLDC GLUCOMTR-MCNC: 88 MG/DL — SIGNIFICANT CHANGE UP (ref 70–99)
GLUCOSE BLDC GLUCOMTR-MCNC: 97 MG/DL — SIGNIFICANT CHANGE UP (ref 70–99)
GLUCOSE SERPL-MCNC: 79 MG/DL — SIGNIFICANT CHANGE UP (ref 70–99)
HCT VFR BLD CALC: 34.6 % — LOW (ref 39–50)
HGB BLD-MCNC: 10.8 G/DL — LOW (ref 13–17)
MAGNESIUM SERPL-MCNC: 1.6 MG/DL — SIGNIFICANT CHANGE UP (ref 1.6–2.6)
MCHC RBC-ENTMCNC: 27.7 PG — SIGNIFICANT CHANGE UP (ref 27–34)
MCHC RBC-ENTMCNC: 31.2 GM/DL — LOW (ref 32–36)
MCV RBC AUTO: 88.7 FL — SIGNIFICANT CHANGE UP (ref 80–100)
PLATELET # BLD AUTO: 261 K/UL — SIGNIFICANT CHANGE UP (ref 150–400)
POTASSIUM SERPL-MCNC: 3.7 MMOL/L — SIGNIFICANT CHANGE UP (ref 3.5–5.3)
POTASSIUM SERPL-SCNC: 3.7 MMOL/L — SIGNIFICANT CHANGE UP (ref 3.5–5.3)
PROT SERPL-MCNC: 7.1 G/DL — SIGNIFICANT CHANGE UP (ref 6.6–8.7)
RBC # BLD: 3.9 M/UL — LOW (ref 4.2–5.8)
RBC # FLD: 14.6 % — HIGH (ref 10.3–14.5)
SODIUM SERPL-SCNC: 139 MMOL/L — SIGNIFICANT CHANGE UP (ref 135–145)
WBC # BLD: 4.22 K/UL — SIGNIFICANT CHANGE UP (ref 3.8–10.5)
WBC # FLD AUTO: 4.22 K/UL — SIGNIFICANT CHANGE UP (ref 3.8–10.5)

## 2022-03-24 PROCEDURE — 99232 SBSQ HOSP IP/OBS MODERATE 35: CPT

## 2022-03-24 PROCEDURE — 43246 EGD PLACE GASTROSTOMY TUBE: CPT

## 2022-03-24 RX ORDER — SODIUM CHLORIDE 9 MG/ML
1000 INJECTION, SOLUTION INTRAVENOUS
Refills: 0 | Status: DISCONTINUED | OUTPATIENT
Start: 2022-03-24 | End: 2022-03-25

## 2022-03-24 RX ORDER — MAGNESIUM SULFATE 500 MG/ML
2 VIAL (ML) INJECTION ONCE
Refills: 0 | Status: COMPLETED | OUTPATIENT
Start: 2022-03-24 | End: 2022-03-24

## 2022-03-24 RX ORDER — ONDANSETRON 8 MG/1
4 TABLET, FILM COATED ORAL ONCE
Refills: 0 | Status: COMPLETED | OUTPATIENT
Start: 2022-03-24 | End: 2022-03-24

## 2022-03-24 RX ADMIN — Medication 75 MICROGRAM(S): at 21:20

## 2022-03-24 RX ADMIN — ONDANSETRON 4 MILLIGRAM(S): 8 TABLET, FILM COATED ORAL at 19:11

## 2022-03-24 RX ADMIN — Medication 2.5 MILLIGRAM(S): at 19:11

## 2022-03-24 RX ADMIN — SODIUM CHLORIDE 70 MILLILITER(S): 9 INJECTION, SOLUTION INTRAVENOUS at 15:30

## 2022-03-24 RX ADMIN — Medication 25 GRAM(S): at 19:14

## 2022-03-24 RX ADMIN — Medication 1 APPLICATION(S): at 19:16

## 2022-03-24 RX ADMIN — Medication 100 MILLIGRAM(S): at 12:00

## 2022-03-24 NOTE — PROGRESS NOTE ADULT - SUBJECTIVE AND OBJECTIVE BOX
[facility] Division of Hospital Medicine    Chief Complaint:  Dysphagia    SUBJECTIVE / OVERNIGHT EVENTS: Patient seen and examined. Asking when the procedure will be. Only complaint is he is hungry.     Patient denies chest pain, SOB, abd pain, N/V, fever, chills, dysuria or any other complaints. All remainder ROS negative.     MEDICATIONS  (STANDING):  ceFAZolin   IVPB 1000 milliGRAM(s) IV Intermittent once  Dakins Solution - 1/4 Strength 1 Application(s) Topical daily  dextrose 40% Gel 15 Gram(s) Oral once  dextrose 5% + sodium chloride 0.9%. 1000 milliLiter(s) (70 mL/Hr) IV Continuous <Continuous>  dextrose 50% Injectable 25 Gram(s) IV Push once  dextrose 50% Injectable 12.5 Gram(s) IV Push once  dextrose 50% Injectable 25 Gram(s) IV Push once  glucagon  Injectable 1 milliGRAM(s) IntraMuscular once  levothyroxine Injectable 75 MICROGram(s) IV Push at bedtime  magnesium sulfate  IVPB 2 Gram(s) IV Intermittent once  metoprolol tartrate Injectable 2.5 milliGRAM(s) IV Push every 12 hours    MEDICATIONS  (PRN):        I&O's Summary    23 Mar 2022 07:01  -  24 Mar 2022 07:00  --------------------------------------------------------  IN: 0 mL / OUT: 400 mL / NET: -400 mL        PHYSICAL EXAM:  Vital Signs Last 24 Hrs  T(C): 36.7 (24 Mar 2022 10:02), Max: 36.8 (23 Mar 2022 17:30)  T(F): 98 (24 Mar 2022 10:02), Max: 98.2 (23 Mar 2022 17:30)  HR: 78 (24 Mar 2022 10:02) (78 - 94)  BP: 156/76 (24 Mar 2022 10:02) (123/71 - 162/78)  BP(mean): --  RR: 18 (24 Mar 2022 10:02) (18 - 18)  SpO2: 98% (24 Mar 2022 10:02) (96% - 98%)      CONSTITUTIONAL: NAD,   ENMT: Moist oral mucosa, no pharyngeal injection or exudates  RESPIRATORY: Normal respiratory effort; lungs are clear to auscultation bilaterally  CARDIOVASCULAR: Regular rate and rhythm, normal S1 and S2, No lower extremity edema;   ABDOMEN: Nontender to palpation, normoactive bowel sounds, no rebound/guarding;   MUSCLOSKELETAL:  no clubbing or cyanosis of digits; no joint swelling or tenderness to palpation  PSYCH: A+O to person, place, and time; affect appropriate  NEUROLOGY: CN 2-12 are intact and symmetric; no gross sensory deficits;   SKIN: No rashes; no palpable lesions    LABS:                        10.8   4.22  )-----------( 261      ( 24 Mar 2022 07:41 )             34.6     03-24    139  |  103  |  10.3  ----------------------------<  79  3.7   |  25.0  |  0.54    Ca    8.2<L>      24 Mar 2022 07:41  Mg     1.6     03-24    TPro  7.1  /  Alb  3.2<L>  /  TBili  0.6  /  DBili  x   /  AST  40<H>  /  ALT  23  /  AlkPhos  153<H>  03-24              CAPILLARY BLOOD GLUCOSE      POCT Blood Glucose.: 75 mg/dL (24 Mar 2022 05:23)  POCT Blood Glucose.: 78 mg/dL (23 Mar 2022 23:38)  POCT Blood Glucose.: 85 mg/dL (23 Mar 2022 18:09)  POCT Blood Glucose.: 73 mg/dL (23 Mar 2022 18:08)  POCT Blood Glucose.: 102 mg/dL (23 Mar 2022 15:30)  POCT Blood Glucose.: 62 mg/dL (23 Mar 2022 14:33)  POCT Blood Glucose.: 59 mg/dL (23 Mar 2022 14:32)        RADIOLOGY & ADDITIONAL TESTS:  Results Reviewed:   Imaging Personally Reviewed:  Electrocardiogram Personally Reviewed:

## 2022-03-24 NOTE — PROGRESS NOTE ADULT - ASSESSMENT
77 yo male pmh h/o back surgery ( LS in 2016 and Thoracic spine in 2018 and after thoracic spine surgery has been in the wheel chair )  alcohol abuse stopped using alcohol 15 years ago, htn, bph , hypothyroidism who follows with ENT Dr. Boudreaux for his routine ENT visits and mentioned diffculty swallowing to both liquid and solid ongoing for more thana year as per pt's wife, so ENT did scope in the office, none was found as per pt's wife so he was sent for modified bairum swallow which was abnormal and pt. was sent by ENT to the ER for further plan. pt's wife stated that he slowly swallows his pills and food does not go down, may be some goes and rest he brings up.    Dysphagia  - NPO  - GI consult appreciated, PEG placement today  - Speech and swallow evaluated outpatient with MBS showing massive aspiration  - Neurology consulted, MRI no stroke  - IVF  - Nutrition consulted.     Hypoglycemia due to NPO status  - D5NS IV  - monitor bg q 6 hours    Chronic left heel ulcer  - Podiatry consulted, WCO: Please clean the wound with Dakin solution, apply aquacel, 4x4 gauze, Abd pad, Bret and ACE to the left heel wound     HTN  - IV Lopressor    Hypothyroidism  - Synthroid 75mcg daily    HLD  - Statin can be restarted once NPO status resolved    DVT ppx  - Heparin SQ     Dispo: Pending PEG placement.  77 yo male pmh h/o back surgery ( LS in 2016 and Thoracic spine in 2018 and after thoracic spine surgery has been in the wheel chair )  alcohol abuse stopped using alcohol 15 years ago, htn, bph , hypothyroidism who follows with ENT Dr. Boudreaux for his routine ENT visits and mentioned diffculty swallowing to both liquid and solid ongoing for more thana year as per pt's wife, so ENT did scope in the office, none was found as per pt's wife so he was sent for modified bairum swallow which was abnormal and pt. was sent by ENT to the ER for further plan. pt's wife stated that he slowly swallows his pills and food does not go down, may be some goes and rest he brings up.    Dysphagia  - NPO  - GI consult appreciated, PEG placement today  - Speech and swallow evaluated outpatient with MBS showing massive aspiration  - Neurology consulted, MRI no stroke  - IVF  - Nutrition consulted.     Hypoglycemia due to NPO status  - D5NS IV  - monitor bg q 6 hours    Chronic left heel ulcer  - Podiatry consulted, WCO: Please clean the wound with Dakin solution, apply aquacel, 4x4 gauze, Abd pad, Bret and ACE to the left heel wound     HTN  - IV Lopressor    Hypothyroidism  - Synthroid 75mcg daily    HLD  - Statin can be restarted once NPO status resolved    Hypomagnesemia  - replaced    DVT ppx  - Heparin SQ     Dispo: Pending PEG placement.

## 2022-03-25 LAB
ACRM BINDING ANTIBODY: 0.03 NMOL/L — SIGNIFICANT CHANGE UP (ref 0–0.24)
ANION GAP SERPL CALC-SCNC: 14 MMOL/L — SIGNIFICANT CHANGE UP (ref 5–17)
BUN SERPL-MCNC: 7.5 MG/DL — LOW (ref 8–20)
CALCIUM SERPL-MCNC: 8.1 MG/DL — LOW (ref 8.6–10.2)
CHLORIDE SERPL-SCNC: 100 MMOL/L — SIGNIFICANT CHANGE UP (ref 98–107)
CO2 SERPL-SCNC: 23 MMOL/L — SIGNIFICANT CHANGE UP (ref 22–29)
CREAT SERPL-MCNC: 0.55 MG/DL — SIGNIFICANT CHANGE UP (ref 0.5–1.3)
EGFR: 101 ML/MIN/1.73M2 — SIGNIFICANT CHANGE UP
GLUCOSE BLDC GLUCOMTR-MCNC: 122 MG/DL — HIGH (ref 70–99)
GLUCOSE BLDC GLUCOMTR-MCNC: 136 MG/DL — HIGH (ref 70–99)
GLUCOSE BLDC GLUCOMTR-MCNC: 137 MG/DL — HIGH (ref 70–99)
GLUCOSE BLDC GLUCOMTR-MCNC: 183 MG/DL — HIGH (ref 70–99)
GLUCOSE SERPL-MCNC: 167 MG/DL — HIGH (ref 70–99)
HCT VFR BLD CALC: 34.5 % — LOW (ref 39–50)
HGB BLD-MCNC: 10.9 G/DL — LOW (ref 13–17)
MCHC RBC-ENTMCNC: 27.9 PG — SIGNIFICANT CHANGE UP (ref 27–34)
MCHC RBC-ENTMCNC: 31.6 GM/DL — LOW (ref 32–36)
MCV RBC AUTO: 88.5 FL — SIGNIFICANT CHANGE UP (ref 80–100)
PLATELET # BLD AUTO: 242 K/UL — SIGNIFICANT CHANGE UP (ref 150–400)
POTASSIUM SERPL-MCNC: 3.7 MMOL/L — SIGNIFICANT CHANGE UP (ref 3.5–5.3)
POTASSIUM SERPL-SCNC: 3.7 MMOL/L — SIGNIFICANT CHANGE UP (ref 3.5–5.3)
RBC # BLD: 3.9 M/UL — LOW (ref 4.2–5.8)
RBC # FLD: 14.6 % — HIGH (ref 10.3–14.5)
SODIUM SERPL-SCNC: 136 MMOL/L — SIGNIFICANT CHANGE UP (ref 135–145)
WBC # BLD: 14.59 K/UL — HIGH (ref 3.8–10.5)
WBC # FLD AUTO: 14.59 K/UL — HIGH (ref 3.8–10.5)

## 2022-03-25 PROCEDURE — 99233 SBSQ HOSP IP/OBS HIGH 50: CPT

## 2022-03-25 PROCEDURE — 99232 SBSQ HOSP IP/OBS MODERATE 35: CPT

## 2022-03-25 RX ORDER — METOPROLOL TARTRATE 50 MG
25 TABLET ORAL
Refills: 0 | Status: DISCONTINUED | OUTPATIENT
Start: 2022-03-25 | End: 2022-03-26

## 2022-03-25 RX ORDER — ATORVASTATIN CALCIUM 80 MG/1
40 TABLET, FILM COATED ORAL AT BEDTIME
Refills: 0 | Status: DISCONTINUED | OUTPATIENT
Start: 2022-03-25 | End: 2022-03-29

## 2022-03-25 RX ORDER — LEVOTHYROXINE SODIUM 125 MCG
150 TABLET ORAL DAILY
Refills: 0 | Status: DISCONTINUED | OUTPATIENT
Start: 2022-03-25 | End: 2022-03-29

## 2022-03-25 RX ORDER — ASPIRIN/CALCIUM CARB/MAGNESIUM 324 MG
81 TABLET ORAL DAILY
Refills: 0 | Status: DISCONTINUED | OUTPATIENT
Start: 2022-03-25 | End: 2022-03-29

## 2022-03-25 RX ORDER — FINASTERIDE 5 MG/1
5 TABLET, FILM COATED ORAL DAILY
Refills: 0 | Status: DISCONTINUED | OUTPATIENT
Start: 2022-03-25 | End: 2022-03-29

## 2022-03-25 RX ORDER — ONDANSETRON 8 MG/1
4 TABLET, FILM COATED ORAL EVERY 6 HOURS
Refills: 0 | Status: DISCONTINUED | OUTPATIENT
Start: 2022-03-25 | End: 2022-03-29

## 2022-03-25 RX ORDER — PANTOPRAZOLE SODIUM 20 MG/1
40 TABLET, DELAYED RELEASE ORAL DAILY
Refills: 0 | Status: DISCONTINUED | OUTPATIENT
Start: 2022-03-25 | End: 2022-03-28

## 2022-03-25 RX ADMIN — ONDANSETRON 4 MILLIGRAM(S): 8 TABLET, FILM COATED ORAL at 01:56

## 2022-03-25 RX ADMIN — FINASTERIDE 5 MILLIGRAM(S): 5 TABLET, FILM COATED ORAL at 11:09

## 2022-03-25 RX ADMIN — Medication 81 MILLIGRAM(S): at 11:09

## 2022-03-25 RX ADMIN — Medication 2.5 MILLIGRAM(S): at 05:51

## 2022-03-25 RX ADMIN — SODIUM CHLORIDE 70 MILLILITER(S): 9 INJECTION, SOLUTION INTRAVENOUS at 01:56

## 2022-03-25 RX ADMIN — ONDANSETRON 4 MILLIGRAM(S): 8 TABLET, FILM COATED ORAL at 11:08

## 2022-03-25 RX ADMIN — ATORVASTATIN CALCIUM 40 MILLIGRAM(S): 80 TABLET, FILM COATED ORAL at 21:35

## 2022-03-25 RX ADMIN — Medication 25 MILLIGRAM(S): at 17:39

## 2022-03-25 NOTE — PROGRESS NOTE ADULT - SUBJECTIVE AND OBJECTIVE BOX
Podiatry interval HPI: Pt seen resting in bed in no acute distress. No new acute overnight events noted per house staff. No new complaints during rounds today.     HPI: pt. is a 75 yo male pmh h/o back surgery ( LS in 2016 and Thoracic spine in 2018 and after thoracic spine surgery has been in the wheel chair )  alcohol abuse stopped using alcohol 15 years ago, htn, bph , hypothyroidism who follows with ENT Dr. Boudreaux for his routine ENT visits and mentioned diffculty swallowing to both liquid and solid ongoing for more thana year as per pt's wife, so ENT did scope in the office, none was found as per pt's wife so he was sent for modified bairum swallow which was abnormal and pt. was sent by ENT to the ER for further plan. pt's wife stated that he slowly swallows his pills and food does not go down, may be some goes and rest he brings up. no cp, no sob. no n/v/d. no abd. pain, no cough, no fever. has left hell non healing ulcer for long time.  (21 Mar 2022 23:17)    Podiatry HPI: History as noted above. Podiatry consulted regarding a 75 yo male who is seen with a chronic left heel ulceration. Pt is accompanied by his wife at bedside. Pt states that he had the wound on the left heel for about a year, follows up with outside podiatrist, Dr. Luu for wound care. Pt also has VNS who comes every other day for dressing changes. Pt denies any pain to the left heel wound. Denies any other constitutional symptoms of N/V/C/F/Sob.       Medications aspirin  chewable 81 milliGRAM(s) Oral daily  atorvastatin 40 milliGRAM(s) Oral at bedtime  Dakins Solution - 1/4 Strength 1 Application(s) Topical daily  dextrose 40% Gel 15 Gram(s) Oral once  dextrose 5% + sodium chloride 0.9%. 1000 milliLiter(s) IV Continuous <Continuous>  dextrose 50% Injectable 25 Gram(s) IV Push once  dextrose 50% Injectable 12.5 Gram(s) IV Push once  dextrose 50% Injectable 25 Gram(s) IV Push once  finasteride 5 milliGRAM(s) Oral daily  glucagon  Injectable 1 milliGRAM(s) IntraMuscular once  levothyroxine 150 MICROGram(s) Oral daily  metoprolol tartrate 25 milliGRAM(s) Oral two times a day  ondansetron Injectable 4 milliGRAM(s) IV Push every 6 hours PRN    FH: Family history of diabetes mellitus    Family history of diabetes mellitus (Father)    Family history of diabetes mellitus (DM) (Mother)    FH: alcoholism (Father)    PMH: Diabetes    OA (osteoarthritis)    Dyslipidemia    Hypothyroid    HTN (hypertension)    BPH (benign prostatic hyperplasia)    PSH: S/P hip replacement    S/P hernia repair    S/P laminectomy    Labs                          10.9   14.59 )-----------( 242      ( 25 Mar 2022 07:26 )             34.5      03-25    136  |  100  |  7.5<L>  ----------------------------<  167<H>  3.7   |  23.0  |  0.55    Ca    8.1<L>      25 Mar 2022 07:26  Mg     1.6     03-24    TPro  7.1  /  Alb  3.2<L>  /  TBili  0.6  /  DBili  x   /  AST  40<H>  /  ALT  23  /  AlkPhos  153<H>  03-24     Vital Signs Last 24 Hrs  T(C): 36.4 (25 Mar 2022 10:21), Max: 37 (24 Mar 2022 20:10)  T(F): 97.5 (25 Mar 2022 10:21), Max: 98.6 (24 Mar 2022 20:10)  HR: 95 (25 Mar 2022 10:21) (81 - 97)  BP: 173/74 (25 Mar 2022 10:21) (113/55 - 173/74)  BP(mean): --  RR: 18 (25 Mar 2022 10:21) (18 - 18)  SpO2: 98% (25 Mar 2022 10:21) (97% - 98%)         WBC Count: 14.59 K/uL *H* (03-25-22 @ 07:26)    CAPILLARY BLOOD GLUCOSE    POCT Blood Glucose.: 137 mg/dL (25 Mar 2022 05:50)  POCT Blood Glucose.: 97 mg/dL (24 Mar 2022 23:42)  POCT Blood Glucose.: 86 mg/dL (24 Mar 2022 19:10)  POCT Blood Glucose.: 88 mg/dL (24 Mar 2022 15:35)  POCT Blood Glucose.: 81 mg/dL (24 Mar 2022 12:04)    ROS: All others negative unless otherwise stated in the HPI      PHYSICAL EXAM  GEN: TIMUR LOUIS is a pleasant well-nourished, well developed 76y Male in no acute distress, alert awake, and oriented to person, place and time.   LE Focused:    Vasc: DP/PT pulses palpable, CFT brisk to distal sai, TG wnl, no edema, no erythema noted   Derm: A partial thickness wound noted to the left heel with fibro-granular wound bed, hyperkeratotic periwound, -PTB, no drainage, no malodor, no tunneling noted. No clinical signs of acute infection noted. Crusty, dried skin noted to b/l feet   Neuro: Protective sensation grossly intact b/l   MSK: Able to wiggle toes, low arch feet b/l, muscle strength 4/5       Imaging:     ACC: [id number] EXAM:  XR FOOT COMP MIN 3 VIEWS BI                          PROCEDURE DATE:  03/22/2022      INTERPRETATION:  Clinical history: 76-year-old male, left heel   ulceration, baseline views.    Three views of each foot without comparison demonstrate diffuse   demineralization with no fracture or dislocation.    Soft tissue ulceration at the left calcaneus posteriorly with slight   cortical irregularity is noted.    IMPRESSION:  Left calcaneal ulceration with slight adjacent cortical irregularity, if   there is continued clinical concern follow-up MRI can be ordered            Assessment:  Foot ulcer      Plan:  -Pt seen and evaluated  -Xray of the left foot reviewed - unremarkable   -Left foot dressing removed and wound evaluated  -No acute signs of infection noted clinically, stable wound  -Applied aquacel, DSD to the left heel wound   -Advised pt to keep the dressing C/D/I  -C/w CAIR boot to offload the heel   Pod plan:  -C/w LWC every other day  -Pt stable from podiatry   -WCO placed   -Will follow while in house  -D/w and evaluated at bedside with attending Dr. Caitlyn WILLIAMSON: Please clean the wound with Dakin solution, apply aquacel, 4x4 gauze, Abd pad, Bret and ACE to the left heel wound

## 2022-03-25 NOTE — DIETITIAN INITIAL EVALUATION ADULT. - ORAL INTAKE PTA/DIET HISTORY
Pt with dysphagia and weight loss over the past 4 years. Pt now has Peg with Jevity at 60cchr as goal rate. Pt with diabetic ulcer left heel noted.

## 2022-03-25 NOTE — PROGRESS NOTE ADULT - SUBJECTIVE AND OBJECTIVE BOX
[facility] Division of Hospital Medicine    Chief Complaint:  Dysphagia    SUBJECTIVE / OVERNIGHT EVENTS: s/p Peg. Patient seen and examined this morning. reports some nausea. Tube feeds started at low rate. No abdominal pain. Discussed that he is likely here until Monday until he meets TF goal rate and home care can be arranged     Patient denies chest pain, SOB, abd pain,  fever, chills, dysuria or any other complaints. All remainder ROS negative.     MEDICATIONS  (STANDING):  Dakins Solution - 1/4 Strength 1 Application(s) Topical daily  dextrose 40% Gel 15 Gram(s) Oral once  dextrose 5% + sodium chloride 0.9%. 1000 milliLiter(s) (70 mL/Hr) IV Continuous <Continuous>  dextrose 50% Injectable 25 Gram(s) IV Push once  dextrose 50% Injectable 12.5 Gram(s) IV Push once  dextrose 50% Injectable 25 Gram(s) IV Push once  glucagon  Injectable 1 milliGRAM(s) IntraMuscular once  levothyroxine Injectable 75 MICROGram(s) IV Push at bedtime  metoprolol tartrate Injectable 2.5 milliGRAM(s) IV Push every 12 hours    MEDICATIONS  (PRN):  ondansetron Injectable 4 milliGRAM(s) IV Push every 6 hours PRN Nausea and/or Vomiting        I&O's Summary    24 Mar 2022 07:01  -  25 Mar 2022 07:00  --------------------------------------------------------  IN: 0 mL / OUT: 485 mL / NET: -485 mL        PHYSICAL EXAM:  Vital Signs Last 24 Hrs  T(C): 36.8 (25 Mar 2022 04:19), Max: 37 (24 Mar 2022 20:10)  T(F): 98.3 (25 Mar 2022 04:19), Max: 98.6 (24 Mar 2022 20:10)  HR: 97 (25 Mar 2022 04:19) (81 - 97)  BP: 163/73 (25 Mar 2022 04:19) (113/55 - 163/73)  BP(mean): --  RR: 18 (25 Mar 2022 04:19) (18 - 18)  SpO2: 97% (25 Mar 2022 04:19) (97% - 98%)      CONSTITUTIONAL: NAD,   ENMT: Moist oral mucosa, no pharyngeal injection or exudates  RESPIRATORY: Normal respiratory effort; lungs are clear to auscultation bilaterally  CARDIOVASCULAR: Regular rate and rhythm, normal S1 and S2, No lower extremity edema;   ABDOMEN: Nontender to palpation, normoactive bowel sounds, no rebound/guarding; + PEG  MUSCLOSKELETAL:  no joint swelling or tenderness to palpation  PSYCH: A+O to person, place, and time; affect appropriate  NEUROLOGY: CN 2-12 are intact and symmetric; no gross sensory deficits;   SKIN: Chronic left heel ulcer    LABS:                        10.9   14.59 )-----------( 242      ( 25 Mar 2022 07:26 )             34.5     03-25    136  |  100  |  7.5<L>  ----------------------------<  167<H>  3.7   |  23.0  |  0.55    Ca    8.1<L>      25 Mar 2022 07:26  Mg     1.6     03-24    TPro  7.1  /  Alb  3.2<L>  /  TBili  0.6  /  DBili  x   /  AST  40<H>  /  ALT  23  /  AlkPhos  153<H>  03-24              CAPILLARY BLOOD GLUCOSE      POCT Blood Glucose.: 137 mg/dL (25 Mar 2022 05:50)  POCT Blood Glucose.: 97 mg/dL (24 Mar 2022 23:42)  POCT Blood Glucose.: 86 mg/dL (24 Mar 2022 19:10)  POCT Blood Glucose.: 88 mg/dL (24 Mar 2022 15:35)  POCT Blood Glucose.: 81 mg/dL (24 Mar 2022 12:04)        RADIOLOGY & ADDITIONAL TESTS:  Results Reviewed:   Imaging Personally Reviewed:  Electrocardiogram Personally Reviewed:

## 2022-03-25 NOTE — PROGRESS NOTE ADULT - SUBJECTIVE AND OBJECTIVE BOX
Patient is a 76y old  Male who presents with a chief complaint of Dysphagia (25 Mar 2022 10:05)      INTERVAL HPI/OVERNIGHT EVENTS: Patient seen and evaluated at bedside, reporting nausea not vomiting since started on Peg tube feedings at 10cc/hr, Peg tube site dry, Denies vomiting, abdominal pain, chest pain, shortness of breath, hematemesis, hematochezia, melena.      MEDICATIONS  (STANDING):  aspirin  chewable 81 milliGRAM(s) Oral daily  atorvastatin 40 milliGRAM(s) Oral at bedtime  Dakins Solution - 1/4 Strength 1 Application(s) Topical daily  dextrose 40% Gel 15 Gram(s) Oral once  dextrose 5% + sodium chloride 0.9%. 1000 milliLiter(s) (70 mL/Hr) IV Continuous <Continuous>  dextrose 50% Injectable 25 Gram(s) IV Push once  dextrose 50% Injectable 12.5 Gram(s) IV Push once  dextrose 50% Injectable 25 Gram(s) IV Push once  finasteride 5 milliGRAM(s) Oral daily  glucagon  Injectable 1 milliGRAM(s) IntraMuscular once  levothyroxine 150 MICROGram(s) Oral daily  metoprolol tartrate 25 milliGRAM(s) Oral two times a day    MEDICATIONS  (PRN):  ondansetron Injectable 4 milliGRAM(s) IV Push every 6 hours PRN Nausea and/or Vomiting      Allergies    No Known Allergies    Intolerances    Review of Systems:  · ENMT: negative  · Respiratory and Thorax: negative  · Cardiovascular: negative  · Gastrointestinal: see above.  · Genitourinary:	negative  · Musculoskeletal: negative  · Neurological: negative  · Psychiatric: negative  · Hematology/Lymphatics: negative  · Endocrine: negative      Vital Signs Last 24 Hrs  T(C): 36.4 (25 Mar 2022 10:21), Max: 37 (24 Mar 2022 20:10)  T(F): 97.5 (25 Mar 2022 10:21), Max: 98.6 (24 Mar 2022 20:10)  HR: 95 (25 Mar 2022 10:21) (81 - 97)  BP: 173/74 (25 Mar 2022 10:21) (113/55 - 173/74)  BP(mean): --  RR: 18 (25 Mar 2022 10:21) (18 - 18)  SpO2: 98% (25 Mar 2022 10:21) (97% - 98%)    PHYSICAL EXAM:  · Constitutional: Elderly ill looking man, in no acute distress.   · Eyes: EOMI; PERRL; no drainage or redness  · ENMT: No oral lesions; no gross abnormalities  · Neck:	No bruits; no thyromegaly or nodules  · Back:	No deformity or limitation of movement  · Respiratory: Breath Sounds equal & clear to percussion & auscultation, no accessory muscle use  · Cardiovascular: Regular rate & rhythm, normal S1, S2; no murmurs, gallops or rubs; no S3, S4  · Gastrointestinal: Soft, non-tender, no hepatosplenomegaly, normal bowel sounds. Peg tube in place      LABS:                        10.9   14.59 )-----------( 242      ( 25 Mar 2022 07:26 )             34.5     03-25    136  |  100  |  7.5<L>  ----------------------------<  167<H>  3.7   |  23.0  |  0.55    Ca    8.1<L>      25 Mar 2022 07:26  Mg     1.6     03-24    TPro  7.1  /  Alb  3.2<L>  /  TBili  0.6  /  DBili  x   /  AST  40<H>  /  ALT  23  /  AlkPhos  153<H>  03-24        LIVER FUNCTIONS - ( 24 Mar 2022 07:41 )  Alb: 3.2 g/dL / Pro: 7.1 g/dL / ALK PHOS: 153 U/L / ALT: 23 U/L / AST: 40 U/L / GGT: x

## 2022-03-25 NOTE — DIETITIAN NUTRITION RISK NOTIFICATION - ADDITIONAL COMMENTS/DIETITIAN RECOMMENDATIONS
Monitor tolerance as rate of TF increased.   Rec Glucerna 1.5cal at 60cchr goal rate to provide 1200cc, 1800kcal, 98gr pro

## 2022-03-25 NOTE — PROGRESS NOTE ADULT - PROBLEM SELECTOR PLAN 1
Peg tube in placed, peg feedings started at 10cc/hr,  patient complaining of nausea not vomiting  We will recommend to continue feedings at same rate, medicate with Zofran as needed,  Keep head of bed elevated while feedings In  progress.   PPI for cytoprotection

## 2022-03-25 NOTE — DIETITIAN INITIAL EVALUATION ADULT. - OTHER INFO
75 yo male pmh h/o back surgery ( LS in 2016 and Thoracic spine in 2018 and after thoracic spine surgery has been in the wheel chair )  alcohol abuse stopped using alcohol 15 years ago, htn, bph , hypothyroidism who follows with ENT Dr. Boudreaux for his routine ENT visits and mentioned difficulty swallowing to both liquid and solid ongoing for more thana year as per pt's wife, so ENT did scope in the office, none was found as per pt's wife so he was sent for modified barium swallow which was abnormal and pt. was sent by ENT to the ER for further plan. pt's wife stated that he slowly swallows his pills and food does not go down, may be some goes and rest he brings up.

## 2022-03-25 NOTE — DIETITIAN INITIAL EVALUATION ADULT. - PERTINENT LABORATORY DATA
03-25 Na136 mmol/L Glu 167 mg/dL<H> K+ 3.7 mmol/L Cr  0.55 mg/dL BUN 7.5 mg/dL<L> Phos n/a   Alb n/a   PAB n/a

## 2022-03-25 NOTE — DIETITIAN NUTRITION RISK NOTIFICATION - TREATMENT: THE FOLLOWING DIET HAS BEEN RECOMMENDED
Diet, NPO with Tube Feed:   Tube Feeding Modality: Gastrostomy  Jevity 1.5 Saad (JEVITY1.5)  Total Volume for 24 Hours (mL): 1440  Continuous  Starting Tube Feed Rate {mL per Hour}: 10  Increase Tube Feed Rate by (mL): 10  Until Goal Tube Feed Rate (mL per Hour): 60  Tube Feed Duration (in Hours): 24  Tube Feed Start Time: 00:00  Free Water Flush  Bolus   Total Volume per Flush (mL): 250   Frequency: Every 6 Hours  Banatrol TF     Qty per Day:  3 (03-25-22 @ 07:57) [Active]

## 2022-03-25 NOTE — PROGRESS NOTE ADULT - ASSESSMENT
77 yo male pmh h/o back surgery ( LS in 2016 and Thoracic spine in 2018 and after thoracic spine surgery has been in the wheel chair )  alcohol abuse stopped using alcohol 15 years ago, htn, bph , hypothyroidism who follows with ENT Dr. Boudreaux for his routine ENT visits and mentioned diffculty swallowing to both liquid and solid ongoing for more thana year as per pt's wife, so ENT did scope in the office, none was found as per pt's wife so he was sent for modified bairum swallow which was abnormal and pt. was sent by ENT to the ER for further plan. pt's wife stated that he slowly swallows his pills and food does not go down, may be some goes and rest he brings up.    Dysphagia  - Speech and swallow evaluated outpatient with MBS showing massive aspiration  - Neurology consulted, MRI no stroke  - NPO  - GI consult appreciated, s/p PEG placement on 3/24  - Start TFs today with free water flushes  - IVF, can dc if tolerating peg feeds  - Nutrition consulted.   - CM notified to arrange home care    Hypoglycemia due to NPO status  - D5NS IV  - monitor bg q 6 hours    Chronic left heel ulcer  - Podiatry consulted, WCO: Please clean the wound with Dakin solution, apply aquacel, 4x4 gauze, Abd pad, Bret and ACE to the left heel wound     HTN  - Metoprolol tartrate 25mg BID    Hypothyroidism  - Synthroid 150 mcg daily    HLD  - Atorvastatin 40mg At bedtime    Hypomagnesemia  - replaced    DVT ppx  - Heparin SQ     Dispo: Pending reaching goal feeds and home care. Likely dc Monday.  77 yo male pmh h/o back surgery ( LS in 2016 and Thoracic spine in 2018 and after thoracic spine surgery has been in the wheel chair )  alcohol abuse stopped using alcohol 15 years ago, htn, bph , hypothyroidism who follows with ENT Dr. Boudreaux for his routine ENT visits and mentioned diffculty swallowing to both liquid and solid ongoing for more thana year as per pt's wife, so ENT did scope in the office, none was found as per pt's wife so he was sent for modified bairum swallow which was abnormal and pt. was sent by ENT to the ER for further plan. pt's wife stated that he slowly swallows his pills and food does not go down, may be some goes and rest he brings up.    Dysphagia  - Speech and swallow evaluated outpatient with MBS showing massive aspiration  - Neurology consulted, MRI no stroke  - NPO  - GI consult appreciated, s/p PEG placement on 3/24  - Start TFs today with free water flushes  - IVF, can dc if tolerating peg feeds  - Nutrition consulted.   - CM notified to arrange home care    Hypoglycemia due to NPO status  - D5NS IV  - monitor bg q 6 hours    Chronic left heel ulcer  - Podiatry consulted, WCO: Please clean the wound with Dakin solution, apply aquacel, 4x4 gauze, Abd pad, Bret and ACE to the left heel wound     HTN  - Metoprolol tartrate 25mg BID    Hypothyroidism  - Synthroid 150 mcg daily    HLD  - Atorvastatin 40mg At bedtime    Hypomagnesemia  - replaced    DVT ppx  - Heparin SQ     Dispo: Pending reaching goal feeds and home care. Likely dc Monday.   Wife Katt updated. [phone]

## 2022-03-26 LAB
ACHR MOD AB SER-ACNC: 0 % — SIGNIFICANT CHANGE UP
ANION GAP SERPL CALC-SCNC: 11 MMOL/L — SIGNIFICANT CHANGE UP (ref 5–17)
BUN SERPL-MCNC: 10.5 MG/DL — SIGNIFICANT CHANGE UP (ref 8–20)
CALCIUM SERPL-MCNC: 8.5 MG/DL — LOW (ref 8.6–10.2)
CHLORIDE SERPL-SCNC: 97 MMOL/L — LOW (ref 98–107)
CO2 SERPL-SCNC: 27 MMOL/L — SIGNIFICANT CHANGE UP (ref 22–29)
CREAT SERPL-MCNC: 0.56 MG/DL — SIGNIFICANT CHANGE UP (ref 0.5–1.3)
EGFR: 101 ML/MIN/1.73M2 — SIGNIFICANT CHANGE UP
GLUCOSE BLDC GLUCOMTR-MCNC: 139 MG/DL — HIGH (ref 70–99)
GLUCOSE BLDC GLUCOMTR-MCNC: 151 MG/DL — HIGH (ref 70–99)
GLUCOSE BLDC GLUCOMTR-MCNC: 152 MG/DL — HIGH (ref 70–99)
GLUCOSE BLDC GLUCOMTR-MCNC: 154 MG/DL — HIGH (ref 70–99)
GLUCOSE SERPL-MCNC: 159 MG/DL — HIGH (ref 70–99)
HCT VFR BLD CALC: 36.6 % — LOW (ref 39–50)
HGB BLD-MCNC: 11.5 G/DL — LOW (ref 13–17)
MAGNESIUM SERPL-MCNC: 2 MG/DL — SIGNIFICANT CHANGE UP (ref 1.6–2.6)
MCHC RBC-ENTMCNC: 27.5 PG — SIGNIFICANT CHANGE UP (ref 27–34)
MCHC RBC-ENTMCNC: 31.4 GM/DL — LOW (ref 32–36)
MCV RBC AUTO: 87.6 FL — SIGNIFICANT CHANGE UP (ref 80–100)
PHOSPHATE SERPL-MCNC: 2.2 MG/DL — LOW (ref 2.4–4.7)
PLATELET # BLD AUTO: 287 K/UL — SIGNIFICANT CHANGE UP (ref 150–400)
POTASSIUM SERPL-MCNC: 4 MMOL/L — SIGNIFICANT CHANGE UP (ref 3.5–5.3)
POTASSIUM SERPL-SCNC: 4 MMOL/L — SIGNIFICANT CHANGE UP (ref 3.5–5.3)
RBC # BLD: 4.18 M/UL — LOW (ref 4.2–5.8)
RBC # FLD: 14.4 % — SIGNIFICANT CHANGE UP (ref 10.3–14.5)
SODIUM SERPL-SCNC: 135 MMOL/L — SIGNIFICANT CHANGE UP (ref 135–145)
WBC # BLD: 10.47 K/UL — SIGNIFICANT CHANGE UP (ref 3.8–10.5)
WBC # FLD AUTO: 10.47 K/UL — SIGNIFICANT CHANGE UP (ref 3.8–10.5)

## 2022-03-26 PROCEDURE — 99232 SBSQ HOSP IP/OBS MODERATE 35: CPT

## 2022-03-26 RX ORDER — METOPROLOL TARTRATE 50 MG
25 TABLET ORAL ONCE
Refills: 0 | Status: COMPLETED | OUTPATIENT
Start: 2022-03-26 | End: 2022-03-26

## 2022-03-26 RX ORDER — METOCLOPRAMIDE HCL 10 MG
10 TABLET ORAL
Refills: 0 | Status: DISCONTINUED | OUTPATIENT
Start: 2022-03-26 | End: 2022-03-27

## 2022-03-26 RX ORDER — METOPROLOL TARTRATE 50 MG
50 TABLET ORAL
Refills: 0 | Status: DISCONTINUED | OUTPATIENT
Start: 2022-03-26 | End: 2022-03-29

## 2022-03-26 RX ADMIN — Medication 81 MILLIGRAM(S): at 11:55

## 2022-03-26 RX ADMIN — ONDANSETRON 4 MILLIGRAM(S): 8 TABLET, FILM COATED ORAL at 21:59

## 2022-03-26 RX ADMIN — ONDANSETRON 4 MILLIGRAM(S): 8 TABLET, FILM COATED ORAL at 11:56

## 2022-03-26 RX ADMIN — FINASTERIDE 5 MILLIGRAM(S): 5 TABLET, FILM COATED ORAL at 11:56

## 2022-03-26 RX ADMIN — ONDANSETRON 4 MILLIGRAM(S): 8 TABLET, FILM COATED ORAL at 05:56

## 2022-03-26 RX ADMIN — Medication 50 MILLIGRAM(S): at 17:39

## 2022-03-26 RX ADMIN — Medication 150 MICROGRAM(S): at 05:56

## 2022-03-26 RX ADMIN — PANTOPRAZOLE SODIUM 40 MILLIGRAM(S): 20 TABLET, DELAYED RELEASE ORAL at 11:55

## 2022-03-26 RX ADMIN — ATORVASTATIN CALCIUM 40 MILLIGRAM(S): 80 TABLET, FILM COATED ORAL at 21:59

## 2022-03-26 RX ADMIN — Medication 25 MILLIGRAM(S): at 10:15

## 2022-03-26 RX ADMIN — Medication 25 MILLIGRAM(S): at 05:57

## 2022-03-26 NOTE — PROGRESS NOTE ADULT - SUBJECTIVE AND OBJECTIVE BOX
Patient is a 76y old  Male who presents with a chief complaint of Dysphagia (26 Mar 2022 09:37)      HPI:  pt. is a 75 yo male seen and evaluated at bedside, reporting nausea with dry heaving and spitting up saliva, not vomiting since started on Peg tube feedings. He is receiving Zofran Q6H prn. Current PEG tube feeding rate is at 40cc/hr, Peg tube site dry. Denies abdominal pain, chest pain, shortness of breath, hematemesis, hematochezia, melena.        REVIEW OF SYSTEMS:  Constitutional: No fever  ENMT:  No difficulty hearing, tinnitus, vertigo; No sinus or throat pain  Respiratory: No cough, wheezing, chills or hemoptysis  Cardiovascular: No chest pain, palpitations, dizziness or leg swelling  Gastrointestinal: +nausea, No abdominal or epigastric pain. No hematemesis; No diarrhea or constipation. No melena or hematochezia.  Skin: No itching, burning, rashes or lesions   Musculoskeletal: No joint pain or swelling; No muscle, back or extremity pain    PAST MEDICAL & SURGICAL HISTORY:  Diabetes    OA (osteoarthritis)    Dyslipidemia    Hypothyroid    HTN (hypertension)    BPH (benign prostatic hyperplasia)    S/P hip replacement  R    S/P hernia repair    S/P laminectomy  lumbar and thoracic        FAMILY HISTORY:  Family history of diabetes mellitus (DM) (Mother)    FH: alcoholism (Father)        SOCIAL HISTORY:  Smoking Status: [ ] Current, [ ] Former, [ ] Never  Pack Years:  [  ] EtOH  [  ] IVDA    MEDICATIONS:  MEDICATIONS  (STANDING):  aspirin  chewable 81 milliGRAM(s) Oral daily  atorvastatin 40 milliGRAM(s) Oral at bedtime  Dakins Solution - 1/4 Strength 1 Application(s) Topical daily  dextrose 40% Gel 15 Gram(s) Oral once  dextrose 50% Injectable 25 Gram(s) IV Push once  dextrose 50% Injectable 12.5 Gram(s) IV Push once  dextrose 50% Injectable 25 Gram(s) IV Push once  finasteride 5 milliGRAM(s) Oral daily  glucagon  Injectable 1 milliGRAM(s) IntraMuscular once  levothyroxine 150 MICROGram(s) Oral daily  metoprolol tartrate 50 milliGRAM(s) Oral two times a day  pantoprazole   Suspension 40 milliGRAM(s) Oral daily    MEDICATIONS  (PRN):  metoclopramide Injectable 10 milliGRAM(s) IV Push two times a day PRN nausea  ondansetron Injectable 4 milliGRAM(s) IV Push every 6 hours PRN Nausea and/or Vomiting      Allergies    No Known Allergies    Intolerances        Vital Signs Last 24 Hrs  T(C): 36.8 (26 Mar 2022 11:10), Max: 37.3 (25 Mar 2022 17:07)  T(F): 98.2 (26 Mar 2022 11:10), Max: 99.2 (25 Mar 2022 17:07)  HR: 93 (26 Mar 2022 11:10) (88 - 95)  BP: 158/79 (26 Mar 2022 11:10) (158/79 - 174/84)  BP(mean): --  RR: 18 (26 Mar 2022 11:10) (18 - 18)  SpO2: 99% (26 Mar 2022 11:10) (98% - 100%)     @ 07:01  -   @ 07:00  --------------------------------------------------------  IN: 0 mL / OUT: 500 mL / NET: -500 mL          PHYSICAL EXAM:    General: Elderly ill looking man, in no acute distress.   HEENT: MMM, conjunctiva and sclera clear  Gastrointestinal: PEG tube in place with blood on gauze, no sign of infection. Soft, non-tender non-distended; Normal bowel sounds; No rebound or guarding  Respiratory: Breath Sounds equal & clear to percussion & auscultation, no accessory muscle use  Cardiovascular: Regular rate & rhythm, normal S1, S2; no murmurs, gallops or rubs; no S3, S4  Neurological: Alert  Skin: Warm and dry        LABS:                        11.5   10.47 )-----------( 287      ( 26 Mar 2022 08:44 )             36.6     26 Mar 2022 08:44    135    |  97     |  10.5   ----------------------------<  159    4.0     |  27.0   |  0.56     Ca    8.5        26 Mar 2022 08:44  Phos  2.2       26 Mar 2022 08:44  Mg     2.0       26 Mar 2022 08:44                RADIOLOGY & ADDITIONAL STUDIES:       < from: Xray Cinesophagram Swallow Function w/ Contrast (22 @ 13:53) >    ACC: [id number] EXAM:  XR SWAL FUNC ZAIRA VID CON STDY                          PROCEDURE DATE:  2022          INTERPRETATION:  Clinical history: 76-year-old male, modified barium   swallowing study.    Modified barium swallowing study was performed by the speech pathology   staff under fluoroscopic observation, cine images were obtained.    FINDINGS: Characterized in speech pathologist's report.    Fluoroscopy time: 10.9 minutes.    IMPRESSION:    Modified barium swallowing study, findings detailed in speech   pathologist's report    --- End of Report ---            JEFFERSON BRUSH DO; Attending Radiologist  This document has been electronically signed. Mar 21 2022  4:27PM    < end of copied text >  < from: EGD w/ PEG Placement (22 @ 00:00) >    PEG Placement Report Date: 3/24/2022     Patient Name: TIMUR LOUIS     MRN: [id number]     Account Number: [id number]    Gender: Male      (age): 1945 (76)     Instrument(s): GIF  ([id number])([id number])        Attending/Fellow:     Tomi Carpio MD             Procedure Room #:     PROCEDURE ROOM 2                        Administered Medications: As per Anesthesiology Record         Indications: Oropharyngeal dysphagia: 787.22 - R13.12        PEG Placement:     Consent: The procedure, indications, preparation and potential complications    were explained to the patient, who indicated understanding and signed the    corresponding consent forms.         Percutaneous endoscopic gastrostomy: In a darkened room, the abdominal wall was    transilluminated and a site was selected. Indentation of the gastric wall by    external finger pressure was demonstrated. The skin was surgically prepared and    anesthetized with xylocaine. A small incision was made in the abdominal wall    using a surgical blade. A 25-gauge needle with cannula was inserted through the    abdominal wall and into the gastric lumen. A guidewire was passed through the    cannula, was caught by the snare passed through the endoscope and brought out    through the mouth. An Avanos 20Fr PEG tube was secured to the guidewire and    pulled through the abdominal wall. A satisfactory final position was confirmed    endoscopically. The gastrostomy tube was secured with the outer flange    positioned at 3 cm. The post procedure appearances were satisfactory.            Limitations/Complications: There were no apparent limitations or complications    Findings:     Esophagus Mucosa Normal mucosa was noted in the whole esophagus.     Stomach Mucosa Normal mucosa was noted in the whole stomach. But J shaped    stomach and external pressure was applied to advance endoscope into the distal    antrum.     Duodenum Mucosa Normal mucosa was noted in the whole duodenum.                                       Impressions:      Normal mucosa in the whole esophagus.      Normal mucosa in the whole stomach.      Normal mucosa in the whole examined duodenum.         Plan: NPO except meds per G tube        start G tube feeds in AM                 Tomi Carpio MD     Version 1, Electronically signed on 3/24/2022 12:32:05 PM by Tomi Carpio MD    < end of copied text >

## 2022-03-26 NOTE — PROGRESS NOTE ADULT - ASSESSMENT
75 yo male pmh h/o back surgery ( LS in 2016 and Thoracic spine in 2018 and after thoracic spine surgery has been in the wheel chair )  alcohol abuse stopped using alcohol 15 years ago, htn, bph , hypothyroidism who follows with ENT Dr. Boudreaux for his routine ENT visits and mentioned diffculty swallowing to both liquid and solid ongoing for more thana year as per pt's wife, so ENT did scope in the office, none was found as per pt's wife so he was sent for modified bairum swallow which was abnormal and pt. was sent by ENT to the ER for further plan. pt's wife stated that he slowly swallows his pills and food does not go down, may be some goes and rest he brings up.    *Dysphagia  Speech and swallow evaluated outpatient with MBS showing massive aspiration  Neurology consulted, MRI no stroke  NPO  GI consult appreciated, s/p PEG placement on 3/24  Start TFs today with free water flushes  IVF due to not able to tolerate feed  decrease feeding by 10cc/hr   Nutrition consulted.   CM notified to arrange home care  Zofran/reglan as needed for nausea     *Hypoglycemia due to NPO status  D5NS IV  monitor bg q 6 hours    *Chronic left heel ulcer  Podiatry consulted, WCO: Please clean the wound with Dakin solution, apply aquacel, 4x4 gauze, Abd pad, Bret and ACE to the left heel wound    *HTN  increase Metoprolol tartrate 50BID     *ypothyroidism  Synthroid 150 mcg daily    *HLD  Atorvastatin 40mg At bedtime    *Hypomagnesemia  replaced    *DVT ppx  Heparin SQ     Dispo: Pending reaching goal feeds and home care. Likely dc Monday.   Wife Katt updated. [phone]

## 2022-03-26 NOTE — PROVIDER CONTACT NOTE (OTHER) - BACKGROUND
PEG tube in place since 3/24 for dysphagia, no PRN medications ordered for high blood pressure, only standing dose of metoprolol via PEG tube

## 2022-03-26 NOTE — PROGRESS NOTE ADULT - SUBJECTIVE AND OBJECTIVE BOX
HPI  Pt is a 75yo M admitted to [facility] for dysphagia  Pt was seen and examined at bedside. Pt states he had 6 episode of spit up include 2 episode of feeding. Nausea noted. Denies of any abd pain. BP elevated noted     Vital Signs Last 24 Hrs  T(C): 36.8 (26 Mar 2022 04:36), Max: 37.3 (25 Mar 2022 17:07)  T(F): 98.2 (26 Mar 2022 04:36), Max: 99.2 (25 Mar 2022 17:07)  HR: 94 (26 Mar 2022 04:36) (88 - 95)  BP: 172/92 (26 Mar 2022 04:36) (170/73 - 174/84)  BP(mean): --  RR: 18 (26 Mar 2022 04:36) (18 - 18)  SpO2: 98% (26 Mar 2022 04:36) (98% - 100%)    I&O's Summary    25 Mar 2022 07:01  -  26 Mar 2022 07:00  --------------------------------------------------------  IN: 0 mL / OUT: 500 mL / NET: -500 mL        CAPILLARY BLOOD GLUCOSE      POCT Blood Glucose.: 154 mg/dL (26 Mar 2022 06:10)  POCT Blood Glucose.: 122 mg/dL (25 Mar 2022 23:35)  POCT Blood Glucose.: 136 mg/dL (25 Mar 2022 18:22)  POCT Blood Glucose.: 183 mg/dL (25 Mar 2022 14:07)      PHYSICAL EXAM:    Constitutional: NAD, awake and alert, well-developed  HEENT: PERR, EOMI, Normal Hearing, MMM  Neck: Soft and supple, No LAD, No JVD  Respiratory: Breath sounds are clear bilaterally, No wheezing, rales or rhonchi  Cardiovascular: S1 and S2, regular rate and rhythm, no Murmurs, gallops or rubs  Gastrointestinal: Bowel Sounds present, soft, nontender, nondistended, no guarding, no rebound  Extremities: No peripheral edema  Vascular: 2+ peripheral pulses  Neurological: A/O x 3, no focal deficits  Musculoskeletal: 5/5 strength b/l upper and lower extremities  Skin: No rashes    MEDICATIONS:  MEDICATIONS  (STANDING):  aspirin  chewable 81 milliGRAM(s) Oral daily  atorvastatin 40 milliGRAM(s) Oral at bedtime  Dakins Solution - 1/4 Strength 1 Application(s) Topical daily  dextrose 40% Gel 15 Gram(s) Oral once  dextrose 50% Injectable 25 Gram(s) IV Push once  dextrose 50% Injectable 12.5 Gram(s) IV Push once  dextrose 50% Injectable 25 Gram(s) IV Push once  finasteride 5 milliGRAM(s) Oral daily  glucagon  Injectable 1 milliGRAM(s) IntraMuscular once  levothyroxine 150 MICROGram(s) Oral daily  metoprolol tartrate 25 milliGRAM(s) Oral two times a day  pantoprazole   Suspension 40 milliGRAM(s) Oral daily      LABS: All Labs Reviewed:                        11.5   10.47 )-----------( 287      ( 26 Mar 2022 08:44 )             36.6     03-26    135  |  97<L>  |  10.5  ----------------------------<  159<H>  4.0   |  27.0  |  0.56    Ca    8.5<L>      26 Mar 2022 08:44  Phos  2.2     03-26  Mg     2.0     03-26            Blood Culture:     RADIOLOGY/EKG:    DVT PPX:    ADVANCED DIRECTIVE:    DISPOSITION: HPI  Pt is a 75yo M admitted to [facility] for dysphagia  Pt was seen and examined at bedside. Pt states he had 6 episode of spit up include 2 episode of feeding. Nausea noted. Denies of any abd pain. BP elevated noted     Vital Signs Last 24 Hrs  T(C): 36.8 (26 Mar 2022 04:36), Max: 37.3 (25 Mar 2022 17:07)  T(F): 98.2 (26 Mar 2022 04:36), Max: 99.2 (25 Mar 2022 17:07)  HR: 94 (26 Mar 2022 04:36) (88 - 95)  BP: 172/92 (26 Mar 2022 04:36) (170/73 - 174/84)  BP(mean): --  RR: 18 (26 Mar 2022 04:36) (18 - 18)  SpO2: 98% (26 Mar 2022 04:36) (98% - 100%)    I&O's Summary    25 Mar 2022 07:01  -  26 Mar 2022 07:00  --------------------------------------------------------  IN: 0 mL / OUT: 500 mL / NET: -500 mL        CAPILLARY BLOOD GLUCOSE      POCT Blood Glucose.: 154 mg/dL (26 Mar 2022 06:10)  POCT Blood Glucose.: 122 mg/dL (25 Mar 2022 23:35)  POCT Blood Glucose.: 136 mg/dL (25 Mar 2022 18:22)  POCT Blood Glucose.: 183 mg/dL (25 Mar 2022 14:07)      PHYSICAL EXAM:    CONSTITUTIONAL: NAD,   ENMT: Moist oral mucosa, no pharyngeal injection or exudates  RESPIRATORY: Normal respiratory effort; lungs are clear to auscultation bilaterally  CARDIOVASCULAR: Regular rate and rhythm, normal S1 and S2, No lower extremity edema;   ABDOMEN: Nontender to palpation, normoactive bowel sounds, no rebound/guarding; + PEG, gauze is bloody, no sign of infection   MUSCLOSKELETAL:  no joint swelling or tenderness to palpation  NEUROLOGY: CN 2-12 are intact and symmetric; no gross sensory deficits;   SKIN: Chronic left heel ulcer    MEDICATIONS:  MEDICATIONS  (STANDING):  aspirin  chewable 81 milliGRAM(s) Oral daily  atorvastatin 40 milliGRAM(s) Oral at bedtime  Dakins Solution - 1/4 Strength 1 Application(s) Topical daily  dextrose 40% Gel 15 Gram(s) Oral once  dextrose 50% Injectable 25 Gram(s) IV Push once  dextrose 50% Injectable 12.5 Gram(s) IV Push once  dextrose 50% Injectable 25 Gram(s) IV Push once  finasteride 5 milliGRAM(s) Oral daily  glucagon  Injectable 1 milliGRAM(s) IntraMuscular once  levothyroxine 150 MICROGram(s) Oral daily  metoprolol tartrate 25 milliGRAM(s) Oral two times a day  pantoprazole   Suspension 40 milliGRAM(s) Oral daily      LABS: All Labs Reviewed:                        11.5   10.47 )-----------( 287      ( 26 Mar 2022 08:44 )             36.6     03-26    135  |  97<L>  |  10.5  ----------------------------<  159<H>  4.0   |  27.0  |  0.56    Ca    8.5<L>      26 Mar 2022 08:44  Phos  2.2     03-26  Mg     2.0     03-26            Blood Culture:     RADIOLOGY/EKG:    DVT PPX:    ADVANCED DIRECTIVE:    DISPOSITION:

## 2022-03-26 NOTE — PROGRESS NOTE ADULT - PROBLEM SELECTOR PLAN 1
Peg tube in place with feedings at 40cc/hr,  patient complaining of nausea with some dry heaving, no vomiting  Continue PEG feedings at this rate and continue with Zofran q6h prn for nausea  Keep head of bed elevated while feedings In  progress.  PPI for cytoprotection

## 2022-03-27 LAB
ANION GAP SERPL CALC-SCNC: 11 MMOL/L — SIGNIFICANT CHANGE UP (ref 5–17)
BUN SERPL-MCNC: 18.1 MG/DL — SIGNIFICANT CHANGE UP (ref 8–20)
CALCIUM SERPL-MCNC: 8.4 MG/DL — LOW (ref 8.6–10.2)
CHLORIDE SERPL-SCNC: 95 MMOL/L — LOW (ref 98–107)
CO2 SERPL-SCNC: 28 MMOL/L — SIGNIFICANT CHANGE UP (ref 22–29)
CREAT SERPL-MCNC: 0.64 MG/DL — SIGNIFICANT CHANGE UP (ref 0.5–1.3)
EGFR: 97 ML/MIN/1.73M2 — SIGNIFICANT CHANGE UP
GLUCOSE BLDC GLUCOMTR-MCNC: 126 MG/DL — HIGH (ref 70–99)
GLUCOSE BLDC GLUCOMTR-MCNC: 132 MG/DL — HIGH (ref 70–99)
GLUCOSE BLDC GLUCOMTR-MCNC: 142 MG/DL — HIGH (ref 70–99)
GLUCOSE BLDC GLUCOMTR-MCNC: 162 MG/DL — HIGH (ref 70–99)
GLUCOSE SERPL-MCNC: 155 MG/DL — HIGH (ref 70–99)
HCT VFR BLD CALC: 36.3 % — LOW (ref 39–50)
HGB BLD-MCNC: 11.6 G/DL — LOW (ref 13–17)
MCHC RBC-ENTMCNC: 27.8 PG — SIGNIFICANT CHANGE UP (ref 27–34)
MCHC RBC-ENTMCNC: 32 GM/DL — SIGNIFICANT CHANGE UP (ref 32–36)
MCV RBC AUTO: 86.8 FL — SIGNIFICANT CHANGE UP (ref 80–100)
PLATELET # BLD AUTO: 300 K/UL — SIGNIFICANT CHANGE UP (ref 150–400)
POTASSIUM SERPL-MCNC: 3.8 MMOL/L — SIGNIFICANT CHANGE UP (ref 3.5–5.3)
POTASSIUM SERPL-SCNC: 3.8 MMOL/L — SIGNIFICANT CHANGE UP (ref 3.5–5.3)
RBC # BLD: 4.18 M/UL — LOW (ref 4.2–5.8)
RBC # FLD: 14.3 % — SIGNIFICANT CHANGE UP (ref 10.3–14.5)
SODIUM SERPL-SCNC: 134 MMOL/L — LOW (ref 135–145)
WBC # BLD: 8.5 K/UL — SIGNIFICANT CHANGE UP (ref 3.8–10.5)
WBC # FLD AUTO: 8.5 K/UL — SIGNIFICANT CHANGE UP (ref 3.8–10.5)

## 2022-03-27 PROCEDURE — 99232 SBSQ HOSP IP/OBS MODERATE 35: CPT

## 2022-03-27 PROCEDURE — 74018 RADEX ABDOMEN 1 VIEW: CPT | Mod: 26

## 2022-03-27 PROCEDURE — 99233 SBSQ HOSP IP/OBS HIGH 50: CPT

## 2022-03-27 RX ORDER — ONDANSETRON 8 MG/1
4 TABLET, FILM COATED ORAL EVERY 6 HOURS
Refills: 0 | Status: DISCONTINUED | OUTPATIENT
Start: 2022-03-27 | End: 2022-03-29

## 2022-03-27 RX ORDER — METOCLOPRAMIDE HCL 10 MG
10 TABLET ORAL EVERY 8 HOURS
Refills: 0 | Status: DISCONTINUED | OUTPATIENT
Start: 2022-03-27 | End: 2022-03-29

## 2022-03-27 RX ORDER — HEPARIN SODIUM 5000 [USP'U]/ML
5000 INJECTION INTRAVENOUS; SUBCUTANEOUS EVERY 12 HOURS
Refills: 0 | Status: DISCONTINUED | OUTPATIENT
Start: 2022-03-27 | End: 2022-03-29

## 2022-03-27 RX ADMIN — Medication 50 MILLIGRAM(S): at 17:44

## 2022-03-27 RX ADMIN — FINASTERIDE 5 MILLIGRAM(S): 5 TABLET, FILM COATED ORAL at 12:13

## 2022-03-27 RX ADMIN — ATORVASTATIN CALCIUM 40 MILLIGRAM(S): 80 TABLET, FILM COATED ORAL at 21:17

## 2022-03-27 RX ADMIN — ONDANSETRON 4 MILLIGRAM(S): 8 TABLET, FILM COATED ORAL at 09:26

## 2022-03-27 RX ADMIN — ONDANSETRON 4 MILLIGRAM(S): 8 TABLET, FILM COATED ORAL at 23:20

## 2022-03-27 RX ADMIN — Medication 50 MILLIGRAM(S): at 05:47

## 2022-03-27 RX ADMIN — Medication 81 MILLIGRAM(S): at 12:12

## 2022-03-27 RX ADMIN — Medication 150 MICROGRAM(S): at 05:47

## 2022-03-27 RX ADMIN — HEPARIN SODIUM 5000 UNIT(S): 5000 INJECTION INTRAVENOUS; SUBCUTANEOUS at 17:44

## 2022-03-27 RX ADMIN — ONDANSETRON 4 MILLIGRAM(S): 8 TABLET, FILM COATED ORAL at 17:44

## 2022-03-27 RX ADMIN — Medication 10 MILLIGRAM(S): at 22:22

## 2022-03-27 RX ADMIN — ONDANSETRON 4 MILLIGRAM(S): 8 TABLET, FILM COATED ORAL at 05:57

## 2022-03-27 RX ADMIN — PANTOPRAZOLE SODIUM 40 MILLIGRAM(S): 20 TABLET, DELAYED RELEASE ORAL at 12:13

## 2022-03-27 RX ADMIN — ONDANSETRON 4 MILLIGRAM(S): 8 TABLET, FILM COATED ORAL at 12:14

## 2022-03-27 RX ADMIN — Medication 10 MILLIGRAM(S): at 09:27

## 2022-03-27 RX ADMIN — Medication 1 APPLICATION(S): at 12:13

## 2022-03-27 NOTE — PROGRESS NOTE ADULT - SUBJECTIVE AND OBJECTIVE BOX
Patient is a 76y old  Male who presents with a chief complaint of Dysphagia (26 Mar 2022 12:04)      HPI:  pt. is a 75 yo male pmh h/o back surgery ( LS in 2016 and Thoracic spine in 2018 and after thoracic spine surgery has been in the wheel chair )  alcohol abuse stopped using alcohol 15 years ago, htn, bph , hypothyroidism . Pt states still regurgitation, " vomited" , but sounds more like wretching. Reglan started yesterday, No fevers Gettting tube feeds at 60 cc hour     REVIEW OF SYSTEMS:  Constitutional: No fever, weight loss or fatigue  ENMT:  No difficulty hearing, tinnitus, vertigo; No sinus or throat pain  Respiratory: No cough, wheezing, chills or hemoptysis  Cardiovascular: No chest pain, palpitations, dizziness or leg swelling  Gastrointestinal: No abdominal or epigastric pain. + nausea, vomiting ; No diarrhea or constipation. No melena or hematochezia.  Skin: No itching, burning, rashes or lesions   Musculoskeletal: No joint pain or swelling; No muscle, back or extremity pain    PAST MEDICAL & SURGICAL HISTORY:  Diabetes    OA (osteoarthritis)    Dyslipidemia    Hypothyroid    HTN (hypertension)    BPH (benign prostatic hyperplasia)    S/P hip replacement  R    S/P hernia repair    S/P laminectomy  lumbar and thoracic        FAMILY HISTORY:  Family history of diabetes mellitus (DM) (Mother)    FH: alcoholism (Father)        SOCIAL HISTORY:  Smoking Status: [ ] Current, [ ] Former, [ ] Never  Pack Years:  [  ] EtOH-no  [  ] IVDA    MEDICATIONS:  MEDICATIONS  (STANDING):  aspirin  chewable 81 milliGRAM(s) Oral daily  atorvastatin 40 milliGRAM(s) Oral at bedtime  Dakins Solution - 1/4 Strength 1 Application(s) Topical daily  dextrose 40% Gel 15 Gram(s) Oral once  dextrose 50% Injectable 25 Gram(s) IV Push once  dextrose 50% Injectable 12.5 Gram(s) IV Push once  dextrose 50% Injectable 25 Gram(s) IV Push once  finasteride 5 milliGRAM(s) Oral daily  glucagon  Injectable 1 milliGRAM(s) IntraMuscular once  levothyroxine 150 MICROGram(s) Oral daily  metoprolol tartrate 50 milliGRAM(s) Oral two times a day  ondansetron Injectable 4 milliGRAM(s) IV Push every 6 hours  pantoprazole   Suspension 40 milliGRAM(s) Oral daily    MEDICATIONS  (PRN):  metoclopramide Injectable 10 milliGRAM(s) IV Push two times a day PRN nausea  ondansetron Injectable 4 milliGRAM(s) IV Push every 6 hours PRN Nausea and/or Vomiting      Allergies    No Known Allergies    Intolerances        Vital Signs Last 24 Hrs  T(C): 37.1 (27 Mar 2022 05:09), Max: 37.1 (27 Mar 2022 05:09)  T(F): 98.7 (27 Mar 2022 05:09), Max: 98.7 (27 Mar 2022 05:09)  HR: 94 (27 Mar 2022 05:09) (86 - 94)  BP: 164/92 (27 Mar 2022 05:09) (158/79 - 169/85)  BP(mean): --  RR: 17 (26 Mar 2022 20:26) (17 - 18)  SpO2: 100% (27 Mar 2022 05:09) (96% - 100%)        PHYSICAL EXAM:    General: ; in no acute distress  HEENT: MMM, conjunctiva and sclera clear  H-RRR  L-CTA  Gastrointestinal: Soft, non-tender non-distended; Normal bowel sounds; No rebound or guarding- PEG site- old dried blood   Extremities: Normal range of motion, No clubbing, cyanosis or edema  Neurological: Alert and oriented x3  Skin: Warm and dry. No obvious rash      LABS:                        11.6   8.50  )-----------( 300      ( 27 Mar 2022 07:18 )             36.3     27 Mar 2022 07:18    134    |  95     |  18.1   ----------------------------<  155    3.8     |  28.0   |  0.64     Ca    8.4        27 Mar 2022 07:18  Phos  2.2       26 Mar 2022 08:44  Mg     2.0       26 Mar 2022 08:44                RADIOLOGY & ADDITIONAL STUDIES:

## 2022-03-27 NOTE — PATIENT PROFILE ADULT - FALL HARM RISK - HARM RISK INTERVENTIONS

## 2022-03-27 NOTE — PROGRESS NOTE ADULT - ASSESSMENT
77 yo male pmh h/o back surgery ( LS in 2016 and Thoracic spine in 2018 and after thoracic spine surgery has been in the wheel chair )  alcohol abuse stopped using alcohol 15 years ago, htn, bph , hypothyroidism who follows with ENT Dr. Boudreaux for his routine ENT visits and mentioned diffculty swallowing to both liquid and solid ongoing for more thana year as per pt's wife, so ENT did scope in the office, none was found as per pt's wife so he was sent for modified bairum swallow which was abnormal and pt. was sent by ENT to the ER for further plan. pt's wife stated that he slowly swallows his pills and food does not go down, may be some goes and rest he brings up.    *Dysphagia  Speech and swallow evaluated outpatient with MBS showing massive aspiration  Neurology consulted, MRI no stroke  NPO  GI consult appreciated, s/p PEG placement on 3/24  Start TFs today with free water flushes  IVF due to not able to tolerate feed  Currently at 60cc/hr   Nutrition consulted  CM notified to arrange home care  Zofran q6hr standing  reglan as needed for nausea   F/u KUB r/o ileus     *Chronic left heel ulcer  Podiatry consulted, WCO: Please clean the wound with Dakin solution, apply aquacel, 4x4 gauze, Abd pad, Bret and ACE to the left heel wound    *HTN  increase Metoprolol tartrate 50BID     *ypothyroidism  Synthroid 150 mcg daily    *HLD  Atorvastatin 40mg At bedtime    *Hypomagnesemia  replaced    *DVT ppx  Heparin SQ     Dispo: Pending reaching goal feeds and home care.   Wife Katt updated at [phone]

## 2022-03-27 NOTE — PROGRESS NOTE ADULT - SUBJECTIVE AND OBJECTIVE BOX
HPI  Pt is a 77yo M admitted to [facility] for dysphagia  Pt was seen and examined at bedside. Per pt, still have spiting up and nausea.     Vital Signs Last 24 Hrs  T(C): 37.1 (27 Mar 2022 05:09), Max: 37.1 (27 Mar 2022 05:09)  T(F): 98.7 (27 Mar 2022 05:09), Max: 98.7 (27 Mar 2022 05:09)  HR: 94 (27 Mar 2022 05:09) (86 - 94)  BP: 164/92 (27 Mar 2022 05:09) (158/79 - 169/85)  BP(mean): --  RR: 17 (26 Mar 2022 20:26) (17 - 18)  SpO2: 100% (27 Mar 2022 05:09) (96% - 100%)    I&O's Summary      CAPILLARY BLOOD GLUCOSE      POCT Blood Glucose.: 142 mg/dL (27 Mar 2022 05:45)  POCT Blood Glucose.: 152 mg/dL (26 Mar 2022 22:10)  POCT Blood Glucose.: 139 mg/dL (26 Mar 2022 17:46)  POCT Blood Glucose.: 151 mg/dL (26 Mar 2022 12:04)      PHYSICAL EXAM:    CONSTITUTIONAL: NAD,   ENMT: Moist oral mucosa, no pharyngeal injection or exudates  RESPIRATORY: Normal respiratory effort; lungs are clear to auscultation bilaterally  CARDIOVASCULAR: Regular rate and rhythm, normal S1 and S2, No lower extremity edema;   ABDOMEN: Nontender to palpation, normoactive bowel sounds, no rebound/guarding; + PEG, gauze has dried bloody, no sign of infection   MUSCLOSKELETAL:  no joint swelling or tenderness to palpation  NEUROLOGY: CN 2-12 are intact and symmetric; no gross sensory deficits;   SKIN: Chronic left heel ulcer    MEDICATIONS:  MEDICATIONS  (STANDING):  aspirin  chewable 81 milliGRAM(s) Oral daily  atorvastatin 40 milliGRAM(s) Oral at bedtime  Dakins Solution - 1/4 Strength 1 Application(s) Topical daily  dextrose 40% Gel 15 Gram(s) Oral once  dextrose 50% Injectable 25 Gram(s) IV Push once  dextrose 50% Injectable 12.5 Gram(s) IV Push once  dextrose 50% Injectable 25 Gram(s) IV Push once  finasteride 5 milliGRAM(s) Oral daily  glucagon  Injectable 1 milliGRAM(s) IntraMuscular once  levothyroxine 150 MICROGram(s) Oral daily  metoprolol tartrate 50 milliGRAM(s) Oral two times a day  ondansetron Injectable 4 milliGRAM(s) IV Push every 6 hours  pantoprazole   Suspension 40 milliGRAM(s) Oral daily      LABS: All Labs Reviewed:                        11.6   8.50  )-----------( 300      ( 27 Mar 2022 07:18 )             36.3     03-27    134<L>  |  95<L>  |  18.1  ----------------------------<  155<H>  3.8   |  28.0  |  0.64    Ca    8.4<L>      27 Mar 2022 07:18  Phos  2.2     03-26  Mg     2.0     03-26            Blood Culture:     RADIOLOGY/EKG:    DVT PPX:    ADVANCED DIRECTIVE:    DISPOSITION:

## 2022-03-27 NOTE — PROGRESS NOTE ADULT - PROBLEM SELECTOR PLAN 1
PEG in place with tube feeds at 60 cc/hr  having regurgitation, wretching. continue reglan and protonix bid  will check KUB to r/o ileus - though abdomen in soft, non distended with +BS

## 2022-03-27 NOTE — PROGRESS NOTE ADULT - ASSESSMENT
The patient is a 76y Male who is followed by neurology because of dysphagia    Dysphagia  At least one year duration  may have followed undiagnosed stroke  he has no symptoms c/w myasthenia gravis or Parkinson's disease  MRI brain does not explain dysphagia  Will check myasthenia labs to make sure this is not the case- AchR negative MuSK Ab pending    s/p PEG for nutrition    Thank you for allowing me to participate in the care of your patient    Andrey Padron MD, PhD   [phone]

## 2022-03-27 NOTE — PROGRESS NOTE ADULT - SUBJECTIVE AND OBJECTIVE BOX
[facility]                                     Neurology at [place]                                 Nereida Kim, & Braulio                                  [address]                                        [place], [place], [address]                                             [phone]    CC: dysphagia  HPI:  The patient is a 76y Male who presented with dysphagia, had difficulty swallowing for at least a year.  His wife says that just before his swallowing issues started he was een at [facility] for dysathria and facial droop butr told he did not have a stroke.  No further work up was done per his wife.  A modified barium swallow study yesterday confirmed the dysphagia and he was sent to [facility] for admission and work up.  Neurology was asked to evaluate.  he denies weakness or diplopia.      Interval history: no new neuro complaints, s/p PEG    Review of systems (neurology): Denies headache or dizziness. Denies weakness/numbness.  Denies speech/language deficits. Denies diplopia/blurred vision.  Denies confusion (+) dysphagia    MEDICATIONS  (STANDING):  aspirin  chewable 81 milliGRAM(s) Oral daily  atorvastatin 40 milliGRAM(s) Oral at bedtime  Dakins Solution - 1/4 Strength 1 Application(s) Topical daily  dextrose 40% Gel 15 Gram(s) Oral once  dextrose 50% Injectable 25 Gram(s) IV Push once  dextrose 50% Injectable 12.5 Gram(s) IV Push once  dextrose 50% Injectable 25 Gram(s) IV Push once  finasteride 5 milliGRAM(s) Oral daily  glucagon  Injectable 1 milliGRAM(s) IntraMuscular once  heparin   Injectable 5000 Unit(s) SubCutaneous every 12 hours  levothyroxine 150 MICROGram(s) Oral daily  metoclopramide Injectable 10 milliGRAM(s) IV Push every 8 hours  metoprolol tartrate 50 milliGRAM(s) Oral two times a day  ondansetron Injectable 4 milliGRAM(s) IV Push every 6 hours  pantoprazole   Suspension 40 milliGRAM(s) Oral daily    MEDICATIONS  (PRN):  ondansetron Injectable 4 milliGRAM(s) IV Push every 6 hours PRN Nausea and/or Vomiting      Vital Signs Last 24 Hrs  T(C): 37 (27 Mar 2022 16:30), Max: 37.1 (27 Mar 2022 05:09)  T(F): 98.6 (27 Mar 2022 16:30), Max: 98.7 (27 Mar 2022 05:09)  HR: 93 (27 Mar 2022 16:30) (86 - 94)  BP: 147/79 (27 Mar 2022 16:30) (147/79 - 169/85)  BP(mean): --  RR: 18 (27 Mar 2022 16:30) (17 - 18)  SpO2: 100% (27 Mar 2022 16:30) (96% - 100%)    Detailed Neurologic Exam:    Mental status: The patient is awake and alert and has normal attention span.  The patient is oriented to self and hospital  The patient is able to name objects, follow commands, repeat sentences. No dysarthria    Cranial nerves: Pupils equal and react symmetrically to light. There is no visual field deficit to confrontation. Extraocular motion is full with no nystagmus. There is no ptosis. Facial sensation is intact. Facial musculature is symmetric. Palate elevates symmetrically. Tongue is midline.    Motor: There is normal bulk and tone.  There is no tremor.  Strength is 5/5 in the right arm and  4/5 leg.   Strength is 5/5 in the left arm and 4+/5 leg.    Sensation: Intact to light touch and pin in 4 extremities    Reflexes: 1 + throughout     Cerebellar: There is no dysmetria on finger to nose testing.    Gait : deferred    LABS:                                    11.6   8.50  )-----------( 300      ( 27 Mar 2022 07:18 )             36.3     03-27    134<L>  |  95<L>  |  18.1  ----------------------------<  155<H>  3.8   |  28.0  |  0.64    Ca    8.4<L>      27 Mar 2022 07:18  Phos  2.2     03-26  Mg     2.0     03-26        Acetylcholine Receptor Binding Antibody (03.23.22 @ 13:08)    ACRM Binding Antibody: 0.03: Negative:   0.00 - 0.24                                 Borderline: 0.25 - 0.40                                 Positive:         >0.40    Acetylcholine Modulating AB (03.23.22 @ 13:08)    Acetylcholine Modulating AB: 0: INTERPRETIVE INFORMATION: Acetylcholine Modulating Ab    Negative ..........  0-45 percent modulating    Positive ..........  46 percent or greater modulating          RADIOLOGY & ADDITIONAL STUDIES (independently reviewed unless otherwise noted):  MRI brain- no acute CVA, mass or blood  (+) SVID and old b/l BG lacunes, left cbl stroke      MBSS:  · Diagnostic Impressions	Pt presents w/moderate oral & severe pharyngeal dysphagia. Oral stage characterized by functional PO acceptance, however decreased lingual strength (w/intermittent lingual pumping), resulting in inadequate bolus formation/cohesion/propulsion, w/subsequent min-mod increased oral transit time. Consistent premature pharyngeal entry noted variably between the mic & hypopharynx, more consistently to the pyriforms w/moderately thick liquids. No anterior loss or oral stasis visualized.    Pharyngeal phase of swallow marked by reduced contractility, decreased hyo-laryngeal elevation/excursion, incomplete epiglottic deflection and reduced upper/lower airway protection (which is not facilitated w/either compensatory postures or increasingly viscous consistencies). Moderate valleculae residue noted across trials, & trace-min on the posterior pharyngeal wall & in the pyriforms. Residue reduced with cued & independent subsequent dry swallow x 2-3. +Silent aspiration of soft & bite-sized & regular solids head neutral. Additional +silent aspiration of moderately thick liquids via tsp via chin tuck posture. +Aspiration with sensation of puree w/chin tuck. +Penetration contacting the vocal cords without clearance of puree w/L head turn & of moderately thick liquids via tsp w/R head turn. +Penetration above the cords without clearance puree head neutral/R head turn, & moderately thick liquids via tsp head neutral/L head turn. All penetration/aspiration intermittently visualized during/following the swallow w/all consistencies provided. Pt with inconsistent ability to clear penetrated/aspirated material from laryngeal vestibule despite expectoration.

## 2022-03-28 LAB
ANION GAP SERPL CALC-SCNC: 10 MMOL/L — SIGNIFICANT CHANGE UP (ref 5–17)
BUN SERPL-MCNC: 20.1 MG/DL — HIGH (ref 8–20)
CALCIUM SERPL-MCNC: 8.3 MG/DL — LOW (ref 8.6–10.2)
CHLORIDE SERPL-SCNC: 93 MMOL/L — LOW (ref 98–107)
CO2 SERPL-SCNC: 28 MMOL/L — SIGNIFICANT CHANGE UP (ref 22–29)
CREAT SERPL-MCNC: 0.59 MG/DL — SIGNIFICANT CHANGE UP (ref 0.5–1.3)
EGFR: 99 ML/MIN/1.73M2 — SIGNIFICANT CHANGE UP
GLUCOSE BLDC GLUCOMTR-MCNC: 102 MG/DL — HIGH (ref 70–99)
GLUCOSE BLDC GLUCOMTR-MCNC: 119 MG/DL — HIGH (ref 70–99)
GLUCOSE BLDC GLUCOMTR-MCNC: 128 MG/DL — HIGH (ref 70–99)
GLUCOSE BLDC GLUCOMTR-MCNC: 95 MG/DL — SIGNIFICANT CHANGE UP (ref 70–99)
GLUCOSE SERPL-MCNC: 118 MG/DL — HIGH (ref 70–99)
HCT VFR BLD CALC: 37 % — LOW (ref 39–50)
HGB BLD-MCNC: 11.9 G/DL — LOW (ref 13–17)
MAGNESIUM SERPL-MCNC: 1.8 MG/DL — SIGNIFICANT CHANGE UP (ref 1.8–2.6)
MCHC RBC-ENTMCNC: 27.8 PG — SIGNIFICANT CHANGE UP (ref 27–34)
MCHC RBC-ENTMCNC: 32.2 GM/DL — SIGNIFICANT CHANGE UP (ref 32–36)
MCV RBC AUTO: 86.4 FL — SIGNIFICANT CHANGE UP (ref 80–100)
PHOSPHATE SERPL-MCNC: 2.3 MG/DL — LOW (ref 2.4–4.7)
PLATELET # BLD AUTO: 328 K/UL — SIGNIFICANT CHANGE UP (ref 150–400)
POTASSIUM SERPL-MCNC: 3.9 MMOL/L — SIGNIFICANT CHANGE UP (ref 3.5–5.3)
POTASSIUM SERPL-SCNC: 3.9 MMOL/L — SIGNIFICANT CHANGE UP (ref 3.5–5.3)
RBC # BLD: 4.28 M/UL — SIGNIFICANT CHANGE UP (ref 4.2–5.8)
RBC # FLD: 14.3 % — SIGNIFICANT CHANGE UP (ref 10.3–14.5)
SARS-COV-2 RNA SPEC QL NAA+PROBE: SIGNIFICANT CHANGE UP
SODIUM SERPL-SCNC: 131 MMOL/L — LOW (ref 135–145)
WBC # BLD: 6.71 K/UL — SIGNIFICANT CHANGE UP (ref 3.8–10.5)
WBC # FLD AUTO: 6.71 K/UL — SIGNIFICANT CHANGE UP (ref 3.8–10.5)

## 2022-03-28 PROCEDURE — 99232 SBSQ HOSP IP/OBS MODERATE 35: CPT

## 2022-03-28 PROCEDURE — 99233 SBSQ HOSP IP/OBS HIGH 50: CPT

## 2022-03-28 RX ORDER — SENNA PLUS 8.6 MG/1
2 TABLET ORAL AT BEDTIME
Refills: 0 | Status: DISCONTINUED | OUTPATIENT
Start: 2022-03-28 | End: 2022-03-29

## 2022-03-28 RX ORDER — PANTOPRAZOLE SODIUM 20 MG/1
40 TABLET, DELAYED RELEASE ORAL
Refills: 0 | Status: DISCONTINUED | OUTPATIENT
Start: 2022-03-28 | End: 2022-03-29

## 2022-03-28 RX ORDER — POLYETHYLENE GLYCOL 3350 17 G/17G
17 POWDER, FOR SOLUTION ORAL DAILY
Refills: 0 | Status: DISCONTINUED | OUTPATIENT
Start: 2022-03-28 | End: 2022-03-29

## 2022-03-28 RX ADMIN — Medication 50 MILLIGRAM(S): at 06:01

## 2022-03-28 RX ADMIN — ONDANSETRON 4 MILLIGRAM(S): 8 TABLET, FILM COATED ORAL at 06:01

## 2022-03-28 RX ADMIN — Medication 1 APPLICATION(S): at 12:02

## 2022-03-28 RX ADMIN — PANTOPRAZOLE SODIUM 40 MILLIGRAM(S): 20 TABLET, DELAYED RELEASE ORAL at 17:48

## 2022-03-28 RX ADMIN — Medication 81 MILLIGRAM(S): at 13:11

## 2022-03-28 RX ADMIN — HEPARIN SODIUM 5000 UNIT(S): 5000 INJECTION INTRAVENOUS; SUBCUTANEOUS at 06:02

## 2022-03-28 RX ADMIN — ONDANSETRON 4 MILLIGRAM(S): 8 TABLET, FILM COATED ORAL at 12:01

## 2022-03-28 RX ADMIN — ONDANSETRON 4 MILLIGRAM(S): 8 TABLET, FILM COATED ORAL at 23:04

## 2022-03-28 RX ADMIN — POLYETHYLENE GLYCOL 3350 17 GRAM(S): 17 POWDER, FOR SOLUTION ORAL at 17:48

## 2022-03-28 RX ADMIN — Medication 10 MILLIGRAM(S): at 21:47

## 2022-03-28 RX ADMIN — Medication 10 MILLIGRAM(S): at 12:00

## 2022-03-28 RX ADMIN — Medication 10 MILLIGRAM(S): at 06:02

## 2022-03-28 RX ADMIN — Medication 50 MILLIGRAM(S): at 17:49

## 2022-03-28 RX ADMIN — FINASTERIDE 5 MILLIGRAM(S): 5 TABLET, FILM COATED ORAL at 11:59

## 2022-03-28 RX ADMIN — ONDANSETRON 4 MILLIGRAM(S): 8 TABLET, FILM COATED ORAL at 17:48

## 2022-03-28 RX ADMIN — ATORVASTATIN CALCIUM 40 MILLIGRAM(S): 80 TABLET, FILM COATED ORAL at 21:48

## 2022-03-28 RX ADMIN — Medication 150 MICROGRAM(S): at 06:02

## 2022-03-28 RX ADMIN — HEPARIN SODIUM 5000 UNIT(S): 5000 INJECTION INTRAVENOUS; SUBCUTANEOUS at 17:49

## 2022-03-28 NOTE — PROGRESS NOTE ADULT - NS PANP COMMENT GEN_ALL_CORE FT
Patient was seen and examined with NP   No abdominal pain.   + nausea. Tube feeds stopped. NO ileus or obstruction  no BM in one week . Rectal shows not impaction  will try suppository   agree with above assessment and plan

## 2022-03-28 NOTE — PROGRESS NOTE ADULT - TIME BILLING
Reviewed lab, notes meds  will order KUB
reviewed EGD report, labs, meds   plan reviewed with NP
reviewed EGD report, labs
I reviewed the PEG report, labs, notes
reviewed notes. labs meds

## 2022-03-28 NOTE — PROGRESS NOTE ADULT - SUBJECTIVE AND OBJECTIVE BOX
HPI  Pt is a 75yo M admitted to [facility] for dysphagia  Pt was seen and examined at bedside. Per pt, still have spiting up and nausea.     Vital Signs Last 24 Hrs  T(C): 37.2 (28 Mar 2022 10:45), Max: 37.2 (28 Mar 2022 10:45)  T(F): 99 (28 Mar 2022 10:45), Max: 99 (28 Mar 2022 10:45)  HR: 86 (28 Mar 2022 10:45) (86 - 96)  BP: 106/63 (28 Mar 2022 10:45) (106/63 - 147/79)  BP(mean): --  RR: 18 (28 Mar 2022 10:45) (18 - 18)  SpO2: 99% (28 Mar 2022 10:45) (98% - 100%)    I&O's Summary    27 Mar 2022 07:01  -  28 Mar 2022 07:00  --------------------------------------------------------  IN: 1000 mL / OUT: 550 mL / NET: 450 mL        CAPILLARY BLOOD GLUCOSE      POCT Blood Glucose.: 95 mg/dL (28 Mar 2022 11:57)  POCT Blood Glucose.: 119 mg/dL (28 Mar 2022 06:20)  POCT Blood Glucose.: 126 mg/dL (27 Mar 2022 22:20)  POCT Blood Glucose.: 132 mg/dL (27 Mar 2022 17:40)      PHYSICAL EXAM:    CONSTITUTIONAL: NAD,   ENMT: Moist oral mucosa, no pharyngeal injection or exudates  RESPIRATORY: Normal respiratory effort; lungs are clear to auscultation bilaterally  CARDIOVASCULAR: Regular rate and rhythm, normal S1 and S2, No lower extremity edema;   ABDOMEN: Nontender to palpation, normoactive bowel sounds, no rebound/guarding; + PEG, no sign of infection   MUSCLOSKELETAL:  no joint swelling or tenderness to palpation  NEUROLOGY: CN 2-12 are intact and symmetric; no gross sensory deficits;   SKIN: Chronic left heel ulcer    MEDICATIONS:  MEDICATIONS  (STANDING):  aspirin  chewable 81 milliGRAM(s) Oral daily  atorvastatin 40 milliGRAM(s) Oral at bedtime  bisacodyl Suppository 10 milliGRAM(s) Rectal at bedtime  Dakins Solution - 1/4 Strength 1 Application(s) Topical daily  dextrose 40% Gel 15 Gram(s) Oral once  dextrose 50% Injectable 25 Gram(s) IV Push once  dextrose 50% Injectable 12.5 Gram(s) IV Push once  dextrose 50% Injectable 25 Gram(s) IV Push once  finasteride 5 milliGRAM(s) Oral daily  glucagon  Injectable 1 milliGRAM(s) IntraMuscular once  heparin   Injectable 5000 Unit(s) SubCutaneous every 12 hours  levothyroxine 150 MICROGram(s) Oral daily  metoclopramide Injectable 10 milliGRAM(s) IV Push every 8 hours  metoprolol tartrate 50 milliGRAM(s) Oral two times a day  ondansetron Injectable 4 milliGRAM(s) IV Push every 6 hours  pantoprazole   Suspension 40 milliGRAM(s) Oral two times a day before meals  polyethylene glycol 3350 17 Gram(s) Oral daily  senna 2 Tablet(s) Oral at bedtime      LABS: All Labs Reviewed:                        11.9   6.71  )-----------( 328      ( 28 Mar 2022 06:05 )             37.0     03-28    131<L>  |  93<L>  |  20.1<H>  ----------------------------<  118<H>  3.9   |  28.0  |  0.59    Ca    8.3<L>      28 Mar 2022 06:05  Phos  2.3     03-28  Mg     1.8     03-28            Blood Culture:     RADIOLOGY/EKG:    DVT PPX:    ADVANCED DIRECTIVE:    DISPOSITION:

## 2022-03-28 NOTE — PROGRESS NOTE ADULT - SUBJECTIVE AND OBJECTIVE BOX
Patient is a 76y old  Male who presents with a chief complaint of Dysphagia (27 Mar 2022 17:00)      INTERVAL HPI/OVERNIGHT EVENTS: Patient seen and evaluated at bedside, reporting persistent regurgitation sensation, Peg feedings discontinue overnight, as patient had 200cc residual during night shift. KUB showed Nonobstructive bowel gas pattern. Gastrostomy tube in placed, Enteric contrast throughout the colon. Denies nausea, vomiting, abdominal pain, chest pain, shortness of breath, hematemesis, hematochezia, melena.    MEDICATIONS  (STANDING):  aspirin  chewable 81 milliGRAM(s) Oral daily  atorvastatin 40 milliGRAM(s) Oral at bedtime  Dakins Solution - 1/4 Strength 1 Application(s) Topical daily  dextrose 40% Gel 15 Gram(s) Oral once  dextrose 50% Injectable 25 Gram(s) IV Push once  dextrose 50% Injectable 12.5 Gram(s) IV Push once  dextrose 50% Injectable 25 Gram(s) IV Push once  finasteride 5 milliGRAM(s) Oral daily  glucagon  Injectable 1 milliGRAM(s) IntraMuscular once  heparin   Injectable 5000 Unit(s) SubCutaneous every 12 hours  levothyroxine 150 MICROGram(s) Oral daily  metoclopramide Injectable 10 milliGRAM(s) IV Push every 8 hours  metoprolol tartrate 50 milliGRAM(s) Oral two times a day  ondansetron Injectable 4 milliGRAM(s) IV Push every 6 hours  pantoprazole   Suspension 40 milliGRAM(s) Oral two times a day before meals    MEDICATIONS  (PRN):  ondansetron Injectable 4 milliGRAM(s) IV Push every 6 hours PRN Nausea and/or Vomiting      Allergies    No Known Allergies    Intolerances    Review of Systems:  · ENMT: negative  · Respiratory and Thorax: negative  · Cardiovascular: negative  · Gastrointestinal: see above.  · Genitourinary:	negative  · Musculoskeletal: negative  · Neurological: negative  · Psychiatric: negative  · Hematology/Lymphatics: negative  · Endocrine: negative    Vital Signs Last 24 Hrs  T(C): 36.7 (28 Mar 2022 05:20), Max: 37.1 (27 Mar 2022 11:35)  T(F): 98 (28 Mar 2022 05:20), Max: 98.7 (27 Mar 2022 11:35)  HR: 96 (28 Mar 2022 05:20) (87 - 96)  BP: 144/77 (28 Mar 2022 05:20) (138/76 - 164/82)  BP(mean): --  RR: 18 (28 Mar 2022 05:20) (18 - 18)  SpO2: 98% (28 Mar 2022 05:20) (97% - 100%)    PHYSICAL EXAM:  · Constitutional: Elderly, ill  looking man, in no acute distress.   · Eyes: EOMI; PERRL; no drainage or redness  · ENMT: No oral lesions; no gross abnormalities  · Neck:	No bruits; no thyromegaly or nodules  · Back:	No deformity or limitation of movement  · Respiratory: Breath Sounds equal & clear to percussion & auscultation, no accessory muscle use  · Cardiovascular: Regular rate & rhythm, normal S1, S2; no murmurs, gallops or rubs; no S3, S4  · Gastrointestinal: Soft, non-tender, no hepatosplenomegaly, normal bowel sounds. Peg tube in placed.       LABS:                        11.9   6.71  )-----------( 328      ( 28 Mar 2022 06:05 )             37.0     03-28    131<L>  |  93<L>  |  20.1<H>  ----------------------------<  118<H>  3.9   |  28.0  |  0.59    Ca    8.3<L>      28 Mar 2022 06:05  Phos  2.3     03-28  Mg     1.8     03-28          LIVER FUNCTIONS - ( 24 Mar 2022 07:41 )  Alb: 3.2 g/dL / Pro: 7.1 g/dL / ALK PHOS: 153 U/L / ALT: 23 U/L / AST: 40 U/L / GGT: x             RADIOLOGY & ADDITIONAL TESTS:  < from: Xray Kidney Ureter Bladder (03.27.22 @ 11:57) >  INTERPRETATION:  XR ABDOMEN KUB    INDICATION: nausea and vomiting.    COMPARISON: 12/5/2018    IMPRESSION:    Nonobstructive bowel gas pattern. Gastrostomy. Nonobstructive bowel gas   pattern. Enteric contrast throughout the colon. Right hip arthroplasty.   Lumbar spine fusion.     Patient is a 76y old  Male who presents with a chief complaint of Dysphagia (27 Mar 2022 17:00)      INTERVAL HPI/OVERNIGHT EVENTS: Patient seen and evaluated at bedside, reporting persistent regurgitation sensation, Peg feedings discontinue overnight, as patient had 200cc residual during night shift. KUB showed Nonobstructive bowel gas pattern. Gastrostomy tube in placed, Enteric contrast throughout the colon. Denies nausea, vomiting, abdominal pain, chest pain, shortness of breath, hematemesis, hematochezia, melena.  Patient  has not had a BM in one week     MEDICATIONS  (STANDING):  aspirin  chewable 81 milliGRAM(s) Oral daily  atorvastatin 40 milliGRAM(s) Oral at bedtime  Dakins Solution - 1/4 Strength 1 Application(s) Topical daily  dextrose 40% Gel 15 Gram(s) Oral once  dextrose 50% Injectable 25 Gram(s) IV Push once  dextrose 50% Injectable 12.5 Gram(s) IV Push once  dextrose 50% Injectable 25 Gram(s) IV Push once  finasteride 5 milliGRAM(s) Oral daily  glucagon  Injectable 1 milliGRAM(s) IntraMuscular once  heparin   Injectable 5000 Unit(s) SubCutaneous every 12 hours  levothyroxine 150 MICROGram(s) Oral daily  metoclopramide Injectable 10 milliGRAM(s) IV Push every 8 hours  metoprolol tartrate 50 milliGRAM(s) Oral two times a day  ondansetron Injectable 4 milliGRAM(s) IV Push every 6 hours  pantoprazole   Suspension 40 milliGRAM(s) Oral two times a day before meals    MEDICATIONS  (PRN):  ondansetron Injectable 4 milliGRAM(s) IV Push every 6 hours PRN Nausea and/or Vomiting      Allergies    No Known Allergies    Intolerances    Review of Systems:  · ENMT: negative  · Respiratory and Thorax: negative  · Cardiovascular: negative  · Gastrointestinal: see above.  · Genitourinary:	negative  · Musculoskeletal: negative  · Neurological: negative  · Psychiatric: negative  · Hematology/Lymphatics: negative  · Endocrine: negative    Vital Signs Last 24 Hrs  T(C): 36.7 (28 Mar 2022 05:20), Max: 37.1 (27 Mar 2022 11:35)  T(F): 98 (28 Mar 2022 05:20), Max: 98.7 (27 Mar 2022 11:35)  HR: 96 (28 Mar 2022 05:20) (87 - 96)  BP: 144/77 (28 Mar 2022 05:20) (138/76 - 164/82)  BP(mean): --  RR: 18 (28 Mar 2022 05:20) (18 - 18)  SpO2: 98% (28 Mar 2022 05:20) (97% - 100%)    PHYSICAL EXAM:  · Constitutional: Elderly, ill  looking man, in no acute distress.   · Eyes: EOMI; PERRL; no drainage or redness  · ENMT: No oral lesions; no gross abnormalities  · Neck:	No bruits; no thyromegaly or nodules  · Back:	No deformity or limitation of movement  · Respiratory: Breath Sounds equal & clear to percussion & auscultation, no accessory muscle use  · Cardiovascular: Regular rate & rhythm, normal S1, S2; no murmurs, gallops or rubs; no S3, S4  · Gastrointestinal: Soft, non-tender, no hepatosplenomegaly, normal bowel sounds. Peg tube in placed.   rectal - no masses , no fecal impaction      LABS:                        11.9   6.71  )-----------( 328      ( 28 Mar 2022 06:05 )             37.0     03-28    131<L>  |  93<L>  |  20.1<H>  ----------------------------<  118<H>  3.9   |  28.0  |  0.59    Ca    8.3<L>      28 Mar 2022 06:05  Phos  2.3     03-28  Mg     1.8     03-28          LIVER FUNCTIONS - ( 24 Mar 2022 07:41 )  Alb: 3.2 g/dL / Pro: 7.1 g/dL / ALK PHOS: 153 U/L / ALT: 23 U/L / AST: 40 U/L / GGT: x             RADIOLOGY & ADDITIONAL TESTS:  < from: Xray Kidney Ureter Bladder (03.27.22 @ 11:57) >  INTERPRETATION:  XR ABDOMEN KUB    INDICATION: nausea and vomiting.    COMPARISON: 12/5/2018    IMPRESSION:    Nonobstructive bowel gas pattern. Gastrostomy. Nonobstructive bowel gas   pattern. Enteric contrast throughout the colon. Right hip arthroplasty.   Lumbar spine fusion.

## 2022-03-28 NOTE — PROGRESS NOTE ADULT - ASSESSMENT
77 yo male pmh h/o back surgery ( LS in 2016 and Thoracic spine in 2018 and after thoracic spine surgery has been in the wheel chair )  alcohol abuse stopped using alcohol 15 years ago, htn, bph , hypothyroidism who follows with ENT Dr. Boudreaux for his routine ENT visits and mentioned diffculty swallowing to both liquid and solid ongoing for more thana year as per pt's wife, so ENT did scope in the office, none was found as per pt's wife so he was sent for modified bairum swallow which was abnormal and pt. was sent by ENT to the ER for further plan. pt's wife stated that he slowly swallows his pills and food does not go down, may be some goes and rest he brings up.    *Dysphagia  Speech and swallow evaluated outpatient with MBS showing massive aspiration   Neurology consulted, MRI no stroke   NPO   GI consult appreciated, s/p PEG placement on 3/24   IVF due to not able to tolerate feed   Nutrition consulted   CM notified to arrange home care   Zofran q6hr standing   Reglan standing   KUB neg for ileus   GI consult appreciated  Will start on bolus feed rather than continuous feed     *Chronic left heel ulcer  Podiatry consulted, WCO: Please clean the wound with Dakin solution, apply aquacel, 4x4 gauze, Abd pad, Bret and ACE to the left heel wound    *HTN  increase Metoprolol tartrate 50BID     *ypothyroidism  Synthroid 150 mcg daily    *HLD  Atorvastatin 40mg At bedtime    *Hypomagnesemia  replaced    *DVT ppx  Heparin SQ     Dispo: Pending reaching goal feeds and home care.   Wife Katt updated at [phone]

## 2022-03-28 NOTE — PROGRESS NOTE ADULT - PROBLEM SELECTOR PLAN 1
With persistent regurgitation/wretching. KUB showed Nonobstructive bowel gas pattern. Gastrostomy tube in placed, Enteric contrast throughout the colon.  We will recommend bolus feeds TID.  Reglan and Zofran around the clock  PPI BID for cytoprotection With persistent regurgitation/wretching. KUB showed Nonobstructive bowel gas pattern. Gastrostomy tube in placed, Enteric contrast throughout the colon.  We will recommend bolus feeds TID.  Reglan and Zofran around the clock  PPI BID for cytoprotection, reglan around the clock   give  rectal suppository . NO  BM  in one week . NO impaction on rectal exam

## 2022-03-28 NOTE — CHART NOTE - NSCHARTNOTEFT_GEN_A_CORE
Asked by Dr. Bonner to call AM podiatry consult for chronic left heel ulcer.  Paged @ [phone] w/o call back  Will sign out to day team to call consult in am.
Source: [ ] Patient [ ]  Family [ ]   other [x ] EMR; consult as per PA for bolus feed recommendations    Current Diet: NPO with tube feedings    Enteral /Parenteral Nutrition: Glucerna 1.5 @ 60mL/hr; provides 1200mL, 1800kcal, 98gpro    Current Weight: 170.6lbs (3/22)  1+ L leg edema    Pertinent Medications: MEDICATIONS  (STANDING):  aspirin  chewable 81 milliGRAM(s) Oral daily  atorvastatin 40 milliGRAM(s) Oral at bedtime  Dakins Solution - 1/4 Strength 1 Application(s) Topical daily  dextrose 40% Gel 15 Gram(s) Oral once  dextrose 50% Injectable 25 Gram(s) IV Push once  dextrose 50% Injectable 12.5 Gram(s) IV Push once  dextrose 50% Injectable 25 Gram(s) IV Push once  finasteride 5 milliGRAM(s) Oral daily  glucagon  Injectable 1 milliGRAM(s) IntraMuscular once  heparin   Injectable 5000 Unit(s) SubCutaneous every 12 hours  levothyroxine 150 MICROGram(s) Oral daily  metoclopramide Injectable 10 milliGRAM(s) IV Push every 8 hours  metoprolol tartrate 50 milliGRAM(s) Oral two times a day  ondansetron Injectable 4 milliGRAM(s) IV Push every 6 hours  pantoprazole   Suspension 40 milliGRAM(s) Oral two times a day before meals    MEDICATIONS  (PRN):  ondansetron Injectable 4 milliGRAM(s) IV Push every 6 hours PRN Nausea and/or Vomiting    Pertinent Labs: CBC Full  -  ( 28 Mar 2022 06:05 )  WBC Count : 6.71 K/uL  RBC Count : 4.28 M/uL  Hemoglobin : 11.9 g/dL  Hematocrit : 37.0 %  Platelet Count - Automated : 328 K/uL  Mean Cell Volume : 86.4 fl  Mean Cell Hemoglobin : 27.8 pg  Mean Cell Hemoglobin Concentration : 32.2 gm/dL  Auto Neutrophil # : x  Auto Lymphocyte # : x  Auto Monocyte # : x  Auto Eosinophil # : x  Auto Basophil # : x  Auto Neutrophil % : x  Auto Lymphocyte % : x  Auto Monocyte % : x  Auto Eosinophil % : x  Auto Basophil % : x    03-28 Na131 mmol/L<L> Glu 118 mg/dL<H> K+ 3.9 mmol/L Cr  0.59 mg/dL BUN 20.1 mg/dL<H> Phos 2.3 mg/dL<L> Alb n/a   PAB n/a         Skin: Diabetic L heel ulcer    Nutrition focused physical exam conducted - found signs of malnutrition [ ]absent [ x]present    Subcutaneous fat loss: [ ] Orbital fat pads region, [ ]Buccal fat region, [x ]Triceps region,  [ ]Ribs region    Muscle wasting: [x ]Temples region, [ ]Clavicle region, [ ]Shoulder region, [ ]Scapula region, [ ]Interosseous region,  [ ]thigh region, [ ]Calf region    Estimated Needs:   [x ] no change since previous assessment  [ ] recalculated:     Current Nutrition Diagnosis: Malnutrition Moderate chronic related to inadequate energy intake in setting of dysphagia as evidenced by <75% intake > 1 mo, mild fat/muscle depletion, mild edema, 44# (20%)wt loss x 4 yrs.       Recommendations: Recommend Glucerna 1.5 Bolus Feeds @240cc x 5 boluses daily to provide 1200cc, 1800kcal, 98gpro.    Monitoring and Evaluation:   [ ] PO intake [x ] Tolerance to diet prescription [X] Weights  [X] Follow up per protocol [X] Labs [x] Will monitor bowel movements
Late Entry  Asked by RN to renew IVF; Pt is NPO due to dysphagia; awaiting PEG placement.  Pt was hypoglycemic last night needing d50 and was placed on maintenance IVF @75cc/hr.  Pt's BS been stable after.  RN may continue IVF for next 12 hrs.  Monitor and reassess prn.
Pt NPO for dysphagia x 3 days  q6h accuchecks ordered after discussion with RN at start of shift  Hypoglycemic x 2 overnight, received D50% injectable with improvement   Asymptomatic, resting comfortably in NAD  All vital signs stable  Start D5+NS @ 75cc/hr  RN to notify of any acute change in pt status

## 2022-03-28 NOTE — PROGRESS NOTE ADULT - NUTRITIONAL ASSESSMENT
This patient has been assessed with a concern for Malnutrition and has been determined to have a diagnosis/diagnoses of Moderate protein-calorie malnutrition.    This patient is being managed with:   Diet NPO with Tube Feed-  Tube Feeding Modality: Gastrostomy  Glucerna 1.5 Saad (GLUCERNA1.5)  Total Volume for 24 Hours (mL): 1440  Continuous  Starting Tube Feed Rate {mL per Hour}: 10  Increase Tube Feed Rate by (mL): 10     Every 4 hours  Until Goal Tube Feed Rate (mL per Hour): 60  Tube Feed Duration (in Hours): 24  Tube Feed Start Time: 15:00  Entered: Mar 25 2022  2:38PM    
This patient has been assessed with a concern for Malnutrition and has been determined to have a diagnosis/diagnoses of Moderate protein-calorie malnutrition.    This patient is being managed with:   Diet NPO with Tube Feed-  Tube Feeding Modality: Gastrostomy  Glucerna 1.5 Saad (GLUCERNA1.5)  Total Volume for 24 Hours (mL): 1440  Bolus  Total Volume of Bolus (mL):  240  Total # of Feeds: 5  Tube Feed Frequency: Every 4 hours   Tube Feed Start Time: 15:00  Bolus Feed Rate (mL per Hour): 60   Bolus Feed Duration (in Hours): 1  Entered: Mar 28 2022  2:52PM    
This patient has been assessed with a concern for Malnutrition and has been determined to have a diagnosis/diagnoses of Moderate protein-calorie malnutrition.    This patient is being managed with:   Diet NPO with Tube Feed-  Tube Feeding Modality: Gastrostomy  Glucerna 1.5 Saad (GLUCERNA1.5)  Total Volume for 24 Hours (mL): 1440  Continuous  Starting Tube Feed Rate {mL per Hour}: 10  Increase Tube Feed Rate by (mL): 10     Every 4 hours  Until Goal Tube Feed Rate (mL per Hour): 60  Tube Feed Duration (in Hours): 24  Tube Feed Start Time: 15:00  Entered: Mar 25 2022  2:38PM

## 2022-03-29 ENCOUNTER — TRANSCRIPTION ENCOUNTER (OUTPATIENT)
Age: 77
End: 2022-03-29

## 2022-03-29 VITALS
OXYGEN SATURATION: 100 % | SYSTOLIC BLOOD PRESSURE: 111 MMHG | RESPIRATION RATE: 18 BRPM | HEART RATE: 91 BPM | TEMPERATURE: 98 F | DIASTOLIC BLOOD PRESSURE: 65 MMHG

## 2022-03-29 LAB
ACHR BLOCK AB SER-ACNC: 14 % — SIGNIFICANT CHANGE UP (ref 0–25)
ANION GAP SERPL CALC-SCNC: 7 MMOL/L — SIGNIFICANT CHANGE UP (ref 5–17)
BUN SERPL-MCNC: 29.2 MG/DL — HIGH (ref 8–20)
CALCIUM SERPL-MCNC: 8.1 MG/DL — LOW (ref 8.6–10.2)
CHLORIDE SERPL-SCNC: 96 MMOL/L — LOW (ref 98–107)
CO2 SERPL-SCNC: 28 MMOL/L — SIGNIFICANT CHANGE UP (ref 22–29)
CREAT SERPL-MCNC: 0.74 MG/DL — SIGNIFICANT CHANGE UP (ref 0.5–1.3)
EGFR: 93 ML/MIN/1.73M2 — SIGNIFICANT CHANGE UP
GLUCOSE BLDC GLUCOMTR-MCNC: 121 MG/DL — HIGH (ref 70–99)
GLUCOSE BLDC GLUCOMTR-MCNC: 88 MG/DL — SIGNIFICANT CHANGE UP (ref 70–99)
GLUCOSE SERPL-MCNC: 132 MG/DL — HIGH (ref 70–99)
HCT VFR BLD CALC: 33.2 % — LOW (ref 39–50)
HGB BLD-MCNC: 10.7 G/DL — LOW (ref 13–17)
MCHC RBC-ENTMCNC: 28.1 PG — SIGNIFICANT CHANGE UP (ref 27–34)
MCHC RBC-ENTMCNC: 32.2 GM/DL — SIGNIFICANT CHANGE UP (ref 32–36)
MCV RBC AUTO: 87.1 FL — SIGNIFICANT CHANGE UP (ref 80–100)
PLATELET # BLD AUTO: 274 K/UL — SIGNIFICANT CHANGE UP (ref 150–400)
POTASSIUM SERPL-MCNC: 4.4 MMOL/L — SIGNIFICANT CHANGE UP (ref 3.5–5.3)
POTASSIUM SERPL-SCNC: 4.4 MMOL/L — SIGNIFICANT CHANGE UP (ref 3.5–5.3)
RBC # BLD: 3.81 M/UL — LOW (ref 4.2–5.8)
RBC # FLD: 14.6 % — HIGH (ref 10.3–14.5)
SODIUM SERPL-SCNC: 131 MMOL/L — LOW (ref 135–145)
WBC # BLD: 6.03 K/UL — SIGNIFICANT CHANGE UP (ref 3.8–10.5)
WBC # FLD AUTO: 6.03 K/UL — SIGNIFICANT CHANGE UP (ref 3.8–10.5)

## 2022-03-29 PROCEDURE — 99232 SBSQ HOSP IP/OBS MODERATE 35: CPT

## 2022-03-29 PROCEDURE — 99239 HOSP IP/OBS DSCHRG MGMT >30: CPT

## 2022-03-29 RX ORDER — METOCLOPRAMIDE HCL 10 MG
10 TABLET ORAL
Qty: 0 | Refills: 0 | DISCHARGE
Start: 2022-03-29

## 2022-03-29 RX ORDER — ONDANSETRON 8 MG/1
4 TABLET, FILM COATED ORAL
Qty: 100 | Refills: 0
Start: 2022-03-29 | End: 2022-04-27

## 2022-03-29 RX ORDER — FINASTERIDE 5 MG/1
1 TABLET, FILM COATED ORAL
Qty: 0 | Refills: 0 | DISCHARGE
Start: 2022-03-29

## 2022-03-29 RX ORDER — BENZOCAINE AND MENTHOL 5; 1 G/100ML; G/100ML
1 LIQUID ORAL ONCE
Refills: 0 | Status: COMPLETED | OUTPATIENT
Start: 2022-03-29 | End: 2022-03-29

## 2022-03-29 RX ORDER — ATORVASTATIN CALCIUM 80 MG/1
1 TABLET, FILM COATED ORAL
Qty: 0 | Refills: 0 | DISCHARGE
Start: 2022-03-29

## 2022-03-29 RX ORDER — METOPROLOL TARTRATE 50 MG
1 TABLET ORAL
Qty: 0 | Refills: 0 | DISCHARGE
Start: 2022-03-29

## 2022-03-29 RX ORDER — ASPIRIN/CALCIUM CARB/MAGNESIUM 324 MG
1 TABLET ORAL
Qty: 0 | Refills: 0 | DISCHARGE
Start: 2022-03-29

## 2022-03-29 RX ORDER — LEVOTHYROXINE SODIUM 125 MCG
1 TABLET ORAL
Qty: 0 | Refills: 0 | DISCHARGE
Start: 2022-03-29

## 2022-03-29 RX ORDER — PANTOPRAZOLE SODIUM 20 MG/1
1 TABLET, DELAYED RELEASE ORAL
Qty: 0 | Refills: 0 | DISCHARGE
Start: 2022-03-29

## 2022-03-29 RX ORDER — SODIUM HYPOCHLORITE 0.125 %
1 SOLUTION, NON-ORAL MISCELLANEOUS
Qty: 0 | Refills: 0 | DISCHARGE
Start: 2022-03-29

## 2022-03-29 RX ADMIN — PANTOPRAZOLE SODIUM 40 MILLIGRAM(S): 20 TABLET, DELAYED RELEASE ORAL at 05:27

## 2022-03-29 RX ADMIN — Medication 50 MILLIGRAM(S): at 05:27

## 2022-03-29 RX ADMIN — Medication 1 APPLICATION(S): at 13:24

## 2022-03-29 RX ADMIN — Medication 10 MILLIGRAM(S): at 06:22

## 2022-03-29 RX ADMIN — BENZOCAINE AND MENTHOL 1 LOZENGE: 5; 1 LIQUID ORAL at 17:10

## 2022-03-29 RX ADMIN — Medication 81 MILLIGRAM(S): at 13:22

## 2022-03-29 RX ADMIN — FINASTERIDE 5 MILLIGRAM(S): 5 TABLET, FILM COATED ORAL at 13:23

## 2022-03-29 RX ADMIN — Medication 150 MICROGRAM(S): at 05:27

## 2022-03-29 RX ADMIN — ONDANSETRON 4 MILLIGRAM(S): 8 TABLET, FILM COATED ORAL at 13:23

## 2022-03-29 RX ADMIN — HEPARIN SODIUM 5000 UNIT(S): 5000 INJECTION INTRAVENOUS; SUBCUTANEOUS at 05:26

## 2022-03-29 RX ADMIN — Medication 10 MILLIGRAM(S): at 13:23

## 2022-03-29 RX ADMIN — ONDANSETRON 4 MILLIGRAM(S): 8 TABLET, FILM COATED ORAL at 05:26

## 2022-03-29 NOTE — DISCHARGE NOTE NURSING/CASE MANAGEMENT/SOCIAL WORK - NSDCPEFALRISK_GEN_ALL_CORE
For information on Fall & Injury Prevention, visit: [email or web address] OR  [email or web address] OR  https://www.cdc.gov/steadi/patient.html

## 2022-03-29 NOTE — DISCHARGE NOTE PROVIDER - NSDCMRMEDTOKEN_GEN_ALL_CORE_FT
aspirin 81 mg oral tablet, chewable: 1 tab(s) orally once a day  atorvastatin 40 mg oral tablet: 1 tab(s) orally once a day (at bedtime)  finasteride 5 mg oral tablet: 1 tab(s) orally once a day  levothyroxine 150 mcg (0.15 mg) oral tablet: 1 tab(s) orally once a day  metoclopramide 5 mg/mL injectable solution: 10 milligram(s) injectable every 8 hours, As Needed  metoprolol tartrate 50 mg oral tablet: 1 tab(s) orally 2 times a day  pantoprazole 40 mg oral granule, delayed release: 1 cap(s) orally 2 times a day  sodium hypochlorite 0.125% topical solution: 1 application topically once a day  Vitamin D3 25 mcg (1000 intl units) oral tablet: 1 tab(s) orally once a day

## 2022-03-29 NOTE — PHYSICAL THERAPY INITIAL EVALUATION ADULT - ADDITIONAL COMMENTS
per patient he has DME and assistance from aides at home. Pt has not ambulated in a long time and is unable to give specifics regarding the last time he walked. Pt reports however he does transfer self OOB with assistance.

## 2022-03-29 NOTE — DISCHARGE NOTE PROVIDER - HOSPITAL COURSE
75 yo male pmh h/o back surgery ( LS in 2016 and Thoracic spine in 2018 and after thoracic spine surgery has been in the wheel chair )  alcohol abuse stopped using alcohol 15 years ago, htn, bph , hypothyroidism who follows with ENT Dr. Boudreaux for his routine ENT visits and mentioned difficulty swallowing to both liquid and solid ongoing for more than a year as per pt's wife, so ENT did scope in the office, none was found as per pt's wife so he was sent for modified barium swallow which was abnormal and pt was sent by ENT to the ER for further plan. Pt's wife stated that he slowly swallows his pills and food does not go down, may be some goes and rest he brings up. Pt admitted to [facility] for dysphagia. Outpatient follow up revealing massive aspiration. Neurology was consulted and MRI revealing no stroke. Pt was seen by GI for PEG placement. PEG placed 3/24. Pt now tolerating bolus feeds. Pt medically stable for DC to [facility].     All electrolyte abnormalities were monitored carefully and repleted as necessary during this hospitalization. At the time of discharge patient was hemodynamically stable and amenable to all terms of discharge.

## 2022-03-29 NOTE — PROGRESS NOTE ADULT - PROBLEM SELECTOR PLAN 1
Now reporting less regurgitation/wretching.   KUB showed Nonobstructive bowel gas pattern. Gastrostomy tube in placed, Enteric contrast throughout the colon.  We will recommend to continue  bolus feeds.  Reglan and Zofran around the clock  PPI BID for cytoprotection,  around the clock   Continue to monitor electrolytes and replenish as needed.

## 2022-03-29 NOTE — DISCHARGE NOTE PROVIDER - PROVIDER TOKENS
PROVIDER:[TOKEN:[[id number]],FOLLOWUP:[1 week]],FREE:[LAST:[PCP],PHONE:[(   )    -],FAX:[(   )    -],FOLLOWUP:[1 week]] PROVIDER:[TOKEN:[[id number]],FOLLOWUP:[1 week]],FREE:[LAST:[PCP],PHONE:[(   )    -],FAX:[(   )    -],FOLLOWUP:[1 week]],PROVIDER:[TOKEN:[[id number]],FOLLOWUP:[1 week]]

## 2022-03-29 NOTE — DISCHARGE NOTE PROVIDER - CARE PROVIDER_API CALL
Rachel Vilchis (DO)  Gastroenterology  [address], [address]  [place] [address]  Phone: [phone]  Fax: [phone]  Follow Up Time: 1 week    PCP,   Phone: (   )    -  Fax: (   )    -  Follow Up Time: 1 week   Rachel Vilchis (DO)  Gastroenterology  [address], [address]  [place] [address]  Phone: [phone]  Fax: [phone]  Follow Up Time: 1 week    PCP,   Phone: (   )    -  Fax: (   )    -  Follow Up Time: 1 week    Shahzad Angeles (DPM)  Surgery  [address] [place] [address]  Phone: [phone]  Fax: [phone]  Follow Up Time: 1 week

## 2022-03-29 NOTE — DISCHARGE NOTE PROVIDER - INSTRUCTIONS
Diet, NPO with Tube Feeds: Glucerna 1.5  Total Volume for 24 hours (mL): 1440 Bolus   Total Volume of Bolus (mL): 240  Total Number of Feeds: 5  Tube Feed Frequency: Every 4 hours  Bolus Feed Rate (mL per hour): 60  Bolus Feeds Duration (in Hours): 1  Maintain aspiration precautions, HOB 30 degrees

## 2022-03-29 NOTE — PROGRESS NOTE ADULT - PROVIDER SPECIALTY LIST ADULT
Gastroenterology
Hospitalist
Hospitalist
Neurology
Podiatry
Hospitalist
Neurology
Gastroenterology
Hospitalist
Gastroenterology
Gastroenterology

## 2022-03-29 NOTE — PROGRESS NOTE ADULT - SUBJECTIVE AND OBJECTIVE BOX
Patient is a 76y old  Male who presents with a chief complaint of Dysphagia (28 Mar 2022 15:08)      INTERVAL HPI/OVERNIGHT EVENTS: Patient seen and evaluated at bedside, reporting large bowel movement this am, tolerating peg feedings with less "regurgitation sensation". Peg site clean and dry, Denies nausea, vomiting, abdominal pain, chest pain, shortness of breath, hematemesis, hematochezia, melena.      MEDICATIONS  (STANDING):  aspirin  chewable 81 milliGRAM(s) Oral daily  atorvastatin 40 milliGRAM(s) Oral at bedtime  Dakins Solution - 1/4 Strength 1 Application(s) Topical daily  dextrose 40% Gel 15 Gram(s) Oral once  dextrose 50% Injectable 25 Gram(s) IV Push once  dextrose 50% Injectable 12.5 Gram(s) IV Push once  dextrose 50% Injectable 25 Gram(s) IV Push once  finasteride 5 milliGRAM(s) Oral daily  glucagon  Injectable 1 milliGRAM(s) IntraMuscular once  heparin   Injectable 5000 Unit(s) SubCutaneous every 12 hours  levothyroxine 150 MICROGram(s) Oral daily  metoclopramide Injectable 10 milliGRAM(s) IV Push every 8 hours  metoprolol tartrate 50 milliGRAM(s) Oral two times a day  ondansetron Injectable 4 milliGRAM(s) IV Push every 6 hours  pantoprazole   Suspension 40 milliGRAM(s) Oral two times a day before meals  polyethylene glycol 3350 17 Gram(s) Oral daily  senna 2 Tablet(s) Oral at bedtime    MEDICATIONS  (PRN):  ondansetron Injectable 4 milliGRAM(s) IV Push every 6 hours PRN Nausea and/or Vomiting      Allergies    No Known Allergies    Intolerances    Review of Systems:  · ENMT: negative  · Respiratory and Thorax: negative  · Cardiovascular: negative  · Gastrointestinal: see above.  · Genitourinary:	negative  · Musculoskeletal: negative  · Neurological: negative  · Psychiatric: negative  · Hematology/Lymphatics: negative  · Endocrine: negative      Vital Signs Last 24 Hrs  T(C): 36.7 (29 Mar 2022 11:28), Max: 37 (29 Mar 2022 04:26)  T(F): 98.1 (29 Mar 2022 11:28), Max: 98.6 (29 Mar 2022 04:26)  HR: 85 (29 Mar 2022 11:28) (82 - 94)  BP: 116/69 (29 Mar 2022 11:28) (108/62 - 116/69)  BP(mean): 83 (28 Mar 2022 19:59) (83 - 83)  RR: 18 (29 Mar 2022 11:28) (17 - 18)  SpO2: 96% (29 Mar 2022 11:28) (96% - 99%)    PHYSICAL EXAM:  · Constitutional: Elderly, frail, thin looking man, in no acute distress.   · Eyes: EOMI; PERRL; no drainage or redness  · ENMT: No oral lesions; no gross abnormalities  · Neck:	No bruits; no thyromegaly or nodules  · Back:	No deformity or limitation of movement  · Respiratory: Breath Sounds equal & clear to percussion & auscultation, no accessory muscle use  · Cardiovascular: Regular rate & rhythm, normal S1, S2; no murmurs, gallops or rubs; no S3, S4  · Gastrointestinal: Soft, non-tender, no hepatosplenomegaly, normal bowel sounds. Peg tube in place.       LABS:                        10.7   6.03  )-----------( 274      ( 29 Mar 2022 07:42 )             33.2     03-29    131<L>  |  96<L>  |  29.2<H>  ----------------------------<  132<H>  4.4   |  28.0  |  0.74    Ca    8.1<L>      29 Mar 2022 07:41  Phos  2.3     03-28  Mg     1.8     03-28              RADIOLOGY & ADDITIONAL TESTS:

## 2022-03-29 NOTE — DISCHARGE NOTE PROVIDER - CARE PROVIDERS DIRECT ADDRESSES
,[email or web address],DirectAddress_Unknown,DirectAddress_Unknown

## 2022-03-29 NOTE — PROGRESS NOTE ADULT - NS ATTEND OPT1 GEN_ALL_CORE
I attest my time as attending is greater than 50% of the total combined time spent on qualifying patient care activities by the PA/NP and attending.

## 2022-03-29 NOTE — PHYSICAL THERAPY INITIAL EVALUATION ADULT - LEVEL OF INDEPENDENCE: SIT/STAND, REHAB EVAL
pt unable to achieve full standing, just clears buttock from surface of bed/dependent (less than 25% patients effort)/unable to perform

## 2022-03-29 NOTE — PROGRESS NOTE ADULT - NS ATTEND AMEND GEN_ALL_CORE FT
Patient complaining of nausea  + BS, soft- old dried blood around PEG site   agree with above assessment and plan
pt seen at bedside, agree with plan above, labs /notes reviewed, plan discussed with NP
Patient alert and oriented. MRI negative for acute CVA   dysphagia  pt will be add on case for PEG if schedule permits
Patient now saying he is not vomiting, but having acid regurgitation. Now at tube feeds at 40 cc /hour. NO abdominal pain   - PEG site- old dried blood at PEG site

## 2022-03-29 NOTE — DISCHARGE NOTE PROVIDER - DETAILS OF MALNUTRITION DIAGNOSIS/DIAGNOSES
This patient has been assessed with a concern for Malnutrition and was treated during this hospitalization for the following Nutrition diagnosis/diagnoses:     -  03/25/2022: Moderate protein-calorie malnutrition

## 2022-03-29 NOTE — DISCHARGE NOTE PROVIDER - NSDCCPCAREPLAN_GEN_ALL_CORE_FT
PRINCIPAL DISCHARGE DIAGNOSIS  Diagnosis: Dysphagia, unspecified  Assessment and Plan of Treatment: - Continue zofran and reglan as needed   - Maintain aspiration precautions, HOB 30 degrees  - Continue with bolus feeds  - PEG placed 3/24   - Follow up with GI in 1 week      SECONDARY DISCHARGE DIAGNOSES  Diagnosis: Chronic heel ulcer  Assessment and Plan of Treatment: - WCO: Please clean the wound with Dakin solution, apply aquacel, 4x4 gauze, Abd pad, Bret and ACE to the left heel wound  - Follow up with Podiatry in 1 week    Diagnosis: HTN (hypertension)  Assessment and Plan of Treatment: - Continue on metoprolol  - Follow up with PCP in 1 week

## 2022-03-29 NOTE — PROGRESS NOTE ADULT - REASON FOR ADMISSION
Dysphagia

## 2022-03-29 NOTE — DISCHARGE NOTE NURSING/CASE MANAGEMENT/SOCIAL WORK - PATIENT PORTAL LINK FT
You can access the FollowMyHealth Patient Portal offered by [facility] by registering at the following website: [email or web address]. By joining [facility]’s FollowMyHealth portal, you will also be able to view your health information using other applications (apps) compatible with our system.

## 2022-03-31 LAB — MUSK IGG SER IA-MCNC: <1 U/ML — SIGNIFICANT CHANGE UP

## 2022-07-01 ENCOUNTER — APPOINTMENT (OUTPATIENT)
Dept: GASTROENTEROLOGY | Facility: CLINIC | Age: 77
End: 2022-07-01

## 2022-07-01 ENCOUNTER — TRANSCRIPTION ENCOUNTER (OUTPATIENT)
Age: 77
End: 2022-07-01

## 2022-07-01 DIAGNOSIS — Z93.1 GASTROSTOMY STATUS: ICD-10-CM

## 2022-07-01 DIAGNOSIS — Z98.890 OTHER SPECIFIED POSTPROCEDURAL STATES: ICD-10-CM

## 2022-07-01 DIAGNOSIS — R13.10 DYSPHAGIA, UNSPECIFIED: ICD-10-CM

## 2022-07-01 DIAGNOSIS — Z09 ENCOUNTER FOR FOLLOW-UP EXAMINATION AFTER COMPLETED TREATMENT FOR CONDITIONS OTHER THAN MALIGNANT NEOPLASM: ICD-10-CM

## 2022-07-01 DIAGNOSIS — Z98.1 ARTHRODESIS STATUS: ICD-10-CM

## 2022-07-01 DIAGNOSIS — Z96.649 PRESENCE OF UNSPECIFIED ARTIFICIAL HIP JOINT: ICD-10-CM

## 2022-07-01 PROCEDURE — 99212 OFFICE O/P EST SF 10 MIN: CPT

## 2022-07-01 NOTE — HISTORY OF PRESENT ILLNESS
[de-identified] : Due to COVID 19 pandemic, telephonic visit was scheduled to decrease any chance of exposure. Verbal consent was obtained from the patient.\par Patient was at home and I was at [facility].  Patient has a history of hypertension, BPH, cervical radiculopathy, wheelchair-bound, ethanol abuse and has stopped 15 years ago and had difficulty eating and swallowing.  He underwent EGD with PEG tube placement.  His wife had called for follow-up.  He is using the PEG tube on a regular basis.  There is no leakage as per the wife Katt.

## 2022-07-01 NOTE — ASSESSMENT
[FreeTextEntry1] : The patient and his wife want to come to the office and want to be evaluated for the G-tube.  We will task the office staff to arrange a office visit.\par \par I spent 7 minutes on the encounter\par \par Conor Trammell MD\par Gastroenterology \par \par

## 2022-07-05 NOTE — PHARMACOTHERAPY INTERVENTION NOTE - OUTCOME
accepted
no

## 2022-07-16 NOTE — PROCEDURE NOTE - PROCEDURE
16-Jul-2022 <<-----Click on this checkbox to enter Procedure PICC line placement  01/08/2019    Active  [id number] 17-Jul-2022

## 2022-07-20 ENCOUNTER — INPATIENT (INPATIENT)
Facility: HOSPITAL | Age: 77
LOS: 26 days | Discharge: HOSPICE | DRG: 70 | End: 2022-08-16
Attending: INTERNAL MEDICINE | Admitting: HOSPITALIST
Payer: MEDICARE

## 2022-07-20 VITALS
OXYGEN SATURATION: 98 % | RESPIRATION RATE: 20 BRPM | TEMPERATURE: 99 F | WEIGHT: 190.04 LBS | DIASTOLIC BLOOD PRESSURE: 80 MMHG | HEIGHT: 73 IN | SYSTOLIC BLOOD PRESSURE: 116 MMHG | HEART RATE: 116 BPM

## 2022-07-20 DIAGNOSIS — R53.1 WEAKNESS: ICD-10-CM

## 2022-07-20 DIAGNOSIS — Z96.60 PRESENCE OF UNSPECIFIED ORTHOPEDIC JOINT IMPLANT: Chronic | ICD-10-CM

## 2022-07-20 DIAGNOSIS — Z98.89 OTHER SPECIFIED POSTPROCEDURAL STATES: Chronic | ICD-10-CM

## 2022-07-20 DIAGNOSIS — R09.89 OTHER SPECIFIED SYMPTOMS AND SIGNS INVOLVING THE CIRCULATORY AND RESPIRATORY SYSTEMS: ICD-10-CM

## 2022-07-20 LAB
ALBUMIN SERPL ELPH-MCNC: 2.7 G/DL — LOW (ref 3.3–5.2)
ALP SERPL-CCNC: 228 U/L — HIGH (ref 40–120)
ALT FLD-CCNC: 45 U/L — HIGH
ANION GAP SERPL CALC-SCNC: 12 MMOL/L — SIGNIFICANT CHANGE UP (ref 5–17)
APPEARANCE UR: CLEAR — SIGNIFICANT CHANGE UP
APTT BLD: 37.7 SEC — HIGH (ref 27.5–35.5)
AST SERPL-CCNC: 79 U/L — HIGH
B PERT DNA SPEC QL NAA+PROBE: SIGNIFICANT CHANGE UP
BACTERIA # UR AUTO: ABNORMAL
BASE EXCESS BLDV CALC-SCNC: 0.9 MMOL/L — SIGNIFICANT CHANGE UP (ref -2–3)
BASOPHILS # BLD AUTO: 0.03 K/UL — SIGNIFICANT CHANGE UP (ref 0–0.2)
BASOPHILS NFR BLD AUTO: 0.4 % — SIGNIFICANT CHANGE UP (ref 0–2)
BILIRUB SERPL-MCNC: 0.8 MG/DL — SIGNIFICANT CHANGE UP (ref 0.4–2)
BILIRUB UR-MCNC: NEGATIVE — SIGNIFICANT CHANGE UP
BUN SERPL-MCNC: 35.9 MG/DL — HIGH (ref 8–20)
C PNEUM DNA SPEC QL NAA+PROBE: SIGNIFICANT CHANGE UP
CA-I SERPL-SCNC: 1.1 MMOL/L — LOW (ref 1.15–1.33)
CALCIUM SERPL-MCNC: 8.3 MG/DL — LOW (ref 8.6–10.2)
CHLORIDE BLDV-SCNC: 106 MMOL/L — SIGNIFICANT CHANGE UP (ref 98–107)
CHLORIDE SERPL-SCNC: 103 MMOL/L — SIGNIFICANT CHANGE UP (ref 98–107)
CO2 SERPL-SCNC: 23 MMOL/L — SIGNIFICANT CHANGE UP (ref 22–29)
COLOR SPEC: YELLOW — SIGNIFICANT CHANGE UP
CREAT SERPL-MCNC: 0.94 MG/DL — SIGNIFICANT CHANGE UP (ref 0.5–1.3)
D DIMER BLD IA.RAPID-MCNC: 2553 NG/ML DDU — HIGH
DIFF PNL FLD: ABNORMAL
EGFR: 82 ML/MIN/1.73M2 — SIGNIFICANT CHANGE UP
EOSINOPHIL # BLD AUTO: 0.11 K/UL — SIGNIFICANT CHANGE UP (ref 0–0.5)
EOSINOPHIL NFR BLD AUTO: 1.5 % — SIGNIFICANT CHANGE UP (ref 0–6)
EPI CELLS # UR: SIGNIFICANT CHANGE UP
FLUAV H1 2009 PAND RNA SPEC QL NAA+PROBE: SIGNIFICANT CHANGE UP
FLUAV H1 RNA SPEC QL NAA+PROBE: SIGNIFICANT CHANGE UP
FLUAV H3 RNA SPEC QL NAA+PROBE: SIGNIFICANT CHANGE UP
FLUAV SUBTYP SPEC NAA+PROBE: SIGNIFICANT CHANGE UP
FLUBV RNA SPEC QL NAA+PROBE: SIGNIFICANT CHANGE UP
GAS PNL BLDV: 137 MMOL/L — SIGNIFICANT CHANGE UP (ref 136–145)
GAS PNL BLDV: SIGNIFICANT CHANGE UP
GAS PNL BLDV: SIGNIFICANT CHANGE UP
GLUCOSE BLDC GLUCOMTR-MCNC: 286 MG/DL — HIGH (ref 70–99)
GLUCOSE BLDV-MCNC: 459 MG/DL — CRITICAL HIGH (ref 70–99)
GLUCOSE SERPL-MCNC: 410 MG/DL — HIGH (ref 70–99)
GLUCOSE UR QL: 1000 MG/DL
HADV DNA SPEC QL NAA+PROBE: SIGNIFICANT CHANGE UP
HCO3 BLDV-SCNC: 26 MMOL/L — SIGNIFICANT CHANGE UP (ref 22–29)
HCOV PNL SPEC NAA+PROBE: SIGNIFICANT CHANGE UP
HCT VFR BLD CALC: 39.5 % — SIGNIFICANT CHANGE UP (ref 39–50)
HCT VFR BLDA CALC: 41 % — SIGNIFICANT CHANGE UP
HGB BLD CALC-MCNC: 13.6 G/DL — SIGNIFICANT CHANGE UP (ref 12.6–17.4)
HGB BLD-MCNC: 13.3 G/DL — SIGNIFICANT CHANGE UP (ref 13–17)
HIV 1 & 2 AB SERPL IA.RAPID: SIGNIFICANT CHANGE UP
HMPV RNA SPEC QL NAA+PROBE: SIGNIFICANT CHANGE UP
HPIV1 RNA SPEC QL NAA+PROBE: SIGNIFICANT CHANGE UP
HPIV2 RNA SPEC QL NAA+PROBE: SIGNIFICANT CHANGE UP
HPIV3 RNA SPEC QL NAA+PROBE: SIGNIFICANT CHANGE UP
HPIV4 RNA SPEC QL NAA+PROBE: SIGNIFICANT CHANGE UP
IMM GRANULOCYTES NFR BLD AUTO: 0.3 % — SIGNIFICANT CHANGE UP (ref 0–1.5)
INR BLD: 1.62 RATIO — HIGH (ref 0.88–1.16)
KETONES UR-MCNC: NEGATIVE — SIGNIFICANT CHANGE UP
LACTATE BLDV-MCNC: 3 MMOL/L — HIGH (ref 0.5–2)
LACTATE BLDV-MCNC: 3.4 MMOL/L — HIGH (ref 0.5–2)
LACTATE SERPL-SCNC: 3.3 MMOL/L — HIGH (ref 0.5–2)
LACTATE SERPL-SCNC: 3.3 MMOL/L — HIGH (ref 0.5–2)
LACTATE SERPL-SCNC: 3.4 MMOL/L — HIGH (ref 0.5–2)
LEUKOCYTE ESTERASE UR-ACNC: NEGATIVE — SIGNIFICANT CHANGE UP
LYMPHOCYTES # BLD AUTO: 1.02 K/UL — SIGNIFICANT CHANGE UP (ref 1–3.3)
LYMPHOCYTES # BLD AUTO: 14 % — SIGNIFICANT CHANGE UP (ref 13–44)
MAGNESIUM SERPL-MCNC: 2.1 MG/DL — SIGNIFICANT CHANGE UP (ref 1.6–2.6)
MCHC RBC-ENTMCNC: 31.7 PG — SIGNIFICANT CHANGE UP (ref 27–34)
MCHC RBC-ENTMCNC: 33.7 GM/DL — SIGNIFICANT CHANGE UP (ref 32–36)
MCV RBC AUTO: 94 FL — SIGNIFICANT CHANGE UP (ref 80–100)
MONOCYTES # BLD AUTO: 0.88 K/UL — SIGNIFICANT CHANGE UP (ref 0–0.9)
MONOCYTES NFR BLD AUTO: 12.1 % — SIGNIFICANT CHANGE UP (ref 2–14)
NEUTROPHILS # BLD AUTO: 5.24 K/UL — SIGNIFICANT CHANGE UP (ref 1.8–7.4)
NEUTROPHILS NFR BLD AUTO: 71.7 % — SIGNIFICANT CHANGE UP (ref 43–77)
NITRITE UR-MCNC: NEGATIVE — SIGNIFICANT CHANGE UP
NT-PROBNP SERPL-SCNC: 1652 PG/ML — HIGH (ref 0–300)
PCO2 BLDV: 48 MMHG — SIGNIFICANT CHANGE UP (ref 42–55)
PH BLDV: 7.35 — SIGNIFICANT CHANGE UP (ref 7.32–7.43)
PH UR: 6.5 — SIGNIFICANT CHANGE UP (ref 5–8)
PLATELET # BLD AUTO: 162 K/UL — SIGNIFICANT CHANGE UP (ref 150–400)
PO2 BLDV: 42 MMHG — SIGNIFICANT CHANGE UP (ref 25–45)
POTASSIUM BLDV-SCNC: 5.2 MMOL/L — HIGH (ref 3.5–5.1)
POTASSIUM SERPL-MCNC: 5 MMOL/L — SIGNIFICANT CHANGE UP (ref 3.5–5.3)
POTASSIUM SERPL-SCNC: 5 MMOL/L — SIGNIFICANT CHANGE UP (ref 3.5–5.3)
PROT SERPL-MCNC: 8.3 G/DL — SIGNIFICANT CHANGE UP (ref 6.6–8.7)
PROT UR-MCNC: 30 MG/DL
PROTHROM AB SERPL-ACNC: 18.9 SEC — HIGH (ref 10.5–13.4)
RAPID RVP RESULT: DETECTED
RBC # BLD: 4.2 M/UL — SIGNIFICANT CHANGE UP (ref 4.2–5.8)
RBC # FLD: 15.9 % — HIGH (ref 10.3–14.5)
RBC CASTS # UR COMP ASSIST: ABNORMAL /HPF (ref 0–4)
RV+EV RNA SPEC QL NAA+PROBE: SIGNIFICANT CHANGE UP
SAO2 % BLDV: 60.7 % — SIGNIFICANT CHANGE UP
SARS-COV-2 RNA SPEC QL NAA+PROBE: DETECTED
SODIUM SERPL-SCNC: 138 MMOL/L — SIGNIFICANT CHANGE UP (ref 135–145)
SP GR SPEC: 1.01 — SIGNIFICANT CHANGE UP (ref 1.01–1.02)
TROPONIN T SERPL-MCNC: 0.06 NG/ML — SIGNIFICANT CHANGE UP (ref 0–0.06)
UROBILINOGEN FLD QL: 8 MG/DL
WBC # BLD: 7.3 K/UL — SIGNIFICANT CHANGE UP (ref 3.8–10.5)
WBC # FLD AUTO: 7.3 K/UL — SIGNIFICANT CHANGE UP (ref 3.8–10.5)
WBC UR QL: NEGATIVE /HPF — SIGNIFICANT CHANGE UP (ref 0–5)

## 2022-07-20 PROCEDURE — 70450 CT HEAD/BRAIN W/O DYE: CPT | Mod: 26,MA

## 2022-07-20 PROCEDURE — 99285 EMERGENCY DEPT VISIT HI MDM: CPT | Mod: CS

## 2022-07-20 PROCEDURE — 71275 CT ANGIOGRAPHY CHEST: CPT | Mod: 26,MA

## 2022-07-20 PROCEDURE — 93010 ELECTROCARDIOGRAM REPORT: CPT

## 2022-07-20 PROCEDURE — 99223 1ST HOSP IP/OBS HIGH 75: CPT

## 2022-07-20 PROCEDURE — 74177 CT ABD & PELVIS W/CONTRAST: CPT | Mod: 26,MA

## 2022-07-20 PROCEDURE — 71045 X-RAY EXAM CHEST 1 VIEW: CPT | Mod: 26

## 2022-07-20 RX ORDER — ASPIRIN/CALCIUM CARB/MAGNESIUM 324 MG
81 TABLET ORAL DAILY
Refills: 0 | Status: DISCONTINUED | OUTPATIENT
Start: 2022-07-20 | End: 2022-08-01

## 2022-07-20 RX ORDER — DEXTROSE 50 % IN WATER 50 %
25 SYRINGE (ML) INTRAVENOUS ONCE
Refills: 0 | Status: DISCONTINUED | OUTPATIENT
Start: 2022-07-20 | End: 2022-08-16

## 2022-07-20 RX ORDER — DEXTROSE 50 % IN WATER 50 %
12.5 SYRINGE (ML) INTRAVENOUS ONCE
Refills: 0 | Status: DISCONTINUED | OUTPATIENT
Start: 2022-07-20 | End: 2022-08-16

## 2022-07-20 RX ORDER — ATORVASTATIN CALCIUM 80 MG/1
40 TABLET, FILM COATED ORAL AT BEDTIME
Refills: 0 | Status: DISCONTINUED | OUTPATIENT
Start: 2022-07-20 | End: 2022-08-15

## 2022-07-20 RX ORDER — SODIUM CHLORIDE 9 MG/ML
1000 INJECTION INTRAMUSCULAR; INTRAVENOUS; SUBCUTANEOUS ONCE
Refills: 0 | Status: COMPLETED | OUTPATIENT
Start: 2022-07-20 | End: 2022-07-20

## 2022-07-20 RX ORDER — GLUCAGON INJECTION, SOLUTION 0.5 MG/.1ML
1 INJECTION, SOLUTION SUBCUTANEOUS ONCE
Refills: 0 | Status: DISCONTINUED | OUTPATIENT
Start: 2022-07-20 | End: 2022-08-15

## 2022-07-20 RX ORDER — INSULIN LISPRO 100/ML
VIAL (ML) SUBCUTANEOUS
Refills: 0 | Status: DISCONTINUED | OUTPATIENT
Start: 2022-07-20 | End: 2022-08-15

## 2022-07-20 RX ORDER — DEXTROSE 50 % IN WATER 50 %
15 SYRINGE (ML) INTRAVENOUS ONCE
Refills: 0 | Status: DISCONTINUED | OUTPATIENT
Start: 2022-07-20 | End: 2022-08-16

## 2022-07-20 RX ORDER — SODIUM CHLORIDE 9 MG/ML
1000 INJECTION, SOLUTION INTRAVENOUS
Refills: 0 | Status: DISCONTINUED | OUTPATIENT
Start: 2022-07-20 | End: 2022-08-16

## 2022-07-20 RX ORDER — ASPIRIN/CALCIUM CARB/MAGNESIUM 324 MG
81 TABLET ORAL DAILY
Refills: 0 | Status: DISCONTINUED | OUTPATIENT
Start: 2022-07-20 | End: 2022-07-20

## 2022-07-20 RX ORDER — ENOXAPARIN SODIUM 100 MG/ML
40 INJECTION SUBCUTANEOUS EVERY 24 HOURS
Refills: 0 | Status: DISCONTINUED | OUTPATIENT
Start: 2022-07-20 | End: 2022-07-31

## 2022-07-20 RX ORDER — INSULIN GLARGINE 100 [IU]/ML
10 INJECTION, SOLUTION SUBCUTANEOUS ONCE
Refills: 0 | Status: COMPLETED | OUTPATIENT
Start: 2022-07-20 | End: 2022-07-20

## 2022-07-20 RX ADMIN — Medication 6: at 18:01

## 2022-07-20 RX ADMIN — SODIUM CHLORIDE 1000 MILLILITER(S): 9 INJECTION INTRAMUSCULAR; INTRAVENOUS; SUBCUTANEOUS at 18:02

## 2022-07-20 RX ADMIN — SODIUM CHLORIDE 1000 MILLILITER(S): 9 INJECTION INTRAMUSCULAR; INTRAVENOUS; SUBCUTANEOUS at 12:52

## 2022-07-20 RX ADMIN — ATORVASTATIN CALCIUM 40 MILLIGRAM(S): 80 TABLET, FILM COATED ORAL at 22:39

## 2022-07-20 RX ADMIN — SODIUM CHLORIDE 500 MILLILITER(S): 9 INJECTION INTRAMUSCULAR; INTRAVENOUS; SUBCUTANEOUS at 11:41

## 2022-07-20 NOTE — ED PROVIDER NOTE - PROGRESS NOTE DETAILS
Given the significant and immediate threats to this patient based on initial presentation, the benefits of emergency contrast-enhanced CT imaging without obtaining GFR/creatinine serum level results greatly outweigh the potential risk of harm due to contrast-induced nephropathy.    --elevated d-dimer unable to obtain hx + weakness Given the significant and immediate threats to this patient based on initial presentation, the benefits of emergency contrast-enhanced CT imaging without obtaining GFR/creatinine serum level results greatly outweigh the potential risk of harm due to contrast-induced nephropathy.    --elevated d-dimer unable to obtain hx + weakness - CTA medically necessary ?infarct on ct head will admit for mri; social work to find out pts name

## 2022-07-20 NOTE — ED CLERICAL - NS ED CLERK UNITS
TELE/+COVID 5221-02 TELE/+COVID/5TWR TELE/+COVID/6TWR COVID+ TELE 6221-02 COVID+ TELE/6TWR 5221-02 COVID+ TELE/5TWR

## 2022-07-20 NOTE — ED PROVIDER NOTE - OBJECTIVE STATEMENT
78yo M unknown medical hx brought in by ems for weakness. as per triage note + weakness over the last few weeks hx of utis and non communicative for the last week. pt unable to provide any history.

## 2022-07-20 NOTE — ED ADULT TRIAGE NOTE - CHIEF COMPLAINT QUOTE
sent from home for increased weakness over the past week. had uti, pt has not been communicating over the past week

## 2022-07-20 NOTE — ED ADULT NURSE REASSESSMENT NOTE - NS ED NURSE REASSESS COMMENT FT1
received report from day RN.  assumed care of pt at change of shift.  pt is alert to voice, nonverbal, NAD. pt has uncontrolled movement of left arm.  IV intact  neuro Q4 unchanged.  attempted to give report to 5T, no bed available at this time.  logistics aware

## 2022-07-20 NOTE — H&P ADULT - ASSESSMENT
77yr old unknown male brought to ER by EMS from home with c/o altered mental state, inability to talk since last 1 week and h/o recent UTI. No other information was available, pt was admitted as Critical [place]. CT head- showed left occipital infarct vs artifact, no last known well time available, not a candidate for tPA. Patient unable to provide any information. vitals show - tachycardia 110s.  labs s/o hyperglycemia BS >400s, Lactate elevated. UA - neg d-dimer elevated. Covid - positive. CT PE - neg for PE, CT abd pelvis - colitis, Sacral decubitus ulcer, rt gluteal edema. He is being admitted for further care.     # Altered mental state - Acute metabolic encephalopathy Vs Acute /subacute CVA   neuro consult   MR head   Neurochecks q4  NPO   check PEG status - ok for medications.   aspiration precautions   f/u blood cx   asa, statin through PEG.     # Hyperglycemia with lactic acidosis - appears vlume depleted - s/p NS bolus 1lit - repeat lit, repeat lactate  lantus+SSI, monitor BS    # abnormal CT abd  - colitis - clinically no suspicion for sepsis/colitis - would consider Levofloxacin iv if febrile/diarrhea. monitor I/O, bladder scan q6  # sacral decubitus ulcer - will examine onc ept on floor   # rt heel dressing - ? open wound? cellulitis  - afebrile, no leucocytosis , needs dressing change and exam - prn podiatry eval/xray   # DVT px - lovenox   SW eval to obtain patient information

## 2022-07-20 NOTE — ED PROVIDER NOTE - CLINICAL SUMMARY MEDICAL DECISION MAKING FREE TEXT BOX
unable to obtain any hx from pt will checl labs cxr/ua ekg ct head reassess  to eval for infectious vs metabolic abnormalities

## 2022-07-20 NOTE — ED PROVIDER NOTE - PHYSICAL EXAMINATION
Head: atraumatic, normacephalic  Face: atraumatic, no crepitus no orbiral/maxillary/mandibular ttp  eyes: perrla eomi  heart: rrr s1s2  lungs: ctab  abd: soft, nt nd +bs no rebound/guarding no cva ttp + peg tube  skin: warm  LE: no swelling, no calf ttp  back: no midline cervical/thoracic/lumbar ttp  neuro: not communicating does not follow commands; follows with eyes

## 2022-07-20 NOTE — H&P ADULT - HISTORY OF PRESENT ILLNESS
77yr old unknown male brought to ER by EMS from home with c/o altered mental state, inability to talk since last 1 week and h/o recent UTI. No other information was available, pt was admitted as Critical [place]. CT head- showed left occipital infarct vs artifact, no last known well time available, not a candidate for tPA. Patient unable to provide any information. labs s/o hyperglycemia BS >400s, Lactate elevated. UA - neg d-dimer elevated. CT PE - neg for PE, CT abd pelvis - colitis, Sacral decubitus ulcer, rt gluteal edema. He is being admitted for further care.

## 2022-07-20 NOTE — H&P ADULT - NSHPPHYSICALEXAM_GEN_ALL_CORE
PHYSICAL EXAM:  Limited exam 2/2 in ER on stretcher - covid positive status  GENERAL: NAD, awake, ? Rt sided gaze, unkempt   HEAD:  Atraumatic, Normocephalic  EYES: EOMI, PERRLA, conjunctiva and sclera clear  ENMT: dry mucous membranes  NECK: Supple  NERVOUS SYSTEM:  Alert, awake - responds to voice purposeless movements of b/l upper extremeties,   CHEST/LUNG: Clear to percussion bilaterally; No rales, rhonchi,   HEART: Regular rate and rhythm; No murmurs,   ABDOMEN: Soft, Nontender, Nondistended; PEG tube+ Bowel sounds present  EXTREMITIES:  No clubbing, cyanosis, or edema, RT heel in dressing - dry

## 2022-07-21 LAB
A1C WITH ESTIMATED AVERAGE GLUCOSE RESULT: 7.7 % — HIGH (ref 4–5.6)
ANION GAP SERPL CALC-SCNC: 12 MMOL/L — SIGNIFICANT CHANGE UP (ref 5–17)
BUN SERPL-MCNC: 32.7 MG/DL — HIGH (ref 8–20)
CALCIUM SERPL-MCNC: 7.9 MG/DL — LOW (ref 8.6–10.2)
CHLORIDE SERPL-SCNC: 109 MMOL/L — HIGH (ref 98–107)
CO2 SERPL-SCNC: 19 MMOL/L — LOW (ref 22–29)
CREAT SERPL-MCNC: 0.66 MG/DL — SIGNIFICANT CHANGE UP (ref 0.5–1.3)
EGFR: 95 ML/MIN/1.73M2 — SIGNIFICANT CHANGE UP
ESTIMATED AVERAGE GLUCOSE: 174 MG/DL — HIGH (ref 68–114)
GLUCOSE BLDC GLUCOMTR-MCNC: 115 MG/DL — HIGH (ref 70–99)
GLUCOSE BLDC GLUCOMTR-MCNC: 120 MG/DL — HIGH (ref 70–99)
GLUCOSE BLDC GLUCOMTR-MCNC: 126 MG/DL — HIGH (ref 70–99)
GLUCOSE BLDC GLUCOMTR-MCNC: 129 MG/DL — HIGH (ref 70–99)
GLUCOSE BLDC GLUCOMTR-MCNC: 161 MG/DL — HIGH (ref 70–99)
GLUCOSE SERPL-MCNC: 138 MG/DL — HIGH (ref 70–99)
HCT VFR BLD CALC: 38.7 % — LOW (ref 39–50)
HGB BLD-MCNC: 12.6 G/DL — LOW (ref 13–17)
LACTATE BLDV-MCNC: 2.5 MMOL/L — HIGH (ref 0.5–2)
LACTATE BLDV-MCNC: 3.8 MMOL/L — HIGH (ref 0.5–2)
LACTATE SERPL-SCNC: 1.8 MMOL/L — SIGNIFICANT CHANGE UP (ref 0.5–2)
LACTATE SERPL-SCNC: 2.9 MMOL/L — HIGH (ref 0.5–2)
LACTATE SERPL-SCNC: 3 MMOL/L — HIGH (ref 0.5–2)
MCHC RBC-ENTMCNC: 31.4 PG — SIGNIFICANT CHANGE UP (ref 27–34)
MCHC RBC-ENTMCNC: 32.6 GM/DL — SIGNIFICANT CHANGE UP (ref 32–36)
MCV RBC AUTO: 96.5 FL — SIGNIFICANT CHANGE UP (ref 80–100)
PLATELET # BLD AUTO: 148 K/UL — LOW (ref 150–400)
POTASSIUM SERPL-MCNC: 4.7 MMOL/L — SIGNIFICANT CHANGE UP (ref 3.5–5.3)
POTASSIUM SERPL-SCNC: 4.7 MMOL/L — SIGNIFICANT CHANGE UP (ref 3.5–5.3)
RBC # BLD: 4.01 M/UL — LOW (ref 4.2–5.8)
RBC # FLD: 16.4 % — HIGH (ref 10.3–14.5)
SODIUM SERPL-SCNC: 140 MMOL/L — SIGNIFICANT CHANGE UP (ref 135–145)
WBC # BLD: 8.47 K/UL — SIGNIFICANT CHANGE UP (ref 3.8–10.5)
WBC # FLD AUTO: 8.47 K/UL — SIGNIFICANT CHANGE UP (ref 3.8–10.5)

## 2022-07-21 PROCEDURE — 99232 SBSQ HOSP IP/OBS MODERATE 35: CPT

## 2022-07-21 RX ORDER — SODIUM CHLORIDE 9 MG/ML
1000 INJECTION INTRAMUSCULAR; INTRAVENOUS; SUBCUTANEOUS
Refills: 0 | Status: DISCONTINUED | OUTPATIENT
Start: 2022-07-21 | End: 2022-07-28

## 2022-07-21 RX ORDER — ONDANSETRON 8 MG/1
4 TABLET, FILM COATED ORAL EVERY 6 HOURS
Refills: 0 | Status: DISCONTINUED | OUTPATIENT
Start: 2022-07-21 | End: 2022-08-16

## 2022-07-21 RX ORDER — SODIUM CHLORIDE 9 MG/ML
1000 INJECTION INTRAMUSCULAR; INTRAVENOUS; SUBCUTANEOUS ONCE
Refills: 0 | Status: COMPLETED | OUTPATIENT
Start: 2022-07-21 | End: 2022-07-21

## 2022-07-21 RX ORDER — CEFTRIAXONE 500 MG/1
1000 INJECTION, POWDER, FOR SOLUTION INTRAMUSCULAR; INTRAVENOUS EVERY 24 HOURS
Refills: 0 | Status: COMPLETED | OUTPATIENT
Start: 2022-07-21 | End: 2022-07-23

## 2022-07-21 RX ORDER — THIAMINE MONONITRATE (VIT B1) 100 MG
500 TABLET ORAL DAILY
Refills: 0 | Status: DISCONTINUED | OUTPATIENT
Start: 2022-07-21 | End: 2022-08-11

## 2022-07-21 RX ORDER — ACETAMINOPHEN 500 MG
650 TABLET ORAL EVERY 6 HOURS
Refills: 0 | Status: DISCONTINUED | OUTPATIENT
Start: 2022-07-21 | End: 2022-07-26

## 2022-07-21 RX ADMIN — SODIUM CHLORIDE 500 MILLILITER(S): 9 INJECTION INTRAMUSCULAR; INTRAVENOUS; SUBCUTANEOUS at 04:50

## 2022-07-21 RX ADMIN — CEFTRIAXONE 100 MILLIGRAM(S): 500 INJECTION, POWDER, FOR SOLUTION INTRAMUSCULAR; INTRAVENOUS at 14:26

## 2022-07-21 RX ADMIN — Medication 0: at 17:47

## 2022-07-21 RX ADMIN — ATORVASTATIN CALCIUM 40 MILLIGRAM(S): 80 TABLET, FILM COATED ORAL at 21:28

## 2022-07-21 RX ADMIN — SODIUM CHLORIDE 75 MILLILITER(S): 9 INJECTION INTRAMUSCULAR; INTRAVENOUS; SUBCUTANEOUS at 14:27

## 2022-07-21 RX ADMIN — ENOXAPARIN SODIUM 40 MILLIGRAM(S): 100 INJECTION SUBCUTANEOUS at 06:55

## 2022-07-21 RX ADMIN — Medication 81 MILLIGRAM(S): at 14:27

## 2022-07-21 NOTE — PROGRESS NOTE ADULT - ASSESSMENT
77yr old unknown male brought to ER by EMS from home with c/o altered mental state, inability to talk since last 1 week and h/o recent UTI. No other information was available, pt was admitted as Critical [place]. CT head- showed left occipital infarct vs artifact, no last known well time available, not a candidate for tPA. Patient unable to provide any information. labs s/o hyperglycemia BS >400s, Lactate elevated. UA - neg d-dimer elevated. CT PE - neg for PE, CT abd pelvis - colitis, Sacral decubitus ulcer, rt gluteal edema. He is being admitted for further care.     #acute metabolic encephalopathy -    77yr old unknown male brought to ER by EMS from home with c/o altered mental state, inability to talk since last 1 week and h/o recent UTI. No other information was available, pt was admitted as Critical [place]. CT head- showed left occipital infarct vs artifact, no last known well time available, not a candidate for tPA. Patient unable to provide any information. labs s/o hyperglycemia BS >400s, Lactate elevated. UA - neg d-dimer elevated. CT PE - neg for PE, CT abd pelvis - colitis, Sacral decubitus ulcer, rt gluteal edema. He is being admitted for further care.     #acute metabolic encephalopathy - covid encephalopahty vs mild uti vs dementia vs subacute stroke  - ct head shows:     IMPRESSION:    Motion limited study. Left occipital lobe lucency may represent artifact   versus infarct. No gross hemorrhage, mass effect or hydrocephalus.  - c.w asa and statin   - neuro consulted by previous hospitlist  - follow up tsh, ammonia  - start 3 days of iv ceft  - supportive care  - on room air no need for remdesivir  - unclear about patients pmh, unable to know if able to get mri of head or pelvis    # dm2- a1c 7.7  - ssi    # abnormal CT abd  - colitis - clinically no suspicion for sepsis/colitis -  - no need for abx    # sacral decubitus ulcer - wpund consult    # rt heel dressing -  open wound cellulitis   - wound consult    # DVT px - lovenox     #diet =- pureed  nutrition consult - for tube feeds    SW consult unclear of name or family

## 2022-07-21 NOTE — CHART NOTE - NSCHARTNOTEFT_GEN_A_CORE
Concern for persistent elevated lactate; chart reviewed shows lactate 3.8.  Pt unable to contribute to history/complaints  Examination of pt  Vital Signs Last 24 Hrs  T(C): 36.6 (21 Jul 2022 03:25), Max: 37.3 (20 Jul 2022 11:29)  T(F): 97.8 (21 Jul 2022 03:25), Max: 99.2 (20 Jul 2022 11:29)  HR: 110 (21 Jul 2022 03:25) (110 - 119)  BP: 128/78 (21 Jul 2022 03:25) (116/80 - 151/81)  BP(mean): --  RR: 18 (21 Jul 2022 03:25) (18 - 22)  SpO2: 100% (21 Jul 2022 03:25) (98% - 100%)    Parameters below as of 21 Jul 2022 03:25  Patient On (Oxygen Delivery Method): room air    Lungs- clear bilaterally to ausc.  Heart- s1s2 heard, RRR no murmur  Extremities cold skin with decreased turgor.    Impression: 1- persistent tachycardia and elevated lactate secondary to inadequate intravascular volume  Plan: 1- NS bolus          2- case d/w Dr. Mckeon

## 2022-07-21 NOTE — PATIENT PROFILE ADULT - FALL HARM RISK - HARM RISK INTERVENTIONS

## 2022-07-21 NOTE — PROGRESS NOTE ADULT - SUBJECTIVE AND OBJECTIVE BOX
[place] [facility]    [id number]    77y      Male    INTERVAL HPI/OVERNIGHT EVENTS: patient being seen for ams and failure to thrive. Patient seen at bedside and is agitated and confused. Unable to obtain accurate ros.     REVIEW OF SYSTEMS:    unable to obtain accurate ros.     Vital Signs Last 24 Hrs  T(C): 36.7 (2022 07:43), Max: 36.9 (2022 19:48)  T(F): 98 (2022 07:43), Max: 98.5 (2022 19:48)  HR: 106 (2022 07:43) (106 - 119)  BP: 132/74 (2022 07:43) (128/78 - 136/61)  BP(mean): --  RR: 18 (2022 07:43) (18 - 19)  SpO2: 99% (2022 07:43) (98% - 100%)    Parameters below as of 2022 07:43  Patient On (Oxygen Delivery Method): room air        PHYSICAL EXAM:    GENERAL: anxious, confused, speaking in full sentences  HEENT: PERRL, +EOMI, dry MM  NECK: soft, Supple, No JVD,   CHEST/LUNG: Clear to auscultation bilaterally; No wheezing  HEART: S1S2+, Regular rate and rhythm; No murmurs  ABDOMEN: Soft, thin  EXTREMITIES:  heel ulcer, rotting flesh. muscle wasting   NEURO: AAOX1-2, foll      LABS:                        12.6   8.47  )-----------( 148      ( 2022 07:00 )             38.7     07-21    140  |  109<H>  |  32.7<H>  ----------------------------<  138<H>  4.7   |  19.0<L>  |  0.66    Ca    7.9<L>      2022 07:00  Mg     2.1     07-20    TPro  8.3  /  Alb  2.7<L>  /  TBili  0.8  /  DBili  x   /  AST  79<H>  /  ALT  45<H>  /  AlkPhos  228<H>  07-20    PT/INR - ( 2022 11:20 )   PT: 18.9 sec;   INR: 1.62 ratio         PTT - ( 2022 11:20 )  PTT:37.7 sec  Urinalysis Basic - ( 2022 14:15 )    Color: Yellow / Appearance: Clear / S.015 / pH: x  Gluc: x / Ketone: Negative  / Bili: Negative / Urobili: 8 mg/dL   Blood: x / Protein: 30 mg/dL / Nitrite: Negative   Leuk Esterase: Negative / RBC: 3-5 /HPF / WBC Negative /HPF   Sq Epi: x / Non Sq Epi: Occasional / Bacteria: Occasional          MEDICATIONS  (STANDING):  aspirin  chewable 81 milliGRAM(s) Enteral Tube daily  atorvastatin 40 milliGRAM(s) Oral at bedtime  dextrose 5%. 1000 milliLiter(s) (100 mL/Hr) IV Continuous <Continuous>  dextrose 5%. 1000 milliLiter(s) (50 mL/Hr) IV Continuous <Continuous>  dextrose 50% Injectable 25 Gram(s) IV Push once  dextrose 50% Injectable 12.5 Gram(s) IV Push once  dextrose 50% Injectable 25 Gram(s) IV Push once  enoxaparin Injectable 40 milliGRAM(s) SubCutaneous every 24 hours  glucagon  Injectable 1 milliGRAM(s) IntraMuscular once  insulin lispro (ADMELOG) corrective regimen sliding scale   SubCutaneous three times a day before meals  sodium chloride 0.9%. 1000 milliLiter(s) (75 mL/Hr) IV Continuous <Continuous>  thiamine IVPB 500 milliGRAM(s) IV Intermittent daily    MEDICATIONS  (PRN):  acetaminophen     Tablet .. 650 milliGRAM(s) Oral every 6 hours PRN Temp greater or equal to 38C (100.4F), Mild Pain (1 - 3)  dextrose Oral Gel 15 Gram(s) Oral once PRN Blood Glucose LESS THAN 70 milliGRAM(s)/deciliter  ondansetron Injectable 4 milliGRAM(s) IV Push every 6 hours PRN Nausea and/or Vomiting      RADIOLOGY & ADDITIONAL TESTS:   [place] [facility]    [id number]    77y      Male    INTERVAL HPI/OVERNIGHT EVENTS: patient being seen for ams and failure to thrive. Patient seen at bedside and is agitated and confused. Unable to obtain accurate ros.     REVIEW OF SYSTEMS:    unable to obtain accurate ros.     Vital Signs Last 24 Hrs  T(C): 36.7 (2022 07:43), Max: 36.9 (2022 19:48)  T(F): 98 (2022 07:43), Max: 98.5 (2022 19:48)  HR: 106 (2022 07:43) (106 - 119)  BP: 132/74 (2022 07:43) (128/78 - 136/61)  BP(mean): --  RR: 18 (2022 07:43) (18 - 19)  SpO2: 99% (2022 07:43) (98% - 100%)    Parameters below as of 2022 07:43  Patient On (Oxygen Delivery Method): room air        PHYSICAL EXAM:    GENERAL: anxious, confused, speaking in full sentences  HEENT: PERRL, +EOMI, dry MM  NECK: soft, Supple, No JVD,   CHEST/LUNG: Clear to auscultation bilaterally; No wheezing  HEART: S1S2+, Regular rate and rhythm; No murmurs  ABDOMEN: Soft, thin  EXTREMITIES:  heel ulcer, rotting flesh. muscle wasting   NEURO: AAOX1-2, foll      LABS:                        12.6   8.47  )-----------( 148      ( 2022 07:00 )             38.7     07-21    140  |  109<H>  |  32.7<H>  ----------------------------<  138<H>  4.7   |  19.0<L>  |  0.66    Ca    7.9<L>      2022 07:00  Mg     2.1     07-20    TPro  8.3  /  Alb  2.7<L>  /  TBili  0.8  /  DBili  x   /  AST  79<H>  /  ALT  45<H>  /  AlkPhos  228<H>  07-20    PT/INR - ( 2022 11:20 )   PT: 18.9 sec;   INR: 1.62 ratio         PTT - ( 2022 11:20 )  PTT:37.7 sec  Urinalysis Basic - ( 2022 14:15 )    Color: Yellow / Appearance: Clear / S.015 / pH: x  Gluc: x / Ketone: Negative  / Bili: Negative / Urobili: 8 mg/dL   Blood: x / Protein: 30 mg/dL / Nitrite: Negative   Leuk Esterase: Negative / RBC: 3-5 /HPF / WBC Negative /HPF   Sq Epi: x / Non Sq Epi: Occasional / Bacteria: Occasional      MEDICATIONS  (STANDING):  aspirin  chewable 81 milliGRAM(s) Enteral Tube daily  atorvastatin 40 milliGRAM(s) Oral at bedtime  dextrose 5%. 1000 milliLiter(s) (100 mL/Hr) IV Continuous <Continuous>  dextrose 5%. 1000 milliLiter(s) (50 mL/Hr) IV Continuous <Continuous>  dextrose 50% Injectable 25 Gram(s) IV Push once  dextrose 50% Injectable 12.5 Gram(s) IV Push once  dextrose 50% Injectable 25 Gram(s) IV Push once  enoxaparin Injectable 40 milliGRAM(s) SubCutaneous every 24 hours  glucagon  Injectable 1 milliGRAM(s) IntraMuscular once  insulin lispro (ADMELOG) corrective regimen sliding scale   SubCutaneous three times a day before meals  sodium chloride 0.9%. 1000 milliLiter(s) (75 mL/Hr) IV Continuous <Continuous>  thiamine IVPB 500 milliGRAM(s) IV Intermittent daily    MEDICATIONS  (PRN):  acetaminophen     Tablet .. 650 milliGRAM(s) Oral every 6 hours PRN Temp greater or equal to 38C (100.4F), Mild Pain (1 - 3)  dextrose Oral Gel 15 Gram(s) Oral once PRN Blood Glucose LESS THAN 70 milliGRAM(s)/deciliter  ondansetron Injectable 4 milliGRAM(s) IV Push every 6 hours PRN Nausea and/or Vomiting      RADIOLOGY & ADDITIONAL TESTS:

## 2022-07-21 NOTE — ED ADULT NURSE REASSESSMENT NOTE - NS ED NURSE REASSESS COMMENT FT1
Patient resting In bed comfortably, resp even/unlabored, VSS, cardiac monitor in progress, awaiting for available bed, will continue to monitor patient.

## 2022-07-22 LAB
ALBUMIN SERPL ELPH-MCNC: 2 G/DL — LOW (ref 3.3–5.2)
ALP SERPL-CCNC: 122 U/L — HIGH (ref 40–120)
ALT FLD-CCNC: 40 U/L — SIGNIFICANT CHANGE UP
AMMONIA BLD-MCNC: 87 UMOL/L — HIGH (ref 11–55)
ANION GAP SERPL CALC-SCNC: 10 MMOL/L — SIGNIFICANT CHANGE UP (ref 5–17)
AST SERPL-CCNC: 69 U/L — HIGH
BASOPHILS # BLD AUTO: 0.02 K/UL — SIGNIFICANT CHANGE UP (ref 0–0.2)
BASOPHILS NFR BLD AUTO: 0.3 % — SIGNIFICANT CHANGE UP (ref 0–2)
BILIRUB SERPL-MCNC: 1.1 MG/DL — SIGNIFICANT CHANGE UP (ref 0.4–2)
BUN SERPL-MCNC: 32.7 MG/DL — HIGH (ref 8–20)
CALCIUM SERPL-MCNC: 7.4 MG/DL — LOW (ref 8.6–10.2)
CHLORIDE SERPL-SCNC: 113 MMOL/L — HIGH (ref 98–107)
CO2 SERPL-SCNC: 19 MMOL/L — LOW (ref 22–29)
CREAT SERPL-MCNC: 0.64 MG/DL — SIGNIFICANT CHANGE UP (ref 0.5–1.3)
CULTURE RESULTS: SIGNIFICANT CHANGE UP
EGFR: 96 ML/MIN/1.73M2 — SIGNIFICANT CHANGE UP
EOSINOPHIL # BLD AUTO: 0.2 K/UL — SIGNIFICANT CHANGE UP (ref 0–0.5)
EOSINOPHIL NFR BLD AUTO: 3.2 % — SIGNIFICANT CHANGE UP (ref 0–6)
GLUCOSE BLDC GLUCOMTR-MCNC: 113 MG/DL — HIGH (ref 70–99)
GLUCOSE BLDC GLUCOMTR-MCNC: 137 MG/DL — HIGH (ref 70–99)
GLUCOSE BLDC GLUCOMTR-MCNC: 67 MG/DL — LOW (ref 70–99)
GLUCOSE BLDC GLUCOMTR-MCNC: 73 MG/DL — SIGNIFICANT CHANGE UP (ref 70–99)
GLUCOSE SERPL-MCNC: 71 MG/DL — SIGNIFICANT CHANGE UP (ref 70–99)
HCT VFR BLD CALC: 35.6 % — LOW (ref 39–50)
HGB BLD-MCNC: 11.4 G/DL — LOW (ref 13–17)
IMM GRANULOCYTES NFR BLD AUTO: 0.3 % — SIGNIFICANT CHANGE UP (ref 0–1.5)
LYMPHOCYTES # BLD AUTO: 0.89 K/UL — LOW (ref 1–3.3)
LYMPHOCYTES # BLD AUTO: 14.1 % — SIGNIFICANT CHANGE UP (ref 13–44)
MAGNESIUM SERPL-MCNC: 2.1 MG/DL — SIGNIFICANT CHANGE UP (ref 1.6–2.6)
MCHC RBC-ENTMCNC: 30.4 PG — SIGNIFICANT CHANGE UP (ref 27–34)
MCHC RBC-ENTMCNC: 32 GM/DL — SIGNIFICANT CHANGE UP (ref 32–36)
MCV RBC AUTO: 94.9 FL — SIGNIFICANT CHANGE UP (ref 80–100)
MONOCYTES # BLD AUTO: 0.89 K/UL — SIGNIFICANT CHANGE UP (ref 0–0.9)
MONOCYTES NFR BLD AUTO: 14.1 % — HIGH (ref 2–14)
NEUTROPHILS # BLD AUTO: 4.31 K/UL — SIGNIFICANT CHANGE UP (ref 1.8–7.4)
NEUTROPHILS NFR BLD AUTO: 68 % — SIGNIFICANT CHANGE UP (ref 43–77)
PLATELET # BLD AUTO: 132 K/UL — LOW (ref 150–400)
POTASSIUM SERPL-MCNC: 4.3 MMOL/L — SIGNIFICANT CHANGE UP (ref 3.5–5.3)
POTASSIUM SERPL-SCNC: 4.3 MMOL/L — SIGNIFICANT CHANGE UP (ref 3.5–5.3)
PROT SERPL-MCNC: 6.6 G/DL — SIGNIFICANT CHANGE UP (ref 6.6–8.7)
RBC # BLD: 3.75 M/UL — LOW (ref 4.2–5.8)
RBC # FLD: 16.1 % — HIGH (ref 10.3–14.5)
SODIUM SERPL-SCNC: 142 MMOL/L — SIGNIFICANT CHANGE UP (ref 135–145)
SPECIMEN SOURCE: SIGNIFICANT CHANGE UP
T3 SERPL-MCNC: 60 NG/DL — LOW (ref 80–200)
T4 AB SER-ACNC: 4.4 UG/DL — LOW (ref 4.5–12)
TSH SERPL-MCNC: 17.96 UIU/ML — HIGH (ref 0.27–4.2)
VIT B12 SERPL-MCNC: >2000 PG/ML — HIGH (ref 232–1245)
WBC # BLD: 6.33 K/UL — SIGNIFICANT CHANGE UP (ref 3.8–10.5)
WBC # FLD AUTO: 6.33 K/UL — SIGNIFICANT CHANGE UP (ref 3.8–10.5)

## 2022-07-22 PROCEDURE — 99233 SBSQ HOSP IP/OBS HIGH 50: CPT

## 2022-07-22 PROCEDURE — 99222 1ST HOSP IP/OBS MODERATE 55: CPT

## 2022-07-22 PROCEDURE — 99221 1ST HOSP IP/OBS SF/LOW 40: CPT

## 2022-07-22 PROCEDURE — 95720 EEG PHY/QHP EA INCR W/VEEG: CPT

## 2022-07-22 RX ORDER — DEXTROSE 50 % IN WATER 50 %
15 SYRINGE (ML) INTRAVENOUS ONCE
Refills: 0 | Status: COMPLETED | OUTPATIENT
Start: 2022-07-22 | End: 2022-07-22

## 2022-07-22 RX ORDER — LEVOTHYROXINE SODIUM 125 MCG
25 TABLET ORAL DAILY
Refills: 0 | Status: DISCONTINUED | OUTPATIENT
Start: 2022-07-22 | End: 2022-07-26

## 2022-07-22 RX ORDER — LACTULOSE 10 G/15ML
20 SOLUTION ORAL EVERY 6 HOURS
Refills: 0 | Status: DISCONTINUED | OUTPATIENT
Start: 2022-07-22 | End: 2022-07-26

## 2022-07-22 RX ADMIN — Medication 105 MILLIGRAM(S): at 15:36

## 2022-07-22 RX ADMIN — LACTULOSE 20 GRAM(S): 10 SOLUTION ORAL at 17:43

## 2022-07-22 RX ADMIN — LACTULOSE 20 GRAM(S): 10 SOLUTION ORAL at 12:44

## 2022-07-22 RX ADMIN — Medication 81 MILLIGRAM(S): at 12:44

## 2022-07-22 RX ADMIN — ENOXAPARIN SODIUM 40 MILLIGRAM(S): 100 INJECTION SUBCUTANEOUS at 05:20

## 2022-07-22 RX ADMIN — Medication 15 GRAM(S): at 09:00

## 2022-07-22 RX ADMIN — SODIUM CHLORIDE 75 MILLILITER(S): 9 INJECTION INTRAMUSCULAR; INTRAVENOUS; SUBCUTANEOUS at 15:36

## 2022-07-22 RX ADMIN — ATORVASTATIN CALCIUM 40 MILLIGRAM(S): 80 TABLET, FILM COATED ORAL at 21:32

## 2022-07-22 RX ADMIN — CEFTRIAXONE 100 MILLIGRAM(S): 500 INJECTION, POWDER, FOR SOLUTION INTRAMUSCULAR; INTRAVENOUS at 15:37

## 2022-07-22 NOTE — ADVANCED PRACTICE NURSE CONSULT - RECOMMEDATIONS
·	Left Heel - Xeroform to wound bed, cover with ABD pad, wrap with Kerlix wrap - change MWF  ·	Apply Heel - Offloading cAIRboots at all times while in bed. - provide skin checks and foot placement Q8H  ·	Right Buttock - Xeroform or Adaptic wound bed, cover with Allevyn foam dressing, (DO NOT PLACE ALLEVYN TO WOUND BED).      -Continue turning/positioning patient from side-to-side q2h while in bed, q1h when/if OOB chair, or in accordance w/ pt's plan of care. Utilize pillows and/or Spry positioner pillow to assist w/ turning/positioning. When/if OOB chair, utilize pillows or chair cushion to offload pressure.   -Continue to offload heels from bed surface with soft pillow under calfs or by applying offloading boots to BLEs.   -Continue applying Coloplast Kat Protect moisture barrier cream to buttock and perineal area daily and prn after each incontinent episode.    -Continue utilizing one underpad underneath patient to contain incontinence episodes; change pad when saturated/soiled.   -Consider utilizing condom catheter (if patient candidate) to contain any urinary incontinence.   -Consider utilizing external fecal  (if patient candidate) or fecal management system/rectal tube/DigniShield (if patient candidate; MD order required) to contain loose fecal incontinence.   -Continue nutrition consult for optimal wound healing & nutritional status.   -Assess skin/wound qshift, report changes to primary provider.     Plan of Care: Primary RN made aware of above recs. Spoke w/ covering provider Bib in regards to above. No further needs/recs from [facility] service at this time. Staff RN to perform routine skin/wound assessment and manage wound care. Questions or concerns or if wound worsens and reconsult needed, please contact CN.

## 2022-07-22 NOTE — PHYSICAL THERAPY INITIAL EVALUATION ADULT - ASR WT BEARING STATUS EVAL
no weight-bearing restrictions Intermediate Repair Preamble Text (Leave Blank If You Do Not Want): Undermining was performed with blunt dissection.

## 2022-07-22 NOTE — SWALLOW BEDSIDE ASSESSMENT ADULT - SWALLOW EVAL: RECOMMENDED DIET
Puree, defer initiation of liquids to MD at this time given limited participation/ unable to assess liquids at this time Puree, defer continuation of liquids to MD at this time given limited participation/ unable to assess liquids at this time

## 2022-07-22 NOTE — ADVANCED PRACTICE NURSE CONSULT - ASSESSMENT
Received patient laying supine in bed, turned to left (pillow under side), HOB elevated 30 degrees. Pt awake, A&Ox3, made aware of purpose of WOCN visit, agreeable to consult.  Patient bedbound, very limited mobility in bed. 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 Ordered for Pureed diet, current Blaine score of 16. With assistance, patient turned patient to side for skin assessment. Skin assessment as below:    ·	Left Heel - Stage 3 Pressure Injury (1x1cm) - red granulation tissue with cobblestone appear, heavily callused indurated edges, scant serous drainage, Periwound indurated with callused, dry flaky intact    ·	Right Buttock - Stage 2 Pressure Injury (7x4cm) - red/pink dermal tissue, friable with scant serous drainage, irregular peeling edges, minimal serous drainage.  Periwound skin boggy with more darkly pigmented skin as compared to adjacent skin.

## 2022-07-22 NOTE — PHYSICAL THERAPY INITIAL EVALUATION ADULT - ADDITIONAL COMMENTS
Pt and ex-wife present-able to provide detail of previous living environment. Pt is non ambulatory, transfers OOB to chair with assist at home. Pt also has a mechanical lift, but unsure when the last time he used it was. Pt lives with his spouse however they remained friends with his ex-wife.

## 2022-07-22 NOTE — CONSULT NOTE ADULT - ASSESSMENT
77 yr old unknown male with unknown PMH admitted with AMS after he was found on the floor at home. Reportedly inability to talk for the last week and h/o recent UTI. CT head suspicious for left occipital infarct vs artifact.  Endocrinology consult requested for abnormal TFTs TSH 17, Total T4 4.4, Total T3 60. Patient is confused and poor historian unable to obtain history.    Patient is awake and alert, but poor historian, confused    # Primary Hypothyroidism  Can obtain TPO ab and TG ab to rule out underlying Hashimoto's thyroiditis  Agree with initiation of low dose Levothyroxine 25 mcg daily.  Repeat TFTs once patient recovers.   Can follow as outpatient.

## 2022-07-22 NOTE — CONSULT NOTE ADULT - SUBJECTIVE AND OBJECTIVE BOX
HPI:  77yr old unknown male brought to ER by EMS from home with c/o altered mental state, inability to talk since last 1 week and h/o recent UTI. No other information was available, pt was admitted as Critical [place]. CT head- showed left occipital infarct vs artifact, no last known well time available, not a candidate for tPA. Patient unable to provide any information. labs s/o hyperglycemia BS >400s, Lactate elevated. UA - neg d-dimer elevated. CT PE - neg for PE, CT abd pelvis - colitis, Sacral decubitus ulcer, rt gluteal edema. He is being admitted for further care.    Endocrinology consult requested for abnormal TFTs TSH 17, Total T4 4.4, Total T3 60. Patient is confused and poor historian unable to obtain history.    PAST MEDICAL & SURGICAL HISTORY:  Unable to obtain    Social History:  unable to obtain    FAMILY HISTORY:  Unknown      REVIEW OF SYSTEMS:  Unable to obtain, patient is poor historian, confused    MEDICATIONS  (STANDING):  aspirin  chewable 81 milliGRAM(s) Enteral Tube daily  atorvastatin 40 milliGRAM(s) Oral at bedtime  cefTRIAXone   IVPB 1000 milliGRAM(s) IV Intermittent every 24 hours  dextrose 5%. 1000 milliLiter(s) (100 mL/Hr) IV Continuous <Continuous>  dextrose 5%. 1000 milliLiter(s) (50 mL/Hr) IV Continuous <Continuous>  dextrose 50% Injectable 25 Gram(s) IV Push once  dextrose 50% Injectable 12.5 Gram(s) IV Push once  dextrose 50% Injectable 25 Gram(s) IV Push once  enoxaparin Injectable 40 milliGRAM(s) SubCutaneous every 24 hours  glucagon  Injectable 1 milliGRAM(s) IntraMuscular once  insulin lispro (ADMELOG) corrective regimen sliding scale   SubCutaneous three times a day before meals  lactulose Syrup 20 Gram(s) Oral every 6 hours  levothyroxine 25 MICROGram(s) Oral daily  rifAXIMin 550 milliGRAM(s) Oral two times a day  sodium chloride 0.9%. 1000 milliLiter(s) (75 mL/Hr) IV Continuous <Continuous>  thiamine IVPB 500 milliGRAM(s) IV Intermittent daily    MEDICATIONS  (PRN):  acetaminophen     Tablet .. 650 milliGRAM(s) Oral every 6 hours PRN Temp greater or equal to 38C (100.4F), Mild Pain (1 - 3)  dextrose Oral Gel 15 Gram(s) Oral once PRN Blood Glucose LESS THAN 70 milliGRAM(s)/deciliter  ondansetron Injectable 4 milliGRAM(s) IV Push every 6 hours PRN Nausea and/or Vomiting      Vital Signs Last 24 Hrs  T(C): 36.6 (22 Jul 2022 05:05), Max: 36.9 (21 Jul 2022 19:00)  T(F): 97.9 (22 Jul 2022 05:05), Max: 98.4 (21 Jul 2022 19:00)  HR: 102 (22 Jul 2022 05:05) (95 - 109)  BP: 121/70 (22 Jul 2022 05:05) (121/70 - 139/86)  BP(mean): --  RR: 18 (22 Jul 2022 05:05) (18 - 18)  SpO2: 98% (22 Jul 2022 05:05) (98% - 100%)    Parameters below as of 22 Jul 2022 05:05  Patient On (Oxygen Delivery Method): room air    Physical Exam:    Constitutional: Elderly frail AA male,NAD, comfortable  HEENT: EOMI, no exophalmos  Neck: trachea midline, no thyroid enlargement, no discrete nodules palpated  Respiratory: CTAB, normal respirations  Cardiovascular: S1 and S2, RRR,   Gastrointestinal: BS+, soft, ntnd  Extremities: No peripheral edema  Neurological: Awake alert, confused, moves all extremities      LABS  07-22    142  |  113<H>  |  32.7<H>  ----------------------------<  71  4.3   |  19.0<L>  |  0.64    Ca    7.4<L>      22 Jul 2022 05:10  Mg     2.1     07-22    TPro  6.6  /  Alb  2.0<L>  /  TBili  1.1  /  DBili  x   /  AST  69<H>  /  ALT  40  /  AlkPhos  122<H>  07-22                          11.4   6.33  )-----------( 132      ( 22 Jul 2022 05:41 )             35.6       A1C with Estimated Average Glucose Result: 7.7 % (07-21-22 @ 07:00)    Aspartate Aminotransferase (AST/SGOT): 69 U/L (07-22-22 @ 05:10)  Alkaline Phosphatase, Serum: 122 U/L (07-22-22 @ 05:10)  Albumin, Serum: 2.0 g/dL (07-22-22 @ 05:10)  Alanine Aminotransferase (ALT/SGPT): 40 U/L (07-22-22 @ 05:10)    Thyroid Stimulating Hormone, Serum: 17.96 uIU/mL (07-22-22 @ 05:10)  Triiodothyronine, Total (T3 Total): 60 ng/dL (07-22-22 @ 05:10)  T4, Serum: 4.4 ug/dL (07-22-22 @ 05:10)    CAPILLARY BLOOD GLUCOSE  POCT Blood Glucose.: 137 mg/dL (22 Jul 2022 12:42)  POCT Blood Glucose.: 67 mg/dL (22 Jul 2022 08:28)  POCT Blood Glucose.: 115 mg/dL (21 Jul 2022 21:05)  POCT Blood Glucose.: 129 mg/dL (21 Jul 2022 17:41)

## 2022-07-22 NOTE — SWALLOW BEDSIDE ASSESSMENT ADULT - NS SPL SWALLOW CLINIC TRIAL FT
Pt declining additional trials of solids, reporting hasn't chewed food in a long time because he "just hasn't felt like it." Declined trials of liquids reporting he "already drank this morning." Overall assessment limited given poor participation.

## 2022-07-22 NOTE — CONSULT NOTE ADULT - ASSESSMENT
IMPRESSION:  -Encephalopathy , liklely of toxic metabolic etiology.    R/ Right occipital infarct.    ASSESSMENT/ PLAN:     - Neuro checks and vital signs Q 2 hours.  - SBP goal permissive upto 160 for 24  hours and then normotensive..  - ASA 81 mg PO or 300 VA QD.  - Lipitor for LDL goal of < 70 .  - Telemetry monitoring.  - CT -images and reports were reviewed.   - MRI Brain stroke protocol. If MRI is not done by tomorrow please get a CT Head,  - Check fasting Lipid panel and HbA1c  - TTE with bubble study.  - PT OT SLP evaluation.  - SCD/ SQ Lovenox for DVT prophylaxis.

## 2022-07-22 NOTE — PROGRESS NOTE ADULT - SUBJECTIVE AND OBJECTIVE BOX
[facility] Division of Hospital Medicine    Chief Complaint:  AMS    SUBJECTIVE: pt is awake follows commands but poor historian, enodrses L leg pain and weakness for years     OVERNIGHT EVENTS: none reported     Limited due to pt mentation, patient denies chest pain, SOB, abd pain, N/V, fever, chills.    MEDICATIONS  (STANDING):  aspirin  chewable 81 milliGRAM(s) Enteral Tube daily  atorvastatin 40 milliGRAM(s) Oral at bedtime  cefTRIAXone   IVPB 1000 milliGRAM(s) IV Intermittent every 24 hours  dextrose 5%. 1000 milliLiter(s) (100 mL/Hr) IV Continuous <Continuous>  dextrose 5%. 1000 milliLiter(s) (50 mL/Hr) IV Continuous <Continuous>  dextrose 50% Injectable 25 Gram(s) IV Push once  dextrose 50% Injectable 12.5 Gram(s) IV Push once  dextrose 50% Injectable 25 Gram(s) IV Push once  enoxaparin Injectable 40 milliGRAM(s) SubCutaneous every 24 hours  glucagon  Injectable 1 milliGRAM(s) IntraMuscular once  insulin lispro (ADMELOG) corrective regimen sliding scale   SubCutaneous three times a day before meals  lactulose Syrup 20 Gram(s) Oral every 6 hours  levothyroxine 25 MICROGram(s) Oral daily  rifAXIMin 550 milliGRAM(s) Oral two times a day  sodium chloride 0.9%. 1000 milliLiter(s) (75 mL/Hr) IV Continuous <Continuous>  thiamine IVPB 500 milliGRAM(s) IV Intermittent daily    MEDICATIONS  (PRN):  acetaminophen     Tablet .. 650 milliGRAM(s) Oral every 6 hours PRN Temp greater or equal to 38C (100.4F), Mild Pain (1 - 3)  dextrose Oral Gel 15 Gram(s) Oral once PRN Blood Glucose LESS THAN 70 milliGRAM(s)/deciliter  ondansetron Injectable 4 milliGRAM(s) IV Push every 6 hours PRN Nausea and/or Vomiting        I&O's Summary      PHYSICAL EXAM:  Vital Signs Last 24 Hrs  T(C): 36.6 (2022 05:05), Max: 36.9 (2022 19:00)  T(F): 97.9 (2022 05:05), Max: 98.4 (2022 19:00)  HR: 102 (2022 05:05) (95 - 109)  BP: 121/70 (2022 05:05) (121/70 - 139/86)  BP(mean): --  RR: 18 (2022 05:05) (18 - 18)  SpO2: 98% (2022 05:05) (98% - 100%)    Parameters below as of 2022 05:05  Patient On (Oxygen Delivery Method): room air            CONSTITUTIONAL: NAD,   ENMT: Moist oral mucosa, no pharyngeal injection or exudates; normal dentition; No JVD  RESPIRATORY: Normal respiratory effort; lungs are diminished on bases, no wheezing no rails  CARDIOVASCULAR: Regular rate and rhythm, normal S1 and S2, no murmur/rub/gallop; No lower extremity edema; Peripheral pulses are 2+ bilaterally  ABDOMEN: Nontender to palpation, normoactive bowel sounds, no rebound/guarding; No hepatosplenomegaly  MUSCLOSKELETAL:  no clubbing or cyanosis of digits; no joint swelling or tenderness to palpation  PSYCH: A+O x 1  NEUROLOGY: pt follows commands, answers simple questtions, but at time answers inappropriate  no facial asymmetry, LLE 4/5, RLE 4+/5 BUE 5/5  SKIN: L heel wound with escar chronic appearing     LABS:                        11.4   6.33  )-----------( 132      ( 2022 05:41 )             35.6         142  |  113<H>  |  32.7<H>  ----------------------------<  71  4.3   |  19.0<L>  |  0.64    Ca    7.4<L>      2022 05:10  Mg     2.1         TPro  6.6  /  Alb  2.0<L>  /  TBili  1.1  /  DBili  x   /  AST  69<H>  /  ALT  40  /  AlkPhos  122<H>            Urinalysis Basic - ( 2022 14:15 )    Color: Yellow / Appearance: Clear / S.015 / pH: x  Gluc: x / Ketone: Negative  / Bili: Negative / Urobili: 8 mg/dL   Blood: x / Protein: 30 mg/dL / Nitrite: Negative   Leuk Esterase: Negative / RBC: 3-5 /HPF / WBC Negative /HPF   Sq Epi: x / Non Sq Epi: Occasional / Bacteria: Occasional        Culture - Urine (collected 2022 14:15)  Source: Clean Catch Clean Catch (Midstream)  Final Report (2022 08:34):    >=3 organisms. Probable collection contamination.    Culture - Blood (collected 2022 11:20)  Source: .Blood Blood-Peripheral  Preliminary Report (2022 18:01):    No growth to date.    Culture - Blood (collected 2022 11:05)  Source: .Blood Blood-Peripheral  Preliminary Report (2022 18:01):    No growth to date.      CAPILLARY BLOOD GLUCOSE      POCT Blood Glucose.: 137 mg/dL (2022 12:42)  POCT Blood Glucose.: 67 mg/dL (2022 08:28)  POCT Blood Glucose.: 115 mg/dL (2022 21:05)  POCT Blood Glucose.: 129 mg/dL (2022 17:41)  POCT Blood Glucose.: 120 mg/dL (2022 14:41)        RADIOLOGY & ADDITIONAL TESTS:  Results Reviewed:   Imaging Personally Reviewed:  Electrocardiogram Personally Reviewed:

## 2022-07-22 NOTE — CONSULT NOTE ADULT - SUBJECTIVE AND OBJECTIVE BOX
Neurology consult    [place] CRITICAL 77y Male     Patient is a 77y old  Male who presents with a chief complaint of Altered mental state (2022 13:18)      HPI:  77yr old unknown male brought to ER by EMS from home with c/o altered mental state, inability to talk since last 1 week and h/o recent UTI. No other information was available, pt was admitted as Critical [place]. CT head- showed left occipital infarct vs artifact, no last known well time available, not a candidate for tPA. Patient unable to provide any information. labs s/o hyperglycemia BS >400s, Lactate elevated. UA - neg d-dimer elevated. CT PE - neg for PE, CT abd pelvis - colitis, Sacral decubitus ulcer, rt gluteal edema. He is being admitted for further care.  (2022 16:50)        PMH:      PSH:       FAMILY HISTORY:      SOCIAL HISTORY:  No history of tobacco or alcohol use     Allergies    oxycodone ([place])    Intolerances            Vital Signs Last 24 Hrs  T(C): 36.6 (2022 05:05), Max: 36.9 (2022 19:00)  T(F): 97.9 (2022 05:05), Max: 98.4 (2022 19:00)  HR: 102 (2022 05:05) (95 - 109)  BP: 121/70 (2022 05:05) (121/70 - 139/86)  BP(mean): --  RR: 18 (2022 05:05) (18 - 18)  SpO2: 98% (2022 05:05) (98% - 100%)    Parameters below as of 2022 05:05  Patient On (Oxygen Delivery Method): room air      MEDICATIONS    acetaminophen     Tablet .. 650 milliGRAM(s) Oral every 6 hours PRN  aspirin  chewable 81 milliGRAM(s) Enteral Tube daily  atorvastatin 40 milliGRAM(s) Oral at bedtime  cefTRIAXone   IVPB 1000 milliGRAM(s) IV Intermittent every 24 hours  dextrose 5%. 1000 milliLiter(s) IV Continuous <Continuous>  dextrose 5%. 1000 milliLiter(s) IV Continuous <Continuous>  dextrose 50% Injectable 25 Gram(s) IV Push once  dextrose 50% Injectable 12.5 Gram(s) IV Push once  dextrose 50% Injectable 25 Gram(s) IV Push once  dextrose Oral Gel 15 Gram(s) Oral once PRN  enoxaparin Injectable 40 milliGRAM(s) SubCutaneous every 24 hours  glucagon  Injectable 1 milliGRAM(s) IntraMuscular once  insulin lispro (ADMELOG) corrective regimen sliding scale   SubCutaneous three times a day before meals  lactulose Syrup 20 Gram(s) Oral every 6 hours  levothyroxine 25 MICROGram(s) Oral daily  ondansetron Injectable 4 milliGRAM(s) IV Push every 6 hours PRN  rifAXIMin 550 milliGRAM(s) Oral two times a day  sodium chloride 0.9%. 1000 milliLiter(s) IV Continuous <Continuous>  thiamine IVPB 500 milliGRAM(s) IV Intermittent daily        LABS:  CBC Full  -  ( 2022 05:41 )  WBC Count : 6.33 K/uL  RBC Count : 3.75 M/uL  Hemoglobin : 11.4 g/dL  Hematocrit : 35.6 %  Platelet Count - Automated : 132 K/uL  Mean Cell Volume : 94.9 fl  Mean Cell Hemoglobin : 30.4 pg  Mean Cell Hemoglobin Concentration : 32.0 gm/dL  Auto Neutrophil # : 4.31 K/uL  Auto Lymphocyte # : 0.89 K/uL  Auto Monocyte # : 0.89 K/uL  Auto Eosinophil # : 0.20 K/uL  Auto Basophil # : 0.02 K/uL  Auto Neutrophil % : 68.0 %  Auto Lymphocyte % : 14.1 %  Auto Monocyte % : 14.1 %  Auto Eosinophil % : 3.2 %  Auto Basophil % : 0.3 %    Urinalysis Basic - ( 2022 14:15 )    Color: Yellow / Appearance: Clear / S.015 / pH: x  Gluc: x / Ketone: Negative  / Bili: Negative / Urobili: 8 mg/dL   Blood: x / Protein: 30 mg/dL / Nitrite: Negative   Leuk Esterase: Negative / RBC: 3-5 /HPF / WBC Negative /HPF   Sq Epi: x / Non Sq Epi: Occasional / Bacteria: Occasional          142  |  113<H>  |  32.7<H>  ----------------------------<  71  4.3   |  19.0<L>  |  0.64    Ca    7.4<L>      2022 05:10  Mg     2.1         TPro  6.6  /  Alb  2.0<L>  /  TBili  1.1  /  DBili  x   /  AST  69<H>  /  ALT  40  /  AlkPhos  122<H>      LIVER FUNCTIONS - ( 2022 05:10 )  Alb: 2.0 g/dL / Pro: 6.6 g/dL / ALK PHOS: 122 U/L / ALT: 40 U/L / AST: 69 U/L / GGT: x           Hemoglobin A1C:     Vitamin B12, Serum: >2000 pg/mL ( @ 05:10)          On Neurological Examination:    Mental Status - Patient is  awake, alert and oriented X2.  Speech is fluent. Patient can name some objects . He can repeat and follows some  commands with some persuasion  There is no dysarthria.    Cranial Nerves - PERRL, EOMI,  Visual field exam is limited due to mental status but seems to blink to threat bilaterally. No gross facial asymmetry seen.    Motor Exam -   Both upper extremities  ---atleast 4+/5 with no drift.  Both lower extremities  withdraws to pain for me but reported to move spontaneously also at times per other providers..  Sensory    Intact to light touch and pinprick bilaterally    Coord: FTN intact bilaterally     Gait -  Not assessed.     [facility] SS:   Date: 22  Time: 1100  1a) Level of consciousness (0-3): -------------------------------------1  1b) Questions (0-2): ----------------------------------2  1c) Commands (0-2): ----------------------------------1  2  ) Gaze (0-2):   3  ) Visual field (0-3):   4  ) Facial palsy (0-3):   Motor  5a) Left arm (0-4): ----------------------------------0  5b) Right arm (0-4): ----------------------------------0  6a) Left leg (0-4): ----------------------------------3  6b) Right leg (0-4): ----------------------------------3  7  ) Ataxia (0-2):   Sensory  8  ) Sensory (0-2):   Speech  9  ) Language (0-3): ----------------------------------1  10) Dysarthria (0-2):   Extinction  11) Extinction/inattention (0-2):     Total score: = 11    Patient was last seen well at unknown. ( few days ago)  Prestroke Modified [place]: 4    RADIOLOGY ( All neurological imaging studies were independently reviewed and interpreted by me)  [facility]     < from: CT Head No Cont (22 @ 13:44) >    IMPRESSION:    Motion limited study. Left occipital lobe lucency may represent artifact   versus infarct. No gross hemorrhage, mass effect or hydrocephalus.    --- End of Report ---    REGINALDO AVILA MD; Attending Radiologist      MRI:    TTE

## 2022-07-22 NOTE — PROGRESS NOTE ADULT - ASSESSMENT
77yr old unknown male brought to ER by EMS from home with c/o altered mental state, inability to talk since last 1 week and h/o recent UTI. No other information was available, pt was admitted as Critical [place]. CT head- showed left occipital infarct vs artifact, no last known well time available, not a candidate for tPA. Patient unable to provide any information. labs s/o hyperglycemia BS >400s, Lactate elevated. UA - neg d-dimer elevated. CT PE - neg for PE, CT abd pelvis - colitis, Sacral decubitus ulcer, rt gluteal edema. He is being admitted for further care. Pt was found to have hypothyroidism started on levothyroxin, also immaging suggestive liver cirrhosis, was found to have ammonia 87, therefore started on refaximin and lactulose for suspected hepatic encephalopathy.     #acute metabolic encephalopathy , multifoctroial: hypothyroidism, hepatic encephalopaty, UTI, and covid in setting of prio vs subacute CVA?   - management of respected problems as bellow    # Hx of CVA vs subacute CVA  - Neurology consult noted  - at this time can't locate family to assess presents of mental parts in body >> holding MRI, ordered CT head in am to assess for interval changes, further cva work up base on f/u CT head and neurology recommendations  - EEG ordered as well   - c/w asa, statins    #Hypothyroidism  - tsh, t4,t3 noted, started on Levothyroxin 25 mcg, consulted Endocrinologist    # Hepatic encephalopaty from liver cirrhosis   - pt reports hx of heavy alcohol use, but quit > 10 years ago, can't locate     #   encephalopahty vs mild uti vs dementia vs subacute stroke  - ct head shows:     IMPRESSION:    Motion limited study. Left occipital lobe lucency may represent artifact   versus infarct. No gross hemorrhage, mass effect or hydrocephalus.  - c.w asa and statin   - neuro consulted by previous hospitlist  - follow up tsh, ammonia  - start 3 days of iv ceft  - supportive care  - on room air no need for remdesivir  - unclear about patients pmh, unable to know if able to get mri of head or pelvis    # dm2- a1c 7.7  - ssi    # abnormal CT abd  - colitis - clinically no suspicion for sepsis/colitis -  - no need for abx    # sacral decubitus ulcer - wpund consult    # rt heel dressing -  open wound cellulitis   - wound consult    # DVT px - lovenox     #diet =- pureed  nutrition consult - for tube feeds    SW consult unclear of name or family      77yr old unknown male brought to ER by EMS from home with c/o altered mental state, inability to talk since last 1 week and h/o recent UTI. No other information was available, pt was admitted as Critical [place]. CT head- showed left occipital infarct vs artifact, no last known well time available, not a candidate for tPA. Patient unable to provide any information. labs s/o hyperglycemia BS >400s, Lactate elevated. UA - neg d-dimer elevated. CT PE - neg for PE, CT abd pelvis - colitis, Sacral decubitus ulcer, rt gluteal edema. He is being admitted for further care. Pt was found to have hypothyroidism started on levothyroxin, also immaging suggestive liver cirrhosis, was found to have ammonia 87, therefore started on refaximin and lactulose for suspected hepatic encephalopathy.     #acute metabolic encephalopathy , multifoctroial: hypothyroidism, hepatic encephalopaty, UTI, and covid in setting of prio vs subacute CVA?   - management of respected problems as bellow    # Hx of CVA vs subacute CVA  - Neurology consult noted  - at this time can't locate family to assess presents of mental parts in body >> holding MRI, ordered CT head in am to assess for interval changes, further cva work up base on f/u CT head and neurology recommendations  - EEG ordered as well   - c/w asa, statins    #Hypothyroidism  - tsh, t4,t3 noted, started on Levothyroxin 25 mcg, consulted Endocrinologist    # Hepatic encephalopaty from liver cirrhosis   - ammonia noted  - pt reports hx of heavy alcohol use, but quit > 10 years ago, can't locate family to elaborate more info  - started on rifaximin, and lactulose     # UTI  - urine cx noted, started on Ceftriaxone will complete 3 days coures (stop date on 7/23)    # dm2, episode of hypolgycemia   - due to poor po intake, encourage po intake, SLP recommended pureed diet  - a1c 7.7  - c/w ssi    # abnormal CT abd  - colitis - clinically no suspicion for sepsis/colitis -  - abx as above  - will monitor for sings of diarrhea    # sacral decubitus ulcer and r heel    - abx as above  - wound care consult noted    # DVT px - lovenox   # code/social - full code, cant' locate family (discussed with RN and SW)  # Dispo - will stay over weekend

## 2022-07-22 NOTE — SWALLOW BEDSIDE ASSESSMENT ADULT - COMMENTS
Pt questionable historian, Pt reporting has not chewed anything for a few weeks due to "not feeling like it" as well as pending test on 8/14 to look at throat, possibly swallowing; however, pt unable to clarify. Pt said diet prior to admission consists of drinking from a "sippy cup;" however, unable to provide specifics as to why. Although reporting no history of dysphagia in the past/ hx of feeding tube, overall understanding of prior diet/ hx of dysphagia remains questionable.

## 2022-07-22 NOTE — PROVIDER CONTACT NOTE (HYPOGLYCEMIA EVENT) - NS PROVIDER CONTACT BACKGROUND-HYPO
Age: 77y    Gender: Male    POCT Blood Glucose:  115 mg/dL (07-21-22 @ 21:05)  129 mg/dL (07-21-22 @ 17:41)  120 mg/dL (07-21-22 @ 14:41)  67 mg/dl (07/22-22) @ 08:45    eMAR:atorvastatin   40 milliGRAM(s) Oral (07-21-22 @ 21:28)    insulin lispro (ADMELOG) corrective regimen sliding scale   0  SubCutaneous (07-21-22 @ 17:47)

## 2022-07-22 NOTE — SWALLOW BEDSIDE ASSESSMENT ADULT - SLP PERTINENT HISTORY OF CURRENT PROBLEM
As per charting, "77yr old unknown male brought to ER by EMS from home with c/o altered mental state, inability to talk since last 1 week and h/o recent UTI. No other information was available, pt was admitted as Critical [place]. CT head- showed left occipital infarct vs artifact, no last known well time available, not a candidate for tPA. Patient unable to provide any information. labs s/o hyperglycemia BS >400s, Lactate elevated. UA - neg d-dimer elevated. CT PE - neg for PE, CT abd pelvis - colitis, Sacral decubitus ulcer, rt gluteal edema. He is being admitted for further care. "

## 2022-07-22 NOTE — SWALLOW BEDSIDE ASSESSMENT ADULT - SWALLOW EVAL: DIAGNOSIS
Overall assessment limited due to minimal participation. Oral stage notable for increased oral transit time; however, grossly functional. Pharyngeal stage deemed WFL w/ no overt s/s aspiration noted. Pt declined trials of liquids & subsequent solid trials.

## 2022-07-22 NOTE — SWALLOW BEDSIDE ASSESSMENT ADULT - SLP GENERAL OBSERVATIONS
Pt received sleeping in bed, arousable w/ cues, oriented to self, birth date, location of hospital & hometown, +mild agitation, +suspect reduced cognition, pt reporting 10/10 pain to neck pre & post RN aware.

## 2022-07-23 LAB
ANION GAP SERPL CALC-SCNC: 10 MMOL/L — SIGNIFICANT CHANGE UP (ref 5–17)
BUN SERPL-MCNC: 32.9 MG/DL — HIGH (ref 8–20)
CALCIUM SERPL-MCNC: 7.3 MG/DL — LOW (ref 8.6–10.2)
CHLORIDE SERPL-SCNC: 107 MMOL/L — SIGNIFICANT CHANGE UP (ref 98–107)
CO2 SERPL-SCNC: 19 MMOL/L — LOW (ref 22–29)
CREAT SERPL-MCNC: 0.82 MG/DL — SIGNIFICANT CHANGE UP (ref 0.5–1.3)
EGFR: 89 ML/MIN/1.73M2 — SIGNIFICANT CHANGE UP
GLUCOSE BLDC GLUCOMTR-MCNC: 73 MG/DL — SIGNIFICANT CHANGE UP (ref 70–99)
GLUCOSE BLDC GLUCOMTR-MCNC: 79 MG/DL — SIGNIFICANT CHANGE UP (ref 70–99)
GLUCOSE BLDC GLUCOMTR-MCNC: 80 MG/DL — SIGNIFICANT CHANGE UP (ref 70–99)
GLUCOSE SERPL-MCNC: 68 MG/DL — LOW (ref 70–99)
HCT VFR BLD CALC: 37.2 % — LOW (ref 39–50)
HGB BLD-MCNC: 12 G/DL — LOW (ref 13–17)
MAGNESIUM SERPL-MCNC: 1.9 MG/DL — SIGNIFICANT CHANGE UP (ref 1.6–2.6)
MCHC RBC-ENTMCNC: 30.7 PG — SIGNIFICANT CHANGE UP (ref 27–34)
MCHC RBC-ENTMCNC: 32.3 GM/DL — SIGNIFICANT CHANGE UP (ref 32–36)
MCV RBC AUTO: 95.1 FL — SIGNIFICANT CHANGE UP (ref 80–100)
PHOSPHATE SERPL-MCNC: 3.8 MG/DL — SIGNIFICANT CHANGE UP (ref 2.4–4.7)
PLATELET # BLD AUTO: 145 K/UL — LOW (ref 150–400)
POTASSIUM SERPL-MCNC: 4.2 MMOL/L — SIGNIFICANT CHANGE UP (ref 3.5–5.3)
POTASSIUM SERPL-SCNC: 4.2 MMOL/L — SIGNIFICANT CHANGE UP (ref 3.5–5.3)
RBC # BLD: 3.91 M/UL — LOW (ref 4.2–5.8)
RBC # FLD: 15.9 % — HIGH (ref 10.3–14.5)
SODIUM SERPL-SCNC: 136 MMOL/L — SIGNIFICANT CHANGE UP (ref 135–145)
WBC # BLD: 5.65 K/UL — SIGNIFICANT CHANGE UP (ref 3.8–10.5)
WBC # FLD AUTO: 5.65 K/UL — SIGNIFICANT CHANGE UP (ref 3.8–10.5)

## 2022-07-23 PROCEDURE — 95718 EEG PHYS/QHP 2-12 HR W/VEEG: CPT

## 2022-07-23 PROCEDURE — 99233 SBSQ HOSP IP/OBS HIGH 50: CPT

## 2022-07-23 RX ORDER — SODIUM CHLORIDE 9 MG/ML
1000 INJECTION, SOLUTION INTRAVENOUS
Refills: 0 | Status: DISCONTINUED | OUTPATIENT
Start: 2022-07-23 | End: 2022-07-28

## 2022-07-23 RX ADMIN — ATORVASTATIN CALCIUM 40 MILLIGRAM(S): 80 TABLET, FILM COATED ORAL at 23:38

## 2022-07-23 RX ADMIN — Medication 25 MICROGRAM(S): at 06:00

## 2022-07-23 RX ADMIN — LACTULOSE 20 GRAM(S): 10 SOLUTION ORAL at 17:58

## 2022-07-23 RX ADMIN — CEFTRIAXONE 100 MILLIGRAM(S): 500 INJECTION, POWDER, FOR SOLUTION INTRAMUSCULAR; INTRAVENOUS at 17:58

## 2022-07-23 RX ADMIN — LACTULOSE 20 GRAM(S): 10 SOLUTION ORAL at 01:40

## 2022-07-23 RX ADMIN — Medication 81 MILLIGRAM(S): at 13:06

## 2022-07-23 RX ADMIN — LACTULOSE 20 GRAM(S): 10 SOLUTION ORAL at 13:06

## 2022-07-23 RX ADMIN — Medication 105 MILLIGRAM(S): at 13:06

## 2022-07-23 RX ADMIN — LACTULOSE 20 GRAM(S): 10 SOLUTION ORAL at 06:00

## 2022-07-23 RX ADMIN — SODIUM CHLORIDE 70 MILLILITER(S): 9 INJECTION, SOLUTION INTRAVENOUS at 11:12

## 2022-07-23 RX ADMIN — ENOXAPARIN SODIUM 40 MILLIGRAM(S): 100 INJECTION SUBCUTANEOUS at 06:00

## 2022-07-23 RX ADMIN — SODIUM CHLORIDE 75 MILLILITER(S): 9 INJECTION INTRAMUSCULAR; INTRAVENOUS; SUBCUTANEOUS at 05:59

## 2022-07-23 NOTE — DIETITIAN INITIAL EVALUATION ADULT - NUTRITON FOCUSED PHYSICAL EXAM
yes... Ilumya Counseling: I discussed with the patient the risks of tildrakizumab including but not limited to immunosuppression, malignancy, posterior leukoencephalopathy syndrome, and serious infections.  The patient understands that monitoring is required including a PPD at baseline and must alert us or the primary physician if symptoms of infection or other concerning signs are noted.

## 2022-07-23 NOTE — EEG REPORT - NS EEG TEXT BOX
[facility]  Comprehensive Epilepsy Center  Report of Continuous Video EEG    [facility]: [address], [place] [address], Phone [phone]  [place] Office: [address], [address], [place] [address] Phone [phone]    [facility]: [address] [place] [address], Phone [phone]  [place] Office: [address] [place] [address], Phone [phone]    Patient Name: Critical , [place]    Age: 77 year, : 1945  Patient ID: -, MRN #: -, Esquivel: [id number]  Referring Physician: Mounika Burgess  EEG #: [id number]    Study Time/Date: 5:02:05 PM on 2022  	  End Time/Date: 0800 on 2022          			   Duration: 15H    Study Information:    EEG Recording Technique:  The patient underwent continuous Video-EEG monitoring, using Telemetry System hardware on the XLTek Digital System. EEG and video data were stored on a computer hard drive with important events saved in digital archive files. The material was reviewed by a physician (electroencephalographer / epileptologist) on a daily basis. Cholo and seizure detection algorithms were utilized and reviewed. An EEG Technician attended to the patient, and was available throughout daytime work hours.  The epilepsy center neurologist was available in person or on call 24-hours per day.    EEG Placement and Labeling of Electrodes:  The EEG was performed utilizing 20 channel referential EEG connections (coronal over temporal over parasagittal montage) using all standard 10-20 electrode placements, with additional electrodes placed in the inferior temporal region using the modified 10-10 montage electrode placements for elective admissions, or if deemed necessary. Recording was at a sampling rate of 256 samples per second per channel. Time synchronized digital video recording was done simultaneously with EEG recording. A low light infrared camera was used for low light recording.     History:   VEEG performed bedside  COR: pt is awake, disoriented to date, cooperative  no hv due to covid protocol. photic stim performed  77yoM c/o altered mental state, h/o recent UTI, non verbal at time of admit in ER  r/o sz      Pertinent Medication  Synthroid  Lovenox  Rocephin  Lipitor    Interpretation:    Daily EEG Visual Analysis  Findings: The background was continuous and reactive. During wakefulness, the posterior dominant rhythm consisted of symmetric, 7 Hz activity, with amplitude to 30 uV, that attenuated to eye opening.     Background Slowing:  Excess diffuse polymorphic delta slowing.    Focal Slowing:   None were present.    Sleep Background:  Drowsiness was characterized by fragmentation, attenuation, and slowing of the background activity.    Sleep was characterized by the presence of vertex waves, symmetric sleep spindles and K-complexes.    Other Non-Epileptiform Findings:  None were present.    Interictal Epileptiform Activity:   None were present.    Events:  Clinical events: None recorded.  Seizures: None recorded.    Activation Procedures:   Hyperventilation was not performed.    Photic stimulation was not performed.     Artifacts:  Intermittent myogenic and movement artifacts were noted.    EEG Summary / Classification:  Abnormal EEG in the awake, drowsy and asleep states.  - Mild to Moderate generalized slowing.    EEG Impression / Clinical Correlate:  Abnormal EEG study.  Mild to Moderate nonspecific diffuse or multifocal cerebral dysfunction.   No epileptiform pattern or seizure seen.    Recommend discontinuation of EEG unless strong clinical concern persists.    Mian Rojas MD  Attending Physician, [facility]

## 2022-07-23 NOTE — DIETITIAN INITIAL EVALUATION ADULT - WEIGHT USED FOR CALCULATIONS
[address] [place] [address] [address], [address]  P [phone]  F [phone]    Office Note  Patient ID:  Name:  Barbara De Santiago  MRN:  [id number]  :  1983/37 y.o. Date:  2021      HISTORY OF PRESENT ILLNESS:  Ms. Barbara De Santiago is a 42 y.o. female who initially presented from [facility] for concerns of a possible uterine mass. Presents today for MRI results. Initial History:  Ms. Barbara De Santiago is a 42 y.o.  premenopausal female who presents as a new patient from [facility] for concerns of a possible uterine mass. Patient reports RLQ severe stabbing pain in 2021. Denies nausea or vomiting. Denies change in appetite or bowel habits. Denies change in menses. Denies fevers or chills. Denies constipation or diarrhea. Reports that the pain has completely resolved now. She reports the pain started when she came off of her OCPs. Now that she has restarted them her pain has resolved. Denies CP or SOB. Negative UPT at that time. Pelvic ultrasound on 2021 demonstrated \"uterien mass, with carcinoma not excluded\". She was then subsequently referred to our office for further discussion and management. Pertinent PMH/PSH: n/a      Active, no restrictions. Imaging Review:   MRI pelvis 2021:  FINDINGS: The uterus measures 9.3 x 5.6 x 4.1 cm. Hypointense T2 enhancing  submucosal fibroid at the fundus measures 2.6 x 2.6 x 2.4 cm. Intramural  posterior fundal fibroid measures 1.7 cm. Other fibroids are smaller. The endometrial stripe measures 1.7 cm. The junctional zone measures 0.5 cm. The  cervix is within normal limits. Hyperintense T2 nonenhancing cyst at the base of the distal vagina to the right  of midline extends into the right labia majora and measures 5.7 x 2.9 x 2.3 cm. A cyst at the left base of the vagina measures 1.1 cm in craniocaudal dimension.   Both of these cysts are inferior to the pubic symphysis. No solidly enhancing  vaginal mass. The right ovary measures 2.7 x 2.3 x 2.2 cm. The left ovary measures 3.4 x 1.5 x  2.5 cm. No solid adnexal mass. Rectum and anus are within normal limits. Urinary bladder is within normal  limits. There is no free fluid. Bones are within normal limits. IMPRESSION  1. Normal sized uterus contains multiple fibroids. Largest fibroid measures 2.6  cm and is submucosal in the fundus. This finding corresponds to the ultrasound  finding. No imaging evidence of endometrial carcinoma. If the patient has  abnormal uterine bleeding, consider biopsy to prove fibroid rather than  carcinoma. 2. 5.7 cm Bartholin's gland cyst to the right of midline may be palpable in the  labia majora. 1.1 cm left Bartholin's gland cyst.  3. Normal ovaries. Pelvic ultrasound 5/4/2021:  \"A midline smooth oval solid uterine mass with internal vascularity measures 2.6 x 2.5 x 2.0cm and fills and distends the fundal portion of th endometrial cavity. Differential considerations include endometrial carcinoma, endometrial polyp, submucosal fibroid. \"    ROS:  A comprehensive review of systems was negative except for that written in the History of Present Illness. , 10 point ROS    OB/GYN ROS:  Patient denies significant menstrual problems.     ECOG stGstrstastdstest:st st1st Problem List:  Patient Active Problem List    Diagnosis Date Noted    Uterine mass 06/02/2021     PMH:  Past Medical History:   Diagnosis Date    Abnormal Papanicolaou smear of cervix 04/2018    ASCUS    Rosacea       PSH:  Past Surgical History:   Procedure Laterality Date    HX COLPOSCOPY  06/2020    HX LEEP PROCEDURE      JONH 3      Social History:  Social History     Tobacco Use    Smoking status: Never Smoker    Smokeless tobacco: Never Used   Substance Use Topics    Alcohol use: No      Family History:  Family History   Problem Relation Age of Onset    Hypertension Mother     Diabetes Maternal Grandmother     Cancer Maternal Grandfather         colon cancer      Medications: (reviewed)  Current Outpatient Medications   Medication Sig    Sprintec, 28, 0.25-35 mg-mcg tab TAKE 1 TABLET BY MOUTH ONCE DAILY    triamcinolone acetonide (KENALOG) 0.1 % topical cream Apply  to affected area two (2) times a day. use thin layer    loratadine (CLARITIN) 10 mg tablet Take 1 Tab by mouth daily. Mifflin 1 cada manana    diphenhydrAMINE (BENADRYL) 25 mg capsule Take 2 Caps by mouth every six (6) hours as needed. Mifflin 2 pastilla cada noche si necissita por kate piel    predniSONE (DELTASONE) 10 mg tablet Take 6 today then 1 less pill daily until all pills are gone     No current facility-administered medications for this visit. Allergies: (reviewed)  No Known Allergies     Gyn History:   Last pap: ASC-H  2017 being followed up by Dr. Vick Morales per patient  History of abnormal pap: yes, h/o JONH-3 s/p prior LEEP  Menses: regular  Contraception: OCPs     OBJECTIVE:  Deferred today as just for imaging results. Physical Exam:  VITAL SIGNS: Vitals:    06/16/21 1109   BP: 123/80   Pulse: 76   Weight: 146 lb (66.2 kg)   Height: 5' 7\" (1.702 m)     Body mass index is 22.87 kg/m². GENERAL NIRAJ: Conversant, alert, oriented. No acute distress. HEENT: HEENT. No thyroid enlargement. No JVD. Neck: Supple without restrictions. RESPIRATORY: Clear to auscultation and percussion to the bases. No CVAT. CARDIOVASC: RRR without murmur/rub. GASTROINT: soft, non-tender, without masses or organomegaly   MUSCULOSKEL: no joint tenderness, deformity or swelling       EXTREMITIES: extremities normal, atraumatic, no cyanosis or edema   PELVIC: Exam chaperoned by nurse. Normal appearing external genitalia. On speculum exam, normal appearing vagina and cervix. On bimanual exam, the cervix and uterus are normal size and mobile. There is a small 2-3cm anterior fibroid. No evidence of adnexal masses or nodularity.     RECTAL: deferred   DEVI SURVEY: No suspicious lymphadenopathy or edema noted. NEURO: Grossly intact. No acute deficit. Lab Date as available:    Lab Results   Component Value Date/Time    WBC 6.1 10/08/2020 09:09 AM    HGB 11.1 (L) 10/08/2020 09:09 AM    HCT 38.1 10/08/2020 09:09 AM    PLATELET 576 52/73/0595 09:09 AM    MCV 76.2 (L) 10/08/2020 09:09 AM     Lab Results   Component Value Date/Time    Sodium 138 10/08/2020 09:09 AM    Potassium 4.4 10/08/2020 09:09 AM    Chloride 106 10/08/2020 09:09 AM    CO2 27 10/08/2020 09:09 AM    Anion gap 5 10/08/2020 09:09 AM    Glucose 78 10/08/2020 09:09 AM    BUN 10 10/08/2020 09:09 AM    Creatinine 0.60 10/08/2020 09:09 AM    BUN/Creatinine ratio 17 10/08/2020 09:09 AM    GFR est AA >60 10/08/2020 09:09 AM    GFR est non-AA >60 10/08/2020 09:09 AM    Calcium 9.3 10/08/2020 09:09 AM         IMPRESSION/PLAN:    Ms. Aron Phelan is a 42 y.o. female with a working diagnosis of possible uterine mass. Presents back today for MRI follow-up. MRI pelvis 6/1/2021 demonstrates normal appearing ovaries and normal appearing multiple uterine fibroids, largest measuring 2.6cm. Bartholin's cyst also imaged. Problems:     Patient Active Problem List    Diagnosis Date Noted    Uterine mass 06/02/2021     Reviewed patient's course to date, including her MRI results which demonstrate normal appearing fibroids and no evidence of a uterine mass. She does have a Bartholin's cyst. She reports having this drained 2 years ago. She would like to see if warm compresses will allow it to drain on it's own. RTC in 1 month for repeat exam. Suspect she may require a small procedure for drainage or marsupialization. Per the patient she is following up with Dr. Ovidio Tolliver regarding her ASC-H pap smear in 2017. Today's visit performed with an . All questions and concerns were addressed with the patient and she is comfortable with the plan.      An electronic signature was used to authenticate this note.     Samina Mancuso MD current weight

## 2022-07-23 NOTE — DIETITIAN INITIAL EVALUATION ADULT - PERTINENT LABORATORY DATA
07-23    136  |  107  |  32.9<H>  ----------------------------<  68<L>  4.2   |  19.0<L>  |  0.82    Ca    7.3<L>      23 Jul 2022 06:04  Phos  3.8     07-23  Mg     1.9     07-23    TPro  6.6  /  Alb  2.0<L>  /  TBili  1.1  /  DBili  x   /  AST  69<H>  /  ALT  40  /  AlkPhos  122<H>  07-22  POCT Blood Glucose.: 73 mg/dL (07-23-22 @ 09:06)  A1C with Estimated Average Glucose Result: 7.7 % (07-21-22 @ 07:00)

## 2022-07-23 NOTE — EEG REPORT - NS EEG TEXT BOX
[facility]  Comprehensive Epilepsy Center  Report of Continuous Video EEG    [facility]: [address], [place] [address], Phone [phone]  [place] Office: [address], [place] [address] Phone [phone]    [facility]: [address] [place] [address], Phone [phone]  [place] Office: [address] [place] [address], Phone [phone]    Patient Name: Critical , [place]    Age: 77 year, : 1945  Patient ID: -, MRN #: -, Esquivel: - [id number]  Referring Physician: Mounika Burgess  EEG #: [id number]    Study Time/Date: 0800 on 2022  	  End Time/Date: 1400 on 2022          			   Duration: 6H    Study Information:    EEG Recording Technique:  The patient underwent continuous Video-EEG monitoring, using Telemetry System hardware on the XLTek Digital System. EEG and video data were stored on a computer hard drive with important events saved in digital archive files. The material was reviewed by a physician (electroencephalographer / epileptologist) on a daily basis. Cholo and seizure detection algorithms were utilized and reviewed. An EEG Technician attended to the patient, and was available throughout daytime work hours.  The epilepsy center neurologist was available in person or on call 24-hours per day.    EEG Placement and Labeling of Electrodes:  The EEG was performed utilizing 20 channel referential EEG connections (coronal over temporal over parasagittal montage) using all standard 10-20 electrode placements, with additional electrodes placed in the inferior temporal region using the modified 10-10 montage electrode placements for elective admissions, or if deemed necessary. Recording was at a sampling rate of 256 samples per second per channel. Time synchronized digital video recording was done simultaneously with EEG recording. A low light infrared camera was used for low light recording.     History:   VEEG performed bedside  COR: pt is awake, disoriented to date, cooperative  no hv due to covid protocol. photic stim performed  77yoM c/o altered mental state, h/o recent UTI, non verbal at time of admit in ER  r/o sz      Pertinent Medication  Synthroid  Lovenox  Rocephin  Lipitor    Interpretation:    Daily EEG Visual Analysis  Findings: The background was continuous and reactive. During wakefulness, the posterior dominant rhythm consisted of symmetric, 7 Hz activity, with amplitude to 30 uV, that attenuated to eye opening.     Background Slowing:  Excess diffuse polymorphic delta slowing.    Focal Slowing:   None were present.    Sleep Background:  Drowsiness was characterized by fragmentation, attenuation, and slowing of the background activity.    Sleep was characterized by the presence of vertex waves, symmetric sleep spindles and K-complexes.    Other Non-Epileptiform Findings:  None were present.    Interictal Epileptiform Activity:   None were present.    Events:  Clinical events: None recorded.  Seizures: None recorded.    Activation Procedures:   Hyperventilation was not performed.    Photic stimulation was not performed.     Artifacts:  Intermittent myogenic and movement artifacts were noted.    EEG Summary / Classification:  Abnormal EEG in the awake, drowsy and asleep states.  - Mild to Moderate generalized slowing.    EEG Impression / Clinical Correlate:  Abnormal EEG study.  Mild to Moderate nonspecific diffuse or multifocal cerebral dysfunction.   No epileptiform pattern or seizure seen.    Mian Rojas MD  Attending Physician, [facility]

## 2022-07-23 NOTE — DIETITIAN INITIAL EVALUATION ADULT - DIET TYPE
consistent carbohydrate (evening snack) Nsaids Pregnancy And Lactation Text: These medications are considered safe up to 30 weeks gestation. It is excreted in breast milk.

## 2022-07-23 NOTE — CHART NOTE - NSCHARTNOTEFT_GEN_A_CORE
Followed for AMS and possible right occipital stroke    EEG 7/22-23  EEG Summary / Classification:  Abnormal EEG in the awake, drowsy and asleep states.  - Mild to Moderate generalized slowing.    EEG Impression / Clinical Correlate:  Abnormal EEG study.  Mild to Moderate nonspecific diffuse or multifocal cerebral dysfunction.   No epileptiform pattern or seizure seen.    No seizure on cvEEG, generalized slowing    await head CT to confirm right occipital stroke    will follow with you    Andrey Padron MD PhD   [id number]

## 2022-07-23 NOTE — DIETITIAN INITIAL EVALUATION ADULT - ORAL INTAKE PTA/DIET HISTORY
Pt with fair po intake. Food preferences obtained. Pt asking for regular food.  Pt with stage 2 right butt and stage 3 left heel as per wound care nurse note.

## 2022-07-23 NOTE — DIETITIAN INITIAL EVALUATION ADULT - WEIGHT (LBS)
[address]  [address] [address]  Dept: [phone]  Dept Fax: [phone]    Surekha Vining is a 35 y.o. female who presents today forher medical conditions/complaints as noted below. Surekha Vining is c/o of   Chief Complaint   Patient presents with    Cough     pt is having cough, can't taste or smell symptoms started two days ago     Headache    Shortness of Breath       HPI:     Cough   This is a new problem. The current episode started yesterday (x's 2 days ). The problem has been unchanged. The problem occurs constantly. The cough is non-productive. Associated symptoms include headaches and shortness of breath. Pertinent negatives include no chest pain, chills, fever, postnasal drip, rash or sore throat. Associated symptoms comments: Loss of taste and smell . The symptoms are aggravated by lying down. Headache    This is a new problem. The current episode started in the past 7 days. The problem occurs intermittently. The pain is located in the frontal region. Associated symptoms include coughing. Pertinent negatives include no abdominal pain, dizziness, fever, nausea, neck pain, sore throat, vomiting or weakness. Shortness of Breath   This is a new problem. The current episode started in the past 7 days. The problem occurs intermittently. Associated symptoms include headaches. Pertinent negatives include no abdominal pain, chest pain, fever, leg swelling, neck pain, rash, sore throat or vomiting.          Past Medical History:   Diagnosis Date    Diabetes mellitus ([facility]     Factor 5 Leiden mutation, heterozygous ([facility]     History of pulmonary embolism     Hx of blood clots     left leg    Obesity       Past Surgical History:   Procedure Laterality Date     SECTION      x 4    DILATION AND CURETTAGE OF UTERUS N/A 2019    DILATATION AND CURETTAGE HYSTEROSCOPY [email or web address] HERNIA REPAIR      abdominal    KNEE ARTHROSCOPY Right 11/27/2017    OTHER SURGICAL HISTORY  2005    Essure bilateral fallopian tubes    CA KNEE SCOPE,DIAGNOSTIC Right 11/27/2017    KNEE ARTHROSCOPY WITH PARTIAL MEDIAL MENISECTOMY performed by Pedro Hills MD at [address] History   Problem Relation Age of Onset    Heart Disease Father     Diabetes Father     High Blood Pressure Father     Thyroid Disease Mother     Heart Disease Mother     Heart Attack Paternal Uncle     Diabetes Maternal Grandfather     Cancer Sister     Thyroid Cancer Sister        Social History     Tobacco Use    Smoking status: Never Smoker    Smokeless tobacco: Never Used   Substance Use Topics    Alcohol use: Yes     Comment: rare      Current Outpatient Medications   Medication Sig Dispense Refill    benzonatate (TESSALON) 200 MG capsule take 1 capsule by mouth three times a day if needed for cough      cephALEXin (KEFLEX) 500 MG capsule Take 1 capsule by mouth 2 times daily for 7 days 14 capsule 0    Lancets MISC 1 each by Does not apply route 2 times daily 100 each 5    blood glucose monitor strips Test 1 times a day & as needed for symptoms of irregular blood glucose. Dispense sufficient amount for indicated testing frequency plus additional to accommodate PRN testing needs.  100 strip 5    Blood Glucose Monitoring Suppl (BLOOD GLUCOSE SYSTEM ASYA) KIT Test once a day 1 kit 0    loratadine (CLARITIN) 10 MG tablet Take 1 tablet by mouth daily 30 tablet 2    lisinopril (PRINIVIL;ZESTRIL) 5 MG tablet Take 1 tablet by mouth daily 30 tablet 5    metFORMIN (GLUCOPHAGE) 500 MG tablet Take 1 tablet by mouth daily (with breakfast) 90 tablet 1    ibuprofen (ADVIL;MOTRIN) 800 MG tablet Take 1 tablet by mouth every 8 hours as needed for Pain 30 tablet 0    aspirin 325 MG tablet Take 1 tablet by mouth daily 30 tablet 0    acetaminophen (TYLENOL) 325 MG tablet Take 650 mg by mouth every 6 hours as needed for Pain Current Facility-Administered Medications   Medication Dose Route Frequency Provider Last Rate Last Dose    Hylan (HYLAN) injection 48 mg  48 mg Intra-articular Once Chente Hollis MD            Allergies   Allergen Reactions    Dilaudid [Hydromorphone Hcl] Nausea And Vomiting           Subjective:      Review of Systems   Constitutional: Negative for appetite change, chills, fatigue and fever. HENT: Negative for congestion, postnasal drip and sore throat. Eyes: Negative. Negative for discharge and itching. Respiratory: Positive for cough and shortness of breath. Negative for chest tightness. Cardiovascular: Negative for chest pain, palpitations and leg swelling. Gastrointestinal: Negative for abdominal pain, diarrhea, nausea and vomiting. Endocrine: Negative. Genitourinary: Negative for dysuria, frequency and urgency. Musculoskeletal: Negative for neck pain and neck stiffness. Skin: Negative for rash. Allergic/Immunologic: Negative. Neurological: Positive for headaches. Negative for dizziness, weakness and light-headedness. Hematological: Negative for adenopathy. Psychiatric/Behavioral: Negative for confusion. Objective:      Physical Exam  Vitals signs reviewed. Constitutional:       Appearance: She is well-developed. HENT:      Head: Normocephalic. Right Ear: External ear normal.      Left Ear: External ear normal.      Nose: Nose normal.   Eyes:      Conjunctiva/sclera: Conjunctivae normal.      Pupils: Pupils are equal, round, and reactive to light. Neck:      Musculoskeletal: Normal range of motion and neck supple. Cardiovascular:      Rate and Rhythm: Normal rate and regular rhythm. Heart sounds: Normal heart sounds. No murmur. No friction rub. No gallop. Pulmonary:      Effort: Pulmonary effort is normal. No respiratory distress. Breath sounds: Normal breath sounds.    Abdominal:      General: Bowel sounds are normal.      Palpations: Abdomen is soft. Musculoskeletal: Normal range of motion. Lymphadenopathy:      Cervical: No cervical adenopathy. Skin:     General: Skin is warm and dry. Findings: No rash. Neurological:      Mental Status: She is alert and oriented to person, place, and time. Cranial Nerves: No cranial nerve deficit. Coordination: Coordination normal.      Deep Tendon Reflexes: Reflexes are normal and symmetric. Psychiatric:         Thought Content: Thought content normal.       Resp 16   Ht 5' 3\" (1.6 m)   Wt (!) 315 lb (142.9 kg)   LMP 09/15/2020   BMI 55.80 kg/m²     Assessment:       Diagnosis Orders   1. Suspected COVID-19 virus infection  COVID-19 Ambulatory   2. Loss of taste  COVID-19 Ambulatory   3. Loss of smell  COVID-19 Ambulatory   4. Acute intractable headache, unspecified headache type  COVID-19 Ambulatory   5. Cough  COVID-19 Ambulatory             Plan:     1.) Covid swab obtained and sent to lab- will call with results   2.) Quarantine while waiting for Covid results   3.) Symptom management encouraged   4.) Follow-up with PCP PRN     Advance Care Planning  People with COVID-19 may have no symptoms, mild symptoms, such as fever, cough, and shortness of breath or they may have more severe illness, developing severe and fatal pneumonia. As a result, Advance Care Planning with attention to naming a health care decision maker (someone you trust to make healthcare decisions for you if you could not speak for yourself) and sharing other health care preferences is important BEFORE a possible health crisis. Please contact your Primary Care Provider to discuss Advance Care Planning.     Preventing the Spread of Coronavirus Disease 2019 in Homes and Residential Communities  For the most recent information go to [employer]    Prevention steps for People with confirmed or suspected COVID-19 (including persons under investigation) who do not need to be hospitalized  and   People with confirmed COVID-19 who were hospitalized and determined to be medically stable to go home    Your healthcare provider and public health staff will evaluate whether you can be cared for at home. If it is determined that you do not need to be hospitalized and can be isolated at home, you will be monitored by staff from your local or state health department. You should follow the prevention steps below until a healthcare provider or local or state health department says you can return to your normal activities. Stay home except to get medical care  People who are mildly ill with COVID-19 are able to isolate at home during their illness. You should restrict activities outside your home, except for getting medical care. Do not go to work, school, or public areas. Avoid using public transportation, ride-sharing, or taxis. Separate yourself from other people and animals in your home  People: As much as possible, you should stay in a specific room and away from other people in your home. Also, you should use a separate bathroom, if available. Animals: You should restrict contact with pets and other animals while you are sick with COVID-19, just like you would around other people. Although there have not been reports of pets or other animals becoming sick with COVID-19, it is still recommended that people sick with COVID-19 limit contact with animals until more information is known about the virus. When possible, have another member of your household care for your animals while you are sick. If you are sick with COVID-19, avoid contact with your pet, including petting, snuggling, being kissed or licked, and sharing food. If you must care for your pet or be around animals while you are sick, wash your hands before and after you interact with pets and wear a facemask.   Call ahead before visiting your doctor  If you have a medical appointment, call the healthcare provider and cleaning spray or wipe, according to the label instructions. Labels contain instructions for safe and effective use of the cleaning product including precautions you should take when applying the product, such as wearing gloves and making sure you have good ventilation during use of the product. Monitor your symptoms  Seek prompt medical attention if your illness is worsening (e.g., difficulty breathing). Before seeking care, call your healthcare provider and tell them that you have, or are being evaluated for, COVID-19. Put on a facemask before you enter the facility. These steps will help the healthcare providers office to keep other people in the office or waiting room from getting infected or exposed. Ask your healthcare provider to call the local or state health department. Persons who are placed under active monitoring or facilitated self-monitoring should follow instructions provided by their local health department or occupational health professionals, as appropriate. When working with your local health department check their available hours. If you have a medical emergency and need to call 911, notify the dispatch personnel that you have, or are being evaluated for COVID-19. If possible, put on a facemask before emergency medical services arrive. Discontinuing home isolation  Patients with confirmed COVID-19 should remain under home isolation precautions until the risk of secondary transmission to others is thought to be low. The decision to discontinue home isolation precautions should be made on a case-by-case basis, in consultation with healthcare providers and state and local health departments. Problem List     None           Patient given educationalmaterials - see patient instructions. Discussed use, benefit, and side effectsof prescribed medications. All patient questions answered. Pt verbalized understanding. Instructed to continue current medications, diet and exercise.   Patient agreedwith treatment plan. Follow up as directed.      Electronically signed by LEATHA Clark CNP on 9/23/2020 at 11:22 AM 190

## 2022-07-23 NOTE — DIETITIAN INITIAL EVALUATION ADULT - OTHER INFO
77yr old unknown male brought to ER by EMS from home with c/o altered mental state, inability to talk since last 1 week and h/o recent UTI. No other information was available, pt was admitted as Critical [place]. CT head- showed left occipital infarct vs artifact, no last known well time available, not a candidate for tPA. Patient unable to provide any information. labs s/o hyperglycemia BS >400s, Lactate elevated. UA - neg d-dimer elevated. CT PE - neg for PE, CT abd pelvis - colitis, Sacral decubitus ulcer, rt gluteal edema. He is being admitted for further care. Pt was found to have hypothyroidism started on levothyroxin, also imaging suggestive liver cirrhosis, was found to have ammonia 87, therefore started on refaximin and lactulose for suspected hepatic encephalopathy.

## 2022-07-23 NOTE — DIETITIAN INITIAL EVALUATION ADULT - NSFNSPHYEXAMSKINFT_GEN_A_CORE
Pressure Injury 1: Right:,buttocks, Stage II  Pressure Injury 2: Left:,heel, Stage III  Pressure Injury 3: none, none  Pressure Injury 4: none, none  Pressure Injury 5: none, none  Pressure Injury 6: none, none  Pressure Injury 7: none, none  Pressure Injury 8: none, none  Pressure Injury 9: none, none  Pressure Injury 10: none, none  Pressure Injury 11: none, none, Pressure Injury 1: Right:,buttocks, Stage II  Pressure Injury 2: Left:,heel, Stage III  Pressure Injury 3: none, none  Pressure Injury 4: none, none  Pressure Injury 5: none, none  Pressure Injury 6: none, none  Pressure Injury 7: none, none  Pressure Injury 8: none, none  Pressure Injury 9: none, none  Pressure Injury 10: none, none  Pressure Injury 11: none, none, Pressure Injury 1: sacrum, Stage II  Pressure Injury 2: Left:, heel  Pressure Injury 3: none, none  Pressure Injury 4: none, none  Pressure Injury 5: none, none  Pressure Injury 6: none, none  Pressure Injury 7: none, none  Pressure Injury 8: none, none  Pressure Injury 9: none, none  Pressure Injury 10: none, none  Pressure Injury 11: none, none

## 2022-07-23 NOTE — PROGRESS NOTE ADULT - SUBJECTIVE AND OBJECTIVE BOX
Chief Complaint:  AMS    SUBJECTIVE / OVERNIGHT EVENTS:  No acute events reported overnight.  Pt is a poor historian but offers no acute complaints at this time.  Patient denies chest pain, SOB, abd pain, N/V, fever, chills, dysuria or any other complaints. All remainder ROS negative.       I&O's Summary        PHYSICAL EXAM:  Vital Signs Last 24 Hrs  T(C): 36.7 (23 Jul 2022 05:13), Max: 36.9 (22 Jul 2022 20:40)  T(F): 98 (23 Jul 2022 05:13), Max: 98.5 (22 Jul 2022 20:40)  HR: 91 (23 Jul 2022 05:13) (91 - 96)  BP: 125/80 (23 Jul 2022 05:13) (125/80 - 144/75)  BP(mean): --  RR: 18 (23 Jul 2022 05:13) (18 - 18)  SpO2: 99% (23 Jul 2022 05:13) (98% - 99%)    Parameters below as of 23 Jul 2022 05:13  Patient On (Oxygen Delivery Method): room air        GENERAL: pt examined bedside, laying comfortably in bed in NAD  HEENT: NC/AT, dry oral mucosa, clear conjunctiva, sclera nonicteric  RESPIRATORY: poor inspiratory effort, CTA b/l, no wheezing, rhonchi, rales  CARDIOVASCULAR: RRR, normal S1 and S2  ABDOMEN: soft, NT/ND, +BS, no rebound/guarding  EXTREMITIES: No cynaosis, no clubbing, no lower extremity edema, pulses 2+  PSYCH: affect flat but cooperative  NEUROLOGY: A+O x2, no focal neurologic deficits appreciated   SKIN: No rashes or no palpable lesions, poor turgur        LABS:                        12.0   5.65  )-----------( 145      ( 23 Jul 2022 06:04 )             37.2     07-23    136  |  107  |  32.9<H>  ----------------------------<  68<L>  4.2   |  19.0<L>  |  0.82    Ca    7.3<L>      23 Jul 2022 06:04  Phos  3.8     07-23  Mg     1.9     07-23    TPro  6.6  /  Alb  2.0<L>  /  TBili  1.1  /  DBili  x   /  AST  69<H>  /  ALT  40  /  AlkPhos  122<H>  07-22              Culture - Urine (collected 20 Jul 2022 14:15)  Source: Clean Catch Clean Catch (Midstream)  Final Report (22 Jul 2022 08:34):    >=3 organisms. Probable collection contamination.      CAPILLARY BLOOD GLUCOSE      POCT Blood Glucose.: 73 mg/dL (23 Jul 2022 09:06)  POCT Blood Glucose.: 113 mg/dL (22 Jul 2022 17:29)  POCT Blood Glucose.: 137 mg/dL (22 Jul 2022 12:42)        RADIOLOGY & ADDITIONAL TESTS:    < from: CT Abdomen and Pelvis w/ IV Cont (07.20.22 @ 13:59) >  IMPRESSION:    CHEST:  1. No central, main, lobar, or segmental pulmonary embolus. Subsegmental   pulmonary arterial tree limited evaluation due to respiratory motion.  2. Mildly dilated aortic root, 4.1 cm at sinuses of Valsalva. Coronary   artery calcification.      ABDOMEN/PELVIS:  1. Colitis. Follow to resolution.  2. Cirrhotic liver. Atrophic left hepatic lobe. Small ascites possibly   related to cirrhosis or colitis.  3. Obliquely oriented lucency of right superior pubic ramus is   indeterminate for nondisplaced fracture versus sequelae of streak   artifact. Correlation with clinical evaluation and pelvic MRI is   recommended.  4. Bilateral partially imaged ischial soft tissue densities with central   low attenuation measuring 7.3 cm on the left and 3.8 cm on the right.   Underlying complex collections associated with decubitus ulcers can be   considered. Right gluteal soft tissue edema and asymmetric muscle   expansion. Underlying hematoma cannot be excluded. Additional probable   sacral decubitus ulcer. Correlation with clinical evaluation and pelvic   MRI is recommended.    < end of copied text >      < from: CT Head No Cont (07.20.22 @ 13:44) >  IMPRESSION:    Motion limited study. Left occipital lobe lucency may represent artifact   versus infarct. No gross hemorrhage, mass effect or hydrocephalus.    < end of copied text >          MEDICATIONS  (STANDING):  aspirin  chewable 81 milliGRAM(s) Enteral Tube daily  atorvastatin 40 milliGRAM(s) Oral at bedtime  cefTRIAXone   IVPB 1000 milliGRAM(s) IV Intermittent every 24 hours  dextrose 5% + lactated ringers. 1000 milliLiter(s) (70 mL/Hr) IV Continuous <Continuous>  dextrose 5%. 1000 milliLiter(s) (100 mL/Hr) IV Continuous <Continuous>  dextrose 5%. 1000 milliLiter(s) (50 mL/Hr) IV Continuous <Continuous>  dextrose 50% Injectable 25 Gram(s) IV Push once  dextrose 50% Injectable 12.5 Gram(s) IV Push once  dextrose 50% Injectable 25 Gram(s) IV Push once  enoxaparin Injectable 40 milliGRAM(s) SubCutaneous every 24 hours  glucagon  Injectable 1 milliGRAM(s) IntraMuscular once  insulin lispro (ADMELOG) corrective regimen sliding scale   SubCutaneous three times a day before meals  lactulose Syrup 20 Gram(s) Oral every 6 hours  levothyroxine 25 MICROGram(s) Oral daily  rifAXIMin 550 milliGRAM(s) Oral two times a day  sodium chloride 0.9%. 1000 milliLiter(s) (75 mL/Hr) IV Continuous <Continuous>  thiamine IVPB 500 milliGRAM(s) IV Intermittent daily    MEDICATIONS  (PRN):  acetaminophen     Tablet .. 650 milliGRAM(s) Oral every 6 hours PRN Temp greater or equal to 38C (100.4F), Mild Pain (1 - 3)  dextrose Oral Gel 15 Gram(s) Oral once PRN Blood Glucose LESS THAN 70 milliGRAM(s)/deciliter  ondansetron Injectable 4 milliGRAM(s) IV Push every 6 hours PRN Nausea and/or Vomiting

## 2022-07-23 NOTE — DIETITIAN INITIAL EVALUATION ADULT - PERTINENT MEDS FT
MEDICATIONS  (STANDING):  aspirin  chewable 81 milliGRAM(s) Enteral Tube daily  atorvastatin 40 milliGRAM(s) Oral at bedtime  cefTRIAXone   IVPB 1000 milliGRAM(s) IV Intermittent every 24 hours  dextrose 5%. 1000 milliLiter(s) (100 mL/Hr) IV Continuous <Continuous>  dextrose 5%. 1000 milliLiter(s) (50 mL/Hr) IV Continuous <Continuous>  dextrose 50% Injectable 25 Gram(s) IV Push once  dextrose 50% Injectable 12.5 Gram(s) IV Push once  dextrose 50% Injectable 25 Gram(s) IV Push once  enoxaparin Injectable 40 milliGRAM(s) SubCutaneous every 24 hours  glucagon  Injectable 1 milliGRAM(s) IntraMuscular once  insulin lispro (ADMELOG) corrective regimen sliding scale   SubCutaneous three times a day before meals  lactulose Syrup 20 Gram(s) Oral every 6 hours  levothyroxine 25 MICROGram(s) Oral daily  rifAXIMin 550 milliGRAM(s) Oral two times a day  sodium chloride 0.9%. 1000 milliLiter(s) (75 mL/Hr) IV Continuous <Continuous>  thiamine IVPB 500 milliGRAM(s) IV Intermittent daily    MEDICATIONS  (PRN):  acetaminophen     Tablet .. 650 milliGRAM(s) Oral every 6 hours PRN Temp greater or equal to 38C (100.4F), Mild Pain (1 - 3)  dextrose Oral Gel 15 Gram(s) Oral once PRN Blood Glucose LESS THAN 70 milliGRAM(s)/deciliter  ondansetron Injectable 4 milliGRAM(s) IV Push every 6 hours PRN Nausea and/or Vomiting

## 2022-07-23 NOTE — PROGRESS NOTE ADULT - ASSESSMENT
76 y/o M BIBA from home for AMS w/ associated inability to talk x 1 week.  Pt had hx of UTI prior to current admission.  CTH on admission showed left occipital infarct vs artifact, no last known well time available, not a candidate for tPA.  w/u on admisison also significant for hyperammonemia and BG >400s.  Lactic acidosis likely 2/2 cirrhosis.  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  Pt was admitted for acute metabolic encephalopathy, likely multifactorial  Pts remains acute given his persistent encephalopathy.        Acute metabolic encephalopathy, likely multifactorial in the setting of hypothyroid, cirrhosis, possible UTI, decub ulcers (POA), colitis, covid and ?subacute CVA  - CTH reporting possible L-occipital lobe lucency which may represent artifact vs infarct; repeat CTH pending for today    - Hyperammonemia in the setting of cirrhosis and AMS could be 2/2 hepatic encephalopathy and therefore started on Rifaxamin on admission   - EEG ongoing to r/o seizure activity   - Maintain on synthroid for hypothyroid and repeat TFTs to assess improvement    - Low suspicion for UTI given UA w/out pyuria and Ucx reporting >3 organisms likely contaminant   - Has sacral decub ulcers, no active drainage but unclear if OM deep to wounds however pt afebrile, no WBC; given persistent AMS will order CRP/ESR and procal and pending results will consider ID consult   - Colitis noted on CT a/p on admission could be 2/2 COVID and can exacerbate dehydration from poor po intake    - AMS persists       Hx of CVA vs subacute CVA  - CTH reviewed and noted above   - MRI on hold for now given unable to locate family to assess for presence of metal body parts   - Per neuro recs will repeat CTH to assess for interval changes; further CVA w/u pending relts of repeat CTH  - Maintain on asa and statin therapy  - Maintain on telemetry   - Neurology consulted and recs noted      Hypothyroidism  - TFTs reviewed   - On syntrhoid and titrate per endo recs   - Will order TPO ab and TG ab to r/o Hashimoto's as per endo recs  - Will need repeat TFTs to reassess if synthroid titration needed  - Endocrinology consulted and recs noted      Acute Hepatic encephalopathy   - Persistent encephalopathy   - Liver cirrhosis on CT a/p and hyperammonemia noted on admisison   - Has hx of heavy EtOH use but as per pt quit >10yrs ago however can not locate family to cooroborate   - Maintain on rifaximin and lactulose   - Fall precautions       ?UTI  - Urine cx reporting >3 organisms, likely contaminant and Bcx (-) x2  - Completed 3 day course of empiric Abx (Rocephin) on 7/23  - No leukocytosis and remains afebrile    - Monitor CBC and temperature      Normocytic anemia / Thrombocytopenia   - H/H and platelets stable   - Low plts likely 2/2 cirrhosis   - Hemodynamically stable w/ no active bleeding noted  - Monitor CBC and transfuse if Hb<7        DM II  - Hyperglycemic on admission and hypolgycemic while hospitalized   - A1c 7.7  - ISS and hypoglycemic protocol in place       Colitis   - Incidental finding on CT however could be related to COVID   - Pt is a poor historian and unable to express himself well   - Can contribute to dehydration, exacerbating encephalopathy   - No diarrhea reported and benign clinical exam       Sacral decubitus ulcers and rt heel decubitus ulcer both POA  - No drainage noted   - No leukocytosis and remains afebrile    - In light of persistent encephalopathy will order CRP and procal  - s/p 3 day course of IV rocephin   - c/w wound care   - Monitor CBC and temperature       Dilated AR   - 4.1cm at sinus of valsalva   - Incidental finding on CT chest   - Will consult CTsx for recs        VTE ppx: Lovenox     Code status: full code and unable to locate family     Dispo: Pt remains acute requiring hospitalization.  Pt is persistently encephalopathic.

## 2022-07-24 DIAGNOSIS — Q25.49 OTHER CONGENITAL MALFORMATIONS OF AORTA: ICD-10-CM

## 2022-07-24 LAB
ALBUMIN SERPL ELPH-MCNC: 2 G/DL — LOW (ref 3.3–5.2)
ALP SERPL-CCNC: 135 U/L — HIGH (ref 40–120)
ALT FLD-CCNC: 45 U/L — HIGH
ANION GAP SERPL CALC-SCNC: 10 MMOL/L — SIGNIFICANT CHANGE UP (ref 5–17)
AST SERPL-CCNC: 81 U/L — HIGH
BILIRUB SERPL-MCNC: 1.1 MG/DL — SIGNIFICANT CHANGE UP (ref 0.4–2)
BUN SERPL-MCNC: 29.5 MG/DL — HIGH (ref 8–20)
CALCIUM SERPL-MCNC: 7.5 MG/DL — LOW (ref 8.6–10.2)
CHLORIDE SERPL-SCNC: 110 MMOL/L — HIGH (ref 98–107)
CO2 SERPL-SCNC: 19 MMOL/L — LOW (ref 22–29)
CREAT SERPL-MCNC: 1.02 MG/DL — SIGNIFICANT CHANGE UP (ref 0.5–1.3)
CRP SERPL-MCNC: 62 MG/L — HIGH
EGFR: 75 ML/MIN/1.73M2 — SIGNIFICANT CHANGE UP
GLUCOSE BLDC GLUCOMTR-MCNC: 106 MG/DL — HIGH (ref 70–99)
GLUCOSE BLDC GLUCOMTR-MCNC: 110 MG/DL — HIGH (ref 70–99)
GLUCOSE BLDC GLUCOMTR-MCNC: 90 MG/DL — SIGNIFICANT CHANGE UP (ref 70–99)
GLUCOSE BLDC GLUCOMTR-MCNC: 94 MG/DL — SIGNIFICANT CHANGE UP (ref 70–99)
GLUCOSE SERPL-MCNC: 98 MG/DL — SIGNIFICANT CHANGE UP (ref 70–99)
HCT VFR BLD CALC: 37.2 % — LOW (ref 39–50)
HGB BLD-MCNC: 11.9 G/DL — LOW (ref 13–17)
MAGNESIUM SERPL-MCNC: 1.9 MG/DL — SIGNIFICANT CHANGE UP (ref 1.6–2.6)
MCHC RBC-ENTMCNC: 30.9 PG — SIGNIFICANT CHANGE UP (ref 27–34)
MCHC RBC-ENTMCNC: 32 GM/DL — SIGNIFICANT CHANGE UP (ref 32–36)
MCV RBC AUTO: 96.6 FL — SIGNIFICANT CHANGE UP (ref 80–100)
PLATELET # BLD AUTO: 191 K/UL — SIGNIFICANT CHANGE UP (ref 150–400)
POTASSIUM SERPL-MCNC: 4.1 MMOL/L — SIGNIFICANT CHANGE UP (ref 3.5–5.3)
POTASSIUM SERPL-SCNC: 4.1 MMOL/L — SIGNIFICANT CHANGE UP (ref 3.5–5.3)
PROCALCITONIN SERPL-MCNC: 0.61 NG/ML — HIGH (ref 0.02–0.1)
PROT SERPL-MCNC: 7.2 G/DL — SIGNIFICANT CHANGE UP (ref 6.6–8.7)
RBC # BLD: 3.85 M/UL — LOW (ref 4.2–5.8)
RBC # FLD: 15.6 % — HIGH (ref 10.3–14.5)
SODIUM SERPL-SCNC: 138 MMOL/L — SIGNIFICANT CHANGE UP (ref 135–145)
WBC # BLD: 5.97 K/UL — SIGNIFICANT CHANGE UP (ref 3.8–10.5)
WBC # FLD AUTO: 5.97 K/UL — SIGNIFICANT CHANGE UP (ref 3.8–10.5)

## 2022-07-24 PROCEDURE — 99233 SBSQ HOSP IP/OBS HIGH 50: CPT

## 2022-07-24 PROCEDURE — 99232 SBSQ HOSP IP/OBS MODERATE 35: CPT

## 2022-07-24 PROCEDURE — 99221 1ST HOSP IP/OBS SF/LOW 40: CPT | Mod: FS

## 2022-07-24 RX ADMIN — ENOXAPARIN SODIUM 40 MILLIGRAM(S): 100 INJECTION SUBCUTANEOUS at 06:16

## 2022-07-24 RX ADMIN — LACTULOSE 20 GRAM(S): 10 SOLUTION ORAL at 00:42

## 2022-07-24 RX ADMIN — SODIUM CHLORIDE 70 MILLILITER(S): 9 INJECTION, SOLUTION INTRAVENOUS at 08:48

## 2022-07-24 RX ADMIN — SODIUM CHLORIDE 70 MILLILITER(S): 9 INJECTION, SOLUTION INTRAVENOUS at 21:07

## 2022-07-24 RX ADMIN — Medication 105 MILLIGRAM(S): at 19:14

## 2022-07-24 NOTE — PROGRESS NOTE ADULT - SUBJECTIVE AND OBJECTIVE BOX
[facility]                                     Neurology at [place]                                 Nereida Kim, & Braulio                                  [address]                                        [place], [place], [address]                                             [phone]      CC/HPI:77yr old unknown male brought to ER by EMS from home with c/o altered mental state, inability to talk since last 1 week and h/o recent UTI. No other information was available, pt was admitted as Critical [place]. CT head- showed left occipital infarct vs artifact, no last known well time available, not a candidate for tPA. Patient unable to provide any information. labs s/o hyperglycemia BS >400s, Lactate elevated. UA - neg d-dimer elevated. CT PE - neg for PE, CT abd pelvis - colitis, Sacral decubitus ulcer, rt gluteal edema. He is being admitted for further care.  (20 Jul 2022 16:50) (from [facility])    Interval HPI: no new events, awaiting head CT/MRI    Review of systems (neurology): Denies headache or dizziness. Denies weakness/numbness.  Denies speech/language deficits. Denies diplopia/blurred vision.  (+)confusion    MEDICATIONS  (STANDING):  aspirin  chewable 81 milliGRAM(s) Enteral Tube daily  atorvastatin 40 milliGRAM(s) Oral at bedtime  dextrose 5% + lactated ringers. 1000 milliLiter(s) (70 mL/Hr) IV Continuous <Continuous>  dextrose 5%. 1000 milliLiter(s) (50 mL/Hr) IV Continuous <Continuous>  dextrose 5%. 1000 milliLiter(s) (100 mL/Hr) IV Continuous <Continuous>  dextrose 50% Injectable 25 Gram(s) IV Push once  dextrose 50% Injectable 12.5 Gram(s) IV Push once  dextrose 50% Injectable 25 Gram(s) IV Push once  enoxaparin Injectable 40 milliGRAM(s) SubCutaneous every 24 hours  glucagon  Injectable 1 milliGRAM(s) IntraMuscular once  insulin lispro (ADMELOG) corrective regimen sliding scale   SubCutaneous three times a day before meals  lactulose Syrup 20 Gram(s) Oral every 6 hours  levothyroxine 25 MICROGram(s) Oral daily  rifAXIMin 550 milliGRAM(s) Oral two times a day  sodium chloride 0.9%. 1000 milliLiter(s) (75 mL/Hr) IV Continuous <Continuous>  thiamine IVPB 500 milliGRAM(s) IV Intermittent daily    MEDICATIONS  (PRN):  acetaminophen     Tablet .. 650 milliGRAM(s) Oral every 6 hours PRN Temp greater or equal to 38C (100.4F), Mild Pain (1 - 3)  dextrose Oral Gel 15 Gram(s) Oral once PRN Blood Glucose LESS THAN 70 milliGRAM(s)/deciliter  ondansetron Injectable 4 milliGRAM(s) IV Push every 6 hours PRN Nausea and/or Vomiting      Vital Signs Last 24 Hrs  T(C): 36.4 (24 Jul 2022 08:54), Max: 36.8 (23 Jul 2022 18:13)  T(F): 97.6 (24 Jul 2022 08:54), Max: 98.2 (23 Jul 2022 18:13)  HR: 106 (24 Jul 2022 08:54) (102 - 108)  BP: 116/67 (24 Jul 2022 08:54) (116/67 - 158/99)  BP(mean): --  RR: 18 (24 Jul 2022 08:54) (18 - 18)  SpO2: 97% (24 Jul 2022 08:54) (97% - 99%)    Parameters below as of 24 Jul 2022 08:54  Patient On (Oxygen Delivery Method): room air        Detailed Neurologic Exam:    Mental status: The patient is awake and alert and has normal attention span.  The patient is oriented to self only. The patient is able to name objects, follow commands, repeat sentences.    Cranial nerves: . Pupils equal and react symmetrically to light. There is no visual field deficit to confrontation. Extraocular motion is full with no nystagmus. There is no ptosis. Facial sensation is intact. Facial musculature is symmetric. Palate elevates symmetrically.  Tongue is midline.    Motor: There is normal bulk and tone.  There is no tremor.  Strength is 5/5 in the right arm and leg.   Strength is 5/5 in the left arm and leg.    Sensation: Intact to light touch and pin in 4 extremities    Reflexes: 1+ throughout and plantar responses are flexor.    Cerebellar: There is no dysmetria on finger to nose testing.    Gait : deferred    Labs:                          11.9   5.97  )-----------( 191      ( 24 Jul 2022 06:48 )             37.2     07-24    138  |  110<H>  |  29.5<H>  ----------------------------<  98  4.1   |  19.0<L>  |  1.02    Ca    7.5<L>      24 Jul 2022 06:48  Phos  3.8     07-23  Mg     1.9     07-24    TPro  7.2  /  Alb  2.0<L>  /  TBili  1.1  /  DBili  x   /  AST  81<H>  /  ALT  45<H>  /  AlkPhos  135<H>  07-24    LIVER FUNCTIONS - ( 24 Jul 2022 06:48 )  Alb: 2.0 g/dL / Pro: 7.2 g/dL / ALK PHOS: 135 U/L / ALT: 45 U/L / AST: 81 U/L / GGT: x                 EEG Summary / Classification: 7/22-23  Abnormal EEG in the awake, drowsy and asleep states.  - Mild to Moderate generalized slowing.    EEG Impression / Clinical Correlate:  Abnormal EEG study.  Mild to Moderate nonspecific diffuse or multifocal cerebral dysfunction.   No epileptiform pattern or seizure seen.

## 2022-07-24 NOTE — CONSULT NOTE ADULT - SUBJECTIVE AND OBJECTIVE BOX
Attending: Dorita   Reason for consult/History of Present Illness:  76y/o M with PMHx of back surgery (LS in 2016, Thoracic spine in 2018-requiring wheel chair assisted device) alcohol abuse stopped using alcohol 15 years ago, HTN, BPH, hypothyroidism who was BIBEM from home with altered mental state and inability to talk since last 1 week (h/o recent UTI). No other information was available, pt was admitted as Critical [place]. CT head- showed left occipital infarct vs artifact, no last known well time available, not a candidate for tPA. Admission w/o significant for +COVID, hyperammonemia, hyperglycemia, lactatemia, elevated d-dimer (CT PE - neg for PE), CT abd pelvis +colitis, cirrhotic liver, sacral decubitus ulcer, rt gluteal edema.   CT Surgery consulted for evaluation of incidental dilated sinus of valsalva noted on CTA of chest.     REVIEW OF SYSTEMS:  ROS negative x 10 systems except as noted above      HPI:  77yr old unknown male brought to ER by EMS from home with c/o altered mental state, inability to talk since last 1 week and h/o recent UTI. No other information was available, pt was admitted as Critical [place]. CT head- showed left occipital infarct vs artifact, no last known well time available, not a candidate for tPA. Patient unable to provide any information. labs s/o hyperglycemia BS >400s, Lactate elevated. UA - neg d-dimer elevated. CT PE - neg for PE, CT abd pelvis - colitis, Sacral decubitus ulcer, rt gluteal edema. He is being admitted for further care.  (20 Jul 2022 16:50)      SOCIAL HISTORY:  Denies toxic habits  Occupation: Retired  Lives with: Wife  Assist device use: Wheel Chair    Relevant Family History  FAMILY HISTORY:      LABS:                        11.9   5.97  )-----------( 191      ( 24 Jul 2022 06:48 )             37.2     07-24    138  |  110<H>  |  29.5<H>  ----------------------------<  98  4.1   |  19.0<L>  |  1.02    Ca    7.5<L>      24 Jul 2022 06:48  Phos  3.8     07-23  Mg     1.9     07-24    TPro  7.2  /  Alb  2.0<L>  /  TBili  1.1  /  DBili  x   /  AST  81<H>  /  ALT  45<H>  /  AlkPhos  135<H>  07-24    c< from: CT Angio Chest PE Protocol w/ IV Cont (07.20.22 @ 13:58) >  IMPRESSION:    CHEST:  1. No central, main, lobar, or segmental pulmonary embolus. Subsegmental   pulmonary arterial tree limited evaluation due to respiratory motion.  2. Mildly dilated aortic root, 4.1 cm at sinuses of Valsalva. Coronary   artery calcification.    ABDOMEN/PELVIS:  1. Colitis. Follow to resolution.  2. Cirrhotic liver. Atrophic left hepatic lobe.Small ascites possibly   related to cirrhosis or colitis.  3. Obliquely oriented lucency of right superior pubic ramus is   indeterminate for nondisplaced fracture versus sequelae of streak   artifact. Correlation with clinical evaluation and pelvic MRI is   recommended.  4. Bilateral partially imaged ischial soft tissue densities with central   low attenuation measuring 7.3 cm on the left and 3.8 cm on the right.   Underlying complex collections associated with decubitus ulcers can be   considered. Right gluteal soft tissue edema and asymmetric muscle   expansion. Underlying hematoma cannot be excluded. Additional probable   sacral decubitus ulcer. Correlation with clinical evaluation and pelvic   MRI is recommended.    --- End of Report ---        ABDOULAYE STEWART M.D., ATTENDING RADIOLOGIST  This document has been electronically signed. Jul 20 2022  3:08PM    < end of copied text >        MEDICATIONS  (STANDING):  aspirin  chewable 81 milliGRAM(s) Enteral Tube daily  atorvastatin 40 milliGRAM(s) Oral at bedtime  dextrose 5% + lactated ringers. 1000 milliLiter(s) (70 mL/Hr) IV Continuous <Continuous>  dextrose 5%. 1000 milliLiter(s) (100 mL/Hr) IV Continuous <Continuous>  dextrose 5%. 1000 milliLiter(s) (50 mL/Hr) IV Continuous <Continuous>  dextrose 50% Injectable 25 Gram(s) IV Push once  dextrose 50% Injectable 12.5 Gram(s) IV Push once  dextrose 50% Injectable 25 Gram(s) IV Push once  enoxaparin Injectable 40 milliGRAM(s) SubCutaneous every 24 hours  glucagon  Injectable 1 milliGRAM(s) IntraMuscular once  insulin lispro (ADMELOG) corrective regimen sliding scale   SubCutaneous three times a day before meals  lactulose Syrup 20 Gram(s) Oral every 6 hours  levothyroxine 25 MICROGram(s) Oral daily  rifAXIMin 550 milliGRAM(s) Oral two times a day  sodium chloride 0.9%. 1000 milliLiter(s) (75 mL/Hr) IV Continuous <Continuous>  thiamine IVPB 500 milliGRAM(s) IV Intermittent daily    MEDICATIONS  (PRN):  acetaminophen     Tablet .. 650 milliGRAM(s) Oral every 6 hours PRN Temp greater or equal to 38C (100.4F), Mild Pain (1 - 3)  dextrose Oral Gel 15 Gram(s) Oral once PRN Blood Glucose LESS THAN 70 milliGRAM(s)/deciliter  ondansetron Injectable 4 milliGRAM(s) IV Push every 6 hours PRN Nausea and/or Vomiting    Antiplatelet therapy:                              Allergies: oxycodone ([place])        Vital Signs Last 24 Hrs  T(C): 36.4 (24 Jul 2022 17:24), Max: 36.8 (23 Jul 2022 18:13)  T(F): 97.5 (24 Jul 2022 17:24), Max: 98.2 (23 Jul 2022 18:13)  HR: 103 (24 Jul 2022 17:24) (102 - 108)  BP: 150/81 (24 Jul 2022 17:24) (116/67 - 158/99)  BP(mean): --  RR: 18 (24 Jul 2022 17:24) (18 - 18)  SpO2: 98% (24 Jul 2022 17:24) (97% - 99%)    Parameters below as of 24 Jul 2022 17:24  Patient On (Oxygen Delivery Method): room air    Physical Exam:   Constitutional: NAD  Neck: supple, trachea midline.  No JVD  Respiratory: Breath Sounds equal & clear bilaterally to auscultation, no accessory muscle use noted. No wheezing, rales, or rhonchi noted b/l  Cardiovascular: Regular rate, regular rhythm, normal S1, S2; no murmurs or rub  Gastrointestinal: +PEG. Soft, non-tender, non distended, normal bowel sounds  Extremities: ABDUL x 4, no peripheral edema, no cyanosis, no clubbing   Vascular: Equal and normal pulses: 2+ peripheral pulses throughout  Neurological: A+O x 2 (Person, Place)  Skin: warm, dry, well perfused, no rashes

## 2022-07-24 NOTE — PROGRESS NOTE ADULT - ASSESSMENT
76 y/o M BIBA from home for AMS w/ associated inability to talk x 1 week.  Pt had hx of UTI prior to current admission.  CTH on admission showed left occipital infarct vs artifact, no last known well time available, not a candidate for tPA.  w/u on admisison also significant for hyperammonemia and BG >400s.  Lactic acidosis likely 2/2 cirrhosis.  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  Pt was admitted for acute metabolic encephalopathy, likely multifactorial  Pts remains acute given his persistent encephalopathy.        Acute metabolic encephalopathy, likely multifactorial in the setting of hypothyroid, cirrhosis, possible UTI, decub ulcers (POA), colitis, covid and ?subacute CVA  - AMS persists  - CTH reporting possible L-occipital lobe lucency which may represent artifact vs infarct; repeat CTH still pending     - Hyperammonemia in the setting of cirrhosis and AMS could be 2/2 hepatic encephalopathy and therefore started on Rifaxamin on admission   - s/p cvEEG and epileptiform pattern or seizures noted  - Maintain on synthroid for hypothyroid and repeat TFTs to assess improvement    - Low suspicion for UTI given UA w/out pyuria and Ucx reporting >3 organisms likely contaminant   - Has sacral decub ulcers, no active drainage but unclear if OM deep to wounds however CRP 62 and procal 0.61, will consult ID   - Colitis noted on CT a/p on admission could be 2/2 COVID and can exacerbate dehydration from poor po intake    - Maintain on fall precautions       Hx of CVA vs subacute CVA  - CTH reviewed and noted above   - MRI on hold for now given unable to locate family to assess for presence of metal body parts   - Per neuro recs will repeat CTH to assess for interval changes; further CVA w/u pending relts of repeat CTH  - Maintain on asa and statin therapy  - Maintain on telemetry   - Neurology consulted and recs noted      Hypothyroidism  - TFTs reviewed   - On syntrhoid and titrate per endo recs   - TPO ab and TG ab pending to r/o Hashimoto's as per endo recs  - Will need repeat TFTs to reassess if synthroid titration needed  - Endocrinology consulted and recs noted      Acute Hepatic encephalopathy   - Persistent encephalopathy   - Liver cirrhosis on CT a/p and hyperammonemia noted on admission   - Has hx of heavy EtOH use but as per pt quit >10yrs ago however can not locate family to cooroborate   - Maintain on rifaximin and lactulose   - Fall precautions       Transaminitis  - Likely related to cirrhosis   - Pt asymptomatic and benign abd exam   - Tbili remains WNL  - Will order hepatitis panel   - Monitor LFTs, if continue to trend up or symptomatic will order RUQ US      ?UTI  - Urine cx reporting >3 organisms, likely contaminant and Bcx (-) x2  - Completed 3 day course of empiric Abx (Rocephin) on 7/23  - No leukocytosis and remains afebrile    - Monitor CBC and temperature      Normocytic anemia / Thrombocytopenia   - H/H and platelets stable   - Low plts likely 2/2 cirrhosis   - Hemodynamically stable w/ no active bleeding noted  - Monitor CBC and transfuse if Hb<7        DM II  - Hyperglycemic on admission and hypolgycemic while hospitalized   - A1c 7.7  - ISS and hypoglycemic protocol in place       Colitis   - Incidental finding on CT however could be related to COVID   - Pt is a poor historian and unable to express himself well   - Can contribute to dehydration, exacerbating encephalopathy   - No diarrhea reported and benign clinical exam       Sacral decubitus ulcers and rt heel decubitus ulcer both POA  - No drainage noted   - No leukocytosis and remains afebrile    - In light of persistent encephalopathy will order CRP and procal  - s/p 3 day course of IV rocephin   - c/w wound care   - Monitor CBC and temperature       Dilated AR   - 4.1cm at sinus of valsalva   - Incidental finding on CT chest   - Will consult CTsx for recs        VTE ppx: Lovenox     Code status: full code and unable to locate family     Dispo: Pt remains acute requiring hospitalization.  Pt is persistently encephalopathic.

## 2022-07-24 NOTE — PROGRESS NOTE ADULT - ASSESSMENT
The patient is a 77y Male who is followed by neurology because of possible right occipital stroke    Encephalopathy  likely toxic metabolic  COVID (+)  improving  cvEEG negative for seizure    Stroke  right occipital stroke vs artifact  await repeat imaging    will follow with you    Andrey Padron MD PhD   [id number]

## 2022-07-24 NOTE — CONSULT NOTE ADULT - PROBLEM SELECTOR RECOMMENDATION 9
7/20: CTA Chest-Mildly dilated aortic root, 4.1 cm at sinuses of Valsalva.  -Imaging reviewed and discuss with surgical attending, no need for acute intervention at this time.  -Recommend following up as output with Cardiothoracic Service for serial imaging.   -Rec cardiology consult for HR and BP mgmt  Cont care per primary team, will cont to follow.

## 2022-07-24 NOTE — PROGRESS NOTE ADULT - SUBJECTIVE AND OBJECTIVE BOX
Chief Complaint:  AMS    SUBJECTIVE / OVERNIGHT EVENTS:  No acute events reported overnight.  Pt is a poor historian but offers no acute complaints at this time.  Patient denies chest pain, SOB, abd pain, N/V, fever, chills, dysuria or any other complaints. All remainder ROS negative.       I&O's Summary        PHYSICAL EXAM:  Vital Signs Last 24 Hrs  T(C): 36.4 (24 Jul 2022 08:54), Max: 36.8 (23 Jul 2022 18:13)  T(F): 97.6 (24 Jul 2022 08:54), Max: 98.2 (23 Jul 2022 18:13)  HR: 106 (24 Jul 2022 08:54) (102 - 108)  BP: 116/67 (24 Jul 2022 08:54) (116/67 - 158/99)  BP(mean): --  RR: 18 (24 Jul 2022 08:54) (18 - 18)  SpO2: 97% (24 Jul 2022 08:54) (97% - 99%)    Parameters below as of 24 Jul 2022 08:54  Patient On (Oxygen Delivery Method): room air        GENERAL: pt examined bedside, laying comfortably in bed in NAD  HEENT: NC/AT, dry oral mucosa, clear conjunctiva, sclera nonicteric  RESPIRATORY: poor inspiratory effort, CTA b/l, no wheezing, rhonchi, rales  CARDIOVASCULAR: RRR, normal S1 and S2  ABDOMEN: soft, NT/ND, +BS, no rebound/guarding  EXTREMITIES: No cynaosis, no clubbing, no lower extremity edema, pulses 2+  PSYCH: affect flat but cooperative  NEUROLOGY: A+O x1-2, no focal neurologic deficits appreciated   SKIN: No rashes or no palpable lesions, poor turgur        LABS:                                          11.9   5.97  )-----------( 191      ( 24 Jul 2022 06:48 )             37.2       07-24    138  |  110<H>  |  29.5<H>  ----------------------------<  98  4.1   |  19.0<L>  |  1.02    Ca    7.5<L>      24 Jul 2022 06:48  Phos  3.8     07-23  Mg     1.9     07-24    TPro  7.2  /  Alb  2.0<L>  /  TBili  1.1  /  DBili  x   /  AST  81<H>  /  ALT  45<H>  /  AlkPhos  135<H>  07-24      Culture - Urine (collected 20 Jul 2022 14:15)  Source: Clean Catch Clean Catch (Midstream)  Final Report (22 Jul 2022 08:34):    >=3 organisms. Probable collection contamination.      CAPILLARY BLOOD GLUCOSE      POCT Blood Glucose.: 73 mg/dL (23 Jul 2022 09:06)  POCT Blood Glucose.: 113 mg/dL (22 Jul 2022 17:29)  POCT Blood Glucose.: 137 mg/dL (22 Jul 2022 12:42)        RADIOLOGY & ADDITIONAL TESTS:    < from: CT Abdomen and Pelvis w/ IV Cont (07.20.22 @ 13:59) >  IMPRESSION:    CHEST:  1. No central, main, lobar, or segmental pulmonary embolus. Subsegmental   pulmonary arterial tree limited evaluation due to respiratory motion.  2. Mildly dilated aortic root, 4.1 cm at sinuses of Valsalva. Coronary   artery calcification.      ABDOMEN/PELVIS:  1. Colitis. Follow to resolution.  2. Cirrhotic liver. Atrophic left hepatic lobe. Small ascites possibly   related to cirrhosis or colitis.  3. Obliquely oriented lucency of right superior pubic ramus is   indeterminate for nondisplaced fracture versus sequelae of streak   artifact. Correlation with clinical evaluation and pelvic MRI is   recommended.  4. Bilateral partially imaged ischial soft tissue densities with central   low attenuation measuring 7.3 cm on the left and 3.8 cm on the right.   Underlying complex collections associated with decubitus ulcers can be   considered. Right gluteal soft tissue edema and asymmetric muscle   expansion. Underlying hematoma cannot be excluded. Additional probable   sacral decubitus ulcer. Correlation with clinical evaluation and pelvic   MRI is recommended.    < end of copied text >      < from: CT Head No Cont (07.20.22 @ 13:44) >  IMPRESSION:    Motion limited study. Left occipital lobe lucency may represent artifact   versus infarct. No gross hemorrhage, mass effect or hydrocephalus.    < end of copied text >          MEDICATIONS  (STANDING):  aspirin  chewable 81 milliGRAM(s) Enteral Tube daily  atorvastatin 40 milliGRAM(s) Oral at bedtime  cefTRIAXone   IVPB 1000 milliGRAM(s) IV Intermittent every 24 hours  dextrose 5% + lactated ringers. 1000 milliLiter(s) (70 mL/Hr) IV Continuous <Continuous>  dextrose 5%. 1000 milliLiter(s) (100 mL/Hr) IV Continuous <Continuous>  dextrose 5%. 1000 milliLiter(s) (50 mL/Hr) IV Continuous <Continuous>  dextrose 50% Injectable 25 Gram(s) IV Push once  dextrose 50% Injectable 12.5 Gram(s) IV Push once  dextrose 50% Injectable 25 Gram(s) IV Push once  enoxaparin Injectable 40 milliGRAM(s) SubCutaneous every 24 hours  glucagon  Injectable 1 milliGRAM(s) IntraMuscular once  insulin lispro (ADMELOG) corrective regimen sliding scale   SubCutaneous three times a day before meals  lactulose Syrup 20 Gram(s) Oral every 6 hours  levothyroxine 25 MICROGram(s) Oral daily  rifAXIMin 550 milliGRAM(s) Oral two times a day  sodium chloride 0.9%. 1000 milliLiter(s) (75 mL/Hr) IV Continuous <Continuous>  thiamine IVPB 500 milliGRAM(s) IV Intermittent daily    MEDICATIONS  (PRN):  acetaminophen     Tablet .. 650 milliGRAM(s) Oral every 6 hours PRN Temp greater or equal to 38C (100.4F), Mild Pain (1 - 3)  dextrose Oral Gel 15 Gram(s) Oral once PRN Blood Glucose LESS THAN 70 milliGRAM(s)/deciliter  ondansetron Injectable 4 milliGRAM(s) IV Push every 6 hours PRN Nausea and/or Vomiting

## 2022-07-24 NOTE — CONSULT NOTE ADULT - ASSESSMENT
78y/o M with PMHx of back surgery (LS in 2016, Thoracic spine in 2018-requiring wheel chair assisted device) alcohol abuse stopped using alcohol 15 years ago, HTN, BPH, hypothyroidism who was BIBEM from home with altered mental state and inability to talk since last 1 week (h/o recent UTI). No other information was available, pt was admitted as Critical [place]. CT head- showed left occipital infarct vs artifact, no last known well time available, not a candidate for tPA. Admission w/o significant for +COVID, hyperammonemia, hyperglycemia, lactatemia, elevated d-dimer (CT PE - neg for PE), CT abd pelvis +colitis, cirrhotic liver, sacral decubitus ulcer, rt gluteal edema.     CT Surgery consulted for evaluation of incidental dilated sinus of valsalva noted on CTA of chest.

## 2022-07-24 NOTE — CHART NOTE - NSCHARTNOTEFT_GEN_A_CORE
Pt seen at bedside after code blue called for pt choking on AM dose lactulose. Pt did not lose a pulse, did not stop breathing. Code blue canceled.    Denies chest pain, SOB, n/v/d/c, abdominal pain, HA, dizziness, blurry vision    Vital Signs:   T(C): 36.3 (07-24-22 @ 05:05), Max: 36.8 (07-23-22 @ 18:13)  T(F): 97.4 (07-24-22 @ 05:05), Max: 98.2 (07-23-22 @ 18:13)  HR: 108 (07-24-22 @ 05:05) (102 - 108)  BP: 138/84 (07-24-22 @ 05:05) (138/84 - 158/99)  RR: 18 (07-24-22 @ 05:05) (18 - 18)  SpO2: 99% (07-24-22 @ 05:05) (97% - 99%)    PHYSICAL EXAM:  GENERAL: NAD  HEAD:  Atraumatic, Normocephalic  EYES: EOMI, PERRL, conjunctiva and sclera clear  ENT: Moist mucous membranes  CHEST/LUNG: Unlabored. Decreased b/l. No rhales, rhonchi, wheezes  HEART: +S1, S2  NERVOUS SYSTEM:  Alert & Oriented X2, speech clear. No focal neurological deficits   SKIN: Warm, dry    Plan:  -SLP Bedside eval ordered  -Keep NPO until further recs made  -Accuchecks q6hr while NPO  -Aspiration precautions Pt seen at bedside after code blue called for pt choking on AM dose lactulose. Pt did not lose a pulse, did not stop breathing. Code blue canceled.    Denies chest pain, SOB, n/v/d/c, abdominal pain, HA, dizziness, blurry vision    Vital Signs:   T(C): 36.3 (07-24-22 @ 05:05), Max: 36.8 (07-23-22 @ 18:13)  T(F): 97.4 (07-24-22 @ 05:05), Max: 98.2 (07-23-22 @ 18:13)  HR: 108 (07-24-22 @ 05:05) (102 - 108)  BP: 138/84 (07-24-22 @ 05:05) (138/84 - 158/99)  RR: 18 (07-24-22 @ 05:05) (18 - 18)  SpO2: 99% (07-24-22 @ 05:05) (97% - 99%)    PHYSICAL EXAM:  GENERAL: NAD  HEAD:  Atraumatic, Normocephalic  EYES: EOMI, PERRL, conjunctiva and sclera clear  ENT: Moist mucous membranes  CHEST/LUNG: Unlabored. Decreased b/l. No rhales, rhonchi, wheezes  HEART: +S1, S2  NERVOUS SYSTEM:  Alert & Oriented X2, speech clear. No focal neurological deficits   SKIN: Warm, dry    Plan:  -SLP eval ordered  -Keep NPO until further recs made  -Accuchecks q6hr while NPO  -Aspiration precautions

## 2022-07-25 LAB
ALBUMIN SERPL ELPH-MCNC: 1.8 G/DL — LOW (ref 3.3–5.2)
ALP SERPL-CCNC: 123 U/L — HIGH (ref 40–120)
ALT FLD-CCNC: 40 U/L — SIGNIFICANT CHANGE UP
ANION GAP SERPL CALC-SCNC: 9 MMOL/L — SIGNIFICANT CHANGE UP (ref 5–17)
AST SERPL-CCNC: 78 U/L — HIGH
BASOPHILS # BLD AUTO: 0.02 K/UL — SIGNIFICANT CHANGE UP (ref 0–0.2)
BASOPHILS NFR BLD AUTO: 0.4 % — SIGNIFICANT CHANGE UP (ref 0–2)
BILIRUB SERPL-MCNC: 0.9 MG/DL — SIGNIFICANT CHANGE UP (ref 0.4–2)
BUN SERPL-MCNC: 23.4 MG/DL — HIGH (ref 8–20)
CALCIUM SERPL-MCNC: 7.3 MG/DL — LOW (ref 8.6–10.2)
CHLORIDE SERPL-SCNC: 109 MMOL/L — HIGH (ref 98–107)
CO2 SERPL-SCNC: 19 MMOL/L — LOW (ref 22–29)
CREAT SERPL-MCNC: 0.74 MG/DL — SIGNIFICANT CHANGE UP (ref 0.5–1.3)
CULTURE RESULTS: SIGNIFICANT CHANGE UP
CULTURE RESULTS: SIGNIFICANT CHANGE UP
EGFR: 92 ML/MIN/1.73M2 — SIGNIFICANT CHANGE UP
EOSINOPHIL # BLD AUTO: 0.38 K/UL — SIGNIFICANT CHANGE UP (ref 0–0.5)
EOSINOPHIL NFR BLD AUTO: 7.3 % — HIGH (ref 0–6)
GLUCOSE BLDC GLUCOMTR-MCNC: 108 MG/DL — HIGH (ref 70–99)
GLUCOSE BLDC GLUCOMTR-MCNC: 109 MG/DL — HIGH (ref 70–99)
GLUCOSE BLDC GLUCOMTR-MCNC: 117 MG/DL — HIGH (ref 70–99)
GLUCOSE BLDC GLUCOMTR-MCNC: 95 MG/DL — SIGNIFICANT CHANGE UP (ref 70–99)
GLUCOSE BLDC GLUCOMTR-MCNC: 96 MG/DL — SIGNIFICANT CHANGE UP (ref 70–99)
GLUCOSE SERPL-MCNC: 91 MG/DL — SIGNIFICANT CHANGE UP (ref 70–99)
HAV IGM SER-ACNC: SIGNIFICANT CHANGE UP
HBV CORE IGM SER-ACNC: SIGNIFICANT CHANGE UP
HBV SURFACE AG SER-ACNC: SIGNIFICANT CHANGE UP
HCT VFR BLD CALC: 33.7 % — LOW (ref 39–50)
HCV AB S/CO SERPL IA: 0.17 S/CO — SIGNIFICANT CHANGE UP (ref 0–0.99)
HCV AB SERPL-IMP: SIGNIFICANT CHANGE UP
HGB BLD-MCNC: 10.9 G/DL — LOW (ref 13–17)
IMM GRANULOCYTES NFR BLD AUTO: 0.4 % — SIGNIFICANT CHANGE UP (ref 0–1.5)
LYMPHOCYTES # BLD AUTO: 0.91 K/UL — LOW (ref 1–3.3)
LYMPHOCYTES # BLD AUTO: 17.5 % — SIGNIFICANT CHANGE UP (ref 13–44)
MAGNESIUM SERPL-MCNC: 1.7 MG/DL — SIGNIFICANT CHANGE UP (ref 1.6–2.6)
MCHC RBC-ENTMCNC: 30.9 PG — SIGNIFICANT CHANGE UP (ref 27–34)
MCHC RBC-ENTMCNC: 32.3 GM/DL — SIGNIFICANT CHANGE UP (ref 32–36)
MCV RBC AUTO: 95.5 FL — SIGNIFICANT CHANGE UP (ref 80–100)
MONOCYTES # BLD AUTO: 0.55 K/UL — SIGNIFICANT CHANGE UP (ref 0–0.9)
MONOCYTES NFR BLD AUTO: 10.6 % — SIGNIFICANT CHANGE UP (ref 2–14)
NEUTROPHILS # BLD AUTO: 3.32 K/UL — SIGNIFICANT CHANGE UP (ref 1.8–7.4)
NEUTROPHILS NFR BLD AUTO: 63.8 % — SIGNIFICANT CHANGE UP (ref 43–77)
PHOSPHATE SERPL-MCNC: 2.9 MG/DL — SIGNIFICANT CHANGE UP (ref 2.4–4.7)
PLATELET # BLD AUTO: 162 K/UL — SIGNIFICANT CHANGE UP (ref 150–400)
POTASSIUM SERPL-MCNC: 4 MMOL/L — SIGNIFICANT CHANGE UP (ref 3.5–5.3)
POTASSIUM SERPL-SCNC: 4 MMOL/L — SIGNIFICANT CHANGE UP (ref 3.5–5.3)
PROT SERPL-MCNC: 6.4 G/DL — LOW (ref 6.6–8.7)
RBC # BLD: 3.53 M/UL — LOW (ref 4.2–5.8)
RBC # FLD: 15.4 % — HIGH (ref 10.3–14.5)
SODIUM SERPL-SCNC: 137 MMOL/L — SIGNIFICANT CHANGE UP (ref 135–145)
SPECIMEN SOURCE: SIGNIFICANT CHANGE UP
SPECIMEN SOURCE: SIGNIFICANT CHANGE UP
THYROGLOB AB FLD IA-ACNC: <20 IU/ML — SIGNIFICANT CHANGE UP
THYROGLOB AB SERPL-ACNC: <20 IU/ML — SIGNIFICANT CHANGE UP
THYROGLOB SERPL-MCNC: 0.92 NG/ML — LOW (ref 1.6–59.9)
THYROPEROXIDASE AB SERPL-ACNC: 48.8 IU/ML — HIGH
WBC # BLD: 5.2 K/UL — SIGNIFICANT CHANGE UP (ref 3.8–10.5)
WBC # FLD AUTO: 5.2 K/UL — SIGNIFICANT CHANGE UP (ref 3.8–10.5)

## 2022-07-25 PROCEDURE — 70450 CT HEAD/BRAIN W/O DYE: CPT | Mod: 26

## 2022-07-25 PROCEDURE — 99233 SBSQ HOSP IP/OBS HIGH 50: CPT

## 2022-07-25 PROCEDURE — 74018 RADEX ABDOMEN 1 VIEW: CPT | Mod: 26

## 2022-07-25 PROCEDURE — 99231 SBSQ HOSP IP/OBS SF/LOW 25: CPT | Mod: FS

## 2022-07-25 PROCEDURE — 99223 1ST HOSP IP/OBS HIGH 75: CPT

## 2022-07-25 RX ORDER — MAGNESIUM SULFATE 500 MG/ML
1 VIAL (ML) INJECTION ONCE
Refills: 0 | Status: COMPLETED | OUTPATIENT
Start: 2022-07-25 | End: 2022-07-25

## 2022-07-25 RX ADMIN — Medication 105 MILLIGRAM(S): at 11:14

## 2022-07-25 RX ADMIN — Medication 100 GRAM(S): at 18:04

## 2022-07-25 RX ADMIN — LACTULOSE 20 GRAM(S): 10 SOLUTION ORAL at 18:57

## 2022-07-25 RX ADMIN — ENOXAPARIN SODIUM 40 MILLIGRAM(S): 100 INJECTION SUBCUTANEOUS at 05:32

## 2022-07-25 RX ADMIN — SODIUM CHLORIDE 70 MILLILITER(S): 9 INJECTION, SOLUTION INTRAVENOUS at 22:09

## 2022-07-25 RX ADMIN — SODIUM CHLORIDE 70 MILLILITER(S): 9 INJECTION, SOLUTION INTRAVENOUS at 11:14

## 2022-07-25 RX ADMIN — ATORVASTATIN CALCIUM 40 MILLIGRAM(S): 80 TABLET, FILM COATED ORAL at 22:10

## 2022-07-25 NOTE — PROGRESS NOTE ADULT - ASSESSMENT
76y/o M with PMHx of back surgery (LS in 2016, Thoracic spine in 2018-requiring wheel chair assisted device) alcohol abuse stopped using alcohol 15 years ago, HTN, BPH, hypothyroidism who was BIBEM from home with altered mental state and inability to talk since last 1 week (h/o recent UTI). No other information was available, pt was admitted as Critical [place]. CT head- showed left occipital infarct vs artifact, no last known well time available, not a candidate for tPA. Admission w/o significant for +COVID, hyperammonemia, hyperglycemia, lactatemia, elevated d-dimer (CT PE - neg for PE), CT abd pelvis +colitis, cirrhotic liver, sacral decubitus ulcer, rt gluteal edema.     CT Surgery consulted for evaluation of incidental dilated sinus of valsalva noted on CTA of chest.

## 2022-07-25 NOTE — PROGRESS NOTE ADULT - SUBJECTIVE AND OBJECTIVE BOX
Chief Complaint:  AMS    SUBJECTIVE / OVERNIGHT EVENTS:  No acute events reported overnight.  Pt is a poor historian but offers no acute complaints at this time.  Patient denies chest pain, SOB, abd pain, N/V, fever, chills, dysuria or any other complaints. All remainder ROS negative.       I&O's Summary        PHYSICAL EXAM:  Vital Signs Last 24 Hrs  T(C): 36.7 (25 Jul 2022 11:13), Max: 36.9 (24 Jul 2022 20:42)  T(F): 98.1 (25 Jul 2022 11:13), Max: 98.5 (24 Jul 2022 20:42)  HR: 98 (25 Jul 2022 11:13) (98 - 106)  BP: 140/77 (25 Jul 2022 11:13) (131/81 - 150/81)  BP(mean): --  RR: 18 (25 Jul 2022 11:13) (18 - 20)  SpO2: 96% (25 Jul 2022 11:13) (96% - 98%)    Parameters below as of 25 Jul 2022 11:13  Patient On (Oxygen Delivery Method): room air          GENERAL: pt examined bedside, laying comfortably in bed in NAD  HEENT: NC/AT, dry oral mucosa, clear conjunctiva, sclera nonicteric  RESPIRATORY: poor inspiratory effort, CTA b/l, no wheezing, rhonchi, rales  CARDIOVASCULAR: RRR, normal S1 and S2  ABDOMEN: soft, NT/ND, +BS, no rebound/guarding  EXTREMITIES: No cynaosis, no clubbing, no lower extremity edema, pulses 2+  PSYCH: affect flat but cooperative  NEUROLOGY: A+O x1-2, no focal neurologic deficits appreciated   SKIN: No rashes or no palpable lesions, poor turgur        LABS:                                       10.9   5.20  )-----------( 162      ( 25 Jul 2022 06:30 )             33.7       07-25    137  |  109<H>  |  23.4<H>  ----------------------------<  91  4.0   |  19.0<L>  |  0.74    Ca    7.3<L>      25 Jul 2022 06:30  Phos  2.9     07-25  Mg     1.7     07-25    TPro  6.4<L>  /  Alb  1.8<L>  /  TBili  0.9  /  DBili  x   /  AST  78<H>  /  ALT  40  /  AlkPhos  123<H>  07-25      Culture - Urine (collected 20 Jul 2022 14:15)  Source: Clean Catch Clean Catch (Midstream)  Final Report (22 Jul 2022 08:34):    >=3 organisms. Probable collection contamination.      CAPILLARY BLOOD GLUCOSE      POCT Blood Glucose.: 73 mg/dL (23 Jul 2022 09:06)  POCT Blood Glucose.: 113 mg/dL (22 Jul 2022 17:29)  POCT Blood Glucose.: 137 mg/dL (22 Jul 2022 12:42)        RADIOLOGY & ADDITIONAL TESTS:    < from: CT Abdomen and Pelvis w/ IV Cont (07.20.22 @ 13:59) >  IMPRESSION:    CHEST:  1. No central, main, lobar, or segmental pulmonary embolus. Subsegmental   pulmonary arterial tree limited evaluation due to respiratory motion.  2. Mildly dilated aortic root, 4.1 cm at sinuses of Valsalva. Coronary   artery calcification.      ABDOMEN/PELVIS:  1. Colitis. Follow to resolution.  2. Cirrhotic liver. Atrophic left hepatic lobe. Small ascites possibly   related to cirrhosis or colitis.  3. Obliquely oriented lucency of right superior pubic ramus is   indeterminate for nondisplaced fracture versus sequelae of streak   artifact. Correlation with clinical evaluation and pelvic MRI is   recommended.  4. Bilateral partially imaged ischial soft tissue densities with central   low attenuation measuring 7.3 cm on the left and 3.8 cm on the right.   Underlying complex collections associated with decubitus ulcers can be   considered. Right gluteal soft tissue edema and asymmetric muscle   expansion. Underlying hematoma cannot be excluded. Additional probable   sacral decubitus ulcer. Correlation with clinical evaluation and pelvic   MRI is recommended.    < end of copied text >      < from: CT Head No Cont (07.20.22 @ 13:44) >  IMPRESSION:    Motion limited study. Left occipital lobe lucency may represent artifact   versus infarct. No gross hemorrhage, mass effect or hydrocephalus.    < end of copied text >          MEDICATIONS  (STANDING):  aspirin  chewable 81 milliGRAM(s) Enteral Tube daily  atorvastatin 40 milliGRAM(s) Oral at bedtime  cefTRIAXone   IVPB 1000 milliGRAM(s) IV Intermittent every 24 hours  dextrose 5% + lactated ringers. 1000 milliLiter(s) (70 mL/Hr) IV Continuous <Continuous>  dextrose 5%. 1000 milliLiter(s) (100 mL/Hr) IV Continuous <Continuous>  dextrose 5%. 1000 milliLiter(s) (50 mL/Hr) IV Continuous <Continuous>  dextrose 50% Injectable 25 Gram(s) IV Push once  dextrose 50% Injectable 12.5 Gram(s) IV Push once  dextrose 50% Injectable 25 Gram(s) IV Push once  enoxaparin Injectable 40 milliGRAM(s) SubCutaneous every 24 hours  glucagon  Injectable 1 milliGRAM(s) IntraMuscular once  insulin lispro (ADMELOG) corrective regimen sliding scale   SubCutaneous three times a day before meals  lactulose Syrup 20 Gram(s) Oral every 6 hours  levothyroxine 25 MICROGram(s) Oral daily  rifAXIMin 550 milliGRAM(s) Oral two times a day  sodium chloride 0.9%. 1000 milliLiter(s) (75 mL/Hr) IV Continuous <Continuous>  thiamine IVPB 500 milliGRAM(s) IV Intermittent daily    MEDICATIONS  (PRN):  acetaminophen     Tablet .. 650 milliGRAM(s) Oral every 6 hours PRN Temp greater or equal to 38C (100.4F), Mild Pain (1 - 3)  dextrose Oral Gel 15 Gram(s) Oral once PRN Blood Glucose LESS THAN 70 milliGRAM(s)/deciliter  ondansetron Injectable 4 milliGRAM(s) IV Push every 6 hours PRN Nausea and/or Vomiting

## 2022-07-25 NOTE — PROGRESS NOTE ADULT - SUBJECTIVE AND OBJECTIVE BOX
Significant recent/past 24 hr events:  No acute overnight events. Pt remained HD stable with without acute complaints.  Pt currently in NAD and denies N/V/D HA, dizziness, blurry vision, numbness/tingling, SOB, cough, chest pain, palpitations, abd pain or urinary sx's.     Subjective:  Review of Systems  ROS negative x 10 systems except as noted above    Patient is a 77y old  Male who presents with a chief complaint of Altered mental state (25 Jul 2022 14:46)    HPI:  77yr old unknown male brought to ER by EMS from home with c/o altered mental state, inability to talk since last 1 week and h/o recent UTI. No other information was available, pt was admitted as Critical [place]. CT head- showed left occipital infarct vs artifact, no last known well time available, not a candidate for tPA. Patient unable to provide any information. labs s/o hyperglycemia BS >400s, Lactate elevated. UA - neg d-dimer elevated. CT PE - neg for PE, CT abd pelvis - colitis, Sacral decubitus ulcer, rt gluteal edema. He is being admitted for further care.  (20 Jul 2022 16:50)    PAST MEDICAL & SURGICAL HISTORY:    FAMILY HISTORY:      Vitals   ICU Vital Signs Last 24 Hrs  T(C): 36.7 (25 Jul 2022 16:25), Max: 36.9 (24 Jul 2022 20:42)  T(F): 98.1 (25 Jul 2022 16:25), Max: 98.5 (24 Jul 2022 20:42)  HR: 93 (25 Jul 2022 16:25) (93 - 106)  BP: 136/86 (25 Jul 2022 16:25) (131/81 - 140/77)  BP(mean): --  ABP: --  ABP(mean): --  RR: 18 (25 Jul 2022 16:25) (18 - 20)  SpO2: 96% (25 Jul 2022 16:25) (96% - 97%)    O2 Parameters below as of 25 Jul 2022 16:25  Patient On (Oxygen Delivery Method): room air      I&O's Detail    24 Jul 2022 07:01  -  25 Jul 2022 07:00  --------------------------------------------------------  IN:    dextrose 5% + lactated ringers: 825 mL  Total IN: 825 mL    OUT:    Voided (mL): 100 mL  Total OUT: 100 mL    Total NET: 725 mL          LABS                        10.9   5.20  )-----------( 162      ( 25 Jul 2022 06:30 )             33.7     07-25    137  |  109<H>  |  23.4<H>  ----------------------------<  91  4.0   |  19.0<L>  |  0.74    Ca    7.3<L>      25 Jul 2022 06:30  Phos  2.9     07-25  Mg     1.7     07-25    TPro  6.4<L>  /  Alb  1.8<L>  /  TBili  0.9  /  DBili  x   /  AST  78<H>  /  ALT  40  /  AlkPhos  123<H>  07-25    LIVER FUNCTIONS - ( 25 Jul 2022 06:30 )  Alb: 1.8 g/dL / Pro: 6.4 g/dL / ALK PHOS: 123 U/L / ALT: 40 U/L / AST: 78 U/L / GGT: x               POCT Blood Glucose.: 109 mg/dL (07-25-22 @ 13:29)  POCT Blood Glucose.: 95 mg/dL (07-25-22 @ 09:30)  POCT Blood Glucose.: 96 mg/dL (07-25-22 @ 05:33)  POCT Blood Glucose.: 90 mg/dL (07-24-22 @ 23:17)      MEDICATIONS  (STANDING):  aspirin  chewable 81 milliGRAM(s) Enteral Tube daily  atorvastatin 40 milliGRAM(s) Oral at bedtime  dextrose 5% + lactated ringers. 1000 milliLiter(s) (70 mL/Hr) IV Continuous <Continuous>  dextrose 5%. 1000 milliLiter(s) (100 mL/Hr) IV Continuous <Continuous>  dextrose 5%. 1000 milliLiter(s) (50 mL/Hr) IV Continuous <Continuous>  dextrose 50% Injectable 25 Gram(s) IV Push once  dextrose 50% Injectable 12.5 Gram(s) IV Push once  dextrose 50% Injectable 25 Gram(s) IV Push once  enoxaparin Injectable 40 milliGRAM(s) SubCutaneous every 24 hours  glucagon  Injectable 1 milliGRAM(s) IntraMuscular once  insulin lispro (ADMELOG) corrective regimen sliding scale   SubCutaneous three times a day before meals  lactulose Syrup 20 Gram(s) Oral every 6 hours  levothyroxine 25 MICROGram(s) Oral daily  magnesium sulfate  IVPB 1 Gram(s) IV Intermittent once  rifAXIMin 550 milliGRAM(s) Oral two times a day  sodium chloride 0.9%. 1000 milliLiter(s) (75 mL/Hr) IV Continuous <Continuous>  thiamine IVPB 500 milliGRAM(s) IV Intermittent daily    MEDICATIONS  (PRN):  acetaminophen     Tablet .. 650 milliGRAM(s) Oral every 6 hours PRN Temp greater or equal to 38C (100.4F), Mild Pain (1 - 3)  dextrose Oral Gel 15 Gram(s) Oral once PRN Blood Glucose LESS THAN 70 milliGRAM(s)/deciliter  ondansetron Injectable 4 milliGRAM(s) IV Push every 6 hours PRN Nausea and/or Vomiting      Allergies:  oxycodone ([place])    Physical Exam:   Constitutional: NAD  Neck: supple, trachea midline.  No JVD  Respiratory: Breath Sounds equal & clear bilaterally to auscultation, no accessory muscle use noted. No wheezing, rales, or rhonchi noted b/l  Cardiovascular: Regular rate, regular rhythm, normal S1, S2; no murmurs or rub  Gastrointestinal: +PEG. Soft, non-tender, non distended, normal bowel sounds  Extremities: ABDUL x 4, no peripheral edema, no cyanosis, no clubbing   Vascular: Equal and normal pulses: 2+ peripheral pulses throughout  Neurological: A+O x 2 (Person, Place)  Skin: warm, dry, well perfused, no rashes

## 2022-07-25 NOTE — PROGRESS NOTE ADULT - ASSESSMENT
76 y/o M BIBA from home for AMS w/ associated inability to talk x 1 week.  Pt had hx of UTI prior to current admission.  CTH on admission showed left occipital infarct vs artifact, no last known well time available, not a candidate for tPA.  w/u on admisison also significant for hyperammonemia and BG >400s.  Lactic acidosis likely 2/2 cirrhosis.  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  Pt was admitted for acute metabolic encephalopathy, likely multifactorial  Pts remains acute given his persistent encephalopathy.        Acute metabolic encephalopathy, likely multifactorial in the setting of hypothyroid, cirrhosis, possible UTI, decub ulcers (POA), colitis, covid and ?subacute CVA  - AMS persists  - CTH reporting possible L-occipital lobe lucency which may represent artifact vs infarct; repeat CTH still pending     - Hyperammonemia in the setting of cirrhosis and AMS could be 2/2 hepatic encephalopathy and therefore started on Rifaxamin on admission   - s/p cvEEG and epileptiform pattern or seizures noted  - Maintain on synthroid for hypothyroid and repeat TFTs to assess improvement    - Low suspicion for UTI given UA w/out pyuria and Ucx reporting >3 organisms likely contaminant   - Has sacral decub ulcers, no active drainage but unclear if OM deep to wounds however CRP 62 and procal 0.61, will consult ID   - Colitis noted on CT a/p on admission could be 2/2 COVID and can exacerbate dehydration from poor po intake    - Maintain on fall precautions       Hx of CVA vs subacute CVA  - CTH reviewed and noted above   - MRI on hold for now given unable to locate family to assess for presence of metal body parts   - Per neuro recs will repeat CTH to assess for interval changes; further CVA w/u pending relts of repeat CTH  - Maintain on asa and statin therapy  - Maintain on telemetry   - Neurology consulted and recs noted      Hypothyroidism  - TFTs reviewed   - On syntrhoid and titrate per endo recs   - TPO ab and TG ab pending to r/o Hashimoto's as per endo recs  - Will need repeat TFTs to reassess if synthroid titration needed  - Endocrinology consulted and recs noted      Acute Hepatic encephalopathy   - Persistent encephalopathy   - Liver cirrhosis on CT a/p and hyperammonemia noted on admission   - Has hx of heavy EtOH use but as per pt quit >10yrs ago   - Maintain on rifaximin and lactulose   - Fall precautions       Transaminitis  - Likely related to cirrhosis   - Pt asymptomatic and benign abd exam   - Tbili remains WNL and LFTs improving   - Hepatitis panel pending   - Trend LFTs       ?UTI  - Urine cx reporting >3 organisms, likely contaminant and Bcx (-) x2  - Completed 3 day course of empiric Abx (Rocephin) on 7/23  - No leukocytosis and remains afebrile    - Monitor CBC and temperature      Normocytic anemia / Thrombocytopenia   - H/H and platelets stable   - Low plts likely 2/2 cirrhosis   - Hemodynamically stable w/ no active bleeding noted  - Monitor CBC and transfuse if Hb<7        DM II  - Hyperglycemic on admission and hypolgycemic while hospitalized   - A1c 7.7  - ISS and hypoglycemic protocol in place       Colitis   - Incidental finding on CT however could be related to COVID   - Pt is a poor historian and unable to express himself well   - Can contribute to dehydration, exacerbating encephalopathy   - No diarrhea reported and benign clinical exam       Sacral decubitus ulcers and rt heel decubitus ulcer both POA  - No drainage noted   - No leukocytosis and remains afebrile    - Given CT findings and elevated CRP/procal ID consulted for possible OM   - s/p 3 day course of IV rocephin   - c/w wound care   - Monitor CBC and temperature       Dilated AR   - 4.1cm at sinus of valsalva   - Incidental finding on CT chest   - CTSx consulted and recs noted; no intervention warranted at this time        VTE ppx: Lovenox     Code status: full code       Dispo: Pt remains acute requiring hospitalization.  Pt is persistently encephalopathic.

## 2022-07-25 NOTE — CONSULT NOTE ADULT - SUBJECTIVE AND OBJECTIVE BOX
INFECTIOUS DISEASES AND INTERNAL MEDICINE at [place]  =======================================================  Wellington Green MD  Diplomates American Board of Internal Medicine and Infectious Diseases  Telephone [phone]  Fax            [phone]  =======================================================    TIMUR [id number]      HPI:  77yr old unknown male brought to ER by EMS from home with c/o altered mental state, inability to talk since last 1 week and h/o recent UTI. No other information was available, pt was admitted as Critical [place]. CT head- showed left occipital infarct vs artifact, no last known well time available, not a candidate for tPA. Patient unable to provide any information. labs s/o hyperglycemia BS >400s, Lactate elevated. UA - neg d-dimer elevated. CT PE - neg for PE, CT abd pelvis - colitis, Sacral decubitus ulcer, rt gluteal edema.    PT ADMITTED 7/20  PT UNDERWENT CT SCAN AND THERE  ARE DECUBITUS ULCERS SEEN  ASKED TO EVALUATE FROM ID STANDPOINT        PAST MEDICAL & SURGICAL HISTORY:      ANTIBIOTICS  rifAXIMin 550 milliGRAM(s) Oral two times a day      Allergies    oxycodone ([place])    Intolerances        SOCIAL HISTORY:     FAMILY HX   FAMILY HISTORY:      Vital Signs Last 24 Hrs  T(C): 36.7 (25 Jul 2022 11:13), Max: 36.9 (24 Jul 2022 20:42)  T(F): 98.1 (25 Jul 2022 11:13), Max: 98.5 (24 Jul 2022 20:42)  HR: 98 (25 Jul 2022 11:13) (98 - 106)  BP: 140/77 (25 Jul 2022 11:13) (131/81 - 150/81)  BP(mean): --  RR: 18 (25 Jul 2022 11:13) (18 - 20)  SpO2: 96% (25 Jul 2022 11:13) (96% - 98%)    Parameters below as of 25 Jul 2022 11:13  Patient On (Oxygen Delivery Method): room air      Drug Dosing Weight  Height (cm): 185.4 (20 Jul 2022 09:11)  Weight (kg): 86.2 (20 Jul 2022 09:11)  BMI (kg/m2): 25.1 (20 Jul 2022 09:11)  BSA (m2): 2.11 (20 Jul 2022 09:11)      REVIEW OF SYSTEMS:    UNABLE TO OBTAIN                PHYSICAL EXAMINATION:    GENERAL: The patient is a _____in no apparent distress. ___     VITAL SIGNS: T(C): 36.7 (07-25-22 @ 11:13), Max: 36.9 (07-24-22 @ 20:42)  HR: 98 (07-25-22 @ 11:13) (98 - 106)  BP: 140/77 (07-25-22 @ 11:13) (131/81 - 150/81)  RR: 18 (07-25-22 @ 11:13) (18 - 20)  SpO2: 96% (07-25-22 @ 11:13) (96% - 98%)  Wt(kg): --    HEENT: Head is normocephalic and atraumatic.  ANICTERIC  NECK: Supple. No carotid bruits.  No lymphadenopathy or thyromegaly.    LUNGS:COARSE BREATH SOUNDS    HEART: Regular rate and rhythm without murmur.    ABDOMEN: Soft, nontender, and nondistended.  Positive bowel sounds.  No hepatosplenomegaly was noted. NO REBOUND NO GUARDING    EXTREMITIES: NO EDEMA NO ERYTHEMA    NEUROLOGIC:  CONFUSED      SKIN:  SACRAL DECUBITUS ULCER AS PER RN NOT COOPERATIVE TO EVALUATE AT THIS TIME      BLOOD CULTURES  Culture Results:   >=3 organisms. Probable collection contamination. (07-20 @ 14:15)  Culture Results:   No growth to date. (07-20 @ 11:20)  Culture Results:   No growth to date. (07-20 @ 11:05)       URINE CX          LABS:                        10.9   5.20  )-----------( 162      ( 25 Jul 2022 06:30 )             33.7     07-25    137  |  109<H>  |  23.4<H>  ----------------------------<  91  4.0   |  19.0<L>  |  0.74    Ca    7.3<L>      25 Jul 2022 06:30  Phos  2.9     07-25  Mg     1.7     07-25    TPro  6.4<L>  /  Alb  1.8<L>  /  TBili  0.9  /  DBili  x   /  AST  78<H>  /  ALT  40  /  AlkPhos  123<H>  07-25          RADIOLOGY & ADDITIONAL STUDIES:      ASSESSMENT/PLAN  77yr old unknown male brought to ER by EMS from home with c/o altered mental state, inability to talk since last 1 week and h/o recent UTI. No other information was available, pt was admitted as Critical [place]. CT head- showed left occipital infarct vs artifact, no last known well time available, not a candidate for tPA. Patient unable to provide any information. labs s/o hyperglycemia BS >400s, Lactate elevated. UA - neg d-dimer elevated. CT PE - neg for PE, CT abd pelvis - colitis, Sacral decubitus ulcer, rt gluteal edema.    PT ADMITTED 7/20  PT UNDERWENT CT SCAN AND THERE  ARE DECUBITUS ULCERS SEEN   NO REPORTED OSTEO BUT WOULD CONSIDER MRI TO  FURTHER EVALUATE  WILL DEFER ABX  WILL FOLLOW UP WITH FURTHER RECOMMENDATIONS                  DORINDA GARSIA MD INFECTIOUS DISEASES AND INTERNAL MEDICINE at [place]  =======================================================  Wellington Green MD  Diplomates American Board of Internal Medicine and Infectious Diseases  Telephone [phone]  Fax            [phone]  =======================================================    TIMUR [id number]      HPI:  77yr old unknown male brought to ER by EMS from home with c/o altered mental state, inability to talk since last 1 week and h/o recent UTI. No other information was available, pt was admitted as Critical [place]. CT head- showed left occipital infarct vs artifact, no last known well time available, not a candidate for tPA. Patient unable to provide any information. labs s/o hyperglycemia BS >400s, Lactate elevated. UA - neg d-dimer elevated. CT PE - neg for PE, CT abd pelvis - colitis, Sacral decubitus ulcer, rt gluteal edema.    PT ADMITTED 7/20  PT UNDERWENT CT SCAN AND THERE  ARE DECUBITUS ULCERS SEEN  ASKED TO EVALUATE FROM ID STANDPOINT        PAST MEDICAL & SURGICAL HISTORY:      ANTIBIOTICS  rifAXIMin 550 milliGRAM(s) Oral two times a day      Allergies    oxycodone ([place])    Intolerances        SOCIAL HISTORY:     FAMILY HX   FAMILY HISTORY:      Vital Signs Last 24 Hrs  T(C): 36.7 (25 Jul 2022 11:13), Max: 36.9 (24 Jul 2022 20:42)  T(F): 98.1 (25 Jul 2022 11:13), Max: 98.5 (24 Jul 2022 20:42)  HR: 98 (25 Jul 2022 11:13) (98 - 106)  BP: 140/77 (25 Jul 2022 11:13) (131/81 - 150/81)  BP(mean): --  RR: 18 (25 Jul 2022 11:13) (18 - 20)  SpO2: 96% (25 Jul 2022 11:13) (96% - 98%)    Parameters below as of 25 Jul 2022 11:13  Patient On (Oxygen Delivery Method): room air      Drug Dosing Weight  Height (cm): 185.4 (20 Jul 2022 09:11)  Weight (kg): 86.2 (20 Jul 2022 09:11)  BMI (kg/m2): 25.1 (20 Jul 2022 09:11)  BSA (m2): 2.11 (20 Jul 2022 09:11)      REVIEW OF SYSTEMS:    UNABLE TO OBTAIN                PHYSICAL EXAMINATION:    GENERAL: The patient is a _____in no apparent distress. ___     VITAL SIGNS: T(C): 36.7 (07-25-22 @ 11:13), Max: 36.9 (07-24-22 @ 20:42)  HR: 98 (07-25-22 @ 11:13) (98 - 106)  BP: 140/77 (07-25-22 @ 11:13) (131/81 - 150/81)  RR: 18 (07-25-22 @ 11:13) (18 - 20)  SpO2: 96% (07-25-22 @ 11:13) (96% - 98%)  Wt(kg): --    HEENT: Head is normocephalic and atraumatic.  ANICTERIC  NECK: Supple. No carotid bruits.  No lymphadenopathy or thyromegaly.    LUNGS:COARSE BREATH SOUNDS    HEART: Regular rate and rhythm without murmur.    ABDOMEN: Soft, nontender, and nondistended.  Positive bowel sounds.  No hepatosplenomegaly was noted. NO REBOUND NO GUARDING    EXTREMITIES: NO EDEMA NO ERYTHEMA    NEUROLOGIC:  CONFUSED      SKIN:  SACRAL DECUBITUS ULCER AS PER RN NOT COOPERATIVE TO EVALUATE AT THIS TIME      BLOOD CULTURES  Culture Results:   >=3 organisms. Probable collection contamination. (07-20 @ 14:15)  Culture Results:   No growth to date. (07-20 @ 11:20)  Culture Results:   No growth to date. (07-20 @ 11:05)       URINE CX          LABS:                        10.9   5.20  )-----------( 162      ( 25 Jul 2022 06:30 )             33.7     07-25    137  |  109<H>  |  23.4<H>  ----------------------------<  91  4.0   |  19.0<L>  |  0.74    Ca    7.3<L>      25 Jul 2022 06:30  Phos  2.9     07-25  Mg     1.7     07-25    TPro  6.4<L>  /  Alb  1.8<L>  /  TBili  0.9  /  DBili  x   /  AST  78<H>  /  ALT  40  /  AlkPhos  123<H>  07-25          RADIOLOGY & ADDITIONAL STUDIES:      ASSESSMENT/PLAN  77yr old unknown male brought to ER by EMS from home with c/o altered mental state, inability to talk since last 1 week and h/o recent UTI. No other information was available, pt was admitted as Critical [place]. CT head- showed left occipital infarct vs artifact, no last known well time available, not a candidate for tPA. Patient unable to provide any information. labs s/o hyperglycemia BS >400s, Lactate elevated. UA - neg d-dimer elevated. CT PE - neg for PE, CT abd pelvis - colitis, Sacral decubitus ulcer, rt gluteal edema.    PT ADMITTED 7/20  PT UNDERWENT CT SCAN AND THERE  ARE DECUBITUS ULCERS SEEN   NO REPORTED OSTEO BUT WOULD CONSIDER MRI TO  FURTHER EVALUATE  WILL DEFER ABX  WILL FOLLOW UP AS NEEDED   PLEASE CALL IF QUESTIONS              DORINDA GARSIA MD

## 2022-07-26 LAB
ALBUMIN SERPL ELPH-MCNC: 2 G/DL — LOW (ref 3.3–5.2)
ALP SERPL-CCNC: 122 U/L — HIGH (ref 40–120)
ALT FLD-CCNC: 43 U/L — HIGH
ANION GAP SERPL CALC-SCNC: 10 MMOL/L — SIGNIFICANT CHANGE UP (ref 5–17)
AST SERPL-CCNC: 79 U/L — HIGH
BASOPHILS # BLD AUTO: 0.04 K/UL — SIGNIFICANT CHANGE UP (ref 0–0.2)
BASOPHILS NFR BLD AUTO: 0.8 % — SIGNIFICANT CHANGE UP (ref 0–2)
BILIRUB SERPL-MCNC: 1 MG/DL — SIGNIFICANT CHANGE UP (ref 0.4–2)
BUN SERPL-MCNC: 17.9 MG/DL — SIGNIFICANT CHANGE UP (ref 8–20)
CALCIUM SERPL-MCNC: 7.2 MG/DL — LOW (ref 8.6–10.2)
CHLORIDE SERPL-SCNC: 110 MMOL/L — HIGH (ref 98–107)
CO2 SERPL-SCNC: 19 MMOL/L — LOW (ref 22–29)
CREAT SERPL-MCNC: 0.65 MG/DL — SIGNIFICANT CHANGE UP (ref 0.5–1.3)
EGFR: 96 ML/MIN/1.73M2 — SIGNIFICANT CHANGE UP
EOSINOPHIL # BLD AUTO: 0.27 K/UL — SIGNIFICANT CHANGE UP (ref 0–0.5)
EOSINOPHIL NFR BLD AUTO: 5.2 % — SIGNIFICANT CHANGE UP (ref 0–6)
GLUCOSE BLDC GLUCOMTR-MCNC: 128 MG/DL — HIGH (ref 70–99)
GLUCOSE BLDC GLUCOMTR-MCNC: 92 MG/DL — SIGNIFICANT CHANGE UP (ref 70–99)
GLUCOSE BLDC GLUCOMTR-MCNC: 99 MG/DL — SIGNIFICANT CHANGE UP (ref 70–99)
GLUCOSE BLDC GLUCOMTR-MCNC: 99 MG/DL — SIGNIFICANT CHANGE UP (ref 70–99)
GLUCOSE SERPL-MCNC: 99 MG/DL — SIGNIFICANT CHANGE UP (ref 70–99)
HCT VFR BLD CALC: 33.9 % — LOW (ref 39–50)
HGB BLD-MCNC: 11.1 G/DL — LOW (ref 13–17)
IMM GRANULOCYTES NFR BLD AUTO: 0.4 % — SIGNIFICANT CHANGE UP (ref 0–1.5)
LYMPHOCYTES # BLD AUTO: 0.99 K/UL — LOW (ref 1–3.3)
LYMPHOCYTES # BLD AUTO: 19.1 % — SIGNIFICANT CHANGE UP (ref 13–44)
MAGNESIUM SERPL-MCNC: 1.8 MG/DL — SIGNIFICANT CHANGE UP (ref 1.6–2.6)
MCHC RBC-ENTMCNC: 31.1 PG — SIGNIFICANT CHANGE UP (ref 27–34)
MCHC RBC-ENTMCNC: 32.7 GM/DL — SIGNIFICANT CHANGE UP (ref 32–36)
MCV RBC AUTO: 95 FL — SIGNIFICANT CHANGE UP (ref 80–100)
MONOCYTES # BLD AUTO: 0.63 K/UL — SIGNIFICANT CHANGE UP (ref 0–0.9)
MONOCYTES NFR BLD AUTO: 12.2 % — SIGNIFICANT CHANGE UP (ref 2–14)
NEUTROPHILS # BLD AUTO: 3.23 K/UL — SIGNIFICANT CHANGE UP (ref 1.8–7.4)
NEUTROPHILS NFR BLD AUTO: 62.3 % — SIGNIFICANT CHANGE UP (ref 43–77)
PHOSPHATE SERPL-MCNC: 2.7 MG/DL — SIGNIFICANT CHANGE UP (ref 2.4–4.7)
PLATELET # BLD AUTO: 163 K/UL — SIGNIFICANT CHANGE UP (ref 150–400)
POTASSIUM SERPL-MCNC: 4 MMOL/L — SIGNIFICANT CHANGE UP (ref 3.5–5.3)
POTASSIUM SERPL-SCNC: 4 MMOL/L — SIGNIFICANT CHANGE UP (ref 3.5–5.3)
PROT SERPL-MCNC: 6.4 G/DL — LOW (ref 6.6–8.7)
RBC # BLD: 3.57 M/UL — LOW (ref 4.2–5.8)
RBC # FLD: 15.3 % — HIGH (ref 10.3–14.5)
SODIUM SERPL-SCNC: 139 MMOL/L — SIGNIFICANT CHANGE UP (ref 135–145)
WBC # BLD: 5.18 K/UL — SIGNIFICANT CHANGE UP (ref 3.8–10.5)
WBC # FLD AUTO: 5.18 K/UL — SIGNIFICANT CHANGE UP (ref 3.8–10.5)

## 2022-07-26 PROCEDURE — 99233 SBSQ HOSP IP/OBS HIGH 50: CPT

## 2022-07-26 PROCEDURE — 74230 X-RAY XM SWLNG FUNCJ C+: CPT | Mod: 26

## 2022-07-26 RX ORDER — LACTULOSE 10 G/15ML
20 SOLUTION ORAL EVERY 6 HOURS
Refills: 0 | Status: DISCONTINUED | OUTPATIENT
Start: 2022-07-26 | End: 2022-07-28

## 2022-07-26 RX ORDER — LEVOTHYROXINE SODIUM 125 MCG
25 TABLET ORAL DAILY
Refills: 0 | Status: DISCONTINUED | OUTPATIENT
Start: 2022-07-26 | End: 2022-07-27

## 2022-07-26 RX ORDER — MAGNESIUM SULFATE 500 MG/ML
1 VIAL (ML) INJECTION ONCE
Refills: 0 | Status: COMPLETED | OUTPATIENT
Start: 2022-07-26 | End: 2022-07-26

## 2022-07-26 RX ORDER — ACETAMINOPHEN 500 MG
650 TABLET ORAL EVERY 6 HOURS
Refills: 0 | Status: DISCONTINUED | OUTPATIENT
Start: 2022-07-26 | End: 2022-08-15

## 2022-07-26 RX ADMIN — Medication 25 MICROGRAM(S): at 05:19

## 2022-07-26 RX ADMIN — ENOXAPARIN SODIUM 40 MILLIGRAM(S): 100 INJECTION SUBCUTANEOUS at 05:21

## 2022-07-26 RX ADMIN — Medication 105 MILLIGRAM(S): at 14:09

## 2022-07-26 RX ADMIN — LACTULOSE 20 GRAM(S): 10 SOLUTION ORAL at 23:10

## 2022-07-26 RX ADMIN — SODIUM CHLORIDE 70 MILLILITER(S): 9 INJECTION, SOLUTION INTRAVENOUS at 14:09

## 2022-07-26 RX ADMIN — Medication 100 GRAM(S): at 19:29

## 2022-07-26 RX ADMIN — LACTULOSE 20 GRAM(S): 10 SOLUTION ORAL at 05:19

## 2022-07-26 RX ADMIN — ATORVASTATIN CALCIUM 40 MILLIGRAM(S): 80 TABLET, FILM COATED ORAL at 21:12

## 2022-07-26 RX ADMIN — LACTULOSE 20 GRAM(S): 10 SOLUTION ORAL at 19:28

## 2022-07-26 RX ADMIN — LACTULOSE 20 GRAM(S): 10 SOLUTION ORAL at 14:09

## 2022-07-26 NOTE — SWALLOW VFSS/MBS ASSESSMENT ADULT - ORAL PHASE
Delayed oral transit time/Reduced anterior - posterior transport/Uncontrolled bolus / spillover in mic-pharynx/Uncontrolled bolus / spillover in hypopharynx Delayed oral transit time/Reduced anterior - posterior transport/Uncontrolled bolus / spillover in hypopharynx

## 2022-07-26 NOTE — SWALLOW VFSS/MBS ASSESSMENT ADULT - UNSUCCESSFUL STRATEGIES TRIALED DURING PROCEDURE
unable to execute additional postures due to reduced mobility of cervical musculature/head turn to the right/productive volitional cough following clinician cue

## 2022-07-26 NOTE — SWALLOW VFSS/MBS ASSESSMENT ADULT - ADDITIONAL RECOMMENDATIONS
Consider palliative care consult as Pt requesting liquids despite MBSV results, however Pt with reduced cognition, therefore rx GOC discussion re: nutrition/hydration

## 2022-07-26 NOTE — PROGRESS NOTE ADULT - SUBJECTIVE AND OBJECTIVE BOX
Chief Complaint:  AMS    SUBJECTIVE / OVERNIGHT EVENTS:  No acute events reported overnight.  Pt is a poor historian but offers no acute complaints at this time.  Patient denies chest pain, SOB, abd pain, N/V, fever, chills, dysuria or any other complaints. All remainder ROS negative.       I&O's Summary        PHYSICAL EXAM:  Vital Signs Last 24 Hrs  T(C): 36.7 (25 Jul 2022 11:13), Max: 36.9 (24 Jul 2022 20:42)  T(F): 98.1 (25 Jul 2022 11:13), Max: 98.5 (24 Jul 2022 20:42)  HR: 98 (25 Jul 2022 11:13) (98 - 106)  BP: 140/77 (25 Jul 2022 11:13) (131/81 - 150/81)  BP(mean): --  RR: 18 (25 Jul 2022 11:13) (18 - 20)  SpO2: 96% (25 Jul 2022 11:13) (96% - 98%)    Parameters below as of 25 Jul 2022 11:13  Patient On (Oxygen Delivery Method): room air          GENERAL: pt examined bedside, laying comfortably in bed in NAD  HEENT: NC/AT, dry oral mucosa, clear conjunctiva, sclera nonicteric  RESPIRATORY: poor inspiratory effort, CTA b/l, no wheezing, rhonchi, rales  CARDIOVASCULAR: RRR, normal S1 and S2  ABDOMEN: soft, NT/ND, +BS, no rebound/guarding, +peg  EXTREMITIES: No cynaosis, no clubbing, no lower extremity edema, pulses 2+  PSYCH: affect flat but cooperative  NEUROLOGY: A+O x1-2, no focal neurologic deficits appreciated   SKIN: No rashes or no palpable lesions, poor turgur        LABS:                                     11.1   5.18  )-----------( 163      ( 26 Jul 2022 04:36 )             33.9     07-26    139  |  110<H>  |  17.9  ----------------------------<  99  4.0   |  19.0<L>  |  0.65    Ca    7.2<L>      26 Jul 2022 04:36  Phos  2.7     07-26  Mg     1.8     07-26    TPro  6.4<L>  /  Alb  2.0<L>  /  TBili  1.0  /  DBili  x   /  AST  79<H>  /  ALT  43<H>  /  AlkPhos  122<H>  07-26        Culture - Urine (collected 20 Jul 2022 14:15)  Source: Clean Catch Clean Catch (Midstream)  Final Report (22 Jul 2022 08:34):    >=3 organisms. Probable collection contamination.      CAPILLARY BLOOD GLUCOSE      POCT Blood Glucose.: 73 mg/dL (23 Jul 2022 09:06)  POCT Blood Glucose.: 113 mg/dL (22 Jul 2022 17:29)  POCT Blood Glucose.: 137 mg/dL (22 Jul 2022 12:42)        RADIOLOGY & ADDITIONAL TESTS:    < from: CT Abdomen and Pelvis w/ IV Cont (07.20.22 @ 13:59) >  IMPRESSION:    CHEST:  1. No central, main, lobar, or segmental pulmonary embolus. Subsegmental   pulmonary arterial tree limited evaluation due to respiratory motion.  2. Mildly dilated aortic root, 4.1 cm at sinuses of Valsalva. Coronary   artery calcification.      ABDOMEN/PELVIS:  1. Colitis. Follow to resolution.  2. Cirrhotic liver. Atrophic left hepatic lobe. Small ascites possibly   related to cirrhosis or colitis.  3. Obliquely oriented lucency of right superior pubic ramus is   indeterminate for nondisplaced fracture versus sequelae of streak   artifact. Correlation with clinical evaluation and pelvic MRI is   recommended.  4. Bilateral partially imaged ischial soft tissue densities with central   low attenuation measuring 7.3 cm on the left and 3.8 cm on the right.   Underlying complex collections associated with decubitus ulcers can be   considered. Right gluteal soft tissue edema and asymmetric muscle   expansion. Underlying hematoma cannot be excluded. Additional probable   sacral decubitus ulcer. Correlation with clinical evaluation and pelvic   MRI is recommended.    < end of copied text >      < from: CT Head No Cont (07.20.22 @ 13:44) >  IMPRESSION:    Motion limited study. Left occipital lobe lucency may represent artifact   versus infarct. No gross hemorrhage, mass effect or hydrocephalus.    < end of copied text >      < from: CT Head No Cont (07.25.22 @ 18:15) >  IMPRESSION:    No acute hemorrhage, mass effect or extra-axial collections. Increasing   paranasal sinus inflammatory changes. Correlate for acute sinusitis.    < end of copied text >        MEDICATIONS  (STANDING):  aspirin  chewable 81 milliGRAM(s) Enteral Tube daily  atorvastatin 40 milliGRAM(s) Oral at bedtime  cefTRIAXone   IVPB 1000 milliGRAM(s) IV Intermittent every 24 hours  dextrose 5% + lactated ringers. 1000 milliLiter(s) (70 mL/Hr) IV Continuous <Continuous>  dextrose 5%. 1000 milliLiter(s) (100 mL/Hr) IV Continuous <Continuous>  dextrose 5%. 1000 milliLiter(s) (50 mL/Hr) IV Continuous <Continuous>  dextrose 50% Injectable 25 Gram(s) IV Push once  dextrose 50% Injectable 12.5 Gram(s) IV Push once  dextrose 50% Injectable 25 Gram(s) IV Push once  enoxaparin Injectable 40 milliGRAM(s) SubCutaneous every 24 hours  glucagon  Injectable 1 milliGRAM(s) IntraMuscular once  insulin lispro (ADMELOG) corrective regimen sliding scale   SubCutaneous three times a day before meals  lactulose Syrup 20 Gram(s) Oral every 6 hours  levothyroxine 25 MICROGram(s) Oral daily  rifAXIMin 550 milliGRAM(s) Oral two times a day  sodium chloride 0.9%. 1000 milliLiter(s) (75 mL/Hr) IV Continuous <Continuous>  thiamine IVPB 500 milliGRAM(s) IV Intermittent daily    MEDICATIONS  (PRN):  acetaminophen     Tablet .. 650 milliGRAM(s) Oral every 6 hours PRN Temp greater or equal to 38C (100.4F), Mild Pain (1 - 3)  dextrose Oral Gel 15 Gram(s) Oral once PRN Blood Glucose LESS THAN 70 milliGRAM(s)/deciliter  ondansetron Injectable 4 milliGRAM(s) IV Push every 6 hours PRN Nausea and/or Vomiting

## 2022-07-26 NOTE — SWALLOW VFSS/MBS ASSESSMENT ADULT - SLP PERTINENT HISTORY OF CURRENT PROBLEM
As per MD note, "78 y/o M BIBA from home for AMS w/ associated inability to talk x 1 week.  Pt had hx of UTI prior to current admission.  CTH on admission showed left occipital infarct vs artifact, no last known well time available, not a candidate for tPA.  w/u on admisison also significant for hyperammonemia and BG >400s.  Lactic acidosis likely 2/2 cirrhosis.  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  Pt was admitted for acute metabolic encephalopathy, likely multifactorial  Pts remains acute given his persistent encephalopathy".

## 2022-07-26 NOTE — SWALLOW VFSS/MBS ASSESSMENT ADULT - DIAGNOSTIC IMPRESSIONS
Pt presents w/moderate oropharyngeal dysphagia. Oral phase of swallow marked by reduced lingual strength/ROM with intermittent lingual pumping, resulting in inadequate bolus formation/cohesion/propulsion, with subsequent min-mod increased oral transit time. Premature pharyngeal entry variably between the mic & hypopharynx, more consistently to the pyriforms with moderately thick liquids. Consistent piecemeal deglutition noted across trials. No anterior loss or oral stasis observed.     Pharyngeal phase of swallow marked by reduced contractility, decreased hyo-laryngeal elevation/excursion, incomplete epiglottic deflection, and inadequate upper airway protection. Trace-min stasis observed on the BOT, in the valleculae, on the posterior pharyngeal wall, and in the pyriforms. Stasis with fair clearance with subsequent dry swallows. Pt with intermittent penetration above the cords without clearance, during & following the swallow, with both puree & moderately thick liquids. Penetration after the swallow occurring on pharyngeal stasis. Limited ability to execute postural strategies due to cervical pain/reduced mobility. Ability to execute R head turn however continued penetration observed. Ineffective clearance of stasis in airway despite cued expectoration.

## 2022-07-26 NOTE — SWALLOW VFSS/MBS ASSESSMENT ADULT - COMMENTS
To note, Pt known to this service from past admission. Seen for outpatient MBSV in 03/2022, with rx made for NPO (please see full report for details, located in outpatient admission in EMR). Pt ultimately admitted to hospital at that time, neuro w/u unrevealing, and Pt s/p PEG tube placement. Pt is a questionable historian, however reports has been receiving ?dysphagia tx, though states "I barely even started it". Also indicating he has been drinking from a "sippy cup" however unable to state as per who's rx. States has not been consuming solid PO, only liquids.

## 2022-07-26 NOTE — SWALLOW VFSS/MBS ASSESSMENT ADULT - SLP GENERAL OBSERVATIONS
Pt recd awake/upright in stretcher in radiology, A&A Ox1, reduced cognition, intermittently agitated, 0/10 pain pre/post, tolerating RA no overt distress

## 2022-07-27 LAB
ALBUMIN SERPL ELPH-MCNC: 2.2 G/DL — LOW (ref 3.3–5.2)
ALP SERPL-CCNC: 140 U/L — HIGH (ref 40–120)
ALT FLD-CCNC: 45 U/L — HIGH
ANION GAP SERPL CALC-SCNC: 9 MMOL/L — SIGNIFICANT CHANGE UP (ref 5–17)
AST SERPL-CCNC: 78 U/L — HIGH
BASOPHILS # BLD AUTO: 0.03 K/UL — SIGNIFICANT CHANGE UP (ref 0–0.2)
BASOPHILS NFR BLD AUTO: 0.4 % — SIGNIFICANT CHANGE UP (ref 0–2)
BILIRUB SERPL-MCNC: 1 MG/DL — SIGNIFICANT CHANGE UP (ref 0.4–2)
BUN SERPL-MCNC: 17 MG/DL — SIGNIFICANT CHANGE UP (ref 8–20)
CALCIUM SERPL-MCNC: 7.4 MG/DL — LOW (ref 8.4–10.5)
CHLORIDE SERPL-SCNC: 105 MMOL/L — SIGNIFICANT CHANGE UP (ref 98–107)
CO2 SERPL-SCNC: 21 MMOL/L — LOW (ref 22–29)
CREAT SERPL-MCNC: 0.86 MG/DL — SIGNIFICANT CHANGE UP (ref 0.5–1.3)
EGFR: 88 ML/MIN/1.73M2 — SIGNIFICANT CHANGE UP
EOSINOPHIL # BLD AUTO: 0.27 K/UL — SIGNIFICANT CHANGE UP (ref 0–0.5)
EOSINOPHIL NFR BLD AUTO: 3.8 % — SIGNIFICANT CHANGE UP (ref 0–6)
GLUCOSE BLDC GLUCOMTR-MCNC: 133 MG/DL — HIGH (ref 70–99)
GLUCOSE BLDC GLUCOMTR-MCNC: 157 MG/DL — HIGH (ref 70–99)
GLUCOSE BLDC GLUCOMTR-MCNC: 166 MG/DL — HIGH (ref 70–99)
GLUCOSE BLDC GLUCOMTR-MCNC: 169 MG/DL — HIGH (ref 70–99)
GLUCOSE BLDC GLUCOMTR-MCNC: 170 MG/DL — HIGH (ref 70–99)
GLUCOSE SERPL-MCNC: 164 MG/DL — HIGH (ref 70–99)
HCT VFR BLD CALC: 35.8 % — LOW (ref 39–50)
HGB BLD-MCNC: 11.5 G/DL — LOW (ref 13–17)
IMM GRANULOCYTES NFR BLD AUTO: 0.3 % — SIGNIFICANT CHANGE UP (ref 0–1.5)
LYMPHOCYTES # BLD AUTO: 1.14 K/UL — SIGNIFICANT CHANGE UP (ref 1–3.3)
LYMPHOCYTES # BLD AUTO: 16.2 % — SIGNIFICANT CHANGE UP (ref 13–44)
MAGNESIUM SERPL-MCNC: 2 MG/DL — SIGNIFICANT CHANGE UP (ref 1.6–2.6)
MCHC RBC-ENTMCNC: 30.7 PG — SIGNIFICANT CHANGE UP (ref 27–34)
MCHC RBC-ENTMCNC: 32.1 GM/DL — SIGNIFICANT CHANGE UP (ref 32–36)
MCV RBC AUTO: 95.5 FL — SIGNIFICANT CHANGE UP (ref 80–100)
MONOCYTES # BLD AUTO: 0.76 K/UL — SIGNIFICANT CHANGE UP (ref 0–0.9)
MONOCYTES NFR BLD AUTO: 10.8 % — SIGNIFICANT CHANGE UP (ref 2–14)
NEUTROPHILS # BLD AUTO: 4.81 K/UL — SIGNIFICANT CHANGE UP (ref 1.8–7.4)
NEUTROPHILS NFR BLD AUTO: 68.5 % — SIGNIFICANT CHANGE UP (ref 43–77)
PHOSPHATE SERPL-MCNC: 2.7 MG/DL — SIGNIFICANT CHANGE UP (ref 2.4–4.7)
PLATELET # BLD AUTO: 189 K/UL — SIGNIFICANT CHANGE UP (ref 150–400)
POTASSIUM SERPL-MCNC: 4 MMOL/L — SIGNIFICANT CHANGE UP (ref 3.5–5.3)
POTASSIUM SERPL-SCNC: 4 MMOL/L — SIGNIFICANT CHANGE UP (ref 3.5–5.3)
PROT SERPL-MCNC: 6.7 G/DL — SIGNIFICANT CHANGE UP (ref 6.6–8.7)
RBC # BLD: 3.75 M/UL — LOW (ref 4.2–5.8)
RBC # FLD: 15.2 % — HIGH (ref 10.3–14.5)
SODIUM SERPL-SCNC: 135 MMOL/L — SIGNIFICANT CHANGE UP (ref 135–145)
T4 AB SER-ACNC: 3.5 UG/DL — LOW (ref 4.5–12)
TSH SERPL-MCNC: 49.27 UIU/ML — HIGH (ref 0.27–4.2)
WBC # BLD: 7.03 K/UL — SIGNIFICANT CHANGE UP (ref 3.8–10.5)
WBC # FLD AUTO: 7.03 K/UL — SIGNIFICANT CHANGE UP (ref 3.8–10.5)

## 2022-07-27 PROCEDURE — 99233 SBSQ HOSP IP/OBS HIGH 50: CPT

## 2022-07-27 PROCEDURE — 74018 RADEX ABDOMEN 1 VIEW: CPT | Mod: 26

## 2022-07-27 RX ORDER — LEVOTHYROXINE SODIUM 125 MCG
75 TABLET ORAL DAILY
Refills: 0 | Status: DISCONTINUED | OUTPATIENT
Start: 2022-07-27 | End: 2022-07-31

## 2022-07-27 RX ADMIN — Medication 10 MILLIGRAM(S): at 21:08

## 2022-07-27 RX ADMIN — LACTULOSE 20 GRAM(S): 10 SOLUTION ORAL at 12:14

## 2022-07-27 RX ADMIN — Medication 81 MILLIGRAM(S): at 12:14

## 2022-07-27 RX ADMIN — Medication 2: at 08:50

## 2022-07-27 RX ADMIN — Medication 105 MILLIGRAM(S): at 12:14

## 2022-07-27 RX ADMIN — ENOXAPARIN SODIUM 40 MILLIGRAM(S): 100 INJECTION SUBCUTANEOUS at 05:08

## 2022-07-27 RX ADMIN — LACTULOSE 20 GRAM(S): 10 SOLUTION ORAL at 18:05

## 2022-07-27 RX ADMIN — LACTULOSE 20 GRAM(S): 10 SOLUTION ORAL at 23:56

## 2022-07-27 RX ADMIN — Medication 2: at 12:13

## 2022-07-27 RX ADMIN — Medication 2: at 18:06

## 2022-07-27 RX ADMIN — Medication 25 MICROGRAM(S): at 05:07

## 2022-07-27 RX ADMIN — ATORVASTATIN CALCIUM 40 MILLIGRAM(S): 80 TABLET, FILM COATED ORAL at 21:09

## 2022-07-27 RX ADMIN — LACTULOSE 20 GRAM(S): 10 SOLUTION ORAL at 05:07

## 2022-07-27 NOTE — PROGRESS NOTE ADULT - SUBJECTIVE AND OBJECTIVE BOX
Chief Complaint:  AMS    SUBJECTIVE / OVERNIGHT EVENTS:  No acute events reported overnight.  Pt is a poor historian but offers no acute complaints at this time.  Patient denies chest pain, SOB, abd pain, N/V, fever, chills, dysuria or any other complaints. All remainder ROS negative.       I&O's Summary        PHYSICAL EXAM:    Vital Signs Last 24 Hrs  T(C): 36.9 (27 Jul 2022 05:18), Max: 36.9 (27 Jul 2022 05:18)  T(F): 98.4 (27 Jul 2022 05:18), Max: 98.4 (27 Jul 2022 05:18)  HR: 102 (27 Jul 2022 05:18) (101 - 102)  BP: 140/89 (27 Jul 2022 05:18) (140/89 - 164/91)  BP(mean): --  RR: 18 (27 Jul 2022 05:18) (18 - 18)  SpO2: 98% (27 Jul 2022 05:18) (96% - 98%)    Parameters below as of 27 Jul 2022 05:18  Patient On (Oxygen Delivery Method): room air        GENERAL: pt examined bedside, laying comfortably in bed in NAD  HEENT: NC/AT, dry oral mucosa, clear conjunctiva, sclera nonicteric  RESPIRATORY: poor inspiratory effort, CTA b/l, no wheezing, rhonchi, rales  CARDIOVASCULAR: RRR, normal S1 and S2  ABDOMEN: soft, nontender but distended, +BS, no rebound/guarding, +peg  EXTREMITIES: No cynaosis, no clubbing, no lower extremity edema, pulses 2+  PSYCH: affect flat but cooperative  NEUROLOGY: A+O x1-2, no focal neurologic deficits appreciated   SKIN: No rashes or no palpable lesions, poor turgur        LABS:                                          11.5   7.03  )-----------( 189      ( 27 Jul 2022 06:09 )             35.8       07-27    135  |  105  |  17.0  ----------------------------<  164<H>  4.0   |  21.0<L>  |  0.86    Ca    7.4<L>      27 Jul 2022 06:09  Phos  2.7     07-27  Mg     2.0     07-27    TPro  6.7  /  Alb  2.2<L>  /  TBili  1.0  /  DBili  x   /  AST  78<H>  /  ALT  45<H>  /  AlkPhos  140<H>  07-27      Culture - Urine (collected 20 Jul 2022 14:15)  Source: Clean Catch Clean Catch (Midstream)  Final Report (22 Jul 2022 08:34):    >=3 organisms. Probable collection contamination.      CAPILLARY BLOOD GLUCOSE      POCT Blood Glucose.: 73 mg/dL (23 Jul 2022 09:06)  POCT Blood Glucose.: 113 mg/dL (22 Jul 2022 17:29)  POCT Blood Glucose.: 137 mg/dL (22 Jul 2022 12:42)        RADIOLOGY & ADDITIONAL TESTS:    < from: CT Abdomen and Pelvis w/ IV Cont (07.20.22 @ 13:59) >  IMPRESSION:    CHEST:  1. No central, main, lobar, or segmental pulmonary embolus. Subsegmental   pulmonary arterial tree limited evaluation due to respiratory motion.  2. Mildly dilated aortic root, 4.1 cm at sinuses of Valsalva. Coronary   artery calcification.      ABDOMEN/PELVIS:  1. Colitis. Follow to resolution.  2. Cirrhotic liver. Atrophic left hepatic lobe. Small ascites possibly   related to cirrhosis or colitis.  3. Obliquely oriented lucency of right superior pubic ramus is   indeterminate for nondisplaced fracture versus sequelae of streak   artifact. Correlation with clinical evaluation and pelvic MRI is   recommended.  4. Bilateral partially imaged ischial soft tissue densities with central   low attenuation measuring 7.3 cm on the left and 3.8 cm on the right.   Underlying complex collections associated with decubitus ulcers can be   considered. Right gluteal soft tissue edema and asymmetric muscle   expansion. Underlying hematoma cannot be excluded. Additional probable   sacral decubitus ulcer. Correlation with clinical evaluation and pelvic   MRI is recommended.    < end of copied text >      < from: CT Head No Cont (07.20.22 @ 13:44) >  IMPRESSION:    Motion limited study. Left occipital lobe lucency may represent artifact   versus infarct. No gross hemorrhage, mass effect or hydrocephalus.    < end of copied text >      < from: CT Head No Cont (07.25.22 @ 18:15) >  IMPRESSION:    No acute hemorrhage, mass effect or extra-axial collections. Increasing   paranasal sinus inflammatory changes. Correlate for acute sinusitis.    < end of copied text >        MEDICATIONS  (STANDING):  aspirin  chewable 81 milliGRAM(s) Enteral Tube daily  atorvastatin 40 milliGRAM(s) Oral at bedtime  cefTRIAXone   IVPB 1000 milliGRAM(s) IV Intermittent every 24 hours  dextrose 5% + lactated ringers. 1000 milliLiter(s) (70 mL/Hr) IV Continuous <Continuous>  dextrose 5%. 1000 milliLiter(s) (100 mL/Hr) IV Continuous <Continuous>  dextrose 5%. 1000 milliLiter(s) (50 mL/Hr) IV Continuous <Continuous>  dextrose 50% Injectable 25 Gram(s) IV Push once  dextrose 50% Injectable 12.5 Gram(s) IV Push once  dextrose 50% Injectable 25 Gram(s) IV Push once  enoxaparin Injectable 40 milliGRAM(s) SubCutaneous every 24 hours  glucagon  Injectable 1 milliGRAM(s) IntraMuscular once  insulin lispro (ADMELOG) corrective regimen sliding scale   SubCutaneous three times a day before meals  lactulose Syrup 20 Gram(s) Oral every 6 hours  levothyroxine 25 MICROGram(s) Oral daily  rifAXIMin 550 milliGRAM(s) Oral two times a day  sodium chloride 0.9%. 1000 milliLiter(s) (75 mL/Hr) IV Continuous <Continuous>  thiamine IVPB 500 milliGRAM(s) IV Intermittent daily    MEDICATIONS  (PRN):  acetaminophen     Tablet .. 650 milliGRAM(s) Oral every 6 hours PRN Temp greater or equal to 38C (100.4F), Mild Pain (1 - 3)  dextrose Oral Gel 15 Gram(s) Oral once PRN Blood Glucose LESS THAN 70 milliGRAM(s)/deciliter  ondansetron Injectable 4 milliGRAM(s) IV Push every 6 hours PRN Nausea and/or Vomiting

## 2022-07-27 NOTE — PROGRESS NOTE ADULT - ASSESSMENT
78 y/o M BIBA from home for AMS w/ associated inability to talk x 1 week.  Pt had hx of UTI prior to current admission.  CTH on admission showed left occipital infarct vs artifact, no last known well time available, not a candidate for tPA.  w/u on admisison also significant for hyperammonemia and BG >400s.  Lactic acidosis likely 2/2 cirrhosis.  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  Pt was admitted for acute metabolic encephalopathy, likely multifactorial  Pts remains acute given his persistent encephalopathy and today w/ notably distended abdomen.         Acute metabolic encephalopathy, likely multifactorial in the setting of hypothyroid, cirrhosis, possible UTI, decub ulcers (POA), colitis, covid and ?subacute CVA  - AMS persists  - CTH reporting possible L-occipital lobe lucency which may represent artifact vs infarct; repeat CTH (-) for acute findings or interval changes     - Hyperammonemia in the setting of cirrhosis and AMS could be 2/2 hepatic encephalopathy and therefore started on Rifaxamin on admission   - Will repeat ammonia levels and monitor frequency of BMs   - s/p cvEEG and epileptiform pattern or seizures noted  - Maintain on synthroid for hypothyroid and repeat TFTs to assess improvement    - Low suspicion for UTI given UA w/out pyuria and Ucx reporting >3 organisms likely contaminant   - Has sacral decub ulcers, no active drainage but unclear if OM deep to wounds however CRP 62 and procal 0.61  - ID recommending MRI; if suspicion high for OM however pt afebrile and nL WBC; If MRI pursued would likely need to be done under conscious sedation    - Colitis noted on CT a/p on admission could be 2/2 COVID and can exacerbate dehydration from poor po intake    - Maintain on fall and aspiration precautions   - ID following and recs noted      Hx of CVA vs subacute CVA  - CTH reviewed and noted above   - MRI deffered by neurology and repeat CTH reviewed and is w/out interval changes  - Maintain on asa and statin therapy  - Maintain on telemetry   - Neurology consulted and recs noted      Distended abdomen   - Notably distended from yesterday  - Nontender and pt asymptomatic but is a poor historian overall  - Abdominal Xray ordered   - Last BM unknown   - Started tube feeds yesterday, will slow rate pending xray findings       Hypothyroidism  - TFTs reviewed; TSH increasing and T4 decreased   - Will increase synthroid dose to 75mcg and reassess TFTs in a week   - TG ab elevated; possibly hashimoto's; TPO ab pending   - Endocrinology consulted and recs noted      Acute Hepatic encephalopathy   - Persistent encephalopathy   - Liver cirrhosis on CT a/p and hyperammonemia noted on admission   - Will repeat ammonia level   - Has hx of heavy EtOH use but as per pt quit >10yrs ago   - Maintain on rifaximin and lactulose  - Titrate lactulose as needed    - Fall precautions       Transaminitis  - Likely related to cirrhosis   - Pt asymptomatic and benign abd exam   - Tbili remains WNL   - Hepatitis panel pending   - Trend LFTs and if continue to trend up or symptomatic will get RUQ US      ?UTI  - Urine cx reporting >3 organisms, likely contaminant and Bcx (-) x2  - Completed 3 day course of empiric Abx (Rocephin) on 7/23  - No leukocytosis and remains afebrile    - Monitor CBC and temperature      Normocytic anemia / Thrombocytopenia   - H/H and platelets stable   - Low plts likely 2/2 cirrhosis   - Hemodynamically stable w/ no active bleeding noted  - Monitor CBC and transfuse if Hb<7        DM II  - Hyperglycemic on admission and hypolgycemic while hospitalized   - A1c 7.7  - ISS and hypoglycemic protocol in place       Colitis   - Incidental finding on CT however could be related to COVID   - Pt is a poor historian and unable to express himself well   - Can contribute to dehydration, exacerbating encephalopathy   - No diarrhea reported and benign clinical exam       Sacral decubitus ulcers and rt heel decubitus ulcer both POA  - No drainage noted   - No leukocytosis and remains afebrile    - Given CT findings and elevated CRP/procal   - ID recommending MRI if suspicion high for OM however given pt afebrile and nL WBC will defer for now; If MRI pursued would likely need to be done under conscious sedation    - s/p 3 day course of IV rocephin   - c/w wound care   - Monitor CBC and temperature   - Consulted ID and recs noted       Dilated AR   - 4.1cm at sinus of valsalva   - Incidental finding on CT chest   - CTSx consulted and recs noted; no intervention warranted at this time        VTE ppx: Lovenox     Code status: full code       Dispo: Pt remains acute requiring hospitalization.  Pt is persistently encephalopathic.

## 2022-07-27 NOTE — CHART NOTE - NSCHARTNOTEFT_GEN_A_CORE
Source: Patient [ ]  Family [ ]   other [x ]EMR  consult noted    Current Diet: NPO with G tube feeds      Enteral /Parenteral Nutrition: Jevity 1.5cal at 40cchr provides 800cc, 1200kcal, 51gr pro    Current Weight: 190#  no edema    % Weight Change     Pertinent Medications: MEDICATIONS  (STANDING):  aspirin  chewable 81 milliGRAM(s) Enteral Tube daily  atorvastatin 40 milliGRAM(s) Oral at bedtime  dextrose 5% + lactated ringers. 1000 milliLiter(s) (70 mL/Hr) IV Continuous <Continuous>  dextrose 5%. 1000 milliLiter(s) (100 mL/Hr) IV Continuous <Continuous>  dextrose 5%. 1000 milliLiter(s) (50 mL/Hr) IV Continuous <Continuous>  dextrose 50% Injectable 25 Gram(s) IV Push once  dextrose 50% Injectable 12.5 Gram(s) IV Push once  dextrose 50% Injectable 25 Gram(s) IV Push once  enoxaparin Injectable 40 milliGRAM(s) SubCutaneous every 24 hours  glucagon  Injectable 1 milliGRAM(s) IntraMuscular once  insulin lispro (ADMELOG) corrective regimen sliding scale   SubCutaneous three times a day before meals  lactulose Syrup 20 Gram(s) Oral every 6 hours  levothyroxine 25 MICROGram(s) Oral daily  rifAXIMin 550 milliGRAM(s) Oral two times a day  sodium chloride 0.9%. 1000 milliLiter(s) (75 mL/Hr) IV Continuous <Continuous>  thiamine IVPB 500 milliGRAM(s) IV Intermittent daily    MEDICATIONS  (PRN):  acetaminophen     Tablet .. 650 milliGRAM(s) Oral every 6 hours PRN Temp greater or equal to 38C (100.4F), Mild Pain (1 - 3)  dextrose Oral Gel 15 Gram(s) Oral once PRN Blood Glucose LESS THAN 70 milliGRAM(s)/deciliter  ondansetron Injectable 4 milliGRAM(s) IV Push every 6 hours PRN Nausea and/or Vomiting    Pertinent Labs: CBC Full  -  ( 27 Jul 2022 06:09 )  WBC Count : 7.03 K/uL  RBC Count : 3.75 M/uL  Hemoglobin : 11.5 g/dL  Hematocrit : 35.8 %  Platelet Count - Automated : 189 K/uL  Mean Cell Volume : 95.5 fl  Mean Cell Hemoglobin : 30.7 pg  Mean Cell Hemoglobin Concentration : 32.1 gm/dL  Auto Neutrophil # : 4.81 K/uL  Auto Lymphocyte # : 1.14 K/uL  Auto Monocyte # : 0.76 K/uL  Auto Eosinophil # : 0.27 K/uL  Auto Basophil # : 0.03 K/uL  Auto Neutrophil % : 68.5 %  Auto Lymphocyte % : 16.2 %  Auto Monocyte % : 10.8 %  Auto Eosinophil % : 3.8 %  Auto Basophil % : 0.4 %      07-27 Na135 mmol/L Glu 164 mg/dL<H> K+ 4.0 mmol/L Cr  0.86 mg/dL BUN 17.0 mg/dL Phos 2.7 mg/dL Alb 2.2 g/dL<L> PAB n/a           Skin: stage 3 right butt, stage 2 right heel as per wound care    Nutrition focused physical exam conducted - found signs of malnutrition [ ]absent [x ]present    Subcutaneous fat loss: [ ] Orbital fat pads region, [ ]Buccal fat region, [ ]Triceps region,  [ ]Ribs region    Muscle wasting: [ x]Temples region, [ ]Clavicle region, [ ]Shoulder region, [ ]Scapula region, [ ]Interosseous region,  [ ]thigh region, [ ]Calf region    Estimated Needs:   [x ] no change since previous assessment  [ ] recalculated:     Current Nutrition Diagnosis: Malnutrition...  Moderate  related to inadequate energy intake in setting of covid +, poor skin integrity  as evidenced by mild muscle depletion, <75% intake > 1 mo. Pt with MBS yesterday, NPO recommended. Pt now on tube feeds with Jevity 1.5cal at 40cchr. See recommendations below.       Recommendations:   Rec Increase rate of Jevity 1.5cal tube feeds to 75cchr to provide 1500cc, 2250kcal, 96gr pro to better meet nutritional needs.  Rx: MVI daily, vitamin C (500mg daily), and zinc sulfate (220mg daily x 10 days) to aid in wound healing.     Monitoring and Evaluation:   [ ] PO intake [x ] Tolerance to diet prescription [X] Weights  [X] Follow up per protocol [X] Labs: English

## 2022-07-28 LAB
ALBUMIN SERPL ELPH-MCNC: 1.9 G/DL — LOW (ref 3.3–5.2)
ALP SERPL-CCNC: 145 U/L — HIGH (ref 40–120)
ALT FLD-CCNC: 43 U/L — HIGH
AMMONIA BLD-MCNC: 49 UMOL/L — SIGNIFICANT CHANGE UP (ref 11–55)
ANION GAP SERPL CALC-SCNC: 9 MMOL/L — SIGNIFICANT CHANGE UP (ref 5–17)
AST SERPL-CCNC: 71 U/L — HIGH
BASOPHILS # BLD AUTO: 0.04 K/UL — SIGNIFICANT CHANGE UP (ref 0–0.2)
BASOPHILS NFR BLD AUTO: 0.6 % — SIGNIFICANT CHANGE UP (ref 0–2)
BILIRUB SERPL-MCNC: 0.9 MG/DL — SIGNIFICANT CHANGE UP (ref 0.4–2)
BUN SERPL-MCNC: 16.3 MG/DL — SIGNIFICANT CHANGE UP (ref 8–20)
CALCIUM SERPL-MCNC: 7.6 MG/DL — LOW (ref 8.4–10.5)
CHLORIDE SERPL-SCNC: 103 MMOL/L — SIGNIFICANT CHANGE UP (ref 98–107)
CO2 SERPL-SCNC: 19 MMOL/L — LOW (ref 22–29)
CREAT SERPL-MCNC: 0.77 MG/DL — SIGNIFICANT CHANGE UP (ref 0.5–1.3)
EGFR: 91 ML/MIN/1.73M2 — SIGNIFICANT CHANGE UP
EOSINOPHIL # BLD AUTO: 0.3 K/UL — SIGNIFICANT CHANGE UP (ref 0–0.5)
EOSINOPHIL NFR BLD AUTO: 4.3 % — SIGNIFICANT CHANGE UP (ref 0–6)
GLUCOSE BLDC GLUCOMTR-MCNC: 123 MG/DL — HIGH (ref 70–99)
GLUCOSE BLDC GLUCOMTR-MCNC: 139 MG/DL — HIGH (ref 70–99)
GLUCOSE BLDC GLUCOMTR-MCNC: 141 MG/DL — HIGH (ref 70–99)
GLUCOSE BLDC GLUCOMTR-MCNC: 147 MG/DL — HIGH (ref 70–99)
GLUCOSE SERPL-MCNC: 135 MG/DL — HIGH (ref 70–99)
HCT VFR BLD CALC: 36 % — LOW (ref 39–50)
HGB BLD-MCNC: 11.7 G/DL — LOW (ref 13–17)
IMM GRANULOCYTES NFR BLD AUTO: 0.6 % — SIGNIFICANT CHANGE UP (ref 0–1.5)
LYMPHOCYTES # BLD AUTO: 1.04 K/UL — SIGNIFICANT CHANGE UP (ref 1–3.3)
LYMPHOCYTES # BLD AUTO: 15.1 % — SIGNIFICANT CHANGE UP (ref 13–44)
MAGNESIUM SERPL-MCNC: 1.8 MG/DL — SIGNIFICANT CHANGE UP (ref 1.6–2.6)
MCHC RBC-ENTMCNC: 31 PG — SIGNIFICANT CHANGE UP (ref 27–34)
MCHC RBC-ENTMCNC: 32.5 GM/DL — SIGNIFICANT CHANGE UP (ref 32–36)
MCV RBC AUTO: 95.2 FL — SIGNIFICANT CHANGE UP (ref 80–100)
MONOCYTES # BLD AUTO: 0.68 K/UL — SIGNIFICANT CHANGE UP (ref 0–0.9)
MONOCYTES NFR BLD AUTO: 9.8 % — SIGNIFICANT CHANGE UP (ref 2–14)
NEUTROPHILS # BLD AUTO: 4.81 K/UL — SIGNIFICANT CHANGE UP (ref 1.8–7.4)
NEUTROPHILS NFR BLD AUTO: 69.6 % — SIGNIFICANT CHANGE UP (ref 43–77)
PHOSPHATE SERPL-MCNC: 2.5 MG/DL — SIGNIFICANT CHANGE UP (ref 2.4–4.7)
PLATELET # BLD AUTO: 193 K/UL — SIGNIFICANT CHANGE UP (ref 150–400)
POTASSIUM SERPL-MCNC: 4 MMOL/L — SIGNIFICANT CHANGE UP (ref 3.5–5.3)
POTASSIUM SERPL-SCNC: 4 MMOL/L — SIGNIFICANT CHANGE UP (ref 3.5–5.3)
PROT SERPL-MCNC: 6.8 G/DL — SIGNIFICANT CHANGE UP (ref 6.6–8.7)
RBC # BLD: 3.78 M/UL — LOW (ref 4.2–5.8)
RBC # FLD: 15.3 % — HIGH (ref 10.3–14.5)
SODIUM SERPL-SCNC: 130 MMOL/L — LOW (ref 135–145)
T4 FREE SERPL-MCNC: 0.6 NG/DL — LOW (ref 0.9–1.8)
WBC # BLD: 6.91 K/UL — SIGNIFICANT CHANGE UP (ref 3.8–10.5)
WBC # FLD AUTO: 6.91 K/UL — SIGNIFICANT CHANGE UP (ref 3.8–10.5)

## 2022-07-28 PROCEDURE — 99233 SBSQ HOSP IP/OBS HIGH 50: CPT

## 2022-07-28 RX ORDER — LACTULOSE 10 G/15ML
30 SOLUTION ORAL EVERY 6 HOURS
Refills: 0 | Status: DISCONTINUED | OUTPATIENT
Start: 2022-07-28 | End: 2022-07-31

## 2022-07-28 RX ORDER — SODIUM CHLORIDE 9 MG/ML
1000 INJECTION, SOLUTION INTRAVENOUS
Refills: 0 | Status: DISCONTINUED | OUTPATIENT
Start: 2022-07-28 | End: 2022-07-30

## 2022-07-28 RX ADMIN — LACTULOSE 30 GRAM(S): 10 SOLUTION ORAL at 23:16

## 2022-07-28 RX ADMIN — Medication 1 ENEMA: at 06:16

## 2022-07-28 RX ADMIN — Medication 75 MICROGRAM(S): at 06:16

## 2022-07-28 RX ADMIN — Medication 105 MILLIGRAM(S): at 14:28

## 2022-07-28 RX ADMIN — ONDANSETRON 4 MILLIGRAM(S): 8 TABLET, FILM COATED ORAL at 11:29

## 2022-07-28 RX ADMIN — LACTULOSE 30 GRAM(S): 10 SOLUTION ORAL at 18:12

## 2022-07-28 RX ADMIN — SODIUM CHLORIDE 70 MILLILITER(S): 9 INJECTION, SOLUTION INTRAVENOUS at 14:33

## 2022-07-28 RX ADMIN — ENOXAPARIN SODIUM 40 MILLIGRAM(S): 100 INJECTION SUBCUTANEOUS at 06:13

## 2022-07-28 RX ADMIN — LACTULOSE 20 GRAM(S): 10 SOLUTION ORAL at 11:22

## 2022-07-28 RX ADMIN — SODIUM CHLORIDE 75 MILLILITER(S): 9 INJECTION, SOLUTION INTRAVENOUS at 18:25

## 2022-07-28 RX ADMIN — LACTULOSE 20 GRAM(S): 10 SOLUTION ORAL at 06:14

## 2022-07-28 RX ADMIN — SODIUM CHLORIDE 75 MILLILITER(S): 9 INJECTION, SOLUTION INTRAVENOUS at 23:17

## 2022-07-28 RX ADMIN — Medication 81 MILLIGRAM(S): at 11:22

## 2022-07-28 RX ADMIN — ATORVASTATIN CALCIUM 40 MILLIGRAM(S): 80 TABLET, FILM COATED ORAL at 23:16

## 2022-07-28 NOTE — PROGRESS NOTE ADULT - ASSESSMENT
76 y/o M BIBA from home for AMS w/ associated inability to talk x 1 week.  Pt had hx of UTI prior to current admission.  CTH on admission showed left occipital infarct vs artifact, no last known well time available, not a candidate for tPA.  w/u on admisison also significant for hyperammonemia and BG >400s.  Lactic acidosis likely 2/2 cirrhosis.  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  Pt was admitted for acute metabolic encephalopathy, likely multifactorial  Pts remains acute given his persistent encephalopathy and today w/ notably distended abdomen; remains constipated despite aggressive bowel regimen.  Also has persistent transaminitis and hyponatremia.         Acute metabolic encephalopathy, likely multifactorial in the setting of hypothyroid, cirrhosis, possible UTI, decub ulcers (POA), colitis, covid and ?subacute CVA, constipation   - AMS persists  - CTH reporting possible L-occipital lobe lucency which may represent artifact vs infarct; repeat CTH (-) for acute findings or interval changes     - Hyperammonemia in the setting of cirrhosis and AMS could be 2/2 hepatic encephalopathy and therefore started on Rifaxamin on admission   - Repeat ammonia WNL   - s/p cvEEG and epileptiform pattern or seizures noted  - Repeat TFTs w/ worsening hypothyroid, increased dose of synthroid and will repeat TFTs in a few days to reassess    - Low suspicion for UTI given UA w/out pyuria and Ucx reporting >3 organisms likely contaminant   - Has sacral decub ulcers, no active drainage but unclear if OM deep to wounds however CRP 62 and procal 0.61  - ID recommending MRI; if suspicion high for OM however pt afebrile and nL WBC; If MRI pursued would likely need to be done under conscious sedation    - Colitis noted on CT a/p on admission could be 2/2 COVID and can exacerbate dehydration from poor po intake    - Maintain on fall and aspiration precautions   - ID following and recs noted      Hx of CVA vs subacute CVA  - CTH reviewed and noted above   - MRI deffered by neurology and repeat CTH reviewed and is w/out interval changes  - Maintain on asa and statin therapy  - Maintain on telemetry   - Neurology consulted and recs noted      Distended abdomen 2/2 constipation   - Notably distended but nontender and asymptomatic but is a poor historian overall  - Abdominal Xray ordered and no pathologic findings   - Had mx enemas overnight and only 1 BM todya  - Will readjust bowel regimen and monitor BMs   - Tube feeds held and will resume tomorrow and maintain on IVFs      Hypothyroidism  - TFTs reviewed; TSH increasing and T4 decreased; synthroid increased to 75mcg   - Reassess TFTs in a few days    - TG ab elevated; possibly hashimoto's; TPO ab pending   - Endocrinology consulted and recs noted      Acute Hepatic encephalopathy   - Persistent encephalopathy   - Liver cirrhosis on CT a/p and hyperammonemia noted on admission   - Repeat ammonia level WNL   - Has hx of heavy EtOH use but as per pt quit >10yrs ago   - Maintain on rifaximin and lactulose  - Titrate lactulose as needed    - Fall precautions       Transaminitis  - Trending up   - Likely related to cirrhosis   - Pt asymptomatic and benign abd exam   - Tbili remains WNL   - Hepatitis panel pending   - Trend LFTs and if continue to trend up or symptomatic will get RUQ US      ?UTI  - Urine cx reporting >3 organisms, likely contaminant and Bcx (-) x2  - Completed 3 day course of empiric Abx (Rocephin) on 7/23  - No leukocytosis and remains afebrile    - Monitor CBC and temperature      Normocytic anemia / Thrombocytopenia   - H/H and platelets stable   - Low plts likely 2/2 cirrhosis   - Hemodynamically stable w/ no active bleeding noted  - Monitor CBC and transfuse if Hb<7        DM II  - Hyperglycemic on admission and hypolgycemic while hospitalized   - A1c 7.7  - ISS and hypoglycemic protocol in place       Colitis   - Incidental finding on CT however could be related to COVID   - Pt is a poor historian and unable to express himself well   - Can contribute to dehydration, exacerbating encephalopathy   - No diarrhea reported and benign clinical exam       Sacral decubitus ulcers and rt heel decubitus ulcer both POA  - No drainage noted   - No leukocytosis and remains afebrile    - Given CT findings and elevated CRP/procal   - ID recommending MRI if suspicion high for OM however given pt afebrile and nL WBC will defer for now; If MRI pursued would likely need to be done under conscious sedation    - s/p 3 day course of IV rocephin   - c/w wound care   - Monitor CBC and temperature   - Consulted ID and recs noted       Dilated AR   - 4.1cm at sinus of valsalva   - Incidental finding on CT chest   - CTSx consulted and recs noted; no intervention warranted at this time      Hypovolemic hyponatremia   - Gentle hydration w/ LR   - Monitor Na levels   - Monitor I/O's         VTE ppx: Lovenox     Code status: full code       Dispo: Pt remains acute requiring hospitalization.  Pt is persistently encephalopathic.     Tried reaching out to wife to update her and to obtain background info on pts baseline mental status but unable to get in touch with her.

## 2022-07-28 NOTE — PROGRESS NOTE ADULT - SUBJECTIVE AND OBJECTIVE BOX
Chief Complaint:  AMS    SUBJECTIVE / OVERNIGHT EVENTS:  No acute events reported overnight.  No BM overnight.  Pt is a poor historian but offers no acute complaints at this time.  Patient denies chest pain, SOB, abd pain, N/V, fever, chills, dysuria or any other complaints. All remainder ROS negative.       I&O's Summary        PHYSICAL EXAM:  Vital Signs Last 24 Hrs  T(C): 36.7 (28 Jul 2022 05:29), Max: 36.9 (27 Jul 2022 20:59)  T(F): 98 (28 Jul 2022 05:29), Max: 98.4 (27 Jul 2022 20:59)  HR: 100 (28 Jul 2022 05:29) (100 - 102)  BP: 132/78 (28 Jul 2022 05:29) (123/82 - 132/78)  BP(mean): --  RR: 18 (28 Jul 2022 05:29) (18 - 18)  SpO2: 96% (28 Jul 2022 05:29) (96% - 97%)    Parameters below as of 28 Jul 2022 05:29  Patient On (Oxygen Delivery Method): room air        GENERAL: pt examined bedside, laying comfortably in bed in NAD  HEENT: NC/AT, dry oral mucosa, clear conjunctiva, sclera nonicteric  RESPIRATORY: poor inspiratory effort, CTA b/l, no wheezing, rhonchi, rales  CARDIOVASCULAR: RRR, normal S1 and S2  ABDOMEN: soft, nontender but distended, +BS, no rebound/guarding, +peg  EXTREMITIES: No cynaosis, no clubbing, no lower extremity edema, pulses 2+  PSYCH: affect flat but cooperative  NEUROLOGY: A+O x1-2, no focal neurologic deficits appreciated   SKIN: No rashes or no palpable lesions, poor turgur        LABS:                                            11.7   6.91  )-----------( 193      ( 28 Jul 2022 04:37 )             36.0     07-28    130<L>  |  103  |  16.3  ----------------------------<  135<H>  4.0   |  19.0<L>  |  0.77    Ca    7.6<L>      28 Jul 2022 04:37  Phos  2.5     07-28  Mg     1.8     07-28    TPro  6.8  /  Alb  1.9<L>  /  TBili  0.9  /  DBili  x   /  AST  71<H>  /  ALT  43<H>  /  AlkPhos  145<H>  07-28    CAPILLARY BLOOD GLUCOSE      POCT Blood Glucose.: 73 mg/dL (23 Jul 2022 09:06)  POCT Blood Glucose.: 113 mg/dL (22 Jul 2022 17:29)  POCT Blood Glucose.: 137 mg/dL (22 Jul 2022 12:42)        RADIOLOGY & ADDITIONAL TESTS:    < from: CT Abdomen and Pelvis w/ IV Cont (07.20.22 @ 13:59) >  IMPRESSION:    CHEST:  1. No central, main, lobar, or segmental pulmonary embolus. Subsegmental   pulmonary arterial tree limited evaluation due to respiratory motion.  2. Mildly dilated aortic root, 4.1 cm at sinuses of Valsalva. Coronary   artery calcification.      ABDOMEN/PELVIS:  1. Colitis. Follow to resolution.  2. Cirrhotic liver. Atrophic left hepatic lobe. Small ascites possibly   related to cirrhosis or colitis.  3. Obliquely oriented lucency of right superior pubic ramus is   indeterminate for nondisplaced fracture versus sequelae of streak   artifact. Correlation with clinical evaluation and pelvic MRI is   recommended.  4. Bilateral partially imaged ischial soft tissue densities with central   low attenuation measuring 7.3 cm on the left and 3.8 cm on the right.   Underlying complex collections associated with decubitus ulcers can be   considered. Right gluteal soft tissue edema and asymmetric muscle   expansion. Underlying hematoma cannot be excluded. Additional probable   sacral decubitus ulcer. Correlation with clinical evaluation and pelvic   MRI is recommended.    < end of copied text >      < from: CT Head No Cont (07.20.22 @ 13:44) >  IMPRESSION:    Motion limited study. Left occipital lobe lucency may represent artifact   versus infarct. No gross hemorrhage, mass effect or hydrocephalus.    < end of copied text >      < from: CT Head No Cont (07.25.22 @ 18:15) >  IMPRESSION:    No acute hemorrhage, mass effect or extra-axial collections. Increasing   paranasal sinus inflammatory changes. Correlate for acute sinusitis.    < end of copied text >      < from: Xray Abdomen 1 View PORTABLE -Urgent (Xray Abdomen 1 View PORTABLE -Urgent .) (07.27.22 @ 09:15) >  IMPRESSION:    No acute radiographic abdominal findings.    < end of copied text >      MEDICATIONS  (STANDING):  aspirin  chewable 81 milliGRAM(s) Enteral Tube daily  atorvastatin 40 milliGRAM(s) Oral at bedtime  cefTRIAXone   IVPB 1000 milliGRAM(s) IV Intermittent every 24 hours  dextrose 5% + lactated ringers. 1000 milliLiter(s) (70 mL/Hr) IV Continuous <Continuous>  dextrose 5%. 1000 milliLiter(s) (100 mL/Hr) IV Continuous <Continuous>  dextrose 5%. 1000 milliLiter(s) (50 mL/Hr) IV Continuous <Continuous>  dextrose 50% Injectable 25 Gram(s) IV Push once  dextrose 50% Injectable 12.5 Gram(s) IV Push once  dextrose 50% Injectable 25 Gram(s) IV Push once  enoxaparin Injectable 40 milliGRAM(s) SubCutaneous every 24 hours  glucagon  Injectable 1 milliGRAM(s) IntraMuscular once  insulin lispro (ADMELOG) corrective regimen sliding scale   SubCutaneous three times a day before meals  lactulose Syrup 20 Gram(s) Oral every 6 hours  levothyroxine 25 MICROGram(s) Oral daily  rifAXIMin 550 milliGRAM(s) Oral two times a day  sodium chloride 0.9%. 1000 milliLiter(s) (75 mL/Hr) IV Continuous <Continuous>  thiamine IVPB 500 milliGRAM(s) IV Intermittent daily    MEDICATIONS  (PRN):  acetaminophen     Tablet .. 650 milliGRAM(s) Oral every 6 hours PRN Temp greater or equal to 38C (100.4F), Mild Pain (1 - 3)  dextrose Oral Gel 15 Gram(s) Oral once PRN Blood Glucose LESS THAN 70 milliGRAM(s)/deciliter  ondansetron Injectable 4 milliGRAM(s) IV Push every 6 hours PRN Nausea and/or Vomiting

## 2022-07-29 LAB
ALBUMIN SERPL ELPH-MCNC: 2.4 G/DL — LOW (ref 3.3–5.2)
ALP SERPL-CCNC: 150 U/L — HIGH (ref 40–120)
ALT FLD-CCNC: 56 U/L — HIGH
ANION GAP SERPL CALC-SCNC: 10 MMOL/L — SIGNIFICANT CHANGE UP (ref 5–17)
AST SERPL-CCNC: 95 U/L — HIGH
BASOPHILS # BLD AUTO: 0.03 K/UL — SIGNIFICANT CHANGE UP (ref 0–0.2)
BASOPHILS NFR BLD AUTO: 0.3 % — SIGNIFICANT CHANGE UP (ref 0–2)
BILIRUB SERPL-MCNC: 1.2 MG/DL — SIGNIFICANT CHANGE UP (ref 0.4–2)
BUN SERPL-MCNC: 17.6 MG/DL — SIGNIFICANT CHANGE UP (ref 8–20)
CALCIUM SERPL-MCNC: 7.9 MG/DL — LOW (ref 8.4–10.5)
CHLORIDE SERPL-SCNC: 103 MMOL/L — SIGNIFICANT CHANGE UP (ref 98–107)
CO2 SERPL-SCNC: 23 MMOL/L — SIGNIFICANT CHANGE UP (ref 22–29)
CREAT SERPL-MCNC: 0.78 MG/DL — SIGNIFICANT CHANGE UP (ref 0.5–1.3)
EGFR: 91 ML/MIN/1.73M2 — SIGNIFICANT CHANGE UP
EOSINOPHIL # BLD AUTO: 0.04 K/UL — SIGNIFICANT CHANGE UP (ref 0–0.5)
EOSINOPHIL NFR BLD AUTO: 0.5 % — SIGNIFICANT CHANGE UP (ref 0–6)
GLUCOSE BLDC GLUCOMTR-MCNC: 100 MG/DL — HIGH (ref 70–99)
GLUCOSE BLDC GLUCOMTR-MCNC: 107 MG/DL — HIGH (ref 70–99)
GLUCOSE BLDC GLUCOMTR-MCNC: 111 MG/DL — HIGH (ref 70–99)
GLUCOSE SERPL-MCNC: 127 MG/DL — HIGH (ref 70–99)
HCT VFR BLD CALC: 36.7 % — LOW (ref 39–50)
HGB BLD-MCNC: 12 G/DL — LOW (ref 13–17)
IMM GRANULOCYTES NFR BLD AUTO: 0.5 % — SIGNIFICANT CHANGE UP (ref 0–1.5)
LYMPHOCYTES # BLD AUTO: 0.73 K/UL — LOW (ref 1–3.3)
LYMPHOCYTES # BLD AUTO: 8.3 % — LOW (ref 13–44)
MAGNESIUM SERPL-MCNC: 1.7 MG/DL — SIGNIFICANT CHANGE UP (ref 1.6–2.6)
MCHC RBC-ENTMCNC: 31.1 PG — SIGNIFICANT CHANGE UP (ref 27–34)
MCHC RBC-ENTMCNC: 32.7 GM/DL — SIGNIFICANT CHANGE UP (ref 32–36)
MCV RBC AUTO: 95.1 FL — SIGNIFICANT CHANGE UP (ref 80–100)
MONOCYTES # BLD AUTO: 0.72 K/UL — SIGNIFICANT CHANGE UP (ref 0–0.9)
MONOCYTES NFR BLD AUTO: 8.2 % — SIGNIFICANT CHANGE UP (ref 2–14)
NEUTROPHILS # BLD AUTO: 7.23 K/UL — SIGNIFICANT CHANGE UP (ref 1.8–7.4)
NEUTROPHILS NFR BLD AUTO: 82.2 % — HIGH (ref 43–77)
PHOSPHATE SERPL-MCNC: 3.3 MG/DL — SIGNIFICANT CHANGE UP (ref 2.4–4.7)
PLATELET # BLD AUTO: 239 K/UL — SIGNIFICANT CHANGE UP (ref 150–400)
POTASSIUM SERPL-MCNC: 4.2 MMOL/L — SIGNIFICANT CHANGE UP (ref 3.5–5.3)
POTASSIUM SERPL-SCNC: 4.2 MMOL/L — SIGNIFICANT CHANGE UP (ref 3.5–5.3)
PROT SERPL-MCNC: 7.7 G/DL — SIGNIFICANT CHANGE UP (ref 6.6–8.7)
RBC # BLD: 3.86 M/UL — LOW (ref 4.2–5.8)
RBC # FLD: 15.1 % — HIGH (ref 10.3–14.5)
SODIUM SERPL-SCNC: 136 MMOL/L — SIGNIFICANT CHANGE UP (ref 135–145)
WBC # BLD: 8.79 K/UL — SIGNIFICANT CHANGE UP (ref 3.8–10.5)
WBC # FLD AUTO: 8.79 K/UL — SIGNIFICANT CHANGE UP (ref 3.8–10.5)

## 2022-07-29 PROCEDURE — 74176 CT ABD & PELVIS W/O CONTRAST: CPT | Mod: 26

## 2022-07-29 PROCEDURE — 99233 SBSQ HOSP IP/OBS HIGH 50: CPT

## 2022-07-29 RX ORDER — METOCLOPRAMIDE HCL 10 MG
10 TABLET ORAL ONCE
Refills: 0 | Status: COMPLETED | OUTPATIENT
Start: 2022-07-29 | End: 2022-07-29

## 2022-07-29 RX ORDER — FUROSEMIDE 40 MG
40 TABLET ORAL ONCE
Refills: 0 | Status: COMPLETED | OUTPATIENT
Start: 2022-07-29 | End: 2022-07-29

## 2022-07-29 RX ADMIN — SODIUM CHLORIDE 75 MILLILITER(S): 9 INJECTION, SOLUTION INTRAVENOUS at 08:46

## 2022-07-29 RX ADMIN — Medication 10 MILLIGRAM(S): at 08:47

## 2022-07-29 RX ADMIN — ENOXAPARIN SODIUM 40 MILLIGRAM(S): 100 INJECTION SUBCUTANEOUS at 05:21

## 2022-07-29 RX ADMIN — LACTULOSE 30 GRAM(S): 10 SOLUTION ORAL at 18:36

## 2022-07-29 RX ADMIN — LACTULOSE 30 GRAM(S): 10 SOLUTION ORAL at 22:30

## 2022-07-29 RX ADMIN — LACTULOSE 30 GRAM(S): 10 SOLUTION ORAL at 14:13

## 2022-07-29 RX ADMIN — LACTULOSE 30 GRAM(S): 10 SOLUTION ORAL at 05:22

## 2022-07-29 RX ADMIN — Medication 105 MILLIGRAM(S): at 18:36

## 2022-07-29 RX ADMIN — ATORVASTATIN CALCIUM 40 MILLIGRAM(S): 80 TABLET, FILM COATED ORAL at 22:30

## 2022-07-29 RX ADMIN — Medication 40 MILLIGRAM(S): at 18:36

## 2022-07-29 RX ADMIN — Medication 75 MICROGRAM(S): at 05:22

## 2022-07-29 RX ADMIN — Medication 81 MILLIGRAM(S): at 14:14

## 2022-07-29 NOTE — PROGRESS NOTE ADULT - SUBJECTIVE AND OBJECTIVE BOX
Chief Complaint:  AMS    SUBJECTIVE / OVERNIGHT EVENTS:  No acute events reported overnight.  This morning pt c/o nausia and NBNB emesis and abd still distendedPt still NPO.  Pt is a poor historian but offers no acute complaints at this time.  Patient denies chest pain, SOB, abd pain, N/V, fever, chills, dysuria or any other complaints. All remainder ROS negative.       I&O's Summary        PHYSICAL EXAM:  Vital Signs Last 24 Hrs  T(C): 36.9 (29 Jul 2022 13:25), Max: 36.9 (28 Jul 2022 20:39)  T(F): 98.4 (29 Jul 2022 13:25), Max: 98.4 (28 Jul 2022 20:39)  HR: 106 (29 Jul 2022 13:25) (101 - 106)  BP: 125/79 (29 Jul 2022 13:25) (125/79 - 153/85)  BP(mean): --  RR: 18 (29 Jul 2022 13:25) (18 - 19)  SpO2: 92% (29 Jul 2022 13:25) (92% - 98%)    Parameters below as of 29 Jul 2022 13:25  Patient On (Oxygen Delivery Method): room air          GENERAL: pt examined bedside, laying comfortably in bed in NAD  HEENT: NC/AT, dry oral mucosa, clear conjunctiva, sclera nonicteric  RESPIRATORY: poor inspiratory effort, CTA b/l, no wheezing, rhonchi, rales  CARDIOVASCULAR: RRR, normal S1 and S2  ABDOMEN: soft, nontender but distended, +BS, no rebound/guarding, +peg  EXTREMITIES: No cynaosis, no clubbing, no lower extremity edema, pulses 2+  PSYCH: affect flat but cooperative  NEUROLOGY: A+O x1-2, no focal neurologic deficits appreciated   SKIN: No rashes or no palpable lesions, poor turgur        LABS:                                            12.0   8.79  )-----------( 239      ( 29 Jul 2022 06:40 )             36.7         07-29    136  |  103  |  17.6  ----------------------------<  127<H>  4.2   |  23.0  |  0.78    Ca    7.9<L>      29 Jul 2022 06:40  Phos  3.3     07-29  Mg     1.7     07-29    TPro  7.7  /  Alb  2.4<L>  /  TBili  1.2  /  DBili  x   /  AST  95<H>  /  ALT  56<H>  /  AlkPhos  150<H>  07-29        CAPILLARY BLOOD GLUCOSE      POCT Blood Glucose.: 73 mg/dL (23 Jul 2022 09:06)  POCT Blood Glucose.: 113 mg/dL (22 Jul 2022 17:29)  POCT Blood Glucose.: 137 mg/dL (22 Jul 2022 12:42)        RADIOLOGY & ADDITIONAL TESTS:    < from: CT Abdomen and Pelvis w/ IV Cont (07.20.22 @ 13:59) >  IMPRESSION:    CHEST:  1. No central, main, lobar, or segmental pulmonary embolus. Subsegmental   pulmonary arterial tree limited evaluation due to respiratory motion.  2. Mildly dilated aortic root, 4.1 cm at sinuses of Valsalva. Coronary   artery calcification.      ABDOMEN/PELVIS:  1. Colitis. Follow to resolution.  2. Cirrhotic liver. Atrophic left hepatic lobe. Small ascites possibly   related to cirrhosis or colitis.  3. Obliquely oriented lucency of right superior pubic ramus is   indeterminate for nondisplaced fracture versus sequelae of streak   artifact. Correlation with clinical evaluation and pelvic MRI is   recommended.  4. Bilateral partially imaged ischial soft tissue densities with central   low attenuation measuring 7.3 cm on the left and 3.8 cm on the right.   Underlying complex collections associated with decubitus ulcers can be   considered. Right gluteal soft tissue edema and asymmetric muscle   expansion. Underlying hematoma cannot be excluded. Additional probable   sacral decubitus ulcer. Correlation with clinical evaluation and pelvic   MRI is recommended.    < end of copied text >      < from: CT Head No Cont (07.20.22 @ 13:44) >  IMPRESSION:    Motion limited study. Left occipital lobe lucency may represent artifact   versus infarct. No gross hemorrhage, mass effect or hydrocephalus.    < end of copied text >      < from: CT Head No Cont (07.25.22 @ 18:15) >  IMPRESSION:    No acute hemorrhage, mass effect or extra-axial collections. Increasing   paranasal sinus inflammatory changes. Correlate for acute sinusitis.    < end of copied text >      < from: Xray Abdomen 1 View PORTABLE -Urgent (Xray Abdomen 1 View PORTABLE -Urgent .) (07.27.22 @ 09:15) >  IMPRESSION:    No acute radiographic abdominal findings.    < end of copied text >      MEDICATIONS  (STANDING):  aspirin  chewable 81 milliGRAM(s) Enteral Tube daily  atorvastatin 40 milliGRAM(s) Oral at bedtime  cefTRIAXone   IVPB 1000 milliGRAM(s) IV Intermittent every 24 hours  dextrose 5% + lactated ringers. 1000 milliLiter(s) (70 mL/Hr) IV Continuous <Continuous>  dextrose 5%. 1000 milliLiter(s) (100 mL/Hr) IV Continuous <Continuous>  dextrose 5%. 1000 milliLiter(s) (50 mL/Hr) IV Continuous <Continuous>  dextrose 50% Injectable 25 Gram(s) IV Push once  dextrose 50% Injectable 12.5 Gram(s) IV Push once  dextrose 50% Injectable 25 Gram(s) IV Push once  enoxaparin Injectable 40 milliGRAM(s) SubCutaneous every 24 hours  glucagon  Injectable 1 milliGRAM(s) IntraMuscular once  insulin lispro (ADMELOG) corrective regimen sliding scale   SubCutaneous three times a day before meals  lactulose Syrup 20 Gram(s) Oral every 6 hours  levothyroxine 25 MICROGram(s) Oral daily  rifAXIMin 550 milliGRAM(s) Oral two times a day  sodium chloride 0.9%. 1000 milliLiter(s) (75 mL/Hr) IV Continuous <Continuous>  thiamine IVPB 500 milliGRAM(s) IV Intermittent daily    MEDICATIONS  (PRN):  acetaminophen     Tablet .. 650 milliGRAM(s) Oral every 6 hours PRN Temp greater or equal to 38C (100.4F), Mild Pain (1 - 3)  dextrose Oral Gel 15 Gram(s) Oral once PRN Blood Glucose LESS THAN 70 milliGRAM(s)/deciliter  ondansetron Injectable 4 milliGRAM(s) IV Push every 6 hours PRN Nausea and/or Vomiting

## 2022-07-29 NOTE — PROGRESS NOTE ADULT - ASSESSMENT
76 y/o M BIBA from home for AMS w/ associated inability to talk x 1 week.  Pt had hx of UTI prior to current admission.  CTH on admission showed left occipital infarct vs artifact, no last known well time available, not a candidate for tPA.  w/u on admisison also significant for hyperammonemia and BG >400s.  Lactic acidosis likely 2/2 cirrhosis.  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  Pt was admitted for acute metabolic encephalopathy, likely multifactorial  Pts remains acute given his persistent encephalopathy and today w/ notably distended abdomen; remains constipated despite aggressive bowel regimen.  Also has persistent transaminitis and today w/ N/V.        Acute metabolic encephalopathy, likely multifactorial in the setting of hypothyroid, cirrhosis, possible UTI, decub ulcers (POA), colitis, covid and ?subacute CVA, constipation   - AMS persists   - CTH reporting possible L-occipital lobe lucency which may represent artifact vs infarct; repeat CTH (-) for acute findings or interval changes     - Hyperammonemia in the setting of cirrhosis and AMS could be 2/2 hepatic encephalopathy and therefore started on Rifaxamin on admission   - Repeat ammonia WNL   - s/p cvEEG and epileptiform pattern or seizures noted  - Repeat TFTs w/ worsening hypothyroid, increased dose of synthroid and will repeat TFTs in a few days to reassess    - Low suspicion for UTI given UA w/out pyuria and Ucx reporting >3 organisms likely contaminant   - Has sacral decub ulcers, no active drainage but unclear if OM deep to wounds however CRP 62 and procal 0.61  - ID recommending MRI; if suspicion high for OM however pt afebrile and nL WBC; If MRI pursued would likely need to be done under conscious sedation    - Colitis noted on CT a/p on admission could be 2/2 COVID and can exacerbate dehydration from poor po intake    - Maintain on fall and aspiration precautions   - ID following and recs noted      Hx of CVA vs subacute CVA  - CTH reviewed and noted above   - MRI deffered by neurology and repeat CTH reviewed and is w/out interval changes  - Maintain on asa and statin therapy  - Maintain on telemetry   - Neurology consulted and recs noted      Distended abdomen 2/2 constipation / Nassea and NBNB emesis   - Notably distended but nontender and asymptomatic but is a poor historian overall  - Abdominal Xray ordered and no pathologic findings   - Reglan PRN for nausea/vomiting as it will also enhance GI motility   - CT a/p reviewed and has worsening ascities but as per IR not enough to drain; will give 1x dose of IV lasix   - Continue with aggressive bowel regimen   - Tube feeds held and will resume tomorrow and maintain on IVFs      Hypothyroidism  - TFTs reviewed; TSH increasing and T4 decreased; synthroid increased to 75mcg   - Reassess TFTs in a few days    - TG ab elevated; possibly hashimoto's; TPO ab pending   - Endocrinology consulted and recs noted      Acute Hepatic encephalopathy   - Persistent encephalopathy   - Liver cirrhosis on CT a/p and hyperammonemia noted on admission   - Repeat ammonia level WNL   - Has hx of heavy EtOH use but as per pt quit >10yrs ago   - Maintain on rifaximin and lactulose  - Lactulose dose increased   - Fall precautions       Transaminitis / worsening ascites  - Trending up   - Likely related to cirrhosis   - CT a/p reports worsening ascites but as per IR not enough to drain   - Will give 1x dose of lasix and reassess   - Tbili remains WNL   - Hepatitis panel pending   - Trend LFTs and if continue to trend up or symptomatic will get RUQ US      ?UTI  - Urine cx reporting >3 organisms, likely contaminant and Bcx (-) x2  - Completed 3 day course of empiric Abx (Rocephin) on 7/23  - No leukocytosis and remains afebrile    - Monitor CBC and temperature      Normocytic anemia / Thrombocytopenia   - H/H and platelets stable   - Low plts likely 2/2 cirrhosis   - Hemodynamically stable w/ no active bleeding noted  - Monitor CBC and transfuse if Hb<7        DM II  - Hyperglycemic on admission and hypolgycemic while hospitalized   - A1c 7.7  - ISS and hypoglycemic protocol in place       Colitis   - Incidental finding on CT however could be related to COVID   - Pt is a poor historian and unable to express himself well   - Can contribute to dehydration, exacerbating encephalopathy   - No diarrhea reported and benign clinical exam       Sacral decubitus ulcers and rt heel decubitus ulcer both POA  - No drainage noted   - No leukocytosis and remains afebrile    - Given CT findings and elevated CRP/procal   - ID recommending MRI if suspicion high for OM however given pt afebrile and nL WBC will defer for now; If MRI pursued would likely need to be done under conscious sedation    - s/p 3 day course of IV rocephin   - c/w wound care   - Monitor CBC and temperature   - Consulted ID and recs noted       Dilated AR   - 4.1cm at sinus of valsalva   - Incidental finding on CT chest   - CTSx consulted and recs noted; no intervention warranted at this time      Hypovolemic hyponatremia   - Improved s/p gentle hydration    - Monitor Na levels   - Monitor I/O's         VTE ppx: Lovenox     Code status: full code       Dispo: Pt remains acute requiring hospitalization.  Pt is persistently encephalopathic.     Tried reaching out to wife to update her and to obtain background info on pts baseline mental status but unable to get in touch with her.

## 2022-07-30 LAB
GLUCOSE BLDC GLUCOMTR-MCNC: 111 MG/DL — HIGH (ref 70–99)
GLUCOSE BLDC GLUCOMTR-MCNC: 80 MG/DL — SIGNIFICANT CHANGE UP (ref 70–99)
GLUCOSE BLDC GLUCOMTR-MCNC: 82 MG/DL — SIGNIFICANT CHANGE UP (ref 70–99)
GLUCOSE BLDC GLUCOMTR-MCNC: 93 MG/DL — SIGNIFICANT CHANGE UP (ref 70–99)
GLUCOSE BLDC GLUCOMTR-MCNC: 95 MG/DL — SIGNIFICANT CHANGE UP (ref 70–99)

## 2022-07-30 PROCEDURE — 99233 SBSQ HOSP IP/OBS HIGH 50: CPT

## 2022-07-30 RX ORDER — SODIUM CHLORIDE 9 MG/ML
1000 INJECTION, SOLUTION INTRAVENOUS
Refills: 0 | Status: DISCONTINUED | OUTPATIENT
Start: 2022-07-30 | End: 2022-08-01

## 2022-07-30 RX ADMIN — LACTULOSE 30 GRAM(S): 10 SOLUTION ORAL at 12:41

## 2022-07-30 RX ADMIN — LACTULOSE 30 GRAM(S): 10 SOLUTION ORAL at 18:36

## 2022-07-30 RX ADMIN — ENOXAPARIN SODIUM 40 MILLIGRAM(S): 100 INJECTION SUBCUTANEOUS at 06:29

## 2022-07-30 RX ADMIN — LACTULOSE 30 GRAM(S): 10 SOLUTION ORAL at 05:40

## 2022-07-30 RX ADMIN — LACTULOSE 30 GRAM(S): 10 SOLUTION ORAL at 22:37

## 2022-07-30 RX ADMIN — Medication 81 MILLIGRAM(S): at 12:41

## 2022-07-30 RX ADMIN — ATORVASTATIN CALCIUM 40 MILLIGRAM(S): 80 TABLET, FILM COATED ORAL at 22:38

## 2022-07-30 RX ADMIN — Medication 105 MILLIGRAM(S): at 18:37

## 2022-07-30 RX ADMIN — Medication 75 MICROGRAM(S): at 05:40

## 2022-07-30 NOTE — PROGRESS NOTE ADULT - SUBJECTIVE AND OBJECTIVE BOX
Chief Complaint:  AMS    SUBJECTIVE / OVERNIGHT EVENTS:  patient seen and eval., comfortable. in no acute distress. no fever or chills.     Vital Signs Last 24 Hrs  T(C): 36.7 (30 Jul 2022 11:53), Max: 37.1 (29 Jul 2022 20:30)  T(F): 98.1 (30 Jul 2022 11:53), Max: 98.8 (29 Jul 2022 20:30)  HR: 106 (30 Jul 2022 11:53) (101 - 110)  BP: 161/93 (30 Jul 2022 11:53) (138/82 - 163/88)  BP(mean): --  RR: 18 (30 Jul 2022 11:53) (18 - 18)  SpO2: 96% (30 Jul 2022 11:53) (92% - 96%)    Parameters below as of 30 Jul 2022 11:53  Patient On (Oxygen Delivery Method): room air        GENERAL: pt examined bedside, laying comfortably in bed in NAD  HEENT: NC/AT, dry oral mucosa, clear conjunctiva, sclera nonicteric  RESPIRATORY: poor inspiratory effort, CTA b/l, no wheezing, rhonchi, rales  CARDIOVASCULAR: RRR, normal S1 and S2  ABDOMEN: soft, nontender but distended, +BS, no rebound/guarding, +peg  EXTREMITIES: No cynaosis, no clubbing, no lower extremity edema, pulses 2+  PSYCH: affect flat but cooperative  NEUROLOGY: A+O x1-2, no focal neurologic deficits appreciated   SKIN: No rashes or no palpable lesions, poor turgur      < from: CT Abdomen and Pelvis w/ IV Cont (07.20.22 @ 13:59) >  IMPRESSION:    CHEST:  1. No central, main, lobar, or segmental pulmonary embolus. Subsegmental   pulmonary arterial tree limited evaluation due to respiratory motion.  2. Mildly dilated aortic root, 4.1 cm at sinuses of Valsalva. Coronary   artery calcification.      ABDOMEN/PELVIS:  1. Colitis. Follow to resolution.  2. Cirrhotic liver. Atrophic left hepatic lobe. Small ascites possibly   related to cirrhosis or colitis.  3. Obliquely oriented lucency of right superior pubic ramus is   indeterminate for nondisplaced fracture versus sequelae of streak   artifact. Correlation with clinical evaluation and pelvic MRI is   recommended.  4. Bilateral partially imaged ischial soft tissue densities with central   low attenuation measuring 7.3 cm on the left and 3.8 cm on the right.   Underlying complex collections associated with decubitus ulcers can be   considered. Right gluteal soft tissue edema and asymmetric muscle   expansion. Underlying hematoma cannot be excluded. Additional probable   sacral decubitus ulcer. Correlation with clinical evaluation and pelvic   MRI is recommended.    < end of copied text >      < from: CT Head No Cont (07.20.22 @ 13:44) >  IMPRESSION:    Motion limited study. Left occipital lobe lucency may represent artifact   versus infarct. No gross hemorrhage, mass effect or hydrocephalus.    < end of copied text >      < from: CT Head No Cont (07.25.22 @ 18:15) >  IMPRESSION:    No acute hemorrhage, mass effect or extra-axial collections. Increasing   paranasal sinus inflammatory changes. Correlate for acute sinusitis.    < end of copied text >      < from: Xray Abdomen 1 View PORTABLE -Urgent (Xray Abdomen 1 View PORTABLE -Urgent .) (07.27.22 @ 09:15) >  IMPRESSION:    No acute radiographic abdominal findings.    < end of copied text >    MEDICATIONS  (STANDING):  aspirin  chewable 81 milliGRAM(s) Enteral Tube daily  atorvastatin 40 milliGRAM(s) Oral at bedtime  dextrose 5%. 1000 milliLiter(s) (100 mL/Hr) IV Continuous <Continuous>  dextrose 5%. 1000 milliLiter(s) (50 mL/Hr) IV Continuous <Continuous>  dextrose 50% Injectable 25 Gram(s) IV Push once  dextrose 50% Injectable 12.5 Gram(s) IV Push once  dextrose 50% Injectable 25 Gram(s) IV Push once  enoxaparin Injectable 40 milliGRAM(s) SubCutaneous every 24 hours  glucagon  Injectable 1 milliGRAM(s) IntraMuscular once  insulin lispro (ADMELOG) corrective regimen sliding scale   SubCutaneous three times a day before meals  lactated ringers. 1000 milliLiter(s) (75 mL/Hr) IV Continuous <Continuous>  lactulose Syrup 30 Gram(s) Oral every 6 hours  levothyroxine 75 MICROGram(s) Oral daily  rifAXIMin 550 milliGRAM(s) Oral two times a day  thiamine IVPB 500 milliGRAM(s) IV Intermittent daily    MEDICATIONS  (PRN):  acetaminophen     Tablet .. 650 milliGRAM(s) Oral every 6 hours PRN Temp greater or equal to 38C (100.4F), Mild Pain (1 - 3)  bisacodyl Suppository 10 milliGRAM(s) Rectal daily PRN Constipation  dextrose Oral Gel 15 Gram(s) Oral once PRN Blood Glucose LESS THAN 70 milliGRAM(s)/deciliter  ondansetron Injectable 4 milliGRAM(s) IV Push every 6 hours PRN Nausea and/or Vomiting

## 2022-07-30 NOTE — PROGRESS NOTE ADULT - ASSESSMENT
76 y/o M BIBA from home for AMS w/ associated inability to talk x 1 week.  Pt had hx of UTI prior to current admission.  CTH on admission showed left occipital infarct vs artifact, no last known well time available, not a candidate for tPA.  w/u on admisison also significant for hyperammonemia and BG >400s.  Lactic acidosis likely 2/2 cirrhosis.  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  Pt was admitted for acute metabolic encephalopathy, likely multifactorial  Pts remains acute given his persistent encephalopathy and today w/ notably distended abdomen; remains constipated despite aggressive bowel regimen.          Acute metabolic encephalopathy, likely multifactorial   -possible UTI, decub ulcers (POA), colitis, covid and ?subacute CVA, constipation vs hepatic encephalopathy   - CTH reporting possible L-occipital lobe lucency which may represent artifact vs infarct; repeat CTH (-) for acute findings or interval changes     - Hyperammonemia in the setting of cirrhosis and AMS could be 2/2 hepatic encephalopathy and therefore started on Rifaxamin on admission   - Repeat ammonia WNL   - s/p cvEEG and epileptiform pattern or seizures noted  - Repeat TFTs w/ worsening hypothyroid, increased dose of synthroid and will repeat TFTs in a few days to reassess    - Low suspicion for UTI given UA w/out pyuria and Ucx reporting >3 organisms likely contaminant   - Has sacral decub ulcers, no active drainage but unclear if OM deep to wounds however CRP 62 and procal 0.61  - ID follwoing , if suspicion high for OM however pt afebrile and nL WBC; If MRI pursued would likely need to be done under conscious sedation    - Colitis noted on CT a/p on admission could be 2/2 COVID and can exacerbate dehydration from poor po intake    - Maintain on fall and aspiration precautions   - ID following and recs noted  - Maintain on rifaximin and lactulose  - Lactulose dose increased   - Fall precautions   - Completed 3 day course of empiric Abx (Rocephin) on 7/23  - No leukocytosis and remains afebrile    - Monitor CBC and temperature          >Hx of CVA vs subacute CVA  - CTH as above   - MRI deffered by neurology and repeat CTH reviewed and is w/out interval changes  - Maintain on asa and statin therapy  - Maintain on telemetry   - Neurology consulted and recs noted      >Distended abdomen 2/2 constipation / Nassea and NBNB emesis   - Notably distended but nontender and asymptomatic but is a poor historian overall  - Abdominal Xray ordered and no pathologic findings   - Reglan PRN for nausea/vomiting as it will also enhance GI motility   - CT a/p reviewed and has worsening ascities but as per IR not enough to drain; will give 1x dose of IV lasix   - Continue with aggressive bowel regimen   - Tube feeds held and will resume tomorrow and maintain on IVFs      >Hypothyroidism  - TFTs reviewed; TSH increasing and T4 decreased; synthroid increased to 75mcg   - Reassess TFTs in a few days    - TG ab elevated; possibly hashimoto's; TPO ab pending   - Endocrinology consulted and recs noted      Transaminitis / worsening ascites  - Likely related to cirrhosis   - CT a/p reports worsening ascites but as per IR not enough to drain   - Tbili remains WNL   - Hepatitis panel neg   - Trend LFTs and if continue to trend up or symptomatic will get RUQ US      Normocytic anemia / Thrombocytopenia   - H/H and platelets stable   - Low plts likely 2/2 cirrhosis   - Hemodynamically stable w/ no active bleeding noted  - Monitor CBC and transfuse if Hb<7        DM II  - Hyperglycemic on admission and hypolgycemic while hospitalized   - A1c 7.7  - ISS and hypoglycemic protocol in place       Colitis   - Incidental finding on CT   - Pt is a poor historian and unable to express himself well   - Can contribute to dehydration, exacerbating encephalopathy   - No diarrhea reported and benign clinical exam       Sacral decubitus ulcers and rt heel decubitus ulcer both POA  - No drainage noted   - No leukocytosis and remains afebrile    - Given CT findings and elevated CRP/procal   - ID recommending MRI if suspicion high for OM however given pt afebrile and nL WBC will defer for now; If MRI pursued would likely need to be done under conscious sedation    - s/p 3 day course of IV rocephin   - c/w wound care   - Monitor CBC and temperature   - Consulted ID and recs noted     Dilated AR   - 4.1cm at sinus of valsalva   - Incidental finding on CT chest   - CTSx consulted and recs noted; no intervention warranted at this time      Hypovolemic hyponatremia   - Improved s/p gentle hydration    - Monitor Na levels   - Monitor I/O's         VTE ppx: Lovenox     Code status: full code       Dispo: Pt remains acute requiring hospitalization.  Pt is persistently encephalopathic.

## 2022-07-31 LAB
ALBUMIN SERPL ELPH-MCNC: 2.1 G/DL — LOW (ref 3.3–5.2)
ALP SERPL-CCNC: 144 U/L — HIGH (ref 40–120)
ALT FLD-CCNC: 55 U/L — HIGH
ANION GAP SERPL CALC-SCNC: 9 MMOL/L — SIGNIFICANT CHANGE UP (ref 5–17)
AST SERPL-CCNC: 92 U/L — HIGH
BILIRUB SERPL-MCNC: 1.1 MG/DL — SIGNIFICANT CHANGE UP (ref 0.4–2)
BUN SERPL-MCNC: 20.1 MG/DL — HIGH (ref 8–20)
CALCIUM SERPL-MCNC: 7.6 MG/DL — LOW (ref 8.4–10.5)
CHLORIDE SERPL-SCNC: 105 MMOL/L — SIGNIFICANT CHANGE UP (ref 98–107)
CO2 SERPL-SCNC: 23 MMOL/L — SIGNIFICANT CHANGE UP (ref 22–29)
CREAT SERPL-MCNC: 0.84 MG/DL — SIGNIFICANT CHANGE UP (ref 0.5–1.3)
EGFR: 89 ML/MIN/1.73M2 — SIGNIFICANT CHANGE UP
GLUCOSE BLDC GLUCOMTR-MCNC: 107 MG/DL — HIGH (ref 70–99)
GLUCOSE BLDC GLUCOMTR-MCNC: 114 MG/DL — HIGH (ref 70–99)
GLUCOSE BLDC GLUCOMTR-MCNC: 131 MG/DL — HIGH (ref 70–99)
GLUCOSE BLDC GLUCOMTR-MCNC: 135 MG/DL — HIGH (ref 70–99)
GLUCOSE SERPL-MCNC: 111 MG/DL — HIGH (ref 70–99)
HCT VFR BLD CALC: 34.5 % — LOW (ref 39–50)
HGB BLD-MCNC: 11.2 G/DL — LOW (ref 13–17)
MAGNESIUM SERPL-MCNC: 1.6 MG/DL — SIGNIFICANT CHANGE UP (ref 1.6–2.6)
MCHC RBC-ENTMCNC: 30.5 PG — SIGNIFICANT CHANGE UP (ref 27–34)
MCHC RBC-ENTMCNC: 32.5 GM/DL — SIGNIFICANT CHANGE UP (ref 32–36)
MCV RBC AUTO: 94 FL — SIGNIFICANT CHANGE UP (ref 80–100)
PLATELET # BLD AUTO: 226 K/UL — SIGNIFICANT CHANGE UP (ref 150–400)
POTASSIUM SERPL-MCNC: 3.8 MMOL/L — SIGNIFICANT CHANGE UP (ref 3.5–5.3)
POTASSIUM SERPL-SCNC: 3.8 MMOL/L — SIGNIFICANT CHANGE UP (ref 3.5–5.3)
PROT SERPL-MCNC: 7.1 G/DL — SIGNIFICANT CHANGE UP (ref 6.6–8.7)
RBC # BLD: 3.67 M/UL — LOW (ref 4.2–5.8)
RBC # FLD: 15.5 % — HIGH (ref 10.3–14.5)
SODIUM SERPL-SCNC: 137 MMOL/L — SIGNIFICANT CHANGE UP (ref 135–145)
T4 AB SER-ACNC: 3.8 UG/DL — LOW (ref 4.5–12)
TSH SERPL-MCNC: 24.86 UIU/ML — HIGH (ref 0.27–4.2)
WBC # BLD: 7.77 K/UL — SIGNIFICANT CHANGE UP (ref 3.8–10.5)
WBC # FLD AUTO: 7.77 K/UL — SIGNIFICANT CHANGE UP (ref 3.8–10.5)

## 2022-07-31 PROCEDURE — 99233 SBSQ HOSP IP/OBS HIGH 50: CPT

## 2022-07-31 PROCEDURE — 74018 RADEX ABDOMEN 1 VIEW: CPT | Mod: 26

## 2022-07-31 PROCEDURE — 76705 ECHO EXAM OF ABDOMEN: CPT | Mod: 26

## 2022-07-31 RX ORDER — CEFTRIAXONE 500 MG/1
1000 INJECTION, POWDER, FOR SOLUTION INTRAMUSCULAR; INTRAVENOUS EVERY 24 HOURS
Refills: 0 | Status: DISCONTINUED | OUTPATIENT
Start: 2022-07-31 | End: 2022-08-03

## 2022-07-31 RX ORDER — PIPERACILLIN AND TAZOBACTAM 4; .5 G/20ML; G/20ML
3.38 INJECTION, POWDER, LYOPHILIZED, FOR SOLUTION INTRAVENOUS EVERY 8 HOURS
Refills: 0 | Status: DISCONTINUED | OUTPATIENT
Start: 2022-07-31 | End: 2022-07-31

## 2022-07-31 RX ORDER — PIPERACILLIN AND TAZOBACTAM 4; .5 G/20ML; G/20ML
3.38 INJECTION, POWDER, LYOPHILIZED, FOR SOLUTION INTRAVENOUS ONCE
Refills: 0 | Status: COMPLETED | OUTPATIENT
Start: 2022-07-31 | End: 2022-07-31

## 2022-07-31 RX ORDER — LEVOTHYROXINE SODIUM 125 MCG
50 TABLET ORAL AT BEDTIME
Refills: 0 | Status: DISCONTINUED | OUTPATIENT
Start: 2022-07-31 | End: 2022-08-08

## 2022-07-31 RX ADMIN — Medication 75 MICROGRAM(S): at 05:12

## 2022-07-31 RX ADMIN — ENOXAPARIN SODIUM 40 MILLIGRAM(S): 100 INJECTION SUBCUTANEOUS at 05:13

## 2022-07-31 RX ADMIN — LACTULOSE 30 GRAM(S): 10 SOLUTION ORAL at 05:12

## 2022-07-31 RX ADMIN — ATORVASTATIN CALCIUM 40 MILLIGRAM(S): 80 TABLET, FILM COATED ORAL at 22:15

## 2022-07-31 RX ADMIN — SODIUM CHLORIDE 75 MILLILITER(S): 9 INJECTION, SOLUTION INTRAVENOUS at 22:18

## 2022-07-31 RX ADMIN — Medication 105 MILLIGRAM(S): at 13:40

## 2022-07-31 RX ADMIN — PIPERACILLIN AND TAZOBACTAM 200 GRAM(S): 4; .5 INJECTION, POWDER, LYOPHILIZED, FOR SOLUTION INTRAVENOUS at 15:33

## 2022-07-31 RX ADMIN — CEFTRIAXONE 100 MILLIGRAM(S): 500 INJECTION, POWDER, FOR SOLUTION INTRAMUSCULAR; INTRAVENOUS at 18:38

## 2022-07-31 RX ADMIN — SODIUM CHLORIDE 75 MILLILITER(S): 9 INJECTION, SOLUTION INTRAVENOUS at 01:20

## 2022-07-31 RX ADMIN — Medication 81 MILLIGRAM(S): at 13:39

## 2022-07-31 RX ADMIN — Medication 50 MICROGRAM(S): at 22:16

## 2022-07-31 NOTE — PROGRESS NOTE ADULT - ASSESSMENT
78 y/o M BIBA from home for AMS w/ associated inability to talk x 1 week.  Pt had hx of UTI prior to current admission.  CTH on admission showed left occipital infarct vs artifact, no last known well time available, not a candidate for tPA.  w/u on admisison also significant for hyperammonemia and BG >400s.  Lactic acidosis likely 2/2 cirrhosis.  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  Pt was admitted for acute metabolic encephalopathy, likely multifactorial  Pts remains acute given his persistent encephalopathy and today w/ notably distended abdomen; remains constipated despite aggressive bowel regimen.          > Acute metabolic encephalopathy, likely multifactorial   -possible UTI, colitis, constipation  and hepatic encephalopathy , covid positive but no resp sxs   - CTH reporting possible L-occipital lobe lucency which may represent artifact vs infarct; repeat CTH (-) for acute findings or interval changes     - Hyperammonemia in the setting of cirrhosis and AMS could be 2/2 hepatic encephalopathy and therefore started on Rifaxamin on admission   - Repeat ammonia WNL   - s/p cvEEG and no  epileptiform pattern or seizures noted  - Maintain on fall and aspiration precautions   - c/w rifaximin , hold lactulose for now due to abd distention   - Fall precautions   - Monitor CBC and temperature    > possible uti   - s/p x 3 days course of iv rocephin       >Hx of CVA  - initial ct h showed ? cva but repeat cth shows no acute / subacute cva   - MRI deffered by neurology and repeat CTH reviewed and is w/out interval changes  - Maintain on asa and statin therapy  - Maintain on telemetry   - Neurology consulted , c/w aspirin       >Distended abdomen 2/2 constipation / Nausea and NBNB emesis   - Notably distended but nontender and asymptomatic but is a poor historian overall/ possible ileus   - Reglan PRN for nausea/vomiting as it will also enhance GI motility   - CT a/p reviewed and has worsening ascities but as per IR not enough to drain; s/p 1x dose of IV lasix   - Tube feeds held  - repeat kub   - hold lactulose for now due to abd distention   - noted with possible colitis on ct abd , possibly due to constipation / will start on zosyn for now       >Hypothyroidism  - TFTs reviewed; TSH increasing and T4 decreased;  - Reassess TFTs in a few days    - TG ab elevated; possibly hashimoto's; TPO ab pending   - Endocrinology consulted and recs noted  - will start on iv synthroid till abd distention is improved       Transaminitis / worsening ascites  - Likely related to cirrhosis   - CT a/p reports worsening ascites but as per IR not enough to drain   - Tbili remains WNL   - Hepatitis panel neg   - Trend LFTs   - will check ruq us       >Normocytic anemia / Thrombocytopenia   - H/H and platelets stable   - Low plts likely 2/2 cirrhosis   - Hemodynamically stable w/ no active bleeding noted  - Monitor CBC and transfuse if Hb<7        >DM II  - Hyperglycemic on admission and hypolgycemic while hospitalized   - A1c 7.7  - ISS and hypoglycemic protocol in place       >Sacral decubitus ulcers and rt heel decubitus ulcer both POA  - No drainage noted   - Given CT findings and elevated CRP/procal   - ID recommending MRI if suspicion high for OM however given pt afebrile and nL WBC will defer for now; If MRI pursued would likely need to be done under conscious sedation    - c/w wound care   - Monitor CBC and temperature   - Consulted ID and recs noted     >Dilated AR   - 4.1cm at sinus of valsalva   - Incidental finding on CT chest   - CTSx consulted and recs noted; no intervention warranted at this time    >Hypovolemic hyponatremia / improved   - Improved s/p gentle hydration    - Monitor Na levels   - Monitor I/O's         >VTE ppx: Lovenox     >Code status: full code , palliative care consult       Dispo: Pt remains acute requiring hospitalization.  Pt is persistently encephalopathic.   tried to call wife on listed numbers , no answer , left voice messages

## 2022-07-31 NOTE — PROGRESS NOTE ADULT - SUBJECTIVE AND OBJECTIVE BOX
Chief Complaint:  AMS    SUBJECTIVE / OVERNIGHT EVENTS:  patient seen and eval., comfortable. in no acute distress. no fever or chills.     Vital Signs Last 24 Hrs  T(C): 36.6 (31 Jul 2022 11:06), Max: 37.2 (30 Jul 2022 22:43)  T(F): 97.9 (31 Jul 2022 11:06), Max: 99 (30 Jul 2022 22:43)  HR: 110 (31 Jul 2022 11:06) (101 - 110)  BP: 150/86 (31 Jul 2022 11:06) (139/78 - 151/77)  BP(mean): --  RR: 18 (31 Jul 2022 11:06) (18 - 20)  SpO2: 94% (31 Jul 2022 11:06) (92% - 96%)    Parameters below as of 31 Jul 2022 11:06  Patient On (Oxygen Delivery Method): room air      GENERAL: pt examined bedside, laying comfortably in bed in NAD  HEENT: NC/AT, dry oral mucosa, clear conjunctiva, sclera nonicteric  RESPIRATORY: poor inspiratory effort, CTA b/l, no wheezing, rhonchi, rales  CARDIOVASCULAR: RRR, normal S1 and S2  ABDOMEN: soft, nontender but distended, +BS, no rebound/guarding, +peg  EXTREMITIES: No cynaosis, no clubbing, no lower extremity edema, pulses 2+  PSYCH: affect flat but cooperative  NEUROLOGY: A+O x1-2, no focal neurologic deficits appreciated   SKIN: No rashes or no palpable lesions, poor turgur      < from: CT Abdomen and Pelvis w/ IV Cont (07.20.22 @ 13:59) >  IMPRESSION:    CHEST:  1. No central, main, lobar, or segmental pulmonary embolus. Subsegmental   pulmonary arterial tree limited evaluation due to respiratory motion.  2. Mildly dilated aortic root, 4.1 cm at sinuses of Valsalva. Coronary   artery calcification.      ABDOMEN/PELVIS:  1. Colitis. Follow to resolution.  2. Cirrhotic liver. Atrophic left hepatic lobe. Small ascites possibly   related to cirrhosis or colitis.  3. Obliquely oriented lucency of right superior pubic ramus is   indeterminate for nondisplaced fracture versus sequelae of streak   artifact. Correlation with clinical evaluation and pelvic MRI is   recommended.  4. Bilateral partially imaged ischial soft tissue densities with central   low attenuation measuring 7.3 cm on the left and 3.8 cm on the right.   Underlying complex collections associated with decubitus ulcers can be   considered. Right gluteal soft tissue edema and asymmetric muscle   expansion. Underlying hematoma cannot be excluded. Additional probable   sacral decubitus ulcer. Correlation with clinical evaluation and pelvic   MRI is recommended.    < end of copied text >      < from: CT Head No Cont (07.20.22 @ 13:44) >  IMPRESSION:    Motion limited study. Left occipital lobe lucency may represent artifact   versus infarct. No gross hemorrhage, mass effect or hydrocephalus.    < end of copied text >      < from: CT Head No Cont (07.25.22 @ 18:15) >  IMPRESSION:    No acute hemorrhage, mass effect or extra-axial collections. Increasing   paranasal sinus inflammatory changes. Correlate for acute sinusitis.    < end of copied text >      < from: Xray Abdomen 1 View PORTABLE -Urgent (Xray Abdomen 1 View PORTABLE -Urgent .) (07.27.22 @ 09:15) >  IMPRESSION:    No acute radiographic abdominal findings.    < end of copied text >    MEDICATIONS  (STANDING):  aspirin  chewable 81 milliGRAM(s) Enteral Tube daily  atorvastatin 40 milliGRAM(s) Oral at bedtime  dextrose 5%. 1000 milliLiter(s) (100 mL/Hr) IV Continuous <Continuous>  dextrose 5%. 1000 milliLiter(s) (50 mL/Hr) IV Continuous <Continuous>  dextrose 50% Injectable 25 Gram(s) IV Push once  dextrose 50% Injectable 12.5 Gram(s) IV Push once  dextrose 50% Injectable 25 Gram(s) IV Push once  enoxaparin Injectable 40 milliGRAM(s) SubCutaneous every 24 hours  glucagon  Injectable 1 milliGRAM(s) IntraMuscular once  insulin lispro (ADMELOG) corrective regimen sliding scale   SubCutaneous three times a day before meals  lactated ringers. 1000 milliLiter(s) (75 mL/Hr) IV Continuous <Continuous>  lactulose Syrup 30 Gram(s) Oral every 6 hours  levothyroxine 75 MICROGram(s) Oral daily  rifAXIMin 550 milliGRAM(s) Oral two times a day  thiamine IVPB 500 milliGRAM(s) IV Intermittent daily    MEDICATIONS  (PRN):  acetaminophen     Tablet .. 650 milliGRAM(s) Oral every 6 hours PRN Temp greater or equal to 38C (100.4F), Mild Pain (1 - 3)  bisacodyl Suppository 10 milliGRAM(s) Rectal daily PRN Constipation  dextrose Oral Gel 15 Gram(s) Oral once PRN Blood Glucose LESS THAN 70 milliGRAM(s)/deciliter  ondansetron Injectable 4 milliGRAM(s) IV Push every 6 hours PRN Nausea and/or Vomiting                                11.2   7.77  )-----------( 226      ( 31 Jul 2022 06:25 )             34.5   07-31    137  |  105  |  20.1<H>  ----------------------------<  111<H>  3.8   |  23.0  |  0.84    Ca    7.6<L>      31 Jul 2022 06:25  Mg     1.6     07-31    TPro  7.1  /  Alb  2.1<L>  /  TBili  1.1  /  DBili  x   /  AST  92<H>  /  ALT  55<H>  /  AlkPhos  144<H>  07-31

## 2022-08-01 LAB
ALBUMIN SERPL ELPH-MCNC: 1.9 G/DL — LOW (ref 3.3–5.2)
ALP SERPL-CCNC: 128 U/L — HIGH (ref 40–120)
ALT FLD-CCNC: 53 U/L — HIGH
ANION GAP SERPL CALC-SCNC: 8 MMOL/L — SIGNIFICANT CHANGE UP (ref 5–17)
AST SERPL-CCNC: 84 U/L — HIGH
BILIRUB SERPL-MCNC: 1 MG/DL — SIGNIFICANT CHANGE UP (ref 0.4–2)
BUN SERPL-MCNC: 18.3 MG/DL — SIGNIFICANT CHANGE UP (ref 8–20)
CALCIUM SERPL-MCNC: 7.2 MG/DL — LOW (ref 8.4–10.5)
CHLORIDE SERPL-SCNC: 104 MMOL/L — SIGNIFICANT CHANGE UP (ref 98–107)
CO2 SERPL-SCNC: 22 MMOL/L — SIGNIFICANT CHANGE UP (ref 22–29)
CREAT SERPL-MCNC: 0.86 MG/DL — SIGNIFICANT CHANGE UP (ref 0.5–1.3)
EGFR: 88 ML/MIN/1.73M2 — SIGNIFICANT CHANGE UP
GLUCOSE BLDC GLUCOMTR-MCNC: 119 MG/DL — HIGH (ref 70–99)
GLUCOSE BLDC GLUCOMTR-MCNC: 78 MG/DL — SIGNIFICANT CHANGE UP (ref 70–99)
GLUCOSE BLDC GLUCOMTR-MCNC: 85 MG/DL — SIGNIFICANT CHANGE UP (ref 70–99)
GLUCOSE BLDC GLUCOMTR-MCNC: 95 MG/DL — SIGNIFICANT CHANGE UP (ref 70–99)
GLUCOSE SERPL-MCNC: 119 MG/DL — HIGH (ref 70–99)
HCT VFR BLD CALC: 33.4 % — LOW (ref 39–50)
HGB BLD-MCNC: 11.1 G/DL — LOW (ref 13–17)
INR BLD: 1.98 RATIO — HIGH (ref 0.88–1.16)
MAGNESIUM SERPL-MCNC: 1.5 MG/DL — LOW (ref 1.6–2.6)
MCHC RBC-ENTMCNC: 30.7 PG — SIGNIFICANT CHANGE UP (ref 27–34)
MCHC RBC-ENTMCNC: 33.2 GM/DL — SIGNIFICANT CHANGE UP (ref 32–36)
MCV RBC AUTO: 92.5 FL — SIGNIFICANT CHANGE UP (ref 80–100)
PLATELET # BLD AUTO: 174 K/UL — SIGNIFICANT CHANGE UP (ref 150–400)
POTASSIUM SERPL-MCNC: 3.6 MMOL/L — SIGNIFICANT CHANGE UP (ref 3.5–5.3)
POTASSIUM SERPL-SCNC: 3.6 MMOL/L — SIGNIFICANT CHANGE UP (ref 3.5–5.3)
PROT SERPL-MCNC: 6.8 G/DL — SIGNIFICANT CHANGE UP (ref 6.6–8.7)
PROTHROM AB SERPL-ACNC: 23.1 SEC — HIGH (ref 10.5–13.4)
RBC # BLD: 3.61 M/UL — LOW (ref 4.2–5.8)
RBC # FLD: 15.5 % — HIGH (ref 10.3–14.5)
SODIUM SERPL-SCNC: 134 MMOL/L — LOW (ref 135–145)
WBC # BLD: 6.21 K/UL — SIGNIFICANT CHANGE UP (ref 3.8–10.5)
WBC # FLD AUTO: 6.21 K/UL — SIGNIFICANT CHANGE UP (ref 3.8–10.5)

## 2022-08-01 PROCEDURE — 99222 1ST HOSP IP/OBS MODERATE 55: CPT

## 2022-08-01 PROCEDURE — 99233 SBSQ HOSP IP/OBS HIGH 50: CPT

## 2022-08-01 RX ORDER — MAGNESIUM SULFATE 500 MG/ML
2 VIAL (ML) INJECTION ONCE
Refills: 0 | Status: COMPLETED | OUTPATIENT
Start: 2022-08-01 | End: 2022-08-01

## 2022-08-01 RX ORDER — MAGNESIUM SULFATE 500 MG/ML
2 VIAL (ML) INJECTION ONCE
Refills: 0 | Status: DISCONTINUED | OUTPATIENT
Start: 2022-08-01 | End: 2022-08-01

## 2022-08-01 RX ORDER — TAMSULOSIN HYDROCHLORIDE 0.4 MG/1
0.4 CAPSULE ORAL AT BEDTIME
Refills: 0 | Status: DISCONTINUED | OUTPATIENT
Start: 2022-08-01 | End: 2022-08-07

## 2022-08-01 RX ADMIN — SODIUM CHLORIDE 75 MILLILITER(S): 9 INJECTION, SOLUTION INTRAVENOUS at 08:56

## 2022-08-01 RX ADMIN — Medication 25 GRAM(S): at 08:55

## 2022-08-01 RX ADMIN — Medication 50 MICROGRAM(S): at 22:55

## 2022-08-01 RX ADMIN — SODIUM CHLORIDE 75 MILLILITER(S): 9 INJECTION, SOLUTION INTRAVENOUS at 06:10

## 2022-08-01 RX ADMIN — Medication 105 MILLIGRAM(S): at 13:56

## 2022-08-01 RX ADMIN — ATORVASTATIN CALCIUM 40 MILLIGRAM(S): 80 TABLET, FILM COATED ORAL at 22:54

## 2022-08-01 RX ADMIN — CEFTRIAXONE 100 MILLIGRAM(S): 500 INJECTION, POWDER, FOR SOLUTION INTRAMUSCULAR; INTRAVENOUS at 18:07

## 2022-08-01 NOTE — PROGRESS NOTE ADULT - ASSESSMENT
76 y/o M BIBA from home for AMS w/ associated inability to talk x 1 week.  Pt had hx of UTI prior to current admission.  CTH on admission showed left occipital infarct vs artifact.   Pt was admitted for acute metabolic encephalopathy, likely multifactorial  Pts remains acute given his persistent encephalopathy and today w/ notably distended abdomen; remains constipated despite aggressive bowel regimen.      Endo consulted for hypothyroidism      Hypothyroidism:  TSH improving, FT4 still low.  Agree with switching to 50 mcg of levothyroxine IV for better absorption.  check FT4 and T3 in a few days.    Acute metabolic encephalopathy, likely multifactorial   possible UTI, colitis, constipation  and hepatic encephalopathy , covid positive but no resp sxs   being managed by primary team.    Distended abdomen 2/2 constipation    possible ileus    Reglan PRN   - Tube feeds held     possible UTI  - on ceftriaxone

## 2022-08-01 NOTE — PROGRESS NOTE ADULT - SUBJECTIVE AND OBJECTIVE BOX
consulted for paracentesis  patient is reportedly unable to give consent  unable to reach wife (two numbers straight to voicemail)

## 2022-08-01 NOTE — PROGRESS NOTE ADULT - SUBJECTIVE AND OBJECTIVE BOX
Chief Complaint:  AMS    SUBJECTIVE / OVERNIGHT EVENTS:  patient seen and eval., comfortable. in no acute distress. no fever or chills. had small bm yesterday     Vital Signs Last 24 Hrs  T(C): 36.8 (01 Aug 2022 10:30), Max: 36.8 (01 Aug 2022 10:30)  T(F): 98.3 (01 Aug 2022 10:30), Max: 98.3 (01 Aug 2022 10:30)  HR: 101 (01 Aug 2022 10:30) (101 - 106)  BP: 119/74 (01 Aug 2022 10:30) (111/68 - 135/76)  BP(mean): --  RR: 18 (01 Aug 2022 10:30) (18 - 20)  SpO2: 95% (01 Aug 2022 10:30) (91% - 95%)    Parameters below as of 01 Aug 2022 10:30  Patient On (Oxygen Delivery Method): room air      GENERAL: pt examined bedside, laying comfortably in bed in NAD  HEENT: NC/AT, dry oral mucosa, clear conjunctiva, sclera nonicteric  RESPIRATORY: poor inspiratory effort, CTA b/l, no wheezing, rhonchi, rales  CARDIOVASCULAR: RRR, normal S1 and S2  ABDOMEN: soft, nontender but distended, +BS, no rebound/guarding, +peg  EXTREMITIES: No cynaosis, no clubbing, no lower extremity edema, pulses 2+  PSYCH: affect flat but cooperative  NEUROLOGY: A+O x1-2, no focal neurologic deficits appreciated   SKIN: No rashes or no palpable lesions, poor turgur      < from: CT Abdomen and Pelvis w/ IV Cont (07.20.22 @ 13:59) >  IMPRESSION:    CHEST:  1. No central, main, lobar, or segmental pulmonary embolus. Subsegmental   pulmonary arterial tree limited evaluation due to respiratory motion.  2. Mildly dilated aortic root, 4.1 cm at sinuses of Valsalva. Coronary   artery calcification.      ABDOMEN/PELVIS:  1. Colitis. Follow to resolution.  2. Cirrhotic liver. Atrophic left hepatic lobe. Small ascites possibly   related to cirrhosis or colitis.  3. Obliquely oriented lucency of right superior pubic ramus is   indeterminate for nondisplaced fracture versus sequelae of streak   artifact. Correlation with clinical evaluation and pelvic MRI is   recommended.  4. Bilateral partially imaged ischial soft tissue densities with central   low attenuation measuring 7.3 cm on the left and 3.8 cm on the right.   Underlying complex collections associated with decubitus ulcers can be   considered. Right gluteal soft tissue edema and asymmetric muscle   expansion. Underlying hematoma cannot be excluded. Additional probable   sacral decubitus ulcer. Correlation with clinical evaluation and pelvic   MRI is recommended.    < end of copied text >      < from: CT Head No Cont (07.20.22 @ 13:44) >  IMPRESSION:    Motion limited study. Left occipital lobe lucency may represent artifact   versus infarct. No gross hemorrhage, mass effect or hydrocephalus.    < end of copied text >      < from: CT Head No Cont (07.25.22 @ 18:15) >  IMPRESSION:    No acute hemorrhage, mass effect or extra-axial collections. Increasing   paranasal sinus inflammatory changes. Correlate for acute sinusitis.    < end of copied text >      < from: Xray Abdomen 1 View PORTABLE -Urgent (Xray Abdomen 1 View PORTABLE -Urgent .) (07.27.22 @ 09:15) >  IMPRESSION:    No acute radiographic abdominal findings.    < end of copied text >    MEDICATIONS  (STANDING):  atorvastatin 40 milliGRAM(s) Oral at bedtime  cefTRIAXone   IVPB 1000 milliGRAM(s) IV Intermittent every 24 hours  dextrose 5%. 1000 milliLiter(s) (100 mL/Hr) IV Continuous <Continuous>  dextrose 5%. 1000 milliLiter(s) (50 mL/Hr) IV Continuous <Continuous>  dextrose 50% Injectable 25 Gram(s) IV Push once  dextrose 50% Injectable 12.5 Gram(s) IV Push once  dextrose 50% Injectable 25 Gram(s) IV Push once  glucagon  Injectable 1 milliGRAM(s) IntraMuscular once  insulin lispro (ADMELOG) corrective regimen sliding scale   SubCutaneous three times a day before meals  levothyroxine Injectable 50 MICROGram(s) IV Push at bedtime  magnesium sulfate  IVPB 2 Gram(s) IV Intermittent once  rifAXIMin 550 milliGRAM(s) Oral two times a day  thiamine IVPB 500 milliGRAM(s) IV Intermittent daily    MEDICATIONS  (PRN):  acetaminophen     Tablet .. 650 milliGRAM(s) Oral every 6 hours PRN Temp greater or equal to 38C (100.4F), Mild Pain (1 - 3)  bisacodyl Suppository 10 milliGRAM(s) Rectal daily PRN Constipation  dextrose Oral Gel 15 Gram(s) Oral once PRN Blood Glucose LESS THAN 70 milliGRAM(s)/deciliter  ondansetron Injectable 4 milliGRAM(s) IV Push every 6 hours PRN Nausea and/or Vomiting                            11.1   6.21  )-----------( 174      ( 01 Aug 2022 06:38 )             33.4   08-01    134<L>  |  104  |  18.3  ----------------------------<  119<H>  3.6   |  22.0  |  0.86    Ca    7.2<L>      01 Aug 2022 06:38  Mg     1.5     08-01    TPro  6.8  /  Alb  1.9<L>  /  TBili  1.0  /  DBili  x   /  AST  84<H>  /  ALT  53<H>  /  AlkPhos  128<H>  08-01

## 2022-08-01 NOTE — CONSULT NOTE ADULT - SUBJECTIVE AND OBJECTIVE BOX
Palliative Care Consult   HPI This is a 77 year old male PMHx back surgery (LS in 2016, Thoracic spine in 2018-requiring wheel chair assisted device) alcohol abuse stopped using alcohol 15 years ago, HTN, BPH, hypothyroidism brought into [facility] [place] AMS. CT head for left occipital infarct vs artifact, no last known well time available, not a candidate for tPA.  Labs with hyperglycemia BS >400s, Lactate elevated. UA - neg d-dimer elevated. CT PE - neg for PE, CT abd pelvis - colitis, Sacral decubitus ulcer, rt gluteal edema. No reports of chest pain, sob, fever, chills, n/v/d/c noted. Admitted for further care. Called for Palliative care consult for [facility].     <HPI:  77yr old unknown male brought to ER by EMS from home with c/o altered mental state, inability to talk since last 1 week and h/o recent UTI. No other information was available, pt was admitted as Critical [place]. CT head- showed left occipital infarct vs artifact, no last known well time available, not a candidate for tPA. Patient unable to provide any information. labs s/o hyperglycemia BS >400s, Lactate elevated. UA - neg d-dimer elevated. CT PE - neg for PE, CT abd pelvis - colitis, Sacral decubitus ulcer, rt gluteal edema. He is being admitted for further care.  (20 Jul 2022 16:50)>end of copied text     PERTINENT PMH REVIEWED: Yes chart check    PAST MEDICAL & SURGICAL HISTORY:  back surgery (LS in 2016, Thoracic spine in 2018-requiring wheel chair assisted device) alcohol abuse stopped using alcohol 15 years ago, HTN, BPH, hypothyroidism    SOCIAL HISTORY:                      Substance history: history of ETOH 15 years quit                     Admitted from:  home                       Episcopal/spirituality: unknown                    Cultural concerns: none noted                       Surrogate/HCP/Guardian: Phone#: Katt Platt (wife) [phone]    FAMILY HISTORY:  No family history related to admission diagonsis    Allergies  oxycodone ([place])    ADVANCE DIRECTIVES/TREATMENT PREFERENCES:  Full Code    Baseline ADLs (prior to admission):  Dependent      Karnofsky/Palliative Performance Status Version 2:  %30  http://npcrc.org/files/news/[email or web address]_performance_scale_[email or web address]    Present Symptoms:   Unable to obtain due to poor mentation     Pain:   Unable to obtain due to poor mentation     Pain AD Score:0  [email or web address]/[email or web address] (press ctrl + left click to view)    Review of Systems: Reviewed  Unable to obtain due to poor mentation   All others negative    MEDICATIONS  (STANDING):  atorvastatin 40 milliGRAM(s) Oral at bedtime  cefTRIAXone   IVPB 1000 milliGRAM(s) IV Intermittent every 24 hours  dextrose 5%. 1000 milliLiter(s) (100 mL/Hr) IV Continuous <Continuous>  dextrose 5%. 1000 milliLiter(s) (50 mL/Hr) IV Continuous <Continuous>  dextrose 50% Injectable 25 Gram(s) IV Push once  dextrose 50% Injectable 12.5 Gram(s) IV Push once  dextrose 50% Injectable 25 Gram(s) IV Push once  glucagon  Injectable 1 milliGRAM(s) IntraMuscular once  insulin lispro (ADMELOG) corrective regimen sliding scale   SubCutaneous three times a day before meals  levothyroxine Injectable 50 MICROGram(s) IV Push at bedtime  magnesium sulfate  IVPB 2 Gram(s) IV Intermittent once  rifAXIMin 550 milliGRAM(s) Oral two times a day  thiamine IVPB 500 milliGRAM(s) IV Intermittent daily    MEDICATIONS  (PRN):  acetaminophen     Tablet .. 650 milliGRAM(s) Oral every 6 hours PRN Temp greater or equal to 38C (100.4F), Mild Pain (1 - 3)  bisacodyl Suppository 10 milliGRAM(s) Rectal daily PRN Constipation  dextrose Oral Gel 15 Gram(s) Oral once PRN Blood Glucose LESS THAN 70 milliGRAM(s)/deciliter  ondansetron Injectable 4 milliGRAM(s) IV Push every 6 hours PRN Nausea and/or Vomiting    PHYSICAL EXAM:  Vital Signs Last 24 Hrs  T(C): 36.8 (01 Aug 2022 10:30), Max: 36.8 (01 Aug 2022 10:30)  T(F): 98.3 (01 Aug 2022 10:30), Max: 98.3 (01 Aug 2022 10:30)  HR: 101 (01 Aug 2022 10:30) (101 - 106)  BP: 119/74 (01 Aug 2022 10:30) (111/68 - 135/76)  BP(mean): --  RR: 18 (01 Aug 2022 10:30) (18 - 20)  SpO2: 95% (01 Aug 2022 10:30) (91% - 95%)    Parameters below as of 01 Aug 2022 10:30  Patient On (Oxygen Delivery Method): room air    Physical Exam:  Due to the nature of this patient's COVID-19 isolation status, no bedside physical exam was done to limit spread of infection. Examination highlights were provided by bedside nurse. Objective data were reviewed in detail    LABS:                        11.1   6.21  )-----------( 174      ( 01 Aug 2022 06:38 )             33.4     08-01    134<L>  |  104  |  18.3  ----------------------------<  119<H>  3.6   |  22.0  |  0.86    Ca    7.2<L>      01 Aug 2022 06:38  Mg     1.5     08-01    TPro  6.8  /  Alb  1.9<L>  /  TBili  1.0  /  DBili  x   /  AST  84<H>  /  ALT  53<H>  /  AlkPhos  128<H>  08-01    PT/INR - ( 01 Aug 2022 06:38 )   PT: 23.1 sec;   INR: 1.98 ratio        I&O's Summary      RADIOLOGY & ADDITIONAL STUDIES:    CT abd 07/29/22  IMPRESSION:  Interval third spacing of fluid including bilateral pleural effusions,   ascites and anasarca. Possible heart failure or fluid overload.  Cirrhosis.  Thick-walled distal esophagus may reflect esophagitis.  Partially imaged left ischial soft tissue mass likely related to   overlying decubitus ulcer.      CT head 07/25/22  IMPRESSION:  No acute hemorrhage, mass effect or extra-axial collections. Increasing /  paranasal sinus inflammatory changes. Correlate for acute sinusitis.    US ABD   IMPRESSION:  Cirrhosis.  Large volume ascites.

## 2022-08-01 NOTE — CONSULT NOTE ADULT - ASSESSMENT
77 year old male admitted for AMS     Problem/Recommendation 1: AMS   Possibly due to underlying infection - UTI , colitis, constipation hepatic encephalopathy - c/w IV abx  Inital CT head possible artifact vs infarct , repeat CT head negative   +COVID asymptomatic with dyspnea  Started on Rifaximin on admission for hyperammonemia which now WNL    Problem/Recommendation 2: Abdominal Ascites  Plan for IR for drainage today of ascites  US ABD: IMPRESSION:  Cirrhosis.  Large volume ascites.    Problem/Recommendation 3: Debility  Assist in ADLS  Maintain safety, fall, aspiration precautions    Problem/Recommendation 4: Palliative Care Encounter  Patient on isolation for covid - discussed case with Dr. Alfred   Patient poor historian - call placed to patient's wife, Katt - no response - will reach out today for further information and to discuss overall [facility]  Patient scheduled for IR today for drainage of large ascites   Code status: Full Code  Surrogate: Katt Platt (wife) [phone]  Palliative are to continue to follow    Total Time Spent__50minutes  This includes chart review, patient assessment, discussion and collaboration with interdisciplinary team members, ACP planning    Thank you for the opportunity to assist with the care of this patient.   [facility] Palliative Medicine Consult Service [phone].      77 year old male admitted for AMS     Problem/Recommendation 1: AMS   Possibly due to underlying infection - UTI , colitis, constipation hepatic encephalopathy - c/w IV abx  Inital CT head possible artifact vs infarct , repeat CT head negative   +COVID asymptomatic with dyspnea  Started on Rifaximin on admission for hyperammonemia which now WNL    Problem/Recommendation 2: Abdominal Ascites  Plan for IR for drainage today of ascites  US ABD: IMPRESSION:  Cirrhosis.  Large volume ascites.   INR 1.98, albumin 1.9    Problem/Recommendation 3: Debility  Assist in ADLS  Maintain safety, fall, aspiration precautions    Problem/Recommendation 4: Palliative Care Encounter  Patient on isolation for covid - discussed case with Dr. Alfred   Patient poor historian - call placed to patient's wife, Katt - no response - will reach out today for further information and to discuss overall [facility]  Patient scheduled for IR today for drainage of large ascites   Code status: Full Code  Surrogate: Katt Platt (wife) [phone]  Palliative are to continue to follow    Total Time Spent__50minutes  This includes chart review, patient assessment, discussion and collaboration with interdisciplinary team members, ACP planning    Thank you for the opportunity to assist with the care of this patient.   [facility] Palliative Medicine Consult Service [phone].

## 2022-08-01 NOTE — PROGRESS NOTE ADULT - SUBJECTIVE AND OBJECTIVE BOX
INTERVAL HPI/OVERNIGHT EVENTS:  followq up on hypothyroidism    MEDICATIONS  (STANDING):  atorvastatin 40 milliGRAM(s) Oral at bedtime  cefTRIAXone   IVPB 1000 milliGRAM(s) IV Intermittent every 24 hours  dextrose 5%. 1000 milliLiter(s) (100 mL/Hr) IV Continuous <Continuous>  dextrose 5%. 1000 milliLiter(s) (50 mL/Hr) IV Continuous <Continuous>  dextrose 50% Injectable 25 Gram(s) IV Push once  dextrose 50% Injectable 12.5 Gram(s) IV Push once  dextrose 50% Injectable 25 Gram(s) IV Push once  glucagon  Injectable 1 milliGRAM(s) IntraMuscular once  insulin lispro (ADMELOG) corrective regimen sliding scale   SubCutaneous three times a day before meals  levothyroxine Injectable 50 MICROGram(s) IV Push at bedtime  rifAXIMin 550 milliGRAM(s) Oral two times a day  thiamine IVPB 500 milliGRAM(s) IV Intermittent daily    MEDICATIONS  (PRN):  acetaminophen     Tablet .. 650 milliGRAM(s) Oral every 6 hours PRN Temp greater or equal to 38C (100.4F), Mild Pain (1 - 3)  bisacodyl Suppository 10 milliGRAM(s) Rectal daily PRN Constipation  dextrose Oral Gel 15 Gram(s) Oral once PRN Blood Glucose LESS THAN 70 milliGRAM(s)/deciliter  ondansetron Injectable 4 milliGRAM(s) IV Push every 6 hours PRN Nausea and/or Vomiting      Allergies    oxycodone (Flushing)    Intolerances        Review of systems:    Vital Signs Last 24 Hrs  T(C): 36.8 (01 Aug 2022 10:30), Max: 36.8 (01 Aug 2022 10:30)  T(F): 98.3 (01 Aug 2022 10:30), Max: 98.3 (01 Aug 2022 10:30)  HR: 101 (01 Aug 2022 10:30) (101 - 106)  BP: 119/74 (01 Aug 2022 10:30) (111/68 - 135/76)  BP(mean): --  RR: 18 (01 Aug 2022 10:30) (18 - 20)  SpO2: 95% (01 Aug 2022 10:30) (91% - 95%)    Parameters below as of 01 Aug 2022 10:30  Patient On (Oxygen Delivery Method): room air        PHYSICAL EXAM:      Constitutional: NAD, well-groomed, well-developed  Respiratory: CTAB  Cardiovascular: S1 and S2, RRR, no M/G/R  Gastrointestinal: BS+, soft, no organomegaly or mass  Extremities: No peripheral edema, no pedal lesions  Skin: No rashes, no acanthosis        LABS:                        11.1   6.21  )-----------( 174      ( 01 Aug 2022 06:38 )             33.4     08-01    134<L>  |  104  |  18.3  ----------------------------<  119<H>  3.6   |  22.0  |  0.86    Ca    7.2<L>      01 Aug 2022 06:38  Mg     1.5     08-01    TPro  6.8  /  Alb  1.9<L>  /  TBili  1.0  /  DBili  x   /  AST  84<H>  /  ALT  53<H>  /  AlkPhos  128<H>  08-01

## 2022-08-01 NOTE — PROGRESS NOTE ADULT - ASSESSMENT
78 y/o M BIBA from home for AMS w/ associated inability to talk x 1 week.  Pt had hx of UTI prior to current admission.  CTH on admission showed left occipital infarct vs artifact, no last known well time available, not a candidate for tPA.  w/u on admisison also significant for hyperammonemia and BG >400s.  Lactic acidosis likely 2/2 cirrhosis.  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  Pt was admitted for acute metabolic encephalopathy, likely multifactorial  Pts remains acute given his persistent encephalopathy and today w/ notably distended abdomen; remains constipated despite aggressive bowel regimen.          > Acute metabolic encephalopathy, likely multifactorial   -possible UTI, colitis, constipation  and hepatic encephalopathy , covid positive but no resp sxs   - CTH reporting possible L-occipital lobe lucency which may represent artifact vs infarct; repeat CTH (-) for acute findings or interval changes     - Hyperammonemia in the setting of cirrhosis and AMS could be 2/2 hepatic encephalopathy and therefore started on Rifaxamin on admission   - Repeat ammonia WNL   - s/p cvEEG and no  epileptiform pattern or seizures noted  - Maintain on fall and aspiration precautions   - c/w rifaximin , hold lactulose for now due to abd distention   - Fall precautions   - Monitor CBC and temperature    >Distended abdomen 2/2 constipation / Nausea and NBNB emesis   - Notably distended but nontender and asymptomatic but is a poor historian overall/ possible ileus   - Reglan PRN for nausea/vomiting as it will also enhance GI motility   - CT a/p reviewed and has worsening ascities but as per IR not enough to drain; s/p 1x dose of IV lasix   - Tube feeds held  - repeat kub shows large ascites   - plan for ir guided drainage today   - c.w zosyn for now     > possible uti   - on ceftriaxone     >Hypothyroidism  - TFTs reviewed; TSH increasing and T4 decreased;  - Reassess TFTs in a few days    - TG ab elevated; possibly hashimoto's; TPO ab pending   - Endocrinology consulted and recs noted  - will start on iv synthroid till abd distention is improved     >Hx of CVA  - initial ct h showed ? cva but repeat cth shows no acute / subacute cva   - MRI deffered by neurology and repeat CTH reviewed and is w/out interval changes  - Maintain on asa and statin therapy  - Maintain on telemetry   - Neurology consulted   - hold aspirin for now due to peritoneal tap      >Transaminitis / worsening ascites  - Likely related to cirrhosis   - CT a/p reports worsening ascites but as per IR not enough to drain   - Tbili remains WNL   - Hepatitis panel neg   - Trend LFTs   - < from: US Abdomen Upper Quadrant Right (07.31.22 @ 15:22) > Cirrhosis. Large volume ascites.    >Normocytic anemia / Thrombocytopenia   - H/H and platelets stable   - Low plts likely 2/2 cirrhosis   - Hemodynamically stable w/ no active bleeding noted  - Monitor CBC and transfuse if Hb<7        >DM II  - Hyperglycemic on admission and hypolgycemic while hospitalized   - A1c 7.7  - ISS and hypoglycemic protocol in place       >Sacral decubitus ulcers and rt heel decubitus ulcer both POA  - No drainage noted   - Given CT findings and elevated CRP/procal   - ID recommending MRI if suspicion high for OM however given pt afebrile and nL WBC will defer for now; If MRI pursued would likely need to be done under conscious sedation    - c/w wound care   - Monitor CBC and temperature   - Consulted ID and recs noted     >Dilated AR   - 4.1cm at sinus of valsalva   - Incidental finding on CT chest   - CTSx consulted and recs noted; no intervention warranted at this time    >Hypovolemic hyponatremia / improved   - Improved s/p gentle hydration    - Monitor Na levels   - Monitor I/O's         >VTE ppx: Lovenox     >Code status: full code , palliative care consult       Dispo: Pt remains acute requiring hospitalization.  plan for ir guided paracentesis. wife aware.

## 2022-08-02 LAB
ANION GAP SERPL CALC-SCNC: 9 MMOL/L — SIGNIFICANT CHANGE UP (ref 5–17)
B PERT IGG+IGM PNL SER: CLEAR — SIGNIFICANT CHANGE UP
BUN SERPL-MCNC: 18.5 MG/DL — SIGNIFICANT CHANGE UP (ref 8–20)
CALCIUM SERPL-MCNC: 7.2 MG/DL — LOW (ref 8.4–10.5)
CHLORIDE SERPL-SCNC: 101 MMOL/L — SIGNIFICANT CHANGE UP (ref 98–107)
CO2 SERPL-SCNC: 22 MMOL/L — SIGNIFICANT CHANGE UP (ref 22–29)
COLOR FLD: ABNORMAL
CREAT SERPL-MCNC: 0.88 MG/DL — SIGNIFICANT CHANGE UP (ref 0.5–1.3)
EGFR: 87 ML/MIN/1.73M2 — SIGNIFICANT CHANGE UP
EOSINOPHIL # FLD: 2 % — SIGNIFICANT CHANGE UP
FLUID INTAKE SUBSTANCE CLASS: SIGNIFICANT CHANGE UP
GLUCOSE BLDC GLUCOMTR-MCNC: 125 MG/DL — HIGH (ref 70–99)
GLUCOSE BLDC GLUCOMTR-MCNC: 72 MG/DL — SIGNIFICANT CHANGE UP (ref 70–99)
GLUCOSE BLDC GLUCOMTR-MCNC: 76 MG/DL — SIGNIFICANT CHANGE UP (ref 70–99)
GLUCOSE BLDC GLUCOMTR-MCNC: 81 MG/DL — SIGNIFICANT CHANGE UP (ref 70–99)
GLUCOSE SERPL-MCNC: 110 MG/DL — HIGH (ref 70–99)
GRAM STN FLD: SIGNIFICANT CHANGE UP
HCT VFR BLD CALC: 35.3 % — LOW (ref 39–50)
HGB BLD-MCNC: 11.5 G/DL — LOW (ref 13–17)
LYMPHOCYTES # FLD: 36 % — SIGNIFICANT CHANGE UP
MCHC RBC-ENTMCNC: 30.6 PG — SIGNIFICANT CHANGE UP (ref 27–34)
MCHC RBC-ENTMCNC: 32.6 GM/DL — SIGNIFICANT CHANGE UP (ref 32–36)
MCV RBC AUTO: 93.9 FL — SIGNIFICANT CHANGE UP (ref 80–100)
MONOS+MACROS # FLD: 30 % — SIGNIFICANT CHANGE UP
NEUTROPHILS-BODY FLUID: 31 % — SIGNIFICANT CHANGE UP
OTHER CELLS FLD MANUAL: SIGNIFICANT CHANGE UP %
PLATELET # BLD AUTO: 199 K/UL — SIGNIFICANT CHANGE UP (ref 150–400)
POTASSIUM SERPL-MCNC: 3.6 MMOL/L — SIGNIFICANT CHANGE UP (ref 3.5–5.3)
POTASSIUM SERPL-SCNC: 3.6 MMOL/L — SIGNIFICANT CHANGE UP (ref 3.5–5.3)
RBC # BLD: 3.76 M/UL — LOW (ref 4.2–5.8)
RBC # FLD: 15.6 % — HIGH (ref 10.3–14.5)
RCV VOL RI: <1000 /UL — HIGH (ref 0–0)
SODIUM SERPL-SCNC: 132 MMOL/L — LOW (ref 135–145)
SPECIMEN SOURCE: SIGNIFICANT CHANGE UP
TOTAL NUCLEATED CELL COUNT, BODY FLUID: 52 /UL — SIGNIFICANT CHANGE UP
TUBE TYPE: SIGNIFICANT CHANGE UP
WBC # BLD: 6.6 K/UL — SIGNIFICANT CHANGE UP (ref 3.8–10.5)
WBC # FLD AUTO: 6.6 K/UL — SIGNIFICANT CHANGE UP (ref 3.8–10.5)

## 2022-08-02 PROCEDURE — 99233 SBSQ HOSP IP/OBS HIGH 50: CPT

## 2022-08-02 PROCEDURE — 49083 ABD PARACENTESIS W/IMAGING: CPT

## 2022-08-02 PROCEDURE — 99232 SBSQ HOSP IP/OBS MODERATE 35: CPT

## 2022-08-02 RX ORDER — SODIUM CHLORIDE 9 MG/ML
1000 INJECTION, SOLUTION INTRAVENOUS
Refills: 0 | Status: DISCONTINUED | OUTPATIENT
Start: 2022-08-02 | End: 2022-08-08

## 2022-08-02 RX ORDER — DEXTROSE 10 % IN WATER 10 %
250 INTRAVENOUS SOLUTION INTRAVENOUS ONCE
Refills: 0 | Status: COMPLETED | OUTPATIENT
Start: 2022-08-02 | End: 2022-08-02

## 2022-08-02 RX ADMIN — SODIUM CHLORIDE 75 MILLILITER(S): 9 INJECTION, SOLUTION INTRAVENOUS at 15:34

## 2022-08-02 RX ADMIN — Medication 105 MILLIGRAM(S): at 11:56

## 2022-08-02 RX ADMIN — Medication 1500 MILLILITER(S): at 05:04

## 2022-08-02 RX ADMIN — CEFTRIAXONE 100 MILLIGRAM(S): 500 INJECTION, POWDER, FOR SOLUTION INTRAMUSCULAR; INTRAVENOUS at 18:05

## 2022-08-02 RX ADMIN — Medication 50 MICROGRAM(S): at 21:17

## 2022-08-02 RX ADMIN — ATORVASTATIN CALCIUM 40 MILLIGRAM(S): 80 TABLET, FILM COATED ORAL at 21:17

## 2022-08-02 NOTE — PROGRESS NOTE ADULT - SUBJECTIVE AND OBJECTIVE BOX
Chief Complaint:  AMS    SUBJECTIVE / OVERNIGHT EVENTS:  patient seen and eval., comfortable. in no acute distress. no fever or chills. had small bm     Vital Signs Last 24 Hrs  T(C): 36.5 (02 Aug 2022 05:11), Max: 36.6 (01 Aug 2022 17:27)  T(F): 97.7 (02 Aug 2022 05:11), Max: 97.8 (01 Aug 2022 17:27)  HR: 105 (02 Aug 2022 05:11) (102 - 105)  BP: 135/80 (02 Aug 2022 05:11) (114/73 - 135/80)  BP(mean): --  RR: 20 (02 Aug 2022 05:11) (18 - 20)  SpO2: 99% (02 Aug 2022 05:11) (96% - 99%)    Parameters below as of 02 Aug 2022 05:11  Patient On (Oxygen Delivery Method): room air      GENERAL: pt examined bedside, laying comfortably in bed in NAD  HEENT: NC/AT, dry oral mucosa, clear conjunctiva, sclera nonicteric  RESPIRATORY: poor inspiratory effort, CTA b/l, no wheezing, rhonchi, rales  CARDIOVASCULAR: RRR, normal S1 and S2  ABDOMEN: soft, nontender but distended, +BS, no rebound/guarding, +peg  EXTREMITIES: No cynaosis, no clubbing, no lower extremity edema, pulses 2+  PSYCH: affect flat but cooperative  NEUROLOGY: A+O x1-2, no focal neurologic deficits appreciated   SKIN: No rashes or no palpable lesions, poor turgur      < from: CT Abdomen and Pelvis w/ IV Cont (07.20.22 @ 13:59) >  IMPRESSION:    CHEST:  1. No central, main, lobar, or segmental pulmonary embolus. Subsegmental   pulmonary arterial tree limited evaluation due to respiratory motion.  2. Mildly dilated aortic root, 4.1 cm at sinuses of Valsalva. Coronary   artery calcification.      ABDOMEN/PELVIS:  1. Colitis. Follow to resolution.  2. Cirrhotic liver. Atrophic left hepatic lobe. Small ascites possibly   related to cirrhosis or colitis.  3. Obliquely oriented lucency of right superior pubic ramus is   indeterminate for nondisplaced fracture versus sequelae of streak   artifact. Correlation with clinical evaluation and pelvic MRI is   recommended.  4. Bilateral partially imaged ischial soft tissue densities with central   low attenuation measuring 7.3 cm on the left and 3.8 cm on the right.   Underlying complex collections associated with decubitus ulcers can be   considered. Right gluteal soft tissue edema and asymmetric muscle   expansion. Underlying hematoma cannot be excluded. Additional probable   sacral decubitus ulcer. Correlation with clinical evaluation and pelvic   MRI is recommended.    < end of copied text >      < from: CT Head No Cont (07.20.22 @ 13:44) >  IMPRESSION:    Motion limited study. Left occipital lobe lucency may represent artifact   versus infarct. No gross hemorrhage, mass effect or hydrocephalus.    < end of copied text >      < from: CT Head No Cont (07.25.22 @ 18:15) >  IMPRESSION:    No acute hemorrhage, mass effect or extra-axial collections. Increasing   paranasal sinus inflammatory changes. Correlate for acute sinusitis.    < end of copied text >      < from: Xray Abdomen 1 View PORTABLE -Urgent (Xray Abdomen 1 View PORTABLE -Urgent .) (07.27.22 @ 09:15) >  IMPRESSION:    No acute radiographic abdominal findings.    < end of copied text >        MEDICATIONS  (STANDING):  atorvastatin 40 milliGRAM(s) Oral at bedtime  cefTRIAXone   IVPB 1000 milliGRAM(s) IV Intermittent every 24 hours  dextrose 5% + sodium chloride 0.9%. 1000 milliLiter(s) (75 mL/Hr) IV Continuous <Continuous>  dextrose 5%. 1000 milliLiter(s) (100 mL/Hr) IV Continuous <Continuous>  dextrose 5%. 1000 milliLiter(s) (50 mL/Hr) IV Continuous <Continuous>  dextrose 50% Injectable 25 Gram(s) IV Push once  dextrose 50% Injectable 12.5 Gram(s) IV Push once  dextrose 50% Injectable 25 Gram(s) IV Push once  glucagon  Injectable 1 milliGRAM(s) IntraMuscular once  insulin lispro (ADMELOG) corrective regimen sliding scale   SubCutaneous three times a day before meals  levothyroxine Injectable 50 MICROGram(s) IV Push at bedtime  rifAXIMin 550 milliGRAM(s) Oral two times a day  tamsulosin 0.4 milliGRAM(s) Oral at bedtime  thiamine IVPB 500 milliGRAM(s) IV Intermittent daily    MEDICATIONS  (PRN):  acetaminophen     Tablet .. 650 milliGRAM(s) Oral every 6 hours PRN Temp greater or equal to 38C (100.4F), Mild Pain (1 - 3)  bisacodyl Suppository 10 milliGRAM(s) Rectal daily PRN Constipation  dextrose Oral Gel 15 Gram(s) Oral once PRN Blood Glucose LESS THAN 70 milliGRAM(s)/deciliter  ondansetron Injectable 4 milliGRAM(s) IV Push every 6 hours PRN Nausea and/or Vomiting                            11.5   6.60  )-----------( 199      ( 02 Aug 2022 06:15 )             35.3           08-02    132<L>  |  101  |  18.5  ----------------------------<  110<H>  3.6   |  22.0  |  0.88    Ca    7.2<L>      02 Aug 2022 06:15  Mg     1.5     08-01    TPro  6.8  /  Alb  1.9<L>  /  TBili  1.0  /  DBili  x   /  AST  84<H>  /  ALT  53<H>  /  AlkPhos  128<H>  08-01

## 2022-08-02 NOTE — PRE PROCEDURE NOTE - PRE PROCEDURE EVALUATION
Procedure: Paracentesis    Indication: Abdominal Ascites      Clinical History: 77M with cirrhosis found to have abdominal ascites. Now planned for paracentesis.      Meds: acetaminophen     Tablet .. 650 milliGRAM(s) Oral every 6 hours PRN  atorvastatin 40 milliGRAM(s) Oral at bedtime  bisacodyl Suppository 10 milliGRAM(s) Rectal daily PRN  cefTRIAXone   IVPB 1000 milliGRAM(s) IV Intermittent every 24 hours  dextrose 5%. 1000 milliLiter(s) IV Continuous <Continuous>  dextrose 5%. 1000 milliLiter(s) IV Continuous <Continuous>  dextrose 50% Injectable 25 Gram(s) IV Push once  dextrose 50% Injectable 12.5 Gram(s) IV Push once  dextrose 50% Injectable 25 Gram(s) IV Push once  dextrose Oral Gel 15 Gram(s) Oral once PRN  glucagon  Injectable 1 milliGRAM(s) IntraMuscular once  insulin lispro (ADMELOG) corrective regimen sliding scale   SubCutaneous three times a day before meals  levothyroxine Injectable 50 MICROGram(s) IV Push at bedtime  ondansetron Injectable 4 milliGRAM(s) IV Push every 6 hours PRN  rifAXIMin 550 milliGRAM(s) Oral two times a day  tamsulosin 0.4 milliGRAM(s) Oral at bedtime  thiamine IVPB 500 milliGRAM(s) IV Intermittent daily      Allergies:oxycodone (Flushing)        Labs:                           11.5   6.60  )-----------( 199      ( 02 Aug 2022 06:15 )             35.3     PT/INR - ( 01 Aug 2022 06:38 )   PT: 23.1 sec;   INR: 1.98 ratio           08-02    132<L>  |  101  |  18.5  ----------------------------<  110<H>  3.6   |  22.0  |  0.88    Ca    7.2<L>      02 Aug 2022 06:15  Mg     1.5     08-01    TPro  6.8  /  Alb  1.9<L>  /  TBili  1.0  /  DBili  x   /  AST  84<H>  /  ALT  53<H>  /  AlkPhos  128<H>  08-01        Physical Exam: NAD, Abdominal Distension      Anesthesia: Local

## 2022-08-02 NOTE — CHART NOTE - NSCHARTNOTEFT_GEN_A_CORE
Source: Patient [ ]  Family [ ]   other [x ]    Current Diet: Diet, NPO:   Except Medications  Tube Feeding Modality: Gastrostomy (07-31-22 @ 14:11)    Current Weight:   (7/20) 190 lbs  Obtain daily wts, continue to monitor  No edema noted    Pertinent Medications: MEDICATIONS  (STANDING):  atorvastatin 40 milliGRAM(s) Oral at bedtime  cefTRIAXone   IVPB 1000 milliGRAM(s) IV Intermittent every 24 hours  dextrose 5%. 1000 milliLiter(s) (100 mL/Hr) IV Continuous <Continuous>  dextrose 5%. 1000 milliLiter(s) (50 mL/Hr) IV Continuous <Continuous>  dextrose 50% Injectable 25 Gram(s) IV Push once  dextrose 50% Injectable 12.5 Gram(s) IV Push once  dextrose 50% Injectable 25 Gram(s) IV Push once  glucagon  Injectable 1 milliGRAM(s) IntraMuscular once  insulin lispro (ADMELOG) corrective regimen sliding scale   SubCutaneous three times a day before meals  levothyroxine Injectable 50 MICROGram(s) IV Push at bedtime  rifAXIMin 550 milliGRAM(s) Oral two times a day  tamsulosin 0.4 milliGRAM(s) Oral at bedtime  thiamine IVPB 500 milliGRAM(s) IV Intermittent daily    MEDICATIONS  (PRN):  acetaminophen     Tablet .. 650 milliGRAM(s) Oral every 6 hours PRN Temp greater or equal to 38C (100.4F), Mild Pain (1 - 3)  bisacodyl Suppository 10 milliGRAM(s) Rectal daily PRN Constipation  dextrose Oral Gel 15 Gram(s) Oral once PRN Blood Glucose LESS THAN 70 milliGRAM(s)/deciliter  ondansetron Injectable 4 milliGRAM(s) IV Push every 6 hours PRN Nausea and/or Vomiting    Pertinent Labs: CBC Full  -  ( 02 Aug 2022 06:15 )  WBC Count : 6.60 K/uL  RBC Count : 3.76 M/uL  Hemoglobin : 11.5 g/dL  Hematocrit : 35.3 %  Platelet Count - Automated : 199 K/uL  Mean Cell Volume : 93.9 fl  Mean Cell Hemoglobin : 30.6 pg  Mean Cell Hemoglobin Concentration : 32.6 gm/dL  Auto Neutrophil # : x  Auto Lymphocyte # : x  Auto Monocyte # : x  Auto Eosinophil # : x  Auto Basophil # : x  Auto Neutrophil % : x  Auto Lymphocyte % : x  Auto Monocyte % : x  Auto Eosinophil % : x  Auto Basophil % : x  08-02 Na132 mmol/L<L> Glu 110 mg/dL<H> K+ 3.6 mmol/L Cr  0.88 mg/dL BUN 18.5 mg/dL Phos n/a   Alb n/a   PAB n/a       Skin: R buttocks stage II, L heel stage III  Blaine: 12    Nutrition focused physical exam previously conducted - found signs of malnutrition [ ]absent [x ]present    Subcutaneous fat loss: [ ] Orbital fat pads region, [ ]Buccal fat region, [ ]Triceps region,  [ ]Ribs region    Muscle wasting: [ x]Temples region, [ ]Clavicle region, [ ]Shoulder region, [ ]Scapula region, [ ]Interosseous region,  [ ]thigh region, [ ]Calf region    Estimated Needs:   [ x] no change since previous assessment  [ ] recalculated:     Current Nutrition Diagnosis: Pt remains at high nutrition risk and meets criteria for moderate chronic malnutrition related to inadequate energy intake in setting of cirrhosis, possible UTI, decub ulcers (POA), colitis, covid and ?subacute CVA as evidenced by mild muscle depletion, meeting <75% estimated protein-energy needs x >1 month. Tube feeds held x 3 days per RN. Aware pt with worsening ascites, plan for ir guided drainage today. Palliative following. Last documented BM 7/31. RD to remain available.     Recommendations:   1) When appropriate restart tube feedings- recommend Glucerna 1.5 initiated at 20 ml/hr and advance 10 ml/hr q4 hrs until goal rate of 70 ml/hr (x20 hrs) to provide 1400 ml, 2100 kcal, 116g protein, 1063 ml free water, and >100% of RDIs for vitamins/minerals. Additional free water per MD discretion.   2) RX: MVI, Vitamin C (500mg), Zinc Sulfate (220mg) x 10 days to aid in wound healing. Continue thiamine.   3) Monitor wts and labs    Monitoring and Evaluation:   [ x] PO intake [ x] Tolerance to diet prescription [X] Weights  [X] Follow up per protocol [X] Labs:

## 2022-08-02 NOTE — PROGRESS NOTE ADULT - ASSESSMENT
77 year old male admitted for AMS     Problem/Recommendation 1: AMS   Possibly due to underlying infection - UTI , colitis, constipation hepatic encephalopathy - c/w IV abx  Inital CT head possible artifact vs infarct , repeat CT head negative   +COVID asymptomatic with dyspnea  Started on Rifaximin on admission for hyperammonemia which now WNL    Problem/Recommendation 2: Abdominal Ascites  Plan for IR for drainage today of ascites  US ABD: IMPRESSION:  Cirrhosis.  Large volume ascites.   INR 1.98, albumin 1.9    Problem/Recommendation 3: Debility  Assist in ADLS  Maintain safety, fall, aspiration precautions    Problem/Recommendation 4: Palliative Care Encounter  Discussed case with ROBY Posada  Call placed to patient's wife, Katt Bolivar states she is the patient's surrogate and her goal is for patient to transition to [facility] on discharge as previously patient was very debilitated at home and relied on her and the home health aide for assistance in his ADLs  Katt prefers discharge to [facility] but states she is reviewing the list that was emailed her to see which other facilities she is interested besides that location   Emotional support provided  Palliative care to continue to follow    **CONTACT NUMBER FOR KATT is  [phone]  Total Time Spent_45___ minutes  This includes chart review, patient assessment, discussion and collaboration with interdisciplinary team members, ACP planning    Thank you for the opportunity to assist with the care of this patient.   [facility] Palliative Medicine Consult Service [phone].

## 2022-08-02 NOTE — PROGRESS NOTE ADULT - SUBJECTIVE AND OBJECTIVE BOX
Palliative Care Followup    OVERNIGHT EVENTS: no acute overnight events - spoke to wife Katt    Present Symptoms:   Unable to obtain due to poor mentation     Pain:   Unable to obtain due to poor mentation     Pain AD Score:0  [email or web address] (press ctrl + left click to view)    Review of Systems: Reviewed  Unable to obtain due to poor mentation   All others negative    MEDICATIONS  (STANDING):  atorvastatin 40 milliGRAM(s) Oral at bedtime  cefTRIAXone   IVPB 1000 milliGRAM(s) IV Intermittent every 24 hours  dextrose 5%. 1000 milliLiter(s) (100 mL/Hr) IV Continuous <Continuous>  dextrose 5%. 1000 milliLiter(s) (50 mL/Hr) IV Continuous <Continuous>  dextrose 50% Injectable 25 Gram(s) IV Push once  dextrose 50% Injectable 12.5 Gram(s) IV Push once  dextrose 50% Injectable 25 Gram(s) IV Push once  glucagon  Injectable 1 milliGRAM(s) IntraMuscular once  insulin lispro (ADMELOG) corrective regimen sliding scale   SubCutaneous three times a day before meals  levothyroxine Injectable 50 MICROGram(s) IV Push at bedtime  rifAXIMin 550 milliGRAM(s) Oral two times a day  tamsulosin 0.4 milliGRAM(s) Oral at bedtime  thiamine IVPB 500 milliGRAM(s) IV Intermittent daily    MEDICATIONS  (PRN):  acetaminophen     Tablet .. 650 milliGRAM(s) Oral every 6 hours PRN Temp greater or equal to 38C (100.4F), Mild Pain (1 - 3)  bisacodyl Suppository 10 milliGRAM(s) Rectal daily PRN Constipation  dextrose Oral Gel 15 Gram(s) Oral once PRN Blood Glucose LESS THAN 70 milliGRAM(s)/deciliter  ondansetron Injectable 4 milliGRAM(s) IV Push every 6 hours PRN Nausea and/or Vomiting    Physical Exam:  Due to the nature of this patient's COVID-19 isolation status, no bedside physical exam was done to limit spread of infection. Examination highlights were provided by bedside nurse. Objective data were reviewed in detail      Vital Signs Last 24 Hrs  T(C): 36.5 (02 Aug 2022 05:11), Max: 36.6 (01 Aug 2022 17:27)  T(F): 97.7 (02 Aug 2022 05:11), Max: 97.8 (01 Aug 2022 17:27)  HR: 105 (02 Aug 2022 05:11) (102 - 105)  BP: 135/80 (02 Aug 2022 05:11) (114/73 - 135/80)  BP(mean): --  RR: 20 (02 Aug 2022 05:11) (18 - 20)  SpO2: 99% (02 Aug 2022 05:11) (96% - 99%)    Parameters below as of 02 Aug 2022 05:11  Patient On (Oxygen Delivery Method): room air    LABS:                          11.5   6.60  )-----------( 199      ( 02 Aug 2022 06:15 )             35.3     08-02    132<L>  |  101  |  18.5  ----------------------------<  110<H>  3.6   |  22.0  |  0.88    Ca    7.2<L>      02 Aug 2022 06:15  Mg     1.5     08-01    TPro  6.8  /  Alb  1.9<L>  /  TBili  1.0  /  DBili  x   /  AST  84<H>  /  ALT  53<H>  /  AlkPhos  128<H>  08-01    PT/INR - ( 01 Aug 2022 06:38 )   PT: 23.1 sec;   INR: 1.98 ratio      I&O's Summary    RADIOLOGY & ADDITIONAL STUDIES:    CT abd 07/29/22  IMPRESSION:  Interval third spacing of fluid including bilateral pleural effusions,   ascites and anasarca. Possible heart failure or fluid overload.  Cirrhosis.  Thick-walled distal esophagus may reflect esophagitis.  Partially imaged left ischial soft tissue mass likely related to   overlying decubitus ulcer.      CT head 07/25/22  IMPRESSION:  No acute hemorrhage, mass effect or extra-axial collections. Increasing /  paranasal sinus inflammatory changes. Correlate for acute sinusitis.    US ABD   IMPRESSION:  Cirrhosis.  Large volume ascites.    ADVANCE DIRECTIVES/TREATMENT PREFERENCES:  Full Code

## 2022-08-02 NOTE — PROGRESS NOTE ADULT - ASSESSMENT
76 y/o M BIBA from home for AMS w/ associated inability to talk x 1 week.  Pt had hx of UTI prior to current admission.  CTH on admission showed left occipital infarct vs artifact, no last known well time available, not a candidate for tPA.  w/u on admisison also significant for hyperammonemia and BG >400s.  Lactic acidosis likely 2/2 cirrhosis.  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  Pt was admitted for acute metabolic encephalopathy, likely multifactorial  Pts remains acute given his persistent encephalopathy and today w/ notably distended abdomen; remains constipated despite aggressive bowel regimen.          > Acute metabolic encephalopathy, likely multifactorial   -possible UTI, colitis, constipation  and hepatic encephalopathy , covid positive but no resp sxs   - CTH reporting possible L-occipital lobe lucency which may represent artifact vs infarct; repeat CTH (-) for acute findings or interval changes     - Hyperammonemia in the setting of cirrhosis and AMS could be 2/2 hepatic encephalopathy and therefore started on Rifaxamin on admission   - Repeat ammonia WNL   - s/p cvEEG and no  epileptiform pattern or seizures noted  - Maintain on fall and aspiration precautions   - c/w rifaximin , hold lactulose for now due to abd distention   - Fall precautions   - Monitor CBC and temperature    >Distended abdomen 2/2 constipation / Nausea and NBNB emesis   - CT a/p reviewed and has worsening ascities but as per IR not enough to drain; s/p 1x dose of IV lasix   - Tube feeds held  - repeat kub shows large ascites   - plan for ir guided drainage today   - c.w ceftriaxone for now for sbp ppx     >Transaminitis / worsening ascites  - Likely related to cirrhosis   - CT a/p reports worsening ascites but as per IR not enough to drain   - Tbili remains WNL   - Hepatitis panel neg   - Trend LFTs   - < from: US Abdomen Upper Quadrant Right (07.31.22 @ 15:22) > Cirrhosis. Large volume ascites.    > possible uti   - on ceftriaxone     >Hypothyroidism  - TFTs reviewed; TSH increasing and T4 decreased;  - Reassess TFTs in a few days    - TG ab elevated; possibly hashimoto's; TPO ab pending   - Endocrinology consulted and recs noted  - will start on iv synthroid till abd distention is improved     >Hx of CVA  - initial ct h showed ? cva but repeat cth shows no acute / subacute cva   - MRI deffered by neurology and repeat CTH reviewed and is w/out interval changes  - Maintain on asa and statin therapy  - Maintain on telemetry   - Neurology consulted   - hold aspirin for now due to peritoneal tap      >Normocytic anemia / Thrombocytopenia   - H/H and platelets stable   - Low plts likely 2/2 cirrhosis   - Hemodynamically stable w/ no active bleeding noted  - Monitor CBC and transfuse if Hb<7      >DM II  - Hyperglycemic on admission and hypoglycemic while hospitalized   - A1c 7.7  - ISS and hypoglycemic protocol in place       >Sacral decubitus ulcers and rt heel decubitus ulcer both POA  - No drainage noted   - Given CT findings and elevated CRP/procal   - ID recommending MRI if suspicion high for OM however given pt afebrile and nL WBC will defer for now; If MRI pursued would likely need to be done under conscious sedation    - c/w wound care   - Monitor CBC and temperature   - Consulted ID and recs noted     >Dilated AR   - 4.1cm at sinus of valsalva   - Incidental finding on CT chest   - CTSx consulted and recs noted; no intervention warranted at this time    >Hypovolemic hyponatremia / improved   - Improved s/p gentle hydration    - Monitor Na levels   - Monitor I/O's       >VTE ppx: Lovenox     >Code status: full code , palliative care consult       Dispo: Pt remains acute requiring hospitalization.  plan for ir guided paracentesis. wife aware.

## 2022-08-03 LAB
ALBUMIN FLD-MCNC: 0.2 G/DL — SIGNIFICANT CHANGE UP
ALBUMIN SERPL ELPH-MCNC: 2 G/DL — LOW (ref 3.3–5.2)
ALP SERPL-CCNC: 126 U/L — HIGH (ref 40–120)
ALT FLD-CCNC: 51 U/L — HIGH
ANION GAP SERPL CALC-SCNC: 9 MMOL/L — SIGNIFICANT CHANGE UP (ref 5–17)
AST SERPL-CCNC: 81 U/L — HIGH
BILIRUB SERPL-MCNC: 1.2 MG/DL — SIGNIFICANT CHANGE UP (ref 0.4–2)
BUN SERPL-MCNC: 19.4 MG/DL — SIGNIFICANT CHANGE UP (ref 8–20)
CALCIUM SERPL-MCNC: 7.1 MG/DL — LOW (ref 8.4–10.5)
CHLORIDE SERPL-SCNC: 102 MMOL/L — SIGNIFICANT CHANGE UP (ref 98–107)
CO2 SERPL-SCNC: 20 MMOL/L — LOW (ref 22–29)
CREAT SERPL-MCNC: 0.81 MG/DL — SIGNIFICANT CHANGE UP (ref 0.5–1.3)
EGFR: 90 ML/MIN/1.73M2 — SIGNIFICANT CHANGE UP
GLUCOSE BLDC GLUCOMTR-MCNC: 109 MG/DL — HIGH (ref 70–99)
GLUCOSE BLDC GLUCOMTR-MCNC: 91 MG/DL — SIGNIFICANT CHANGE UP (ref 70–99)
GLUCOSE BLDC GLUCOMTR-MCNC: 91 MG/DL — SIGNIFICANT CHANGE UP (ref 70–99)
GLUCOSE BLDC GLUCOMTR-MCNC: 97 MG/DL — SIGNIFICANT CHANGE UP (ref 70–99)
GLUCOSE FLD-MCNC: 93 MG/DL — SIGNIFICANT CHANGE UP
GLUCOSE SERPL-MCNC: 96 MG/DL — SIGNIFICANT CHANGE UP (ref 70–99)
HCT VFR BLD CALC: 35.2 % — LOW (ref 39–50)
HGB BLD-MCNC: 11.4 G/DL — LOW (ref 13–17)
LDH SERPL L TO P-CCNC: 45 U/L — SIGNIFICANT CHANGE UP
MAGNESIUM SERPL-MCNC: 1.7 MG/DL — SIGNIFICANT CHANGE UP (ref 1.6–2.6)
MCHC RBC-ENTMCNC: 30.2 PG — SIGNIFICANT CHANGE UP (ref 27–34)
MCHC RBC-ENTMCNC: 32.4 GM/DL — SIGNIFICANT CHANGE UP (ref 32–36)
MCV RBC AUTO: 93.4 FL — SIGNIFICANT CHANGE UP (ref 80–100)
MRSA PCR RESULT.: DETECTED
PLATELET # BLD AUTO: 169 K/UL — SIGNIFICANT CHANGE UP (ref 150–400)
POTASSIUM SERPL-MCNC: 3.7 MMOL/L — SIGNIFICANT CHANGE UP (ref 3.5–5.3)
POTASSIUM SERPL-SCNC: 3.7 MMOL/L — SIGNIFICANT CHANGE UP (ref 3.5–5.3)
PROT FLD-MCNC: <1 G/DL — SIGNIFICANT CHANGE UP
PROT SERPL-MCNC: 6.7 G/DL — SIGNIFICANT CHANGE UP (ref 6.6–8.7)
RBC # BLD: 3.77 M/UL — LOW (ref 4.2–5.8)
RBC # FLD: 15.8 % — HIGH (ref 10.3–14.5)
S AUREUS DNA NOSE QL NAA+PROBE: DETECTED
SODIUM SERPL-SCNC: 131 MMOL/L — LOW (ref 135–145)
WBC # BLD: 7.01 K/UL — SIGNIFICANT CHANGE UP (ref 3.8–10.5)
WBC # FLD AUTO: 7.01 K/UL — SIGNIFICANT CHANGE UP (ref 3.8–10.5)

## 2022-08-03 PROCEDURE — 99232 SBSQ HOSP IP/OBS MODERATE 35: CPT

## 2022-08-03 RX ORDER — ASPIRIN/CALCIUM CARB/MAGNESIUM 324 MG
81 TABLET ORAL DAILY
Refills: 0 | Status: DISCONTINUED | OUTPATIENT
Start: 2022-08-03 | End: 2022-08-08

## 2022-08-03 RX ORDER — ENOXAPARIN SODIUM 100 MG/ML
40 INJECTION SUBCUTANEOUS EVERY 24 HOURS
Refills: 0 | Status: DISCONTINUED | OUTPATIENT
Start: 2022-08-03 | End: 2022-08-08

## 2022-08-03 RX ADMIN — SODIUM CHLORIDE 75 MILLILITER(S): 9 INJECTION, SOLUTION INTRAVENOUS at 22:51

## 2022-08-03 RX ADMIN — Medication 81 MILLIGRAM(S): at 14:39

## 2022-08-03 RX ADMIN — SODIUM CHLORIDE 75 MILLILITER(S): 9 INJECTION, SOLUTION INTRAVENOUS at 05:43

## 2022-08-03 RX ADMIN — Medication 105 MILLIGRAM(S): at 14:40

## 2022-08-03 RX ADMIN — Medication 50 MICROGRAM(S): at 22:52

## 2022-08-03 RX ADMIN — ATORVASTATIN CALCIUM 40 MILLIGRAM(S): 80 TABLET, FILM COATED ORAL at 22:52

## 2022-08-03 RX ADMIN — TAMSULOSIN HYDROCHLORIDE 0.4 MILLIGRAM(S): 0.4 CAPSULE ORAL at 22:52

## 2022-08-03 RX ADMIN — ENOXAPARIN SODIUM 40 MILLIGRAM(S): 100 INJECTION SUBCUTANEOUS at 18:12

## 2022-08-03 NOTE — PROGRESS NOTE ADULT - ASSESSMENT
78 y/o M BIBA from home for AMS w/ associated inability to talk x 1 week.  Pt had hx of UTI prior to current admission.  CTH on admission showed left occipital infarct vs artifact, no last known well time available, not a candidate for tPA.  w/u on admisison also significant for hyperammonemia and BG >400s.  Lactic acidosis likely 2/2 cirrhosis.  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  Pt was admitted for acute metabolic encephalopathy, likely multifactorial  Pts remains acute given his persistent encephalopathy and today w/ notably distended abdomen; remains constipated despite aggressive bowel regimen.          > Acute metabolic encephalopathy, likely multifactorial  -possible UTI, colitis, constipation  and hepatic encephalopathy , covid positive but no resp sxs   - CTH reporting possible L-occipital lobe lucency which may represent artifact vs infarct; repeat CTH (-) for acute findings or interval changes     - Hyperammonemia in the setting of cirrhosis and AMS could be 2/2 hepatic encephalopathy and therefore started on Rifaxamin on admission   - Repeat ammonia WNL   - s/p cvEEG and no  epileptiform pattern or seizures noted  - Maintain on fall and aspiration precautions   - c/w rifaximin , hold lactulose for now due to abd distention   - Fall precautions   - Monitor CBC and temperature    >Distended abdomen/ likely due to ascites / ileus   - CT a/p reviewed and has worsening ascities but as per IR not enough to drain; s/p 1x dose of IV lasix   - Tube feeds held  - repeat kub shows large ascites , ileus   - s/p  ir guided tap , 2 L drained   -no sbp on fluid analysis. ceftriaxone dcd     >Transaminitis / worsening ascites  - Likely related to cirrhosis   - CT a/p reports worsening ascites but as per IR not enough to drain   - Tbili remains WNL   - Hepatitis panel neg   - Trend LFTs   - < from: US Abdomen Upper Quadrant Right (07.31.22 @ 15:22) > Cirrhosis. Large volume ascites.    > possible uti   - completed ceftraixone     >Hypothyroidism  - TFTs reviewed; TSH increasing and T4 decreased;  - Reassess TFTs in a few days    - TG ab elevated; possibly hashimoto's; TPO ab pending   - Endocrinology consulted and recs noted  - will start on iv synthroid till abd distention is improved     >Hx of CVA  - initial ct h showed ? cva but repeat cth shows no acute / subacute cva   - MRI deffered by neurology and repeat CTH reviewed and is w/out interval changes  - Maintain on asa and statin therapy  - Maintain on telemetry   - Neurology consulted   - hold aspirin for now due to peritoneal tap      >Normocytic anemia / Thrombocytopenia   - H/H and platelets stable   - Low plts likely 2/2 cirrhosis   - Hemodynamically stable w/ no active bleeding noted  - Monitor CBC and transfuse if Hb<7      >DM II  - Hyperglycemic on admission and hypoglycemic while hospitalized   - A1c 7.7  - ISS and hypoglycemic protocol in place       >Sacral decubitus ulcers and rt heel decubitus ulcer both POA  - No drainage noted   - Given CT findings and elevated CRP/procal   - ID recommending MRI if suspicion high for OM however given pt afebrile and nL WBC will defer for now; If MRI pursued would likely need to be done under conscious sedation    - c/w wound care   - Monitor CBC and temperature   - Consulted ID and recs noted     >Dilated AR   - 4.1cm at sinus of valsalva   - Incidental finding on CT chest   - CTSx consulted and recs noted; no intervention warranted at this time    >Hypovolemic hyponatremia / improved   - Improved s/p gentle hydration    - Monitor Na levels   - Monitor I/O's       >VTE ppx: Lovenox     >Code status: full code , palliative care consult       Dispo:  acute , TF held due to ileus , oob to chair / ambulate as tolerated. plan for eventual virgilio

## 2022-08-03 NOTE — PROGRESS NOTE ADULT - SUBJECTIVE AND OBJECTIVE BOX
Chief Complaint:  AMS    SUBJECTIVE / OVERNIGHT EVENTS:  patient seen and eval., comfortable. in no acute distress. no fever or chills. s/p IR guided 2 L ascites drainage on 8/2. tolerated procedure well.     Vital Signs Last 24 Hrs  T(C): 36.6 (03 Aug 2022 11:43), Max: 36.7 (02 Aug 2022 16:00)  T(F): 97.9 (03 Aug 2022 11:43), Max: 98 (02 Aug 2022 16:00)  HR: 99 (03 Aug 2022 11:43) (81 - 106)  BP: 120/72 (03 Aug 2022 11:43) (120/72 - 133/82)  BP(mean): --  RR: 18 (03 Aug 2022 11:43) (18 - 18)  SpO2: 96% (03 Aug 2022 11:43) (93% - 96%)    Parameters below as of 03 Aug 2022 11:43  Patient On (Oxygen Delivery Method): room air        GENERAL: pt examined bedside, laying comfortably in bed in NAD  HEENT: NC/AT, dry oral mucosa, clear conjunctiva, sclera nonicteric  RESPIRATORY: poor inspiratory effort, CTA b/l, no wheezing, rhonchi, rales  CARDIOVASCULAR: RRR, normal S1 and S2  ABDOMEN: soft, nontender but distended, +BS, no rebound/guarding, +peg  EXTREMITIES: No cynaosis, no clubbing, no lower extremity edema, pulses 2+  PSYCH: affect flat but cooperative  NEUROLOGY: A+O x1-2, no focal neurologic deficits appreciated   SKIN: No rashes or no palpable lesions, poor turgur      < from: CT Abdomen and Pelvis w/ IV Cont (07.20.22 @ 13:59) >  IMPRESSION:    CHEST:  1. No central, main, lobar, or segmental pulmonary embolus. Subsegmental   pulmonary arterial tree limited evaluation due to respiratory motion.  2. Mildly dilated aortic root, 4.1 cm at sinuses of Valsalva. Coronary   artery calcification.      ABDOMEN/PELVIS:  1. Colitis. Follow to resolution.  2. Cirrhotic liver. Atrophic left hepatic lobe. Small ascites possibly   related to cirrhosis or colitis.  3. Obliquely oriented lucency of right superior pubic ramus is   indeterminate for nondisplaced fracture versus sequelae of streak   artifact. Correlation with clinical evaluation and pelvic MRI is   recommended.  4. Bilateral partially imaged ischial soft tissue densities with central   low attenuation measuring 7.3 cm on the left and 3.8 cm on the right.   Underlying complex collections associated with decubitus ulcers can be   considered. Right gluteal soft tissue edema and asymmetric muscle   expansion. Underlying hematoma cannot be excluded. Additional probable   sacral decubitus ulcer. Correlation with clinical evaluation and pelvic   MRI is recommended.    < end of copied text >      < from: CT Head No Cont (07.20.22 @ 13:44) >  IMPRESSION:    Motion limited study. Left occipital lobe lucency may represent artifact   versus infarct. No gross hemorrhage, mass effect or hydrocephalus.    < end of copied text >      < from: CT Head No Cont (07.25.22 @ 18:15) >  IMPRESSION:    No acute hemorrhage, mass effect or extra-axial collections. Increasing   paranasal sinus inflammatory changes. Correlate for acute sinusitis.    < end of copied text >      < from: Xray Abdomen 1 View PORTABLE -Urgent (Xray Abdomen 1 View PORTABLE -Urgent .) (07.27.22 @ 09:15) >  IMPRESSION:    No acute radiographic abdominal findings.    < end of copied text >    MEDICATIONS  (STANDING):  aspirin  chewable 81 milliGRAM(s) Enteral Tube daily  atorvastatin 40 milliGRAM(s) Oral at bedtime  dextrose 5% + sodium chloride 0.9%. 1000 milliLiter(s) (75 mL/Hr) IV Continuous <Continuous>  dextrose 5%. 1000 milliLiter(s) (100 mL/Hr) IV Continuous <Continuous>  dextrose 5%. 1000 milliLiter(s) (50 mL/Hr) IV Continuous <Continuous>  dextrose 50% Injectable 25 Gram(s) IV Push once  dextrose 50% Injectable 12.5 Gram(s) IV Push once  dextrose 50% Injectable 25 Gram(s) IV Push once  enoxaparin Injectable 40 milliGRAM(s) SubCutaneous every 24 hours  glucagon  Injectable 1 milliGRAM(s) IntraMuscular once  insulin lispro (ADMELOG) corrective regimen sliding scale   SubCutaneous three times a day before meals  levothyroxine Injectable 50 MICROGram(s) IV Push at bedtime  rifAXIMin 550 milliGRAM(s) Oral two times a day  tamsulosin 0.4 milliGRAM(s) Oral at bedtime  thiamine IVPB 500 milliGRAM(s) IV Intermittent daily    MEDICATIONS  (PRN):  acetaminophen     Tablet .. 650 milliGRAM(s) Oral every 6 hours PRN Temp greater or equal to 38C (100.4F), Mild Pain (1 - 3)  bisacodyl Suppository 10 milliGRAM(s) Rectal daily PRN Constipation  dextrose Oral Gel 15 Gram(s) Oral once PRN Blood Glucose LESS THAN 70 milliGRAM(s)/deciliter  ondansetron Injectable 4 milliGRAM(s) IV Push every 6 hours PRN Nausea and/or Vomiting                            11.4   7.01  )-----------( 169      ( 03 Aug 2022 06:04 )             35.2   08-03    131<L>  |  102  |  19.4  ----------------------------<  96  3.7   |  20.0<L>  |  0.81    Ca    7.1<L>      03 Aug 2022 06:04  Mg     1.7     08-03    TPro  6.7  /  Alb  2.0<L>  /  TBili  1.2  /  DBili  x   /  AST  81<H>  /  ALT  51<H>  /  AlkPhos  126<H>  08-03

## 2022-08-04 LAB
ALBUMIN SERPL ELPH-MCNC: 1.9 G/DL — LOW (ref 3.3–5.2)
ALP SERPL-CCNC: 125 U/L — HIGH (ref 40–120)
ALT FLD-CCNC: 52 U/L — HIGH
ANION GAP SERPL CALC-SCNC: 11 MMOL/L — SIGNIFICANT CHANGE UP (ref 5–17)
AST SERPL-CCNC: 84 U/L — HIGH
BILIRUB SERPL-MCNC: 1.2 MG/DL — SIGNIFICANT CHANGE UP (ref 0.4–2)
BUN SERPL-MCNC: 17.6 MG/DL — SIGNIFICANT CHANGE UP (ref 8–20)
CALCIUM SERPL-MCNC: 6.7 MG/DL — LOW (ref 8.4–10.5)
CHLORIDE SERPL-SCNC: 105 MMOL/L — SIGNIFICANT CHANGE UP (ref 98–107)
CO2 SERPL-SCNC: 18 MMOL/L — LOW (ref 22–29)
CREAT SERPL-MCNC: 0.8 MG/DL — SIGNIFICANT CHANGE UP (ref 0.5–1.3)
EGFR: 90 ML/MIN/1.73M2 — SIGNIFICANT CHANGE UP
GLUCOSE BLDC GLUCOMTR-MCNC: 118 MG/DL — HIGH (ref 70–99)
GLUCOSE BLDC GLUCOMTR-MCNC: 123 MG/DL — HIGH (ref 70–99)
GLUCOSE BLDC GLUCOMTR-MCNC: 83 MG/DL — SIGNIFICANT CHANGE UP (ref 70–99)
GLUCOSE BLDC GLUCOMTR-MCNC: 89 MG/DL — SIGNIFICANT CHANGE UP (ref 70–99)
GLUCOSE BLDC GLUCOMTR-MCNC: 95 MG/DL — SIGNIFICANT CHANGE UP (ref 70–99)
GLUCOSE SERPL-MCNC: 91 MG/DL — SIGNIFICANT CHANGE UP (ref 70–99)
HCT VFR BLD CALC: 34.1 % — LOW (ref 39–50)
HGB BLD-MCNC: 11.2 G/DL — LOW (ref 13–17)
MCHC RBC-ENTMCNC: 30.4 PG — SIGNIFICANT CHANGE UP (ref 27–34)
MCHC RBC-ENTMCNC: 32.8 GM/DL — SIGNIFICANT CHANGE UP (ref 32–36)
MCV RBC AUTO: 92.4 FL — SIGNIFICANT CHANGE UP (ref 80–100)
PLATELET # BLD AUTO: 152 K/UL — SIGNIFICANT CHANGE UP (ref 150–400)
POTASSIUM SERPL-MCNC: 3.6 MMOL/L — SIGNIFICANT CHANGE UP (ref 3.5–5.3)
POTASSIUM SERPL-SCNC: 3.6 MMOL/L — SIGNIFICANT CHANGE UP (ref 3.5–5.3)
PROT SERPL-MCNC: 6.3 G/DL — LOW (ref 6.6–8.7)
RBC # BLD: 3.69 M/UL — LOW (ref 4.2–5.8)
RBC # FLD: 15.6 % — HIGH (ref 10.3–14.5)
SODIUM SERPL-SCNC: 134 MMOL/L — LOW (ref 135–145)
WBC # BLD: 6.12 K/UL — SIGNIFICANT CHANGE UP (ref 3.8–10.5)
WBC # FLD AUTO: 6.12 K/UL — SIGNIFICANT CHANGE UP (ref 3.8–10.5)

## 2022-08-04 PROCEDURE — 99232 SBSQ HOSP IP/OBS MODERATE 35: CPT

## 2022-08-04 PROCEDURE — 74018 RADEX ABDOMEN 1 VIEW: CPT | Mod: 26

## 2022-08-04 RX ORDER — MUPIROCIN 20 MG/G
1 OINTMENT TOPICAL
Refills: 0 | Status: COMPLETED | OUTPATIENT
Start: 2022-08-04 | End: 2022-08-09

## 2022-08-04 RX ADMIN — Medication 105 MILLIGRAM(S): at 11:56

## 2022-08-04 RX ADMIN — Medication 0: at 11:56

## 2022-08-04 RX ADMIN — TAMSULOSIN HYDROCHLORIDE 0.4 MILLIGRAM(S): 0.4 CAPSULE ORAL at 22:51

## 2022-08-04 RX ADMIN — Medication 81 MILLIGRAM(S): at 11:56

## 2022-08-04 RX ADMIN — Medication 50 MICROGRAM(S): at 22:51

## 2022-08-04 RX ADMIN — ENOXAPARIN SODIUM 40 MILLIGRAM(S): 100 INJECTION SUBCUTANEOUS at 17:31

## 2022-08-04 RX ADMIN — MUPIROCIN 1 APPLICATION(S): 20 OINTMENT TOPICAL at 22:52

## 2022-08-04 RX ADMIN — ATORVASTATIN CALCIUM 40 MILLIGRAM(S): 80 TABLET, FILM COATED ORAL at 22:51

## 2022-08-04 RX ADMIN — SODIUM CHLORIDE 75 MILLILITER(S): 9 INJECTION, SOLUTION INTRAVENOUS at 20:48

## 2022-08-04 NOTE — PROGRESS NOTE ADULT - ASSESSMENT
78 y/o M BIBA from home for AMS w/ associated inability to talk x 1 week.  Pt had hx of UTI prior to current admission.  CTH on admission showed left occipital infarct vs artifact, no last known well time available, not a candidate for tPA.  w/u on admisison also significant for hyperammonemia and BG >400s.  Lactic acidosis likely 2/2 cirrhosis.  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  Pt was admitted for acute metabolic encephalopathy, likely multifactorial  Pts remains acute given his persistent encephalopathy and today w/ notably distended abdomen; remains constipated despite aggressive bowel regimen.          > Acute metabolic encephalopathy, likely multifactorial/ improving   -possible UTI, colitis, constipation  and hepatic encephalopathy , covid positive but no resp sxs   - CTH reporting possible L-occipital lobe lucency which may represent artifact vs infarct; repeat CTH (-) for acute findings or interval changes     - Hyperammonemia in the setting of cirrhosis and AMS could be 2/2 hepatic encephalopathy and therefore started on Rifaxamin on admission   - Repeat ammonia WNL   - s/p cvEEG and no  epileptiform pattern or seizures noted  - Maintain on fall and aspiration precautions   - c/w rifaximin , hold lactulose for now due to abd distention   - Fall precautions   - Monitor CBC and temperature    >Distended abdomen/ likely due to ascites / ileus / slow improvement   - CT a/p reviewed and has worsening ascities but as per IR not enough to drain; s/p 1x dose of IV lasix   - Tube feeds held  - repeat kub shows large ascites , ileus   - s/p  ir guided tap , 2 L drained   -no sbp on fluid analysis. ceftriaxone dcd   - c/w daily KUB , OOB to chair with assist , monitor electrolytes     >Transaminitis / worsening ascites  - Likely related to cirrhosis   - CT a/p reports worsening ascites but as per IR not enough to drain   - Tbili remains WNL   - Hepatitis panel neg   - Trend LFTs   - < from: US Abdomen Upper Quadrant Right (07.31.22 @ 15:22) > Cirrhosis. Large volume ascites.    > possible uti   - completed ceftraixone     >Hypothyroidism  - TFTs reviewed; TSH increasing and T4 decreased;  - Reassess TFTs in a few days    - TG ab elevated; possibly hashimoto's; TPO ab pending   - Endocrinology consulted and recs noted  - c/w v synthroid till abd distention is improved     >Hx of CVA  - initial ct h showed ? cva but repeat cth shows no acute / subacute cva   - MRI deffered by neurology and repeat CTH reviewed and is w/out interval changes  - Maintain on asa and statin therapy  - Maintain on telemetry   - Neurology consulted   - resumed on aspirin post peritoneal  tap      >Normocytic anemia / Thrombocytopenia   - H/H and platelets stable   - Low plts likely 2/2 cirrhosis   - Hemodynamically stable w/ no active bleeding noted  - Monitor CBC and transfuse if Hb<7      >DM II  - Hyperglycemic on admission and hypoglycemic while hospitalized   - A1c 7.7  - ISS and hypoglycemic protocol in place       >Sacral decubitus ulcers and rt heel decubitus ulcer both POA  - No drainage noted   - Given CT findings and elevated CRP/procal   - ID recommending MRI if suspicion high for OM however given pt afebrile and nL WBC will defer for now; If MRI pursued would likely need to be done under conscious sedation    - c/w wound care   - Monitor CBC and temperature   - Consulted ID and recs noted     >Dilated AR   - 4.1cm at sinus of valsalva   - Incidental finding on CT chest   - CTSx consulted and recs noted; no intervention warranted at this time    >Hypovolemic hyponatremia / improved   - Improved s/p gentle hydration    - Monitor Na levels   - Monitor I/O's       >VTE ppx: Lovenox     >Code status: full code , palliative care consult       Dispo:  acute , TF held due to ileus , oob to chair / ambulate as tolerated. plan for eventual virgilio

## 2022-08-04 NOTE — PROGRESS NOTE ADULT - SUBJECTIVE AND OBJECTIVE BOX
Chief Complaint:  AMS    SUBJECTIVE / OVERNIGHT EVENTS:  patient seen and eval., comfortable. in no acute distress. no fever or chills. denies any abd pain, awake , alert x 1-2     Vital Signs Last 24 Hrs  T(C): 36.6 (04 Aug 2022 10:11), Max: 36.9 (04 Aug 2022 05:00)  T(F): 97.9 (04 Aug 2022 10:11), Max: 98.4 (04 Aug 2022 05:00)  HR: 109 (04 Aug 2022 10:11) (104 - 109)  BP: 145/74 (04 Aug 2022 10:11) (109/68 - 145/74)  BP(mean): --  RR: 18 (04 Aug 2022 10:11) (18 - 18)  SpO2: 94% (04 Aug 2022 10:11) (94% - 96%)    Parameters below as of 04 Aug 2022 05:00  Patient On (Oxygen Delivery Method): room air      GENERAL: pt examined bedside, laying comfortably in bed in NAD  HEENT: NC/AT, dry oral mucosa, clear conjunctiva, sclera nonicteric  RESPIRATORY: poor inspiratory effort, CTA b/l, no wheezing, rhonchi, rales  CARDIOVASCULAR: RRR, normal S1 and S2  ABDOMEN: soft, nontender but distended, distension little better than yesterday , +BS, no rebound/guarding, +peg  EXTREMITIES: No cynaosis, no clubbing, no lower extremity edema, pulses 2+  PSYCH: affect flat but cooperative  NEUROLOGY: A+O x1-2, no focal neurologic deficits appreciated   SKIN: No rashes or no palpable lesions, poor turgur      < from: CT Abdomen and Pelvis w/ IV Cont (07.20.22 @ 13:59) >  IMPRESSION:    CHEST:  1. No central, main, lobar, or segmental pulmonary embolus. Subsegmental   pulmonary arterial tree limited evaluation due to respiratory motion.  2. Mildly dilated aortic root, 4.1 cm at sinuses of Valsalva. Coronary   artery calcification.      ABDOMEN/PELVIS:  1. Colitis. Follow to resolution.  2. Cirrhotic liver. Atrophic left hepatic lobe. Small ascites possibly   related to cirrhosis or colitis.  3. Obliquely oriented lucency of right superior pubic ramus is   indeterminate for nondisplaced fracture versus sequelae of streak   artifact. Correlation with clinical evaluation and pelvic MRI is   recommended.  4. Bilateral partially imaged ischial soft tissue densities with central   low attenuation measuring 7.3 cm on the left and 3.8 cm on the right.   Underlying complex collections associated with decubitus ulcers can be   considered. Right gluteal soft tissue edema and asymmetric muscle   expansion. Underlying hematoma cannot be excluded. Additional probable   sacral decubitus ulcer. Correlation with clinical evaluation and pelvic   MRI is recommended.    < end of copied text >      < from: CT Head No Cont (07.20.22 @ 13:44) >  IMPRESSION:    Motion limited study. Left occipital lobe lucency may represent artifact   versus infarct. No gross hemorrhage, mass effect or hydrocephalus.    < end of copied text >      < from: CT Head No Cont (07.25.22 @ 18:15) >  IMPRESSION:    No acute hemorrhage, mass effect or extra-axial collections. Increasing   paranasal sinus inflammatory changes. Correlate for acute sinusitis.    < end of copied text >      < from: Xray Abdomen 1 View PORTABLE -Urgent (Xray Abdomen 1 View PORTABLE -Urgent .) (07.27.22 @ 09:15) >  IMPRESSION:    No acute radiographic abdominal findings.    < end of copied text >                          11.2   6.12  )-----------( 152      ( 04 Aug 2022 06:00 )             34.1   08-04    134<L>  |  105  |  17.6  ----------------------------<  91  3.6   |  18.0<L>  |  0.80    Ca    6.7<L>      04 Aug 2022 06:00  Mg     1.7     08-03    TPro  6.3<L>  /  Alb  1.9<L>  /  TBili  1.2  /  DBili  x   /  AST  84<H>  /  ALT  52<H>  /  AlkPhos  125<H>  08-04      MEDICATIONS  (STANDING):  aspirin  chewable 81 milliGRAM(s) Enteral Tube daily  atorvastatin 40 milliGRAM(s) Oral at bedtime  dextrose 5% + sodium chloride 0.9%. 1000 milliLiter(s) (75 mL/Hr) IV Continuous <Continuous>  dextrose 5%. 1000 milliLiter(s) (100 mL/Hr) IV Continuous <Continuous>  dextrose 5%. 1000 milliLiter(s) (50 mL/Hr) IV Continuous <Continuous>  dextrose 50% Injectable 25 Gram(s) IV Push once  dextrose 50% Injectable 12.5 Gram(s) IV Push once  dextrose 50% Injectable 25 Gram(s) IV Push once  enoxaparin Injectable 40 milliGRAM(s) SubCutaneous every 24 hours  glucagon  Injectable 1 milliGRAM(s) IntraMuscular once  insulin lispro (ADMELOG) corrective regimen sliding scale   SubCutaneous three times a day before meals  levothyroxine Injectable 50 MICROGram(s) IV Push at bedtime  mupirocin 2% Ointment 1 Application(s) Both Nostrils two times a day  rifAXIMin 550 milliGRAM(s) Oral two times a day  tamsulosin 0.4 milliGRAM(s) Oral at bedtime  thiamine IVPB 500 milliGRAM(s) IV Intermittent daily    MEDICATIONS  (PRN):  acetaminophen     Tablet .. 650 milliGRAM(s) Oral every 6 hours PRN Temp greater or equal to 38C (100.4F), Mild Pain (1 - 3)  bisacodyl Suppository 10 milliGRAM(s) Rectal daily PRN Constipation  dextrose Oral Gel 15 Gram(s) Oral once PRN Blood Glucose LESS THAN 70 milliGRAM(s)/deciliter  ondansetron Injectable 4 milliGRAM(s) IV Push every 6 hours PRN Nausea and/or Vomiting

## 2022-08-05 LAB
ALBUMIN SERPL ELPH-MCNC: 1.8 G/DL — LOW (ref 3.3–5.2)
ALP SERPL-CCNC: 124 U/L — HIGH (ref 40–120)
ALT FLD-CCNC: 53 U/L — HIGH
ANION GAP SERPL CALC-SCNC: 11 MMOL/L — SIGNIFICANT CHANGE UP (ref 5–17)
AST SERPL-CCNC: 88 U/L — HIGH
BILIRUB SERPL-MCNC: 1.4 MG/DL — SIGNIFICANT CHANGE UP (ref 0.4–2)
BUN SERPL-MCNC: 15.9 MG/DL — SIGNIFICANT CHANGE UP (ref 8–20)
CALCIUM SERPL-MCNC: 7 MG/DL — LOW (ref 8.4–10.5)
CHLORIDE SERPL-SCNC: 106 MMOL/L — SIGNIFICANT CHANGE UP (ref 98–107)
CO2 SERPL-SCNC: 18 MMOL/L — LOW (ref 22–29)
CREAT SERPL-MCNC: 0.8 MG/DL — SIGNIFICANT CHANGE UP (ref 0.5–1.3)
EGFR: 90 ML/MIN/1.73M2 — SIGNIFICANT CHANGE UP
GLUCOSE BLDC GLUCOMTR-MCNC: 102 MG/DL — HIGH (ref 70–99)
GLUCOSE BLDC GLUCOMTR-MCNC: 117 MG/DL — HIGH (ref 70–99)
GLUCOSE BLDC GLUCOMTR-MCNC: 132 MG/DL — HIGH (ref 70–99)
GLUCOSE SERPL-MCNC: 116 MG/DL — HIGH (ref 70–99)
HCT VFR BLD CALC: 33.8 % — LOW (ref 39–50)
HGB BLD-MCNC: 11.2 G/DL — LOW (ref 13–17)
MAGNESIUM SERPL-MCNC: 1.6 MG/DL — SIGNIFICANT CHANGE UP (ref 1.6–2.6)
MCHC RBC-ENTMCNC: 30.6 PG — SIGNIFICANT CHANGE UP (ref 27–34)
MCHC RBC-ENTMCNC: 33.1 GM/DL — SIGNIFICANT CHANGE UP (ref 32–36)
MCV RBC AUTO: 92.3 FL — SIGNIFICANT CHANGE UP (ref 80–100)
PHOSPHATE SERPL-MCNC: 2.6 MG/DL — SIGNIFICANT CHANGE UP (ref 2.4–4.7)
PLATELET # BLD AUTO: 142 K/UL — LOW (ref 150–400)
POTASSIUM SERPL-MCNC: 3.9 MMOL/L — SIGNIFICANT CHANGE UP (ref 3.5–5.3)
POTASSIUM SERPL-SCNC: 3.9 MMOL/L — SIGNIFICANT CHANGE UP (ref 3.5–5.3)
PROT SERPL-MCNC: 6.4 G/DL — LOW (ref 6.6–8.7)
RBC # BLD: 3.66 M/UL — LOW (ref 4.2–5.8)
RBC # FLD: 15.9 % — HIGH (ref 10.3–14.5)
SODIUM SERPL-SCNC: 134 MMOL/L — LOW (ref 135–145)
WBC # BLD: 6.12 K/UL — SIGNIFICANT CHANGE UP (ref 3.8–10.5)
WBC # FLD AUTO: 6.12 K/UL — SIGNIFICANT CHANGE UP (ref 3.8–10.5)

## 2022-08-05 PROCEDURE — 99232 SBSQ HOSP IP/OBS MODERATE 35: CPT

## 2022-08-05 PROCEDURE — 74018 RADEX ABDOMEN 1 VIEW: CPT | Mod: 26

## 2022-08-05 RX ADMIN — Medication 50 MICROGRAM(S): at 21:05

## 2022-08-05 RX ADMIN — Medication 105 MILLIGRAM(S): at 13:15

## 2022-08-05 RX ADMIN — SODIUM CHLORIDE 75 MILLILITER(S): 9 INJECTION, SOLUTION INTRAVENOUS at 12:04

## 2022-08-05 RX ADMIN — ENOXAPARIN SODIUM 40 MILLIGRAM(S): 100 INJECTION SUBCUTANEOUS at 18:21

## 2022-08-05 RX ADMIN — MUPIROCIN 1 APPLICATION(S): 20 OINTMENT TOPICAL at 05:04

## 2022-08-05 RX ADMIN — ATORVASTATIN CALCIUM 40 MILLIGRAM(S): 80 TABLET, FILM COATED ORAL at 21:05

## 2022-08-05 RX ADMIN — TAMSULOSIN HYDROCHLORIDE 0.4 MILLIGRAM(S): 0.4 CAPSULE ORAL at 21:05

## 2022-08-05 RX ADMIN — MUPIROCIN 1 APPLICATION(S): 20 OINTMENT TOPICAL at 18:20

## 2022-08-05 NOTE — PROGRESS NOTE ADULT - ASSESSMENT
77 year old male admitted for AMS     Problem/Recommendation 1: AMS   Possibly due to underlying infection - UTI , colitis, constipation hepatic encephalopathy - c/w IV abx  Inital CT head possible artifact vs infarct , repeat CT head negative   +COVID asymptomatic , now completed course  Started on Rifaximin on admission for hyperammonemia which now WNL  Slowly resolving     Problem/Recommendation 2: Abdominal Ascites  S/p IR for drainage of ascites  US ABD: IMPRESSION:  Cirrhosis.  Large volume ascites.    Problem/Recommendation 3: Debility  Assist in ADLS  Maintain safety, fall, aspiration precautions    Problem/Recommendation 4: Palliative Care Encounter  Met with patient at bedside, awake, alert, answered simple yes or no questions but unable to give in depth responses to more complicated questions  Patient denies discomforts, appears comfortable   Plan to follow up with Katt (wife) today for emotional support   Per our last conversation plan was for patient to transition to [facility]/LTC on discharge as previously patient was very debilitated at home and relied on her and the home health aide for assistance in his ADLs  Palliative care to continue to follow    **CONTACT NUMBER FOR KATT is  [phone]    Total Time Spent_45___ minutes  This includes chart review, patient assessment, discussion and collaboration with interdisciplinary team members, ACP planning    Thank you for the opportunity to assist with the care of this patient.   [facility] Palliative Medicine Consult Service [phone].    77 year old male admitted for AMS     Problem/Recommendation 1: AMS   Possibly due to underlying infection - UTI , colitis, constipation hepatic encephalopathy - c/w IV abx  Inital CT head possible artifact vs infarct , repeat CT head negative   +COVID asymptomatic , now completed course  Started on Rifaximin on admission for hyperammonemia which now WNL  Slowly resolving     Problem/Recommendation 2: Abdominal Ascites  S/p IR for drainage of ascites  US ABD: IMPRESSION:  Cirrhosis.  Large volume ascites.    Problem/Recommendation 3: Debility  Assist in ADLS  Maintain safety, fall, aspiration precautions    Problem/Recommendation 4: Palliative Care Encounter  Met with patient at bedside, awake, alert, answered simple yes or no questions but unable to give in depth responses to more complicated questions  Patient denies discomforts, appears comfortable   Plan to follow up with Katt (wife) today for emotional support   Per our last conversation plan was for patient to transition to [facility]/LTC on discharge as previously patient was very debilitated at home and relied on her and the home health aide for assistance in his ADLs  Patient to be evaluated by surgical team due to abdominal distention - feeds on hold   Discussed case with medical MINDI Colón  Palliative care to continue to follow    **CONTACT NUMBER FOR KATT is  [phone]    Total Time Spent_45___ minutes  This includes chart review, patient assessment, discussion and collaboration with interdisciplinary team members, ACP planning    Thank you for the opportunity to assist with the care of this patient.   [facility] Palliative Medicine Consult Service [phone].    77 year old male admitted for AMS     Problem/Recommendation 1: AMS   Possibly due to underlying infection - UTI , colitis, constipation hepatic encephalopathy - c/w IV abx  Inital CT head possible artifact vs infarct , repeat CT head negative   +COVID asymptomatic , now completed course  Started on Rifaximin on admission for hyperammonemia which now WNL  Slowly resolving     Problem/Recommendation 2: Abdominal Ascites  S/p IR for drainage of ascites  US ABD: IMPRESSION:  Cirrhosis.  Large volume ascites.    Problem/Recommendation 3: Debility  Assist in ADLS  Maintain safety, fall, aspiration precautions    Problem/Recommendation 4: Palliative Care Encounter  Met with patient at bedside, awake, alert, answered simple yes or no questions but unable to give in depth responses to more complicated questions  Patient denies discomforts, appears comfortable   Plan to follow up with Katt (wife) today for emotional support   Per our last conversation plan was for patient to transition to [facility]/LTC on discharge as previously patient was very debilitated at home and relied on her and the home health aide for assistance in his ADLs  Patient to be evaluated by surgical team due to abdominal distention - feeds on hold   Discussed case with medical MINDI Colón  Palliative care to continue to follow    Code status: Full code  Surrogate: Katt Platt   **CONTACT NUMBER FOR KATT is  [phone]    Total Time Spent_45___ minutes  This includes chart review, patient assessment, discussion and collaboration with interdisciplinary team members, ACP planning    Thank you for the opportunity to assist with the care of this patient.   [facility] Palliative Medicine Consult Service [phone].    77 year old male admitted for AMS     Problem/Recommendation 1: AMS   Possibly due to underlying infection - UTI , colitis, constipation hepatic encephalopathy - c/w IV abx  Inital CT head possible artifact vs infarct , repeat CT head negative   +COVID asymptomatic , now completed course  Started on Rifaximin on admission for hyperammonemia which now WNL  Slowly resolving     Problem/Recommendation 2: Abdominal Ascites  S/p IR for drainage of ascites  US ABD: IMPRESSION:  Cirrhosis.  Large volume ascites.    Problem/Recommendation 3: Debility  Assist in ADLS  Maintain safety, fall, aspiration precautions    Problem/Recommendation 4: Palliative Care Encounter  Met with patient at bedside, awake, alert, answered simple yes or no questions but unable to give in depth responses to more complicated questions  Patient denies discomforts, appears comfortable   Plan to follow up with Katt (wife) today for emotional support   Per our last conversation plan was for patient to transition to [facility]/LTC on discharge as previously patient was very debilitated at home and relied on her and the home health aide for assistance in his ADLs  Patient to be evaluated by surgical team due to abdominal distention - feeds on hold   Discussed case with medical MINDI Colón  Update: Spoke to Katt, she was appreciative of the reach out - Verified with Katt patient has not completed a DNR/I or Living will in the past. Also informed her that if she wants a more conservative approach to [facility] hospice at a facility may be an option for him. Katt thanked me for the information - at this time plan is to continue with medical optimization.   Palliative care to continue to follow    Code status: Full code  HCP/Surrogate: Katt Platt   **CONTACT NUMBER FOR KATT is  [phone]    Total Time Spent_45___ minutes  This includes chart review, patient assessment, discussion and collaboration with interdisciplinary team members, ACP planning    Thank you for the opportunity to assist with the care of this patient.   [facility] Palliative Medicine Consult Service [phone].

## 2022-08-05 NOTE — PROGRESS NOTE ADULT - SUBJECTIVE AND OBJECTIVE BOX
Awake alert no comp[laints   afberiler   abd soft  slight distended nontneder doughy   wbc 6.12   surgically stable   supportive care

## 2022-08-05 NOTE — CHART NOTE - NSCHARTNOTEFT_GEN_A_CORE
Source: Patient [ ]  Family [ ]   other [ x ] chart    Current Diet: Diet, NPO:   Except Medications  Tube Feeding Modality: Gastrostomy (07-31-22 @ 14:11) [Active]    Enteral /Parenteral Nutrition: tube feedings d/c'd 2/2 abdominal distention     Current Weight: 190lbs (7/20)    Pertinent Medications: MEDICATIONS  (STANDING):  aspirin  chewable 81 milliGRAM(s) Enteral Tube daily  atorvastatin 40 milliGRAM(s) Oral at bedtime  dextrose 5% + sodium chloride 0.9%. 1000 milliLiter(s) (75 mL/Hr) IV Continuous <Continuous>  dextrose 5%. 1000 milliLiter(s) (100 mL/Hr) IV Continuous <Continuous>  dextrose 5%. 1000 milliLiter(s) (50 mL/Hr) IV Continuous <Continuous>  dextrose 50% Injectable 25 Gram(s) IV Push once  dextrose 50% Injectable 12.5 Gram(s) IV Push once  dextrose 50% Injectable 25 Gram(s) IV Push once  enoxaparin Injectable 40 milliGRAM(s) SubCutaneous every 24 hours  glucagon  Injectable 1 milliGRAM(s) IntraMuscular once  insulin lispro (ADMELOG) corrective regimen sliding scale   SubCutaneous three times a day before meals  levothyroxine Injectable 50 MICROGram(s) IV Push at bedtime  mupirocin 2% Ointment 1 Application(s) Both Nostrils two times a day  rifAXIMin 550 milliGRAM(s) Oral two times a day  tamsulosin 0.4 milliGRAM(s) Oral at bedtime  thiamine IVPB 500 milliGRAM(s) IV Intermittent daily    MEDICATIONS  (PRN):  acetaminophen     Tablet .. 650 milliGRAM(s) Oral every 6 hours PRN Temp greater or equal to 38C (100.4F), Mild Pain (1 - 3)  bisacodyl Suppository 10 milliGRAM(s) Rectal daily PRN Constipation  dextrose Oral Gel 15 Gram(s) Oral once PRN Blood Glucose LESS THAN 70 milliGRAM(s)/deciliter  ondansetron Injectable 4 milliGRAM(s) IV Push every 6 hours PRN Nausea and/or Vomiting    Pertinent Labs: CBC Full  -  ( 05 Aug 2022 06:20 )  WBC Count : 6.12 K/uL  RBC Count : 3.66 M/uL  Hemoglobin : 11.2 g/dL  Hematocrit : 33.8 %  Platelet Count - Automated : 142 K/uL  Mean Cell Volume : 92.3 fl  Mean Cell Hemoglobin : 30.6 pg  Mean Cell Hemoglobin Concentration : 33.1 gm/dL    Nutrition Related Labs: 08-05 Na134 mmol/L<L> Glu 116 mg/dL<H> K+ 3.9 mmol/L Cr  0.80 mg/dL BUN 15.9 mg/dL Phos 2.6 mg/dL Alb 1.8 g/dL<L> PAB n/a       Skin: Stage 2 Pressure Ulcer R buttock  Edema: 1+ L arm and L hand    Nutrition focused physical exam previously conducted - found signs of malnutrition [ ]absent [x ]present    Subcutaneous fat loss: [ ] Orbital fat pads region, [ ]Buccal fat region, [ ]Triceps region,  [ ]Ribs region    Muscle wasting: [ x]Temples region, [ ]Clavicle region, [ ]Shoulder region, [ ]Scapula region, [ ]Interosseous region,  [ ]thigh region, [ ]Calf region    Estimated Needs:   [ x] no change since previous assessment  [ ] recalculated:     CURRENT NUTRITION DIAGNOSIS:  Pt remains at high nutrition risk and meets criteria for moderate chronic malnutrition related to inadequate energy intake in setting of cirrhosis, possible UTI, decub ulcers (POA), colitis, covid and ?subacute CVA as evidenced by mild muscle depletion, meeting <75% estimated protein-energy needs x >1 month. Currently, patient remains with tube feedings on hold 2/2 abdominal distention and severe constipation; per chart, likely related to ascites / ileus. Pending to resume TF when resolved. Currently no source of nutrition at this time. Patient with increased protein/kcal needs due to current clinical status and healing stage 2 pressure ulcer. IV fluids provided. AMS remains. RD to remain available.     RECOMMENDATIONS:  1) If medically feasible - Glucerna 1.5 initiated at 20 ml/hr and advance 10 ml/hr q4 hrs until goal rate of 70 ml/hr (x20 hrs) to provide 1400 ml, 2100 kcal, 116g protein, 1063 ml free water, and >100% of RDIs for vitamins/minerals. Additional free water per MD discretion.   2) RX: MVI, Vitamin C 500mg, Zinc Sulfate 220mg x 10 days to assist with healing, when medically feasible; continue thiamin  3) monitor weights; monitor labs      Monitoring and Evaluation:   [ ] PO intake [ ] Tolerance to diet prescription [X] Weights  [X] Follow up per protocol [X] Labs: Source: Patient [ ]  Family [ ]   other [ x ] chart    Current Diet: Diet, NPO:   Except Medications  Tube Feeding Modality: Gastrostomy (07-31-22 @ 14:11) [Active]    Enteral /Parenteral Nutrition: tube feedings d/c'd 2/2 abdominal distention     Current Weight: 190lbs (7/20)    Pertinent Medications: MEDICATIONS  (STANDING):  aspirin  chewable 81 milliGRAM(s) Enteral Tube daily  atorvastatin 40 milliGRAM(s) Oral at bedtime  dextrose 5% + sodium chloride 0.9%. 1000 milliLiter(s) (75 mL/Hr) IV Continuous <Continuous>  dextrose 5%. 1000 milliLiter(s) (100 mL/Hr) IV Continuous <Continuous>  dextrose 5%. 1000 milliLiter(s) (50 mL/Hr) IV Continuous <Continuous>  dextrose 50% Injectable 25 Gram(s) IV Push once  dextrose 50% Injectable 12.5 Gram(s) IV Push once  dextrose 50% Injectable 25 Gram(s) IV Push once  enoxaparin Injectable 40 milliGRAM(s) SubCutaneous every 24 hours  glucagon  Injectable 1 milliGRAM(s) IntraMuscular once  insulin lispro (ADMELOG) corrective regimen sliding scale   SubCutaneous three times a day before meals  levothyroxine Injectable 50 MICROGram(s) IV Push at bedtime  mupirocin 2% Ointment 1 Application(s) Both Nostrils two times a day  rifAXIMin 550 milliGRAM(s) Oral two times a day  tamsulosin 0.4 milliGRAM(s) Oral at bedtime  thiamine IVPB 500 milliGRAM(s) IV Intermittent daily    MEDICATIONS  (PRN):  acetaminophen     Tablet .. 650 milliGRAM(s) Oral every 6 hours PRN Temp greater or equal to 38C (100.4F), Mild Pain (1 - 3)  bisacodyl Suppository 10 milliGRAM(s) Rectal daily PRN Constipation  dextrose Oral Gel 15 Gram(s) Oral once PRN Blood Glucose LESS THAN 70 milliGRAM(s)/deciliter  ondansetron Injectable 4 milliGRAM(s) IV Push every 6 hours PRN Nausea and/or Vomiting    Pertinent Labs: CBC Full  -  ( 05 Aug 2022 06:20 )  WBC Count : 6.12 K/uL  RBC Count : 3.66 M/uL  Hemoglobin : 11.2 g/dL  Hematocrit : 33.8 %  Platelet Count - Automated : 142 K/uL  Mean Cell Volume : 92.3 fl  Mean Cell Hemoglobin : 30.6 pg  Mean Cell Hemoglobin Concentration : 33.1 gm/dL    Nutrition Related Labs: 08-05 Na134 mmol/L<L> Glu 116 mg/dL<H> K+ 3.9 mmol/L Cr  0.80 mg/dL BUN 15.9 mg/dL Phos 2.6 mg/dL Alb 1.8 g/dL<L> PAB n/a       Skin: Stage 2 Pressure Ulcer R buttock  Edema: 1+ L arm and L hand    Nutrition focused physical exam previously conducted - found signs of malnutrition [ ]absent [x ]present    Subcutaneous fat loss: [ ] Orbital fat pads region, [ ]Buccal fat region, [ ]Triceps region,  [ ]Ribs region    Muscle wasting: [ severe x ]Temples region, [ ]Clavicle region, [ ]Shoulder region, [ ]Scapula region, [ ]Interosseous region,  [ ]thigh region, [ ]Calf region    Estimated Needs:   [ x] no change since previous assessment  [ ] recalculated:     CURRENT NUTRITION DIAGNOSIS:  Pt remains at high nutrition risk and meets criteria for severe chronic malnutrition related to inadequate energy intake in setting of cirrhosis, possible UTI, decub ulcers (POA), colitis, covid and ?subacute CVA as evidenced by severe muscle depletion, meeting <75% estimated protein-energy needs x >1 month. Currently, patient remains with tube feedings on hold 2/2 abdominal distention and severe constipation; per chart, likely related to ascites / ileus. Pending to resume TF when resolved. Currently no source of nutrition at this time. Patient with increased protein/kcal needs due to current clinical status and healing stage 2 pressure ulcer. IV fluids provided. AMS remains. RD to remain available.     RECOMMENDATIONS:  1) If medically feasible - Glucerna 1.5 initiated at 20 ml/hr and advance 10 ml/hr q4 hrs until goal rate of 70 ml/hr (x20 hrs) to provide 1400 ml, 2100 kcal, 116g protein, 1063 ml free water, and >100% of RDIs for vitamins/minerals. Additional free water per MD discretion.   2) RX: MVI, Vitamin C 500mg, Zinc Sulfate 220mg x 10 days to assist with healing, when medically feasible; continue thiamin  3) monitor weights; monitor labs      Monitoring and Evaluation:   [ ] PO intake [ ] Tolerance to diet prescription [X] Weights  [X] Follow up per protocol [X] Labs:

## 2022-08-05 NOTE — PROGRESS NOTE ADULT - SUBJECTIVE AND OBJECTIVE BOX
Palliative Care Followup    OVERNIGHT EVENTS: no acute overnight events, patient resting in bed, denies pain, appears comfortable    Present Symptoms:     Dyspnea: denies  Nausea/Vomiting: No  Anxiety:  No  Depression: No  Fatigue: Yes   Loss of appetite: Yes   Constipation: no    Pain: Appears comfortable, denies pain            Character-            Duration-            Effect-            Factors-            Frequency-            Location-            Severity-    Pain AD Score:0  [email or web address] (press ctrl + left click to view)    Review of Systems: Reviewed  Difficult to obtain due to poor mentation   All others negative    MEDICATIONS  (STANDING):  aspirin  chewable 81 milliGRAM(s) Enteral Tube daily  atorvastatin 40 milliGRAM(s) Oral at bedtime  dextrose 5% + sodium chloride 0.9%. 1000 milliLiter(s) (75 mL/Hr) IV Continuous <Continuous>  dextrose 5%. 1000 milliLiter(s) (100 mL/Hr) IV Continuous <Continuous>  dextrose 5%. 1000 milliLiter(s) (50 mL/Hr) IV Continuous <Continuous>  dextrose 50% Injectable 25 Gram(s) IV Push once  dextrose 50% Injectable 12.5 Gram(s) IV Push once  dextrose 50% Injectable 25 Gram(s) IV Push once  enoxaparin Injectable 40 milliGRAM(s) SubCutaneous every 24 hours  glucagon  Injectable 1 milliGRAM(s) IntraMuscular once  insulin lispro (ADMELOG) corrective regimen sliding scale   SubCutaneous three times a day before meals  levothyroxine Injectable 50 MICROGram(s) IV Push at bedtime  mupirocin 2% Ointment 1 Application(s) Both Nostrils two times a day  rifAXIMin 550 milliGRAM(s) Oral two times a day  tamsulosin 0.4 milliGRAM(s) Oral at bedtime  thiamine IVPB 500 milliGRAM(s) IV Intermittent daily    MEDICATIONS  (PRN):  acetaminophen     Tablet .. 650 milliGRAM(s) Oral every 6 hours PRN Temp greater or equal to 38C (100.4F), Mild Pain (1 - 3)  bisacodyl Suppository 10 milliGRAM(s) Rectal daily PRN Constipation  dextrose Oral Gel 15 Gram(s) Oral once PRN Blood Glucose LESS THAN 70 milliGRAM(s)/deciliter  ondansetron Injectable 4 milliGRAM(s) IV Push every 6 hours PRN Nausea and/or Vomiting      PHYSICAL EXAM:    Vital Signs Last 24 Hrs  T(C): 36.3 (05 Aug 2022 08:51), Max: 36.6 (04 Aug 2022 10:11)  T(F): 97.4 (05 Aug 2022 08:51), Max: 97.9 (04 Aug 2022 10:11)  HR: 102 (05 Aug 2022 08:51) (102 - 109)  BP: 130/80 (05 Aug 2022 08:51) (115/66 - 145/74)  BP(mean): 96 (05 Aug 2022 08:51) (91 - 96)  RR: 17 (05 Aug 2022 08:51) (17 - 18)  SpO2: 95% (05 Aug 2022 08:51) (94% - 96%)    Parameters below as of 05 Aug 2022 08:51  Patient On (Oxygen Delivery Method): room air        General: in and out of sleep cycle, answers some questions, not all    Karnofsky:  %30    HEENT: dry mouth     Lungs: comfortable     CV: normal      GI: incontinent     : incontinent      MSK: weakness , bedbound/wheelchair bound    Skin: dry skin    LABS:                      11.2   6.12  )-----------( 142      ( 05 Aug 2022 06:20 )             33.8     08-05    134<L>  |  106  |  15.9  ----------------------------<  116<H>  3.9   |  18.0<L>  |  0.80    Ca    7.0<L>      05 Aug 2022 06:20  Phos  2.6     08-05  Mg     1.6     08-05    TPro  6.4<L>  /  Alb  1.8<L>  /  TBili  1.4  /  DBili  x   /  AST  88<H>  /  ALT  53<H>  /  AlkPhos  124<H>  08-05    RADIOLOGY & ADDITIONAL STUDIES:  No new    ADVANCE DIRECTIVES/TREATMENT PREFERENCES:  Full Code Palliative Care Followup    OVERNIGHT EVENTS: no acute overnight events, patient resting in bed, denies pain, appears comfortable- plan for surgical eval for abdominal distention per medicine team     Present Symptoms:     Dyspnea: denies  Nausea/Vomiting: No  Anxiety:  No  Depression: No  Fatigue: Yes   Loss of appetite: Yes   Constipation: no    Pain: Appears comfortable, denies pain            Character-            Duration-            Effect-            Factors-            Frequency-            Location-            Severity-    Pain AD Score:0  [email or web address] (press ctrl + left click to view)    Review of Systems: Reviewed  Difficult to obtain due to poor mentation   All others negative    MEDICATIONS  (STANDING):  aspirin  chewable 81 milliGRAM(s) Enteral Tube daily  atorvastatin 40 milliGRAM(s) Oral at bedtime  dextrose 5% + sodium chloride 0.9%. 1000 milliLiter(s) (75 mL/Hr) IV Continuous <Continuous>  dextrose 5%. 1000 milliLiter(s) (100 mL/Hr) IV Continuous <Continuous>  dextrose 5%. 1000 milliLiter(s) (50 mL/Hr) IV Continuous <Continuous>  dextrose 50% Injectable 25 Gram(s) IV Push once  dextrose 50% Injectable 12.5 Gram(s) IV Push once  dextrose 50% Injectable 25 Gram(s) IV Push once  enoxaparin Injectable 40 milliGRAM(s) SubCutaneous every 24 hours  glucagon  Injectable 1 milliGRAM(s) IntraMuscular once  insulin lispro (ADMELOG) corrective regimen sliding scale   SubCutaneous three times a day before meals  levothyroxine Injectable 50 MICROGram(s) IV Push at bedtime  mupirocin 2% Ointment 1 Application(s) Both Nostrils two times a day  rifAXIMin 550 milliGRAM(s) Oral two times a day  tamsulosin 0.4 milliGRAM(s) Oral at bedtime  thiamine IVPB 500 milliGRAM(s) IV Intermittent daily    MEDICATIONS  (PRN):  acetaminophen     Tablet .. 650 milliGRAM(s) Oral every 6 hours PRN Temp greater or equal to 38C (100.4F), Mild Pain (1 - 3)  bisacodyl Suppository 10 milliGRAM(s) Rectal daily PRN Constipation  dextrose Oral Gel 15 Gram(s) Oral once PRN Blood Glucose LESS THAN 70 milliGRAM(s)/deciliter  ondansetron Injectable 4 milliGRAM(s) IV Push every 6 hours PRN Nausea and/or Vomiting      PHYSICAL EXAM:    Vital Signs Last 24 Hrs  T(C): 36.3 (05 Aug 2022 08:51), Max: 36.6 (04 Aug 2022 10:11)  T(F): 97.4 (05 Aug 2022 08:51), Max: 97.9 (04 Aug 2022 10:11)  HR: 102 (05 Aug 2022 08:51) (102 - 109)  BP: 130/80 (05 Aug 2022 08:51) (115/66 - 145/74)  BP(mean): 96 (05 Aug 2022 08:51) (91 - 96)  RR: 17 (05 Aug 2022 08:51) (17 - 18)  SpO2: 95% (05 Aug 2022 08:51) (94% - 96%)    Parameters below as of 05 Aug 2022 08:51  Patient On (Oxygen Delivery Method): room air        General: in and out of sleep cycle, answers some questions, not all    Karnofsky:  %30    HEENT: dry mouth     Lungs: comfortable     CV: normal      GI: incontinent , abd distention    : incontinent      MSK: weakness , bedbound/wheelchair bound    Skin: dry skin    LABS:                      11.2   6.12  )-----------( 142      ( 05 Aug 2022 06:20 )             33.8     08-05    134<L>  |  106  |  15.9  ----------------------------<  116<H>  3.9   |  18.0<L>  |  0.80    Ca    7.0<L>      05 Aug 2022 06:20  Phos  2.6     08-05  Mg     1.6     08-05    TPro  6.4<L>  /  Alb  1.8<L>  /  TBili  1.4  /  DBili  x   /  AST  88<H>  /  ALT  53<H>  /  AlkPhos  124<H>  08-05    RADIOLOGY & ADDITIONAL STUDIES:  No new    ADVANCE DIRECTIVES/TREATMENT PREFERENCES:  Full Code

## 2022-08-05 NOTE — PROGRESS NOTE ADULT - SUBJECTIVE AND OBJECTIVE BOX
INTERVAL HPI/OVERNIGHT EVENTS:  follow up on hypothyroidism    MEDICATIONS  (STANDING):  atorvastatin 40 milliGRAM(s) Oral at bedtime  cefTRIAXone   IVPB 1000 milliGRAM(s) IV Intermittent every 24 hours  dextrose 5%. 1000 milliLiter(s) (100 mL/Hr) IV Continuous <Continuous>  dextrose 5%. 1000 milliLiter(s) (50 mL/Hr) IV Continuous <Continuous>  dextrose 50% Injectable 25 Gram(s) IV Push once  dextrose 50% Injectable 12.5 Gram(s) IV Push once  dextrose 50% Injectable 25 Gram(s) IV Push once  glucagon  Injectable 1 milliGRAM(s) IntraMuscular once  insulin lispro (ADMELOG) corrective regimen sliding scale   SubCutaneous three times a day before meals  levothyroxine Injectable 50 MICROGram(s) IV Push at bedtime  rifAXIMin 550 milliGRAM(s) Oral two times a day  thiamine IVPB 500 milliGRAM(s) IV Intermittent daily    MEDICATIONS  (PRN):  acetaminophen     Tablet .. 650 milliGRAM(s) Oral every 6 hours PRN Temp greater or equal to 38C (100.4F), Mild Pain (1 - 3)  bisacodyl Suppository 10 milliGRAM(s) Rectal daily PRN Constipation  dextrose Oral Gel 15 Gram(s) Oral once PRN Blood Glucose LESS THAN 70 milliGRAM(s)/deciliter  ondansetron Injectable 4 milliGRAM(s) IV Push every 6 hours PRN Nausea and/or Vomiting      Allergies    oxycodone (Flushing)    Intolerances        Review of systems:    Vital Signs Last 24 Hrs  T(C): 36.8 (01 Aug 2022 10:30), Max: 36.8 (01 Aug 2022 10:30)  T(F): 98.3 (01 Aug 2022 10:30), Max: 98.3 (01 Aug 2022 10:30)  HR: 101 (01 Aug 2022 10:30) (101 - 106)  BP: 119/74 (01 Aug 2022 10:30) (111/68 - 135/76)  BP(mean): --  RR: 18 (01 Aug 2022 10:30) (18 - 20)  SpO2: 95% (01 Aug 2022 10:30) (91% - 95%)    Parameters below as of 01 Aug 2022 10:30  Patient On (Oxygen Delivery Method): room air        PHYSICAL EXAM:      Constitutional: NAD, well-groomed, well-developed  Respiratory: CTAB  Cardiovascular: S1 and S2, RRR, no M/G/R  Gastrointestinal: BS+, soft, no organomegaly or mass  Extremities: No peripheral edema, no pedal lesions  Skin: No rashes, no acanthosis        LABS:                        11.1   6.21  )-----------( 174      ( 01 Aug 2022 06:38 )             33.4     08-01    134<L>  |  104  |  18.3  ----------------------------<  119<H>  3.6   |  22.0  |  0.86    Ca    7.2<L>      01 Aug 2022 06:38  Mg     1.5     08-01    TPro  6.8  /  Alb  1.9<L>  /  TBili  1.0  /  DBili  x   /  AST  84<H>  /  ALT  53<H>  /  AlkPhos  128<H>  08-01

## 2022-08-05 NOTE — DIETITIAN NUTRITION RISK NOTIFICATION - ADDITIONAL COMMENTS/DIETITIAN RECOMMENDATIONS
Rx: MVI daily, vitamin C (500mg daily), and zinc sulfate (220mg daily x 10 days) to aid in wound healing. Continue thiamine  Provide high protein gelatein BID, Magic cup TID vanilla pudding TID
1) If medically feasible - Glucerna 1.5 initiated at 20 ml/hr and advance 10 ml/hr q4 hrs until goal rate of 70 ml/hr (x20 hrs) to provide 1400 ml, 2100 kcal, 116g protein, 1063 ml free water, and >100% of RDIs for vitamins/minerals. Additional free water per MD discretion.   2) RX: MVI, Vitamin C 500mg, Zinc Sulfate 220mg x 10 days to assist with healing, when medically feasible; continue thiamin  3) monitor weights; monitor labs

## 2022-08-05 NOTE — PROGRESS NOTE ADULT - ASSESSMENT
76 y/o M BIBA from home for AMS w/ associated inability to talk x 1 week.  Pt had hx of UTI prior to current admission.  CTH on admission showed left occipital infarct vs artifact, no last known well time available, not a candidate for tPA.  w/u on admisison also significant for hyperammonemia and BG >400s.  Lactic acidosis likely 2/2 cirrhosis.  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  Pt was admitted for acute metabolic encephalopathy, likely multifactorial  Pts remains acute given his persistent encephalopathy and today w/ notably distended abdomen; remains constipated despite aggressive bowel regimen.          > Acute metabolic encephalopathy, likely multifactorial/ improving / likely back to baseline alert and orented x 1-2 with dementia   -possible UTI, colitis, constipation  and hepatic encephalopathy , covid positive but no resp sxs   - CTH reporting possible L-occipital lobe lucency which may represent artifact vs infarct; repeat CTH (-) for acute findings or interval changes     - Hyperammonemia in the setting of cirrhosis and AMS could be 2/2 hepatic encephalopathy and therefore started on Rifaxamin on admission   - Repeat ammonia WNL   - s/p cvEEG and no  epileptiform pattern or seizures noted  - Maintain on fall and aspiration precautions   - c/w rifaximin , hold lactulose for now due to abd distention   - Fall precautions   - Monitor CBC and temperature    >Distended abdomen/ likely due to ascites / ileus / slow improvement   - CT a/p reviewed and has worsening ascities but as per IR not enough to drain; s/p 1x dose of IV lasix   - Tube feeds held  - repeat kub shows large ascites , ileus   - s/p  ir guided tap , 2 L drained   -no sbp on fluid analysis. ceftriaxone dcd   - c/w daily KUB , OOB to chair with assist , monitor electrolytes   - kub shows ileus   - surgery consulted , recommend hold TF , enemas , DANIELITO, g tube to gravity     >Transaminitis / worsening ascites  - Likely related to cirrhosis   - CT a/p reports worsening ascites but as per IR not enough to drain   - Tbili remains WNL   - Hepatitis panel neg   - Trend LFTs   - < from: US Abdomen Upper Quadrant Right (07.31.22 @ 15:22) > Cirrhosis. Large volume ascites.    > possible uti   - completed ceftraixone     >Hypothyroidism  - TFTs reviewed; TSH increasing and T4 decreased;  - Reassess TFTs in a few days    - TG ab elevated; possibly hashimoto's; TPO ab pending   - Endocrinology consulted and recs noted  - c/w v synthroid till able to resume TF 8/8 as per endo   - repeat TFTs on monday     >Hx of CVA  - initial ct h showed ? cva but repeat cth shows no acute / subacute cva   - MRI deffered by neurology and repeat CTH reviewed and is w/out interval changes  - Maintain on asa and statin therapy  - Maintain on telemetry   - Neurology consulted   - resumed on aspirin post peritoneal  tap      >Normocytic anemia / Thrombocytopenia   - H/H and platelets stable   - Low plts likely 2/2 cirrhosis   - Hemodynamically stable w/ no active bleeding noted  - Monitor CBC and transfuse if Hb<7      >DM II  - Hyperglycemic on admission and hypoglycemic while hospitalized   - A1c 7.7  - ISS and hypoglycemic protocol in place       >Sacral decubitus ulcers and rt heel decubitus ulcer both POA  - No drainage noted   - Given CT findings and elevated CRP/procal   - ID recommending MRI if suspicion high for OM however given pt afebrile and nL WBC will defer for now; If MRI pursued would likely need to be done under conscious sedation    - c/w wound care   - Monitor CBC and temperature   - Consulted ID and recs noted     >Dilated AR   - 4.1cm at sinus of valsalva   - Incidental finding on CT chest   - CTSx consulted and recs noted; no intervention warranted at this time    >Hypovolemic hyponatremia / improved   - Improved s/p gentle hydration    - Monitor Na levels   - Monitor I/O's       >VTE ppx: Lovenox     >Code status: full code , palliative care consult       Dispo:  acute , TF held due to ileus , . plan for eventual virgilio       76 y/o M BIBA from home for AMS w/ associated inability to talk x 1 week.  Pt had hx of UTI prior to current admission.  CTH on admission showed left occipital infarct vs artifact, no last known well time available, not a candidate for tPA.  w/u on admisison also significant for hyperammonemia and BG >400s.  Lactic acidosis likely 2/2 cirrhosis.  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  Pt was admitted for acute metabolic encephalopathy, also foudn to have large ascites, s/p IR [id number] drainage , now with ileus , sx consulted. TF on hold     > Acute metabolic encephalopathy, likely multifactorial/ improving / likely back to baseline alert and orented x 1-2 with dementia   -possible UTI, colitis, constipation  and hepatic encephalopathy , covid positive but no resp sxs   - CTH reporting possible L-occipital lobe lucency which may represent artifact vs infarct; repeat CTH (-) for acute findings or interval changes     - Hyperammonemia in the setting of cirrhosis and AMS could be 2/2 hepatic encephalopathy and therefore started on Rifaxamin on admission   - Repeat ammonia WNL   - s/p cvEEG and no  epileptiform pattern or seizures noted  - Maintain on fall and aspiration precautions   - c/w rifaximin , hold lactulose for now due to abd distention   - Fall precautions   - Monitor CBC and temperature    >Distended abdomen/ likely due to ascites / ileus / slow improvement   - CT a/p reviewed and has worsening ascities but as per IR not enough to drain; s/p 1x dose of IV lasix   - Tube feeds held  - repeat kub shows large ascites , ileus   - s/p  ir guided tap , 2 L drained   -no sbp on fluid analysis. ceftriaxone dcd   - c/w daily KUB , OOB to chair with assist , monitor electrolytes   - kub shows ileus   - surgery consulted , recommend hold TF , enemas , DANIELITO, g tube to gravity     >Transaminitis / worsening ascites  - Likely related to cirrhosis   - CT a/p reports worsening ascites but as per IR not enough to drain   - Tbili remains WNL   - Hepatitis panel neg   - Trend LFTs   - < from: US Abdomen Upper Quadrant Right (07.31.22 @ 15:22) > Cirrhosis. Large volume ascites.    > possible uti   - completed ceftraixone     >Hypothyroidism  - TFTs reviewed; TSH increasing and T4 decreased;  - Reassess TFTs in a few days    - TG ab elevated; possibly hashimoto's; TPO ab pending   - Endocrinology consulted and recs noted  - c/w v synthroid till able to resume TF 8/8 as per endo   - repeat TFTs on monday     >Hx of CVA  - initial ct h showed ? cva but repeat cth shows no acute / subacute cva   - MRI deffered by neurology and repeat CTH reviewed and is w/out interval changes  - Maintain on asa and statin therapy  - Maintain on telemetry   - Neurology consulted   - resumed on aspirin post peritoneal  tap      >Normocytic anemia / Thrombocytopenia   - H/H and platelets stable   - Low plts likely 2/2 cirrhosis   - Hemodynamically stable w/ no active bleeding noted  - Monitor CBC and transfuse if Hb<7      >DM II  - Hyperglycemic on admission and hypoglycemic while hospitalized   - A1c 7.7  - ISS and hypoglycemic protocol in place       >Sacral decubitus ulcers and rt heel decubitus ulcer both POA  - No drainage noted   - Given CT findings and elevated CRP/procal   - ID recommending MRI if suspicion high for OM however given pt afebrile and nL WBC will defer for now; If MRI pursued would likely need to be done under conscious sedation    - c/w wound care   - Monitor CBC and temperature   - Consulted ID and recs noted     >Dilated AR   - 4.1cm at sinus of valsalva   - Incidental finding on CT chest   - CTSx consulted and recs noted; no intervention warranted at this time    >Hypovolemic hyponatremia / improved   - Improved s/p gentle hydration    - Monitor Na levels   - Monitor I/O's       >VTE ppx: Lovenox     >Code status: full code , palliative care consult       Dispo:  acute , TF held due to ileus , . eduinie called and updated. plan for eventual virgilio

## 2022-08-05 NOTE — PROGRESS NOTE ADULT - ASSESSMENT
78 y/o M BIBA from home for AMS w/ associated inability to talk x 1 week.  Pt had hx of UTI prior to current admission.  CTH on admission showed left occipital infarct vs artifact.   Pt was admitted for acute metabolic encephalopathy, likely multifactorial  Pts remains acute given his persistent encephalopathy and today w/ notably distended abdomen; remains constipated despite aggressive bowel regimen.      Endo consulted for hypothyroidism      Hypothyroidism:  TSH improving, FT4 still low.  continue  50 mcg of levothyroxine IV for now  check FT4 and T3 on monday    Acute metabolic encephalopathy, likely multifactorial   possible UTI, colitis, constipation  and hepatic encephalopathy , covid positive but no resp sxs   being managed by primary team.    Distended abdomen 2/2 constipation    possible ileus    Reglan PRN   - Tube feeds held     possible UTI  - on ceftriaxone

## 2022-08-05 NOTE — PROGRESS NOTE ADULT - SUBJECTIVE AND OBJECTIVE BOX
Chief Complaint:  AMS    SUBJECTIVE / OVERNIGHT EVENTS:  patient seen and eval., comfortable. in no acute distress. abd still distended , not much bms     Vital Signs Last 24 Hrs  T(C): 36.3 (05 Aug 2022 08:51), Max: 36.6 (04 Aug 2022 20:24)  T(F): 97.4 (05 Aug 2022 08:51), Max: 97.9 (04 Aug 2022 20:24)  HR: 102 (05 Aug 2022 08:51) (102 - 107)  BP: 130/80 (05 Aug 2022 08:51) (115/66 - 130/80)  BP(mean): 96 (05 Aug 2022 08:51) (91 - 96)  RR: 17 (05 Aug 2022 08:51) (17 - 18)  SpO2: 95% (05 Aug 2022 08:51) (95% - 96%)    Parameters below as of 05 Aug 2022 08:51  Patient On (Oxygen Delivery Method): room air        GENERAL: pt examined bedside, laying comfortably in bed in NAD  HEENT: NC/AT, dry oral mucosa, clear conjunctiva, sclera nonicteric  RESPIRATORY: poor inspiratory effort, CTA b/l, no wheezing, rhonchi, rales  CARDIOVASCULAR: RRR, normal S1 and S2  ABDOMEN: soft, nontender but distended, distension little better than yesterday , +BS, no rebound/guarding, +peg  EXTREMITIES: No cynaosis, no clubbing, no lower extremity edema, pulses 2+  PSYCH: affect flat but cooperative  NEUROLOGY: A+O x1-2, no focal neurologic deficits appreciated   SKIN: No rashes or no palpable lesions, poor turgur      < from: CT Abdomen and Pelvis w/ IV Cont (07.20.22 @ 13:59) >  IMPRESSION:    CHEST:  1. No central, main, lobar, or segmental pulmonary embolus. Subsegmental   pulmonary arterial tree limited evaluation due to respiratory motion.  2. Mildly dilated aortic root, 4.1 cm at sinuses of Valsalva. Coronary   artery calcification.      ABDOMEN/PELVIS:  1. Colitis. Follow to resolution.  2. Cirrhotic liver. Atrophic left hepatic lobe. Small ascites possibly   related to cirrhosis or colitis.  3. Obliquely oriented lucency of right superior pubic ramus is   indeterminate for nondisplaced fracture versus sequelae of streak   artifact. Correlation with clinical evaluation and pelvic MRI is   recommended.  4. Bilateral partially imaged ischial soft tissue densities with central   low attenuation measuring 7.3 cm on the left and 3.8 cm on the right.   Underlying complex collections associated with decubitus ulcers can be   considered. Right gluteal soft tissue edema and asymmetric muscle   expansion. Underlying hematoma cannot be excluded. Additional probable   sacral decubitus ulcer. Correlation with clinical evaluation and pelvic   MRI is recommended.    < end of copied text >      < from: CT Head No Cont (07.20.22 @ 13:44) >  IMPRESSION:    Motion limited study. Left occipital lobe lucency may represent artifact   versus infarct. No gross hemorrhage, mass effect or hydrocephalus.    < end of copied text >      < from: CT Head No Cont (07.25.22 @ 18:15) >  IMPRESSION:    No acute hemorrhage, mass effect or extra-axial collections. Increasing   paranasal sinus inflammatory changes. Correlate for acute sinusitis.    < end of copied text >      < from: Xray Abdomen 1 View PORTABLE -Urgent (Xray Abdomen 1 View PORTABLE -Urgent .) (07.27.22 @ 09:15) >  IMPRESSION:    No acute radiographic abdominal findings.    < end of copied text >                                     11.2   6.12  )-----------( 142      ( 05 Aug 2022 06:20 )             33.8   08-05    134<L>  |  106  |  15.9  ----------------------------<  116<H>  3.9   |  18.0<L>  |  0.80    Ca    7.0<L>      05 Aug 2022 06:20  Phos  2.6     08-05  Mg     1.6     08-05    TPro  6.4<L>  /  Alb  1.8<L>  /  TBili  1.4  /  DBili  x   /  AST  88<H>  /  ALT  53<H>  /  AlkPhos  124<H>  08-05               MEDICATIONS  (STANDING):  aspirin  chewable 81 milliGRAM(s) Enteral Tube daily  atorvastatin 40 milliGRAM(s) Oral at bedtime  dextrose 5% + sodium chloride 0.9%. 1000 milliLiter(s) (75 mL/Hr) IV Continuous <Continuous>  dextrose 5%. 1000 milliLiter(s) (100 mL/Hr) IV Continuous <Continuous>  dextrose 5%. 1000 milliLiter(s) (50 mL/Hr) IV Continuous <Continuous>  dextrose 50% Injectable 25 Gram(s) IV Push once  dextrose 50% Injectable 12.5 Gram(s) IV Push once  dextrose 50% Injectable 25 Gram(s) IV Push once  enoxaparin Injectable 40 milliGRAM(s) SubCutaneous every 24 hours  glucagon  Injectable 1 milliGRAM(s) IntraMuscular once  insulin lispro (ADMELOG) corrective regimen sliding scale   SubCutaneous three times a day before meals  levothyroxine Injectable 50 MICROGram(s) IV Push at bedtime  mupirocin 2% Ointment 1 Application(s) Both Nostrils two times a day  rifAXIMin 550 milliGRAM(s) Oral two times a day  tamsulosin 0.4 milliGRAM(s) Oral at bedtime  thiamine IVPB 500 milliGRAM(s) IV Intermittent daily    MEDICATIONS  (PRN):  acetaminophen     Tablet .. 650 milliGRAM(s) Oral every 6 hours PRN Temp greater or equal to 38C (100.4F), Mild Pain (1 - 3)  bisacodyl Suppository 10 milliGRAM(s) Rectal daily PRN Constipation  dextrose Oral Gel 15 Gram(s) Oral once PRN Blood Glucose LESS THAN 70 milliGRAM(s)/deciliter  ondansetron Injectable 4 milliGRAM(s) IV Push every 6 hours PRN Nausea and/or Vomiting

## 2022-08-05 NOTE — DIETITIAN NUTRITION RISK NOTIFICATION - TREATMENT: THE FOLLOWING DIET HAS BEEN RECOMMENDED
Diet, NPO:   Except Medications  Tube Feeding Modality: Gastrostomy (07-31-22 @ 14:11) [Active]

## 2022-08-05 NOTE — DIETITIAN NUTRITION RISK NOTIFICATION - MALNUTRITION EVALUATION AS DEMONSTRATED BY (ADULTS > 20 YEARS OF AGE)
Inadequate energy intake.../Loss of muscle...
Inadequate energy intake.../Loss of muscle.../Fluid accumulation...

## 2022-08-06 LAB
ALBUMIN SERPL ELPH-MCNC: 2 G/DL — LOW (ref 3.3–5.2)
ALP SERPL-CCNC: 125 U/L — HIGH (ref 40–120)
ALT FLD-CCNC: 56 U/L — HIGH
ANION GAP SERPL CALC-SCNC: 10 MMOL/L — SIGNIFICANT CHANGE UP (ref 5–17)
AST SERPL-CCNC: 91 U/L — HIGH
BILIRUB SERPL-MCNC: 1.3 MG/DL — SIGNIFICANT CHANGE UP (ref 0.4–2)
BUN SERPL-MCNC: 13.3 MG/DL — SIGNIFICANT CHANGE UP (ref 8–20)
CALCIUM SERPL-MCNC: 7 MG/DL — LOW (ref 8.4–10.5)
CHLORIDE SERPL-SCNC: 104 MMOL/L — SIGNIFICANT CHANGE UP (ref 98–107)
CO2 SERPL-SCNC: 19 MMOL/L — LOW (ref 22–29)
CREAT SERPL-MCNC: 0.89 MG/DL — SIGNIFICANT CHANGE UP (ref 0.5–1.3)
EGFR: 87 ML/MIN/1.73M2 — SIGNIFICANT CHANGE UP
GLUCOSE BLDC GLUCOMTR-MCNC: 109 MG/DL — HIGH (ref 70–99)
GLUCOSE BLDC GLUCOMTR-MCNC: 89 MG/DL — SIGNIFICANT CHANGE UP (ref 70–99)
GLUCOSE BLDC GLUCOMTR-MCNC: 92 MG/DL — SIGNIFICANT CHANGE UP (ref 70–99)
GLUCOSE BLDC GLUCOMTR-MCNC: 92 MG/DL — SIGNIFICANT CHANGE UP (ref 70–99)
GLUCOSE SERPL-MCNC: 89 MG/DL — SIGNIFICANT CHANGE UP (ref 70–99)
HCT VFR BLD CALC: 32.6 % — LOW (ref 39–50)
HGB BLD-MCNC: 10.9 G/DL — LOW (ref 13–17)
MCHC RBC-ENTMCNC: 30.8 PG — SIGNIFICANT CHANGE UP (ref 27–34)
MCHC RBC-ENTMCNC: 33.4 GM/DL — SIGNIFICANT CHANGE UP (ref 32–36)
MCV RBC AUTO: 92.1 FL — SIGNIFICANT CHANGE UP (ref 80–100)
PLATELET # BLD AUTO: 151 K/UL — SIGNIFICANT CHANGE UP (ref 150–400)
POTASSIUM SERPL-MCNC: 3.6 MMOL/L — SIGNIFICANT CHANGE UP (ref 3.5–5.3)
POTASSIUM SERPL-SCNC: 3.6 MMOL/L — SIGNIFICANT CHANGE UP (ref 3.5–5.3)
PROT SERPL-MCNC: 6.5 G/DL — LOW (ref 6.6–8.7)
RBC # BLD: 3.54 M/UL — LOW (ref 4.2–5.8)
RBC # FLD: 15.8 % — HIGH (ref 10.3–14.5)
SODIUM SERPL-SCNC: 133 MMOL/L — LOW (ref 135–145)
WBC # BLD: 6.29 K/UL — SIGNIFICANT CHANGE UP (ref 3.8–10.5)
WBC # FLD AUTO: 6.29 K/UL — SIGNIFICANT CHANGE UP (ref 3.8–10.5)

## 2022-08-06 PROCEDURE — 76705 ECHO EXAM OF ABDOMEN: CPT | Mod: 26

## 2022-08-06 PROCEDURE — 99233 SBSQ HOSP IP/OBS HIGH 50: CPT

## 2022-08-06 RX ADMIN — MUPIROCIN 1 APPLICATION(S): 20 OINTMENT TOPICAL at 05:02

## 2022-08-06 RX ADMIN — MUPIROCIN 1 APPLICATION(S): 20 OINTMENT TOPICAL at 18:03

## 2022-08-06 RX ADMIN — Medication 105 MILLIGRAM(S): at 12:23

## 2022-08-06 RX ADMIN — ENOXAPARIN SODIUM 40 MILLIGRAM(S): 100 INJECTION SUBCUTANEOUS at 18:03

## 2022-08-06 RX ADMIN — ATORVASTATIN CALCIUM 40 MILLIGRAM(S): 80 TABLET, FILM COATED ORAL at 21:22

## 2022-08-06 RX ADMIN — Medication 81 MILLIGRAM(S): at 12:23

## 2022-08-06 RX ADMIN — TAMSULOSIN HYDROCHLORIDE 0.4 MILLIGRAM(S): 0.4 CAPSULE ORAL at 21:22

## 2022-08-06 RX ADMIN — Medication 50 MICROGRAM(S): at 21:22

## 2022-08-06 NOTE — PROGRESS NOTE ADULT - ASSESSMENT
76 y/o M BIBA from home for AMS w/ associated inability to talk x 1 week.  Pt had hx of UTI prior to current admission.  CTH on admission showed left occipital infarct vs artifact, no last known well time available, not a candidate for tPA.  w/u on admisison also significant for hyperammonemia and BG >400s.  Lactic acidosis likely 2/2 cirrhosis.  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  Pt was admitted for acute metabolic encephalopathy, also found to have large ascites, s/p IR guidded drainage, now with ileus , sx consulted. TF on hold     #Distended abdomen/ likely due to ascites / ileus  - CT a/p reviewed and has worsening ascites but as per IR not enough to drain; s/p 1x dose of IV lasix   - Tube feeds held  - repeat kub shows large ascites , ileus   - s/p ir guided tap, 2 L drained   - no sbp on fluid analysis. ceftriaxone dcd   - c/w daily KUB , OOB to chair with assist , monitor electrolytes   - kub shows ileus   - surgery consulted , recommend hold TF , enemas , DANIELITO, g tube to gravity  - repeat abd u/s to eval ascites      #Acute metabolic encephalopathy, likely multifactorial/ improving / likely back to baseline alert and oriented x 1-2 with dementia   - possible UTI, colitis, constipation  and hepatic encephalopathy , covid positive but no resp sxs   - CTH reporting possible L-occipital lobe lucency which may represent artifact vs infarct; repeat CTH (-) for acute findings or interval changes     - Hyperammonemia in the setting of cirrhosis and AMS could be 2/2 hepatic encephalopathy and therefore started on Rifaximin on admission   - Repeat ammonia WNL   - s/p cvEEG and no  epileptiform pattern or seizures noted  - Maintain on fall and aspiration precautions   - c/w rifaximin, hold lactulose for now due to abd distention   - Fall precautions   - Monitor CBC and temperature    #Transaminitis / worsening ascites  - Likely related to cirrhosis   - CT a/p reports worsening ascites but as per IR not enough to drain   - Tbili remains WNL   - Hepatitis panel neg   - Trend LFTs   - < from: US Abdomen Upper Quadrant Right (07.31.22 @ 15:22) > Cirrhosis. Large volume ascites.    #possible uti   - completed ceftriaxone     #Hypothyroidism  - TFTs reviewed; TSH increasing and T4 decreased;  - Reassess TFTs in a few days    - TG ab elevated; possibly hashimoto's; TPO ab pending   - Endocrinology consulted and recs noted  - c/w v synthroid till able to resume TF 8/8 as per endo   - repeat TFTs on Monday     #Hx of CVA  - initial ct h showed ? cva but repeat cth shows no acute / subacute cva   - MRI differed by neurology and repeat CTH reviewed and is w/out interval changes  - Maintain on asa and statin therapy  - Maintain on telemetry   - Neurology consulted   - resumed on aspirin post peritoneal  tap      #Normocytic anemia / Thrombocytopenia   - H/H and platelets stable   - Low plts likely 2/2 cirrhosis   - Hemodynamically stable w/ no active bleeding noted  - Monitor CBC and transfuse if Hb<7      #DM II  - Hyperglycemic on admission and hypoglycemic while hospitalized   - A1c 7.7  - ISS and hypoglycemic protocol in place     #Sacral decubitus ulcers and rt heel decubitus ulcer both POA  - No drainage noted   - Given CT findings and elevated CRP/procal   - ID recommending MRI if suspicion high for OM however given pt afebrile and nL WBC will defer for now; If MRI pursued would likely need to be done under conscious sedation    - c/w wound care   - Monitor CBC and temperature   - Consulted ID and recs noted     #Dilated AR   - 4.1cm at sinus of valsalva   - Incidental finding on CT chest   - CTSx consulted and recs noted; no intervention warranted at this time - outpt serial imaging    #Hypovolemic hyponatremia / improved   - Improved s/p gentle hydration    - Monitor Na levels   - Monitor I/O's     #VTE ppx: Lovenox   Diet: Tube feed - on hold due to ileus  Dispo: medically active, plan for eventual [facility]IMI    Code status: full code , palliative care consult 76 y/o M BIBA from home for AMS w/ associated inability to talk x 1 week.  Pt had hx of UTI prior to current admission.  CTH on admission showed left occipital infarct vs artifact, no last known well time available, not a candidate for tPA.  w/u on admisison also significant for hyperammonemia and BG >400s.  Lactic acidosis likely 2/2 cirrhosis.  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  Pt was admitted for acute metabolic encephalopathy, also found to have large ascites, s/p IR guidded drainage, now with ileus , sx consulted. TF on hold     #Distended abdomen/ likely due to ascites / ileus  - CT a/p reviewed and has worsening ascites but as per IR not enough to drain; s/p 1x dose of IV lasix   - Tube feeds held  - repeat kub shows large ascites , ileus   - s/p ir guided tap, 2 L drained   - no sbp on fluid analysis. ceftriaxone dcd   - c/w daily KUB , OOB to chair with assist , monitor electrolytes   - kub shows ileus   - surgery consulted , recommend hold TF , enemas , DANIELITO, g tube to gravity  - repeat abd u/s to eval ascites      #Acute metabolic encephalopathy, likely multifactorial/ improving / likely back to baseline alert and oriented x 1-2 with dementia   - possible UTI, colitis, constipation  and hepatic encephalopathy , covid positive but no resp sxs   - CTH reporting possible L-occipital lobe lucency which may represent artifact vs infarct; repeat CTH (-) for acute findings or interval changes     - Hyperammonemia in the setting of cirrhosis and AMS could be 2/2 hepatic encephalopathy and therefore started on Rifaximin on admission   - Repeat ammonia WNL   - s/p cvEEG and no  epileptiform pattern or seizures noted  - Maintain on fall and aspiration precautions   - c/w rifaximin, hold lactulose for now due to abd distention   - Fall precautions   - Monitor CBC and temperature    #Transaminitis / worsening ascites  - Likely related to cirrhosis   - CT a/p reports worsening ascites but as per IR not enough to drain   - Tbili remains WNL   - Hepatitis panel neg   - Trend LFTs   - < from: US Abdomen Upper Quadrant Right (07.31.22 @ 15:22) > Cirrhosis. Large volume ascites.    #possible uti   - completed ceftriaxone     #Hypothyroidism  - TFTs reviewed; TSH increasing and T4 decreased;  - Reassess TFTs in a few days    - TG ab elevated; possibly hashimoto's; TPO ab pending   - Endocrinology consulted and recs noted  - c/w v synthroid till able to resume TF 8/8 as per endo   - repeat TFTs on Monday     #Hx of CVA  - initial ct h showed ? cva but repeat cth shows no acute / subacute cva   - MRI differed by neurology and repeat CTH reviewed and is w/out interval changes  - Maintain on asa and statin therapy  - Maintain on telemetry   - Neurology consulted   - resumed on aspirin post peritoneal  tap      #Normocytic anemia / Thrombocytopenia   - H/H and platelets stable   - Low plts likely 2/2 cirrhosis   - Hemodynamically stable w/ no active bleeding noted  - Monitor CBC and transfuse if Hb<7      #DM II  - Hyperglycemic on admission and hypoglycemic while hospitalized   - A1c 7.7  - ISS and hypoglycemic protocol in place     #Sacral decubitus ulcers and rt heel decubitus ulcer both POA  - No drainage noted   - Given CT findings and elevated CRP/procal   - ID recommending MRI if suspicion high for OM however given pt afebrile and nL WBC will defer for now; If MRI pursued would likely need to be done under conscious sedation    - c/w wound care   - Monitor CBC and temperature   - Consulted ID and recs noted     #Dilated AR   - 4.1cm at sinus of valsalva   - Incidental finding on CT chest   - CTSx consulted and recs noted; no intervention warranted at this time - outpt serial imaging    #Hypovolemic hyponatremia / improved   - Improved s/p gentle hydration    - Monitor Na levels   - Monitor I/O's     #VTE ppx: Lovenox   Diet: Tube feed - on hold due to ileus  Dispo: medically active, plan for eventual [facility]IMI    Code status: full code , palliative care consult    Wife updated on the phone of plan of care 8/6

## 2022-08-06 NOTE — PROGRESS NOTE ADULT - SUBJECTIVE AND OBJECTIVE BOX
Subjective: Patient seen and examined at bedside. No events O/N. Denies cp/n/v/d/sob.       MEDICATIONS  (STANDING):  aspirin  chewable 81 milliGRAM(s) Enteral Tube daily  atorvastatin 40 milliGRAM(s) Oral at bedtime  dextrose 5% + sodium chloride 0.9%. 1000 milliLiter(s) (75 mL/Hr) IV Continuous <Continuous>  dextrose 5%. 1000 milliLiter(s) (100 mL/Hr) IV Continuous <Continuous>  dextrose 5%. 1000 milliLiter(s) (50 mL/Hr) IV Continuous <Continuous>  dextrose 50% Injectable 25 Gram(s) IV Push once  dextrose 50% Injectable 12.5 Gram(s) IV Push once  dextrose 50% Injectable 25 Gram(s) IV Push once  enoxaparin Injectable 40 milliGRAM(s) SubCutaneous every 24 hours  glucagon  Injectable 1 milliGRAM(s) IntraMuscular once  insulin lispro (ADMELOG) corrective regimen sliding scale   SubCutaneous three times a day before meals  levothyroxine Injectable 50 MICROGram(s) IV Push at bedtime  mupirocin 2% Ointment 1 Application(s) Both Nostrils two times a day  rifAXIMin 550 milliGRAM(s) Oral two times a day  tamsulosin 0.4 milliGRAM(s) Oral at bedtime  thiamine IVPB 500 milliGRAM(s) IV Intermittent daily    MEDICATIONS  (PRN):  acetaminophen     Tablet .. 650 milliGRAM(s) Oral every 6 hours PRN Temp greater or equal to 38C (100.4F), Mild Pain (1 - 3)  bisacodyl Suppository 10 milliGRAM(s) Rectal daily PRN Constipation  dextrose Oral Gel 15 Gram(s) Oral once PRN Blood Glucose LESS THAN 70 milliGRAM(s)/deciliter  ondansetron Injectable 4 milliGRAM(s) IV Push every 6 hours PRN Nausea and/or Vomiting      oxycodone ([place])        Objective:     ICU Vital Signs Last 24 Hrs  T(C): 36.4 (05 Aug 2022 19:33), Max: 36.5 (05 Aug 2022 15:54)  T(F): 97.5 (05 Aug 2022 19:33), Max: 97.7 (05 Aug 2022 15:54)  HR: 102 (05 Aug 2022 19:33) (100 - 102)  BP: 145/84 (05 Aug 2022 19:33) (124/66 - 145/84)  BP(mean): 96 (05 Aug 2022 08:51) (96 - 96)  ABP: --  ABP(mean): --  RR: 18 (05 Aug 2022 19:33) (17 - 18)  SpO2: 95% (05 Aug 2022 19:33) (95% - 98%)    O2 Parameters below as of 05 Aug 2022 19:33  Patient On (Oxygen Delivery Method): room air            I&O's Detail      Physical Exam:  General: NAD. Lying comfortably in bed.   HEENT: NCAT. Neck supple. EOMI.   Respiratory: Non labored breathing. No respiratory distress.   Cardio: RRR  Abdomen: Soft, non-tender, distended. No guarding or rebound. G tube with appropriate output.   Extremities: No clubbing or cyanosis.   Musculoskeletal: No obvious bony masses or joint pain   Neuro: A&O x 3. CNs II-XII grossly inatct.                           11.2   6.12  )-----------( 142      ( 05 Aug 2022 06:20 )             33.8       08-05    134<L>  |  106  |  15.9  ----------------------------<  116<H>  3.9   |  18.0<L>  |  0.80    Ca    7.0<L>      05 Aug 2022 06:20  Phos  2.6     08-05  Mg     1.6     08-05    TPro  6.4<L>  /  Alb  1.8<L>  /  TBili  1.4  /  DBili  x   /  AST  88<H>  /  ALT  53<H>  /  AlkPhos  124<H>  08-05      LIVER FUNCTIONS - ( 05 Aug 2022 06:20 )  Alb: 1.8 g/dL / Pro: 6.4 g/dL / ALK PHOS: 124 U/L / ALT: 53 U/L / AST: 88 U/L / GGT: x

## 2022-08-06 NOTE — PROGRESS NOTE ADULT - ASSESSMENT
Patient is a 77y old male with AMS, tube feed dependent, unclear if presence of bowel function, distention could be related to ileus/constipation also reactive in case of fast reaccumulation of ascites. Low suspicion for SBO given recent imaging    PLAN:    - Agree with holding tube feeds  - G tube to gravity to assess output  - recommend enemas  - consider CT vs abdominal US to reassess reaccumulation of ascites  - DANIELITO  - Surgery will continue to follow

## 2022-08-06 NOTE — PROGRESS NOTE ADULT - SUBJECTIVE AND OBJECTIVE BOX
Mayra Casiano M.D.    Patient is a 77y old  Male who presents with a chief complaint of Altered mental state (06 Aug 2022 01:29)      SUBJECTIVE / OVERNIGHT EVENTS: tube feed on hold due to ileus concerns. ROS unable to be assess due to dementia    MEDICATIONS  (STANDING):  aspirin  chewable 81 milliGRAM(s) Enteral Tube daily  atorvastatin 40 milliGRAM(s) Oral at bedtime  dextrose 5% + sodium chloride 0.9%. 1000 milliLiter(s) (75 mL/Hr) IV Continuous <Continuous>  dextrose 5%. 1000 milliLiter(s) (100 mL/Hr) IV Continuous <Continuous>  dextrose 5%. 1000 milliLiter(s) (50 mL/Hr) IV Continuous <Continuous>  dextrose 50% Injectable 25 Gram(s) IV Push once  dextrose 50% Injectable 12.5 Gram(s) IV Push once  dextrose 50% Injectable 25 Gram(s) IV Push once  enoxaparin Injectable 40 milliGRAM(s) SubCutaneous every 24 hours  glucagon  Injectable 1 milliGRAM(s) IntraMuscular once  insulin lispro (ADMELOG) corrective regimen sliding scale   SubCutaneous three times a day before meals  levothyroxine Injectable 50 MICROGram(s) IV Push at bedtime  mupirocin 2% Ointment 1 Application(s) Both Nostrils two times a day  rifAXIMin 550 milliGRAM(s) Oral two times a day  tamsulosin 0.4 milliGRAM(s) Oral at bedtime  thiamine IVPB 500 milliGRAM(s) IV Intermittent daily    MEDICATIONS  (PRN):  acetaminophen     Tablet .. 650 milliGRAM(s) Oral every 6 hours PRN Temp greater or equal to 38C (100.4F), Mild Pain (1 - 3)  bisacodyl Suppository 10 milliGRAM(s) Rectal daily PRN Constipation  dextrose Oral Gel 15 Gram(s) Oral once PRN Blood Glucose LESS THAN 70 milliGRAM(s)/deciliter  ondansetron Injectable 4 milliGRAM(s) IV Push every 6 hours PRN Nausea and/or Vomiting      I&O's Summary      PHYSICAL EXAM:  Vital Signs Last 24 Hrs  T(C): 36.6 (06 Aug 2022 05:00), Max: 36.6 (06 Aug 2022 05:00)  T(F): 97.8 (06 Aug 2022 05:00), Max: 97.8 (06 Aug 2022 05:00)  HR: 96 (06 Aug 2022 09:29) (96 - 102)  BP: 136/75 (06 Aug 2022 09:29) (126/68 - 145/84)  BP(mean): --  RR: 18 (06 Aug 2022 09:29) (18 - 18)  SpO2: 96% (06 Aug 2022 09:29) (95% - 98%)    Parameters below as of 06 Aug 2022 09:29  Patient On (Oxygen Delivery Method): room air    GENERAL: laying comfortably in bed in NAD, answers yes/no questions  HEENT: NC/AT, dry oral mucosa, clear conjunctiva, sclera nonicteric  RESPIRATORY: poor inspiratory effort, CTA b/l, no wheezing, rhonchi, rales  CARDIOVASCULAR: RRR, normal S1 and S2  ABDOMEN: Distended, minimal BS, +Peg  EXTREMITIES: No cynaosis, no clubbing, no lower extremity edema, pulses 2+  PSYCH: affect flat but cooperative  NEUROLOGY: A+O x1-2, no focal neurologic deficits appreciated   SKIN: No rashes or no palpable lesions, poor turgur  LABS:                        10.9   6.29  )-----------( 151      ( 06 Aug 2022 07:35 )             32.6     08-06    133<L>  |  104  |  13.3  ----------------------------<  89  3.6   |  19.0<L>  |  0.89    Ca    7.0<L>      06 Aug 2022 07:35  Phos  2.6     08-05  Mg     1.6     08-05    TPro  6.5<L>  /  Alb  2.0<L>  /  TBili  1.3  /  DBili  x   /  AST  91<H>  /  ALT  56<H>  /  AlkPhos  125<H>  08-06              CAPILLARY BLOOD GLUCOSE      POCT Blood Glucose.: 92 mg/dL (06 Aug 2022 11:07)  POCT Blood Glucose.: 89 mg/dL (06 Aug 2022 07:17)  POCT Blood Glucose.: 92 mg/dL (06 Aug 2022 00:31)  POCT Blood Glucose.: 132 mg/dL (05 Aug 2022 18:02)      RADIOLOGY & ADDITIONAL TESTS:  Results Reviewed:   Imaging Personally Reviewed:  Electrocardiogram Personally Reviewed:

## 2022-08-07 LAB
ALBUMIN SERPL ELPH-MCNC: 1.7 G/DL — LOW (ref 3.3–5.2)
ALP SERPL-CCNC: 122 U/L — HIGH (ref 40–120)
ALT FLD-CCNC: 60 U/L — HIGH
ANION GAP SERPL CALC-SCNC: 9 MMOL/L — SIGNIFICANT CHANGE UP (ref 5–17)
AST SERPL-CCNC: 96 U/L — HIGH
BILIRUB SERPL-MCNC: 1.3 MG/DL — SIGNIFICANT CHANGE UP (ref 0.4–2)
BUN SERPL-MCNC: 11.9 MG/DL — SIGNIFICANT CHANGE UP (ref 8–20)
CALCIUM SERPL-MCNC: 6.8 MG/DL — LOW (ref 8.4–10.5)
CHLORIDE SERPL-SCNC: 106 MMOL/L — SIGNIFICANT CHANGE UP (ref 98–107)
CO2 SERPL-SCNC: 20 MMOL/L — LOW (ref 22–29)
CREAT SERPL-MCNC: 0.81 MG/DL — SIGNIFICANT CHANGE UP (ref 0.5–1.3)
CULTURE RESULTS: NO GROWTH — SIGNIFICANT CHANGE UP
EGFR: 90 ML/MIN/1.73M2 — SIGNIFICANT CHANGE UP
GLUCOSE BLDC GLUCOMTR-MCNC: 103 MG/DL — HIGH (ref 70–99)
GLUCOSE BLDC GLUCOMTR-MCNC: 119 MG/DL — HIGH (ref 70–99)
GLUCOSE BLDC GLUCOMTR-MCNC: 84 MG/DL — SIGNIFICANT CHANGE UP (ref 70–99)
GLUCOSE BLDC GLUCOMTR-MCNC: 86 MG/DL — SIGNIFICANT CHANGE UP (ref 70–99)
GLUCOSE BLDC GLUCOMTR-MCNC: 95 MG/DL — SIGNIFICANT CHANGE UP (ref 70–99)
GLUCOSE BLDC GLUCOMTR-MCNC: 99 MG/DL — SIGNIFICANT CHANGE UP (ref 70–99)
GLUCOSE SERPL-MCNC: 97 MG/DL — SIGNIFICANT CHANGE UP (ref 70–99)
HCT VFR BLD CALC: 31.7 % — LOW (ref 39–50)
HGB BLD-MCNC: 10.7 G/DL — LOW (ref 13–17)
MAGNESIUM SERPL-MCNC: 1.6 MG/DL — LOW (ref 1.8–2.6)
MCHC RBC-ENTMCNC: 31 PG — SIGNIFICANT CHANGE UP (ref 27–34)
MCHC RBC-ENTMCNC: 33.8 GM/DL — SIGNIFICANT CHANGE UP (ref 32–36)
MCV RBC AUTO: 91.9 FL — SIGNIFICANT CHANGE UP (ref 80–100)
PHOSPHATE SERPL-MCNC: 2.4 MG/DL — SIGNIFICANT CHANGE UP (ref 2.4–4.7)
PLATELET # BLD AUTO: 137 K/UL — LOW (ref 150–400)
POTASSIUM SERPL-MCNC: 3.5 MMOL/L — SIGNIFICANT CHANGE UP (ref 3.5–5.3)
POTASSIUM SERPL-SCNC: 3.5 MMOL/L — SIGNIFICANT CHANGE UP (ref 3.5–5.3)
PROT SERPL-MCNC: 6.1 G/DL — LOW (ref 6.6–8.7)
RBC # BLD: 3.45 M/UL — LOW (ref 4.2–5.8)
RBC # FLD: 15.6 % — HIGH (ref 10.3–14.5)
SODIUM SERPL-SCNC: 135 MMOL/L — SIGNIFICANT CHANGE UP (ref 135–145)
SPECIMEN SOURCE: SIGNIFICANT CHANGE UP
WBC # BLD: 5.61 K/UL — SIGNIFICANT CHANGE UP (ref 3.8–10.5)
WBC # FLD AUTO: 5.61 K/UL — SIGNIFICANT CHANGE UP (ref 3.8–10.5)

## 2022-08-07 PROCEDURE — 99232 SBSQ HOSP IP/OBS MODERATE 35: CPT

## 2022-08-07 RX ORDER — POTASSIUM CHLORIDE 20 MEQ
40 PACKET (EA) ORAL ONCE
Refills: 0 | Status: COMPLETED | OUTPATIENT
Start: 2022-08-07 | End: 2022-08-07

## 2022-08-07 RX ORDER — TAMSULOSIN HYDROCHLORIDE 0.4 MG/1
0.8 CAPSULE ORAL AT BEDTIME
Refills: 0 | Status: DISCONTINUED | OUTPATIENT
Start: 2022-08-07 | End: 2022-08-15

## 2022-08-07 RX ORDER — MAGNESIUM SULFATE 500 MG/ML
2 VIAL (ML) INJECTION ONCE
Refills: 0 | Status: COMPLETED | OUTPATIENT
Start: 2022-08-07 | End: 2022-08-07

## 2022-08-07 RX ORDER — POTASSIUM CHLORIDE 20 MEQ
10 PACKET (EA) ORAL ONCE
Refills: 0 | Status: COMPLETED | OUTPATIENT
Start: 2022-08-07 | End: 2022-08-07

## 2022-08-07 RX ORDER — NYSTATIN CREAM 100000 [USP'U]/G
1 CREAM TOPICAL THREE TIMES A DAY
Refills: 0 | Status: DISCONTINUED | OUTPATIENT
Start: 2022-08-07 | End: 2022-08-15

## 2022-08-07 RX ORDER — MAGNESIUM OXIDE 400 MG ORAL TABLET 241.3 MG
400 TABLET ORAL
Refills: 0 | Status: COMPLETED | OUTPATIENT
Start: 2022-08-07 | End: 2022-08-08

## 2022-08-07 RX ADMIN — MUPIROCIN 1 APPLICATION(S): 20 OINTMENT TOPICAL at 17:46

## 2022-08-07 RX ADMIN — Medication 25 GRAM(S): at 10:45

## 2022-08-07 RX ADMIN — MAGNESIUM OXIDE 400 MG ORAL TABLET 400 MILLIGRAM(S): 241.3 TABLET ORAL at 11:45

## 2022-08-07 RX ADMIN — Medication 50 MICROGRAM(S): at 21:46

## 2022-08-07 RX ADMIN — NYSTATIN CREAM 1 APPLICATION(S): 100000 CREAM TOPICAL at 21:47

## 2022-08-07 RX ADMIN — ENOXAPARIN SODIUM 40 MILLIGRAM(S): 100 INJECTION SUBCUTANEOUS at 17:45

## 2022-08-07 RX ADMIN — Medication 40 MILLIEQUIVALENT(S): at 10:45

## 2022-08-07 RX ADMIN — ATORVASTATIN CALCIUM 40 MILLIGRAM(S): 80 TABLET, FILM COATED ORAL at 21:46

## 2022-08-07 RX ADMIN — Medication 81 MILLIGRAM(S): at 11:45

## 2022-08-07 RX ADMIN — Medication 100 MILLIEQUIVALENT(S): at 10:45

## 2022-08-07 RX ADMIN — MAGNESIUM OXIDE 400 MG ORAL TABLET 400 MILLIGRAM(S): 241.3 TABLET ORAL at 17:45

## 2022-08-07 RX ADMIN — Medication 105 MILLIGRAM(S): at 14:09

## 2022-08-07 RX ADMIN — TAMSULOSIN HYDROCHLORIDE 0.8 MILLIGRAM(S): 0.4 CAPSULE ORAL at 21:46

## 2022-08-07 RX ADMIN — SODIUM CHLORIDE 75 MILLILITER(S): 9 INJECTION, SOLUTION INTRAVENOUS at 01:05

## 2022-08-07 RX ADMIN — MUPIROCIN 1 APPLICATION(S): 20 OINTMENT TOPICAL at 05:01

## 2022-08-07 NOTE — CHART NOTE - NSCHARTNOTEFT_GEN_A_CORE
Pt seen and examined at bedside. Now having bowel function, did have a recent bowel movement. Ileus resolving. No further intervention from surgical standpoint. Green surgery will sign off at this time. Please feel free to contact our team with any further questions or concerns.

## 2022-08-07 NOTE — PROGRESS NOTE ADULT - SUBJECTIVE AND OBJECTIVE BOX
Mayra Casiano M.D.    Patient is a 77y old  Male who presents with a chief complaint of Altered mental state (07 Aug 2022 02:18)      SUBJECTIVE / OVERNIGHT EVENTS: no concerns. +Small BM per RN    Patient denies chest pain, SOB, abd pain, N/V, fever, chills, dysuria or any other complaints. All remainder ROS negative.     MEDICATIONS  (STANDING):  aspirin  chewable 81 milliGRAM(s) Enteral Tube daily  atorvastatin 40 milliGRAM(s) Oral at bedtime  dextrose 5% + sodium chloride 0.9%. 1000 milliLiter(s) (75 mL/Hr) IV Continuous <Continuous>  dextrose 5%. 1000 milliLiter(s) (100 mL/Hr) IV Continuous <Continuous>  dextrose 5%. 1000 milliLiter(s) (50 mL/Hr) IV Continuous <Continuous>  dextrose 50% Injectable 25 Gram(s) IV Push once  dextrose 50% Injectable 12.5 Gram(s) IV Push once  dextrose 50% Injectable 25 Gram(s) IV Push once  enoxaparin Injectable 40 milliGRAM(s) SubCutaneous every 24 hours  glucagon  Injectable 1 milliGRAM(s) IntraMuscular once  insulin lispro (ADMELOG) corrective regimen sliding scale   SubCutaneous three times a day before meals  levothyroxine Injectable 50 MICROGram(s) IV Push at bedtime  magnesium oxide 400 milliGRAM(s) Oral three times a day with meals  mupirocin 2% Ointment 1 Application(s) Both Nostrils two times a day  rifAXIMin 550 milliGRAM(s) Oral two times a day  tamsulosin 0.4 milliGRAM(s) Oral at bedtime  thiamine IVPB 500 milliGRAM(s) IV Intermittent daily    MEDICATIONS  (PRN):  acetaminophen     Tablet .. 650 milliGRAM(s) Oral every 6 hours PRN Temp greater or equal to 38C (100.4F), Mild Pain (1 - 3)  bisacodyl Suppository 10 milliGRAM(s) Rectal daily PRN Constipation  dextrose Oral Gel 15 Gram(s) Oral once PRN Blood Glucose LESS THAN 70 milliGRAM(s)/deciliter  ondansetron Injectable 4 milliGRAM(s) IV Push every 6 hours PRN Nausea and/or Vomiting      I&O's Summary      PHYSICAL EXAM:  Vital Signs Last 24 Hrs  T(C): 36.2 (07 Aug 2022 11:15), Max: 36.6 (06 Aug 2022 20:03)  T(F): 97.1 (07 Aug 2022 11:15), Max: 97.9 (06 Aug 2022 20:03)  HR: 96 (07 Aug 2022 11:15) (73 - 96)  BP: 124/65 (07 Aug 2022 11:15) (119/76 - 128/77)  BP(mean): --  RR: 18 (07 Aug 2022 05:11) (18 - 18)  SpO2: 96% (07 Aug 2022 11:15) (94% - 96%)    Parameters below as of 07 Aug 2022 11:15  Patient On (Oxygen Delivery Method): room air      GENERAL: laying comfortably in bed in NAD, awake and conversational  HEENT: NC/AT, dry oral mucosa, clear conjunctiva, sclera nonicteric  RESPIRATORY: poor inspiratory effort, CTA b/l, no wheezing, rhonchi, rales  CARDIOVASCULAR: RRR, normal S1 and S2  ABDOMEN: Distended, minimal BS, +Peg  EXTREMITIES: No cynaosis, no clubbing, no lower extremity edema, pulses 2+  PSYCH: affect flat but cooperative  NEUROLOGY: A+O x1-2, no focal neurologic deficits appreciated   SKIN: No rashes or no palpable lesions, poor turgur    LABS:                        10.7   5.61  )-----------( 137      ( 07 Aug 2022 07:05 )             31.7     08-07    135  |  106  |  11.9  ----------------------------<  97  3.5   |  20.0<L>  |  0.81    Ca    6.8<L>      07 Aug 2022 07:05  Phos  2.4     08-07  Mg     1.6     08-07    TPro  6.1<L>  /  Alb  1.7<L>  /  TBili  1.3  /  DBili  x   /  AST  96<H>  /  ALT  60<H>  /  AlkPhos  122<H>  08-07              CAPILLARY BLOOD GLUCOSE      POCT Blood Glucose.: 84 mg/dL (07 Aug 2022 11:44)  POCT Blood Glucose.: 95 mg/dL (07 Aug 2022 08:11)  POCT Blood Glucose.: 99 mg/dL (07 Aug 2022 05:02)  POCT Blood Glucose.: 103 mg/dL (07 Aug 2022 00:55)  POCT Blood Glucose.: 109 mg/dL (06 Aug 2022 16:39)      RADIOLOGY & ADDITIONAL TESTS:  Results Reviewed:   Imaging Personally Reviewed:  Electrocardiogram Personally Reviewed: Mayra Casiano M.D.    Patient is a 77y old  Male who presents with a chief complaint of Altered mental state (07 Aug 2022 02:18)      SUBJECTIVE / OVERNIGHT EVENTS: no concerns. +Small BM per RN    Patient denies chest pain, SOB, abd pain, N/V, fever, chills, dysuria or any other complaints. All remainder ROS negative.     MEDICATIONS  (STANDING):  aspirin  chewable 81 milliGRAM(s) Enteral Tube daily  atorvastatin 40 milliGRAM(s) Oral at bedtime  dextrose 5% + sodium chloride 0.9%. 1000 milliLiter(s) (75 mL/Hr) IV Continuous <Continuous>  dextrose 5%. 1000 milliLiter(s) (100 mL/Hr) IV Continuous <Continuous>  dextrose 5%. 1000 milliLiter(s) (50 mL/Hr) IV Continuous <Continuous>  dextrose 50% Injectable 25 Gram(s) IV Push once  dextrose 50% Injectable 12.5 Gram(s) IV Push once  dextrose 50% Injectable 25 Gram(s) IV Push once  enoxaparin Injectable 40 milliGRAM(s) SubCutaneous every 24 hours  glucagon  Injectable 1 milliGRAM(s) IntraMuscular once  insulin lispro (ADMELOG) corrective regimen sliding scale   SubCutaneous three times a day before meals  levothyroxine Injectable 50 MICROGram(s) IV Push at bedtime  magnesium oxide 400 milliGRAM(s) Oral three times a day with meals  mupirocin 2% Ointment 1 Application(s) Both Nostrils two times a day  rifAXIMin 550 milliGRAM(s) Oral two times a day  tamsulosin 0.4 milliGRAM(s) Oral at bedtime  thiamine IVPB 500 milliGRAM(s) IV Intermittent daily    MEDICATIONS  (PRN):  acetaminophen     Tablet .. 650 milliGRAM(s) Oral every 6 hours PRN Temp greater or equal to 38C (100.4F), Mild Pain (1 - 3)  bisacodyl Suppository 10 milliGRAM(s) Rectal daily PRN Constipation  dextrose Oral Gel 15 Gram(s) Oral once PRN Blood Glucose LESS THAN 70 milliGRAM(s)/deciliter  ondansetron Injectable 4 milliGRAM(s) IV Push every 6 hours PRN Nausea and/or Vomiting      I&O's Summary      PHYSICAL EXAM:  Vital Signs Last 24 Hrs  T(C): 36.2 (07 Aug 2022 11:15), Max: 36.6 (06 Aug 2022 20:03)  T(F): 97.1 (07 Aug 2022 11:15), Max: 97.9 (06 Aug 2022 20:03)  HR: 96 (07 Aug 2022 11:15) (73 - 96)  BP: 124/65 (07 Aug 2022 11:15) (119/76 - 128/77)  BP(mean): --  RR: 18 (07 Aug 2022 05:11) (18 - 18)  SpO2: 96% (07 Aug 2022 11:15) (94% - 96%)    Parameters below as of 07 Aug 2022 11:15  Patient On (Oxygen Delivery Method): room air      GENERAL: laying comfortably in bed in NAD, awake and conversational  HEENT: NC/AT, dry oral mucosa, clear conjunctiva, sclera nonicteric  RESPIRATORY: poor inspiratory effort, CTA b/l, no wheezing, rhonchi, rales  CARDIOVASCULAR: RRR, normal S1 and S2  ABDOMEN: Distended, minimal BS, +Peg  EXTREMITIES: No clubbing, 2+ LE edema  PSYCH: affect flat but cooperative  NEUROLOGY: A+O x1-2, no focal neurologic deficits appreciated   SKIN: No rashes or no palpable lesions, poor turgur    LABS:                        10.7   5.61  )-----------( 137      ( 07 Aug 2022 07:05 )             31.7     08-07    135  |  106  |  11.9  ----------------------------<  97  3.5   |  20.0<L>  |  0.81    Ca    6.8<L>      07 Aug 2022 07:05  Phos  2.4     08-07  Mg     1.6     08-07    TPro  6.1<L>  /  Alb  1.7<L>  /  TBili  1.3  /  DBili  x   /  AST  96<H>  /  ALT  60<H>  /  AlkPhos  122<H>  08-07              CAPILLARY BLOOD GLUCOSE      POCT Blood Glucose.: 84 mg/dL (07 Aug 2022 11:44)  POCT Blood Glucose.: 95 mg/dL (07 Aug 2022 08:11)  POCT Blood Glucose.: 99 mg/dL (07 Aug 2022 05:02)  POCT Blood Glucose.: 103 mg/dL (07 Aug 2022 00:55)  POCT Blood Glucose.: 109 mg/dL (06 Aug 2022 16:39)      RADIOLOGY & ADDITIONAL TESTS:  Results Reviewed:   Imaging Personally Reviewed:  Electrocardiogram Personally Reviewed:

## 2022-08-07 NOTE — PROGRESS NOTE ADULT - ASSESSMENT
78 y/o M BIBA from home for AMS w/ associated inability to talk x 1 week.  Pt had hx of UTI prior to current admission.  CTH on admission showed left occipital infarct vs artifact, no last known well time available, not a candidate for tPA.  w/u on admisison also significant for hyperammonemia and BG >400s.  Lactic acidosis likely 2/2 cirrhosis.  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  Pt was admitted for acute metabolic encephalopathy, also found to have large ascites, s/p IR guidded drainage, now with ileus , sx consulted. TF on hold     #Distended abdomen/ likely due to ascites / ileus  - CT a/p reviewed and has worsening ascites but as per IR not enough to drain; s/p 1x dose of IV lasix   - Tube feeds held  - repeat kub shows large ascites , ileus   - s/p ir guided tap, 2 L drained   - no sbp on fluid analysis. ceftriaxone dcd   - c/w daily KUB , OOB to chair with assist , monitor electrolytes   - kub shows ileus   - surgery consulted, recommend hold TF , enemas, DANIELITO, g tube to gravity  - abd u/s with mod-severe ascites, pt asymptomatic    #Acute metabolic encephalopathy, likely multifactorial/ improving / likely back to baseline alert and oriented x 1-2 with dementia   - possible UTI, colitis, constipation  and hepatic encephalopathy , covid positive but no resp sxs   - CTH reporting possible L-occipital lobe lucency which may represent artifact vs infarct; repeat CTH (-) for acute findings or interval changes     - Hyperammonemia in the setting of cirrhosis and AMS could be 2/2 hepatic encephalopathy and therefore started on Rifaximin on admission   - Repeat ammonia WNL   - s/p cvEEG and no  epileptiform pattern or seizures noted  - Maintain on fall and aspiration precautions   - c/w rifaximin, hold lactulose for now due to abd distention   - Fall precautions   - Monitor CBC and temperature    #Transaminitis / worsening ascites  - Likely related to cirrhosis   - CT a/p reports worsening ascites s/p IR drainage on 8/2  - Tbili remains WNL   - Hepatitis panel neg   - Trend LFTs   - < from: US Abdomen Upper Quadrant Right (07.31.22 @ 15:22) > Cirrhosis. Large volume ascites.    #possible uti   - completed ceftriaxone     #Hypothyroidism  - TFTs reviewed; TSH increasing and T4 decreased;  - Reassess TFTs in a few days    - TG ab elevated; possibly hashimoto's; TPO ab pending   - Endocrinology consulted and recs noted  - c/w v synthroid till able to resume TF 8/8 as per endo   - repeat TFTs on Monday     #Hx of CVA  - initial ct h showed ? cva but repeat cth shows no acute / subacute cva   - MRI differed by neurology and repeat CTH reviewed and is w/out interval changes  - Maintain on asa and statin therapy  - Maintain on telemetry   - Neurology consulted   - resumed on aspirin post peritoneal  tap      #Normocytic anemia / Thrombocytopenia   - H/H and platelets stable   - Low plts likely 2/2 cirrhosis   - Hemodynamically stable w/ no active bleeding noted  - Monitor CBC and transfuse if Hb<7      #DM II  - Hyperglycemic on admission and hypoglycemic while hospitalized   - A1c 7.7  - ISS and hypoglycemic protocol in place     #Sacral decubitus ulcers and rt heel decubitus ulcer both POA  - No drainage noted   - Given CT findings and elevated CRP/procal   - ID recommending MRI if suspicion high for OM however given pt afebrile and nL WBC will defer for now; If MRI pursued would likely need to be done under conscious sedation    - c/w wound care   - Monitor CBC and temperature   - Consulted ID and recs noted     #Dilated AR   - 4.1cm at sinus of valsalva   - Incidental finding on CT chest   - CTSx consulted and recs noted; no intervention warranted at this time - outpt serial imaging    #Hypovolemic hyponatremia / improved   - Improved s/p gentle hydration    - Monitor Na levels   - Monitor I/O's     #VTE ppx: Lovenox   Diet: Tube feed - on hold due to ileus  Dispo: medically active, plan for eventual MIMI    Code status: full code , palliative care consult 78 y/o M BIBA from home for AMS w/ associated inability to talk x 1 week.  Pt had hx of UTI prior to current admission.  CTH on admission showed left occipital infarct vs artifact, no last known well time available, not a candidate for tPA.  w/u on admisison also significant for hyperammonemia and BG >400s.  Lactic acidosis likely 2/2 cirrhosis.  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  Pt was admitted for acute metabolic encephalopathy, also found to have large ascites, s/p IR guidded drainage, now with ileus , sx consulted. TF on hold     #Distended abdomen/ likely due to ascites / ileus  - CT a/p reviewed and has worsening ascites but as per IR not enough to drain; s/p 1x dose of IV lasix   - Tube feeds held  - repeat kub shows large ascites , ileus   - s/p ir guided tap, 2 L drained   - no sbp on fluid analysis. ceftriaxone dcd   - c/w daily KUB , OOB to chair with assist , monitor electrolytes   - kub shows ileus   - surgery consulted, recommend hold TF , enemas, DANIELITO, g tube to gravity  - abd u/s with mod-severe ascites, pt asymptomatic, IR in AM for possible para given persistent ascites     #Acute metabolic encephalopathy, likely multifactorial/ improving / likely back to baseline alert and oriented x 1-2 with dementia   - possible UTI, colitis, constipation  and hepatic encephalopathy , covid positive but no resp sxs   - CTH reporting possible L-occipital lobe lucency which may represent artifact vs infarct; repeat CTH (-) for acute findings or interval changes     - Hyperammonemia in the setting of cirrhosis and AMS could be 2/2 hepatic encephalopathy and therefore started on Rifaximin on admission   - Repeat ammonia WNL   - s/p cvEEG and no  epileptiform pattern or seizures noted  - Maintain on fall and aspiration precautions   - c/w rifaximin, hold lactulose for now due to abd distention   - Fall precautions   - Monitor CBC and temperature    #Transaminitis / worsening ascites  - Likely related to cirrhosis   - CT a/p reports worsening ascites s/p IR drainage on 8/2  - Tbili remains WNL   - Hepatitis panel neg   - Trend LFTs   - < from: US Abdomen Upper Quadrant Right (07.31.22 @ 15:22) > Cirrhosis. Large volume ascites.    #possible uti   - completed ceftriaxone     #Hypothyroidism  - TFTs reviewed; TSH increasing and T4 decreased;  - Reassess TFTs in a few days    - TG ab elevated; possibly hashimoto's; TPO ab pending   - Endocrinology consulted and recs noted  - c/w v synthroid till able to resume TF 8/8 as per endo   - repeat TFTs on Monday     #Hx of CVA  - initial ct h showed ? cva but repeat cth shows no acute / subacute cva   - MRI differed by neurology and repeat CTH reviewed and is w/out interval changes  - Maintain on asa and statin therapy  - Maintain on telemetry   - Neurology consulted   - resumed on aspirin post peritoneal  tap      #Normocytic anemia / Thrombocytopenia   - H/H and platelets stable   - Low plts likely 2/2 cirrhosis   - Hemodynamically stable w/ no active bleeding noted  - Monitor CBC and transfuse if Hb<7      #DM II  - Hyperglycemic on admission and hypoglycemic while hospitalized   - A1c 7.7  - ISS and hypoglycemic protocol in place     #Sacral decubitus ulcers and rt heel decubitus ulcer both POA  - No drainage noted   - Given CT findings and elevated CRP/procal   - ID recommending MRI if suspicion high for OM however given pt afebrile and nL WBC will defer for now; If MRI pursued would likely need to be done under conscious sedation    - c/w wound care   - Monitor CBC and temperature   - Consulted ID and recs noted     #Dilated AR   - 4.1cm at sinus of valsalva   - Incidental finding on CT chest   - CTSx consulted and recs noted; no intervention warranted at this time - outpt serial imaging    #Hypovolemic hyponatremia / improved   - Improved s/p gentle hydration    - Monitor Na levels   - Monitor I/O's     #VTE ppx: Lovenox   Diet: Tube feed - on hold due to ileus  Dispo: medically active, plan for eventual MIMI    Code status: full code , palliative care consult 76 y/o M BIBA from home for AMS w/ associated inability to talk x 1 week.  Pt had hx of UTI prior to current admission.  CTH on admission showed left occipital infarct vs artifact, no last known well time available, not a candidate for tPA.  w/u on admisison also significant for hyperammonemia and BG >400s.  Lactic acidosis likely 2/2 cirrhosis.  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  Pt was admitted for acute metabolic encephalopathy, also found to have large ascites, s/p IR guidded drainage, now with ileus , sx consulted. TF on hold     #Distended abdomen/ likely due to ascites / ileus  - CT a/p reviewed and has worsening ascites but as per IR not enough to drain; s/p 1x dose of IV lasix   - Tube feeds held  - repeat kub shows large ascites , ileus   - s/p ir guided tap, 2 L drained   - no sbp on fluid analysis. ceftriaxone dcd   - c/w daily KUB , OOB to chair with assist , monitor electrolytes   - kub shows ileus   - surgery consulted, recommend hold TF , enemas, DANIELITO, g tube to gravity  - abd u/s with mod-severe ascites, pt asymptomatic, IR in AM for possible repeat para    #Acute metabolic encephalopathy, likely multifactorial/ improving / likely back to baseline alert and oriented x 1-2 with dementia   - possible UTI, colitis, constipation  and hepatic encephalopathy , covid positive but no resp sxs   - CTH reporting possible L-occipital lobe lucency which may represent artifact vs infarct; repeat CTH (-) for acute findings or interval changes     - Hyperammonemia in the setting of cirrhosis and AMS could be 2/2 hepatic encephalopathy and therefore started on Rifaximin on admission   - Repeat ammonia WNL   - s/p cvEEG and no  epileptiform pattern or seizures noted  - Maintain on fall and aspiration precautions   - c/w rifaximin, hold lactulose for now due to abd distention   - Fall precautions   - Monitor CBC and temperature    #Transaminitis / worsening ascites  - Likely related to cirrhosis   - CT a/p reports worsening ascites s/p IR drainage on 8/2  - Tbili remains WNL   - Hepatitis panel neg   - Trend LFTs   - < from: US Abdomen Upper Quadrant Right (07.31.22 @ 15:22) > Cirrhosis. Large volume ascites.    #possible uti   - completed ceftriaxone     #Hypothyroidism  - TFTs reviewed; TSH increasing and T4 decreased;  - Reassess TFTs in a few days    - TG ab elevated; possibly hashimoto's; TPO ab pending   - Endocrinology consulted and recs noted  - c/w v synthroid till able to resume TF 8/8 as per endo   - repeat TFTs on Monday     #Hx of CVA  - initial ct h showed ? cva but repeat cth shows no acute / subacute cva   - MRI differed by neurology and repeat CTH reviewed and is w/out interval changes  - Maintain on asa and statin therapy  - Maintain on telemetry   - Neurology consulted   - resumed on aspirin post peritoneal  tap      #Normocytic anemia / Thrombocytopenia   - H/H and platelets stable   - Low plts likely 2/2 cirrhosis   - Hemodynamically stable w/ no active bleeding noted  - Monitor CBC and transfuse if Hb<7      #DM II  - Hyperglycemic on admission and hypoglycemic while hospitalized   - A1c 7.7  - ISS and hypoglycemic protocol in place     #Sacral decubitus ulcers and rt heel decubitus ulcer both POA  - No drainage noted   - Given CT findings and elevated CRP/procal   - ID recommending MRI if suspicion high for OM however given pt afebrile and nL WBC will defer for now; If MRI pursued would likely need to be done under conscious sedation    - c/w wound care   - Monitor CBC and temperature   - Consulted ID and recs noted     #Dilated AR   - 4.1cm at sinus of valsalva   - Incidental finding on CT chest   - CTSx consulted and recs noted; no intervention warranted at this time - outpt serial imaging    #Hypovolemic hyponatremia / improved   - Improved s/p gentle hydration    - Monitor Na levels   - Monitor I/O's     #VTE ppx: Lovenox   Diet: Tube feed - on hold due to ileus  Dispo: medically active, plan for eventual MIMI    Code status: full code , palliative care consult

## 2022-08-07 NOTE — CHART NOTE - NSCHARTNOTEFT_GEN_A_CORE
Called by RN patient noted to be retaining ~400cc, advised RN to please straight cath x1 and continue to bladder scan patient q6 and place barreto

## 2022-08-07 NOTE — PROGRESS NOTE ADULT - SUBJECTIVE AND OBJECTIVE BOX
Subjective: Patient seen and examined at bedside. No events O/N. Denies cp/n/v/d/sob. imaging showed large ascites planning repeat para      MEDICATIONS  (STANDING):  aspirin  chewable 81 milliGRAM(s) Enteral Tube daily  atorvastatin 40 milliGRAM(s) Oral at bedtime  dextrose 5% + sodium chloride 0.9%. 1000 milliLiter(s) (75 mL/Hr) IV Continuous <Continuous>  dextrose 5%. 1000 milliLiter(s) (100 mL/Hr) IV Continuous <Continuous>  dextrose 5%. 1000 milliLiter(s) (50 mL/Hr) IV Continuous <Continuous>  dextrose 50% Injectable 25 Gram(s) IV Push once  dextrose 50% Injectable 12.5 Gram(s) IV Push once  dextrose 50% Injectable 25 Gram(s) IV Push once  enoxaparin Injectable 40 milliGRAM(s) SubCutaneous every 24 hours  glucagon  Injectable 1 milliGRAM(s) IntraMuscular once  insulin lispro (ADMELOG) corrective regimen sliding scale   SubCutaneous three times a day before meals  levothyroxine Injectable 50 MICROGram(s) IV Push at bedtime  mupirocin 2% Ointment 1 Application(s) Both Nostrils two times a day  rifAXIMin 550 milliGRAM(s) Oral two times a day  tamsulosin 0.4 milliGRAM(s) Oral at bedtime  thiamine IVPB 500 milliGRAM(s) IV Intermittent daily    MEDICATIONS  (PRN):  acetaminophen     Tablet .. 650 milliGRAM(s) Oral every 6 hours PRN Temp greater or equal to 38C (100.4F), Mild Pain (1 - 3)  bisacodyl Suppository 10 milliGRAM(s) Rectal daily PRN Constipation  dextrose Oral Gel 15 Gram(s) Oral once PRN Blood Glucose LESS THAN 70 milliGRAM(s)/deciliter  ondansetron Injectable 4 milliGRAM(s) IV Push every 6 hours PRN Nausea and/or Vomiting        oxycodone ([place])        Objective:     Vital Signs Last 24 Hrs  T(C): 36.6 (06 Aug 2022 20:03), Max: 36.6 (06 Aug 2022 05:00)  T(F): 97.9 (06 Aug 2022 20:03), Max: 97.9 (06 Aug 2022 20:03)  HR: 84 (06 Aug 2022 20:03) (73 - 100)  BP: 119/76 (06 Aug 2022 20:03) (119/76 - 136/75)  BP(mean): --  RR: 18 (06 Aug 2022 20:03) (18 - 18)  SpO2: 96% (06 Aug 2022 20:03) (96% - 96%)    Parameters below as of 06 Aug 2022 20:03  Patient On (Oxygen Delivery Method): room air              I&O's Detail      Physical Exam:  General: NAD. Lying comfortably in bed.   HEENT: NCAT. Neck supple. EOMI.   Respiratory: Non labored breathing. No respiratory distress.   Cardio: RRR  Abdomen: Soft, non-tender, distended. No guarding or rebound. G tube with appropriate output.   Extremities: No clubbing or cyanosis.   Musculoskeletal: No obvious bony masses or joint pain   Neuro: A&O x 3. CNs II-XII grossly inatct.                                              10.9   6.29  )-----------( 151      ( 06 Aug 2022 07:35 )             32.6   08-06    133<L>  |  104  |  13.3  ----------------------------<  89  3.6   |  19.0<L>  |  0.89    Ca    7.0<L>      06 Aug 2022 07:35  Phos  2.6     08-05  Mg     1.6     08-05    TPro  6.5<L>  /  Alb  2.0<L>  /  TBili  1.3  /  DBili  x   /  AST  91<H>  /  ALT  56<H>  /  AlkPhos  125<H>  08-06

## 2022-08-07 NOTE — PROGRESS NOTE ADULT - ASSESSMENT
Patient is a 77y old male with AMS, tube feed dependent, now with bowel function and large volume ascites, pending repeat para    PLAN:    - Agree with holding tube feeds  - G tube to gravity to assess output  - track bowel movements  - Surgery to follow

## 2022-08-08 LAB
ALBUMIN SERPL ELPH-MCNC: 1.7 G/DL — LOW (ref 3.3–5.2)
ALP SERPL-CCNC: 125 U/L — HIGH (ref 40–120)
ALT FLD-CCNC: 69 U/L — HIGH
ANION GAP SERPL CALC-SCNC: 9 MMOL/L — SIGNIFICANT CHANGE UP (ref 5–17)
APTT BLD: 45.2 SEC — HIGH (ref 27.5–35.5)
AST SERPL-CCNC: 102 U/L — HIGH
BILIRUB SERPL-MCNC: 1.3 MG/DL — SIGNIFICANT CHANGE UP (ref 0.4–2)
BUN SERPL-MCNC: 10.5 MG/DL — SIGNIFICANT CHANGE UP (ref 8–20)
CALCIUM SERPL-MCNC: 6.7 MG/DL — LOW (ref 8.4–10.5)
CHLORIDE SERPL-SCNC: 109 MMOL/L — HIGH (ref 98–107)
CO2 SERPL-SCNC: 19 MMOL/L — LOW (ref 22–29)
CREAT SERPL-MCNC: 0.8 MG/DL — SIGNIFICANT CHANGE UP (ref 0.5–1.3)
EGFR: 90 ML/MIN/1.73M2 — SIGNIFICANT CHANGE UP
GLUCOSE BLDC GLUCOMTR-MCNC: 114 MG/DL — HIGH (ref 70–99)
GLUCOSE BLDC GLUCOMTR-MCNC: 88 MG/DL — SIGNIFICANT CHANGE UP (ref 70–99)
GLUCOSE BLDC GLUCOMTR-MCNC: 95 MG/DL — SIGNIFICANT CHANGE UP (ref 70–99)
GLUCOSE BLDC GLUCOMTR-MCNC: 97 MG/DL — SIGNIFICANT CHANGE UP (ref 70–99)
GLUCOSE SERPL-MCNC: 102 MG/DL — HIGH (ref 70–99)
HCT VFR BLD CALC: 33.8 % — LOW (ref 39–50)
HGB BLD-MCNC: 11.1 G/DL — LOW (ref 13–17)
INR BLD: 2.29 RATIO — HIGH (ref 0.88–1.16)
MAGNESIUM SERPL-MCNC: 1.8 MG/DL — SIGNIFICANT CHANGE UP (ref 1.6–2.6)
MCHC RBC-ENTMCNC: 30.7 PG — SIGNIFICANT CHANGE UP (ref 27–34)
MCHC RBC-ENTMCNC: 32.8 GM/DL — SIGNIFICANT CHANGE UP (ref 32–36)
MCV RBC AUTO: 93.6 FL — SIGNIFICANT CHANGE UP (ref 80–100)
PHOSPHATE SERPL-MCNC: 1.8 MG/DL — LOW (ref 2.4–4.7)
PLATELET # BLD AUTO: 139 K/UL — LOW (ref 150–400)
POTASSIUM SERPL-MCNC: 3.8 MMOL/L — SIGNIFICANT CHANGE UP (ref 3.5–5.3)
POTASSIUM SERPL-SCNC: 3.8 MMOL/L — SIGNIFICANT CHANGE UP (ref 3.5–5.3)
PROT SERPL-MCNC: 6.4 G/DL — LOW (ref 6.6–8.7)
PROTHROM AB SERPL-ACNC: 26.8 SEC — HIGH (ref 10.5–13.4)
RBC # BLD: 3.61 M/UL — LOW (ref 4.2–5.8)
RBC # FLD: 15.5 % — HIGH (ref 10.3–14.5)
SODIUM SERPL-SCNC: 137 MMOL/L — SIGNIFICANT CHANGE UP (ref 135–145)
T4 FREE SERPL-MCNC: 1.1 NG/DL — SIGNIFICANT CHANGE UP (ref 0.9–1.8)
TSH SERPL-MCNC: 30.3 UIU/ML — HIGH (ref 0.27–4.2)
WBC # BLD: 5.84 K/UL — SIGNIFICANT CHANGE UP (ref 3.8–10.5)
WBC # FLD AUTO: 5.84 K/UL — SIGNIFICANT CHANGE UP (ref 3.8–10.5)

## 2022-08-08 PROCEDURE — 99233 SBSQ HOSP IP/OBS HIGH 50: CPT

## 2022-08-08 PROCEDURE — 99232 SBSQ HOSP IP/OBS MODERATE 35: CPT

## 2022-08-08 RX ORDER — LEVOTHYROXINE SODIUM 125 MCG
75 TABLET ORAL AT BEDTIME
Refills: 0 | Status: DISCONTINUED | OUTPATIENT
Start: 2022-08-08 | End: 2022-08-15

## 2022-08-08 RX ORDER — SODIUM,POTASSIUM PHOSPHATES 278-250MG
1 POWDER IN PACKET (EA) ORAL ONCE
Refills: 0 | Status: COMPLETED | OUTPATIENT
Start: 2022-08-08 | End: 2022-08-08

## 2022-08-08 RX ORDER — PHYTONADIONE (VIT K1) 5 MG
10 TABLET ORAL ONCE
Refills: 0 | Status: COMPLETED | OUTPATIENT
Start: 2022-08-08 | End: 2022-08-08

## 2022-08-08 RX ADMIN — NYSTATIN CREAM 1 APPLICATION(S): 100000 CREAM TOPICAL at 21:08

## 2022-08-08 RX ADMIN — SODIUM CHLORIDE 75 MILLILITER(S): 9 INJECTION, SOLUTION INTRAVENOUS at 03:21

## 2022-08-08 RX ADMIN — NYSTATIN CREAM 1 APPLICATION(S): 100000 CREAM TOPICAL at 05:10

## 2022-08-08 RX ADMIN — Medication 75 MICROGRAM(S): at 21:09

## 2022-08-08 RX ADMIN — Medication 102 MILLIGRAM(S): at 11:48

## 2022-08-08 RX ADMIN — Medication 1 PACKET(S): at 08:51

## 2022-08-08 RX ADMIN — ATORVASTATIN CALCIUM 40 MILLIGRAM(S): 80 TABLET, FILM COATED ORAL at 21:09

## 2022-08-08 RX ADMIN — Medication 105 MILLIGRAM(S): at 12:58

## 2022-08-08 RX ADMIN — MUPIROCIN 1 APPLICATION(S): 20 OINTMENT TOPICAL at 17:43

## 2022-08-08 RX ADMIN — TAMSULOSIN HYDROCHLORIDE 0.8 MILLIGRAM(S): 0.4 CAPSULE ORAL at 21:08

## 2022-08-08 RX ADMIN — MUPIROCIN 1 APPLICATION(S): 20 OINTMENT TOPICAL at 05:10

## 2022-08-08 RX ADMIN — MAGNESIUM OXIDE 400 MG ORAL TABLET 400 MILLIGRAM(S): 241.3 TABLET ORAL at 08:51

## 2022-08-08 RX ADMIN — NYSTATIN CREAM 1 APPLICATION(S): 100000 CREAM TOPICAL at 12:59

## 2022-08-08 NOTE — PROGRESS NOTE ADULT - SUBJECTIVE AND OBJECTIVE BOX
Mayra Casiano M.D.    Patient is a 77y old  Male who presents with a chief complaint of Altered mental state (07 Aug 2022 12:24)      SUBJECTIVE / OVERNIGHT EVENTS: urinary retention overnight s/p straight cath x2. Also +BM.     Patient denies chest pain, SOB, abd pain, N/V, fever, chills, dysuria or any other complaints. All remainder ROS negative.     MEDICATIONS  (STANDING):  atorvastatin 40 milliGRAM(s) Oral at bedtime  dextrose 5% + sodium chloride 0.9%. 1000 milliLiter(s) (75 mL/Hr) IV Continuous <Continuous>  dextrose 5%. 1000 milliLiter(s) (100 mL/Hr) IV Continuous <Continuous>  dextrose 5%. 1000 milliLiter(s) (50 mL/Hr) IV Continuous <Continuous>  dextrose 50% Injectable 25 Gram(s) IV Push once  dextrose 50% Injectable 12.5 Gram(s) IV Push once  dextrose 50% Injectable 25 Gram(s) IV Push once  glucagon  Injectable 1 milliGRAM(s) IntraMuscular once  insulin lispro (ADMELOG) corrective regimen sliding scale   SubCutaneous three times a day before meals  levothyroxine Injectable 50 MICROGram(s) IV Push at bedtime  mupirocin 2% Ointment 1 Application(s) Both Nostrils two times a day  nystatin Powder 1 Application(s) Topical three times a day  rifAXIMin 550 milliGRAM(s) Oral two times a day  tamsulosin 0.8 milliGRAM(s) Oral at bedtime  thiamine IVPB 500 milliGRAM(s) IV Intermittent daily    MEDICATIONS  (PRN):  acetaminophen     Tablet .. 650 milliGRAM(s) Oral every 6 hours PRN Temp greater or equal to 38C (100.4F), Mild Pain (1 - 3)  bisacodyl Suppository 10 milliGRAM(s) Rectal daily PRN Constipation  dextrose Oral Gel 15 Gram(s) Oral once PRN Blood Glucose LESS THAN 70 milliGRAM(s)/deciliter  ondansetron Injectable 4 milliGRAM(s) IV Push every 6 hours PRN Nausea and/or Vomiting      I&O's Summary    07 Aug 2022 07:01  -  08 Aug 2022 07:00  --------------------------------------------------------  IN: 900 mL / OUT: 980 mL / NET: -80 mL        PHYSICAL EXAM:  Vital Signs Last 24 Hrs  T(C): 36.1 (08 Aug 2022 11:45), Max: 37.1 (08 Aug 2022 10:26)  T(F): 97 (08 Aug 2022 11:45), Max: 98.7 (08 Aug 2022 10:26)  HR: 94 (08 Aug 2022 11:45) (84 - 102)  BP: 114/69 (08 Aug 2022 11:45) (95/60 - 128/72)  BP(mean): --  RR: 18 (08 Aug 2022 11:45) (18 - 19)  SpO2: 96% (08 Aug 2022 11:45) (94% - 96%)    Parameters below as of 08 Aug 2022 11:45  Patient On (Oxygen Delivery Method): room air    GENERAL: laying comfortably in bed in NAD, awake and conversational  HEENT: NC/AT, dry oral mucosa, clear conjunctiva, sclera nonicteric  RESPIRATORY: poor inspiratory effort, CTA b/l, no wheezing, rhonchi, rales  CARDIOVASCULAR: RRR, normal S1 and S2  ABDOMEN: Distended, minimal BS, +Peg  EXTREMITIES: No clubbing, 2+ LE edema L >R  PSYCH: affect flat but cooperative  NEUROLOGY: A+O x1-2, no focal neurologic deficits appreciated   SKIN: No rashes or no palpable lesions, poor turgur    LABS:                        11.1   5.84  )-----------( 139      ( 08 Aug 2022 06:45 )             33.8     08-08    137  |  109<H>  |  10.5  ----------------------------<  102<H>  3.8   |  19.0<L>  |  0.80    Ca    6.7<L>      08 Aug 2022 06:45  Phos  1.8     08-08  Mg     1.8     08-08    TPro  6.4<L>  /  Alb  1.7<L>  /  TBili  1.3  /  DBili  x   /  AST  102<H>  /  ALT  69<H>  /  AlkPhos  125<H>  08-08    PT/INR - ( 08 Aug 2022 09:40 )   PT: 26.8 sec;   INR: 2.29 ratio         PTT - ( 08 Aug 2022 09:40 )  PTT:45.2 sec          CAPILLARY BLOOD GLUCOSE      POCT Blood Glucose.: 97 mg/dL (08 Aug 2022 08:06)  POCT Blood Glucose.: 95 mg/dL (08 Aug 2022 05:48)  POCT Blood Glucose.: 86 mg/dL (07 Aug 2022 23:49)  POCT Blood Glucose.: 119 mg/dL (07 Aug 2022 16:59)      RADIOLOGY & ADDITIONAL TESTS:  Results Reviewed:   Imaging Personally Reviewed:  Electrocardiogram Personally Reviewed: Quality 47: Advance Care Plan: Advance Care Planning discussed and documented; advance care plan or surrogate decision maker documented in the medical record. Quality 431: Preventive Care And Screening: Unhealthy Alcohol Use - Screening: Patient screened for unhealthy alcohol use using a single question and scores less than 2 times per year Quality 130: Documentation Of Current Medications In The Medical Record: Current Medications Documented Detail Level: Detailed Additional Notes: Due to COVID-19, at today’s office visit we utilized face masks, wipes, and other additional supplies,materials, and clinical staff time over and above those usually included in an office visit, which was performed during the Coronavirus-related Public Health Emergency. Quality 111:Pneumonia Vaccination Status For Older Adults: Pneumococcal Vaccination Previously Received Quality 110: Preventive Care And Screening: Influenza Immunization: Influenza Immunization Administered during Influenza season Quality 154 Part A: Falls: Risk Assessment (Should Be Reported With Measure 155.): Falls risk assessment completed and documented in the past 12 months. Quality 154 Part B: Falls: Risk Screening (Should Be Reported With Measure 155.): Patient screened for future fall risk; documentation of no falls in the past year or only one fall without injury in the past year

## 2022-08-08 NOTE — PROGRESS NOTE ADULT - ASSESSMENT
76 y/o M BIBA from home for AMS w/ associated inability to talk x 1 week.  Pt had hx of UTI prior to current admission.  CTH on admission showed left occipital infarct vs artifact.  Pt was admitted for acute metabolic encephalopathy, likely multifactorial  Pts remains acute given his persistent encephalopathy. remains constipated despite aggressive bowel regimen.        Hypothyroidism:  TSH rechecked high. increase LT4 to 75 mcg qd   check FT4 and T3 on monday    Acute metabolic encephalopathy, likely multifactorial   possible UTI, colitis, constipation  and hepatic encephalopathy , covid positive but no resp sxs   continue management per primary team.     Distended abdomen 2/2 constipation    possible ileus    Reglan PRN   - Tube feeds held     possible UTI  - on ceftriaxone

## 2022-08-08 NOTE — PROGRESS NOTE ADULT - ASSESSMENT
76 y/o M BIBA from home for AMS w/ associated inability to talk x 1 week.  Pt had hx of UTI prior to current admission.  CTH on admission showed left occipital infarct vs artifact, no last known well time available, not a candidate for tPA.  w/u on admisison also significant for hyperammonemia and BG >400s.  Lactic acidosis likely 2/2 cirrhosis.  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  Pt was admitted for acute metabolic encephalopathy, also found to have large ascites, s/p IR guidded drainage, now with ileus , sx consulted. TF on hold, ileus now better. Now with urinary retention and pending repeat paracentesis for large volume ascites.     #Urinary retention  - increase flomax  - q8hr bladder scan, barreto placement if indicated  - repeat paracentesis as below    #Distended abdomen/ likely due to ascites / ileus  - CT a/p reviewed and has worsening ascites but as per IR not enough to drain; s/p 1x dose of IV lasix   - Tube feeds held  - repeat kub shows large ascites , ileus   - s/p ir guided tap, 2 L drained   - no sbp on fluid analysis. ceftriaxone dcd   - c/w daily KUB , OOB to chair with assist , monitor electrolytes   - kub shows ileus   - surgery consulted, recommend hold TF , enemas, DANIELITO, g tube to gravity  - abd u/s with mod-severe ascites, IR called for repeat paracentesis, pending INR < 2, s/p vitamin K    #Acute metabolic encephalopathy, likely multifactorial/ improving / likely back to baseline alert and oriented x 1-2 with dementia   - possible UTI, colitis, constipation  and hepatic encephalopathy , covid positive but no resp sxs   - CTH reporting possible L-occipital lobe lucency which may represent artifact vs infarct; repeat CTH (-) for acute findings or interval changes     - Hyperammonemia in the setting of cirrhosis and AMS could be 2/2 hepatic encephalopathy and therefore started on Rifaximin on admission   - Repeat ammonia WNL   - s/p cvEEG and no  epileptiform pattern or seizures noted  - Maintain on fall and aspiration precautions   - c/w rifaximin, hold lactulose for now due to abd distention   - Fall precautions   - Monitor CBC and temperature    #Transaminitis / worsening ascites  - Likely related to cirrhosis   - CT a/p reports worsening ascites s/p IR drainage on 8/2  - Tbili remains WNL   - Hepatitis panel neg   - Trend LFTs   - < from: US Abdomen Upper Quadrant Right (07.31.22 @ 15:22) > Cirrhosis. Large volume ascites.    #possible uti   - completed ceftriaxone     #Hypothyroidism  - TFTs reviewed; TSH increasing and T4 decreased;  - Reassess TFTs in a few days    - TG ab elevated; possibly hashimoto's; TPO ab pending   - c/w v synthroid till able to resume TF 8/8 as per endo   - repeat TFTs with TSH 30  - f/u further endo recs    #Hx of CVA  - initial ct h showed ? cva but repeat cth shows no acute / subacute cva   - MRI differed by neurology and repeat CTH reviewed and is w/out interval changes  - Maintain on asa and statin therapy  - Maintain on telemetry   - Neurology consulted   - resumed on aspirin post peritoneal  tap      #Normocytic anemia / Thrombocytopenia   - H/H and platelets stable   - Low plts likely 2/2 cirrhosis   - Hemodynamically stable w/ no active bleeding noted  - Monitor CBC and transfuse if Hb<7      #DM II  - Hyperglycemic on admission and hypoglycemic while hospitalized   - A1c 7.7  - ISS and hypoglycemic protocol in place     #Sacral decubitus ulcers and rt heel decubitus ulcer both POA  - No drainage noted   - Given CT findings and elevated CRP/procal   - ID recommending MRI if suspicion high for OM however given pt afebrile and nL WBC will defer for now; If MRI pursued would likely need to be done under conscious sedation    - c/w wound care   - Monitor CBC and temperature   - Consulted ID and recs noted     #Dilated AR   - 4.1cm at sinus of valsalva   - Incidental finding on CT chest   - CTSx consulted and recs noted; no intervention warranted at this time - outpt serial imaging    #Hypovolemic hyponatremia / improved   - Improved s/p gentle hydration    - Monitor Na levels   - Monitor I/O's     #VTE ppx: Lovenox   Diet: Tube feed - NPO mn for paracentesis  Dispo: medically active, plan for eventual [facility]    Code status: full code , palliative care consult

## 2022-08-08 NOTE — CHART NOTE - NSCHARTNOTEFT_GEN_A_CORE
Palliative Care NP Sign Off Note    Informed Katt (wife) that if she wants a more conservative approach to [facility] care hospice at a facility may be an option for him. Katt thanked me for the information - at this time plan is to continue with medical optimization.   Goal for [facility]/LTC on discharge (she cannot care for him at home)    GOC have been established, no further needs to be addressed from Palliative care perspective.  Will sign off at this time. Please reconsult if GOC change or condition decompensates requiring further palliative care assistance.    Code status: Full code  HCP/Surrogate: Katt Monroyerson   **CONTACT NUMBER FOR KATT is  [phone] (phone number in sunrise is wrong)    Thank you for the opportunity to assist with the care of this patient.   [facility] Palliative Medicine Consult Service [phone].

## 2022-08-08 NOTE — PROGRESS NOTE ADULT - SUBJECTIVE AND OBJECTIVE BOX
INTERVAL HPI/OVERNIGHT EVENTS:  followup for hypoT     MEDICATIONS  (STANDING):  atorvastatin 40 milliGRAM(s) Oral at bedtime  dextrose 5% + sodium chloride 0.9%. 1000 milliLiter(s) (75 mL/Hr) IV Continuous <Continuous>  dextrose 5%. 1000 milliLiter(s) (100 mL/Hr) IV Continuous <Continuous>  dextrose 5%. 1000 milliLiter(s) (50 mL/Hr) IV Continuous <Continuous>  dextrose 50% Injectable 25 Gram(s) IV Push once  dextrose 50% Injectable 12.5 Gram(s) IV Push once  dextrose 50% Injectable 25 Gram(s) IV Push once  glucagon  Injectable 1 milliGRAM(s) IntraMuscular once  insulin lispro (ADMELOG) corrective regimen sliding scale   SubCutaneous three times a day before meals  levothyroxine Injectable 75 MICROGram(s) IV Push at bedtime  mupirocin 2% Ointment 1 Application(s) Both Nostrils two times a day  nystatin Powder 1 Application(s) Topical three times a day  rifAXIMin 550 milliGRAM(s) Oral two times a day  tamsulosin 0.8 milliGRAM(s) Oral at bedtime  thiamine IVPB 500 milliGRAM(s) IV Intermittent daily    MEDICATIONS  (PRN):  acetaminophen     Tablet .. 650 milliGRAM(s) Oral every 6 hours PRN Temp greater or equal to 38C (100.4F), Mild Pain (1 - 3)  bisacodyl Suppository 10 milliGRAM(s) Rectal daily PRN Constipation  dextrose Oral Gel 15 Gram(s) Oral once PRN Blood Glucose LESS THAN 70 milliGRAM(s)/deciliter  ondansetron Injectable 4 milliGRAM(s) IV Push every 6 hours PRN Nausea and/or Vomiting    Allergies  oxycodone ([place])    Review of systems: does not answer to questions, sleepy     Vital Signs Last 24 Hrs  T(C): 36.2 (08 Aug 2022 12:20), Max: 37.1 (08 Aug 2022 10:26)  T(F): 97.1 (08 Aug 2022 12:20), Max: 98.7 (08 Aug 2022 10:26)  HR: 93 (08 Aug 2022 12:20) (84 - 102)  BP: 115/69 (08 Aug 2022 12:20) (95/60 - 128/72)  BP(mean): --  RR: 18 (08 Aug 2022 12:20) (18 - 19)  SpO2: 97% (08 Aug 2022 12:20) (94% - 98%)    Parameters below as of 08 Aug 2022 12:20  Patient On (Oxygen Delivery Method): room air    PHYSICAL EXAM:  Constitutional: NAD, well-developed  Respiratory: CTAB  Cardiovascular: S1 and S2, RRR, no M/G/R  Gastrointestinal: BS+, soft, no organomegaly or mass  Extremities: No peripheral edema, no pedal lesions  Skin: No rashes    LABS:                    11.1   5.84  )-----------( 139      ( 08 Aug 2022 06:45 )             33.8   08-08    137  |  109<H>  |  10.5  ----------------------------<  102<H>  3.8   |  19.0<L>  |  0.80    Ca    6.7<L>      08 Aug 2022 06:45  Phos  1.8     08-08  Mg     1.8     08-08    TPro  6.4<L>  /  Alb  1.7<L>  /  TBili  1.3  /  DBili  x   /  AST  102<H>  /  ALT  69<H>  /  AlkPhos  125<H>  08-08

## 2022-08-08 NOTE — CHART NOTE - NSCHARTNOTEFT_GEN_A_CORE
Source: Patient [ ]  Family [ ]   other [x ]EMR, rounds    Current Diet: NPO    Enteral /Parenteral Nutrition:     Current Weight: 190#  none in flow sheets    % Weight Change     Pertinent Medications: MEDICATIONS  (STANDING):  atorvastatin 40 milliGRAM(s) Oral at bedtime  dextrose 5% + sodium chloride 0.9%. 1000 milliLiter(s) (75 mL/Hr) IV Continuous <Continuous>  dextrose 5%. 1000 milliLiter(s) (100 mL/Hr) IV Continuous <Continuous>  dextrose 5%. 1000 milliLiter(s) (50 mL/Hr) IV Continuous <Continuous>  dextrose 50% Injectable 25 Gram(s) IV Push once  dextrose 50% Injectable 12.5 Gram(s) IV Push once  dextrose 50% Injectable 25 Gram(s) IV Push once  glucagon  Injectable 1 milliGRAM(s) IntraMuscular once  insulin lispro (ADMELOG) corrective regimen sliding scale   SubCutaneous three times a day before meals  levothyroxine Injectable 50 MICROGram(s) IV Push at bedtime  mupirocin 2% Ointment 1 Application(s) Both Nostrils two times a day  nystatin Powder 1 Application(s) Topical three times a day  phytonadione  IVPB 10 milliGRAM(s) IV Intermittent once  rifAXIMin 550 milliGRAM(s) Oral two times a day  tamsulosin 0.8 milliGRAM(s) Oral at bedtime  thiamine IVPB 500 milliGRAM(s) IV Intermittent daily    MEDICATIONS  (PRN):  acetaminophen     Tablet .. 650 milliGRAM(s) Oral every 6 hours PRN Temp greater or equal to 38C (100.4F), Mild Pain (1 - 3)  bisacodyl Suppository 10 milliGRAM(s) Rectal daily PRN Constipation  dextrose Oral Gel 15 Gram(s) Oral once PRN Blood Glucose LESS THAN 70 milliGRAM(s)/deciliter  ondansetron Injectable 4 milliGRAM(s) IV Push every 6 hours PRN Nausea and/or Vomiting    Pertinent Labs: CBC Full  -  ( 08 Aug 2022 06:45 )  WBC Count : 5.84 K/uL  RBC Count : 3.61 M/uL  Hemoglobin : 11.1 g/dL  Hematocrit : 33.8 %  Platelet Count - Automated : 139 K/uL  Mean Cell Volume : 93.6 fl  Mean Cell Hemoglobin : 30.7 pg  Mean Cell Hemoglobin Concentration : 32.8 gm/dL  Auto Neutrophil # : x  Auto Lymphocyte # : x  Auto Monocyte # : x  Auto Eosinophil # : x  Auto Basophil # : x  Auto Neutrophil % : x  Auto Lymphocyte % : x  Auto Monocyte % : x  Auto Eosinophil % : x  Auto Basophil % : x    08-08 Na137 mmol/L Glu 102 mg/dL<H> K+ 3.8 mmol/L Cr  0.80 mg/dL BUN 10.5 mg/dL Phos 1.8 mg/dL<L> Alb 1.7 g/dL<L> PAB n/a             Skin: Stage 2 right butt, stage 2 right heel     Nutrition focused physical exam conducted - found signs of malnutrition [ ]absent [ x]present    Subcutaneous fat loss: [ ] Orbital fat pads region, [ ]Buccal fat region, [ ]Triceps region,  [ ]Ribs region    Muscle wasting: [ x]Temples region, [ ]Clavicle region, [ ]Shoulder region, [ ]Scapula region, [ ]Interosseous region,  [ ]thigh region, [ ]Calf region    Estimated Needs:   [x ] no change since previous assessment  [ ] recalculated:     Current Nutrition Diagnosis: Pt remains at high nutrition risk and meets criteria for severe chronic malnutrition related to inadequate energy intake in setting of cirrhosis, possible UTI, decub ulcers (POA), colitis, covid and ?subacute CVA as evidenced by severe muscle depletion, meeting <75% estimated protein-energy needs x >1 month. Currently, patient remains with tube feedings on hold 2/2 abdominal distention and severe constipation; per chart, likely related to ascites / ileus. Pending to resume TF when resolved. Going for paracentesis today. If successful, TF may be resumed today as per MD.  Currently no source of nutrition at this time. Patient with increased protein/kcal needs due to current clinical status and healing stage 2 x2 pressure ulcer. IV fluids provided. AMS remains. RD to remain available.     RECOMMENDATIONS:  1) If medically feasible - Glucerna 1.5 initiated at 20 ml/hr and advance 10 ml/hr q4 hrs until goal rate of 70 ml/hr (x20 hrs) to provide 1400 ml, 2100 kcal, 116g protein, 1063 ml free water, and >100% of RDIs for vitamins/minerals. Additional free water per MD discretion.   2) RX: MVI, Vitamin C 500mg, Zinc Sulfate 220mg x 10 days to assist with healing, when medically feasible; continue thiamine  3) monitor weights; monitor labs      Monitoring and Evaluation:   [ ] PO intake [x ] Tolerance to diet prescription [X] Weights  [X] Follow up per protocol [X] Labs:

## 2022-08-09 LAB
ALBUMIN SERPL ELPH-MCNC: 1.9 G/DL — LOW (ref 3.3–5.2)
ALP SERPL-CCNC: 121 U/L — HIGH (ref 40–120)
ALT FLD-CCNC: 67 U/L — HIGH
ANION GAP SERPL CALC-SCNC: 9 MMOL/L — SIGNIFICANT CHANGE UP (ref 5–17)
APTT BLD: 40.6 SEC — HIGH (ref 27.5–35.5)
AST SERPL-CCNC: 95 U/L — HIGH
BILIRUB SERPL-MCNC: 1.6 MG/DL — SIGNIFICANT CHANGE UP (ref 0.4–2)
BLD GP AB SCN SERPL QL: SIGNIFICANT CHANGE UP
BUN SERPL-MCNC: 11.1 MG/DL — SIGNIFICANT CHANGE UP (ref 8–20)
CALCIUM SERPL-MCNC: 7 MG/DL — LOW (ref 8.4–10.5)
CHLORIDE SERPL-SCNC: 107 MMOL/L — SIGNIFICANT CHANGE UP (ref 98–107)
CO2 SERPL-SCNC: 18 MMOL/L — LOW (ref 22–29)
CREAT SERPL-MCNC: 0.81 MG/DL — SIGNIFICANT CHANGE UP (ref 0.5–1.3)
EGFR: 90 ML/MIN/1.73M2 — SIGNIFICANT CHANGE UP
GLUCOSE BLDC GLUCOMTR-MCNC: 71 MG/DL — SIGNIFICANT CHANGE UP (ref 70–99)
GLUCOSE BLDC GLUCOMTR-MCNC: 77 MG/DL — SIGNIFICANT CHANGE UP (ref 70–99)
GLUCOSE BLDC GLUCOMTR-MCNC: 82 MG/DL — SIGNIFICANT CHANGE UP (ref 70–99)
GLUCOSE BLDC GLUCOMTR-MCNC: 93 MG/DL — SIGNIFICANT CHANGE UP (ref 70–99)
GLUCOSE SERPL-MCNC: 82 MG/DL — SIGNIFICANT CHANGE UP (ref 70–99)
HCT VFR BLD CALC: 32.8 % — LOW (ref 39–50)
HGB BLD-MCNC: 11 G/DL — LOW (ref 13–17)
INR BLD: 2.15 RATIO — HIGH (ref 0.88–1.16)
INR BLD: 2.15 RATIO — HIGH (ref 0.88–1.16)
MAGNESIUM SERPL-MCNC: 2.1 MG/DL — SIGNIFICANT CHANGE UP (ref 1.6–2.6)
MCHC RBC-ENTMCNC: 31.3 PG — SIGNIFICANT CHANGE UP (ref 27–34)
MCHC RBC-ENTMCNC: 33.5 GM/DL — SIGNIFICANT CHANGE UP (ref 32–36)
MCV RBC AUTO: 93.2 FL — SIGNIFICANT CHANGE UP (ref 80–100)
PHOSPHATE SERPL-MCNC: 2.2 MG/DL — LOW (ref 2.4–4.7)
PLATELET # BLD AUTO: 129 K/UL — LOW (ref 150–400)
POTASSIUM SERPL-MCNC: 3.8 MMOL/L — SIGNIFICANT CHANGE UP (ref 3.5–5.3)
POTASSIUM SERPL-SCNC: 3.8 MMOL/L — SIGNIFICANT CHANGE UP (ref 3.5–5.3)
PROT SERPL-MCNC: 6.3 G/DL — LOW (ref 6.6–8.7)
PROTHROM AB SERPL-ACNC: 25.1 SEC — HIGH (ref 10.5–13.4)
PROTHROM AB SERPL-ACNC: 25.1 SEC — HIGH (ref 10.5–13.4)
RBC # BLD: 3.52 M/UL — LOW (ref 4.2–5.8)
RBC # FLD: 15.8 % — HIGH (ref 10.3–14.5)
SODIUM SERPL-SCNC: 134 MMOL/L — LOW (ref 135–145)
WBC # BLD: 6.62 K/UL — SIGNIFICANT CHANGE UP (ref 3.8–10.5)
WBC # FLD AUTO: 6.62 K/UL — SIGNIFICANT CHANGE UP (ref 3.8–10.5)

## 2022-08-09 PROCEDURE — 99233 SBSQ HOSP IP/OBS HIGH 50: CPT

## 2022-08-09 RX ORDER — DEXTROSE 10 % IN WATER 10 %
250 INTRAVENOUS SOLUTION INTRAVENOUS ONCE
Refills: 0 | Status: COMPLETED | OUTPATIENT
Start: 2022-08-09 | End: 2022-08-09

## 2022-08-09 RX ORDER — PHYTONADIONE (VIT K1) 5 MG
10 TABLET ORAL ONCE
Refills: 0 | Status: COMPLETED | OUTPATIENT
Start: 2022-08-09 | End: 2022-08-09

## 2022-08-09 RX ADMIN — ATORVASTATIN CALCIUM 40 MILLIGRAM(S): 80 TABLET, FILM COATED ORAL at 21:18

## 2022-08-09 RX ADMIN — MUPIROCIN 1 APPLICATION(S): 20 OINTMENT TOPICAL at 05:38

## 2022-08-09 RX ADMIN — NYSTATIN CREAM 1 APPLICATION(S): 100000 CREAM TOPICAL at 05:38

## 2022-08-09 RX ADMIN — NYSTATIN CREAM 1 APPLICATION(S): 100000 CREAM TOPICAL at 21:17

## 2022-08-09 RX ADMIN — Medication 105 MILLIGRAM(S): at 14:42

## 2022-08-09 RX ADMIN — Medication 102 MILLIGRAM(S): at 11:17

## 2022-08-09 RX ADMIN — Medication 75 MICROGRAM(S): at 21:17

## 2022-08-09 RX ADMIN — NYSTATIN CREAM 1 APPLICATION(S): 100000 CREAM TOPICAL at 14:43

## 2022-08-09 RX ADMIN — Medication 1000 MILLILITER(S): at 00:32

## 2022-08-09 RX ADMIN — TAMSULOSIN HYDROCHLORIDE 0.8 MILLIGRAM(S): 0.4 CAPSULE ORAL at 21:18

## 2022-08-09 NOTE — CHART NOTE - NSCHARTNOTEFT_GEN_A_CORE
IR called by day team for repeat paracentesis, pending INR < 2  Now s/p vitamin K x2, INR without improvement, still 2.15  Advised by day PA on sign out to give FFP if INR still >2  FFP ordered, with recent type and screen  Blood consent filled out with HCP Wife Katt via telephone [phone] and placed in chart  Repeat coags in am pending

## 2022-08-09 NOTE — PROGRESS NOTE ADULT - SUBJECTIVE AND OBJECTIVE BOX
Mayra Casiano M.D.    Patient is a 77y old  Male who presents with a chief complaint of Altered mental state (08 Aug 2022 15:01)      SUBJECTIVE / OVERNIGHT EVENTS: +urinary retention overnight s/p barreto placement.     Patient denies chest pain, SOB, abd pain, N/V, fever, chills, dysuria or any other complaints. All remainder ROS negative.     MEDICATIONS  (STANDING):  atorvastatin 40 milliGRAM(s) Oral at bedtime  dextrose 5%. 1000 milliLiter(s) (100 mL/Hr) IV Continuous <Continuous>  dextrose 5%. 1000 milliLiter(s) (50 mL/Hr) IV Continuous <Continuous>  dextrose 50% Injectable 25 Gram(s) IV Push once  dextrose 50% Injectable 12.5 Gram(s) IV Push once  dextrose 50% Injectable 25 Gram(s) IV Push once  glucagon  Injectable 1 milliGRAM(s) IntraMuscular once  insulin lispro (ADMELOG) corrective regimen sliding scale   SubCutaneous three times a day before meals  levothyroxine Injectable 75 MICROGram(s) IV Push at bedtime  nystatin Powder 1 Application(s) Topical three times a day  rifAXIMin 550 milliGRAM(s) Oral two times a day  tamsulosin 0.8 milliGRAM(s) Oral at bedtime  thiamine IVPB 500 milliGRAM(s) IV Intermittent daily    MEDICATIONS  (PRN):  acetaminophen     Tablet .. 650 milliGRAM(s) Oral every 6 hours PRN Temp greater or equal to 38C (100.4F), Mild Pain (1 - 3)  bisacodyl Suppository 10 milliGRAM(s) Rectal daily PRN Constipation  dextrose Oral Gel 15 Gram(s) Oral once PRN Blood Glucose LESS THAN 70 milliGRAM(s)/deciliter  ondansetron Injectable 4 milliGRAM(s) IV Push every 6 hours PRN Nausea and/or Vomiting      I&O's Summary    08 Aug 2022 07:01  -  09 Aug 2022 07:00  --------------------------------------------------------  IN: 330 mL / OUT: 600 mL / NET: -270 mL        PHYSICAL EXAM:  Vital Signs Last 24 Hrs  T(C): 36.6 (09 Aug 2022 09:25), Max: 36.6 (08 Aug 2022 16:00)  T(F): 97.9 (09 Aug 2022 09:25), Max: 97.9 (09 Aug 2022 09:25)  HR: 74 (09 Aug 2022 11:47) (71 - 105)  BP: 116/70 (09 Aug 2022 11:47) (106/71 - 122/71)  BP(mean): 82 (08 Aug 2022 20:06) (82 - 82)  RR: 18 (09 Aug 2022 09:25) (17 - 18)  SpO2: 96% (09 Aug 2022 09:25) (96% - 98%)    Parameters below as of 09 Aug 2022 05:06  Patient On (Oxygen Delivery Method): room air      GENERAL: laying comfortably in bed in NAD, awake and conversational  HEENT: NC/AT, dry oral mucosa, clear conjunctiva, sclera nonicteric  RESPIRATORY: poor inspiratory effort, CTA b/l, no wheezing, rhonchi, rales  CARDIOVASCULAR: RRR, normal S1 and S2  ABDOMEN: Distended, minimal BS, +Peg  EXTREMITIES: No clubbing, 2+ LE edema L >R  PSYCH: affect flat but cooperative  NEUROLOGY: A+O x1-2, no focal neurologic deficits appreciated   SKIN: No rashes or no palpable lesions, poor turgur    LABS:                        11.0   6.62  )-----------( 129      ( 09 Aug 2022 06:40 )             32.8     08-09    134<L>  |  107  |  11.1  ----------------------------<  82  3.8   |  18.0<L>  |  0.81    Ca    7.0<L>      09 Aug 2022 06:40  Phos  2.2     08-09  Mg     2.1     08-09    TPro  6.3<L>  /  Alb  1.9<L>  /  TBili  1.6  /  DBili  x   /  AST  95<H>  /  ALT  67<H>  /  AlkPhos  121<H>  08-09    PT/INR - ( 09 Aug 2022 08:40 )   PT: 25.1 sec;   INR: 2.15 ratio         PTT - ( 09 Aug 2022 08:40 )  PTT:40.6 sec          CAPILLARY BLOOD GLUCOSE      POCT Blood Glucose.: 77 mg/dL (09 Aug 2022 11:20)  POCT Blood Glucose.: 82 mg/dL (09 Aug 2022 06:46)  POCT Blood Glucose.: 71 mg/dL (09 Aug 2022 00:02)  POCT Blood Glucose.: 88 mg/dL (08 Aug 2022 16:38)  POCT Blood Glucose.: 114 mg/dL (08 Aug 2022 12:09)      RADIOLOGY & ADDITIONAL TESTS:  Results Reviewed:   Imaging Personally Reviewed:  Electrocardiogram Personally Reviewed:

## 2022-08-09 NOTE — PROGRESS NOTE ADULT - ASSESSMENT
76 y/o M BIBA from home for AMS w/ associated inability to talk x 1 week.  Pt had hx of UTI prior to current admission.  CTH on admission showed left occipital infarct vs artifact, no last known well time available, not a candidate for tPA.  w/u on admisison also significant for hyperammonemia and BG >400s.  Lactic acidosis likely 2/2 cirrhosis.  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  Pt was admitted for acute metabolic encephalopathy, also found to have large ascites, s/p IR guidded drainage, now with ileus , sx consulted. TF on hold, ileus now better. Now with urinary retention and pending repeat paracentesis for large volume ascites.     #Urinary retention  - increase flomax  - barreto placement, will TOV after paracentesis  - repeat paracentesis as below    #Distended abdomen/ likely due to ascites / ileus  - CT a/p reviewed and has worsening ascites but as per IR not enough to drain; s/p 1x dose of IV lasix   - Tube feeds held  - repeat kub shows large ascites , ileus   - s/p ir guided tap, 2 L drained   - no sbp on fluid analysis. ceftriaxone dcd   - c/w daily KUB , OOB to chair with assist , monitor electrolytes   - kub shows ileus   - surgery consulted, recommend hold TF , enemas, DANIELITO, g tube to gravity  - abd u/s with mod-severe ascites, IR called for repeat paracentesis, pending INR < 2, s/p vitamin K x2    #Acute metabolic encephalopathy, likely multifactorial/ improving / likely back to baseline alert and oriented x 1-2 with dementia   - possible UTI, colitis, constipation  and hepatic encephalopathy , covid positive but no resp sxs   - CTH reporting possible L-occipital lobe lucency which may represent artifact vs infarct; repeat CTH (-) for acute findings or interval changes     - Hyperammonemia in the setting of cirrhosis and AMS could be 2/2 hepatic encephalopathy and therefore started on Rifaximin on admission   - Repeat ammonia WNL   - s/p cvEEG and no  epileptiform pattern or seizures noted  - Maintain on fall and aspiration precautions   - c/w rifaximin, hold lactulose for now due to abd distention   - Fall precautions   - Monitor CBC and temperature    #Transaminitis / worsening ascites  - Likely related to cirrhosis   - CT a/p reports worsening ascites s/p IR drainage on 8/2  - Tbili remains WNL   - Hepatitis panel neg   - Trend LFTs   - < from: US Abdomen Upper Quadrant Right (07.31.22 @ 15:22) > Cirrhosis. Large volume ascites.    #possible uti   - completed ceftriaxone     #Hypothyroidism  - TFTs reviewed; TSH increasing and T4 decreased;  - Reassess TFTs in a few days    - TG ab elevated; possibly hashimoto's; TPO ab pending   - c/w v synthroid till able to resume TF 8/8 as per endo   - repeat TFTs with TSH 30  - f/u further endo recs    #Hx of CVA  - initial ct h showed ? cva but repeat cth shows no acute / subacute cva   - MRI differed by neurology and repeat CTH reviewed and is w/out interval changes  - Maintain on asa and statin therapy  - Maintain on telemetry   - Neurology consulted   - resumed on aspirin post peritoneal  tap      #Normocytic anemia / Thrombocytopenia   - H/H and platelets stable   - Low plts likely 2/2 cirrhosis   - Hemodynamically stable w/ no active bleeding noted  - Monitor CBC and transfuse if Hb<7      #DM II  - Hyperglycemic on admission and hypoglycemic while hospitalized   - A1c 7.7  - ISS and hypoglycemic protocol in place     #Sacral decubitus ulcers and rt heel decubitus ulcer both POA  - No drainage noted   - Given CT findings and elevated CRP/procal   - ID recommending MRI if suspicion high for OM however given pt afebrile and nL WBC will defer for now; If MRI pursued would likely need to be done under conscious sedation    - c/w wound care   - Monitor CBC and temperature   - Consulted ID and recs noted     #Dilated AR   - 4.1cm at sinus of valsalva   - Incidental finding on CT chest   - CTSx consulted and recs noted; no intervention warranted at this time - outpt serial imaging    #Hypovolemic hyponatremia / improved   - Improved s/p gentle hydration    - Monitor Na levels   - Monitor I/O's     #VTE ppx: Lovenox   Diet: Tube feed - NPO mn for paracentesis  Dispo: medically active, plan for eventual M[facility]I    Code status: full code , palliative care consult

## 2022-08-10 LAB
ANION GAP SERPL CALC-SCNC: 11 MMOL/L — SIGNIFICANT CHANGE UP (ref 5–17)
APTT BLD: 36.2 SEC — HIGH (ref 27.5–35.5)
BUN SERPL-MCNC: 12.7 MG/DL — SIGNIFICANT CHANGE UP (ref 8–20)
CALCIUM SERPL-MCNC: 7.3 MG/DL — LOW (ref 8.4–10.5)
CHLORIDE SERPL-SCNC: 105 MMOL/L — SIGNIFICANT CHANGE UP (ref 98–107)
CO2 SERPL-SCNC: 19 MMOL/L — LOW (ref 22–29)
CREAT SERPL-MCNC: 0.92 MG/DL — SIGNIFICANT CHANGE UP (ref 0.5–1.3)
EGFR: 85 ML/MIN/1.73M2 — SIGNIFICANT CHANGE UP
GLUCOSE BLDC GLUCOMTR-MCNC: 113 MG/DL — HIGH (ref 70–99)
GLUCOSE BLDC GLUCOMTR-MCNC: 113 MG/DL — HIGH (ref 70–99)
GLUCOSE BLDC GLUCOMTR-MCNC: 137 MG/DL — HIGH (ref 70–99)
GLUCOSE BLDC GLUCOMTR-MCNC: 63 MG/DL — LOW (ref 70–99)
GLUCOSE BLDC GLUCOMTR-MCNC: 69 MG/DL — LOW (ref 70–99)
GLUCOSE BLDC GLUCOMTR-MCNC: 78 MG/DL — SIGNIFICANT CHANGE UP (ref 70–99)
GLUCOSE BLDC GLUCOMTR-MCNC: 82 MG/DL — SIGNIFICANT CHANGE UP (ref 70–99)
GLUCOSE BLDC GLUCOMTR-MCNC: 83 MG/DL — SIGNIFICANT CHANGE UP (ref 70–99)
GLUCOSE SERPL-MCNC: 72 MG/DL — SIGNIFICANT CHANGE UP (ref 70–99)
HCT VFR BLD CALC: 32.4 % — LOW (ref 39–50)
HGB BLD-MCNC: 10.6 G/DL — LOW (ref 13–17)
INR BLD: 1.88 RATIO — HIGH (ref 0.88–1.16)
MAGNESIUM SERPL-MCNC: 1.9 MG/DL — SIGNIFICANT CHANGE UP (ref 1.8–2.6)
MCHC RBC-ENTMCNC: 30.8 PG — SIGNIFICANT CHANGE UP (ref 27–34)
MCHC RBC-ENTMCNC: 32.7 GM/DL — SIGNIFICANT CHANGE UP (ref 32–36)
MCV RBC AUTO: 94.2 FL — SIGNIFICANT CHANGE UP (ref 80–100)
PHOSPHATE SERPL-MCNC: 2.4 MG/DL — SIGNIFICANT CHANGE UP (ref 2.4–4.7)
PLATELET # BLD AUTO: 112 K/UL — LOW (ref 150–400)
POTASSIUM SERPL-MCNC: 4 MMOL/L — SIGNIFICANT CHANGE UP (ref 3.5–5.3)
POTASSIUM SERPL-SCNC: 4 MMOL/L — SIGNIFICANT CHANGE UP (ref 3.5–5.3)
PROTHROM AB SERPL-ACNC: 21.9 SEC — HIGH (ref 10.5–13.4)
RBC # BLD: 3.44 M/UL — LOW (ref 4.2–5.8)
RBC # FLD: 15.5 % — HIGH (ref 10.3–14.5)
SODIUM SERPL-SCNC: 135 MMOL/L — SIGNIFICANT CHANGE UP (ref 135–145)
WBC # BLD: 6.13 K/UL — SIGNIFICANT CHANGE UP (ref 3.8–10.5)
WBC # FLD AUTO: 6.13 K/UL — SIGNIFICANT CHANGE UP (ref 3.8–10.5)

## 2022-08-10 PROCEDURE — 99233 SBSQ HOSP IP/OBS HIGH 50: CPT

## 2022-08-10 RX ORDER — DEXTROSE 10 % IN WATER 10 %
250 INTRAVENOUS SOLUTION INTRAVENOUS ONCE
Refills: 0 | Status: COMPLETED | OUTPATIENT
Start: 2022-08-10 | End: 2022-08-10

## 2022-08-10 RX ORDER — DEXTROSE 10 % IN WATER 10 %
250 INTRAVENOUS SOLUTION INTRAVENOUS ONCE
Refills: 0 | Status: DISCONTINUED | OUTPATIENT
Start: 2022-08-10 | End: 2022-08-16

## 2022-08-10 RX ADMIN — TAMSULOSIN HYDROCHLORIDE 0.8 MILLIGRAM(S): 0.4 CAPSULE ORAL at 21:15

## 2022-08-10 RX ADMIN — Medication 105 MILLIGRAM(S): at 18:57

## 2022-08-10 RX ADMIN — Medication 75 MICROGRAM(S): at 21:16

## 2022-08-10 RX ADMIN — Medication 750 MILLILITER(S): at 08:35

## 2022-08-10 RX ADMIN — NYSTATIN CREAM 1 APPLICATION(S): 100000 CREAM TOPICAL at 21:15

## 2022-08-10 RX ADMIN — NYSTATIN CREAM 1 APPLICATION(S): 100000 CREAM TOPICAL at 12:00

## 2022-08-10 RX ADMIN — NYSTATIN CREAM 1 APPLICATION(S): 100000 CREAM TOPICAL at 05:00

## 2022-08-10 RX ADMIN — ATORVASTATIN CALCIUM 40 MILLIGRAM(S): 80 TABLET, FILM COATED ORAL at 21:15

## 2022-08-10 NOTE — PROGRESS NOTE ADULT - SUBJECTIVE AND OBJECTIVE BOX
HOSPITALIST PROGRESS NOTE    TIMUR LOUIS  [id number]  77yMale    Patient is a 77y old  Male who presents with a chief complaint of Altered mental state (10 Aug 2022 10:59)      SUBJECTIVE:   Chart reviewed since last visit.  Patient seen and examined at bedside earlier today for ascites, ileus, coagulopathy  Had IR guided paracentesis earlier today with removal 3900  Comfortable. Denies any pain, dyspnea, fever      OBJECTIVE:  Vital Signs Last 24 Hrs  T(C): 36.5 (10 Aug 2022 16:27), Max: 36.9 (10 Aug 2022 05:00)  T(F): 97.7 (10 Aug 2022 16:27), Max: 98.4 (10 Aug 2022 05:00)  HR: 100 (10 Aug 2022 16:27) (100 - 103)  BP: 121/68 (10 Aug 2022 16:27) (101/62 - 121/68)  BP(mean): --  RR: 18 (10 Aug 2022 16:27) (17 - 19)  SpO2: 99% (10 Aug 2022 16:27) (94% - 99%)    Parameters below as of 10 Aug 2022 16:27  Patient On (Oxygen Delivery Method): room air        PHYSICAL EXAMINATION  General: Elderly male, lying in bed, appears chronically ill  HEENT:  EOMI  NECK:  Supple  CVS: regular rate and rhythm S1 S2  RESP:  Poor effort, no distress  GI:  Flabby abdomen, glue RLQ  : De La Cruz  MSK:  Able to move upper extremities, wea LE  CNS:  Awake but slow to respond. LE weakness  INTEG:  Sacral decubitus  PSYCH:  Depressed    MONITOR:  CAPILLARY BLOOD GLUCOSE      POCT Blood Glucose.: 113 mg/dL (10 Aug 2022 19:05)  POCT Blood Glucose.: 82 mg/dL (10 Aug 2022 13:42)  POCT Blood Glucose.: 137 mg/dL (10 Aug 2022 08:56)  POCT Blood Glucose.: 63 mg/dL (10 Aug 2022 08:23)  POCT Blood Glucose.: 78 mg/dL (10 Aug 2022 05:33)  POCT Blood Glucose.: 69 mg/dL (10 Aug 2022 05:32)  POCT Blood Glucose.: 83 mg/dL (10 Aug 2022 00:13)        I&O's Summary    09 Aug 2022 07:01  -  10 Aug 2022 07:00  --------------------------------------------------------  IN: 670 mL / OUT: 0 mL / NET: 670 mL    10 Aug 2022 07:01  -  10 Aug 2022 19:38  --------------------------------------------------------  IN: 1260 mL / OUT: 0 mL / NET: 1260 mL                            10.6   6.13  )-----------( 112      ( 10 Aug 2022 04:51 )             32.4     PT/INR - ( 10 Aug 2022 04:51 )   PT: 21.9 sec;   INR: 1.88 ratio         PTT - ( 10 Aug 2022 04:51 )  PTT:36.2 sec  08-10    135  |  105  |  12.7  ----------------------------<  72  4.0   |  19.0<L>  |  0.92    Ca    7.3<L>      10 Aug 2022 04:51  Phos  2.4     08-10  Mg     1.9     08-10    TPro  6.3<L>  /  Alb  1.9<L>  /  TBili  1.6  /  DBili  x   /  AST  95<H>  /  ALT  67<H>  /  AlkPhos  121<H>  08-09            Culture:    TTE:    RADIOLOGY        MEDICATIONS  (STANDING):  atorvastatin 40 milliGRAM(s) Oral at bedtime  dextrose 10% Bolus 250 milliLiter(s) IV Bolus once  dextrose 5%. 1000 milliLiter(s) (100 mL/Hr) IV Continuous <Continuous>  dextrose 5%. 1000 milliLiter(s) (50 mL/Hr) IV Continuous <Continuous>  dextrose 50% Injectable 25 Gram(s) IV Push once  dextrose 50% Injectable 12.5 Gram(s) IV Push once  dextrose 50% Injectable 25 Gram(s) IV Push once  glucagon  Injectable 1 milliGRAM(s) IntraMuscular once  insulin lispro (ADMELOG) corrective regimen sliding scale   SubCutaneous three times a day before meals  levothyroxine Injectable 75 MICROGram(s) IV Push at bedtime  nystatin Powder 1 Application(s) Topical three times a day  rifAXIMin 550 milliGRAM(s) Oral two times a day  tamsulosin 0.8 milliGRAM(s) Oral at bedtime  thiamine IVPB 500 milliGRAM(s) IV Intermittent daily      MEDICATIONS  (PRN):  acetaminophen     Tablet .. 650 milliGRAM(s) Oral every 6 hours PRN Temp greater or equal to 38C (100.4F), Mild Pain (1 - 3)  bisacodyl Suppository 10 milliGRAM(s) Rectal daily PRN Constipation  dextrose Oral Gel 15 Gram(s) Oral once PRN Blood Glucose LESS THAN 70 milliGRAM(s)/deciliter  ondansetron Injectable 4 milliGRAM(s) IV Push every 6 hours PRN Nausea and/or Vomiting

## 2022-08-10 NOTE — PROGRESS NOTE ADULT - SUBJECTIVE AND OBJECTIVE BOX
Diagnosis: Ascites    Procedure: Paracentesis    : George Alvarado MD    Contrast: None    Anesthesia: 1% Lidocaine Subcutaneous    Estimated Blood Loss: Less than 10cc    Specimens: Identified, Labeled, Confirmed and Sent to Lab.    Complications: No Immediate Complications    Anticoagulation: Resume in 24 Hours    Findings & Plan: RLQ Paracentesis performed w 5F Yueh needle after local anesthesia under US guidance. 3900 cc of clear yellow fluid drained from abdomen.       Please call Interventional Radiology with any questions, concerns, or issues.

## 2022-08-10 NOTE — PROGRESS NOTE ADULT - ASSESSMENT
78 y/o M BIBA from home for AMS w/ associated inability to talk x 1 week.  Pt had hx of UTI prior to current admission.  CTH on admission showed left occipital infarct vs artifact, no last known well time available, not a candidate for tPA.  w/u on admisison also significant for hyperammonemia and BG >400s.  Lactic acidosis likely 2/2 cirrhosis.  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  Pt was admitted for acute metabolic encephalopathy, also found to have large ascites, s/p IR guidded drainage, now with ileus , sx consulted. TF on hold, ileus now better. Now with urinary retention and pending repeat paracentesis for large volume ascites.     #Urinary retention  - flomax  - barreto placement, will TOV after paracentesis    #Distended abdomen/ likely due to ascites / ileus  - CT a/p reviewed and has worsening ascites but as per IR not enough to drain; s/p 1x dose of IV lasix   - Tube feeds held  - repeat kub shows large ascites , ileus   - s/p ir guided tap, 2 L drained   - no sbp on fluid analysis. ceftriaxone dcd   - c/w daily KUB , OOB to chair with assist , monitor electrolytes   - kub shows ileus   - surgery consulted, recommend hold TF , enemas, DANIELITO, g tube to gravity  - abd u/s with mod-severe ascites, IR called for repeat paracentesis, pending INR < 2, s/p vitamin K x2  - Paracentesis 8/10/22 with removal 3900 ml    #Acute metabolic encephalopathy, likely multifactorial/ improving / likely back to baseline alert and oriented x 1-2 with dementia   - possible UTI, colitis, constipation  and hepatic encephalopathy , covid positive but no resp sxs   - CTH reporting possible L-occipital lobe lucency which may represent artifact vs infarct; repeat CTH (-) for acute findings or interval changes     - Hyperammonemia in the setting of cirrhosis and AMS could be 2/2 hepatic encephalopathy and therefore started on Rifaximin on admission   - Repeat ammonia WNL   - s/p cvEEG and no  epileptiform pattern or seizures noted  - Maintain on fall and aspiration precautions   - c/w rifaximin, hold lactulose for now due to abd distention   - Fall precautions   - Monitor CBC and temperature    #Transaminitis / worsening ascites  - Likely related to cirrhosis   - CT a/p reports worsening ascites s/p IR drainage on 8/2  - Tbili remains WNL   - Hepatitis panel neg   - Trend LFTs   - < from: US Abdomen Upper Quadrant Right (07.31.22 @ 15:22) > Cirrhosis. Large volume ascites.    #possible uti   - completed ceftriaxone     #Hypothyroidism  - TFTs reviewed; TSH increasing and T4 decreased;  - Reassess TFTs in a few days    - TG ab elevated; possibly hashimoto's; TPO ab pending   - c/w v synthroid till able to resume TF 8/8 as per endo   - repeat TFTs with TSH 30  - f/u further endo recs    #Hx of CVA  - initial ct h showed ? cva but repeat cth shows no acute / subacute cva   - MRI defered by neurology and repeat CTH reviewed and is w/out interval changes  - Maintain on asa and statin therapy  - Maintain on telemetry   - Neurology consulted   - resumed on aspirin post peritoneal  tap      #Normocytic anemia / Thrombocytopenia   - H/H and platelets stable   - Low plts likely 2/2 cirrhosis   - Hemodynamically stable w/ no active bleeding noted  - Monitor CBC and transfuse if Hb<7      #DM II  - Hyperglycemic on admission and hypoglycemic while hospitalized   - A1c 7.7  - ISS and hypoglycemic protocol in place     #Sacral decubitus ulcers and rt heel decubitus ulcer both POA  - No drainage noted   - Given CT findings and elevated CRP/procal   - ID recommending MRI if suspicion high for OM however given pt afebrile and nL WBC will defer for now; If MRI pursued would likely need to be done under conscious sedation    - c/w wound care   - Monitor CBC and temperature   - Consulted ID and recs noted     #Dilated AR   - 4.1cm at sinus of valsalva   - Incidental finding on CT chest   - CTSx consulted and recs noted; no intervention warranted at this time - outpt serial imaging    #Hypovolemic hyponatremia / improved   - Improved s/p gentle hydration    - Monitor Na levels   - Monitor I/O's     #VTE ppx: Lovenox   Diet: Tube feed - NPO mn for paracentesis  Dispo: medically active, plan for eventual [facility]    Guarded Prognosis - multiple comorbidities, multiorgan system involvement, severe PCM, debility with propensity to deteriorate, prolonged hospitalization  Code status: full code , palliative care consult appreciated

## 2022-08-11 ENCOUNTER — TRANSCRIPTION ENCOUNTER (OUTPATIENT)
Age: 77
End: 2022-08-11

## 2022-08-11 LAB
GLUCOSE BLDC GLUCOMTR-MCNC: 150 MG/DL — HIGH (ref 70–99)
GLUCOSE BLDC GLUCOMTR-MCNC: 157 MG/DL — HIGH (ref 70–99)
GLUCOSE BLDC GLUCOMTR-MCNC: 166 MG/DL — HIGH (ref 70–99)
GLUCOSE BLDC GLUCOMTR-MCNC: 189 MG/DL — HIGH (ref 70–99)
SARS-COV-2 RNA SPEC QL NAA+PROBE: DETECTED

## 2022-08-11 PROCEDURE — 99232 SBSQ HOSP IP/OBS MODERATE 35: CPT

## 2022-08-11 PROCEDURE — 99233 SBSQ HOSP IP/OBS HIGH 50: CPT

## 2022-08-11 RX ORDER — THIAMINE MONONITRATE (VIT B1) 100 MG
100 TABLET ORAL DAILY
Refills: 0 | Status: DISCONTINUED | OUTPATIENT
Start: 2022-08-11 | End: 2022-08-15

## 2022-08-11 RX ORDER — POLYETHYLENE GLYCOL 3350 17 G/17G
17 POWDER, FOR SOLUTION ORAL DAILY
Refills: 0 | Status: DISCONTINUED | OUTPATIENT
Start: 2022-08-11 | End: 2022-08-15

## 2022-08-11 RX ORDER — THIAMINE MONONITRATE (VIT B1) 100 MG
100 TABLET ORAL DAILY
Refills: 0 | Status: DISCONTINUED | OUTPATIENT
Start: 2022-08-11 | End: 2022-08-11

## 2022-08-11 RX ADMIN — Medication 1 DROP(S): at 18:09

## 2022-08-11 RX ADMIN — NYSTATIN CREAM 1 APPLICATION(S): 100000 CREAM TOPICAL at 12:04

## 2022-08-11 RX ADMIN — ATORVASTATIN CALCIUM 40 MILLIGRAM(S): 80 TABLET, FILM COATED ORAL at 21:12

## 2022-08-11 RX ADMIN — NYSTATIN CREAM 1 APPLICATION(S): 100000 CREAM TOPICAL at 05:57

## 2022-08-11 RX ADMIN — Medication 75 MICROGRAM(S): at 21:13

## 2022-08-11 RX ADMIN — TAMSULOSIN HYDROCHLORIDE 0.8 MILLIGRAM(S): 0.4 CAPSULE ORAL at 21:12

## 2022-08-11 RX ADMIN — NYSTATIN CREAM 1 APPLICATION(S): 100000 CREAM TOPICAL at 21:12

## 2022-08-11 RX ADMIN — Medication 2: at 12:04

## 2022-08-11 RX ADMIN — Medication 100 MILLIGRAM(S): at 12:04

## 2022-08-11 RX ADMIN — POLYETHYLENE GLYCOL 3350 17 GRAM(S): 17 POWDER, FOR SOLUTION ORAL at 18:12

## 2022-08-11 RX ADMIN — Medication 2: at 18:08

## 2022-08-11 NOTE — PROGRESS NOTE ADULT - SUBJECTIVE AND OBJECTIVE BOX
INTERVAL HPI/OVERNIGHT EVENTS:  follow-up for DM management    MEDICATIONS  (STANDING):  artificial tears (preservative free) Ophthalmic Solution 1 Drop(s) Both EYES two times a day  atorvastatin 40 milliGRAM(s) Oral at bedtime  dextrose 10% Bolus 250 milliLiter(s) IV Bolus once  dextrose 5%. 1000 milliLiter(s) (100 mL/Hr) IV Continuous <Continuous>  dextrose 5%. 1000 milliLiter(s) (50 mL/Hr) IV Continuous <Continuous>  dextrose 50% Injectable 25 Gram(s) IV Push once  dextrose 50% Injectable 12.5 Gram(s) IV Push once  dextrose 50% Injectable 25 Gram(s) IV Push once  glucagon  Injectable 1 milliGRAM(s) IntraMuscular once  insulin lispro (ADMELOG) corrective regimen sliding scale   SubCutaneous three times a day before meals  levothyroxine Injectable 75 MICROGram(s) IV Push at bedtime  nystatin Powder 1 Application(s) Topical three times a day  polyethylene glycol 3350 17 Gram(s) Oral daily  rifAXIMin 550 milliGRAM(s) Oral two times a day  tamsulosin 0.8 milliGRAM(s) Oral at bedtime  thiamine 100 milliGRAM(s) Oral daily    MEDICATIONS  (PRN):  acetaminophen     Tablet .. 650 milliGRAM(s) Oral every 6 hours PRN Temp greater or equal to 38C (100.4F), Mild Pain (1 - 3)  bisacodyl Suppository 10 milliGRAM(s) Rectal daily PRN Constipation  dextrose Oral Gel 15 Gram(s) Oral once PRN Blood Glucose LESS THAN 70 milliGRAM(s)/deciliter  ondansetron Injectable 4 milliGRAM(s) IV Push every 6 hours PRN Nausea and/or Vomiting    Allergies  oxycodone (Flushing)    Review of systems: no shortness of breath, no chest pain, no headache, no nausea, no vomiting    Vital Signs Last 24 Hrs  T(C): 36.3 (11 Aug 2022 11:44), Max: 37 (11 Aug 2022 05:00)  T(F): 97.3 (11 Aug 2022 11:44), Max: 98.6 (11 Aug 2022 05:00)  HR: 107 (11 Aug 2022 11:44) (100 - 107)  BP: 97/57 (11 Aug 2022 11:44) (97/57 - 121/68)  BP(mean): --  RR: 18 (11 Aug 2022 11:44) (18 - 18)  SpO2: 94% (11 Aug 2022 11:44) (94% - 99%)    Parameters below as of 11 Aug 2022 11:44  Patient On (Oxygen Delivery Method): room air    PHYSICAL EXAM:  Constitutional: NAD, well-developed  Respiratory: CTAB  Cardiovascular: S1 and S2, RRR, no M/G/R  Gastrointestinal: BS+, soft, no organomegaly or mass  Extremities: No peripheral edema, no pedal lesions  Skin: No rashes    LABS:                        10.6   6.13  )-----------( 112      ( 10 Aug 2022 04:51 )             32.4     08-10    135  |  105  |  12.7  ----------------------------<  72  4.0   |  19.0<L>  |  0.92    Ca    7.3<L>      10 Aug 2022 04:51  Phos  2.4     08-10  Mg     1.9     08-10    CAPILLARY BLOOD GLUCOSE  POCT Blood Glucose.: 189 mg/dL (11 Aug 2022 11:39)  POCT Blood Glucose.: 150 mg/dL (11 Aug 2022 05:55)  POCT Blood Glucose.: 113 mg/dL (10 Aug 2022 23:43)  POCT Blood Glucose.: 113 mg/dL (10 Aug 2022 19:05)

## 2022-08-11 NOTE — PROGRESS NOTE ADULT - ASSESSMENT
76 y/o M BIBA from home for AMS w/ associated inability to talk x 1 week.  Pt had hx of UTI prior to current admission.  CTH on admission showed left occipital infarct vs artifact.  Pt was admitted for acute metabolic encephalopathy, likely multifactorial  Pts remains acute given his persistent encephalopathy. remains constipated despite aggressive bowel regimen.        Hypothyroidism:  cont LT4 to 75 mcg qd   check FT4 and T3 on monday    Acute metabolic encephalopathy, likely multifactorial   possible UTI, colitis, constipation  and hepatic encephalopathy  continue management per primary team.      possible UTI: ABX given

## 2022-08-11 NOTE — DISCHARGE NOTE PROVIDER - NSDCMRMEDTOKEN_GEN_ALL_CORE_FT
diazePAM 5 mg/mL injectable solution: 5 milligram(s) injectable every 4 hours, As Needed for agitation   morphine: 2 milligram(s) intravenous every 4 hours, As Needed for discomfort.   ocular lubricant ophthalmic solution: 1 drop(s) to each affected eye 2 times a day  ondansetron 2 mg/mL injectable solution: 4 milligram(s) injectable every 6 hours, As Needed for nausea and vomiting

## 2022-08-11 NOTE — DISCHARGE NOTE PROVIDER - ATTENDING DISCHARGE PHYSICAL EXAMINATION:
Vital Signs Last 24 Hrs  T(C): 37.3 (15 Aug 2022 16:58), Max: 37.3 (15 Aug 2022 16:58)  T(F): 99.1 (15 Aug 2022 16:58), Max: 99.1 (15 Aug 2022 16:58)  HR: 101 (15 Aug 2022 16:58) (101 - 101)  BP: 98/63 (15 Aug 2022 16:58) (98/63 - 98/63)  BP(mean): --  RR: 19 (15 Aug 2022 16:58) (19 - 19)  SpO2: 93% (15 Aug 2022 16:58) (93% - 93%)    Parameters below as of 15 Aug 2022 16:58  Patient On (Oxygen Delivery Method): room air    HEENT:  EOMI  NECK:  Supple  CVS: regular rate and rhythm S1 S2  RESP:  Poor effort, no distress  GI:  distended abdomen, soft , bs hypoactive   : De La Cruz  MSK:  Able to move upper extremities, wea LE  CNS:  Awake but slow to respond. LE weakness , chronic   INTEG:  Sacral decubitus  PSYCH:  Slow speech

## 2022-08-11 NOTE — DISCHARGE NOTE PROVIDER - INSTRUCTIONS
Tube feed  Jevity 1.5 Saad  Starting tube feed at 10ml/hr  Increase Tube feed rate by 10ml/hr every 4 hours  Until Goal tube feed rate of 40ml/hr   Tube feed for comfort   Jevity 1.5 Saad

## 2022-08-11 NOTE — DISCHARGE NOTE PROVIDER - NSDCCPCAREPLAN_GEN_ALL_CORE_FT
PRINCIPAL DISCHARGE DIAGNOSIS  Diagnosis: Metabolic encephalopathy  Assessment and Plan of Treatment:       SECONDARY DISCHARGE DIAGNOSES  Diagnosis: Ileus  Assessment and Plan of Treatment:     Diagnosis: Cirrhosis of liver with ascites  Assessment and Plan of Treatment:      PRINCIPAL DISCHARGE DIAGNOSIS  Diagnosis: Metabolic encephalopathy  Assessment and Plan of Treatment: hospice care      SECONDARY DISCHARGE DIAGNOSES  Diagnosis: Ileus  Assessment and Plan of Treatment: hospice care    Diagnosis: Cirrhosis of liver with ascites  Assessment and Plan of Treatment: hospice

## 2022-08-11 NOTE — PROGRESS NOTE ADULT - ASSESSMENT
78 y/o M BIBA from home for AMS w/ associated inability to talk x 1 week.  Pt had hx of UTI prior to current admission.  CTH on admission showed left occipital infarct vs artifact, no last known well time available, not a candidate for tPA.  w/u on admisison also significant for hyperammonemia and BG >400s.  Lactic acidosis likely 2/2 cirrhosis.  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  Pt was admitted for acute metabolic encephalopathy, also found to have large ascites, s/p IR guidded drainage, now with ileus , sx consulted. TF on hold, ileus now better. Now with urinary retention and pending repeat paracentesis for large volume ascites.     #Urinary retention  - flomax  - barreto placement, plan for TOV 8/11    #Distended abdomen/ likely due to ascites / ileus  - CT a/p reviewed and has worsening ascites but as per IR not enough to drain; s/p 1x dose of IV lasix   - Tube feeds held  - repeat kub shows large ascites , ileus   - s/p ir guided tap, 2 L drained   - no sbp on fluid analysis. ceftriaxone dcd   - c/w daily KUB , OOB to chair with assist , monitor electrolytes   - kub shows ileus   - surgery consulted, recommend hold TF , enemas, DANIELITO, g tube to gravity  - abd u/s with mod-severe ascites  - Paracentesis repeat 8/10/22 with removal 3900 ml    #Acute metabolic encephalopathy, likely multifactorial/ improving / likely back to baseline alert and oriented x 1-2 with dementia   - possible UTI, colitis, constipation  and hepatic encephalopathy , covid positive but no resp sxs   - CTH reporting possible L-occipital lobe lucency which may represent artifact vs infarct; repeat CTH (-) for acute findings or interval changes     - Hyperammonemia in the setting of cirrhosis and AMS could be 2/2 hepatic encephalopathy and therefore started on Rifaximin on admission   - Repeat ammonia WNL   - s/p cvEEG and no  epileptiform pattern or seizures noted  - Maintain on fall and aspiration precautions   - c/w rifaximin, hold lactulose for now due to abd distention   - Fall precautions   - Monitor CBC and temperature    #Transaminitis / worsening ascites  - Likely related to cirrhosis   - CT a/p reports worsening ascites s/p IR drainage on 8/2  - Tbili remains WNL   - Hepatitis panel neg   - Trend LFTs   - < from: US Abdomen Upper Quadrant Right (07.31.22 @ 15:22) > Cirrhosis. Large volume ascites.    #possible uti   - completed ceftriaxone     #Hypothyroidism  - TFTs reviewed; TSH increasing and T4 decreased;  - Reassess TFTs in a few days    - TG ab elevated; possibly hashimoto's; TPO ab pending   - c/w v synthroid till able to resume TF 8/8 as per endo   - repeat TFTs with TSH 30  - f/u further endo recs    #Hx of CVA  - initial ct h showed ? cva but repeat cth shows no acute / subacute cva   - MRI defered by neurology and repeat CTH reviewed and is w/out interval changes  - Maintain on asa and statin therapy  - Maintain on telemetry   - Neurology consulted   - resumed on aspirin post peritoneal  tap      #Normocytic anemia / Thrombocytopenia   - H/H and platelets stable   - Low plts likely 2/2 cirrhosis   - Hemodynamically stable w/ no active bleeding noted  - Monitor CBC and transfuse if Hb<7      #DM II  - Hyperglycemic on admission and hypoglycemic while hospitalized   - A1c 7.7  - ISS and hypoglycemic protocol in place     #Sacral decubitus ulcers and rt heel decubitus ulcer both POA  - No drainage noted   - Given CT findings and elevated CRP/procal   - ID recommending MRI if suspicion high for OM however given pt afebrile and nL WBC will defer for now; If MRI pursued would likely need to be done under conscious sedation    - c/w wound care   - Monitor CBC and temperature   - Consulted ID and recs noted     #Dilated AR   - 4.1cm at sinus of valsalva   - Incidental finding on CT chest   - CTSx consulted and recs noted; no intervention warranted at this time - outpt serial imaging    #Hypovolemic hyponatremia / improved   - Improved s/p gentle hydration    - Monitor Na levels   - Monitor I/O's     #VTE ppx: Lovenox   Diet: Tube feed  Dispo: [facility] after TOV    Guarded Prognosis - multiple comorbidities, multiorgan system involvement, severe PCM, debility with propensity to deteriorate, prolonged hospitalization  Code status: full code, palliative care consult appreciated

## 2022-08-11 NOTE — PROGRESS NOTE ADULT - SUBJECTIVE AND OBJECTIVE BOX
Mayra Casiano M.D.    Patient is a 77y old  Male who presents with a chief complaint of Altered mental state (10 Aug 2022 19:38)      SUBJECTIVE / OVERNIGHT EVENTS: s/p paracentesis. No other concerns.     Patient denies chest pain, SOB, abd pain, N/V, fever, chills, dysuria or any other complaints. All remainder ROS negative.     MEDICATIONS  (STANDING):  atorvastatin 40 milliGRAM(s) Oral at bedtime  dextrose 10% Bolus 250 milliLiter(s) IV Bolus once  dextrose 5%. 1000 milliLiter(s) (100 mL/Hr) IV Continuous <Continuous>  dextrose 5%. 1000 milliLiter(s) (50 mL/Hr) IV Continuous <Continuous>  dextrose 50% Injectable 25 Gram(s) IV Push once  dextrose 50% Injectable 12.5 Gram(s) IV Push once  dextrose 50% Injectable 25 Gram(s) IV Push once  glucagon  Injectable 1 milliGRAM(s) IntraMuscular once  insulin lispro (ADMELOG) corrective regimen sliding scale   SubCutaneous three times a day before meals  levothyroxine Injectable 75 MICROGram(s) IV Push at bedtime  nystatin Powder 1 Application(s) Topical three times a day  rifAXIMin 550 milliGRAM(s) Oral two times a day  tamsulosin 0.8 milliGRAM(s) Oral at bedtime  thiamine 100 milliGRAM(s) Oral daily    MEDICATIONS  (PRN):  acetaminophen     Tablet .. 650 milliGRAM(s) Oral every 6 hours PRN Temp greater or equal to 38C (100.4F), Mild Pain (1 - 3)  bisacodyl Suppository 10 milliGRAM(s) Rectal daily PRN Constipation  dextrose Oral Gel 15 Gram(s) Oral once PRN Blood Glucose LESS THAN 70 milliGRAM(s)/deciliter  ondansetron Injectable 4 milliGRAM(s) IV Push every 6 hours PRN Nausea and/or Vomiting      I&O's Summary    10 Aug 2022 07:01  -  11 Aug 2022 07:00  --------------------------------------------------------  IN: 1260 mL / OUT: 200 mL / NET: 1060 mL        PHYSICAL EXAM:  Vital Signs Last 24 Hrs  T(C): 37 (11 Aug 2022 05:00), Max: 37 (11 Aug 2022 05:00)  T(F): 98.6 (11 Aug 2022 05:00), Max: 98.6 (11 Aug 2022 05:00)  HR: 103 (11 Aug 2022 05:00) (100 - 103)  BP: 103/66 (11 Aug 2022 05:00) (103/66 - 121/68)  BP(mean): --  RR: 18 (11 Aug 2022 05:00) (18 - 18)  SpO2: 96% (11 Aug 2022 05:00) (96% - 99%)    Parameters below as of 11 Aug 2022 05:00  Patient On (Oxygen Delivery Method): room air    General: Elderly male, lying in bed, appears chronically ill  HEENT:  EOMI  NECK:  Supple  CVS: regular rate and rhythm S1 S2  RESP:  Poor effort, no distress  GI:  Flabby abdomen, RUQ PEG  : De La Cruz  MSK:  Able to move upper extremities, wea LE  CNS:  Awake but slow to respond. LE weakness  INTEG:  Sacral decubitus  PSYCH:  Slow speech    LABS:                        10.6   6.13  )-----------( 112      ( 10 Aug 2022 04:51 )             32.4     08-10    135  |  105  |  12.7  ----------------------------<  72  4.0   |  19.0<L>  |  0.92    Ca    7.3<L>      10 Aug 2022 04:51  Phos  2.4     08-10  Mg     1.9     08-10      PT/INR - ( 10 Aug 2022 04:51 )   PT: 21.9 sec;   INR: 1.88 ratio         PTT - ( 10 Aug 2022 04:51 )  PTT:36.2 sec          CAPILLARY BLOOD GLUCOSE      POCT Blood Glucose.: 150 mg/dL (11 Aug 2022 05:55)  POCT Blood Glucose.: 113 mg/dL (10 Aug 2022 23:43)  POCT Blood Glucose.: 113 mg/dL (10 Aug 2022 19:05)  POCT Blood Glucose.: 82 mg/dL (10 Aug 2022 13:42)      RADIOLOGY & ADDITIONAL TESTS:  Results Reviewed:   Imaging Personally Reviewed:  Electrocardiogram Personally Reviewed:

## 2022-08-11 NOTE — DISCHARGE NOTE PROVIDER - HOSPITAL COURSE
78 y/o M BIBA from home for AMS w/ associated inability to talk x 1 week.  Pt had hx of UTI prior to current admission.  CTH on admission showed left occipital infarct vs artifact, no last known well time available, not a candidate for tPA.  w/u on admisison also significant for hyperammonemia and BG >400s.  Lactic acidosis likely 2/2 cirrhosis.  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  Pt was admitted for acute metabolic encephalopathy, s/op rifaximin and lactulose with improvement in mental status. Also found to have large ascites, s/p IR guidded drainage x 2. Course then c/b ileus , sx consulted. TF on hold/. Ileus then resolved. Also with urinary retention. 76 y/o M BIBA from home for AMS w/ associated inability to talk x 1 week.  Pt had hx of UTI prior to current admission.  CTH on admission showed left occipital infarct vs artifact, no last known well time available, not a candidate for tPA.  w/u on admisison also significant for hyperammonemia and BG >400s.  Lactic acidosis likely 2/2 cirrhosis.  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  Pt was admitted for acute metabolic encephalopathy, s/op rifaximin and lactulose with improvement in mental status. Also found to have recurrent large ascites, s/p IR guidded drainage x 2. Course then c/b ileus. failed to resume tube feeds due to recurrent ileus. goc discussion held with family. now comfort care, dnr/ dni. plan for inpatient hospice.

## 2022-08-11 NOTE — DISCHARGE NOTE PROVIDER - DETAILS OF MALNUTRITION DIAGNOSIS/DIAGNOSES
This patient has been assessed with a concern for Malnutrition and was treated during this hospitalization for the following Nutrition diagnosis/diagnoses:     -  08/05/2022: Severe protein-calorie malnutrition   -  07/23/2022: Moderate protein-calorie malnutrition

## 2022-08-12 DIAGNOSIS — K74.60 UNSPECIFIED CIRRHOSIS OF LIVER: ICD-10-CM

## 2022-08-12 LAB
ANION GAP SERPL CALC-SCNC: 9 MMOL/L — SIGNIFICANT CHANGE UP (ref 5–17)
BUN SERPL-MCNC: 15.9 MG/DL — SIGNIFICANT CHANGE UP (ref 8–20)
CALCIUM SERPL-MCNC: 7.6 MG/DL — LOW (ref 8.4–10.5)
CHLORIDE SERPL-SCNC: 101 MMOL/L — SIGNIFICANT CHANGE UP (ref 98–107)
CO2 SERPL-SCNC: 20 MMOL/L — LOW (ref 22–29)
CREAT SERPL-MCNC: 0.97 MG/DL — SIGNIFICANT CHANGE UP (ref 0.5–1.3)
EGFR: 79 ML/MIN/1.73M2 — SIGNIFICANT CHANGE UP
GLUCOSE BLDC GLUCOMTR-MCNC: 110 MG/DL — HIGH (ref 70–99)
GLUCOSE BLDC GLUCOMTR-MCNC: 116 MG/DL — HIGH (ref 70–99)
GLUCOSE BLDC GLUCOMTR-MCNC: 94 MG/DL — SIGNIFICANT CHANGE UP (ref 70–99)
GLUCOSE BLDC GLUCOMTR-MCNC: 98 MG/DL — SIGNIFICANT CHANGE UP (ref 70–99)
GLUCOSE SERPL-MCNC: 108 MG/DL — HIGH (ref 70–99)
HCT VFR BLD CALC: 35.4 % — LOW (ref 39–50)
HGB BLD-MCNC: 11.4 G/DL — LOW (ref 13–17)
MAGNESIUM SERPL-MCNC: 1.9 MG/DL — SIGNIFICANT CHANGE UP (ref 1.6–2.6)
MCHC RBC-ENTMCNC: 30.2 PG — SIGNIFICANT CHANGE UP (ref 27–34)
MCHC RBC-ENTMCNC: 32.2 GM/DL — SIGNIFICANT CHANGE UP (ref 32–36)
MCV RBC AUTO: 93.7 FL — SIGNIFICANT CHANGE UP (ref 80–100)
PHOSPHATE SERPL-MCNC: 2.3 MG/DL — LOW (ref 2.4–4.7)
PLATELET # BLD AUTO: 105 K/UL — LOW (ref 150–400)
POTASSIUM SERPL-MCNC: 4.1 MMOL/L — SIGNIFICANT CHANGE UP (ref 3.5–5.3)
POTASSIUM SERPL-SCNC: 4.1 MMOL/L — SIGNIFICANT CHANGE UP (ref 3.5–5.3)
RBC # BLD: 3.78 M/UL — LOW (ref 4.2–5.8)
RBC # FLD: 15.6 % — HIGH (ref 10.3–14.5)
SODIUM SERPL-SCNC: 130 MMOL/L — LOW (ref 135–145)
T3 SERPL-MCNC: 42 NG/DL — LOW
WBC # BLD: 6.21 K/UL — SIGNIFICANT CHANGE UP (ref 3.8–10.5)
WBC # FLD AUTO: 6.21 K/UL — SIGNIFICANT CHANGE UP (ref 3.8–10.5)

## 2022-08-12 PROCEDURE — 99223 1ST HOSP IP/OBS HIGH 75: CPT | Mod: FS

## 2022-08-12 PROCEDURE — 99233 SBSQ HOSP IP/OBS HIGH 50: CPT

## 2022-08-12 PROCEDURE — 93975 VASCULAR STUDY: CPT | Mod: 26

## 2022-08-12 RX ORDER — CHLORHEXIDINE GLUCONATE 213 G/1000ML
1 SOLUTION TOPICAL
Refills: 0 | Status: DISCONTINUED | OUTPATIENT
Start: 2022-08-12 | End: 2022-08-15

## 2022-08-12 RX ORDER — FUROSEMIDE 40 MG
20 TABLET ORAL DAILY
Refills: 0 | Status: DISCONTINUED | OUTPATIENT
Start: 2022-08-12 | End: 2022-08-12

## 2022-08-12 RX ORDER — SPIRONOLACTONE 25 MG/1
100 TABLET, FILM COATED ORAL DAILY
Refills: 0 | Status: DISCONTINUED | OUTPATIENT
Start: 2022-08-12 | End: 2022-08-15

## 2022-08-12 RX ORDER — FUROSEMIDE 40 MG
40 TABLET ORAL DAILY
Refills: 0 | Status: DISCONTINUED | OUTPATIENT
Start: 2022-08-12 | End: 2022-08-15

## 2022-08-12 RX ORDER — SPIRONOLACTONE 25 MG/1
50 TABLET, FILM COATED ORAL DAILY
Refills: 0 | Status: DISCONTINUED | OUTPATIENT
Start: 2022-08-12 | End: 2022-08-12

## 2022-08-12 RX ADMIN — POLYETHYLENE GLYCOL 3350 17 GRAM(S): 17 POWDER, FOR SOLUTION ORAL at 13:13

## 2022-08-12 RX ADMIN — NYSTATIN CREAM 1 APPLICATION(S): 100000 CREAM TOPICAL at 13:13

## 2022-08-12 RX ADMIN — Medication 1 DROP(S): at 05:57

## 2022-08-12 RX ADMIN — NYSTATIN CREAM 1 APPLICATION(S): 100000 CREAM TOPICAL at 05:57

## 2022-08-12 RX ADMIN — TAMSULOSIN HYDROCHLORIDE 0.8 MILLIGRAM(S): 0.4 CAPSULE ORAL at 22:18

## 2022-08-12 RX ADMIN — NYSTATIN CREAM 1 APPLICATION(S): 100000 CREAM TOPICAL at 22:18

## 2022-08-12 RX ADMIN — ATORVASTATIN CALCIUM 40 MILLIGRAM(S): 80 TABLET, FILM COATED ORAL at 22:19

## 2022-08-12 RX ADMIN — Medication 100 MILLIGRAM(S): at 13:13

## 2022-08-12 RX ADMIN — Medication 1 DROP(S): at 17:58

## 2022-08-12 RX ADMIN — Medication 75 MICROGRAM(S): at 22:19

## 2022-08-12 NOTE — PROVIDER CONTACT NOTE (OTHER) - REASON
Abd distended and bladder scan 517
FFP cannot be given at this time
Patient blood glucose
Pt's barreto with <50 cc output this am and bladder scan revealed 204 cc urine retention
Pt's tube feed unable to run d/t residual
Pt refusing IV access

## 2022-08-12 NOTE — PROGRESS NOTE ADULT - ASSESSMENT
76 y/o M BIBA from home for AMS w/ associated inability to talk x 1 week.  Pt had hx of UTI prior to current admission.  CTH on admission showed left occipital infarct vs artifact.  Pt was admitted for acute metabolic encephalopathy, likely multifactorial  Pts remains acute given his persistent encephalopathy. remains constipated despite aggressive bowel regimen.        Hypothyroidism:  cont LT4 to 75 mcg qd   check Tfts in 4 days     DM2 : bgs decently controlled.   check fingersticks actid and hs  cont iSS for coverage     Acute metabolic encephalopathy, likely multifactorial   possible UTI, colitis, constipation  and hepatic encephalopathy  continue management per primary team.   Ascites  of unclear etiology      possible UTI: ABX given

## 2022-08-12 NOTE — PROGRESS NOTE ADULT - ASSESSMENT
76 y/o M BIBA from home for AMS w/ associated inability to talk x 1 week.  Pt had hx of UTI prior to current admission.  CTH on admission showed left occipital infarct vs artifact, no last known well time available, not a candidate for tPA.  w/u on admisison also significant for hyperammonemia and BG >400s.  Lactic acidosis likely 2/2 cirrhosis.  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  Pt was admitted for acute metabolic encephalopathy, also found to have large ascites, s/p IR guidded drainage, now with ileus , sx consulted. TF on hold, ileus now better. Now with urinary retention and pending repeat paracentesis for large volume ascites.     #Urinary retention  - flomax  - barreto placement, currently undergoing TOV    #Distended abdomen/ likely due to ascites / ileus  - CT a/p reviewed and has worsening ascites but as per IR not enough to drain; s/p 1x dose of IV lasix   - Tube feeds held  - repeat kub shows large ascites , ileus   - s/p ir guided tap, 2 L drained   - no sbp on fluid analysis. ceftriaxone dcd   - c/w daily KUB , OOB to chair with assist , monitor electrolytes   - kub shows ileus   - surgery consulted, recommend hold TF , enemas, DANIEILTO, g tube to gravity  - abd u/s with mod-severe ascites  - Paracentesis repeat 8/10/22 with removal 3900 ml    #Recurrent ascites  - unclear etiology, as admission CT with small ascites, now with mod-severe s/p paracentesis x2  - fluid studies negative for infection  - abd doppler to r/o PVT/portal HTN  - Hep panel negative  - will start aldactone and lasix    #Acute metabolic encephalopathy, likely multifactorial/ improving / likely back to baseline alert and oriented x 1-2 with dementia   - possible UTI, colitis, constipation  and hepatic encephalopathy , covid positive but no resp sxs   - CTH reporting possible L-occipital lobe lucency which may represent artifact vs infarct; repeat CTH (-) for acute findings or interval changes     - Hyperammonemia in the setting of cirrhosis and AMS could be 2/2 hepatic encephalopathy and therefore started on Rifaximin on admission   - Repeat ammonia WNL   - s/p cvEEG and no  epileptiform pattern or seizures noted  - Maintain on fall and aspiration precautions   - c/w rifaximin, hold lactulose for now due to abd distention   - Fall precautions   - Monitor CBC and temperature    #Transaminitis / worsening ascites  - Likely related to cirrhosis   - CT a/p reports worsening ascites s/p IR drainage on 8/2  - Tbili remains WNL   - Hepatitis panel neg   - Trend LFTs   - < from: US Abdomen Upper Quadrant Right (07.31.22 @ 15:22) > Cirrhosis. Large volume ascites.    #possible uti   - completed ceftriaxone     #Hypothyroidism  - TFTs reviewed; TSH increasing and T4 decreased;  - Reassess TFTs in a few days    - TG ab elevated; possibly hashimoto's; TPO ab pending   - c/w v synthroid till able to resume TF 8/8 as per endo   - repeat TFTs with TSH 30  - f/u further endo recs    #Hx of CVA  - initial ct h showed ? cva but repeat cth shows no acute / subacute cva   - MRI defered by neurology and repeat CTH reviewed and is w/out interval changes  - Maintain on asa and statin therapy  - Maintain on telemetry   - Neurology consulted   - resumed on aspirin post peritoneal  tap      #Normocytic anemia / Thrombocytopenia   - H/H and platelets stable   - Low plts likely 2/2 cirrhosis   - Hemodynamically stable w/ no active bleeding noted  - Monitor CBC and transfuse if Hb<7      #DM II  - Hyperglycemic on admission and hypoglycemic while hospitalized   - A1c 7.7  - ISS and hypoglycemic protocol in place     #Sacral decubitus ulcers and rt heel decubitus ulcer both POA  - No drainage noted   - Given CT findings and elevated CRP/procal   - ID recommending MRI if suspicion high for OM however given pt afebrile and nL WBC will defer for now; If MRI pursued would likely need to be done under conscious sedation    - c/w wound care   - Monitor CBC and temperature   - Consulted ID and recs noted     #Dilated AR   - 4.1cm at sinus of valsalva   - Incidental finding on CT chest   - CTSx consulted and recs noted; no intervention warranted at this time - outpt serial imaging    #Hypovolemic hyponatremia / improved   - Improved s/p gentle hydration    - Monitor Na levels   - Monitor I/O's     #VTE ppx: Lovenox   Diet: Tube feed  Dispo: [facility] after TOV and workup etiology of ascites     Guarded Prognosis - multiple comorbidities, multiorgan system involvement, severe PCM, debility with propensity to deteriorate, prolonged hospitalization  Code status: full code, palliative care consult appreciated 76 y/o M BIBA from home for AMS w/ associated inability to talk x 1 week. CTH on admission showed left occipital infarct vs artifact, no last known well time available, not a candidate for tPA.  w/u on admisison also significant for hyperammonemia and BG >400s.  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 Pt was admitted for acute metabolic encephalopathy, s/p rifaximin and lactulose with improvement in mental status. Course c/b with ileus, sx consulted. TF on hold, ileus now better. Now with urinary retention as well as recurrent ascites s/p paracentesis x2.     #Recurrent ascites  - unclear etiology, as admission CT with small ascites, now with mod-severe s/p paracentesis x2  - fluid studies negative for infection  - Hep panel negative  - abd doppler to r/o PVT/portal HTN  - will start aldactone and lasix  - GI consulted, maryana recs    #Urinary retention  - Flomax  - s/p barreto, currently undergoing TOV    #Distended abdomen/ likely due to ascites / ileus  - CT a/p reviewed and has worsening ascites but as per IR not enough to drain; s/p 1x dose of IV lasix   - Tube feeds held  - repeat kub shows large ascites , ileus   - s/p ir guided tap, 2 L drained   - no sbp on fluid analysis. ceftriaxone dcd   - c/w daily KUB , OOB to chair with assist , monitor electrolytes   - kub shows ileus   - surgery consulted, recommend hold TF , enemas, DANIELITO, g tube to gravity  - abd u/s with mod-severe ascites  - Paracentesis repeat 8/10/22 with removal 3900 ml    #Acute metabolic encephalopathy, likely multifactorial/ improving / likely back to baseline alert and oriented x 1-2 with dementia   - possible UTI, colitis, constipation  and hepatic encephalopathy , covid positive but no resp sxs   - CTH reporting possible L-occipital lobe lucency which may represent artifact vs infarct; repeat CTH (-) for acute findings or interval changes     - Hyperammonemia in the setting of cirrhosis and AMS could be 2/2 hepatic encephalopathy and therefore started on Rifaximin on admission   - Repeat ammonia WNL   - s/p cvEEG and no  epileptiform pattern or seizures noted  - Maintain on fall and aspiration precautions   - c/w rifaximin, hold lactulose for now due to abd distention   - Fall precautions   - Monitor CBC and temperature    #Transaminitis / worsening ascites  - Likely related to cirrhosis   - CT a/p reports worsening ascites s/p IR drainage on 8/2  - Tbili remains WNL   - Hepatitis panel neg   - Trend LFTs   - < from: US Abdomen Upper Quadrant Right (07.31.22 @ 15:22) > Cirrhosis. Large volume ascites.    #possible uti   - completed ceftriaxone     #Hypothyroidism  - TFTs reviewed; TSH increasing and T4 decreased;  - Reassess TFTs in a few days    - TG ab elevated; possibly hashimoto's; TPO ab pending   - c/w v synthroid till able to resume TF 8/8 as per endo   - repeat TFTs with TSH 30  - f/u further endo recs    #Hx of CVA  - initial ct h showed ? cva but repeat cth shows no acute / subacute cva   - MRI defered by neurology and repeat CTH reviewed and is w/out interval changes  - Maintain on asa and statin therapy  - Maintain on telemetry   - Neurology consulted   - resumed on aspirin post peritoneal  tap      #Normocytic anemia / Thrombocytopenia   - H/H and platelets stable   - Low plts likely 2/2 cirrhosis   - Hemodynamically stable w/ no active bleeding noted  - Monitor CBC and transfuse if Hb<7      #DM II  - Hyperglycemic on admission and hypoglycemic while hospitalized   - A1c 7.7  - ISS and hypoglycemic protocol in place     #Sacral decubitus ulcers and rt heel decubitus ulcer both POA  - No drainage noted   - Given CT findings and elevated CRP/procal   - ID recommending MRI if suspicion high for OM however given pt afebrile and nL WBC will defer for now; If MRI pursued would likely need to be done under conscious sedation    - c/w wound care   - Monitor CBC and temperature   - Consulted ID and recs noted     #Dilated AR   - 4.1cm at sinus of valsalva   - Incidental finding on CT chest   - CTSx consulted and recs noted; no intervention warranted at this time - outpt serial imaging    #Hypovolemic hyponatremia / improved   - Improved s/p gentle hydration    - Monitor Na levels   - Monitor I/O's     #VTE ppx: Lovenox   Diet: Tube feed  Dispo: [facility] after TOV and workup etiology of ascites     Guarded Prognosis - multiple comorbidities, multiorgan system involvement, severe PCM, debility with propensity to deteriorate, prolonged hospitalization  Code status: full code, palliative care consult appreciated

## 2022-08-12 NOTE — PROGRESS NOTE ADULT - SUBJECTIVE AND OBJECTIVE BOX
Mayra Casiano M.D.    Patient is a 77y old  Male who presents with a chief complaint of Altered mental state (11 Aug 2022 16:05)      SUBJECTIVE / OVERNIGHT EVENTS: no event overnight. Bladder scan with 500cc, straight cath with 200cc.     Patient denies chest pain, SOB, abd pain, N/V, fever, chills, dysuria or any other complaints. All remainder ROS negative.     MEDICATIONS  (STANDING):  artificial tears (preservative free) Ophthalmic Solution 1 Drop(s) Both EYES two times a day  atorvastatin 40 milliGRAM(s) Oral at bedtime  chlorhexidine 2% Cloths 1 Application(s) Topical <User Schedule>  dextrose 10% Bolus 250 milliLiter(s) IV Bolus once  dextrose 5%. 1000 milliLiter(s) (100 mL/Hr) IV Continuous <Continuous>  dextrose 5%. 1000 milliLiter(s) (50 mL/Hr) IV Continuous <Continuous>  dextrose 50% Injectable 25 Gram(s) IV Push once  dextrose 50% Injectable 12.5 Gram(s) IV Push once  dextrose 50% Injectable 25 Gram(s) IV Push once  glucagon  Injectable 1 milliGRAM(s) IntraMuscular once  insulin lispro (ADMELOG) corrective regimen sliding scale   SubCutaneous three times a day before meals  levothyroxine Injectable 75 MICROGram(s) IV Push at bedtime  nystatin Powder 1 Application(s) Topical three times a day  polyethylene glycol 3350 17 Gram(s) Oral daily  rifAXIMin 550 milliGRAM(s) Oral two times a day  tamsulosin 0.8 milliGRAM(s) Oral at bedtime  thiamine 100 milliGRAM(s) Oral daily    MEDICATIONS  (PRN):  acetaminophen     Tablet .. 650 milliGRAM(s) Oral every 6 hours PRN Temp greater or equal to 38C (100.4F), Mild Pain (1 - 3)  bisacodyl Suppository 10 milliGRAM(s) Rectal daily PRN Constipation  dextrose Oral Gel 15 Gram(s) Oral once PRN Blood Glucose LESS THAN 70 milliGRAM(s)/deciliter  ondansetron Injectable 4 milliGRAM(s) IV Push every 6 hours PRN Nausea and/or Vomiting      I&O's Summary    11 Aug 2022 07:01  -  12 Aug 2022 07:00  --------------------------------------------------------  IN: 1150 mL / OUT: 320 mL / NET: 830 mL        PHYSICAL EXAM:  Vital Signs Last 24 Hrs  T(C): 36.6 (12 Aug 2022 05:18), Max: 36.7 (11 Aug 2022 17:14)  T(F): 97.8 (12 Aug 2022 05:18), Max: 98 (11 Aug 2022 17:14)  HR: 103 (12 Aug 2022 05:18) (103 - 109)  BP: 112/69 (12 Aug 2022 05:18) (97/57 - 115/69)  BP(mean): --  RR: 18 (12 Aug 2022 05:18) (18 - 18)  SpO2: 96% (12 Aug 2022 05:18) (93% - 96%)    Parameters below as of 12 Aug 2022 05:18  Patient On (Oxygen Delivery Method): room air    General: Elderly male, lying in bed, appears chronically ill  HEENT:  EOMI  NECK:  Supple  CVS: regular rate and rhythm S1 S2  RESP:  Poor effort, no distress  GI:  distended abdomen, soft on the right side, more tympanic on the left RUQ PEG  : De La Cruz  MSK:  Able to move upper extremities, wea LE  CNS:  Awake but slow to respond. LE weakness  INTEG:  Sacral decubitus  PSYCH:  Slow speech    LABS:                        11.4   6.21  )-----------( 105      ( 12 Aug 2022 06:52 )             35.4     08-12    130<L>  |  101  |  15.9  ----------------------------<  108<H>  4.1   |  20.0<L>  |  0.97    Ca    7.6<L>      12 Aug 2022 06:52  Phos  2.3     08-12  Mg     1.9     08-12                CAPILLARY BLOOD GLUCOSE      POCT Blood Glucose.: 110 mg/dL (12 Aug 2022 06:14)  POCT Blood Glucose.: 116 mg/dL (12 Aug 2022 00:33)  POCT Blood Glucose.: 166 mg/dL (11 Aug 2022 18:07)  POCT Blood Glucose.: 157 mg/dL (11 Aug 2022 16:55)  POCT Blood Glucose.: 189 mg/dL (11 Aug 2022 11:39)      RADIOLOGY & ADDITIONAL TESTS:  Results Reviewed:   Imaging Personally Reviewed:  Electrocardiogram Personally Reviewed:

## 2022-08-12 NOTE — PROVIDER CONTACT NOTE (OTHER) - ACTION/TREATMENT ORDERED:
PA stated she will pass it along on report and will put straight cath order in. Will continue to monitor.

## 2022-08-12 NOTE — CONSULT NOTE ADULT - NS ATTEND AMEND GEN_ALL_CORE FT
I saw pt with NP   I reviewed  meds, labs   Cirrhosis - CABRERA vs ETOH- liver serologies ordered   ascites- s/p paracentesis X 2. Next paracentesis get cell count, total protein, albumin. currently not enough fluid to tap   diuretics increased

## 2022-08-12 NOTE — CHART NOTE - NSCHARTNOTEFT_GEN_A_CORE
Patient evaluated for abdominal distension. Patient w/ ascites, s/p paracentesis yesterday w/ 3.9 L removed. On exam, patient resting comfortably in bed in NAD. Abdomen distended, firm, tympanic. No tenderness to palpation. Concern for reaccumulation of ascites.     - Urgent abdominal US   - s/p straight cath w/ 250 cc return   - RN to continue to monitor, will alert provider w/ any change in patient status.

## 2022-08-12 NOTE — PROVIDER CONTACT NOTE (OTHER) - SITUATION
Patient with borderline blood glucose while NPO
Residual >50cc and tube feed unable to stay connected d/t pressure.
Patient plan for IR procedure in AM, needs IR <2. Current 2.15
TOV ordered. De La Cruz draining <50cc output. Bladder scanned per MD rec; pt retaining 204 cc urine
Pt IV removed inadvertantly. Multiple attempts to obtain access. ANM attempting IV access and pt refused.
Pt abdomen looks more distended than previous assessment. Directly below PEG site is more distended and soft than rest of stomach.

## 2022-08-12 NOTE — CONSULT NOTE ADULT - PROBLEM SELECTOR RECOMMENDATION 9
Decompensated alcoholic cirrhosis, with ascites and hepatic encephalopathy. LFT's slowly downtrend  Paracentesis x 2 with rapid reaccumulation -SBP  Hepatitis panel Negative  Increase Lasix 40mg and Spirolactone to 100mg  Lactulose 20g TID via tube.  Continue Rifaximin  AM labs Hep A IgG, IgM, Hep B core ab, Hep B BsAg, Bs Ab, Bc Ag, Be ab, Hep C ab, AMA, BELLA, AFP, ceruloplasmin, smooth muscle abd, anti liver, kidney, mitochondria, Ferritin, TIBC, iron Decompensated alcoholic cirrhosis vs CABRERA  with ascites and hepatic encephalopathy. LFT's slowly downtrend  Paracentesis x 2 . Today hospitalist spoke to  IR- not enough ascites to tap again   Hepatitis panel Negative  Increase Lasix 40mg and Spirolactone to 100mg  Lactulose 20g TID via tube.  Continue Rifaximin   check liver serologies AM labs Hep A IgG, IgM, Hep B core ab, Hep B BsAg, Bs Ab, Bc Ag, Be ab, Hep C ab, AMA, BELLA, AFP, ceruloplasmin, smooth muscle abd, anti liver, kidney, mitochondria, Ferritin, TIBC, iron

## 2022-08-12 NOTE — CONSULT NOTE ADULT - CONSULT REASON
Abdominal distention
Dilated Sinus of Valsalva
Palliative care/GOC
Hypothyroidism
Stroke and AMS
R/O OSTEOMYELITIS
Cirrhosis

## 2022-08-12 NOTE — CONSULT NOTE ADULT - ASSESSMENT
This is a 77 year old man with a significant past medical history of HTN, BPH, Hypothyroidism, back surgery (LS in 2016, Thoracic spine in 2018-requiring wheel chair assisted device) alcohol abuse stopped using alcohol 15 years ago, alcoholic cirrhosis, who arrived on July 20 to [facility] with alter mental status and inability to speak. Today seeing by GI for worsening ascites

## 2022-08-12 NOTE — PROGRESS NOTE ADULT - SUBJECTIVE AND OBJECTIVE BOX
INTERVAL HPI/OVERNIGHT EVENTS:  follow-up for DM management    MEDICATIONS  (STANDING):  artificial tears (preservative free) Ophthalmic Solution 1 Drop(s) Both EYES two times a day  atorvastatin 40 milliGRAM(s) Oral at bedtime  chlorhexidine 2% Cloths 1 Application(s) Topical <User Schedule>  dextrose 10% Bolus 250 milliLiter(s) IV Bolus once  dextrose 5%. 1000 milliLiter(s) (100 mL/Hr) IV Continuous <Continuous>  dextrose 5%. 1000 milliLiter(s) (50 mL/Hr) IV Continuous <Continuous>  dextrose 50% Injectable 25 Gram(s) IV Push once  dextrose 50% Injectable 12.5 Gram(s) IV Push once  dextrose 50% Injectable 25 Gram(s) IV Push once  furosemide    Tablet 40 milliGRAM(s) Oral daily  glucagon  Injectable 1 milliGRAM(s) IntraMuscular once  insulin lispro (ADMELOG) corrective regimen sliding scale   SubCutaneous three times a day before meals  levothyroxine Injectable 75 MICROGram(s) IV Push at bedtime  nystatin Powder 1 Application(s) Topical three times a day  polyethylene glycol 3350 17 Gram(s) Oral daily  rifAXIMin 550 milliGRAM(s) Oral two times a day  spironolactone 100 milliGRAM(s) Oral daily  tamsulosin 0.8 milliGRAM(s) Oral at bedtime  thiamine 100 milliGRAM(s) Oral daily    MEDICATIONS  (PRN):  acetaminophen     Tablet .. 650 milliGRAM(s) Oral every 6 hours PRN Temp greater or equal to 38C (100.4F), Mild Pain (1 - 3)  bisacodyl Suppository 10 milliGRAM(s) Rectal daily PRN Constipation  dextrose Oral Gel 15 Gram(s) Oral once PRN Blood Glucose LESS THAN 70 milliGRAM(s)/deciliter  ondansetron Injectable 4 milliGRAM(s) IV Push every 6 hours PRN Nausea and/or Vomiting    Allergies  oxycodone ([place])    Review of systems: no shortness of breath, no chest pain, no headache    Vital Signs Last 24 Hrs  T(C): 36.6 (12 Aug 2022 05:18), Max: 36.7 (11 Aug 2022 17:14)  T(F): 97.8 (12 Aug 2022 05:18), Max: 98 (11 Aug 2022 17:14)  HR: 103 (12 Aug 2022 05:18) (103 - 109)  BP: 112/69 (12 Aug 2022 05:18) (110/66 - 115/69)  BP(mean): --  RR: 18 (12 Aug 2022 05:18) (18 - 18)  SpO2: 96% (12 Aug 2022 05:18) (93% - 96%)    Parameters below as of 12 Aug 2022 05:18  Patient On (Oxygen Delivery Method): room air    General: Elderly male, lying in bed, appears chronically ill  HEENT:  EOMI  NECK:  Supple  CVS: regular rate and rhythm S1 S2  RESP:  Poor effort, no distress  GI:  distended abdomen, soft on the right side, more tympanic on the left RUQ PEG  MSK:  Able to move upper extremities  CNS:  Awake but slow to respond. LE weakness  INTEG:  Sacral decubitus  PSYCH:  Slow speech      LABS:                        11.4   6.21  )-----------( 105      ( 12 Aug 2022 06:52 )             35.4     08-12    130<L>  |  101  |  15.9  ----------------------------<  108<H>  4.1   |  20.0<L>  |  0.97    Ca    7.6<L>      12 Aug 2022 06:52  Phos  2.3     08-12  Mg     1.9     08-12    CAPILLARY BLOOD GLUCOSE  POCT Blood Glucose.: 94 mg/dL (12 Aug 2022 13:15)  POCT Blood Glucose.: 110 mg/dL (12 Aug 2022 06:14)  POCT Blood Glucose.: 116 mg/dL (12 Aug 2022 00:33)  POCT Blood Glucose.: 166 mg/dL (11 Aug 2022 18:07)  POCT Blood Glucose.: 157 mg/dL (11 Aug 2022 16:55)

## 2022-08-12 NOTE — CONSULT NOTE ADULT - SUBJECTIVE AND OBJECTIVE BOX
Patient is a 77y old  Male who presents with a chief complaint of Altered mental state (12 Aug 2022 11:22)      HPI: This is a 77 year old man with a significant past medical history of HTN, BPH, Hypothyroidism, back surgery (LS in 2016, Thoracic spine in 2018-requiring wheel chair assisted device) alcohol abuse stopped using alcohol 15 years ago, who arrived on July 20 to [facility] with alter mental status and inability to speak. CT-Head revealed  left occipital infarct vs artifact, not a candidate for TPA since last known well time was not available, BS>400, elevated lactate, Elevated D-dimer, hyperammonemia, Transaminitis, CT of abdomen showed cirrhosis, colitis. Patient admitted to hospital for acute metabolic encephalopathy, and large volume ascites. Paracentesis on 08/07 removing 2 L, no SBP, repeated Paracentesis on 08/10 draining 3900cc of yellow clear fluid. Course complicated by ileus, no BM reported by nursing staff Today patient seeing by GI for rapid reaccumulated  ascites. As per patient colonoscopy/EGD in past but does not recall results.  Patient found resting comfortably, abdomen distended non tender, awake and conversational, today's US showed moderate to large abdominal pelvic ascites, and partially visualized right pleura effusion.         PAST MEDICAL & SURGICAL HISTORY:      Allergies    oxycodone (Flushing)    Intolerances        MEDICATIONS  (STANDING):  artificial tears (preservative free) Ophthalmic Solution 1 Drop(s) Both EYES two times a day  atorvastatin 40 milliGRAM(s) Oral at bedtime  chlorhexidine 2% Cloths 1 Application(s) Topical <User Schedule>  dextrose 10% Bolus 250 milliLiter(s) IV Bolus once  dextrose 5%. 1000 milliLiter(s) (100 mL/Hr) IV Continuous <Continuous>  dextrose 5%. 1000 milliLiter(s) (50 mL/Hr) IV Continuous <Continuous>  dextrose 50% Injectable 25 Gram(s) IV Push once  dextrose 50% Injectable 12.5 Gram(s) IV Push once  dextrose 50% Injectable 25 Gram(s) IV Push once  furosemide    Tablet 20 milliGRAM(s) Oral daily  glucagon  Injectable 1 milliGRAM(s) IntraMuscular once  insulin lispro (ADMELOG) corrective regimen sliding scale   SubCutaneous three times a day before meals  levothyroxine Injectable 75 MICROGram(s) IV Push at bedtime  nystatin Powder 1 Application(s) Topical three times a day  polyethylene glycol 3350 17 Gram(s) Oral daily  rifAXIMin 550 milliGRAM(s) Oral two times a day  spironolactone 50 milliGRAM(s) Oral daily  tamsulosin 0.8 milliGRAM(s) Oral at bedtime  thiamine 100 milliGRAM(s) Oral daily    MEDICATIONS  (PRN):  acetaminophen     Tablet .. 650 milliGRAM(s) Oral every 6 hours PRN Temp greater or equal to 38C (100.4F), Mild Pain (1 - 3)  bisacodyl Suppository 10 milliGRAM(s) Rectal daily PRN Constipation  dextrose Oral Gel 15 Gram(s) Oral once PRN Blood Glucose LESS THAN 70 milliGRAM(s)/deciliter  ondansetron Injectable 4 milliGRAM(s) IV Push every 6 hours PRN Nausea and/or Vomiting      Social History:    Marital Status:  (   )    (   ) Single    (   )    (  )   Occupation:   Lives with: (  ) alone  (  ) children   (  ) spouse   (  ) parents  (  ) other    Substance Use (street drugs): (  ) never used  (  ) other:  Tobacco Usage:  (   ) never smoked   (   ) former smoker   (   ) current smoker  (     ) pack year  (        ) last cigarette date  Alcohol Usage:  Sexual History:     Family History   IBD (  ) Yes   (  ) No  GI Malignancy (  )  Yes    (  ) No    Health Management     Last Colonoscopy -      (     ) Advanced Directives: (     ) None    (      ) DNR    (     ) DNI    (     ) Health Care Proxy:     Review of Systems:  · ENMT: negative  · Respiratory and Thorax: negative  · Cardiovascular: negative  · Gastrointestinal: see above.  · Genitourinary:	negative  · Musculoskeletal: negative  · Neurological: negative  · Psychiatric: negative  · Hematology/Lymphatics: negative  · Endocrine: negative    Vital Signs Last 24 Hrs  T(C): 36.6 (12 Aug 2022 05:18), Max: 36.7 (11 Aug 2022 17:14)  T(F): 97.8 (12 Aug 2022 05:18), Max: 98 (11 Aug 2022 17:14)  HR: 103 (12 Aug 2022 05:18) (103 - 109)  BP: 112/69 (12 Aug 2022 05:18) (110/66 - 115/69)  BP(mean): --  RR: 18 (12 Aug 2022 05:18) (18 - 18)  SpO2: 96% (12 Aug 2022 05:18) (93% - 96%)    Parameters below as of 12 Aug 2022 05:18  Patient On (Oxygen Delivery Method): room air        PHYSICAL EXAM:  · Constitutional: Elderly looking man, ill looking in no acute distress.   · Eyes: EOMI; PERRL; no drainage or redness  · ENMT: No oral lesions; no gross abnormalities  · Neck:	No bruits; no thyromegaly or nodules  · Back:	No deformity or limitation of movement  · Respiratory: Breath Sounds equal & clear to percussion & auscultation, no accessory muscle use  · Cardiovascular: Regular rate & rhythm, normal S1, S2; no murmurs, gallops or rubs; no S3, S4  · Gastrointestinal: Soft, non-tender, severely distended, normal bowel sounds          LABS:                        11.4   6.21  )-----------( 105      ( 12 Aug 2022 06:52 )             35.4     08-12    130<L>  |  101  |  15.9  ----------------------------<  108<H>  4.1   |  20.0<L>  |  0.97    Ca    7.6<L>      12 Aug 2022 06:52  Phos  2.3     08-12  Mg     1.9     08-12          LIVER FUNCTIONS - ( 09 Aug 2022 06:40 )  Alb: 1.9 g/dL / Pro: 6.3 g/dL / ALK PHOS: 121 U/L / ALT: 67 U/L / AST: 95 U/L / GGT: x             RADIOLOGY & ADDITIONAL TESTS:       Patient is a 77y old  Male who presents with a chief complaint of Altered mental state (12 Aug 2022 11:22)      HPI: This is a 77 year old man with a significant past medical history of HTN, BPH, Hypothyroidism, back surgery (LS in 2016, Thoracic spine in 2018-requiring wheel chair assisted device) alcohol abuse stopped using alcohol 15 years ago, who arrived on July 20 to [facility] with alter mental status and inability to speak. CT-Head revealed  left occipital infarct vs artifact, not a candidate for TPA since last known well time was not available, BS>400, elevated lactate, Elevated D-dimer, hyperammonemia, Transaminitis, CT of abdomen showed cirrhosis, colitis. Patient admitted to hospital for acute metabolic encephalopathy,  and large volume ascites. Originally admitted as Critical Anand.  Paracentesis on 08/07 removing 2 L, no SBP, repeated Paracentesis on 08/10 draining 3900cc of yellow clear fluid. Course complicated by ileus, no BM reported by nursing staff Today patient seeing by GI for rapid reaccumulated  ascites. As per patient colonoscopy/EGD in past but does not recall results.  Patient found resting comfortably, abdomen distended non tender, awake and conversational, today's US showed moderate to large abdominal pelvic ascites, and partially visualized right pleura effusion.         PAST MEDICAL & SURGICAL HISTORY:      Allergies    oxycodone (Flushing)    Intolerances        MEDICATIONS  (STANDING):  artificial tears (preservative free) Ophthalmic Solution 1 Drop(s) Both EYES two times a day  atorvastatin 40 milliGRAM(s) Oral at bedtime  chlorhexidine 2% Cloths 1 Application(s) Topical <User Schedule>  dextrose 10% Bolus 250 milliLiter(s) IV Bolus once  dextrose 5%. 1000 milliLiter(s) (100 mL/Hr) IV Continuous <Continuous>  dextrose 5%. 1000 milliLiter(s) (50 mL/Hr) IV Continuous <Continuous>  dextrose 50% Injectable 25 Gram(s) IV Push once  dextrose 50% Injectable 12.5 Gram(s) IV Push once  dextrose 50% Injectable 25 Gram(s) IV Push once  furosemide    Tablet 20 milliGRAM(s) Oral daily  glucagon  Injectable 1 milliGRAM(s) IntraMuscular once  insulin lispro (ADMELOG) corrective regimen sliding scale   SubCutaneous three times a day before meals  levothyroxine Injectable 75 MICROGram(s) IV Push at bedtime  nystatin Powder 1 Application(s) Topical three times a day  polyethylene glycol 3350 17 Gram(s) Oral daily  rifAXIMin 550 milliGRAM(s) Oral two times a day  spironolactone 50 milliGRAM(s) Oral daily  tamsulosin 0.8 milliGRAM(s) Oral at bedtime  thiamine 100 milliGRAM(s) Oral daily    MEDICATIONS  (PRN):  acetaminophen     Tablet .. 650 milliGRAM(s) Oral every 6 hours PRN Temp greater or equal to 38C (100.4F), Mild Pain (1 - 3)  bisacodyl Suppository 10 milliGRAM(s) Rectal daily PRN Constipation  dextrose Oral Gel 15 Gram(s) Oral once PRN Blood Glucose LESS THAN 70 milliGRAM(s)/deciliter  ondansetron Injectable 4 milliGRAM(s) IV Push every 6 hours PRN Nausea and/or Vomiting      Social History:    Marital Status:  (   )    (   ) Single    (   )    (  )   Occupation:   Lives with: (  ) alone  (  ) children   (  ) spouse   (  ) parents  (  ) other    Substance Use (street drugs): (  ) never used  (  ) other:  Tobacco Usage:  (   ) never smoked   (   ) former smoker   (   ) current smoker  (     ) pack year  (        ) last cigarette date  Alcohol Usage:  Sexual History:     Family History   IBD (  ) Yes   (  ) No  GI Malignancy (  )  Yes    (  ) No    Health Management     Last Colonoscopy -      (     ) Advanced Directives: (     ) None    (      ) DNR    (     ) DNI    (     ) Health Care Proxy:     Review of Systems:  · ENMT: negative  · Respiratory and Thorax: negative  · Cardiovascular: negative  · Gastrointestinal: see above.  · Genitourinary:	negative  · Musculoskeletal: negative  · Neurological: negative  · Psychiatric: negative  · Hematology/Lymphatics: negative  · Endocrine: negative    Vital Signs Last 24 Hrs  T(C): 36.6 (12 Aug 2022 05:18), Max: 36.7 (11 Aug 2022 17:14)  T(F): 97.8 (12 Aug 2022 05:18), Max: 98 (11 Aug 2022 17:14)  HR: 103 (12 Aug 2022 05:18) (103 - 109)  BP: 112/69 (12 Aug 2022 05:18) (110/66 - 115/69)  BP(mean): --  RR: 18 (12 Aug 2022 05:18) (18 - 18)  SpO2: 96% (12 Aug 2022 05:18) (93% - 96%)    Parameters below as of 12 Aug 2022 05:18  Patient On (Oxygen Delivery Method): room air        PHYSICAL EXAM:  · Constitutional: Elderly looking man, ill looking in no acute distress.   · Eyes: EOMI; PERRL; no drainage or redness  · ENMT: No oral lesions; no gross abnormalities  · Neck:	No bruits; no thyromegaly or nodules  · Back:	No deformity or limitation of movement  · Respiratory: Breath Sounds equal & clear to percussion & auscultation, no accessory muscle use  · Cardiovascular: Regular rate & rhythm, normal S1, S2; no murmurs, gallops or rubs; no S3, S4  · Gastrointestinal: Soft, non-tender, severely distended, normal bowel sounds          LABS:                        11.4   6.21  )-----------( 105      ( 12 Aug 2022 06:52 )             35.4     08-12    130<L>  |  101  |  15.9  ----------------------------<  108<H>  4.1   |  20.0<L>  |  0.97    Ca    7.6<L>      12 Aug 2022 06:52  Phos  2.3     08-12  Mg     1.9     08-12          LIVER FUNCTIONS - ( 09 Aug 2022 06:40 )  Alb: 1.9 g/dL / Pro: 6.3 g/dL / ALK PHOS: 121 U/L / ALT: 67 U/L / AST: 95 U/L / GGT: x             RADIOLOGY & ADDITIONAL TESTS:  < from: US Abdomen Doppler (08.12.22 @ 10:07) >    ACC: [id number] EXAM:  US DPLX ABDOMEN                          PROCEDURE DATE:  08/12/2022          INTERPRETATION:  Clinical indication: Assess ascites, portal hypertension   and portal thrombosis    COMPARISON: Abdominal ultrasound dated 8/6/2022,CT abdomen pelvis dated   7/29/2022 and 7/20/2022    FINDINGS: Exam is markedly limited due to bowel gas and ascites as well   as suboptimal patient positioning.    There is moderate to large ascites in all 4 quadrants as well as a   partially visualized right pleural effusion. The IVC, aorta, splenic   artery and vein and hepatic veins are not well-visualized due to bowel   gas.    Peak systolic velocity within the hepatic artery is 116 cm/sec.    No flow visualized within the main, left or right portal veins, although   evaluation is limited.    The spleen is normal in size measuring 9 cm.    IMPRESSION: Technically limited exam. Flow is not definitely seen within   the main, left and right portal veins which may be due to thrombus versus   slow flow. MRI of the abdomen with contrast is recommended for further   evaluation if there is no contraindication.    Moderate to large abdominal and pelvic ascites and partially visualized   right pleural effusion.    --- End of Report ---      < end of copied text >       Patient is a 77y old  Male who presents with a chief complaint of Altered mental state (12 Aug 2022 11:22)      HPI: This is a 77 year old man with a significant past medical history of +PEG, HTN, BPH, Hypothyroidism, back surgery (LS in 2016, Thoracic spine in 2018-requiring wheel chair assisted device) alcohol abuse stopped using alcohol 15 years ago, who arrived on July 20 to [facility] with alter mental status and inability to speak. CT-Head revealed  left occipital infarct vs artifact, not a candidate for TPA since last known well time was not available,  Subsequent  MRI negative for CVA. BS>400, elevated lactate, Elevated D-dimer, hyperammonemia, Transaminitis, CT of abdomen showed cirrhosis, colitis. Patient admitted to hospital for acute metabolic encephalopathy,  .  Paracentesis on 08/07 removing 2 L, no SBP, repeated Paracentesis on 08/10 draining 3900cc of yellow clear fluid. Course complicated by ileus, no BM reported by nursing staff. Today patient seeing by GI for rapid reaccumulated  ascites. As per patient colonoscopy/EGD in past but does not recall results.  Patient found resting comfortably, abdomen distended non tender, awake and conversational, today's US showed moderate to large abdominal pelvic ascites, and partially visualized right pleura effusion.    Attending mabel IRVIN spoke to wife at bedside, State no ETOH in 50 years. NO family hx of liver disease.  Concerned that the pt is not urinating. Patient is confused . knew name,  Knew he is in hospital. Did not know year        PAST MEDICAL & SURGICAL HISTORY:      Allergies    oxycodone (Flushing)    Intolerances        MEDICATIONS  (STANDING):  artificial tears (preservative free) Ophthalmic Solution 1 Drop(s) Both EYES two times a day  atorvastatin 40 milliGRAM(s) Oral at bedtime  chlorhexidine 2% Cloths 1 Application(s) Topical <User Schedule>  dextrose 10% Bolus 250 milliLiter(s) IV Bolus once  dextrose 5%. 1000 milliLiter(s) (100 mL/Hr) IV Continuous <Continuous>  dextrose 5%. 1000 milliLiter(s) (50 mL/Hr) IV Continuous <Continuous>  dextrose 50% Injectable 25 Gram(s) IV Push once  dextrose 50% Injectable 12.5 Gram(s) IV Push once  dextrose 50% Injectable 25 Gram(s) IV Push once  furosemide    Tablet 20 milliGRAM(s) Oral daily  glucagon  Injectable 1 milliGRAM(s) IntraMuscular once  insulin lispro (ADMELOG) corrective regimen sliding scale   SubCutaneous three times a day before meals  levothyroxine Injectable 75 MICROGram(s) IV Push at bedtime  nystatin Powder 1 Application(s) Topical three times a day  polyethylene glycol 3350 17 Gram(s) Oral daily  rifAXIMin 550 milliGRAM(s) Oral two times a day  spironolactone 50 milliGRAM(s) Oral daily  tamsulosin 0.8 milliGRAM(s) Oral at bedtime  thiamine 100 milliGRAM(s) Oral daily    MEDICATIONS  (PRN):  acetaminophen     Tablet .. 650 milliGRAM(s) Oral every 6 hours PRN Temp greater or equal to 38C (100.4F), Mild Pain (1 - 3)  bisacodyl Suppository 10 milliGRAM(s) Rectal daily PRN Constipation  dextrose Oral Gel 15 Gram(s) Oral once PRN Blood Glucose LESS THAN 70 milliGRAM(s)/deciliter  ondansetron Injectable 4 milliGRAM(s) IV Push every 6 hours PRN Nausea and/or Vomiting      Social History:    Marital Status:  (   )    (   ) Single    (   )    (  )   Occupation:   Lives with: (  ) alone  (  ) children   (  ) spouse   (  ) parents  (  ) other    Substance Use (street drugs): (  ) never used  (  ) other:  Tobacco Usage:  (   ) never smoked   (   ) former smoker   (   ) current smoker  (     ) pack year  (        ) last cigarette date  Alcohol Usage:  Sexual History:     Family History   IBD (  ) Yes   (  ) No  GI Malignancy (  )  Yes    (  ) No    Health Management     Last Colonoscopy -      (     ) Advanced Directives: (     ) None    (      ) DNR    (     ) DNI    (     ) Health Care Proxy:     Review of Systems:  · ENMT: negative  · Respiratory and Thorax: negative  · Cardiovascular: negative  · Gastrointestinal: see above.  · Genitourinary:	negative  · Musculoskeletal: negative  · Neurological: negative  · Psychiatric: negative  · Hematology/Lymphatics: negative  · Endocrine: negative    Vital Signs Last 24 Hrs  T(C): 36.6 (12 Aug 2022 05:18), Max: 36.7 (11 Aug 2022 17:14)  T(F): 97.8 (12 Aug 2022 05:18), Max: 98 (11 Aug 2022 17:14)  HR: 103 (12 Aug 2022 05:18) (103 - 109)  BP: 112/69 (12 Aug 2022 05:18) (110/66 - 115/69)  BP(mean): --  RR: 18 (12 Aug 2022 05:18) (18 - 18)  SpO2: 96% (12 Aug 2022 05:18) (93% - 96%)    Parameters below as of 12 Aug 2022 05:18  Patient On (Oxygen Delivery Method): room air        PHYSICAL EXAM:  · Constitutional: Elderly looking man, ill looking in no acute distress.   · Eyes: EOMI; PERRL; no drainage or redness  · ENMT: No oral lesions; no gross abnormalities  · Neck:	No bruits; no thyromegaly or nodules  · Back:	No deformity or limitation of movement  · Respiratory: Breath Sounds equal & clear to percussion & auscultation, no accessory muscle use  · Cardiovascular: Regular rate & rhythm, normal S1, S2; no murmurs, gallops or rubs; no S3, S4  · Gastrointestinal: Soft, non-tender,softly distended, normal bowel sounds, +PEG  LE -  no LE edema           LABS:                        11.4   6.21  )-----------( 105      ( 12 Aug 2022 06:52 )             35.4     08-12    130<L>  |  101  |  15.9  ----------------------------<  108<H>  4.1   |  20.0<L>  |  0.97    Ca    7.6<L>      12 Aug 2022 06:52  Phos  2.3     08-12  Mg     1.9     08-12          LIVER FUNCTIONS - ( 09 Aug 2022 06:40 )  Alb: 1.9 g/dL / Pro: 6.3 g/dL / ALK PHOS: 121 U/L / ALT: 67 U/L / AST: 95 U/L / GGT: x             RADIOLOGY & ADDITIONAL TESTS:  < from: US Abdomen Doppler (08.12.22 @ 10:07) >    ACC: [id number] EXAM:  US DPLX ABDOMEN                          PROCEDURE DATE:  08/12/2022          INTERPRETATION:  Clinical indication: Assess ascites, portal hypertension   and portal thrombosis    COMPARISON: Abdominal ultrasound dated 8/6/2022,CT abdomen pelvis dated   7/29/2022 and 7/20/2022    FINDINGS: Exam is markedly limited due to bowel gas and ascites as well   as suboptimal patient positioning.    There is moderate to large ascites in all 4 quadrants as well as a   partially visualized right pleural effusion. The IVC, aorta, splenic   artery and vein and hepatic veins are not well-visualized due to bowel   gas.    Peak systolic velocity within the hepatic artery is 116 cm/sec.    No flow visualized within the main, left or right portal veins, although   evaluation is limited.    The spleen is normal in size measuring 9 cm.    IMPRESSION: Technically limited exam. Flow is not definitely seen within   the main, left and right portal veins which may be due to thrombus versus   slow flow. MRI of the abdomen with contrast is recommended for further   evaluation if there is no contraindication.    Moderate to large abdominal and pelvic ascites and partially visualized   right pleural effusion.    --- End of Report ---      < end of copied text >

## 2022-08-12 NOTE — PROVIDER CONTACT NOTE (OTHER) - RECOMMENDATIONS
Pause tube feed
Bedside assessment of abdomen and Straight cath for residual urine
Continue to monitor sugar level
Notify when lab printer gets fixed
Recommend irrigation prior to TOV

## 2022-08-12 NOTE — PROVIDER CONTACT NOTE (OTHER) - ASSESSMENT
Pt A&Ox3. No continuous IV fluids or medications.
No redness at PEG site. Pt denies pain, discomfort, or fullness. Bladder scan 517; difficult assessment d/t ascites.
Patient aysmptomatic and his baseline is high 70s -80

## 2022-08-12 NOTE — PROVIDER CONTACT NOTE (OTHER) - BACKGROUND
Lab printer down and cannot print requisition, ANM, AND, PA aware, will notify when working
Pt admitted for AMS.
Pt has ileus and s/p paracentesis 8/10. Pt on TOV.

## 2022-08-12 NOTE — CONSULT NOTE ADULT - REASON FOR ADMISSION
Altered mental state

## 2022-08-12 NOTE — CONSULT NOTE ADULT - CONSULT REQUESTED DATE/TIME
24-Jul-2022 00:00
25-Jul-2022
22-Jul-2022 14:05
22-Jul-2022 15:41
01-Aug-2022 12:43
12-Aug-2022 12:18
05-Aug-2022 11:07

## 2022-08-12 NOTE — PROVIDER CONTACT NOTE (OTHER) - DATE AND TIME:
11-Aug-2022 10:36
12-Aug-2022 06:17
10-Aug-2022 05:48
11-Aug-2022 18:53
12-Aug-2022 01:02
09-Aug-2022 23:44

## 2022-08-13 LAB
ALBUMIN SERPL ELPH-MCNC: 1.9 G/DL — LOW (ref 3.3–5.2)
ALBUMIN SERPL ELPH-MCNC: 2 G/DL — LOW (ref 3.3–5.2)
ALP SERPL-CCNC: 142 U/L — HIGH (ref 40–120)
ALP SERPL-CCNC: 160 U/L — HIGH (ref 40–120)
ALT FLD-CCNC: 70 U/L — HIGH
ALT FLD-CCNC: 73 U/L — HIGH
AMMONIA BLD-MCNC: 77 UMOL/L — HIGH (ref 11–55)
ANION GAP SERPL CALC-SCNC: 10 MMOL/L — SIGNIFICANT CHANGE UP (ref 5–17)
ANION GAP SERPL CALC-SCNC: 12 MMOL/L — SIGNIFICANT CHANGE UP (ref 5–17)
ANISOCYTOSIS BLD QL: SLIGHT — SIGNIFICANT CHANGE UP
APTT BLD: 43 SEC — HIGH (ref 27.5–35.5)
AST SERPL-CCNC: 91 U/L — HIGH
AST SERPL-CCNC: 93 U/L — HIGH
BASOPHILS # BLD AUTO: 0.1 K/UL — SIGNIFICANT CHANGE UP (ref 0–0.2)
BASOPHILS NFR BLD AUTO: 1.7 % — SIGNIFICANT CHANGE UP (ref 0–2)
BILIRUB SERPL-MCNC: 1.4 MG/DL — SIGNIFICANT CHANGE UP (ref 0.4–2)
BILIRUB SERPL-MCNC: 1.5 MG/DL — SIGNIFICANT CHANGE UP (ref 0.4–2)
BLD GP AB SCN SERPL QL: SIGNIFICANT CHANGE UP
BUN SERPL-MCNC: 19 MG/DL — SIGNIFICANT CHANGE UP (ref 8–20)
BUN SERPL-MCNC: 19.7 MG/DL — SIGNIFICANT CHANGE UP (ref 8–20)
BURR CELLS BLD QL SMEAR: PRESENT — SIGNIFICANT CHANGE UP
CALCIUM SERPL-MCNC: 7.7 MG/DL — LOW (ref 8.4–10.5)
CALCIUM SERPL-MCNC: 7.7 MG/DL — LOW (ref 8.4–10.5)
CERULOPLASMIN SERPL-MCNC: 19 MG/DL — SIGNIFICANT CHANGE UP (ref 15–30)
CHLORIDE SERPL-SCNC: 101 MMOL/L — SIGNIFICANT CHANGE UP (ref 98–107)
CHLORIDE SERPL-SCNC: 102 MMOL/L — SIGNIFICANT CHANGE UP (ref 98–107)
CO2 SERPL-SCNC: 21 MMOL/L — LOW (ref 22–29)
CO2 SERPL-SCNC: 21 MMOL/L — LOW (ref 22–29)
CREAT SERPL-MCNC: 1.05 MG/DL — SIGNIFICANT CHANGE UP (ref 0.5–1.3)
CREAT SERPL-MCNC: 1.13 MG/DL — SIGNIFICANT CHANGE UP (ref 0.5–1.3)
EGFR: 66 ML/MIN/1.73M2 — SIGNIFICANT CHANGE UP
EGFR: 72 ML/MIN/1.73M2 — SIGNIFICANT CHANGE UP
EOSINOPHIL # BLD AUTO: 0.05 K/UL — SIGNIFICANT CHANGE UP (ref 0–0.5)
EOSINOPHIL NFR BLD AUTO: 0.9 % — SIGNIFICANT CHANGE UP (ref 0–6)
FERRITIN SERPL-MCNC: 173 NG/ML — SIGNIFICANT CHANGE UP (ref 30–400)
GIANT PLATELETS BLD QL SMEAR: PRESENT — SIGNIFICANT CHANGE UP
GLUCOSE BLDC GLUCOMTR-MCNC: 108 MG/DL — HIGH (ref 70–99)
GLUCOSE BLDC GLUCOMTR-MCNC: 150 MG/DL — HIGH (ref 70–99)
GLUCOSE BLDC GLUCOMTR-MCNC: 157 MG/DL — HIGH (ref 70–99)
GLUCOSE BLDC GLUCOMTR-MCNC: 191 MG/DL — HIGH (ref 70–99)
GLUCOSE SERPL-MCNC: 143 MG/DL — HIGH (ref 70–99)
GLUCOSE SERPL-MCNC: 178 MG/DL — HIGH (ref 70–99)
HCT VFR BLD CALC: 34.2 % — LOW (ref 39–50)
HGB BLD-MCNC: 11.5 G/DL — LOW (ref 13–17)
INR BLD: 1.96 RATIO — HIGH (ref 0.88–1.16)
IRON SATN MFR SERPL: 22 % — SIGNIFICANT CHANGE UP (ref 16–55)
IRON SATN MFR SERPL: 38 UG/DL — LOW (ref 59–158)
LACTATE SERPL-SCNC: 2.9 MMOL/L — HIGH (ref 0.5–2)
LYMPHOCYTES # BLD AUTO: 0 % — LOW (ref 13–44)
LYMPHOCYTES # BLD AUTO: 0 K/UL — LOW (ref 1–3.3)
MAGNESIUM SERPL-MCNC: 1.9 MG/DL — SIGNIFICANT CHANGE UP (ref 1.6–2.6)
MANUAL SMEAR VERIFICATION: SIGNIFICANT CHANGE UP
MCHC RBC-ENTMCNC: 30.3 PG — SIGNIFICANT CHANGE UP (ref 27–34)
MCHC RBC-ENTMCNC: 33.6 GM/DL — SIGNIFICANT CHANGE UP (ref 32–36)
MCV RBC AUTO: 90.2 FL — SIGNIFICANT CHANGE UP (ref 80–100)
MONOCYTES # BLD AUTO: 0.21 K/UL — SIGNIFICANT CHANGE UP (ref 0–0.9)
MONOCYTES NFR BLD AUTO: 3.5 % — SIGNIFICANT CHANGE UP (ref 2–14)
NEUTROPHILS # BLD AUTO: 5.74 K/UL — SIGNIFICANT CHANGE UP (ref 1.8–7.4)
NEUTROPHILS NFR BLD AUTO: 92.2 % — HIGH (ref 43–77)
NEUTS BAND # BLD: 1.7 % — SIGNIFICANT CHANGE UP (ref 0–8)
PHOSPHATE SERPL-MCNC: 2.8 MG/DL — SIGNIFICANT CHANGE UP (ref 2.4–4.7)
PLAT MORPH BLD: NORMAL — SIGNIFICANT CHANGE UP
PLATELET # BLD AUTO: 145 K/UL — LOW (ref 150–400)
POIKILOCYTOSIS BLD QL AUTO: SLIGHT — SIGNIFICANT CHANGE UP
POTASSIUM SERPL-MCNC: 4.2 MMOL/L — SIGNIFICANT CHANGE UP (ref 3.5–5.3)
POTASSIUM SERPL-MCNC: 4.2 MMOL/L — SIGNIFICANT CHANGE UP (ref 3.5–5.3)
POTASSIUM SERPL-SCNC: 4.2 MMOL/L — SIGNIFICANT CHANGE UP (ref 3.5–5.3)
POTASSIUM SERPL-SCNC: 4.2 MMOL/L — SIGNIFICANT CHANGE UP (ref 3.5–5.3)
PROCALCITONIN SERPL-MCNC: 0.55 NG/ML — HIGH (ref 0.02–0.1)
PROT SERPL-MCNC: 6.7 G/DL — SIGNIFICANT CHANGE UP (ref 6.6–8.7)
PROT SERPL-MCNC: 7.1 G/DL — SIGNIFICANT CHANGE UP (ref 6.6–8.7)
PROTHROM AB SERPL-ACNC: 22.9 SEC — HIGH (ref 10.5–13.4)
RBC # BLD: 3.79 M/UL — LOW (ref 4.2–5.8)
RBC # FLD: 15.5 % — HIGH (ref 10.3–14.5)
RBC BLD AUTO: SIGNIFICANT CHANGE UP
SCHISTOCYTES BLD QL AUTO: SLIGHT — SIGNIFICANT CHANGE UP
SODIUM SERPL-SCNC: 133 MMOL/L — LOW (ref 135–145)
SODIUM SERPL-SCNC: 134 MMOL/L — LOW (ref 135–145)
TIBC SERPL-MCNC: 174 UG/DL — LOW (ref 220–430)
TRANSFERRIN SERPL-MCNC: 122 MG/DL — LOW (ref 180–329)
WBC # BLD: 6.11 K/UL — SIGNIFICANT CHANGE UP (ref 3.8–10.5)
WBC # FLD AUTO: 6.11 K/UL — SIGNIFICANT CHANGE UP (ref 3.8–10.5)

## 2022-08-13 PROCEDURE — 70450 CT HEAD/BRAIN W/O DYE: CPT | Mod: 26

## 2022-08-13 PROCEDURE — 12345: CPT | Mod: NC

## 2022-08-13 PROCEDURE — 74018 RADEX ABDOMEN 1 VIEW: CPT | Mod: 26

## 2022-08-13 PROCEDURE — 99233 SBSQ HOSP IP/OBS HIGH 50: CPT | Mod: FS

## 2022-08-13 PROCEDURE — 99232 SBSQ HOSP IP/OBS MODERATE 35: CPT

## 2022-08-13 PROCEDURE — 99233 SBSQ HOSP IP/OBS HIGH 50: CPT

## 2022-08-13 PROCEDURE — 71045 X-RAY EXAM CHEST 1 VIEW: CPT | Mod: 26

## 2022-08-13 RX ORDER — PANTOPRAZOLE SODIUM 20 MG/1
40 TABLET, DELAYED RELEASE ORAL
Refills: 0 | Status: DISCONTINUED | OUTPATIENT
Start: 2022-08-14 | End: 2022-08-15

## 2022-08-13 RX ORDER — SODIUM CHLORIDE 9 MG/ML
1000 INJECTION, SOLUTION INTRAVENOUS
Refills: 0 | Status: DISCONTINUED | OUTPATIENT
Start: 2022-08-13 | End: 2022-08-16

## 2022-08-13 RX ORDER — PANTOPRAZOLE SODIUM 20 MG/1
40 TABLET, DELAYED RELEASE ORAL ONCE
Refills: 0 | Status: COMPLETED | OUTPATIENT
Start: 2022-08-13 | End: 2022-08-13

## 2022-08-13 RX ORDER — PANTOPRAZOLE SODIUM 20 MG/1
TABLET, DELAYED RELEASE ORAL
Refills: 0 | Status: DISCONTINUED | OUTPATIENT
Start: 2022-08-13 | End: 2022-08-15

## 2022-08-13 RX ORDER — OCTREOTIDE ACETATE 200 UG/ML
50 INJECTION, SOLUTION INTRAVENOUS; SUBCUTANEOUS
Qty: 500 | Refills: 0 | Status: DISCONTINUED | OUTPATIENT
Start: 2022-08-13 | End: 2022-08-14

## 2022-08-13 RX ADMIN — POLYETHYLENE GLYCOL 3350 17 GRAM(S): 17 POWDER, FOR SOLUTION ORAL at 13:04

## 2022-08-13 RX ADMIN — SODIUM CHLORIDE 75 MILLILITER(S): 9 INJECTION, SOLUTION INTRAVENOUS at 19:54

## 2022-08-13 RX ADMIN — OCTREOTIDE ACETATE 10 MICROGRAM(S)/HR: 200 INJECTION, SOLUTION INTRAVENOUS; SUBCUTANEOUS at 23:32

## 2022-08-13 RX ADMIN — Medication 100 MILLIGRAM(S): at 13:03

## 2022-08-13 RX ADMIN — Medication 1 DROP(S): at 18:38

## 2022-08-13 RX ADMIN — Medication 75 MICROGRAM(S): at 22:57

## 2022-08-13 RX ADMIN — CHLORHEXIDINE GLUCONATE 1 APPLICATION(S): 213 SOLUTION TOPICAL at 06:06

## 2022-08-13 RX ADMIN — NYSTATIN CREAM 1 APPLICATION(S): 100000 CREAM TOPICAL at 06:06

## 2022-08-13 RX ADMIN — Medication 1 DROP(S): at 06:06

## 2022-08-13 RX ADMIN — NYSTATIN CREAM 1 APPLICATION(S): 100000 CREAM TOPICAL at 13:04

## 2022-08-13 RX ADMIN — Medication 2: at 06:24

## 2022-08-13 RX ADMIN — Medication 2: at 18:35

## 2022-08-13 RX ADMIN — NYSTATIN CREAM 1 APPLICATION(S): 100000 CREAM TOPICAL at 22:58

## 2022-08-13 RX ADMIN — PANTOPRAZOLE SODIUM 40 MILLIGRAM(S): 20 TABLET, DELAYED RELEASE ORAL at 19:54

## 2022-08-13 NOTE — PROGRESS NOTE ADULT - SUBJECTIVE AND OBJECTIVE BOX
cc: ileus ,     SUBJECTIVE / OVERNIGHT EVENTS: patient seen and eval. General: Elderly male, lying in bed, appears chronically ill      Vital Signs Last 24 Hrs  T(C): 36.9 (13 Aug 2022 12:04), Max: 36.9 (13 Aug 2022 12:04)  T(F): 98.4 (13 Aug 2022 12:04), Max: 98.4 (13 Aug 2022 12:04)  HR: 112 (13 Aug 2022 12:04) (102 - 112)  BP: 123/76 (13 Aug 2022 12:04) (103/58 - 123/76)  BP(mean): --  RR: 20 (13 Aug 2022 12:04) (18 - 20)  SpO2: 95% (13 Aug 2022 12:04) (95% - 97%)    Parameters below as of 13 Aug 2022 12:04  Patient On (Oxygen Delivery Method): room air        HEENT:  EOMI  NECK:  Supple  CVS: regular rate and rhythm S1 S2  RESP:  Poor effort, no distress  GI:  distended abdomen, soft on the right side, more tympanic on the left RUQ PEG  : [place]  MSK:  Able to move upper extremities, wea LE  CNS:  Awake but slow to respond. LE weakness , chronic   INTEG:  Sacral decubitus  PSYCH:  Slow speech      MEDICATIONS  (STANDING):  artificial tears (preservative free) Ophthalmic Solution 1 Drop(s) Both EYES two times a day  atorvastatin 40 milliGRAM(s) Oral at bedtime  chlorhexidine 2% Cloths 1 Application(s) Topical <User Schedule>  dextrose 10% Bolus 250 milliLiter(s) IV Bolus once  dextrose 5%. 1000 milliLiter(s) (100 mL/Hr) IV Continuous <Continuous>  dextrose 5%. 1000 milliLiter(s) (50 mL/Hr) IV Continuous <Continuous>  dextrose 50% Injectable 25 Gram(s) IV Push once  dextrose 50% Injectable 12.5 Gram(s) IV Push once  dextrose 50% Injectable 25 Gram(s) IV Push once  furosemide    Tablet 40 milliGRAM(s) Oral daily  glucagon  Injectable 1 milliGRAM(s) IntraMuscular once  insulin lispro (ADMELOG) corrective regimen sliding scale   SubCutaneous three times a day before meals  levothyroxine Injectable 75 MICROGram(s) IV Push at bedtime  nystatin Powder 1 Application(s) Topical three times a day  polyethylene glycol 3350 17 Gram(s) Oral daily  rifAXIMin 550 milliGRAM(s) Oral two times a day  spironolactone 100 milliGRAM(s) Oral daily  tamsulosin 0.8 milliGRAM(s) Oral at bedtime  thiamine 100 milliGRAM(s) Oral daily    MEDICATIONS  (PRN):  acetaminophen     Tablet .. 650 milliGRAM(s) Oral every 6 hours PRN Temp greater or equal to 38C (100.4F), Mild Pain (1 - 3)  bisacodyl Suppository 10 milliGRAM(s) Rectal daily PRN Constipation  dextrose Oral Gel 15 Gram(s) Oral once PRN Blood Glucose LESS THAN 70 milliGRAM(s)/deciliter  ondansetron Injectable 4 milliGRAM(s) IV Push every 6 hours PRN Nausea and/or Vomiting                              11.4   6.21  )-----------( 105      ( 12 Aug 2022 06:52 )             35.4   08-13    133<L>  |  102  |  19.0  ----------------------------<  143<H>  4.2   |  21.0<L>  |  1.13    Ca    7.7<L>      13 Aug 2022 07:42  Phos  2.8     08-13  Mg     1.9     08-13    TPro  6.7  /  Alb  2.0<L>  /  TBili  1.4  /  DBili  x   /  AST  91<H>  /  ALT  70<H>  /  AlkPhos  142<H>  08-13

## 2022-08-13 NOTE — PROGRESS NOTE ADULT - ASSESSMENT
76 y/o M BIBA from home for AMS w/ associated inability to talk x 1 week.  Pt had hx of UTI prior to current admission.  CTH on admission showed left occipital infarct vs artifact.  Pt was admitted for acute metabolic encephalopathy, likely multifactorial  Pts remains acute given his persistent encephalopathy.        Hypothyroidism:  cont LT4 to 75 mcg qd .   check Tfts in 4 days     DM2 : bgs decently controlled.   check fingersticks actid and hs  cont iSS for coverage     Acute metabolic encephalopathy, likely multifactorial   possible UTI, colitis, constipation  and hepatic encephalopathy  continue management per primary team.   Ascites  of unclear etiology      possible UTI: ABX given

## 2022-08-13 NOTE — PROGRESS NOTE ADULT - ASSESSMENT
This is a 77 year old man with a significant past medical history of HTN, BPH, Hypothyroidism, back surgery (LS in 2016, Thoracic spine in 2018-requiring wheel chair assisted device) alcohol abuse stopped using alcohol 15 years ago, alcoholic cirrhosis, who arrived on July 20 to [facility] with alter mental status.     Cirrhosis - CABRERA vs ETOH- liver serologies ordered. ascites- s/p paracentesis X 2. will need cell count, total protein, albumin on next para. not enough fluid to tap right now. continue with diuretics as ordered. cbc, lfts and inr in am.

## 2022-08-13 NOTE — CHART NOTE - NSCHARTNOTEFT_GEN_A_CORE
RN called for episode of coffee ground emesis  Pt seen and examined at bedside  Pt unable to verbalize any acute complaints, baseline A&O x 1-2 with underlying dementia and currently lethargic    PHYSICAL EXAM:  GENERAL: Elderly lethargic male   HEAD:  Atraumatic   EYES: PERRLA  HEART: Regular rate and rhythm  ABDOMEN: +Abdominal distention   NERVOUS SYSTEM:  Alert & Oriented X0, moving all extremities spontaneously, facial symmetry noted  SKIN: Warm and dry     VS: /67, , Temp 97.6, SpO2 94% on RA    A/P: 76 y/o M BIBA from home for AMS. Pt admitted to [facility] for acute encephalopathy. Imaging consistent with cirrhosis, ?left occipital infarct, not a TPA candidate. On admission, hyperammonemia, hyperglycemia. Pt initiated on rifaximin and lactulose with some improvement in mental status. Hospital course c/b abdominal distention, paracentesis x 2 thus far and +ileus on imaging. TF held, and ileus resolving. TF re-initiated, however, abdominal distention persisted. GI following and ordered a KUB this AM. Pt currently off AC at this time. US 8/12 revealing limited flow to the main, left and right portal veins which may be due to thrombus versus slow flow. Moderate to large abdominal and pelvic ascites and partially visualized right pleural effusion. Pt now with coffee ground emesis, concern for aspiration. Pt now lethargic appearing, moving all extremities, keeps answering "yes" to all questions.   - CTH ordered STAT for change in mental status   - CTA Abdomen and Pelvis with IV contrast to r/o portal vein thrombosis  - H&H Q6 hrs   - Diet NPO; TF on hold  - D5NS @ 75cc and Q6 accu-checks   - CXR to r/o asp PNA  - Labs ordered: CBC, CMP, Procal, T&S, Lactate, Ammonia, Pt/INR, PTT  - Notified GI, Dr. Slade   - Signed out to night team, will obtain consent from wife Katt   - RN advised to notify PA for any further changed  - Continue to monitor

## 2022-08-13 NOTE — PROGRESS NOTE ADULT - ASSESSMENT
78 y/o M BIBA from home for AMS w/ associated inability to talk x 1 week. CTH on admission showed left occipital infarct vs artifact, no last known well time available, not a candidate for tPA.  w/u on admisison also significant for hyperammonemia and BG >400s.  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 Pt was admitted for acute metabolic encephalopathy, s/p rifaximin and lactulose with improvement in mental status. Course c/b with ileus, sx consulted. TF on hold, ileus now better. Now with urinary retention as well as recurrent ascites s/p paracentesis x2.     #Recurrent ascites  - unclear etiology, as admission CT with small ascites, now with mod-severe s/p paracentesis x2  - fluid studies negative for infection  - Hep panel negative  - abd doppler to r/o PVT/portal HTN  - c/w aldactone and lasix  - GI consulted, maryana recs    #Urinary retention  - Flomax  - s/p barreto, currently undergoing TOV    #Distended abdomen/ likely due to ascites / ileus  - CT a/p reviewed and has worsening ascites but as per IR not enough to drain; s/p 1x dose of IV lasix   - Tube feeds held  - repeat kub shows large ascites , ileus   - s/p ir guided tap, 2 L drained   - no sbp on fluid analysis. ceftriaxone dcd   - c/w daily KUB , OOB to chair with assist , monitor electrolytes   - kub shows ileus   - surgery consulted, recommend hold TF , enemas, DANIELITO, g tube to gravity  - abd u/s with mod-severe ascites  - Paracentesis repeat 8/10/22 with removal 3900 ml    #Acute metabolic encephalopathy, likely multifactorial/ improving / likely back to baseline alert and oriented x 1-2 with dementia   - possible UTI, colitis, constipation  and hepatic encephalopathy , covid positive but no resp sxs   - CTH reporting possible L-occipital lobe lucency which may represent artifact vs infarct; repeat CTH (-) for acute findings or interval changes     - Hyperammonemia in the setting of cirrhosis and AMS could be 2/2 hepatic encephalopathy and therefore started on Rifaximin on admission   - Repeat ammonia WNL   - s/p cvEEG and no  epileptiform pattern or seizures noted  - Maintain on fall and aspiration precautions   - c/w rifaximin, hold lactulose for now due to abd distention   - Fall precautions   - Monitor CBC and temperature    #Transaminitis / worsening ascites  - Likely related to cirrhosis   - CT a/p reports worsening ascites s/p IR drainage on 8/2  - Tbili remains WNL   - Hepatitis panel neg   - Trend LFTs   - < from: US Abdomen Upper Quadrant Right (07.31.22 @ 15:22) > Cirrhosis. Large volume ascites.    #possible uti   - completed ceftriaxone     #Hypothyroidism  - TFTs reviewed; TSH increasing and T4 decreased;  - Reassess TFTs in a few days    - TG ab elevated; possibly hashimoto's; TPO ab pending   - c/w v synthroid till able to resume TF 8/8 as per endo   - repeat TFTs with TSH 30  - f/u further endo recs    #Hx of CVA  - initial ct h showed ? cva but repeat cth shows no acute / subacute cva   - MRI defered by neurology and repeat CTH reviewed and is w/out interval changes  - Maintain on asa and statin therapy  - Maintain on telemetry   - Neurology consulted   - resumed on aspirin post peritoneal  tap      #Normocytic anemia / Thrombocytopenia   - H/H and platelets stable   - Low plts likely 2/2 cirrhosis   - Hemodynamically stable w/ no active bleeding noted  - Monitor CBC and transfuse if Hb<7      #DM II  - Hyperglycemic on admission and hypoglycemic while hospitalized   - A1c 7.7  - ISS and hypoglycemic protocol in place     #Sacral decubitus ulcers and rt heel decubitus ulcer both POA  - No drainage noted   - Given CT findings and elevated CRP/procal   - ID recommending MRI if suspicion high for OM however given pt afebrile and nL WBC will defer for now; If MRI pursued would likely need to be done under conscious sedation    - c/w wound care   - Monitor CBC and temperature   - Consulted ID and recs noted     #Dilated AR   - 4.1cm at sinus of valsalva   - Incidental finding on CT chest   - CTSx consulted and recs noted; no intervention warranted at this time - outpt serial imaging    #Hypovolemic hyponatremia / improved   - Improved s/p gentle hydration    - Monitor Na levels   - Monitor I/O's     #VTE ppx: Lovenox   Diet: Tube feed  Dispo: MIMI after TOV and workup etiology of ascites     Guarded Prognosis - multiple comorbidities, multiorgan system involvement, severe PCM, debility with propensity to deteriorate, prolonged hospitalization  Code status: full code, palliative care consult appreciated

## 2022-08-13 NOTE — PROGRESS NOTE ADULT - SUBJECTIVE AND OBJECTIVE BOX
Patient is a 77y old  Male who presents with a chief complaint of Altered mental state (12 Aug 2022 11:22)      HPI: This is a 77 year old man with a significant past medical history of +PEG, HTN, BPH, Hypothyroidism, back surgery (LS in 2016, Thoracic spine in 2018-requiring wheel chair assisted device) alcohol abuse stopped using alcohol 15 years ago, who arrived on July 20 to [facility] with alter mental status and inability to speak. CT-Head revealed  left occipital infarct vs artifact, not a candidate for TPA since last known well time was not available,  Subsequent  MRI negative for CVA. BS>400, elevated lactate, Elevated D-dimer, hyperammonemia, Transaminitis, CT of abdomen showed cirrhosis, colitis. Patient admitted to hospital for acute metabolic encephalopathy,  .  Paracentesis on 08/07 removing 2 L, no SBP, repeated Paracentesis on 08/10 draining 3900cc of yellow clear fluid. Course complicated by ileus, no BM reported by nursing staff. Today patient seeing by GI for rapid reaccumulated  ascites. As per patient colonoscopy/EGD in past but does not recall results.  Patient found resting comfortably, abdomen distended non tender, awake and conversational, today's US showed moderate to large abdominal pelvic ascites, and partially visualized right pleura effusion.       overnight events: no acute events. still altered this am.         PAST MEDICAL & SURGICAL HISTORY:      Allergies    oxycodone (Flushing)    Intolerances        MEDICATIONS  (STANDING):  artificial tears (preservative free) Ophthalmic Solution 1 Drop(s) Both EYES two times a day  atorvastatin 40 milliGRAM(s) Oral at bedtime  chlorhexidine 2% Cloths 1 Application(s) Topical <User Schedule>  dextrose 10% Bolus 250 milliLiter(s) IV Bolus once  dextrose 5%. 1000 milliLiter(s) (100 mL/Hr) IV Continuous <Continuous>  dextrose 5%. 1000 milliLiter(s) (50 mL/Hr) IV Continuous <Continuous>  dextrose 50% Injectable 25 Gram(s) IV Push once  dextrose 50% Injectable 12.5 Gram(s) IV Push once  dextrose 50% Injectable 25 Gram(s) IV Push once  furosemide    Tablet 20 milliGRAM(s) Oral daily  glucagon  Injectable 1 milliGRAM(s) IntraMuscular once  insulin lispro (ADMELOG) corrective regimen sliding scale   SubCutaneous three times a day before meals  levothyroxine Injectable 75 MICROGram(s) IV Push at bedtime  nystatin Powder 1 Application(s) Topical three times a day  polyethylene glycol 3350 17 Gram(s) Oral daily  rifAXIMin 550 milliGRAM(s) Oral two times a day  spironolactone 50 milliGRAM(s) Oral daily  tamsulosin 0.8 milliGRAM(s) Oral at bedtime  thiamine 100 milliGRAM(s) Oral daily    MEDICATIONS  (PRN):  acetaminophen     Tablet .. 650 milliGRAM(s) Oral every 6 hours PRN Temp greater or equal to 38C (100.4F), Mild Pain (1 - 3)  bisacodyl Suppository 10 milliGRAM(s) Rectal daily PRN Constipation  dextrose Oral Gel 15 Gram(s) Oral once PRN Blood Glucose LESS THAN 70 milliGRAM(s)/deciliter  ondansetron Injectable 4 milliGRAM(s) IV Push every 6 hours PRN Nausea and/or Vomiting      Social History:    Marital Status:  (   )    (   ) Single    (   )    (  )   Occupation:   Lives with: (  ) alone  (  ) children   (  ) spouse   (  ) parents  (  ) other    Substance Use (street drugs): (  ) never used  (  ) other:  Tobacco Usage:  (   ) never smoked   (   ) former smoker   (   ) current smoker  (     ) pack year  (        ) last cigarette date  Alcohol Usage:  Sexual History:     Family History   IBD (  ) Yes   (  ) No  GI Malignancy (  )  Yes    (  ) No    Health Management     Last Colonoscopy -      (     ) Advanced Directives: (     ) None    (      ) DNR    (     ) DNI    (     ) Health Care Proxy:     Review of Systems:  · ENMT: negative  · Respiratory and Thorax: negative  · Cardiovascular: negative  · Gastrointestinal: see above.  · Genitourinary:	negative  · Musculoskeletal: negative  · Neurological: negative  · Psychiatric: negative  · Hematology/Lymphatics: negative  · Endocrine: negative    Vital Signs Last 24 Hrs  T(C): 36.6 (12 Aug 2022 05:18), Max: 36.7 (11 Aug 2022 17:14)  T(F): 97.8 (12 Aug 2022 05:18), Max: 98 (11 Aug 2022 17:14)  HR: 103 (12 Aug 2022 05:18) (103 - 109)  BP: 112/69 (12 Aug 2022 05:18) (110/66 - 115/69)  BP(mean): --  RR: 18 (12 Aug 2022 05:18) (18 - 18)  SpO2: 96% (12 Aug 2022 05:18) (93% - 96%)    Parameters below as of 12 Aug 2022 05:18  Patient On (Oxygen Delivery Method): room air        PHYSICAL EXAM:  · Constitutional: Elderly looking man, ill looking  · Eyes: no drainage or redness  · ENMT: No oral lesions; no gross abnormalities  · Respiratory:  no accessory muscle use  · Cardiovascular: Regular rate & rhythm, normal  · Gastrointestinal: Soft, non-tender,softly distended, normal bowel sounds, +PEG              LABS:                        11.4   6.21  )-----------( 105      ( 12 Aug 2022 06:52 )             35.4     08-12    130<L>  |  101  |  15.9  ----------------------------<  108<H>  4.1   |  20.0<L>  |  0.97    Ca    7.6<L>      12 Aug 2022 06:52  Phos  2.3     08-12  Mg     1.9     08-12          LIVER FUNCTIONS - ( 09 Aug 2022 06:40 )  Alb: 1.9 g/dL / Pro: 6.3 g/dL / ALK PHOS: 121 U/L / ALT: 67 U/L / AST: 95 U/L / GGT: x             RADIOLOGY & ADDITIONAL TESTS:  < from: US Abdomen Doppler (08.12.22 @ 10:07) >    ACC: [id number] EXAM:  US DPLX ABDOMEN                          PROCEDURE DATE:  08/12/2022          INTERPRETATION:  Clinical indication: Assess ascites, portal hypertension   and portal thrombosis    COMPARISON: Abdominal ultrasound dated 8/6/2022,CT abdomen pelvis dated   7/29/2022 and 7/20/2022    FINDINGS: Exam is markedly limited due to bowel gas and ascites as well   as suboptimal patient positioning.    There is moderate to large ascites in all 4 quadrants as well as a   partially visualized right pleural effusion. The IVC, aorta, splenic   artery and vein and hepatic veins are not well-visualized due to bowel   gas.    Peak systolic velocity within the hepatic artery is 116 cm/sec.    No flow visualized within the main, left or right portal veins, although   evaluation is limited.    The spleen is normal in size measuring 9 cm.    IMPRESSION: Technically limited exam. Flow is not definitely seen within   the main, left and right portal veins which may be due to thrombus versus   slow flow. MRI of the abdomen with contrast is recommended for further   evaluation if there is no contraindication.    Moderate to large abdominal and pelvic ascites and partially visualized   right pleural effusion.    --- End of Report ---      < end of copied text >

## 2022-08-13 NOTE — PROGRESS NOTE ADULT - SUBJECTIVE AND OBJECTIVE BOX
INTERVAL HPI/OVERNIGHT EVENTS:  follow-up for DM management    MEDICATIONS  (STANDING):  artificial tears (preservative free) Ophthalmic Solution 1 Drop(s) Both EYES two times a day  atorvastatin 40 milliGRAM(s) Oral at bedtime  chlorhexidine 2% Cloths 1 Application(s) Topical <User Schedule>  dextrose 10% Bolus 250 milliLiter(s) IV Bolus once  dextrose 5%. 1000 milliLiter(s) (100 mL/Hr) IV Continuous <Continuous>  dextrose 5%. 1000 milliLiter(s) (50 mL/Hr) IV Continuous <Continuous>  dextrose 50% Injectable 25 Gram(s) IV Push once  dextrose 50% Injectable 12.5 Gram(s) IV Push once  dextrose 50% Injectable 25 Gram(s) IV Push once  furosemide    Tablet 40 milliGRAM(s) Oral daily  glucagon  Injectable 1 milliGRAM(s) IntraMuscular once  insulin lispro (ADMELOG) corrective regimen sliding scale   SubCutaneous three times a day before meals  levothyroxine Injectable 75 MICROGram(s) IV Push at bedtime  nystatin Powder 1 Application(s) Topical three times a day  polyethylene glycol 3350 17 Gram(s) Oral daily  rifAXIMin 550 milliGRAM(s) Oral two times a day  spironolactone 100 milliGRAM(s) Oral daily  tamsulosin 0.8 milliGRAM(s) Oral at bedtime  thiamine 100 milliGRAM(s) Oral daily    MEDICATIONS  (PRN):  acetaminophen     Tablet .. 650 milliGRAM(s) Oral every 6 hours PRN Temp greater or equal to 38C (100.4F), Mild Pain (1 - 3)  bisacodyl Suppository 10 milliGRAM(s) Rectal daily PRN Constipation  dextrose Oral Gel 15 Gram(s) Oral once PRN Blood Glucose LESS THAN 70 milliGRAM(s)/deciliter  ondansetron Injectable 4 milliGRAM(s) IV Push every 6 hours PRN Nausea and/or Vomiting    Allergies  oxycodone (Flushing)      Review of systems: no shortness of breath, no chest pain, no headache, no nausea, no vomiting    Vital Signs Last 24 Hrs  T(C): 36.9 (13 Aug 2022 12:04), Max: 36.9 (13 Aug 2022 12:04)  T(F): 98.4 (13 Aug 2022 12:04), Max: 98.4 (13 Aug 2022 12:04)  HR: 112 (13 Aug 2022 12:04) (102 - 112)  BP: 123/76 (13 Aug 2022 12:04) (103/58 - 123/76)  BP(mean): --  RR: 20 (13 Aug 2022 12:04) (18 - 20)  SpO2: 95% (13 Aug 2022 12:04) (95% - 97%)    Parameters below as of 13 Aug 2022 12:04  Patient On (Oxygen Delivery Method): room air    General: Elderly male, lying in bed, appears chronically ill  HEENT:  EOMI  NECK:  Supple  CVS: regular rate and rhythm S1 S2  RESP:  Poor effort, no distress  GI:  distended abdomen, soft on the right side, more tympanic on the left RUQ PEG  MSK:  Able to move upper extremities  CNS:  Awake but slow to respond. LE weakness  INTEG:  Sacral decubitus  PSYCH:  Slow speech      LABS:                        11.4   6.21  )-----------( 105      ( 12 Aug 2022 06:52 )             35.4     08-13    133<L>  |  102  |  19.0  ----------------------------<  143<H>  4.2   |  21.0<L>  |  1.13    Ca    7.7<L>      13 Aug 2022 07:42  Phos  2.8     08-13  Mg     1.9     08-13    TPro  6.7  /  Alb  2.0<L>  /  TBili  1.4  /  DBili  x   /  AST  91<H>  /  ALT  70<H>  /  AlkPhos  142<H>  08-13          CAPILLARY BLOOD GLUCOSE      RADIOLOGY & ADDITIONAL TESTS:

## 2022-08-14 LAB
ALBUMIN SERPL ELPH-MCNC: 2 G/DL — LOW (ref 3.3–5.2)
ALP SERPL-CCNC: 138 U/L — HIGH (ref 40–120)
ALT FLD-CCNC: 66 U/L — HIGH
ANION GAP SERPL CALC-SCNC: 10 MMOL/L — SIGNIFICANT CHANGE UP (ref 5–17)
APPEARANCE UR: CLEAR — SIGNIFICANT CHANGE UP
AST SERPL-CCNC: 81 U/L — HIGH
BACTERIA # UR AUTO: ABNORMAL
BILIRUB SERPL-MCNC: 1.5 MG/DL — SIGNIFICANT CHANGE UP (ref 0.4–2)
BILIRUB UR-MCNC: ABNORMAL
BUN SERPL-MCNC: 20.1 MG/DL — HIGH (ref 8–20)
CALCIUM SERPL-MCNC: 7.8 MG/DL — LOW (ref 8.4–10.5)
CHLORIDE SERPL-SCNC: 101 MMOL/L — SIGNIFICANT CHANGE UP (ref 98–107)
CO2 SERPL-SCNC: 21 MMOL/L — LOW (ref 22–29)
COLOR SPEC: YELLOW — SIGNIFICANT CHANGE UP
CREAT SERPL-MCNC: 1.01 MG/DL — SIGNIFICANT CHANGE UP (ref 0.5–1.3)
DIFF PNL FLD: ABNORMAL
EGFR: 76 ML/MIN/1.73M2 — SIGNIFICANT CHANGE UP
EPI CELLS # UR: SIGNIFICANT CHANGE UP
GLUCOSE BLDC GLUCOMTR-MCNC: 112 MG/DL — HIGH (ref 70–99)
GLUCOSE BLDC GLUCOMTR-MCNC: 114 MG/DL — HIGH (ref 70–99)
GLUCOSE BLDC GLUCOMTR-MCNC: 127 MG/DL — HIGH (ref 70–99)
GLUCOSE BLDC GLUCOMTR-MCNC: 131 MG/DL — HIGH (ref 70–99)
GLUCOSE BLDC GLUCOMTR-MCNC: 156 MG/DL — HIGH (ref 70–99)
GLUCOSE BLDC GLUCOMTR-MCNC: 168 MG/DL — HIGH (ref 70–99)
GLUCOSE SERPL-MCNC: 154 MG/DL — HIGH (ref 70–99)
GLUCOSE UR QL: NEGATIVE — SIGNIFICANT CHANGE UP
HCT VFR BLD CALC: 30.7 % — LOW (ref 39–50)
HCT VFR BLD CALC: 31.4 % — LOW (ref 39–50)
HCT VFR BLD CALC: 32.3 % — LOW (ref 39–50)
HCT VFR BLD CALC: 33.1 % — LOW (ref 39–50)
HGB BLD-MCNC: 10.5 G/DL — LOW (ref 13–17)
HGB BLD-MCNC: 10.8 G/DL — LOW (ref 13–17)
HGB BLD-MCNC: 10.9 G/DL — LOW (ref 13–17)
HGB BLD-MCNC: 9.9 G/DL — LOW (ref 13–17)
INR BLD: 2.22 RATIO — HIGH (ref 0.88–1.16)
KETONES UR-MCNC: ABNORMAL
LACTATE BLDV-MCNC: 2.8 MMOL/L — HIGH (ref 0.5–2)
LACTATE SERPL-SCNC: 2.6 MMOL/L — HIGH (ref 0.5–2)
LACTATE SERPL-SCNC: 2.9 MMOL/L — HIGH (ref 0.5–2)
LACTATE SERPL-SCNC: 3 MMOL/L — HIGH (ref 0.5–2)
LACTATE SERPL-SCNC: 3.8 MMOL/L — HIGH (ref 0.5–2)
LACTATE SERPL-SCNC: 4.2 MMOL/L — CRITICAL HIGH (ref 0.5–2)
LEUKOCYTE ESTERASE UR-ACNC: ABNORMAL
MAGNESIUM SERPL-MCNC: 2 MG/DL — SIGNIFICANT CHANGE UP (ref 1.6–2.6)
MCHC RBC-ENTMCNC: 30.5 PG — SIGNIFICANT CHANGE UP (ref 27–34)
MCHC RBC-ENTMCNC: 31.3 PG — SIGNIFICANT CHANGE UP (ref 27–34)
MCHC RBC-ENTMCNC: 32.9 GM/DL — SIGNIFICANT CHANGE UP (ref 32–36)
MCHC RBC-ENTMCNC: 33.4 GM/DL — SIGNIFICANT CHANGE UP (ref 32–36)
MCV RBC AUTO: 92.7 FL — SIGNIFICANT CHANGE UP (ref 80–100)
MCV RBC AUTO: 93.5 FL — SIGNIFICANT CHANGE UP (ref 80–100)
NITRITE UR-MCNC: POSITIVE
PH UR: 5 — SIGNIFICANT CHANGE UP (ref 5–8)
PHOSPHATE SERPL-MCNC: 3 MG/DL — SIGNIFICANT CHANGE UP (ref 2.4–4.7)
PLATELET # BLD AUTO: 132 K/UL — LOW (ref 150–400)
PLATELET # BLD AUTO: 94 K/UL — LOW (ref 150–400)
POTASSIUM SERPL-MCNC: 4.7 MMOL/L — SIGNIFICANT CHANGE UP (ref 3.5–5.3)
POTASSIUM SERPL-SCNC: 4.7 MMOL/L — SIGNIFICANT CHANGE UP (ref 3.5–5.3)
PROT SERPL-MCNC: 6.7 G/DL — SIGNIFICANT CHANGE UP (ref 6.6–8.7)
PROT UR-MCNC: 30 MG/DL
PROTHROM AB SERPL-ACNC: 26 SEC — HIGH (ref 10.5–13.4)
RBC # BLD: 3.36 M/UL — LOW (ref 4.2–5.8)
RBC # BLD: 3.57 M/UL — LOW (ref 4.2–5.8)
RBC # FLD: 15.6 % — HIGH (ref 10.3–14.5)
RBC # FLD: 15.7 % — HIGH (ref 10.3–14.5)
RBC CASTS # UR COMP ASSIST: ABNORMAL /HPF (ref 0–4)
SODIUM SERPL-SCNC: 132 MMOL/L — LOW (ref 135–145)
SP GR SPEC: 1.01 — SIGNIFICANT CHANGE UP (ref 1.01–1.02)
UROBILINOGEN FLD QL: 8
WBC # BLD: 11.34 K/UL — HIGH (ref 3.8–10.5)
WBC # BLD: 13.64 K/UL — HIGH (ref 3.8–10.5)
WBC # FLD AUTO: 11.34 K/UL — HIGH (ref 3.8–10.5)
WBC # FLD AUTO: 13.64 K/UL — HIGH (ref 3.8–10.5)
WBC UR QL: SIGNIFICANT CHANGE UP /HPF (ref 0–5)

## 2022-08-14 PROCEDURE — 74018 RADEX ABDOMEN 1 VIEW: CPT | Mod: 26

## 2022-08-14 PROCEDURE — 99233 SBSQ HOSP IP/OBS HIGH 50: CPT

## 2022-08-14 PROCEDURE — 74174 CTA ABD&PLVS W/CONTRAST: CPT | Mod: 26

## 2022-08-14 PROCEDURE — 99233 SBSQ HOSP IP/OBS HIGH 50: CPT | Mod: FS

## 2022-08-14 RX ORDER — PIPERACILLIN AND TAZOBACTAM 4; .5 G/20ML; G/20ML
3.38 INJECTION, POWDER, LYOPHILIZED, FOR SOLUTION INTRAVENOUS EVERY 8 HOURS
Refills: 0 | Status: DISCONTINUED | OUTPATIENT
Start: 2022-08-14 | End: 2022-08-15

## 2022-08-14 RX ORDER — SODIUM CHLORIDE 9 MG/ML
500 INJECTION INTRAMUSCULAR; INTRAVENOUS; SUBCUTANEOUS ONCE
Refills: 0 | Status: COMPLETED | OUTPATIENT
Start: 2022-08-14 | End: 2022-08-14

## 2022-08-14 RX ORDER — LACTULOSE 10 G/15ML
30 SOLUTION ORAL EVERY 6 HOURS
Refills: 0 | Status: DISCONTINUED | OUTPATIENT
Start: 2022-08-14 | End: 2022-08-15

## 2022-08-14 RX ORDER — VANCOMYCIN HCL 1 G
1000 VIAL (EA) INTRAVENOUS ONCE
Refills: 0 | Status: COMPLETED | OUTPATIENT
Start: 2022-08-14 | End: 2022-08-14

## 2022-08-14 RX ORDER — SODIUM CHLORIDE 9 MG/ML
500 INJECTION, SOLUTION INTRAVENOUS ONCE
Refills: 0 | Status: COMPLETED | OUTPATIENT
Start: 2022-08-14 | End: 2022-08-14

## 2022-08-14 RX ORDER — PIPERACILLIN AND TAZOBACTAM 4; .5 G/20ML; G/20ML
3.38 INJECTION, POWDER, LYOPHILIZED, FOR SOLUTION INTRAVENOUS ONCE
Refills: 0 | Status: COMPLETED | OUTPATIENT
Start: 2022-08-14 | End: 2022-08-14

## 2022-08-14 RX ADMIN — NYSTATIN CREAM 1 APPLICATION(S): 100000 CREAM TOPICAL at 14:27

## 2022-08-14 RX ADMIN — NYSTATIN CREAM 1 APPLICATION(S): 100000 CREAM TOPICAL at 07:52

## 2022-08-14 RX ADMIN — SODIUM CHLORIDE 500 MILLILITER(S): 9 INJECTION INTRAMUSCULAR; INTRAVENOUS; SUBCUTANEOUS at 09:24

## 2022-08-14 RX ADMIN — Medication 100 MILLIGRAM(S): at 12:43

## 2022-08-14 RX ADMIN — ATORVASTATIN CALCIUM 40 MILLIGRAM(S): 80 TABLET, FILM COATED ORAL at 22:53

## 2022-08-14 RX ADMIN — CHLORHEXIDINE GLUCONATE 1 APPLICATION(S): 213 SOLUTION TOPICAL at 07:51

## 2022-08-14 RX ADMIN — SODIUM CHLORIDE 75 MILLILITER(S): 9 INJECTION, SOLUTION INTRAVENOUS at 22:57

## 2022-08-14 RX ADMIN — PANTOPRAZOLE SODIUM 40 MILLIGRAM(S): 20 TABLET, DELAYED RELEASE ORAL at 17:25

## 2022-08-14 RX ADMIN — Medication 650 MILLIGRAM(S): at 23:41

## 2022-08-14 RX ADMIN — Medication 1 DROP(S): at 07:53

## 2022-08-14 RX ADMIN — Medication 75 MICROGRAM(S): at 22:53

## 2022-08-14 RX ADMIN — SODIUM CHLORIDE 75 MILLILITER(S): 9 INJECTION, SOLUTION INTRAVENOUS at 12:50

## 2022-08-14 RX ADMIN — OCTREOTIDE ACETATE 10 MICROGRAM(S)/HR: 200 INJECTION, SOLUTION INTRAVENOUS; SUBCUTANEOUS at 10:46

## 2022-08-14 RX ADMIN — Medication 2: at 17:22

## 2022-08-14 RX ADMIN — Medication 1 DROP(S): at 17:50

## 2022-08-14 RX ADMIN — LACTULOSE 30 GRAM(S): 10 SOLUTION ORAL at 17:25

## 2022-08-14 RX ADMIN — NYSTATIN CREAM 1 APPLICATION(S): 100000 CREAM TOPICAL at 22:53

## 2022-08-14 RX ADMIN — SODIUM CHLORIDE 1000 MILLILITER(S): 9 INJECTION, SOLUTION INTRAVENOUS at 03:40

## 2022-08-14 RX ADMIN — POLYETHYLENE GLYCOL 3350 17 GRAM(S): 17 POWDER, FOR SOLUTION ORAL at 12:43

## 2022-08-14 RX ADMIN — PIPERACILLIN AND TAZOBACTAM 25 GRAM(S): 4; .5 INJECTION, POWDER, LYOPHILIZED, FOR SOLUTION INTRAVENOUS at 14:27

## 2022-08-14 RX ADMIN — LACTULOSE 30 GRAM(S): 10 SOLUTION ORAL at 12:42

## 2022-08-14 RX ADMIN — PIPERACILLIN AND TAZOBACTAM 25 GRAM(S): 4; .5 INJECTION, POWDER, LYOPHILIZED, FOR SOLUTION INTRAVENOUS at 22:53

## 2022-08-14 RX ADMIN — PIPERACILLIN AND TAZOBACTAM 200 GRAM(S): 4; .5 INJECTION, POWDER, LYOPHILIZED, FOR SOLUTION INTRAVENOUS at 09:35

## 2022-08-14 RX ADMIN — Medication 250 MILLIGRAM(S): at 09:42

## 2022-08-14 RX ADMIN — PANTOPRAZOLE SODIUM 40 MILLIGRAM(S): 20 TABLET, DELAYED RELEASE ORAL at 07:52

## 2022-08-14 RX ADMIN — LACTULOSE 30 GRAM(S): 10 SOLUTION ORAL at 22:52

## 2022-08-14 NOTE — PROGRESS NOTE ADULT - ASSESSMENT
76 y/o M BIBA from home for AMS w/ associated inability to talk x 1 week. CTH on admission showed left occipital infarct vs artifact, no last known well time available, not a candidate for tPA.  w/u on admisison also significant for hyperammonemia and BG >400s.  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 Pt was admitted for acute metabolic encephalopathy, s/p rifaximin and lactulose with improvement in mental status. Course c/b with ileus, sx consulted. TF on hold, ileus now better. Now with urinary retention as well as recurrent ascites s/p paracentesis x2.     #Recurrent ascites  - unclear etiology, as admission CT with small ascites, now with mod-severe s/p paracentesis x2  - fluid studies negative for infection  - Hep panel negative  - us abd / ? prital vein thrombosis , follow up ct abd pelvis doesnot show portal vein thrombosis    - gi following   - started on octeotride gtt   - c/w ppi bid iv     #Urinary retention  - Flomax  - s/p barreto, currently undergoing TOV  - ua borderline , completed abxs for uti last week  ,hold off abxs for now     #Distended abdomen/ likely due to ascites / recurrent/ likley with some component of ileus   - no sbp on fluid analysis  - c/w daily KUB , OOB to chair with assist , monitor electrolytes   - surgery consulted, recommend hold TF , enemas, DANIELITO, g tube to gravity, tube feeds held intermittently , were resumed  but now held again due to coffee ground emesis   - abd u/s with mod-severe ascites  - s/p IR guided draianage x 2 , last Paracentesis repeat 8/10/22 with removal 3900 ml    #Acute metabolic encephalopathy, likely multifactorial/ improving / likely back to baseline alert and oriented x 1-2 with dementia   - possible UTI, colitis, constipation  and hepatic encephalopathy , covid positive but no resp sxs   - CTH reporting possible L-occipital lobe lucency which may represent artifact vs infarct; repeat CTH (-) for acute findings or interval changes     - Hyperammonemia in the setting of cirrhosis and AMS could be 2/2 hepatic encephalopathy and therefore started on Rifaximin on admission , improved initially but now slightly up again in 70s   - s/p cvEEG and no  epileptiform pattern or seizures noted  - c/w rifaximin, lactulose     #Transaminitis / worsening ascites  - Likely related to cirrhosis   - CT a/p reports worsening ascites s/p IR drainage on 8/2, 8/10   - Tbili remains WNL   - Hepatitis panel neg   - Trend LFTs     #possible uti   - completed ceftriaxone     #Hypothyroidism  - TFTs reviewed; TSH increasing and T4 decreased;  - Reassess TFTs in a few days    - TG ab elevated; possibly hashimoto's; TPO ab pending   - c/w  i v synthroid for now till able to take po   - endo following , repeat tfts in 4 days as per endo     #Hx of CVA  - initial ct h showed ? cva but repeat cth shows no acute / subacute cva   - MRI defered by neurology and repeat CTH reviewed and is w/out interval changes  - Maintain on asa and statin therapy  - Neurology consulted   - resumed on aspirin post peritoneal  tap   - no further intervention as per neuro      #Normocytic anemia / Thrombocytopenia   - H/H and platelets stable   - Low plts likely 2/2 cirrhosis   - Hemodynamically stable w/ no active bleeding noted  - Monitor CBC and transfuse if Hb<7      #DM II  - Hyperglycemic on admission and hypoglycemic while hospitalized   - A1c 7.7  - ISS and hypoglycemic protocol in place     #Sacral decubitus ulcers and rt heel decubitus ulcer both POA  - No drainage noted   - ID recommending MRI if suspicion high for OM however given pt afebrile and nL WBC will defer for now; If MRI pursued would likely need to be done under conscious sedation    - c/w wound care     #Dilated AR   - 4.1cm at sinus of valsalva   - Incidental finding on CT chest   - CTSx consulted and recs noted; no intervention warranted at this time - outpt serial imaging    #Hypovolemic hyponatremia  - gentle ivf if tf held   - Monitor Na levels   - Monitor I/O's     #VTE ppx: scds due to coffee ground emesis     Diet: Tube feed on hold again since 8/13 due to vomiting   Guarded Prognosis - multiple comorbidities, multiorgan system involvement, severe PCM, debility with propensity to deteriorate, prolonged hospitalization  Code status: full code, palliative care following.   attempted to call wife kimberli on [phone] . went to voice mail. left message   dispo: remains acute

## 2022-08-14 NOTE — PROGRESS NOTE ADULT - ASSESSMENT
This is a 77 year old man with a significant past medical history of HTN, BPH, Hypothyroidism, back surgery (LS in 2016, Thoracic spine in 2018-requiring wheel chair assisted device) alcohol abuse stopped using alcohol 15 years ago, alcoholic cirrhosis, who arrived on July 20 to [facility] with alter mental status.     Cirrhosis - CABRERA vs ETOH- liver serologies ordered. ascites- s/p paracentesis X 2. will need cell count, total protein, albumin on next para. not enough fluid to tap right now. continue with diuretics as ordered. trend lfts and INR daily. Hb stable, no active GIB. Ileus. now with ?pna on CT. No PVT.     This is a 77 year old man with a significant past medical history of HTN, BPH, Hypothyroidism, back surgery (LS in 2016, Thoracic spine in 2018-requiring wheel chair assisted device) alcohol abuse stopped using alcohol 15 years ago, alcoholic cirrhosis, who arrived on July 20 to [facility] with alter mental status.     AMS w/ Cirrhosis -  ETOH likely (CABRERA possible) w/ ascites. continue with diuretics as ordered. trend lfts, cbc and INR daily. Hb stable, no active GIB. Ileus. now with ?pna on CT. No PVT. cont lactulose.     This is a 77 year old man with a significant past medical history of HTN, BPH, Hypothyroidism, back surgery (LS in 2016, Thoracic spine in 2018-requiring wheel chair assisted device) alcohol abuse stopped using alcohol 15 years ago, alcoholic cirrhosis, who arrived on July 20 to [facility] with alter mental status.     AMS w/ Cirrhosis -  ETOH likely (CABRERA possible) w/ ascites. continue with diuretics as ordered. trend lfts, cbc and INR daily. Hb stable, no active GIB. Ileus. now with ?pna on CT. No PVT. cont lactulose. vit K tomorrow if INR rises further.

## 2022-08-14 NOTE — CHART NOTE - NSCHARTNOTEFT_GEN_A_CORE
PA NOTE-LATE ENTRY    Asked to follow up on CT Abd/Pelvis Results due to increasing Acities and a 2 episodes of Coffee Ground Emesis      78 y/o M BIBA from home for AMS. Pt admitted to [facility] for acute encephalopathy. Imaging consistent with cirrhosis, ?left occipital infarct, not a TPA candidate. On admission, hyperammonemia, hyperglycemia. Pt initiated on rifaximin and lactulose with some improvement in mental status. Hospital course c/b abdominal distention, paracentesis x 2 thus far and +ileus on imaging. TF held, and ileus resolving. TF re-initiated, however, abdominal distention persisted. GI following and ordered a KUB. Pt currently off AC at this time. US 8/12 revealing limited flow to the main, left and right portal veins which may be due to thrombus versus slow flow. Moderate to large abdominal and pelvic ascites and partially visualized right pleural effusion. Pt now with coffee ground emesis, concern for aspiration. Pt now lethargic appearing, moving all extremities, keeps answering "yes" to all questions. Pt made NPO     General: WDWN Male lying in Bed non Verbal, Moans when asked questions    Vitals:   BP: 109/60  HR: 106  RR: 18  T: 98.6 po  O2 sat: 95 ra     Cardiac: S1S2 + tachy  REG Rhythm  Lungs: Good air entry No Wheezing/Rales/Rhonchi B/L   Abd: + distention + Pain Rxs on palp LLQ + BS   Ext: No C/C/E x 4   Integument: No Ashiness warm/Dry     A/P Follow up CT Brain due to lethargy- No Acute findings  Abd/Pelvis ordered due to Increasing Acities   LABS:                      10.9   11.34 )-----------( 132      ( 14 Aug 2022 02:00 )             33.1     08-14    132<L>  |  101  |  20.1<H>  ----------------------------<  154<H>  4.7   |  21.0<L>  |  1.01  Lactate; 2.8    Repeat lactate 3.8  500 cc's x 15 min  Repeat Lactate 2.8 500 cc IV Bolus x 15 min   Blood cx x 2  UA   Pt started on Zosyn Q 8 and given dose of vancomycin 1 gm       ACC: [id number] EXAM:  CT ANGIO ABD PELV (W)AW IC                        PROCEDURE DATE:  08/14/2022    INTERPRETATION:  CLINICAL INFORMATION: Suspicion for portal vein   thrombosis.  COMPARISON: 7/29/2022.  FINDINGS:  LOWER CHEST: The heart is borderline in size. There are small bilateral pleural effusions and associated compressive atelectasis. Additional patchy opacity of the right posterior upper lobe is appreciated, suspicious for superimposed pneumonia..    LIVER: Nodular hepatic contour compatible with cirrhosis. The portal veins enhance normally. There is no portal vein thrombosis.  BILE DUCTS: Normal caliber.  GALLBLADDER: Cholecystectomy.  SPLEEN: Within normal limits.  PANCREAS: Within normal limits.  ADRENALS: Within normal limits.  KIDNEYS/URETERS: There are bilateral renal cysts. There is no urinary tract obstruction.    BLADDER: The urinary bladder is diffusely thick-walled.  REPRODUCTIVE ORGANS: Limited evaluation secondary to streak artifact from right hip hardware.    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 A percutaneous gastrostomy tube is noted with the catheter balloon is seen inflated along the anterior gastric antrum. There is diffuse wall thickening of the stomach and proximal small bowel. There is also bowel wall thickening of the ascending colon. There is colonic diverticulosis.  PERITONEUM: Moderate to large amount ascites is appreciated within the intraperitoneal cavity. There is generalized marked soft tissue edema..  VESSELS: Atherosclerotic changes.  RETROPERITONEUM/LYMPH NODES: No lymphadenopathy.  ABDOMINAL WALL: Within normal limits.  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 Degenerative changes of left hip are appreciated. Postlaminectomy changes of lumbar spine appreciated. Posterior pedicle screws and rods are seen from T10 through L1..    IMPRESSION:  No portal vein thrombosis.  Cirrhotic liver with associated ascites and varices.  Gastric, proximal small bowel and ascending colon wall thickening. While this may be related to gastritis, enteritis and colitis, this pattern can also be seen with portal enteropathy.  Suspected right upper lobe pneumonia.  Urinary bladder wall thickening which may be reactive to adjacent ascites or represent sequelae of chronic outlet obstruction or cystitis. linical correlation with urinalysis is advised.  Generalized anasarca.  Small bilateral effusions.  --- End of Report ---  COLIN BARFIELD MD; Attending Radiologist  This document has been electronically signed. Aug 14 2022 6:16AM      Continue to monitor Pt  Recall PA PRN  Will sign out to AM Team to follow                                                      Notes          - CTA Abdomen and Pelvis with IV contrast to r/o portal vein thrombosis PA NOTE-LATE ENTRY    Asked to follow up on CT Abd/Pelvis Results due to increasing Acities and a 2 episodes of Coffee Ground Emesis      76 y/o M BIBA from home for AMS. Pt admitted to [facility] for acute encephalopathy. Imaging consistent with cirrhosis, ?left occipital infarct, not a TPA candidate. On admission, hyperammonemia, hyperglycemia. Pt initiated on rifaximin and lactulose with some improvement in mental status. Hospital course c/b abdominal distention, paracentesis x 2 thus far and +ileus on imaging. TF held, and ileus resolving. TF re-initiated, however, abdominal distention persisted. GI following and ordered a KUB. Pt currently off AC at this time. US 8/12 revealing limited flow to the main, left and right portal veins which may be due to thrombus versus slow flow. Moderate to large abdominal and pelvic ascites and partially visualized right pleural effusion. Pt now with coffee ground emesis, concern for aspiration. Pt now lethargic appearing, moving all extremities, keeps answering "yes" to all questions. Pt made NPO     General: WDWN Male lying in Bed non Verbal, Moans when asked questions    Vitals:   BP: 109/60  HR: 106  RR: 18  T: 98.6 po  O2 sat: 95 ra     Cardiac: S1S2 + tachy  REG Rhythm  Lungs: Good air entry No Wheezing/Rales/Rhonchi B/L   Abd: + distention + Pain Rxs on palp LLQ + BS   Ext: No C/C/E x 4   Integument: No Ashiness warm/Dry     A/P Follow up CT Brain due to lethargy- No Acute findings  Abd/Pelvis ordered due to Increasing Acities   LABS:                      10.9   11.34 )-----------( 132      ( 14 Aug 2022 02:00 )             33.1     08-14    132<L>  |  101  |  20.1<H>  ----------------------------<  154<H>  4.7   |  21.0<L>  |  1.01  Lactate; 2.8    Repeat lactate 3.8  500 cc's x 15 min  Repeat Lactate 2.8 500 cc IV Bolus x 15 min   Blood cx x 2  UA   Pt started on Zosyn Q 8 and given dose of vancomycin 1 gm       ACC: [id number] EXAM:  CT ANGIO ABD PELV (W)AW IC                        PROCEDURE DATE:  08/14/2022    INTERPRETATION:  CLINICAL INFORMATION: Suspicion for portal vein   thrombosis.  COMPARISON: 7/29/2022.  FINDINGS:  LOWER CHEST: The heart is borderline in size. There are small bilateral pleural effusions and associated compressive atelectasis. Additional patchy opacity of the right posterior upper lobe is appreciated, suspicious for superimposed pneumonia..    LIVER: Nodular hepatic contour compatible with cirrhosis. The portal veins enhance normally. There is no portal vein thrombosis.  BILE DUCTS: Normal caliber.  GALLBLADDER: Cholecystectomy.  SPLEEN: Within normal limits.  PANCREAS: Within normal limits.  ADRENALS: Within normal limits.  KIDNEYS/URETERS: There are bilateral renal cysts. There is no urinary tract obstruction.    BLADDER: The urinary bladder is diffusely thick-walled.  REPRODUCTIVE ORGANS: Limited evaluation secondary to streak artifact from right hip hardware.    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 A percutaneous gastrostomy tube is noted with the catheter balloon is seen inflated along the anterior gastric antrum. There is diffuse wall thickening of the stomach and proximal small bowel. There is also bowel wall thickening of the ascending colon. There is colonic diverticulosis.  PERITONEUM: Moderate to large amount ascites is appreciated within the intraperitoneal cavity. There is generalized marked soft tissue edema..  VESSELS: Atherosclerotic changes.  RETROPERITONEUM/LYMPH NODES: No lymphadenopathy.  ABDOMINAL WALL: Within normal limits.  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 Degenerative changes of left hip are appreciated. Postlaminectomy changes of lumbar spine appreciated. Posterior pedicle screws and rods are seen from T10 through L1..    IMPRESSION:  No portal vein thrombosis.  Cirrhotic liver with associated ascites and varices.  Gastric, proximal small bowel and ascending colon wall thickening. While this may be related to gastritis, enteritis and colitis, this pattern can also be seen with portal enteropathy.  Suspected right upper lobe pneumonia.  Urinary bladder wall thickening which may be reactive to adjacent ascites or represent sequelae of chronic outlet obstruction or cystitis. linical correlation with urinalysis is advised.  Generalized anasarca.  Small bilateral effusions.  --- End of Report ---  COLIN BARFIELD MD; Attending Radiologist  This document has been electronically signed. Aug 14 2022 6:16AM      Continue to monitor Pt  Continue Zosyn   Recall PA PRN  Will sign out to AM Team to follow

## 2022-08-14 NOTE — PROGRESS NOTE ADULT - SUBJECTIVE AND OBJECTIVE BOX
cc: ileus ,     SUBJECTIVE / OVERNIGHT EVENTS: patient seen and eval. General: Elderly male, lying in bed, appears chronically ill, noted to have coffee ground emesis  yesterday , no new episodes.     Vital Signs Last 24 Hrs  T(C): 36.3 (14 Aug 2022 09:22), Max: 37 (13 Aug 2022 20:00)  T(F): 97.4 (14 Aug 2022 09:22), Max: 98.6 (13 Aug 2022 20:00)  HR: 108 (14 Aug 2022 09:22) (104 - 113)  BP: 107/59 (14 Aug 2022 09:22) (106/60 - 145/79)  BP(mean): --  RR: 18 (14 Aug 2022 09:22) (18 - 20)  SpO2: 98% (14 Aug 2022 09:22) (93% - 98%)    Parameters below as of 14 Aug 2022 09:22  Patient On (Oxygen Delivery Method): room air      HEENT:  EOMI  NECK:  Supple  CVS: regular rate and rhythm S1 S2  RESP:  Poor effort, no distress  GI:  distended abdomen, soft on the right side, more tympanic on the left RUQ PEG  : [place]  MSK:  Able to move upper extremities, wea LE  CNS:  Awake but slow to respond. LE weakness , chronic   INTEG:  Sacral decubitus  PSYCH:  Slow speech                          10.9   11.34 )-----------( 132      ( 14 Aug 2022 02:00 )             33.1   08-14    132<L>  |  101  |  20.1<H>  ----------------------------<  154<H>  4.7   |  21.0<L>  |  1.01    Ca    7.8<L>      14 Aug 2022 02:00  Phos  3.0     08-14  Mg     2.0     08-14    TPro  6.7  /  Alb  2.0<L>  /  TBili  1.5  /  DBili  x   /  AST  81<H>  /  ALT  66<H>  /  AlkPhos  138<H>  08-14      MEDICATIONS  (STANDING):  artificial tears (preservative free) Ophthalmic Solution 1 Drop(s) Both EYES two times a day  atorvastatin 40 milliGRAM(s) Oral at bedtime  chlorhexidine 2% Cloths 1 Application(s) Topical <User Schedule>  dextrose 10% Bolus 250 milliLiter(s) IV Bolus once  dextrose 5% + sodium chloride 0.9%. 1000 milliLiter(s) (75 mL/Hr) IV Continuous <Continuous>  dextrose 5%. 1000 milliLiter(s) (100 mL/Hr) IV Continuous <Continuous>  dextrose 5%. 1000 milliLiter(s) (50 mL/Hr) IV Continuous <Continuous>  dextrose 50% Injectable 25 Gram(s) IV Push once  dextrose 50% Injectable 12.5 Gram(s) IV Push once  dextrose 50% Injectable 25 Gram(s) IV Push once  furosemide    Tablet 40 milliGRAM(s) Oral daily  glucagon  Injectable 1 milliGRAM(s) IntraMuscular once  insulin lispro (ADMELOG) corrective regimen sliding scale   SubCutaneous three times a day before meals  lactulose Syrup 30 Gram(s) Oral every 6 hours  levothyroxine Injectable 75 MICROGram(s) IV Push at bedtime  nystatin Powder 1 Application(s) Topical three times a day  octreotide  Infusion 50 MICROgram(s)/Hr (10 mL/Hr) IV Continuous <Continuous>  pantoprazole  Injectable 40 milliGRAM(s) IV Push two times a day  pantoprazole  Injectable      piperacillin/tazobactam IVPB. 3.375 Gram(s) IV Intermittent once  piperacillin/tazobactam IVPB.. 3.375 Gram(s) IV Intermittent every 8 hours  polyethylene glycol 3350 17 Gram(s) Oral daily  rifAXIMin 550 milliGRAM(s) Oral two times a day  spironolactone 100 milliGRAM(s) Oral daily  tamsulosin 0.8 milliGRAM(s) Oral at bedtime  thiamine 100 milliGRAM(s) Oral daily  vancomycin  IVPB 1000 milliGRAM(s) IV Intermittent once    MEDICATIONS  (PRN):  acetaminophen     Tablet .. 650 milliGRAM(s) Oral every 6 hours PRN Temp greater or equal to 38C (100.4F), Mild Pain (1 - 3)  bisacodyl Suppository 10 milliGRAM(s) Rectal daily PRN Constipation  dextrose Oral Gel 15 Gram(s) Oral once PRN Blood Glucose LESS THAN 70 milliGRAM(s)/deciliter  ondansetron Injectable 4 milliGRAM(s) IV Push every 6 hours PRN Nausea and/or Vomiting    < from: CT Angio Abdomen and Pelvis w/ IV Cont (08.14.22 @ 05:51) >  IMPRESSION:  No portal vein thrombosis.    Cirrhotic liver with associated ascites and varices.    Gastric, proximal small bowel and ascending colon wall thickening. While   this may be related to gastritis, enteritis and colitis, this pattern can   also be seen with portal enteropathy.    Suspected right upper lobe pneumonia.    Urinary bladder wall thickening which may be reactive to adjacent ascites   or represent sequelae of chronic outlet obstruction or cystitis. Clinical   correlation with urinalysis is advised.    Generalized anasarca.    Small bilateral effusions.    < end of copied text >    < from: US Abdomen Doppler (08.12.22 @ 10:07) >  MPRESSION: Technically limited exam. Flow is not definitely seen within   the main, left and right portal veins which may be due to thrombus versus   slow flow. MRI of the abdomen with contrast is recommended for further   evaluation if there is no contraindication.    Moderate to large abdominal and pelvic ascites and partially visualized   right pleural effusion.      < end of copied text >

## 2022-08-14 NOTE — PROGRESS NOTE ADULT - SUBJECTIVE AND OBJECTIVE BOX
Patient is a 77y old  Male who presents with a chief complaint of Altered mental state (12 Aug 2022 11:22)      HPI: This is a 77 year old man with a significant past medical history of +PEG, HTN, BPH, Hypothyroidism, back surgery (LS in 2016, Thoracic spine in 2018-requiring wheel chair assisted device) alcohol abuse stopped using alcohol 15 years ago, who arrived on July 20 to [facility] with alter mental status and inability to speak. CT-Head revealed  left occipital infarct vs artifact, not a candidate for TPA since last known well time was not available,  Subsequent  MRI negative for CVA. BS>400, elevated lactate, Elevated D-dimer, hyperammonemia, Transaminitis, CT of abdomen showed cirrhosis, colitis. Patient admitted to hospital for acute metabolic encephalopathy,  .  Paracentesis on 08/07 removing 2 L, no SBP, repeated Paracentesis on 08/10 draining 3900cc of yellow clear fluid. Course complicated by ileus, no BM reported by nursing staff. Today patient seeing by GI for rapid reaccumulated  ascites. As per patient colonoscopy/EGD in past but does not recall results.  Patient found resting comfortably, abdomen distended non tender, awake and conversational, today's US showed moderate to large abdominal pelvic ascites, and partially visualized right pleura effusion.       overnight events: vomiting yesterday evening--brown material.  no active s/s GIB this am.       PAST MEDICAL & SURGICAL HISTORY:      Allergies    oxycodone (Flushing)    Intolerances        MEDICATIONS  (STANDING):  artificial tears (preservative free) Ophthalmic Solution 1 Drop(s) Both EYES two times a day  atorvastatin 40 milliGRAM(s) Oral at bedtime  chlorhexidine 2% Cloths 1 Application(s) Topical <User Schedule>  dextrose 10% Bolus 250 milliLiter(s) IV Bolus once  dextrose 5%. 1000 milliLiter(s) (100 mL/Hr) IV Continuous <Continuous>  dextrose 5%. 1000 milliLiter(s) (50 mL/Hr) IV Continuous <Continuous>  dextrose 50% Injectable 25 Gram(s) IV Push once  dextrose 50% Injectable 12.5 Gram(s) IV Push once  dextrose 50% Injectable 25 Gram(s) IV Push once  furosemide    Tablet 20 milliGRAM(s) Oral daily  glucagon  Injectable 1 milliGRAM(s) IntraMuscular once  insulin lispro (ADMELOG) corrective regimen sliding scale   SubCutaneous three times a day before meals  levothyroxine Injectable 75 MICROGram(s) IV Push at bedtime  nystatin Powder 1 Application(s) Topical three times a day  polyethylene glycol 3350 17 Gram(s) Oral daily  rifAXIMin 550 milliGRAM(s) Oral two times a day  spironolactone 50 milliGRAM(s) Oral daily  tamsulosin 0.8 milliGRAM(s) Oral at bedtime  thiamine 100 milliGRAM(s) Oral daily    MEDICATIONS  (PRN):  acetaminophen     Tablet .. 650 milliGRAM(s) Oral every 6 hours PRN Temp greater or equal to 38C (100.4F), Mild Pain (1 - 3)  bisacodyl Suppository 10 milliGRAM(s) Rectal daily PRN Constipation  dextrose Oral Gel 15 Gram(s) Oral once PRN Blood Glucose LESS THAN 70 milliGRAM(s)/deciliter  ondansetron Injectable 4 milliGRAM(s) IV Push every 6 hours PRN Nausea and/or Vomiting      Social History:    Marital Status:  (   )    (   ) Single    (   )    (  )   Occupation:   Lives with: (  ) alone  (  ) children   (  ) spouse   (  ) parents  (  ) other    Substance Use (street drugs): (  ) never used  (  ) other:  Tobacco Usage:  (   ) never smoked   (   ) former smoker   (   ) current smoker  (     ) pack year  (        ) last cigarette date  Alcohol Usage:  Sexual History:     Family History   IBD (  ) Yes   (  ) No  GI Malignancy (  )  Yes    (  ) No    Health Management     Last Colonoscopy -      (     ) Advanced Directives: (     ) None    (      ) DNR    (     ) DNI    (     ) Health Care Proxy:     Review of Systems:  · ENMT: negative  · Respiratory and Thorax: negative  · Cardiovascular: negative  · Gastrointestinal: see above.  · Genitourinary:	negative  · Musculoskeletal: negative  · Neurological: negative  · Psychiatric: negative  · Hematology/Lymphatics: negative  · Endocrine: negative    Vital Signs Last 24 Hrs  T(C): 36.6 (12 Aug 2022 05:18), Max: 36.7 (11 Aug 2022 17:14)  T(F): 97.8 (12 Aug 2022 05:18), Max: 98 (11 Aug 2022 17:14)  HR: 103 (12 Aug 2022 05:18) (103 - 109)  BP: 112/69 (12 Aug 2022 05:18) (110/66 - 115/69)  BP(mean): --  RR: 18 (12 Aug 2022 05:18) (18 - 18)  SpO2: 96% (12 Aug 2022 05:18) (93% - 96%)    Parameters below as of 12 Aug 2022 05:18  Patient On (Oxygen Delivery Method): room air        PHYSICAL EXAM:  · Constitutional: Elderly looking man, ill looking  · Eyes: no drainage or redness  · ENMT: No oral lesions; no gross abnormalities  · Respiratory:  no accessory muscle use  · Cardiovascular: Regular rate & rhythm, normal  · Gastrointestinal: Soft, non-tender,softly distended, normal bowel sounds, +PEG              LABS:                        11.4   6.21  )-----------( 105      ( 12 Aug 2022 06:52 )             35.4     08-12    130<L>  |  101  |  15.9  ----------------------------<  108<H>  4.1   |  20.0<L>  |  0.97    Ca    7.6<L>      12 Aug 2022 06:52  Phos  2.3     08-12  Mg     1.9     08-12          LIVER FUNCTIONS - ( 09 Aug 2022 06:40 )  Alb: 1.9 g/dL / Pro: 6.3 g/dL / ALK PHOS: 121 U/L / ALT: 67 U/L / AST: 95 U/L / GGT: x             RADIOLOGY & ADDITIONAL TESTS:  < from: US Abdomen Doppler (08.12.22 @ 10:07) >    ACC: [id number] EXAM:  US DPLX ABDOMEN                          PROCEDURE DATE:  08/12/2022          INTERPRETATION:  Clinical indication: Assess ascites, portal hypertension   and portal thrombosis    COMPARISON: Abdominal ultrasound dated 8/6/2022,CT abdomen pelvis dated   7/29/2022 and 7/20/2022    FINDINGS: Exam is markedly limited due to bowel gas and ascites as well   as suboptimal patient positioning.    There is moderate to large ascites in all 4 quadrants as well as a   partially visualized right pleural effusion. The IVC, aorta, splenic   artery and vein and hepatic veins are not well-visualized due to bowel   gas.    Peak systolic velocity within the hepatic artery is 116 cm/sec.    No flow visualized within the main, left or right portal veins, although   evaluation is limited.    The spleen is normal in size measuring 9 cm.    IMPRESSION: Technically limited exam. Flow is not definitely seen within   the main, left and right portal veins which may be due to thrombus versus   slow flow. MRI of the abdomen with contrast is recommended for further   evaluation if there is no contraindication.    Moderate to large abdominal and pelvic ascites and partially visualized   right pleural effusion.    --- End of Report ---      < end of copied text >       Patient is a 77y old  Male who presents with a chief complaint of Altered mental state (12 Aug 2022 11:22)          overnight events: vomiting yesterday evening--brown material.  no active s/s GIB this am.       PAST MEDICAL & SURGICAL HISTORY:      Allergies    oxycodone (Flushing)    Intolerances        MEDICATIONS  (STANDING):  artificial tears (preservative free) Ophthalmic Solution 1 Drop(s) Both EYES two times a day  atorvastatin 40 milliGRAM(s) Oral at bedtime  chlorhexidine 2% Cloths 1 Application(s) Topical <User Schedule>  dextrose 10% Bolus 250 milliLiter(s) IV Bolus once  dextrose 5%. 1000 milliLiter(s) (100 mL/Hr) IV Continuous <Continuous>  dextrose 5%. 1000 milliLiter(s) (50 mL/Hr) IV Continuous <Continuous>  dextrose 50% Injectable 25 Gram(s) IV Push once  dextrose 50% Injectable 12.5 Gram(s) IV Push once  dextrose 50% Injectable 25 Gram(s) IV Push once  furosemide    Tablet 20 milliGRAM(s) Oral daily  glucagon  Injectable 1 milliGRAM(s) IntraMuscular once  insulin lispro (ADMELOG) corrective regimen sliding scale   SubCutaneous three times a day before meals  levothyroxine Injectable 75 MICROGram(s) IV Push at bedtime  nystatin Powder 1 Application(s) Topical three times a day  polyethylene glycol 3350 17 Gram(s) Oral daily  rifAXIMin 550 milliGRAM(s) Oral two times a day  spironolactone 50 milliGRAM(s) Oral daily  tamsulosin 0.8 milliGRAM(s) Oral at bedtime  thiamine 100 milliGRAM(s) Oral daily    MEDICATIONS  (PRN):  acetaminophen     Tablet .. 650 milliGRAM(s) Oral every 6 hours PRN Temp greater or equal to 38C (100.4F), Mild Pain (1 - 3)  bisacodyl Suppository 10 milliGRAM(s) Rectal daily PRN Constipation  dextrose Oral Gel 15 Gram(s) Oral once PRN Blood Glucose LESS THAN 70 milliGRAM(s)/deciliter  ondansetron Injectable 4 milliGRAM(s) IV Push every 6 hours PRN Nausea and/or Vomiting      Social History:    Marital Status:  (   )    (   ) Single    (   )    (  )   Occupation:   Lives with: (  ) alone  (  ) children   (  ) spouse   (  ) parents  (  ) other    Substance Use (street drugs): (  ) never used  (  ) other:  Tobacco Usage:  (   ) never smoked   (   ) former smoker   (   ) current smoker  (     ) pack year  (        ) last cigarette date  Alcohol Usage:  Sexual History:     Family History   IBD (  ) Yes   (  ) No  GI Malignancy (  )  Yes    (  ) No    Health Management     Last Colonoscopy -      (     ) Advanced Directives: (     ) None    (      ) DNR    (     ) DNI    (     ) Health Care Proxy:     Review of Systems:  · ENMT: negative  · Respiratory and Thorax: negative  · Cardiovascular: negative  · Gastrointestinal: see above.  · Genitourinary:	negative  · Musculoskeletal: negative  · Neurological: negative  · Psychiatric: negative  · Hematology/Lymphatics: negative  · Endocrine: negative    Vital Signs Last 24 Hrs  T(C): 36.6 (12 Aug 2022 05:18), Max: 36.7 (11 Aug 2022 17:14)  T(F): 97.8 (12 Aug 2022 05:18), Max: 98 (11 Aug 2022 17:14)  HR: 103 (12 Aug 2022 05:18) (103 - 109)  BP: 112/69 (12 Aug 2022 05:18) (110/66 - 115/69)  BP(mean): --  RR: 18 (12 Aug 2022 05:18) (18 - 18)  SpO2: 96% (12 Aug 2022 05:18) (93% - 96%)    Parameters below as of 12 Aug 2022 05:18  Patient On (Oxygen Delivery Method): room air        PHYSICAL EXAM:  · Constitutional: Elderly looking man, ill looking  · Eyes: no drainage or redness  · ENMT: No oral lesions; no gross abnormalities  · Respiratory:  no accessory muscle use  · Cardiovascular: Regular rate & rhythm, normal  · Gastrointestinal: Soft, non-tender,softly distended, normal bowel sounds, +PEG              LABS:                        11.4   6.21  )-----------( 105      ( 12 Aug 2022 06:52 )             35.4     08-12    130<L>  |  101  |  15.9  ----------------------------<  108<H>  4.1   |  20.0<L>  |  0.97    Ca    7.6<L>      12 Aug 2022 06:52  Phos  2.3     08-12  Mg     1.9     08-12          LIVER FUNCTIONS - ( 09 Aug 2022 06:40 )  Alb: 1.9 g/dL / Pro: 6.3 g/dL / ALK PHOS: 121 U/L / ALT: 67 U/L / AST: 95 U/L / GGT: x             RADIOLOGY & ADDITIONAL TESTS:  < from: US Abdomen Doppler (08.12.22 @ 10:07) >    ACC: [id number] EXAM:  US DPLX ABDOMEN                          PROCEDURE DATE:  08/12/2022          INTERPRETATION:  Clinical indication: Assess ascites, portal hypertension   and portal thrombosis    COMPARISON: Abdominal ultrasound dated 8/6/2022,CT abdomen pelvis dated   7/29/2022 and 7/20/2022    FINDINGS: Exam is markedly limited due to bowel gas and ascites as well   as suboptimal patient positioning.    There is moderate to large ascites in all 4 quadrants as well as a   partially visualized right pleural effusion. The IVC, aorta, splenic   artery and vein and hepatic veins are not well-visualized due to bowel   gas.    Peak systolic velocity within the hepatic artery is 116 cm/sec.    No flow visualized within the main, left or right portal veins, although   evaluation is limited.    The spleen is normal in size measuring 9 cm.    IMPRESSION: Technically limited exam. Flow is not definitely seen within   the main, left and right portal veins which may be due to thrombus versus   slow flow. MRI of the abdomen with contrast is recommended for further   evaluation if there is no contraindication.    Moderate to large abdominal and pelvic ascites and partially visualized   right pleural effusion.    --- End of Report ---      < end of copied text >       Patient is a 77y old  Male who presents with a chief complaint of Altered mental state (12 Aug 2022 11:22)          overnight events: vomiting yesterday evening--brown material.  no active s/s GIB this am.       PAST MEDICAL & SURGICAL HISTORY:      Allergies    oxycodone (Flushing)    Intolerances        MEDICATIONS  (STANDING):  artificial tears (preservative free) Ophthalmic Solution 1 Drop(s) Both EYES two times a day  atorvastatin 40 milliGRAM(s) Oral at bedtime  chlorhexidine 2% Cloths 1 Application(s) Topical <User Schedule>  dextrose 10% Bolus 250 milliLiter(s) IV Bolus once  dextrose 5%. 1000 milliLiter(s) (100 mL/Hr) IV Continuous <Continuous>  dextrose 5%. 1000 milliLiter(s) (50 mL/Hr) IV Continuous <Continuous>  dextrose 50% Injectable 25 Gram(s) IV Push once  dextrose 50% Injectable 12.5 Gram(s) IV Push once  dextrose 50% Injectable 25 Gram(s) IV Push once  furosemide    Tablet 20 milliGRAM(s) Oral daily   glucagon  Injectable 1 milliGRAM(s) IntraMuscular once  insulin lispro (ADMELOG) corrective regimen sliding scale   SubCutaneous three times a day before meals  levothyroxine Injectable 75 MICROGram(s) IV Push at bedtime  nystatin Powder 1 Application(s) Topical three times a day  polyethylene glycol 3350 17 Gram(s) Oral daily  rifAXIMin 550 milliGRAM(s) Oral two times a day  spironolactone 50 milliGRAM(s) Oral daily  tamsulosin 0.8 milliGRAM(s) Oral at bedtime  thiamine 100 milliGRAM(s) Oral daily    MEDICATIONS  (PRN):  acetaminophen     Tablet .. 650 milliGRAM(s) Oral every 6 hours PRN Temp greater or equal to 38C (100.4F), Mild Pain (1 - 3)  bisacodyl Suppository 10 milliGRAM(s) Rectal daily PRN Constipation  dextrose Oral Gel 15 Gram(s) Oral once PRN Blood Glucose LESS THAN 70 milliGRAM(s)/deciliter  ondansetron Injectable 4 milliGRAM(s) IV Push every 6 hours PRN Nausea and/or Vomiting      Social History:    Marital Status:  (   )    (   ) Single    (   )    (  )   Occupation:   Lives with: (  ) alone  (  ) children   (  ) spouse   (  ) parents  (  ) other    Substance Use (street drugs): (  ) never used  (  ) other:  Tobacco Usage:  (   ) never smoked   (   ) former smoker   (   ) current smoker  (     ) pack year  (        ) last cigarette date  Alcohol Usage:  Sexual History:     Family History   IBD (  ) Yes   (  ) No  GI Malignancy (  )  Yes    (  ) No    Health Management     Last Colonoscopy -      (     ) Advanced Directives: (     ) None    (      ) DNR    (     ) DNI    (     ) Health Care Proxy:     Review of Systems:  · ENMT: negative  · Respiratory and Thorax: negative  · Cardiovascular: negative  · Gastrointestinal: see above.  · Genitourinary:	negative  · Musculoskeletal: negative  · Neurological: negative  · Psychiatric: negative  · Hematology/Lymphatics: negative  · Endocrine: negative    Vital Signs Last 24 Hrs  T(C): 36.6 (12 Aug 2022 05:18), Max: 36.7 (11 Aug 2022 17:14)  T(F): 97.8 (12 Aug 2022 05:18), Max: 98 (11 Aug 2022 17:14)  HR: 103 (12 Aug 2022 05:18) (103 - 109)  BP: 112/69 (12 Aug 2022 05:18) (110/66 - 115/69)  BP(mean): --  RR: 18 (12 Aug 2022 05:18) (18 - 18)  SpO2: 96% (12 Aug 2022 05:18) (93% - 96%)    Parameters below as of 12 Aug 2022 05:18  Patient On (Oxygen Delivery Method): room air        PHYSICAL EXAM:  · Constitutional: Elderly looking man, ill looking  · Eyes: no drainage or redness  · ENMT: No oral lesions; no gross abnormalities  · Respiratory:  no accessory muscle use  · Cardiovascular: Regular rate & rhythm, normal  · Gastrointestinal: Soft, non-tender,softly distended, normal bowel sounds, +PEG              LABS:                        11.4   6.21  )-----------( 105      ( 12 Aug 2022 06:52 )             35.4     08-12    130<L>  |  101  |  15.9  ----------------------------<  108<H>  4.1   |  20.0<L>  |  0.97    Ca    7.6<L>      12 Aug 2022 06:52  Phos  2.3     08-12  Mg     1.9     08-12          LIVER FUNCTIONS - ( 09 Aug 2022 06:40 )  Alb: 1.9 g/dL / Pro: 6.3 g/dL / ALK PHOS: 121 U/L / ALT: 67 U/L / AST: 95 U/L / GGT: x             RADIOLOGY & ADDITIONAL TESTS:  < from: US Abdomen Doppler (08.12.22 @ 10:07) >    ACC: [id number] EXAM:  US DPLX ABDOMEN                          PROCEDURE DATE:  08/12/2022          INTERPRETATION:  Clinical indication: Assess ascites, portal hypertension   and portal thrombosis    COMPARISON: Abdominal ultrasound dated 8/6/2022,CT abdomen pelvis dated   7/29/2022 and 7/20/2022    FINDINGS: Exam is markedly limited due to bowel gas and ascites as well   as suboptimal patient positioning.    There is moderate to large ascites in all 4 quadrants as well as a   partially visualized right pleural effusion. The IVC, aorta, splenic   artery and vein and hepatic veins are not well-visualized due to bowel   gas.    Peak systolic velocity within the hepatic artery is 116 cm/sec.    No flow visualized within the main, left or right portal veins, although   evaluation is limited.    The spleen is normal in size measuring 9 cm.    IMPRESSION: Technically limited exam. Flow is not definitely seen within   the main, left and right portal veins which may be due to thrombus versus   slow flow. MRI of the abdomen with contrast is recommended for further   evaluation if there is no contraindication.    Moderate to large abdominal and pelvic ascites and partially visualized   right pleural effusion.    --- End of Report ---      < end of copied text >

## 2022-08-15 VITALS
TEMPERATURE: 99 F | HEART RATE: 101 BPM | DIASTOLIC BLOOD PRESSURE: 63 MMHG | OXYGEN SATURATION: 93 % | SYSTOLIC BLOOD PRESSURE: 98 MMHG | RESPIRATION RATE: 19 BRPM

## 2022-08-15 LAB
ALBUMIN SERPL ELPH-MCNC: 1.7 G/DL — LOW (ref 3.3–5.2)
ALP SERPL-CCNC: 111 U/L — SIGNIFICANT CHANGE UP (ref 40–120)
ALT FLD-CCNC: 60 U/L — HIGH
ANION GAP SERPL CALC-SCNC: 10 MMOL/L — SIGNIFICANT CHANGE UP (ref 5–17)
AST SERPL-CCNC: 75 U/L — HIGH
BILIRUB SERPL-MCNC: 1.5 MG/DL — SIGNIFICANT CHANGE UP (ref 0.4–2)
BUN SERPL-MCNC: 18.2 MG/DL — SIGNIFICANT CHANGE UP (ref 8–20)
CALCIUM SERPL-MCNC: 7.3 MG/DL — LOW (ref 8.4–10.5)
CHLORIDE SERPL-SCNC: 104 MMOL/L — SIGNIFICANT CHANGE UP (ref 98–107)
CO2 SERPL-SCNC: 19 MMOL/L — LOW (ref 22–29)
CREAT SERPL-MCNC: 1.09 MG/DL — SIGNIFICANT CHANGE UP (ref 0.5–1.3)
EGFR: 69 ML/MIN/1.73M2 — SIGNIFICANT CHANGE UP
GLUCOSE BLDC GLUCOMTR-MCNC: 132 MG/DL — HIGH (ref 70–99)
GLUCOSE BLDC GLUCOMTR-MCNC: 136 MG/DL — HIGH (ref 70–99)
GLUCOSE BLDC GLUCOMTR-MCNC: 155 MG/DL — HIGH (ref 70–99)
GLUCOSE SERPL-MCNC: 134 MG/DL — HIGH (ref 70–99)
HCT VFR BLD CALC: 28.5 % — LOW (ref 39–50)
HCT VFR BLD CALC: 30.1 % — LOW (ref 39–50)
HGB BLD-MCNC: 9.5 G/DL — LOW (ref 13–17)
HGB BLD-MCNC: 9.7 G/DL — LOW (ref 13–17)
LACTATE SERPL-SCNC: 2.3 MMOL/L — HIGH (ref 0.5–2)
LACTATE SERPL-SCNC: 2.4 MMOL/L — HIGH (ref 0.5–2)
LACTATE SERPL-SCNC: 2.5 MMOL/L — HIGH (ref 0.5–2)
LACTATE SERPL-SCNC: 2.5 MMOL/L — HIGH (ref 0.5–2)
LACTATE SERPL-SCNC: 2.7 MMOL/L — HIGH (ref 0.5–2)
LKM AB SER-ACNC: <20.1 UNITS — SIGNIFICANT CHANGE UP (ref 0–20)
MAGNESIUM SERPL-MCNC: 1.9 MG/DL — SIGNIFICANT CHANGE UP (ref 1.6–2.6)
MCHC RBC-ENTMCNC: 31 PG — SIGNIFICANT CHANGE UP (ref 27–34)
MCHC RBC-ENTMCNC: 33.3 GM/DL — SIGNIFICANT CHANGE UP (ref 32–36)
MCV RBC AUTO: 93.1 FL — SIGNIFICANT CHANGE UP (ref 80–100)
PLATELET # BLD AUTO: 92 K/UL — LOW (ref 150–400)
POTASSIUM SERPL-MCNC: 4 MMOL/L — SIGNIFICANT CHANGE UP (ref 3.5–5.3)
POTASSIUM SERPL-SCNC: 4 MMOL/L — SIGNIFICANT CHANGE UP (ref 3.5–5.3)
PROT SERPL-MCNC: 5.9 G/DL — LOW (ref 6.6–8.7)
RBC # BLD: 3.06 M/UL — LOW (ref 4.2–5.8)
RBC # FLD: 15.8 % — HIGH (ref 10.3–14.5)
SODIUM SERPL-SCNC: 133 MMOL/L — LOW (ref 135–145)
WBC # BLD: 9.27 K/UL — SIGNIFICANT CHANGE UP (ref 3.8–10.5)
WBC # FLD AUTO: 9.27 K/UL — SIGNIFICANT CHANGE UP (ref 3.8–10.5)

## 2022-08-15 PROCEDURE — 99232 SBSQ HOSP IP/OBS MODERATE 35: CPT | Mod: FS

## 2022-08-15 PROCEDURE — 99233 SBSQ HOSP IP/OBS HIGH 50: CPT | Mod: FS

## 2022-08-15 PROCEDURE — 99497 ADVNCD CARE PLAN 30 MIN: CPT

## 2022-08-15 PROCEDURE — 99497 ADVNCD CARE PLAN 30 MIN: CPT | Mod: 25

## 2022-08-15 PROCEDURE — 99233 SBSQ HOSP IP/OBS HIGH 50: CPT

## 2022-08-15 RX ORDER — MORPHINE SULFATE 50 MG/1
2 CAPSULE, EXTENDED RELEASE ORAL EVERY 4 HOURS
Refills: 0 | Status: DISCONTINUED | OUTPATIENT
Start: 2022-08-15 | End: 2022-08-16

## 2022-08-15 RX ORDER — ROBINUL 0.2 MG/ML
0.2 INJECTION INTRAMUSCULAR; INTRAVENOUS EVERY 6 HOURS
Refills: 0 | Status: DISCONTINUED | OUTPATIENT
Start: 2022-08-15 | End: 2022-08-16

## 2022-08-15 RX ORDER — DIAZEPAM 5 MG
5 TABLET ORAL EVERY 4 HOURS
Refills: 0 | Status: DISCONTINUED | OUTPATIENT
Start: 2022-08-15 | End: 2022-08-16

## 2022-08-15 RX ORDER — ROBINUL 0.2 MG/ML
0.2 INJECTION INTRAMUSCULAR; INTRAVENOUS EVERY 4 HOURS
Refills: 0 | Status: DISCONTINUED | OUTPATIENT
Start: 2022-08-15 | End: 2022-08-16

## 2022-08-15 RX ADMIN — SODIUM CHLORIDE 75 MILLILITER(S): 9 INJECTION, SOLUTION INTRAVENOUS at 12:47

## 2022-08-15 RX ADMIN — NYSTATIN CREAM 1 APPLICATION(S): 100000 CREAM TOPICAL at 14:52

## 2022-08-15 RX ADMIN — LACTULOSE 30 GRAM(S): 10 SOLUTION ORAL at 11:21

## 2022-08-15 RX ADMIN — Medication 1 DROP(S): at 18:13

## 2022-08-15 RX ADMIN — ROBINUL 0.2 MILLIGRAM(S): 0.2 INJECTION INTRAMUSCULAR; INTRAVENOUS at 22:55

## 2022-08-15 RX ADMIN — PANTOPRAZOLE SODIUM 40 MILLIGRAM(S): 20 TABLET, DELAYED RELEASE ORAL at 05:08

## 2022-08-15 RX ADMIN — POLYETHYLENE GLYCOL 3350 17 GRAM(S): 17 POWDER, FOR SOLUTION ORAL at 11:21

## 2022-08-15 RX ADMIN — Medication 650 MILLIGRAM(S): at 01:00

## 2022-08-15 RX ADMIN — LACTULOSE 30 GRAM(S): 10 SOLUTION ORAL at 05:47

## 2022-08-15 RX ADMIN — CHLORHEXIDINE GLUCONATE 1 APPLICATION(S): 213 SOLUTION TOPICAL at 05:49

## 2022-08-15 RX ADMIN — NYSTATIN CREAM 1 APPLICATION(S): 100000 CREAM TOPICAL at 05:49

## 2022-08-15 RX ADMIN — Medication 1 DROP(S): at 05:50

## 2022-08-15 RX ADMIN — ROBINUL 0.2 MILLIGRAM(S): 0.2 INJECTION INTRAMUSCULAR; INTRAVENOUS at 18:15

## 2022-08-15 RX ADMIN — PIPERACILLIN AND TAZOBACTAM 25 GRAM(S): 4; .5 INJECTION, POWDER, LYOPHILIZED, FOR SOLUTION INTRAVENOUS at 05:09

## 2022-08-15 RX ADMIN — Medication 2: at 12:45

## 2022-08-15 RX ADMIN — PIPERACILLIN AND TAZOBACTAM 25 GRAM(S): 4; .5 INJECTION, POWDER, LYOPHILIZED, FOR SOLUTION INTRAVENOUS at 14:41

## 2022-08-15 RX ADMIN — Medication 100 MILLIGRAM(S): at 11:21

## 2022-08-15 NOTE — PROGRESS NOTE ADULT - ASSESSMENT
78 y/o M BIBA from home for AMS w/ associated inability to talk x 1 week. CTH on admission showed left occipital infarct vs artifact, no last known well time available, not a candidate for tPA.  w/u on admisison also significant for hyperammonemia and BG >400s.  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 Pt was admitted for acute metabolic encephalopathy, s/p rifaximin and lactulose with improvement in mental status. Course c/b with ileus, sx consulted. TF on hold, ileus now better. Now with urinary retention as well as recurrent ascites s/p paracentesis x2.     #Recurrent ascites  - unclear etiology, as admission CT with small ascites, now with mod-severe s/p paracentesis x2  - fluid studies negative for infection  - Hep panel negative  - us abd / ? prital vein thrombosis , follow up ct abd pelvis doesnot show NO portal vein thrombosis    - gi following   - c/w ppi bid iv     #Urinary retention  - Flomax  - s/p barreto, currently undergoing TOV  - ua borderline , completed abxs for uti last week  ,hold off abxs for now     #Distended abdomen/ likely due to ascites / recurrent ascites / likley with some component of ileus   - no sbp on fluid analysis  - c/w daily KUB , OOB to chair with assist , monitor electrolytes   - surgery consulted, recommend hold TF , enemas, DANIELITO, g tube to gravity, tube feeds held intermittently , were resumed  but now held again due to coffee ground emesis   - abd u/s with mod-severe ascites  - s/p IR guided draianage x 2 , last Paracentesis repeat 8/10/22 with removal 3900 ml  - tf on hold again     #Acute metabolic encephalopathy, likely multifactorial/ improving / likely back to baseline alert and oriented x 1-2 with dementia   - possible UTI, colitis, constipation  and hepatic encephalopathy , covid positive but no resp sxs   - CTH reporting possible L-occipital lobe lucency which may represent artifact vs infarct; repeat CTH (-) for acute findings or interval changes     - Hyperammonemia in the setting of cirrhosis and AMS could be 2/2 hepatic encephalopathy and therefore started on Rifaximin on admission , improved initially but now slightly up again in 70s   - s/p cvEEG and no  epileptiform pattern or seizures noted  - c/w rifaximin, lactulose     #Transaminitis / worsening ascites  - Likely related to cirrhosis   - CT a/p reports worsening ascites s/p IR drainage on 8/2, 8/10   - Tbili remains WNL   - Hepatitis panel neg   - Trend LFTs     #possible uti   - completed ceftriaxone     #Hypothyroidism  - TFTs reviewed; TSH increasing and T4 decreased;  - Reassess TFTs in a few days    - TG ab elevated; possibly hashimoto's; TPO ab pending   - c/w  i v synthroid for now till able to take po   - endo following , repeat tfts in few  days as per endo     #Hx of CVA  - initial ct h showed ? cva but repeat cth shows no acute / subacute cva   - MRI defered by neurology and repeat CTH reviewed and is w/out interval changes  - Maintain on asa and statin therapy  - Neurology consulted   - resumed on aspirin post peritoneal  tap   - no further intervention as per neuro      #Normocytic anemia / Thrombocytopenia   - H/H and platelets stable   - Low plts likely 2/2 cirrhosis   - Hemodynamically stable w/ no active bleeding noted  - Monitor CBC and transfuse if Hb<7      #DM II  - Hyperglycemic on admission and hypoglycemic while hospitalized   - A1c 7.7  - ISS and hypoglycemic protocol in place     #Sacral decubitus ulcers and rt heel decubitus ulcer both POA  - No drainage noted   - ID recommending MRI if suspicion high for OM however given pt afebrile and nL WBC will defer for now; If MRI pursued would likely need to be done under conscious sedation    - c/w wound care     #Dilated AR   - 4.1cm at sinus of valsalva   - Incidental finding on CT chest   - CTSx consulted and recs noted; no intervention warranted at this time - outpt serial imaging    #Hypovolemic hyponatremia  - gentle ivf if tf held   - Monitor Na levels   - Monitor I/O's     #VTE ppx: scds due to coffee ground emesis     Diet: Tube feed on hold again since 8/13 due to vomiting   Guarded Prognosis - multiple comorbidities, multiorgan system involvement, severe PCM, debility with propensity to deteriorate, prolonged hospitalization  Code status: full code, palliative care following.  met with  wife kimberli  at bedside . GOC discussed ,

## 2022-08-15 NOTE — GOALS OF CARE CONVERSATION - ADVANCED CARE PLANNING - CONVERSATION DETAILS
Care manager note: Chart reviewed.  Case discussed with interdisciplinary team as Palliative NP noted that goals of care discussion held with family who were receptive to comfort care and inpatient hospice at this time.  Outreach made to patient's spouse Katt (ph: [phone]) who confirmed such.  Hospice philosophy as well as local agencies/locations discussed with wife who is requesting [facility].  All questions answered.  Referral sent and MD notified of need for doc-to-doc.  Awaiting further determination at this time.  Will continue to follow.
Met with Kattalex Platt (wife) along with Dr. Alfred to discuss GOC  We discussed Diagnosis, prognosis, MOLST, treatment options/comfort care/palliative care  At the end of our conversation her goal was to shift to optimal comfort level and symptom management.  Explained starting medications for symptom management in anticipation of symptoms in dyspnea, pain, excessive secretions and agitation. Patient with no PO route, has a peg that he cannot tolerate feeds with - patient will need IV access for medication administration of his symptoms.   Currently appears uncomfortable, non verbal cues of discomfort present with excessive movement and grimacing.   Discussed code status at length. Explained the difference between Do Not Resuscitate/ Do Not Intubate versus CPR and Intubation.   At the end of our conversation decision was made for DNR/DNI - MOLST completed and placed in chart.   Discussed inpatient hospice for plan of discharge, Katt mancilla. Discussed with Marie Dawson from [employer]     diazepam  Injectable 5 milliGRAM(s) IV Push every 4 hours PRN AGITATION/ANXIETY/INCREASED WORK OF BREATHING  glycopyrrolate Injectable 0.2 milliGRAM(s) IV Push every 4 hours PRN BREAKTHROUGH EXCESSIVE SECRETIONS  morphine  - Injectable 2 milliGRAM(s) IV Push every 4 hours PRN PAIN/DYSPNEA     The fundamental to a palliative approach is the aim to reduce suffering and improve the quality of life for dying patients.

## 2022-08-15 NOTE — PROGRESS NOTE ADULT - ASSESSMENT
This is a 77 year old man with a significant past medical history of HTN, BPH, Hypothyroidism, back surgery (LS in 2016, Thoracic spine in 2018-requiring wheel chair assisted device) alcohol abuse stopped using alcohol 15 years ago, alcoholic cirrhosis, who arrived on July 20 to [facility] with alter mental status.

## 2022-08-15 NOTE — PROGRESS NOTE ADULT - SUBJECTIVE AND OBJECTIVE BOX
CC: Follow up hypothyroid/DM management     INTERVAL HPI/OVERNIGHT EVENTS:  Poor mentation but arousable, denies any pain.    MEDICATIONS  (STANDING):  artificial tears (preservative free) Ophthalmic Solution 1 Drop(s) Both EYES two times a day  atorvastatin 40 milliGRAM(s) Oral at bedtime  chlorhexidine 2% Cloths 1 Application(s) Topical <User Schedule>  dextrose 10% Bolus 250 milliLiter(s) IV Bolus once  dextrose 5% + sodium chloride 0.9%. 1000 milliLiter(s) (75 mL/Hr) IV Continuous <Continuous>  dextrose 5%. 1000 milliLiter(s) (100 mL/Hr) IV Continuous <Continuous>  dextrose 5%. 1000 milliLiter(s) (50 mL/Hr) IV Continuous <Continuous>  dextrose 50% Injectable 25 Gram(s) IV Push once  dextrose 50% Injectable 12.5 Gram(s) IV Push once  dextrose 50% Injectable 25 Gram(s) IV Push once  furosemide    Tablet 40 milliGRAM(s) Oral daily  glucagon  Injectable 1 milliGRAM(s) IntraMuscular once  insulin lispro (ADMELOG) corrective regimen sliding scale   SubCutaneous three times a day before meals  lactulose Syrup 30 Gram(s) Oral every 6 hours  levothyroxine Injectable 75 MICROGram(s) IV Push at bedtime  nystatin Powder 1 Application(s) Topical three times a day  pantoprazole  Injectable      pantoprazole  Injectable 40 milliGRAM(s) IV Push two times a day  piperacillin/tazobactam IVPB.. 3.375 Gram(s) IV Intermittent every 8 hours  polyethylene glycol 3350 17 Gram(s) Oral daily  rifAXIMin 550 milliGRAM(s) Oral two times a day  spironolactone 100 milliGRAM(s) Oral daily  tamsulosin 0.8 milliGRAM(s) Oral at bedtime  thiamine 100 milliGRAM(s) Oral daily    MEDICATIONS  (PRN):  acetaminophen     Tablet .. 650 milliGRAM(s) Oral every 6 hours PRN Temp greater or equal to 38C (100.4F), Mild Pain (1 - 3)  bisacodyl Suppository 10 milliGRAM(s) Rectal daily PRN Constipation  dextrose Oral Gel 15 Gram(s) Oral once PRN Blood Glucose LESS THAN 70 milliGRAM(s)/deciliter  ondansetron Injectable 4 milliGRAM(s) IV Push every 6 hours PRN Nausea and/or Vomiting    Allergies  oxycodone (Flushing)    Vital Signs Last 24 Hrs  T(C): 36.5 (15 Aug 2022 09:59), Max: 36.6 (14 Aug 2022 17:24)  T(F): 97.7 (15 Aug 2022 09:59), Max: 97.8 (14 Aug 2022 17:24)  HR: 100 (15 Aug 2022 09:59) (100 - 104)  BP: 108/66 (15 Aug 2022 09:59) (98/59 - 112/53)  BP(mean): --  RR: 19 (15 Aug 2022 09:59) (18 - 20)  SpO2: 96% (15 Aug 2022 09:59) (92% - 96%)    Parameters below as of 15 Aug 2022 09:59  Patient On (Oxygen Delivery Method): room air    PHYSICAL EXAM  General: No distress, poor mentation  Neck: Supple, trachea midline, no thyromegaly  Respiratory: Dimished bs b/l  Cardiac: +S1, S2, no m/r/g  GI: Abdominal distention, firm to touch, +BS  Extremities: +LE edema  Neuro: Alert/awake      LABS:                        9.7    x     )-----------( x        ( 15 Aug 2022 08:30 )             30.1     08-15    133<L>  |  104  |  18.2  ----------------------------<  134<H>  4.0   |  19.0<L>  |  1.09    Ca    7.3<L>      15 Aug 2022 04:22  Phos  3.0     08-14  Mg     1.9     08-15    TPro  5.9<L>  /  Alb  1.7<L>  /  TBili  1.5  /  DBili  x   /  AST  75<H>  /  ALT  60<H>  /  AlkPhos  111  08-15    Urinalysis Basic - ( 14 Aug 2022 09:36 )    Color: Yellow / Appearance: Clear / S.015 / pH: x  Gluc: x / Ketone: Trace  / Bili: Moderate / Urobili: 8   Blood: x / Protein: 30 mg/dL / Nitrite: Positive   Leuk Esterase: Trace / RBC: 6-10 /HPF / WBC 3-5 /HPF   Sq Epi: x / Non Sq Epi: Few / Bacteria: Moderate      POCT Blood Glucose.: 132 mg/dL (08-15-22 @ 07:04)  POCT Blood Glucose.: 112 mg/dL (22 @ 23:14)  POCT Blood Glucose.: 168 mg/dL (22 @ 17:04)      Thyroid Stimulating Hormone, Serum: 30.30 uIU/mL (22 @ 06:45)  Free Thyroxine, Serum: 1.1 ng/dL (22 @ 06:45)  Thyroid Stimulating Hormone, Serum: 24.86 uIU/mL (22 @ 06:25)  Free Thyroxine, Serum: 0.6 ng/dL (22 @ 04:37)  Thyroid Stimulating Hormone, Serum: 49.27 uIU/mL (22 @ 06:09)  Thyroid Stimulating Hormone, Serum: 17.96 uIU/mL (22 @ 05:10)  Triiodothyronine, Total (T3 Total): 60 ng/dL (22 @ 05:10)

## 2022-08-15 NOTE — PROGRESS NOTE ADULT - NS ATTEST RISK PROBLEM GEN_ALL_CORE FT
Pt is persistently encephalopathic, worsening TFTs, LFTs trending up, constipation, worsening ascites
ascites, urinary retention
ileus
Pt is persistently encephalopathic w/ workup ongoing
Pt is persistently encephalopathic w/ workup ongoing, possible OM
ams , abd distention
ascites, urinary retention
ileus
ams , abd distention
ascites, urinary retention
ileus
Pt is persistently encephalopathic, worsening TFTs, LFTs trending up, acute abdominal distension
ascites, urinary retention
ileus
Pt is persistently encephalopathic w/ workup ongoing, possible OM
ascites, urinary retention
Pt is persistently encephalopathic w/ workup ongoing
Pt is persistently encephalopathic, worsening TFTs, LFTs trending up, constipation, hyponatremia
ams , abd distention
recurrent ascites , gib
recurrent ascites , gib
ascites, urinary retention
ileus
metabolic encephalopathy

## 2022-08-15 NOTE — PROGRESS NOTE ADULT - SUBJECTIVE AND OBJECTIVE BOX
cc: ileus ,     SUBJECTIVE / OVERNIGHT EVENTS: patient seen and eval. General: Elderly male, lying in bed, appears chronically ill, tf on hold     Vital Signs Last 24 Hrs  T(C): 36.5 (15 Aug 2022 09:59), Max: 36.6 (14 Aug 2022 17:24)  T(F): 97.7 (15 Aug 2022 09:59), Max: 97.8 (14 Aug 2022 17:24)  HR: 100 (15 Aug 2022 09:59) (100 - 104)  BP: 108/66 (15 Aug 2022 09:59) (98/59 - 112/53)  BP(mean): --  RR: 19 (15 Aug 2022 09:59) (18 - 20)  SpO2: 96% (15 Aug 2022 09:59) (92% - 96%)    Parameters below as of 15 Aug 2022 09:59  Patient On (Oxygen Delivery Method): room air        HEENT:  EOMI  NECK:  Supple  CVS: regular rate and rhythm S1 S2  RESP:  Poor effort, no distress  GI:  distended abdomen, soft , bs hypoactive   : [place]  MSK:  Able to move upper extremities, wea LE  CNS:  Awake but slow to respond. LE weakness , chronic   INTEG:  Sacral decubitus  PSYCH:  Slow speech                         < from: CT Angio Abdomen and Pelvis w/ IV Cont (08.14.22 @ 05:51) >  IMPRESSION:  No portal vein thrombosis.    Cirrhotic liver with associated ascites and varices.    Gastric, proximal small bowel and ascending colon wall thickening. While   this may be related to gastritis, enteritis and colitis, this pattern can   also be seen with portal enteropathy.    Suspected right upper lobe pneumonia.    Urinary bladder wall thickening which may be reactive to adjacent ascites   or represent sequelae of chronic outlet obstruction or cystitis. Clinical   correlation with urinalysis is advised.    Generalized anasarca.    Small bilateral effusions.    < end of copied text >    < from: US Abdomen Doppler (08.12.22 @ 10:07) >  MPRESSION: Technically limited exam. Flow is not definitely seen within   the main, left and right portal veins which may be due to thrombus versus   slow flow. MRI of the abdomen with contrast is recommended for further   evaluation if there is no contraindication.    Moderate to large abdominal and pelvic ascites and partially visualized   right pleural effusion.      < end of copied text >    MEDICATIONS  (STANDING):  artificial tears (preservative free) Ophthalmic Solution 1 Drop(s) Both EYES two times a day  atorvastatin 40 milliGRAM(s) Oral at bedtime  chlorhexidine 2% Cloths 1 Application(s) Topical <User Schedule>  dextrose 10% Bolus 250 milliLiter(s) IV Bolus once  dextrose 5% + sodium chloride 0.9%. 1000 milliLiter(s) (75 mL/Hr) IV Continuous <Continuous>  dextrose 5%. 1000 milliLiter(s) (100 mL/Hr) IV Continuous <Continuous>  dextrose 5%. 1000 milliLiter(s) (50 mL/Hr) IV Continuous <Continuous>  dextrose 50% Injectable 25 Gram(s) IV Push once  dextrose 50% Injectable 12.5 Gram(s) IV Push once  dextrose 50% Injectable 25 Gram(s) IV Push once  furosemide    Tablet 40 milliGRAM(s) Oral daily  glucagon  Injectable 1 milliGRAM(s) IntraMuscular once  insulin lispro (ADMELOG) corrective regimen sliding scale   SubCutaneous three times a day before meals  lactulose Syrup 30 Gram(s) Oral every 6 hours  levothyroxine Injectable 75 MICROGram(s) IV Push at bedtime  nystatin Powder 1 Application(s) Topical three times a day  pantoprazole  Injectable      pantoprazole  Injectable 40 milliGRAM(s) IV Push two times a day  piperacillin/tazobactam IVPB.. 3.375 Gram(s) IV Intermittent every 8 hours  polyethylene glycol 3350 17 Gram(s) Oral daily  rifAXIMin 550 milliGRAM(s) Oral two times a day  spironolactone 100 milliGRAM(s) Oral daily  tamsulosin 0.8 milliGRAM(s) Oral at bedtime  thiamine 100 milliGRAM(s) Oral daily    MEDICATIONS  (PRN):  acetaminophen     Tablet .. 650 milliGRAM(s) Oral every 6 hours PRN Temp greater or equal to 38C (100.4F), Mild Pain (1 - 3)  bisacodyl Suppository 10 milliGRAM(s) Rectal daily PRN Constipation  dextrose Oral Gel 15 Gram(s) Oral once PRN Blood Glucose LESS THAN 70 milliGRAM(s)/deciliter  ondansetron Injectable 4 milliGRAM(s) IV Push every 6 hours PRN Nausea and/or Vomiting

## 2022-08-15 NOTE — GOALS OF CARE CONVERSATION - ADVANCED CARE PLANNING - CONVERSATION/DISCUSSION
Diagnosis/Prognosis/MOLST Discussed/Treatment Options/Hospice Referral/Palliative Care Referral
Hospice Referral

## 2022-08-15 NOTE — PROGRESS NOTE ADULT - SUBJECTIVE AND OBJECTIVE BOX
Chief Complaint:  Patient is a 77y old  Male who presents with a chief complaint of Altered mental state (14 Aug 2022 11:47)      Interval Events / Subjective: Patient seen and evaluated at bedside, reporting no complaints, no overnight events. Pleasantly confused. Paracentesis on 08/10 draining 3900cc of yellow clear fluid. No evidence of overt GIB this am. Hemoglobin stable at 9.7gm.       REVIEW OF SYSTEMS:   General: Negative  HEENT: Negative  CV: Negative  Respiratory: Negative  GI: See HPI  : Negative  MSK: Negative  Hematologic: Negative  Skin: Negative    MEDICATIONS:   MEDICATIONS  (STANDING):  artificial tears (preservative free) Ophthalmic Solution 1 Drop(s) Both EYES two times a day  atorvastatin 40 milliGRAM(s) Oral at bedtime  chlorhexidine 2% Cloths 1 Application(s) Topical <User Schedule>  dextrose 10% Bolus 250 milliLiter(s) IV Bolus once  dextrose 5% + sodium chloride 0.9%. 1000 milliLiter(s) (75 mL/Hr) IV Continuous <Continuous>  dextrose 5%. 1000 milliLiter(s) (100 mL/Hr) IV Continuous <Continuous>  dextrose 5%. 1000 milliLiter(s) (50 mL/Hr) IV Continuous <Continuous>  dextrose 50% Injectable 25 Gram(s) IV Push once  dextrose 50% Injectable 12.5 Gram(s) IV Push once  dextrose 50% Injectable 25 Gram(s) IV Push once  furosemide    Tablet 40 milliGRAM(s) Oral daily  glucagon  Injectable 1 milliGRAM(s) IntraMuscular once  insulin lispro (ADMELOG) corrective regimen sliding scale   SubCutaneous three times a day before meals  lactulose Syrup 30 Gram(s) Oral every 6 hours  levothyroxine Injectable 75 MICROGram(s) IV Push at bedtime  nystatin Powder 1 Application(s) Topical three times a day  pantoprazole  Injectable 40 milliGRAM(s) IV Push two times a day  pantoprazole  Injectable      piperacillin/tazobactam IVPB.. 3.375 Gram(s) IV Intermittent every 8 hours  polyethylene glycol 3350 17 Gram(s) Oral daily  rifAXIMin 550 milliGRAM(s) Oral two times a day  spironolactone 100 milliGRAM(s) Oral daily  tamsulosin 0.8 milliGRAM(s) Oral at bedtime  thiamine 100 milliGRAM(s) Oral daily    MEDICATIONS  (PRN):  acetaminophen     Tablet .. 650 milliGRAM(s) Oral every 6 hours PRN Temp greater or equal to 38C (100.4F), Mild Pain (1 - 3)  bisacodyl Suppository 10 milliGRAM(s) Rectal daily PRN Constipation  dextrose Oral Gel 15 Gram(s) Oral once PRN Blood Glucose LESS THAN 70 milliGRAM(s)/deciliter  ondansetron Injectable 4 milliGRAM(s) IV Push every 6 hours PRN Nausea and/or Vomiting      ALLERGIES:   Allergies    oxycodone (Flushing)    Intolerances        VITAL SIGNS:   Vital Signs Last 24 Hrs  T(C): 36.2 (15 Aug 2022 05:22), Max: 36.6 (14 Aug 2022 17:24)  T(F): 97.1 (15 Aug 2022 05:), Max: 97.8 (14 Aug 2022 17:24)  HR: 102 (15 Aug 2022 05:22) (102 - 104)  BP: 99/62 (15 Aug 2022 05:22) (98/59 - 112/53)  BP(mean): --  RR: 18 (15 Aug 2022 05:22) (18 - 20)  SpO2: 94% (15 Aug 2022 05:22) (92% - 94%)    Parameters below as of 15 Aug 2022 05:22  Patient On (Oxygen Delivery Method): room air      I&O's Summary      PHYSICAL EXAM:   GENERAL:  No acute distress  HEENT:  NC/AT,  conjunctivae clear, sclera -anicteric  CHEST:  Full & symmetric excursion, no increased effort, breath sounds clear  HEART:  Regular rhythm, S1, S2, no murmur/rub/S3/S4,  no edema  ABDOMEN: +PEG, Softly distended, +ascites, non-tender, normoactive bowel sounds, no rebound or guarding  EXTREMITIES: No cyanosis, clubbing or edema  SKIN:  No rash/erythema/ecchymoses/petechiae/wounds/abscess/warm/dry  NEURO:  calm, cooperative      LABS:  CBC Full  -  ( 15 Aug 2022 08:30 )  WBC Count : x  RBC Count : x  Hemoglobin : 9.7 g/dL  Hematocrit : 30.1 %  Platelet Count - Automated : x  Mean Cell Volume : x  Mean Cell Hemoglobin : x  Mean Cell Hemoglobin Concentration : x  Auto Neutrophil # : x  Auto Lymphocyte # : x  Auto Monocyte # : x  Auto Eosinophil # : x  Auto Basophil # : x  Auto Neutrophil % : x  Auto Lymphocyte % : x  Auto Monocyte % : x  Auto Eosinophil % : x  Auto Basophil % : x    08-15    133<L>  |  104  |  18.2  ----------------------------<  134<H>  4.0   |  19.0<L>  |  1.09    Ca    7.3<L>      15 Aug 2022 04:22  Phos  3.0       Mg     1.9     08-15    TPro  5.9<L>  /  Alb  1.7<L>  /  TBili  1.5  /  DBili  x   /  AST  75<H>  /  ALT  60<H>  /  AlkPhos  111  08-15    LIVER FUNCTIONS - ( 15 Aug 2022 04:22 )  Alb: 1.7 g/dL / Pro: 5.9 g/dL / ALK PHOS: 111 U/L / ALT: 60 U/L / AST: 75 U/L / GGT: x           PT/INR - ( 14 Aug 2022 02:00 )   PT: 26.0 sec;   INR: 2.22 ratio         PTT - ( 13 Aug 2022 18:24 )  PTT:43.0 sec  Urinalysis Basic - ( 14 Aug 2022 09:36 )    Color: Yellow / Appearance: Clear / S.015 / pH: x  Gluc: x / Ketone: Trace  / Bili: Moderate / Urobili: 8   Blood: x / Protein: 30 mg/dL / Nitrite: Positive   Leuk Esterase: Trace / RBC: 6-10 /HPF / WBC 3-5 /HPF   Sq Epi: x / Non Sq Epi: Few / Bacteria: Moderate        Culture - Blood (collected 14 Aug 2022 02:06)  Source: .Blood Blood-Peripheral  Preliminary Report (15 Aug 2022 09:01):    No growth to date.    Culture - Blood (collected 14 Aug 2022 02:00)  Source: .Blood Blood-Peripheral  Preliminary Report (15 Aug 2022 09:01):    No growth to date.        RADIOLOGY & ADDITIONAL STUDIES (The following images were personally reviewed):    < from: CT Angio Abdomen and Pelvis w/ IV Cont (22 @ 05:51) >    ACC: [id number] EXAM:  CT ANGIO ABD PELV (W)AW IC                          PROCEDURE DATE:  2022          INTERPRETATION:  CLINICAL INFORMATION: Suspicion for portal vein   thrombosis.    COMPARISON: 2022.    CONTRAST/COMPLICATIONS:  IV Contrast: Omnipaque 350  90 cc administered   10 cc discarded  Oral Contrast: NONE  Complications: None reported at time of study completion    PROCEDURE:  CT of the Abdomen and Pelvis was performed.  Arterial and Portal Venous phases were acquired.  Sagittal and coronal reformats were performed.    FINDINGS:  LOWER CHEST: The heart is borderline in size. There are small bilateral   pleural effusions and associated compressive atelectasis. Additional   patchy opacity of the right posterior upper lobe is appreciated,   suspicious for superimposed pneumonia..    LIVER: Nodular hepatic contour compatible with cirrhosis. The portal   veins enhance normally. There is no portal vein thrombosis.  BILE DUCTS: Normal caliber.  GALLBLADDER: Cholecystectomy.  SPLEEN: Within normal limits.  PANCREAS: Within normal limits.  ADRENALS: Within normal limits.  KIDNEYS/URETERS: There are bilateral renal cysts. There is no urinary   tract obstruction.    BLADDER: The urinary bladder is diffusely thick-walled.  REPRODUCTIVE ORGANS: Limited evaluation secondary to streak artifact from   right hip hardware.    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 A percutaneous gastrostomy   tube is noted with the catheter balloon is seen inflated along the   anterior gastric antrum. There is diffuse wall thickening of the stomach   and proximal small bowel. There is also bowel wall thickening of the   ascending colon. There is colonic diverticulosis.  PERITONEUM: Moderate to large amount ascites is appreciated within the   intraperitoneal cavity. There is generalized marked soft tissue edema..  VESSELS: Atherosclerotic changes.  RETROPERITONEUM/LYMPH NODES: No lymphadenopathy.  ABDOMINAL WALL: Within normal limits.  BONES: There is a right total hip prosthesis.Degenerative changes of   left hip are appreciated. Postlaminectomy changes of lumbar spine   appreciated. Posterior pedicle screws and rods are seen from T10 through   L1..      IMPRESSION:  No portal vein thrombosis.    Cirrhotic liver with associated ascites and varices.    Gastric, proximal small bowel and ascending colon wall thickening. While   this may be related to gastritis, enteritis and colitis, this pattern can   also be seen with portal enteropathy.    Suspected right upper lobe pneumonia.    Urinary bladder wall thickening which may be reactive to adjacent ascites   or represent sequelae of chronic outlet obstruction or cystitis. Clinical   correlation with urinalysis is advised.    Generalized anasarca.    Small bilateral effusions.    --- End of Report ---            COLIN BARFIELD MD; Attending Radiologist  This document has been electronically signed. Aug 14 2022  6:16AM    < end of copied text >      < from: US Abdomen Doppler (22 @ 10:07) >    ACC: [id number] EXAM:  US DPLX ABDOMEN                          PROCEDURE DATE:  2022          INTERPRETATION:  Clinical indication: Assess ascites, portal hypertension   and portal thrombosis    COMPARISON: Abdominal ultrasound dated 2022,CT abdomen pelvis dated   2022 and 2022    FINDINGS: Exam is markedly limited due to bowel gas and ascites as well   as suboptimal patient positioning.    There is moderate to large ascites in all 4 quadrants as well as a   partially visualized right pleural effusion. The IVC, aorta, splenic   artery and vein and hepatic veins are not well-visualized due to bowel   gas.    Peak systolic velocity within the hepatic artery is 116 cm/sec.    No flow visualized within the main, left or right portal veins, although   evaluation is limited.    The spleen is normal in size measuring 9 cm.    IMPRESSION: Technically limited exam. Flow is not definitely seen within   the main, left and right portal veins which may be due to thrombus versus   slow flow. MRI of the abdomen with contrast is recommended for further   evaluation if there is no contraindication.    Moderate to large abdominal and pelvic ascites and partially visualized   right pleural effusion.    --- End of Report ---            XANDER PUGA MD; Attending Radiologist  This document has been electronically signed. Aug 12 2022 11:27AM    < end of copied text >

## 2022-08-15 NOTE — PROGRESS NOTE ADULT - NUTRITIONAL ASSESSMENT
This patient has been assessed with a concern for Malnutrition and has been determined to have a diagnosis/diagnoses of Severe protein-calorie malnutrition.    This patient is being managed with:   Diet NPO with Tube Feed-  Tube Feeding Modality: Gastrostomy  Jevity 1.5 Saad (JEVITY1.5)  Total Volume for 24 Hours (mL): 800  Continuous  Starting Tube Feed Rate {mL per Hour}: 10  Increase Tube Feed Rate by (mL): 10     Every 4 hours  Until Goal Tube Feed Rate (mL per Hour): 40  Tube Feed Duration (in Hours): 20  Tube Feed Start Time: 13:00  Entered: Aug  8 2022 12:09PM    
This patient has been assessed with a concern for Malnutrition and has been determined to have a diagnosis/diagnoses of Moderate protein-calorie malnutrition.    This patient is being managed with:   Diet NPO-  Except Medications  Tube Feeding Modality: Gastrostomy  Entered: Jul 31 2022  2:11PM    
This patient has been assessed with a concern for Malnutrition and has been determined to have a diagnosis/diagnoses of Moderate protein-calorie malnutrition.    This patient is being managed with:   Diet NPO-  Except Medications  Tube Feeding Modality: Gastrostomy  Entered: Jul 31 2022  2:11PM    
This patient has been assessed with a concern for Malnutrition and has been determined to have a diagnosis/diagnoses of Severe protein-calorie malnutrition.    This patient is being managed with:   Diet NPO with Tube Feed-  Tube Feeding Modality: Gastrostomy  Jevity 1.5 Saad (JEVITY1.5)  Total Volume for 24 Hours (mL): 800  Continuous  Starting Tube Feed Rate {mL per Hour}: 10  Increase Tube Feed Rate by (mL): 10     Every 4 hours  Until Goal Tube Feed Rate (mL per Hour): 40  Tube Feed Duration (in Hours): 20  Tube Feed Start Time: 13:00  Entered: Aug  8 2022 12:09PM    
This patient has been assessed with a concern for Malnutrition and has been determined to have a diagnosis/diagnoses of Severe protein-calorie malnutrition.    This patient is being managed with:   Diet NPO-  Except Medications  Tube Feeding Modality: Gastrostomy  Entered: Jul 31 2022  2:11PM    
This patient has been assessed with a concern for Malnutrition and has been determined to have a diagnosis/diagnoses of Moderate protein-calorie malnutrition.    This patient is being managed with:   Diet NPO with Tube Feed-  Tube Feeding Modality: Gastrostomy  Jevity 1.5 Saad (JEVITY1.5)  Total Volume for 24 Hours (mL): 960  Continuous  Starting Tube Feed Rate {mL per Hour}: 10  Increase Tube Feed Rate by (mL): 10     Every 4 hours  Until Goal Tube Feed Rate (mL per Hour): 40  Tube Feed Duration (in Hours): 24  Tube Feed Start Time: 17:00  Entered: Jul 26 2022  4:58PM    
This patient has been assessed with a concern for Malnutrition and has been determined to have a diagnosis/diagnoses of Moderate protein-calorie malnutrition.    This patient is being managed with:   Diet NPO-  Except Medications  Tube Feeding Modality: Gastrostomy  Entered: Jul 31 2022  2:11PM    
This patient has been assessed with a concern for Malnutrition and has been determined to have a diagnosis/diagnoses of Moderate protein-calorie malnutrition.    This patient is being managed with:   Diet NPO-  Except Medications  Tube Feeding Modality: Gastrostomy  Entered: Jul 31 2022  2:11PM    
This patient has been assessed with a concern for Malnutrition and has been determined to have a diagnosis/diagnoses of Severe protein-calorie malnutrition.    This patient is being managed with:   Diet NPO after Midnight-     NPO Start Date: 09-Aug-2022   NPO Start Time: 23:59  Except Medications  Entered: Aug  9 2022 12:01PM    Diet NPO with Tube Feed-  Tube Feeding Modality: Gastrostomy  Jevity 1.5 Saad (JEVITY1.5)  Total Volume for 24 Hours (mL): 800  Continuous  Starting Tube Feed Rate {mL per Hour}: 10  Increase Tube Feed Rate by (mL): 10     Every 4 hours  Until Goal Tube Feed Rate (mL per Hour): 40  Tube Feed Duration (in Hours): 20  Tube Feed Start Time: 13:00  Entered: Aug  8 2022 12:09PM    
This patient has been assessed with a concern for Malnutrition and has been determined to have a diagnosis/diagnoses of Severe protein-calorie malnutrition.    This patient is being managed with:   Diet NPO with Tube Feed-  Tube Feeding Modality: Gastrostomy  Jevity 1.5 Saad (JEVITY1.5)  Total Volume for 24 Hours (mL): 800  Continuous  Starting Tube Feed Rate {mL per Hour}: 10  Increase Tube Feed Rate by (mL): 10     Every 4 hours  Until Goal Tube Feed Rate (mL per Hour): 40  Tube Feed Duration (in Hours): 20  Tube Feed Start Time: 13:00  Entered: Aug  8 2022 12:09PM    
This patient has been assessed with a concern for Malnutrition and has been determined to have a diagnosis/diagnoses of Severe protein-calorie malnutrition.    This patient is being managed with:   Diet NPO-  Except Medications  Tube Feeding Modality: Gastrostomy  Entered: Jul 31 2022  2:11PM    
This patient has been assessed with a concern for Malnutrition and has been determined to have a diagnosis/diagnoses of Severe protein-calorie malnutrition.    This patient is being managed with:   Diet NPO-  Except Medications  Tube Feeding Modality: Gastrostomy  Entered: Jul 31 2022  2:11PM    
This patient has been assessed with a concern for Malnutrition and has been determined to have a diagnosis/diagnoses of Moderate protein-calorie malnutrition.    This patient is being managed with:   Diet NPO-  Entered: Jul 24 2022  6:35AM    
This patient has been assessed with a concern for Malnutrition and has been determined to have a diagnosis/diagnoses of Moderate protein-calorie malnutrition.    This patient is being managed with:   Diet NPO-  Entered: Jul 24 2022  6:35AM    
This patient has been assessed with a concern for Malnutrition and has been determined to have a diagnosis/diagnoses of Moderate protein-calorie malnutrition.    This patient is being managed with:   Diet NPO-  Except Medications  Tube Feeding Modality: Gastrostomy  Entered: Jul 31 2022  2:11PM    
This patient has been assessed with a concern for Malnutrition and has been determined to have a diagnosis/diagnoses of Severe protein-calorie malnutrition.    This patient is being managed with:   Diet NPO-  Entered: Aug 13 2022  6:02PM    
This patient has been assessed with a concern for Malnutrition and has been determined to have a diagnosis/diagnoses of Moderate protein-calorie malnutrition.    This patient is being managed with:   Diet NPO with Tube Feed-  Tube Feeding Modality: Gastrostomy  Jevity 1.5 Saad (JEVITY1.5)  Total Volume for 24 Hours (mL): 960  Continuous  Starting Tube Feed Rate {mL per Hour}: 10  Increase Tube Feed Rate by (mL): 10     Every 4 hours  Until Goal Tube Feed Rate (mL per Hour): 40  Tube Feed Duration (in Hours): 24  Tube Feed Start Time: 17:00  Entered: Jul 26 2022  4:58PM    
This patient has been assessed with a concern for Malnutrition and has been determined to have a diagnosis/diagnoses of Moderate protein-calorie malnutrition.    This patient is being managed with:   Diet NPO-  Entered: Jul 24 2022  6:35AM    
This patient has been assessed with a concern for Malnutrition and has been determined to have a diagnosis/diagnoses of Severe protein-calorie malnutrition.    This patient is being managed with:   Diet NPO with Tube Feed-  Tube Feeding Modality: Gastrostomy  Jevity 1.5 Saad (JEVITY1.5)  Total Volume for 24 Hours (mL): 800  Continuous  Starting Tube Feed Rate {mL per Hour}: 10  Increase Tube Feed Rate by (mL): 10     Every 4 hours  Until Goal Tube Feed Rate (mL per Hour): 40  Tube Feed Duration (in Hours): 20  Tube Feed Start Time: 13:00  Entered: Aug  8 2022 12:09PM    
This patient has been assessed with a concern for Malnutrition and has been determined to have a diagnosis/diagnoses of Severe protein-calorie malnutrition.    This patient is being managed with:   Diet NPO-  Entered: Aug 13 2022  6:02PM    
This patient has been assessed with a concern for Malnutrition and has been determined to have a diagnosis/diagnoses of Severe protein-calorie malnutrition.    This patient is being managed with:   Diet NPO-  Except Medications  Tube Feeding Modality: Gastrostomy  Entered: Jul 31 2022  2:11PM    
This patient has been assessed with a concern for Malnutrition and has been determined to have a diagnosis/diagnoses of Moderate protein-calorie malnutrition.      
This patient has been assessed with a concern for Malnutrition and has been determined to have a diagnosis/diagnoses of Moderate protein-calorie malnutrition.    This patient is being managed with:   Diet NPO with Tube Feed-  Tube Feeding Modality: Gastrostomy  Jevity 1.5 Saad (JEVITY1.5)  Total Volume for 24 Hours (mL): 960  Continuous  Starting Tube Feed Rate {mL per Hour}: 10  Increase Tube Feed Rate by (mL): 10     Every 4 hours  Until Goal Tube Feed Rate (mL per Hour): 40  Tube Feed Duration (in Hours): 24  Tube Feed Start Time: 17:00  Entered: Jul 26 2022  4:58PM    
This patient has been assessed with a concern for Malnutrition and has been determined to have a diagnosis/diagnoses of Severe protein-calorie malnutrition.    This patient is being managed with:   Diet NPO-  Except Medications  Tube Feeding Modality: Gastrostomy  Entered: Jul 31 2022  2:11PM    
This patient has been assessed with a concern for Malnutrition and has been determined to have a diagnosis/diagnoses of Moderate protein-calorie malnutrition.    This patient is being managed with:   Diet NPO with Tube Feed-  Tube Feeding Modality: Gastrostomy  Jevity 1.5 Saad (JEVITY1.5)  Total Volume for 24 Hours (mL): 960  Continuous  Starting Tube Feed Rate {mL per Hour}: 10  Increase Tube Feed Rate by (mL): 10     Every 4 hours  Until Goal Tube Feed Rate (mL per Hour): 40  Tube Feed Duration (in Hours): 24  Tube Feed Start Time: 17:00  Entered: Jul 26 2022  4:58PM    
This patient has been assessed with a concern for Malnutrition and has been determined to have a diagnosis/diagnoses of Moderate protein-calorie malnutrition.    This patient is being managed with:   Diet NPO-  Entered: Jul 24 2022  6:35AM    
This patient has been assessed with a concern for Malnutrition and has been determined to have a diagnosis/diagnoses of Moderate protein-calorie malnutrition.    This patient is being managed with:   Diet NPO-  Except Medications  Tube Feeding Modality: Gastrostomy  Entered: Jul 31 2022  2:11PM    
This patient has been assessed with a concern for Malnutrition and has been determined to have a diagnosis/diagnoses of Moderate protein-calorie malnutrition.    This patient is being managed with:   Diet Pureed-  Entered: Jul 21 2022  1:12PM

## 2022-08-15 NOTE — CHART NOTE - NSCHARTNOTEFT_GEN_A_CORE
Source: Patient [ ]  Family [ ]   other [x ]EMR    Current Diet: NPO with Jevity TF on hold      Enteral /Parenteral Nutrition: see recommendation below    Current Weight: 190# 7/20  1+ right and left leg    % Weight Change     Pertinent Medications: MEDICATIONS  (STANDING):  artificial tears (preservative free) Ophthalmic Solution 1 Drop(s) Both EYES two times a day  atorvastatin 40 milliGRAM(s) Oral at bedtime  chlorhexidine 2% Cloths 1 Application(s) Topical <User Schedule>  dextrose 10% Bolus 250 milliLiter(s) IV Bolus once  dextrose 5% + sodium chloride 0.9%. 1000 milliLiter(s) (75 mL/Hr) IV Continuous <Continuous>  dextrose 5%. 1000 milliLiter(s) (100 mL/Hr) IV Continuous <Continuous>  dextrose 5%. 1000 milliLiter(s) (50 mL/Hr) IV Continuous <Continuous>  dextrose 50% Injectable 25 Gram(s) IV Push once  dextrose 50% Injectable 12.5 Gram(s) IV Push once  dextrose 50% Injectable 25 Gram(s) IV Push once  furosemide    Tablet 40 milliGRAM(s) Oral daily  glucagon  Injectable 1 milliGRAM(s) IntraMuscular once  insulin lispro (ADMELOG) corrective regimen sliding scale   SubCutaneous three times a day before meals  lactulose Syrup 30 Gram(s) Oral every 6 hours  levothyroxine Injectable 75 MICROGram(s) IV Push at bedtime  nystatin Powder 1 Application(s) Topical three times a day  pantoprazole  Injectable      pantoprazole  Injectable 40 milliGRAM(s) IV Push two times a day  piperacillin/tazobactam IVPB.. 3.375 Gram(s) IV Intermittent every 8 hours  polyethylene glycol 3350 17 Gram(s) Oral daily  rifAXIMin 550 milliGRAM(s) Oral two times a day  spironolactone 100 milliGRAM(s) Oral daily  tamsulosin 0.8 milliGRAM(s) Oral at bedtime  thiamine 100 milliGRAM(s) Oral daily    MEDICATIONS  (PRN):  acetaminophen     Tablet .. 650 milliGRAM(s) Oral every 6 hours PRN Temp greater or equal to 38C (100.4F), Mild Pain (1 - 3)  bisacodyl Suppository 10 milliGRAM(s) Rectal daily PRN Constipation  dextrose Oral Gel 15 Gram(s) Oral once PRN Blood Glucose LESS THAN 70 milliGRAM(s)/deciliter  ondansetron Injectable 4 milliGRAM(s) IV Push every 6 hours PRN Nausea and/or Vomiting    Pertinent Labs: CBC Full  -  ( 15 Aug 2022 08:30 )  WBC Count : x  RBC Count : x  Hemoglobin : 9.7 g/dL  Hematocrit : 30.1 %  Platelet Count - Automated : x  Mean Cell Volume : x  Mean Cell Hemoglobin : x  Mean Cell Hemoglobin Concentration : x  Auto Neutrophil # : x  Auto Lymphocyte # : x  Auto Monocyte # : x  Auto Eosinophil # : x  Auto Basophil # : x  Auto Neutrophil % : x  Auto Lymphocyte % : x  Auto Monocyte % : x  Auto Eosinophil % : x  Auto Basophil % : x      08-15 Na133 mmol/L<L> Glu 134 mg/dL<H> K+ 4.0 mmol/L Cr  1.09 mg/dL BUN 18.2 mg/dL Phos n/a   Alb 1.7 g/dL<L> PAB n/a           Skin: stage 2 right butt x 2    Nutrition focused physical exam conducted - found signs of malnutrition [ ]absent [x ]present    Subcutaneous fat loss: [ ] Orbital fat pads region, [ ]Buccal fat region, [ ]Triceps region,  [ ]Ribs region    Muscle wasting: [x ]Temples region, [ ]Clavicle region, [ ]Shoulder region, [ ]Scapula region, [ ]Interosseous region,  [ ]thigh region, [ ]Calf region    Estimated Needs:   [x ] no change since previous assessment  [ ] recalculated:     Current Nutrition Diagnosis: Diagnosis: Pt remains at high nutrition risk and meets criteria for severe chronic malnutrition related to inadequate energy intake in setting of cirrhosis, possible UTI, decub ulcers (POA), colitis, and subacute CVA as evidenced by severe muscle depletion, meeting <75% estimated protein-energy needs x >1 month. Currently, patient remains with tube feedings on hold 2/2 abdominal distention and severe constipation; per chart, related to vomiting, ileus now improving. Currently no source of nutrition at this time. Patient with increased protein/kcal needs due to current clinical status and healing stage 2 x2 pressure ulcer. IV fluids provided. AMS remains. RD to remain available.     RECOMMENDATIONS:  1) If medically feasible - Glucerna 1.5 initiated at 20 ml/hr and advance 10 ml/hr q4 hrs until goal rate of 70 ml/hr (x20 hrs) to provide 1400 ml, 2100 kcal, 116g protein, 1063 ml free water, and >100% of RDIs for vitamins/minerals. Additional free water per MD discretion.   2) RX: MVI, Vitamin C 500mg, Zinc Sulfate 220mg x 10 days to assist with healing, when medically feasible; continue thiamine  3) monitor weights; monitor labs        Monitoring and Evaluation:   [ ] PO intake [ x] Tolerance to diet prescription [X] Weights  [X] Follow up per protocol [X] Labs:

## 2022-08-15 NOTE — PROGRESS NOTE ADULT - NS ATTEND AMEND GEN_ALL_CORE FT
patient seen and examined. plan discussed with NP and changes incorporated in the note.    patient arousable but has anasarca   will not switch to orals, likely has absorption issues  keep IV LT4, recheck TFTs at the end of the week
Patient seen and examined.  Patient required repeat paracentesis.  Mental status is fluctuating.  Continue current regimen.  Call GI as needed.

## 2022-08-15 NOTE — PROGRESS NOTE ADULT - ASSESSMENT
77 year old male admitted for AMS     Problem/Recommendation 1: AMS   Possibly due to underlying infection - UTI , colitis, constipation hepatic encephalopathy - was on IV abx  Inital CT head possible artifact vs infarct , repeat CT head negative   +COVID asymptomatic , now completed course- off isolation  Started on Rifaximin on admission for hyperammonemia which now WNL    Problem/Recommendation 2: Abdominal Ascites  S/p IR for drainage of ascites  CT abd with Cirrhotic liver with associated ascites and varices, anasarca, pleural effusions, pneumonia.    Problem/Recommendation 3: Debility  Assist in ADLS  Maintain safety, fall, aspiration precautions    Problem/Recommendation 4: Palliative Care Encounter  Discussed case with Dr. Alfred and MINDI Lake  Re-consulted for GOC as patient's condition not significant improvement despite attempts to medically optimize   Per last encounters with patient's wife Katt, goals were in alignment with medical optimization and not on a page of conservative management/hospice support  Awaiting to see if katt can come today to further discuss GOC with medical team and palliative care  Palliative to continue to follow    Code status: Full code  HCP/Surrogate: Katt Platt   **CONTACT NUMBER FOR KATT is  [phone]    Total Time Spent_50___ minutes  This includes chart review, patient assessment, discussion and collaboration with interdisciplinary team members, ACP planning    Thank you for the opportunity to assist with the care of this patient.   [facility] Palliative Medicine Consult Service [phone].

## 2022-08-15 NOTE — PROGRESS NOTE ADULT - SUBJECTIVE AND OBJECTIVE BOX
Palliative Care Re-Consult    OVERNIGHT EVENTS: no acute overnight events - called for palliative care re-consult due to concern for decline despite efforts to medically optimize    Present Symptoms:   Unable to obtain due to poor mentation     Pain: Appears uncomfortable, contracted extremeties            Character-            Duration-            Effect-            Factors-            Frequency-            Location-            Severity-    Pain AD Score:4  [email or web address] (press ctrl + left click to view)    Review of Systems: Reviewed  Unable to obtain due to poor mentation     MEDICATIONS  (STANDING):  artificial tears (preservative free) Ophthalmic Solution 1 Drop(s) Both EYES two times a day  atorvastatin 40 milliGRAM(s) Oral at bedtime  chlorhexidine 2% Cloths 1 Application(s) Topical <User Schedule>  dextrose 10% Bolus 250 milliLiter(s) IV Bolus once  dextrose 5% + sodium chloride 0.9%. 1000 milliLiter(s) (75 mL/Hr) IV Continuous <Continuous>  dextrose 5%. 1000 milliLiter(s) (100 mL/Hr) IV Continuous <Continuous>  dextrose 5%. 1000 milliLiter(s) (50 mL/Hr) IV Continuous <Continuous>  dextrose 50% Injectable 25 Gram(s) IV Push once  dextrose 50% Injectable 12.5 Gram(s) IV Push once  dextrose 50% Injectable 25 Gram(s) IV Push once  furosemide    Tablet 40 milliGRAM(s) Oral daily  glucagon  Injectable 1 milliGRAM(s) IntraMuscular once  insulin lispro (ADMELOG) corrective regimen sliding scale   SubCutaneous three times a day before meals  lactulose Syrup 30 Gram(s) Oral every 6 hours  levothyroxine Injectable 75 MICROGram(s) IV Push at bedtime  nystatin Powder 1 Application(s) Topical three times a day  pantoprazole  Injectable      pantoprazole  Injectable 40 milliGRAM(s) IV Push two times a day  piperacillin/tazobactam IVPB.. 3.375 Gram(s) IV Intermittent every 8 hours  polyethylene glycol 3350 17 Gram(s) Oral daily  rifAXIMin 550 milliGRAM(s) Oral two times a day  spironolactone 100 milliGRAM(s) Oral daily  tamsulosin 0.8 milliGRAM(s) Oral at bedtime  thiamine 100 milliGRAM(s) Oral daily    MEDICATIONS  (PRN):  acetaminophen     Tablet .. 650 milliGRAM(s) Oral every 6 hours PRN Temp greater or equal to 38C (100.4F), Mild Pain (1 - 3)  bisacodyl Suppository 10 milliGRAM(s) Rectal daily PRN Constipation  dextrose Oral Gel 15 Gram(s) Oral once PRN Blood Glucose LESS THAN 70 milliGRAM(s)/deciliter  ondansetron Injectable 4 milliGRAM(s) IV Push every 6 hours PRN Nausea and/or Vomiting    PHYSICAL EXAM:  Vital Signs Last 24 Hrs  T(C): 36.5 (15 Aug 2022 09:59), Max: 36.6 (14 Aug 2022 17:24)  T(F): 97.7 (15 Aug 2022 09:59), Max: 97.8 (14 Aug 2022 17:24)  HR: 100 (15 Aug 2022 09:59) (100 - 104)  BP: 108/66 (15 Aug 2022 09:59) (98/59 - 112/53)  BP(mean): --  RR: 19 (15 Aug 2022 09:59) (18 - 20)  SpO2: 96% (15 Aug 2022 09:59) (92% - 96%)    Parameters below as of 15 Aug 2022 09:59  Patient On (Oxygen Delivery Method): room air    General: in and out of sleep cycle, answers some questions, not all    Karnofsky:  %30    HEENT: dry mouth     Lungs: comfortable     CV: normal      GI: incontinent , abd distention    : incontinent      MSK: weakness , bedbound/wheelchair bound    Skin: dry skin    LABS:                          9.7    x     )-----------( x        ( 15 Aug 2022 08:30 )             30.1     08-15    133<L>  |  104  |  18.2  ----------------------------<  134<H>  4.0   |  19.0<L>  |  1.09    Ca    7.3<L>      15 Aug 2022 04:22  Phos  3.0     08-14  Mg     1.9     08-15    TPro  5.9<L>  /  Alb  1.7<L>  /  TBili  1.5  /  DBili  x   /  AST  75<H>  /  ALT  60<H>  /  AlkPhos  111  08-15    PT/INR - ( 14 Aug 2022 02:00 )   PT: 26.0 sec;   INR: 2.22 ratio      PTT - ( 13 Aug 2022 18:24 )  PTT:43.0 sec  Urinalysis Basic - ( 14 Aug 2022 09:36 )    Color: Yellow / Appearance: Clear / S.015 / pH: x  Gluc: x / Ketone: Trace  / Bili: Moderate / Urobili: 8   Blood: x / Protein: 30 mg/dL / Nitrite: Positive   Leuk Esterase: Trace / RBC: 6-10 /HPF / WBC 3-5 /HPF   Sq Epi: x / Non Sq Epi: Few / Bacteria: Moderate    I&O's Summary    RADIOLOGY & ADDITIONAL STUDIES:   CT abd 22    IMPRESSION:  No portal vein thrombosis.    Cirrhotic liver with associated ascites and varices.    Gastric, proximal small bowel and ascending colon wall thickening. While   this may be related to gastritis, enteritis and colitis, this pattern can   also be seen with portal enteropathy.    Suspected right upper lobe pneumonia.    Urinary bladder wall thickening which may be reactive to adjacent ascites   or represent sequelae of chronic outlet obstruction or cystitis. Clinical   correlation with urinalysis is advised.    Generalized anasarca.    Small bilateral effusions.      ADVANCE DIRECTIVES/TREATMENT PREFERENCES:  Full Code

## 2022-08-15 NOTE — PROGRESS NOTE ADULT - NS ATTEST RISK GEN_ALL_CORE
Risk Statement (NON-critical care)

## 2022-08-15 NOTE — PROGRESS NOTE ADULT - PROBLEM SELECTOR PLAN 1
7/20: CTA Chest-Mildly dilated aortic root, 4.1 cm at sinuses of Valsalva.  -Imaging reviewed and discuss with surgical attending, no need for acute intervention at this time.  -Recommend following up as output with Cardiothoracic Service for serial imaging.   -Rec cardiology consult for HR and BP mgmt  Cont care per primary team, will cont to follow.  CTS signing off at this time, please reconsult as needed. Thank you.
Cirrhosis with recurrent ascites requiring paracentesis possibly CABRERA vs ETOH (stopped drinking 15 years ago). Paracentesis on 08/07 removing 2 L, no SBP, repeated Paracentesis on 08/10 draining 3900cc of yellow clear fluid.  CT of abdomen and pelvis from 8/14 negative for portal vein thrombosis. Cirrhotic liver with associated ascites and varices. Gastric, proximal small bowel and ascending colon wall thickening. R upper lobe PNA, anasarca.  US Abd 8/12 revealed Moderate to large abdominal and pelvic ascites and partially visualized right pleural effusion.  Hemoglobin stable. No evidence of overt GI bleeding.     Plan:  Continue Lasix 40mg & Spironolactone 100mg   Continue to trend LFTs  Trend CBC and INR daily   continue lactulose & Rifaximin 550mg  PPI BID  Consider repeat therapeutic paracentesis

## 2022-08-15 NOTE — PROGRESS NOTE ADULT - ASSESSMENT
78 y/o M BIBA from home for AMS w/ associated inability to talk x 1 week.  Pt had hx of UTI prior to current admission.  CTH on admission showed left occipital infarct vs artifact.  Pt was admitted for acute metabolic encephalopathy, likely multifactorial.    1. Hypothyroidism  - Continue LT4 to 75 mcg daily  - TFTs ordered for Thursday morning    2. T2DM  - Controlled  - Continue fingersticks and SSI    3. Recurrent ascites  - Uclear etiology, as admission CT with small ascites, now with mod-severe s/p paracentesis x2  - GI following  - Monitor LFTs 78 y/o M BIBA from home for AMS w/ associated inability to talk x 1 week.  Pt had hx of UTI prior to current admission.  CTH on admission showed left occipital infarct vs artifact.  Pt was admitted for acute metabolic encephalopathy, likely multifactorial.    1. Hypothyroidism  - Continue LT4 IV 75 mcg daily  - TFTs ordered for Thursday morning    2. T2DM  - Controlled  - Continue fingersticks and SSI    3. Recurrent ascites  - Uclear etiology, as admission CT with small ascites, now with mod-severe s/p paracentesis x2  - GI following  - Monitor LFTs

## 2022-08-15 NOTE — PROGRESS NOTE ADULT - NS ATTEST RISKLEV GEN_ALL_CORE
High

## 2022-08-16 ENCOUNTER — TRANSCRIPTION ENCOUNTER (OUTPATIENT)
Age: 77
End: 2022-08-16

## 2022-08-16 LAB
ALBUMIN SERPL ELPH-MCNC: 1.6 G/DL — LOW (ref 3.3–5.2)
ALP SERPL-CCNC: 102 U/L — SIGNIFICANT CHANGE UP (ref 40–120)
ALT FLD-CCNC: 55 U/L — HIGH
ANION GAP SERPL CALC-SCNC: 10 MMOL/L — SIGNIFICANT CHANGE UP (ref 5–17)
AST SERPL-CCNC: 68 U/L — HIGH
BILIRUB SERPL-MCNC: 1.4 MG/DL — SIGNIFICANT CHANGE UP (ref 0.4–2)
BUN SERPL-MCNC: 16.5 MG/DL — SIGNIFICANT CHANGE UP (ref 8–20)
CALCIUM SERPL-MCNC: 7.1 MG/DL — LOW (ref 8.4–10.5)
CHLORIDE SERPL-SCNC: 108 MMOL/L — HIGH (ref 98–107)
CO2 SERPL-SCNC: 18 MMOL/L — LOW (ref 22–29)
CREAT SERPL-MCNC: 1.13 MG/DL — SIGNIFICANT CHANGE UP (ref 0.5–1.3)
EGFR: 66 ML/MIN/1.73M2 — SIGNIFICANT CHANGE UP
GLUCOSE SERPL-MCNC: 105 MG/DL — HIGH (ref 70–99)
HCT VFR BLD CALC: 26.9 % — LOW (ref 39–50)
HGB BLD-MCNC: 9.3 G/DL — LOW (ref 13–17)
MCHC RBC-ENTMCNC: 31.6 PG — SIGNIFICANT CHANGE UP (ref 27–34)
MCHC RBC-ENTMCNC: 34.6 GM/DL — SIGNIFICANT CHANGE UP (ref 32–36)
MCV RBC AUTO: 91.5 FL — SIGNIFICANT CHANGE UP (ref 80–100)
MITOCHONDRIA AB SER-ACNC: SIGNIFICANT CHANGE UP
PLATELET # BLD AUTO: 92 K/UL — LOW (ref 150–400)
POTASSIUM SERPL-MCNC: 3.6 MMOL/L — SIGNIFICANT CHANGE UP (ref 3.5–5.3)
POTASSIUM SERPL-SCNC: 3.6 MMOL/L — SIGNIFICANT CHANGE UP (ref 3.5–5.3)
PROT SERPL-MCNC: 5.8 G/DL — LOW (ref 6.6–8.7)
RBC # BLD: 2.94 M/UL — LOW (ref 4.2–5.8)
RBC # FLD: 15.9 % — HIGH (ref 10.3–14.5)
SMOOTH MUSCLE AB SER-ACNC: SIGNIFICANT CHANGE UP
SODIUM SERPL-SCNC: 135 MMOL/L — SIGNIFICANT CHANGE UP (ref 135–145)
WBC # BLD: 7.31 K/UL — SIGNIFICANT CHANGE UP (ref 3.8–10.5)
WBC # FLD AUTO: 7.31 K/UL — SIGNIFICANT CHANGE UP (ref 3.8–10.5)

## 2022-08-16 PROCEDURE — 96360 HYDRATION IV INFUSION INIT: CPT

## 2022-08-16 PROCEDURE — 36430 TRANSFUSION BLD/BLD COMPNT: CPT

## 2022-08-16 PROCEDURE — 82435 ASSAY OF BLOOD CHLORIDE: CPT

## 2022-08-16 PROCEDURE — 81001 URINALYSIS AUTO W/SCOPE: CPT

## 2022-08-16 PROCEDURE — 84484 ASSAY OF TROPONIN QUANT: CPT

## 2022-08-16 PROCEDURE — 84295 ASSAY OF SERUM SODIUM: CPT

## 2022-08-16 PROCEDURE — 82607 VITAMIN B-12: CPT

## 2022-08-16 PROCEDURE — 86901 BLOOD TYPING SEROLOGIC RH(D): CPT

## 2022-08-16 PROCEDURE — 82390 ASSAY OF CERULOPLASMIN: CPT

## 2022-08-16 PROCEDURE — 86381 MITOCHONDRIAL ANTIBODY EACH: CPT

## 2022-08-16 PROCEDURE — 85018 HEMOGLOBIN: CPT

## 2022-08-16 PROCEDURE — 86800 THYROGLOBULIN ANTIBODY: CPT

## 2022-08-16 PROCEDURE — 85027 COMPLETE CBC AUTOMATED: CPT

## 2022-08-16 PROCEDURE — 87070 CULTURE OTHR SPECIMN AEROBIC: CPT

## 2022-08-16 PROCEDURE — 84100 ASSAY OF PHOSPHORUS: CPT

## 2022-08-16 PROCEDURE — 84439 ASSAY OF FREE THYROXINE: CPT

## 2022-08-16 PROCEDURE — 99232 SBSQ HOSP IP/OBS MODERATE 35: CPT | Mod: FS

## 2022-08-16 PROCEDURE — 85014 HEMATOCRIT: CPT

## 2022-08-16 PROCEDURE — P9059: CPT

## 2022-08-16 PROCEDURE — 71045 X-RAY EXAM CHEST 1 VIEW: CPT

## 2022-08-16 PROCEDURE — 74230 X-RAY XM SWLNG FUNCJ C+: CPT

## 2022-08-16 PROCEDURE — 99239 HOSP IP/OBS DSCHRG MGMT >30: CPT

## 2022-08-16 PROCEDURE — 82140 ASSAY OF AMMONIA: CPT

## 2022-08-16 PROCEDURE — 83735 ASSAY OF MAGNESIUM: CPT

## 2022-08-16 PROCEDURE — 92526 ORAL FUNCTION THERAPY: CPT

## 2022-08-16 PROCEDURE — 85025 COMPLETE CBC W/AUTO DIFF WBC: CPT

## 2022-08-16 PROCEDURE — 0225U NFCT DS DNA&RNA 21 SARSCOV2: CPT

## 2022-08-16 PROCEDURE — 99285 EMERGENCY DEPT VISIT HI MDM: CPT | Mod: 25

## 2022-08-16 PROCEDURE — 87641 MR-STAPH DNA AMP PROBE: CPT

## 2022-08-16 PROCEDURE — 87040 BLOOD CULTURE FOR BACTERIA: CPT

## 2022-08-16 PROCEDURE — 84157 ASSAY OF PROTEIN OTHER: CPT

## 2022-08-16 PROCEDURE — 74174 CTA ABD&PLVS W/CONTRAST: CPT

## 2022-08-16 PROCEDURE — 84480 ASSAY TRIIODOTHYRONINE (T3): CPT

## 2022-08-16 PROCEDURE — C1729: CPT

## 2022-08-16 PROCEDURE — 83540 ASSAY OF IRON: CPT

## 2022-08-16 PROCEDURE — 93975 VASCULAR STUDY: CPT

## 2022-08-16 PROCEDURE — 83880 ASSAY OF NATRIURETIC PEPTIDE: CPT

## 2022-08-16 PROCEDURE — 82330 ASSAY OF CALCIUM: CPT

## 2022-08-16 PROCEDURE — 71275 CT ANGIOGRAPHY CHEST: CPT | Mod: MA

## 2022-08-16 PROCEDURE — 86255 FLUORESCENT ANTIBODY SCREEN: CPT

## 2022-08-16 PROCEDURE — 86038 ANTINUCLEAR ANTIBODIES: CPT

## 2022-08-16 PROCEDURE — 97163 PT EVAL HIGH COMPLEX 45 MIN: CPT

## 2022-08-16 PROCEDURE — 86376 MICROSOMAL ANTIBODY EACH: CPT

## 2022-08-16 PROCEDURE — 86140 C-REACTIVE PROTEIN: CPT

## 2022-08-16 PROCEDURE — 74177 CT ABD & PELVIS W/CONTRAST: CPT | Mod: MA

## 2022-08-16 PROCEDURE — 76705 ECHO EXAM OF ABDOMEN: CPT

## 2022-08-16 PROCEDURE — 87640 STAPH A DNA AMP PROBE: CPT

## 2022-08-16 PROCEDURE — 82962 GLUCOSE BLOOD TEST: CPT

## 2022-08-16 PROCEDURE — 86850 RBC ANTIBODY SCREEN: CPT

## 2022-08-16 PROCEDURE — 82803 BLOOD GASES ANY COMBINATION: CPT

## 2022-08-16 PROCEDURE — U0003: CPT

## 2022-08-16 PROCEDURE — 83615 LACTATE (LD) (LDH) ENZYME: CPT

## 2022-08-16 PROCEDURE — 80074 ACUTE HEPATITIS PANEL: CPT

## 2022-08-16 PROCEDURE — 87205 SMEAR GRAM STAIN: CPT

## 2022-08-16 PROCEDURE — 89051 BODY FLUID CELL COUNT: CPT

## 2022-08-16 PROCEDURE — 80053 COMPREHEN METABOLIC PANEL: CPT

## 2022-08-16 PROCEDURE — 85379 FIBRIN DEGRADATION QUANT: CPT

## 2022-08-16 PROCEDURE — 83036 HEMOGLOBIN GLYCOSYLATED A1C: CPT

## 2022-08-16 PROCEDURE — 84132 ASSAY OF SERUM POTASSIUM: CPT

## 2022-08-16 PROCEDURE — 86703 HIV-1/HIV-2 1 RESULT ANTBDY: CPT

## 2022-08-16 PROCEDURE — 87075 CULTR BACTERIA EXCEPT BLOOD: CPT

## 2022-08-16 PROCEDURE — 76942 ECHO GUIDE FOR BIOPSY: CPT

## 2022-08-16 PROCEDURE — 86900 BLOOD TYPING SEROLOGIC ABO: CPT

## 2022-08-16 PROCEDURE — 82042 OTHER SOURCE ALBUMIN QUAN EA: CPT

## 2022-08-16 PROCEDURE — 87086 URINE CULTURE/COLONY COUNT: CPT

## 2022-08-16 PROCEDURE — 82947 ASSAY GLUCOSE BLOOD QUANT: CPT

## 2022-08-16 PROCEDURE — 95714 VEEG EA 12-26 HR UNMNTR: CPT

## 2022-08-16 PROCEDURE — 82945 GLUCOSE OTHER FLUID: CPT

## 2022-08-16 PROCEDURE — 74018 RADEX ABDOMEN 1 VIEW: CPT

## 2022-08-16 PROCEDURE — 84436 ASSAY OF TOTAL THYROXINE: CPT

## 2022-08-16 PROCEDURE — 84145 PROCALCITONIN (PCT): CPT

## 2022-08-16 PROCEDURE — 93005 ELECTROCARDIOGRAM TRACING: CPT

## 2022-08-16 PROCEDURE — 95813 EEG EXTND MNTR 61-119 MIN: CPT

## 2022-08-16 PROCEDURE — 85730 THROMBOPLASTIN TIME PARTIAL: CPT

## 2022-08-16 PROCEDURE — 80048 BASIC METABOLIC PNL TOTAL CA: CPT

## 2022-08-16 PROCEDURE — 36415 COLL VENOUS BLD VENIPUNCTURE: CPT

## 2022-08-16 PROCEDURE — 84443 ASSAY THYROID STIM HORMONE: CPT

## 2022-08-16 PROCEDURE — 92610 EVALUATE SWALLOWING FUNCTION: CPT

## 2022-08-16 PROCEDURE — 84466 ASSAY OF TRANSFERRIN: CPT

## 2022-08-16 PROCEDURE — 95700 EEG CONT REC W/VID EEG TECH: CPT

## 2022-08-16 PROCEDURE — 83550 IRON BINDING TEST: CPT

## 2022-08-16 PROCEDURE — 74176 CT ABD & PELVIS W/O CONTRAST: CPT

## 2022-08-16 PROCEDURE — 85610 PROTHROMBIN TIME: CPT

## 2022-08-16 PROCEDURE — U0005: CPT

## 2022-08-16 PROCEDURE — 70450 CT HEAD/BRAIN W/O DYE: CPT

## 2022-08-16 PROCEDURE — 83605 ASSAY OF LACTIC ACID: CPT

## 2022-08-16 PROCEDURE — 84432 ASSAY OF THYROGLOBULIN: CPT

## 2022-08-16 PROCEDURE — 82728 ASSAY OF FERRITIN: CPT

## 2022-08-16 RX ORDER — MORPHINE SULFATE 50 MG/1
2 CAPSULE, EXTENDED RELEASE ORAL
Qty: 0 | Refills: 0 | DISCHARGE
Start: 2022-08-16

## 2022-08-16 RX ORDER — DIAZEPAM 5 MG
5 TABLET ORAL
Qty: 0 | Refills: 0 | DISCHARGE
Start: 2022-08-16

## 2022-08-16 RX ORDER — ONDANSETRON 8 MG/1
4 TABLET, FILM COATED ORAL
Qty: 0 | Refills: 0 | DISCHARGE
Start: 2022-08-16

## 2022-08-16 RX ADMIN — SODIUM CHLORIDE 75 MILLILITER(S): 9 INJECTION, SOLUTION INTRAVENOUS at 01:58

## 2022-08-16 RX ADMIN — Medication 1 DROP(S): at 05:50

## 2022-08-16 RX ADMIN — ROBINUL 0.2 MILLIGRAM(S): 0.2 INJECTION INTRAMUSCULAR; INTRAVENOUS at 05:50

## 2022-08-16 NOTE — PROGRESS NOTE ADULT - SUBJECTIVE AND OBJECTIVE BOX
Palliative Care Re-Consult    OVERNIGHT EVENTS: no acute overnight events - symptoms well managed on comfort care- no PRNs needed overnight    Present Symptoms:   Unable to obtain due to poor mentation     Pain: Appears comfortable            Character-            Duration-            Effect-            Factors-            Frequency-            Location-            Severity-    MEDICATIONS  (STANDING):  artificial tears (preservative free) Ophthalmic Solution 1 Drop(s) Both EYES two times a day  dextrose 10% Bolus 250 milliLiter(s) IV Bolus once  dextrose 5% + sodium chloride 0.9%. 1000 milliLiter(s) (75 mL/Hr) IV Continuous <Continuous>  dextrose 5%. 1000 milliLiter(s) (100 mL/Hr) IV Continuous <Continuous>  dextrose 5%. 1000 milliLiter(s) (50 mL/Hr) IV Continuous <Continuous>  dextrose 50% Injectable 25 Gram(s) IV Push once  dextrose 50% Injectable 12.5 Gram(s) IV Push once  dextrose 50% Injectable 25 Gram(s) IV Push once  glycopyrrolate Injectable 0.2 milliGRAM(s) IV Push every 6 hours    MEDICATIONS  (PRN):  dextrose Oral Gel 15 Gram(s) Oral once PRN Blood Glucose LESS THAN 70 milliGRAM(s)/deciliter  diazepam  Injectable 5 milliGRAM(s) IV Push every 4 hours PRN AGITATION/ANXIETY/INCREASED WORK OF BREATHING  glycopyrrolate Injectable 0.2 milliGRAM(s) IV Push every 4 hours PRN BREAKTHROUGH EXCESSIVE SECRETIONS  morphine  - Injectable 2 milliGRAM(s) IV Push every 4 hours PRN PAIN/DYSPNEA  ondansetron Injectable 4 milliGRAM(s) IV Push every 6 hours PRN Nausea and/or Vomiting  Pain AD Score:0  [email or web address] (press ctrl + left click to view)    Review of Systems: Reviewed  Unable to obtain due to poor mentation     PHYSICAL EXAM:  Vital Signs Last 24 Hrs  T(C): 37.3 (15 Aug 2022 16:58), Max: 37.3 (15 Aug 2022 16:58)  T(F): 99.1 (15 Aug 2022 16:58), Max: 99.1 (15 Aug 2022 16:58)  HR: 101 (15 Aug 2022 16:58) (101 - 101)  BP: 98/63 (15 Aug 2022 16:58) (98/63 - 98/63)  BP(mean): --  RR: 19 (15 Aug 2022 16:58) (19 - 19)  SpO2: 93% (15 Aug 2022 16:58) (93% - 93%)    Parameters below as of 15 Aug 2022 16:58  Patient On (Oxygen Delivery Method): room air      General: in and out of sleep cycle, answers some questions, not all    Karnofsky:  %30    HEENT: dry mouth     Lungs: comfortable     CV: normal      GI: incontinent , abd distention    : incontinent      MSK: weakness , bedbound/wheelchair bound    Skin: dry skin    LABS:      Comprehensive Metabolic Panel in AM (08.16.22 @ 06:32)    Sodium, Serum: 135 mmol/L    Potassium, Serum: 3.6 mmol/L    Chloride, Serum: 108 mmol/L    Carbon Dioxide, Serum: 18.0 mmol/L    Anion Gap, Serum: 10 mmol/L    Blood Urea Nitrogen, Serum: 16.5 mg/dL    Creatinine, Serum: 1.13 mg/dL    Glucose, Serum: 105 mg/dL    Calcium, Total Serum: 7.1 mg/dL    Protein Total, Serum: 5.8 g/dL    Albumin, Serum: 1.6 g/dL    Bilirubin Total, Serum: 1.4 mg/dL    Alkaline Phosphatase, Serum: 102 U/L    Aspartate Aminotransferase (AST/SGOT): 68 U/L    Alanine Aminotransferase (ALT/SGPT): 55 U/L    eGFR: 66: The estimated glomerular filtration rate (eGFR) is calculated using the  2021 CKD-EPI creatinine equation, which does not have a coefficient for  race and is validated in individuals 18 years of age and older (N Engl J  Med 2021; 385:5729-2289). Creatinine-based eGFR may be inaccurate in  various situations including but not limited to extremes of muscle mass,  altered dietary protein intake, or medications that affect renal tubular  creatinine secretion. mL/min/1.73m2    RADIOLOGY & ADDITIONAL STUDIES:   CT abd 8/14/22    IMPRESSION:  No portal vein thrombosis.    Cirrhotic liver with associated ascites and varices.    Gastric, proximal small bowel and ascending colon wall thickening. While   this may be related to gastritis, enteritis and colitis, this pattern can   also be seen with portal enteropathy.    Suspected right upper lobe pneumonia.    Urinary bladder wall thickening which may be reactive to adjacent ascites   or represent sequelae of chronic outlet obstruction or cystitis. Clinical   correlation with urinalysis is advised.    Generalized anasarca.    Small bilateral effusions.      ADVANCE DIRECTIVES/TREATMENT PREFERENCES:  Full Code   Palliative Care Re-Consult    OVERNIGHT EVENTS: no acute overnight events - symptoms well managed on comfort care- no PRNs needed overnight    Present Symptoms:   Unable to obtain due to poor mentation     Pain: Appears comfortable            Character-            Duration-            Effect-            Factors-            Frequency-            Location-            Severity-    MEDICATIONS  (STANDING):  artificial tears (preservative free) Ophthalmic Solution 1 Drop(s) Both EYES two times a day  dextrose 10% Bolus 250 milliLiter(s) IV Bolus once  dextrose 5% + sodium chloride 0.9%. 1000 milliLiter(s) (75 mL/Hr) IV Continuous <Continuous>  dextrose 5%. 1000 milliLiter(s) (100 mL/Hr) IV Continuous <Continuous>  dextrose 5%. 1000 milliLiter(s) (50 mL/Hr) IV Continuous <Continuous>  dextrose 50% Injectable 25 Gram(s) IV Push once  dextrose 50% Injectable 12.5 Gram(s) IV Push once  dextrose 50% Injectable 25 Gram(s) IV Push once  glycopyrrolate Injectable 0.2 milliGRAM(s) IV Push every 6 hours    MEDICATIONS  (PRN):  dextrose Oral Gel 15 Gram(s) Oral once PRN Blood Glucose LESS THAN 70 milliGRAM(s)/deciliter  diazepam  Injectable 5 milliGRAM(s) IV Push every 4 hours PRN AGITATION/ANXIETY/INCREASED WORK OF BREATHING  glycopyrrolate Injectable 0.2 milliGRAM(s) IV Push every 4 hours PRN BREAKTHROUGH EXCESSIVE SECRETIONS  morphine  - Injectable 2 milliGRAM(s) IV Push every 4 hours PRN PAIN/DYSPNEA  ondansetron Injectable 4 milliGRAM(s) IV Push every 6 hours PRN Nausea and/or Vomiting  Pain AD Score:0  [email or web address] (press ctrl + left click to view)    Review of Systems: Reviewed  Unable to obtain due to poor mentation     PHYSICAL EXAM:  Vital Signs Last 24 Hrs  T(C): 37.3 (15 Aug 2022 16:58), Max: 37.3 (15 Aug 2022 16:58)  T(F): 99.1 (15 Aug 2022 16:58), Max: 99.1 (15 Aug 2022 16:58)  HR: 101 (15 Aug 2022 16:58) (101 - 101)  BP: 98/63 (15 Aug 2022 16:58) (98/63 - 98/63)  BP(mean): --  RR: 19 (15 Aug 2022 16:58) (19 - 19)  SpO2: 93% (15 Aug 2022 16:58) (93% - 93%)    Parameters below as of 15 Aug 2022 16:58  Patient On (Oxygen Delivery Method): room air      General: in and out of sleep cycle, answers some questions, not all    Karnofsky:  %30    HEENT: dry mouth     Lungs: comfortable     CV: normal      GI: incontinent , abd distention    : incontinent      MSK: weakness , bedbound/wheelchair bound    Skin: dry skin    LABS:      Comprehensive Metabolic Panel in AM (08.16.22 @ 06:32)    Sodium, Serum: 135 mmol/L    Potassium, Serum: 3.6 mmol/L    Chloride, Serum: 108 mmol/L    Carbon Dioxide, Serum: 18.0 mmol/L    Anion Gap, Serum: 10 mmol/L    Blood Urea Nitrogen, Serum: 16.5 mg/dL    Creatinine, Serum: 1.13 mg/dL    Glucose, Serum: 105 mg/dL    Calcium, Total Serum: 7.1 mg/dL    Protein Total, Serum: 5.8 g/dL    Albumin, Serum: 1.6 g/dL    Bilirubin Total, Serum: 1.4 mg/dL    Alkaline Phosphatase, Serum: 102 U/L    Aspartate Aminotransferase (AST/SGOT): 68 U/L    Alanine Aminotransferase (ALT/SGPT): 55 U/L    eGFR: 66: The estimated glomerular filtration rate (eGFR) is calculated using the  2021 CKD-EPI creatinine equation, which does not have a coefficient for  race and is validated in individuals 18 years of age and older (N Engl J  Med 2021; 385:6396-5488). Creatinine-based eGFR may be inaccurate in  various situations including but not limited to extremes of muscle mass,  altered dietary protein intake, or medications that affect renal tubular  creatinine secretion. mL/min/1.73m2    RADIOLOGY & ADDITIONAL STUDIES:   CT abd 8/14/22    IMPRESSION:  No portal vein thrombosis.    Cirrhotic liver with associated ascites and varices.    Gastric, proximal small bowel and ascending colon wall thickening. While   this may be related to gastritis, enteritis and colitis, this pattern can   also be seen with portal enteropathy.    Suspected right upper lobe pneumonia.    Urinary bladder wall thickening which may be reactive to adjacent ascites   or represent sequelae of chronic outlet obstruction or cystitis. Clinical   correlation with urinalysis is advised.    Generalized anasarca.    Small bilateral effusions.      ADVANCE DIRECTIVES/TREATMENT PREFERENCES:  DNR DNI

## 2022-08-16 NOTE — CHART NOTE - NSCHARTNOTEFT_GEN_A_CORE
chart reviewed    patient is comfort care  LT4 IV was dced    thank you for the consult  will sign off

## 2022-08-16 NOTE — PROGRESS NOTE ADULT - REASON FOR ADMISSION
Altered mental state

## 2022-08-16 NOTE — DISCHARGE NOTE NURSING/CASE MANAGEMENT/SOCIAL WORK - NSDCPEFALRISK_GEN_ALL_CORE
For information on Fall & Injury Prevention, visit: [email or web address] OR  [email or web address] OR  https://www.cdc.gov/steadi/patient.html

## 2022-08-16 NOTE — PROGRESS NOTE ADULT - ASSESSMENT
77 year old male admitted for AMS     Problem/Recommendation 1: AMS   Possibly due to underlying infection - UTI , colitis, constipation hepatic encephalopathy - was on IV abx  Inital CT head possible artifact vs infarct , repeat CT head negative   +COVID asymptomatic , now completed course- off isolation  Started on Rifaximin on admission for hyperammonemia which now WNL    Problem/Recommendation 2: Abdominal Ascites  S/p IR for drainage of ascites  CT abd with Cirrhotic liver with associated ascites and varices, anasarca, pleural effusions, pneumonia.    Problem/Recommendation 3: Debility  Assist in ADLS  Maintain safety, fall, aspiration precautions    Problem/Recommendation 4: Palliative Care Encounter  Met with patient's wife Katt yesterday at which time patient transitioned to comfort measures only  Symptoms of pain and dyspnea well managed no PRNs overnight  Patient with no PO route on IV medication for optimal symptom management   Discussed with Natasha Banda from [employer] - Patient for transfer to inpatient hospice today around 11 am     Code status: DNR DNI MOLST   HCP/Surrogate: Katt Platt   **CONTACT NUMBER FOR KATT is  [phone]    Total Time Spent_40__ minutes  This includes chart review, patient assessment, discussion and collaboration with interdisciplinary team members, ACP planning    Thank you for the opportunity to assist with the care of this patient.   [facility] Palliative Medicine Consult Service [phone].

## 2022-08-16 NOTE — PROGRESS NOTE ADULT - PROVIDER SPECIALTY LIST ADULT
Endocrinology
Endocrinology
Hospitalist
Palliative Care
Endocrinology
Hospitalist
Intervent Radiology
Intervent Radiology
Neurology
Palliative Care
Surgery
Endocrinology
Gastroenterology
Gastroenterology
Hospitalist
Internal Medicine
Palliative Care
Surgery
Surgery
Endocrinology
Hospitalist
Palliative Care
Hospitalist
Hospitalist
CT Surgery
Hospitalist
Gastroenterology
Hospitalist

## 2022-08-16 NOTE — CHART NOTE - NSCHARTNOTESELECT_GEN_ALL_CORE
Event Note
Nutrition Services
Event Note
Nutrition Services
Palliative NP/Off Service Note
Registered Dietitian
eeg read/Event Note

## 2022-08-16 NOTE — DISCHARGE NOTE NURSING/CASE MANAGEMENT/SOCIAL WORK - PATIENT PORTAL LINK FT
You can access the FollowMyHealth Patient Portal offered by [facility] by registering at the following website: [email or web address]. By joining [employer]’s FollowMyHealth portal, you will also be able to view your health information using other applications (apps) compatible with our system.

## 2022-08-18 LAB
ANA PAT FLD IF-IMP: SIGNIFICANT CHANGE UP
ANA TITR SER: ABNORMAL

## 2022-08-19 LAB
CULTURE RESULTS: SIGNIFICANT CHANGE UP
CULTURE RESULTS: SIGNIFICANT CHANGE UP
SPECIMEN SOURCE: SIGNIFICANT CHANGE UP
SPECIMEN SOURCE: SIGNIFICANT CHANGE UP

## 2022-11-09 NOTE — PROGRESS NOTE ADULT - ASSESSMENT
76 y/o M BIBA from home for AMS w/ associated inability to talk x 1 week.  Pt had hx of UTI prior to current admission.  CTH on admission showed left occipital infarct vs artifact, no last known well time available, not a candidate for tPA.  w/u on admisison also significant for hyperammonemia and BG >400s.  Lactic acidosis likely 2/2 cirrhosis.  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  Pt was admitted for acute metabolic encephalopathy, likely multifactorial  Pts remains acute given his persistent encephalopathy.        Acute metabolic encephalopathy, likely multifactorial in the setting of hypothyroid, cirrhosis, possible UTI, decub ulcers (POA), colitis, covid and ?subacute CVA  - AMS persists  - CTH reporting possible L-occipital lobe lucency which may represent artifact vs infarct; repeat CTH (-) for acute findings or interval changes     - Hyperammonemia in the setting of cirrhosis and AMS could be 2/2 hepatic encephalopathy and therefore started on Rifaxamin on admission   - s/p cvEEG and epileptiform pattern or seizures noted  - Maintain on synthroid for hypothyroid and repeat TFTs to assess improvement    - Low suspicion for UTI given UA w/out pyuria and Ucx reporting >3 organisms likely contaminant   - Has sacral decub ulcers, no active drainage but unclear if OM deep to wounds however CRP 62 and procal 0.61  - ID recommending MRI; if suspicion high for OM however pt afebrile and nL WBC; If MRI pursued would likely need to be done under conscious sedation    - Colitis noted on CT a/p on admission could be 2/2 COVID and can exacerbate dehydration from poor po intake    - Maintain on fall and aspiration precautions   - ID following and recs noted      Hx of CVA vs subacute CVA  - CTH reviewed and noted above   - MRI deffered by neurology and repeat CTH reviewed and is w/out interval changes  - Maintain on asa and statin therapy  - Maintain on telemetry   - Neurology consulted and recs noted      Hypothyroidism  - TFTs reviewed   - On syntrhoid and titrate per endo recs   - TG ab elevated; possibly hashimoto's; TPO ab pending   - Will need repeat TFTs to reassess if synthroid titration needed  - Endocrinology consulted and recs noted      Acute Hepatic encephalopathy   - Persistent encephalopathy   - Liver cirrhosis on CT a/p and hyperammonemia noted on admission   - Has hx of heavy EtOH use but as per pt quit >10yrs ago   - Maintain on rifaximin and lactulose   - Fall precautions       Transaminitis  - Likely related to cirrhosis   - Pt asymptomatic and benign abd exam   - Tbili remains WNL and LFTs improving   - Hepatitis panel pending   - Trend LFTs       ?UTI  - Urine cx reporting >3 organisms, likely contaminant and Bcx (-) x2  - Completed 3 day course of empiric Abx (Rocephin) on 7/23  - No leukocytosis and remains afebrile    - Monitor CBC and temperature      Normocytic anemia / Thrombocytopenia   - H/H and platelets stable   - Low plts likely 2/2 cirrhosis   - Hemodynamically stable w/ no active bleeding noted  - Monitor CBC and transfuse if Hb<7        DM II  - Hyperglycemic on admission and hypolgycemic while hospitalized   - A1c 7.7  - ISS and hypoglycemic protocol in place       Colitis   - Incidental finding on CT however could be related to COVID   - Pt is a poor historian and unable to express himself well   - Can contribute to dehydration, exacerbating encephalopathy   - No diarrhea reported and benign clinical exam       Sacral decubitus ulcers and rt heel decubitus ulcer both POA  - No drainage noted   - No leukocytosis and remains afebrile    - Given CT findings and elevated CRP/procal   - ID recommending MRI if suspicion high for OM however given pt afebrile and nL WBC will defer for now; If MRI pursued would likely need to be done under conscious sedation    - s/p 3 day course of IV rocephin   - c/w wound care   - Monitor CBC and temperature   - Consulted ID and recs noted       Dilated AR   - 4.1cm at sinus of valsalva   - Incidental finding on CT chest   - CTSx consulted and recs noted; no intervention warranted at this time        VTE ppx: Lovenox     Code status: full code       Dispo: Pt remains acute requiring hospitalization.  Pt is persistently encephalopathic.      Pt's desire is to feel better.

## 2022-12-06 RX ORDER — ATORVASTATIN CALCIUM 80 MG/1
1 TABLET, FILM COATED ORAL
Qty: 0 | Refills: 0 | DISCHARGE

## 2022-12-06 RX ORDER — METOPROLOL TARTRATE 50 MG
0 TABLET ORAL
Qty: 0 | Refills: 0 | DISCHARGE

## 2022-12-06 RX ORDER — CHOLECALCIFEROL (VITAMIN D3) 125 MCG
1 CAPSULE ORAL
Qty: 0 | Refills: 0 | DISCHARGE

## 2022-12-06 RX ORDER — INSULIN LISPRO 100/ML
0 VIAL (ML) SUBCUTANEOUS
Qty: 0 | Refills: 0 | DISCHARGE

## 2022-12-06 RX ORDER — TAMSULOSIN HYDROCHLORIDE 0.4 MG/1
0 CAPSULE ORAL
Qty: 0 | Refills: 0 | DISCHARGE

## 2022-12-06 RX ORDER — METFORMIN HYDROCHLORIDE 850 MG/1
1 TABLET ORAL
Qty: 0 | Refills: 0 | DISCHARGE

## 2022-12-06 RX ORDER — ASPIRIN/CALCIUM CARB/MAGNESIUM 324 MG
1 TABLET ORAL
Qty: 0 | Refills: 0 | DISCHARGE

## 2022-12-06 RX ORDER — ATORVASTATIN CALCIUM 80 MG/1
0 TABLET, FILM COATED ORAL
Qty: 0 | Refills: 0 | DISCHARGE

## 2022-12-06 RX ORDER — ASPIRIN/CALCIUM CARB/MAGNESIUM 324 MG
0 TABLET ORAL
Qty: 0 | Refills: 0 | DISCHARGE

## 2022-12-06 RX ORDER — CYCLOSPORINE 0.5 MG/ML
1 EMULSION OPHTHALMIC
Qty: 0 | Refills: 0 | DISCHARGE

## 2022-12-06 RX ORDER — TAMSULOSIN HYDROCHLORIDE 0.4 MG/1
1 CAPSULE ORAL
Qty: 0 | Refills: 0 | DISCHARGE

## 2022-12-06 RX ORDER — LEVOTHYROXINE SODIUM 125 MCG
13 TABLET ORAL
Qty: 0 | Refills: 0 | DISCHARGE

## 2022-12-06 RX ORDER — INSULIN ASPART 100 [IU]/ML
0 INJECTION, SOLUTION SUBCUTANEOUS
Qty: 0 | Refills: 0 | DISCHARGE

## 2022-12-06 RX ORDER — GLIMEPIRIDE 1 MG
1 TABLET ORAL
Qty: 0 | Refills: 0 | DISCHARGE

## 2022-12-06 RX ORDER — FINASTERIDE 5 MG/1
1 TABLET, FILM COATED ORAL
Qty: 0 | Refills: 0 | DISCHARGE

## 2022-12-16 NOTE — CONSULT NOTE ADULT - CONSULT REASON
Weak urinary stream Hydroxyzine Counseling: Patient advised that the medication is sedating and not to drive a car after taking this medication.  Patient informed of potential adverse effects including but not limited to dry mouth, urinary retention, and blurry vision.  The patient verbalized understanding of the proper use and possible adverse effects of hydroxyzine.  All of the patient's questions and concerns were addressed.

## 2023-02-17 NOTE — PATIENT PROFILE ADULT - FUNCTIONAL SCREEN CURRENT LEVEL: EATING, MLM
0 = independent Tazorac Pregnancy And Lactation Text: This medication is not safe during pregnancy. It is unknown if this medication is excreted in breast milk.

## 2023-04-14 NOTE — PROGRESS NOTE ADULT - PROBLEM SELECTOR PLAN 4
04/14/23 0942   PHQ-2/9 Depression Screening   Able to Complete PHQ2/9 Questionnaire Yes   PHQ-2/9 Depression Screening   Little interest or pleasure in activity? 0   Feeling down, depressed or hopeless? 1   Initial depression screening score: 1   PHQ2 Interpretation No further screening needed   Trouble falling or staying asleep or sleeping all the time? 2   Feeling tired or having little energy? 0   Poor appetite or overeating? 0   Feeling bad about yourself or that you are a failure or have let yourself or family down? 1   Trouble concentrating on things such as reading the newspaper or watching TV? 2   Moving or speaking slowly that other people have noticed or the opposite - being so fidgety or restless that you have been moving around a lot more than usual? 0   Thoughts that you would be better off dead or of hurting yourself in some way? 1   Total depressive symptoms score (PHQ9):  7   PHQ9 Interpretation Mild Depression   If you reported any problems, how difficult have these problems made it to do your work, take care of things at home, or get along with other people? Somewhat difficult   Shea Mayberry, SIMONE    TSH elevated, increase synthroid from 137 to 150 mcg daily

## 2023-06-22 NOTE — ED ADULT NURSE NOTE - CHPI ED NUR TIMING2
"Chief Complaint   Patient presents with    Annual Exam       SUBJECTIVE:   76 y.o. female for annual routine checkup.    Current Outpatient Medications   Medication Sig Dispense Refill    atorvastatin (LIPITOR) 40 MG tablet Take 1 tablet (40 mg total) by mouth once daily. 90 tablet 3    co-enzyme Q-10 30 mg capsule Take 30 mg by mouth 3 (three) times daily.      diclofenac sodium (VOLTAREN) 1 % Gel Apply 2 g topically once daily. 450 g 2    multivitamin/iron/folic acid (CENTRUM WOMEN ORAL) Take by mouth.      mv,hussein,iron,mn/folic acid/chol (HAIR-SKIN-NAILS, PABA, ORAL) Take by mouth.      OMEGA 3,6,9 COMBINATION NO.7 (OMEGA DHA ORAL) Take 830 mg by mouth once daily.      traMADoL (ULTRAM) 50 mg tablet Take 1 tablet (50 mg total) by mouth 3 (three) times daily as needed for Pain. 90 tablet 5    triamterene-hydrochlorothiazide 37.5-25 mg (DYAZIDE) 37.5-25 mg per capsule Take 1 capsule by mouth once daily. 90 capsule 2    diclofenac (FLECTOR) 1.3 % PT12 Use 1 patch daily on the affected area 30 patch 5    lisinopriL (PRINIVIL,ZESTRIL) 40 MG tablet Take 1 tablet (40 mg total) by mouth once daily. 90 tablet 3     No current facility-administered medications for this visit.     Allergies: Patient has no known allergies.   No LMP recorded. Patient is postmenopausal.    ROS:  Feeling well. No dyspnea or chest pain on exertion.  No abdominal pain, change in bowel habits, black or bloody stools.  No urinary tract symptoms. GYN ROS: no breast pain or new or enlarging lumps on self exam, she complains of arthritis changes and pain, otherwise doing well. No neurological complaints.    OBJECTIVE:   The patient appears well, alert, oriented x 3, in no distress.  /80   Pulse 91   Temp 98.4 °F (36.9 °C) (Oral)   Ht 5' 6" (1.676 m)   Wt 77.3 kg (170 lb 6.7 oz)   SpO2 96%   BMI 27.51 kg/m²   Wt Readings from Last 5 Encounters:   06/23/23 77.2 kg (170 lb 3.1 oz)   06/22/23 77.3 kg (170 lb 6.7 oz)   06/05/23 77.6 kg (171 lb " 1.2 oz)   05/26/23 77 kg (169 lb 12.1 oz)   05/11/23 77.5 kg (170 lb 13.7 oz)       ENT normal.  Neck supple. No adenopathy or thyromegaly. JUAN. Lungs are clear, good air entry, no wheezes, rhonchi or rales. S1 and S2 normal, no murmurs, regular rate and rhythm. Abdomen soft without tenderness, guarding, mass or organomegaly. Extremities show no edema, normal peripheral pulses. Neurological is normal, no focal findings.  Arthritic change in the hands/hips and knees    BREAST EXAM: deferred    PELVIC EXAM: deferred    ASSESSMENT:   1. Annual physical exam    2. Essential hypertension    3. Osteoarthritis of both hips, unspecified osteoarthritis type          PLAN:   Counseled on age appropriate medical preventative services, including age appropriate cancer screenings, over all nutritional health, need for a consistent exercise regimen and an over all push towards maintaining a vigorous and active lifestyle.  Counseled on age appropriate vaccines and discussed upcoming health care needs based on age/gender.  Spent time with patient counseling on need for a good patient/doctor relationship moving forward.  Discussed use of common OTC medications and supplements.  Discussed common dietary aids and use of caffeine and the need for good sleep hygiene and stress management.    Problem List Items Addressed This Visit       Osteoarthritis of both hips (moderate)    Relevant Medications    diclofenac (FLECTOR) 1.3 % PT12    Essential hypertension    Relevant Medications    lisinopriL (PRINIVIL,ZESTRIL) 40 MG tablet     Other Visit Diagnoses       Annual physical exam    -  Primary            F/u in 1 year for wellness     gradual onset

## 2023-08-18 NOTE — SWALLOW VFSS/MBS ASSESSMENT ADULT - RECOMMENDED CONSISTENCY
NPO w/continuation of non-oral alternative means of nutrition/hydration via PEG, as per Pt/family wishes  Rx re-initiation/continuation of dysphagia tx
none

## 2024-03-31 PROBLEM — Z93.1 GASTROSTOMY TUBE IN PLACE: Status: ACTIVE | Noted: 2022-07-01

## 2024-05-03 NOTE — CONSULT NOTE ADULT - SUBJECTIVE AND OBJECTIVE BOX
Death Summary  [facility]    Patient Name Indra Roche   MRN: [id number]   YOB: 1937       Admit date: 5/2/2024   Date and time of death:  1335 5/3/24     Admitting Physician: Thierry Levine DO   Primary care provider: Leland Medina MD  Discharge Physician: Thierry Levine DO    Primary cause of death:  Acute respiratory failure 2/2 to metastatic lung cancer.     Primary Discharge Diagnoses:  Active Problems:    * No active hospital problems. *  Resolved Problems:    * No resolved hospital problems. *      Autopsy performed:  No  Did tobacco contribute to death:  Probably  Did alcohol contribute to death: Unknown  Manner of death:  Natural  Pregnancy status:  None    Past Medical History:    Colon polyp                                                   Diverticulosis                                                Vitreous detachment                                             Comment: right eye    Cataract                                                      Hyperlipidemia                                                Essential (primary) hypertension                              Osteoarthritis                                                  Comment: shoulder    CKD (chronic kidney disease)                                  Impaired glucose tolerance                                    S/P percutaneous endoscopic gastrostomy (PEG) * 7/21/2020     Malignant neoplasm (CMD)                                      Keratosis, actinic                                            Chronic stable angina                           8/30/2018     Consultations:  None    Hospital Course:  Indra Roche is a 86 year old male who presented on 4/29/2024 with complaints of No chief complaint on file.   and subsequently admitted for:  Pneumothorax [J93.9]     HPI per Admitting Provider:   Indra Roche is a 86 year old male with the past medical history of previous head  and neck cancer, left pleural effusion status post PleurX catheter who presents to IR for elective biopsy for pulmonary nodule and developed pneumothorax postprocedure.  Patient is in room air.  He denies any chest pain or shortness a breath.  He denies fever or chills.  He is speaking in full sentences.  IR placed chest tube.         Hospital Course by Problem List:  Iatrogenic right pneumothorax-resolved  Status post chest tube placement  Chest tube management removed today     Acute respiratory failure with hypoxia  -possibly 2/2 to left pleural effusion, RONNIE mucuous plugging/atelectasis  -chest PT  -High flow O2, drain PleurX cath  -plan for bronch. Pt made full code for procedure and intubation, if no improvement would prefer extubation.  -palliative consulted     Recurrent left pleural effusion  Status post PleurX catheter placement  Only 150 cc drained today.      Pulmonary nodule- Poorly differentiated non-small cell carcinoma, favor squamous cell carcinoma.   Left Lung mass  Left supraclavicular lymph node  Status post biopsy by IR  Follow-up biopsy result     Hypotension  -likely 2/2 to infection and poor PO intake  -levophed, wean as tolerated     History of squamous cell carcinoma of the tongue  Outpatient follow-up with Heme-Onc     Severe protein energy malnutrition  Body mass index is 16.14 kg/m².  Consult dietitian     Hypernatremia  -resolved        Pt had worsened hemodynamic status overnight and required Levophed. Given complete lung collapse, and poor oxygenation, decision made with family to make patient inpatient hospice. POA contacted and agreed. POA activated due to pt oriented only to self. Pt has overall very poor prognosis. Daughter at bedside.     Code status: Do Not Resuscitate    Time spent on discharge was 31 minutes.    Signed:  Thierry Levine DO  5/3/2024  1:43 PM    ACUTE CARE SURGERY CONSULT     HPI: 77y Male present to ED with AMS    77yr old unknown male brought to ER by EMS from home with c/o altered mental state, inability to talk since last 1 week and h/o recent UTI. No other information was available, pt was admitted as Critical [place]. CT head- showed left occipital infarct vs artifact, no last known well time available, not a candidate for tPA. Patient unable to provide any information. labs s/o hyperglycemia BS >400s, Lactate elevated. UA - neg d-dimer elevated. CT PE - neg for PE, CT abd pelvis - colitis, Sacral decubitus ulcer, rt gluteal edema. He is being admitted for further care.  (20 Jul 2022 16:50)    Interval HPI:    Patient was started on tube feeds, underwent parascentesis on 8/2 with 2 L of ascites drainage. however primary team noticed abdominal distention tube feeds stopped since then. Currently denies pain nausea and vomiting, however in and out sleep cycle. per nursing report episodes of fecal incontinence but unclear if there are bowel movements, none recorded in output          ROS: 10-system review is otherwise negative except HPI above.      PAST MEDICAL & SURGICAL HISTORY:  Cirrhosis, HTN, Hypothyroidism, BPH, Back surgery, PEG  FAMILY HISTORY:    Family history not pertinent as reviewed with the patient.    SOCIAL HISTORY:  Denies any toxic habits    ALLERGIES: NKA oxycodone ([place])      HOME MEDICATIONS: ***  Home Medications:      --------------------------------------------------------------------------------------------    PHYSICAL EXAM:   General: NAD, Lying in bed comfortably  Neuro: A+Ox1, In and out of sleep cycle  HEENT: EOMI, PERRLA, MMM  Cardio: RRR  Resp: Non labored breathing on RA  GI/Abd: Soft, NT/distended in upper abdomen, unclear if positive ascitic fluid no rebound/guarding, no masses palpated gtube clamped  Vascular: All 4 extremities warm and well perfused.   Pelvis: stable  Musculoskeletal: All 4 extremities moving spontaneously, no limitations, no spinal tenderness.  --------------------------------------------------------------------------------------------    LABS                 11.2   6.12   )----------(  142       ( 05 Aug 2022 06:20 )               33.8      134    |  106    |  15.9   ----------------------------<  116        ( 05 Aug 2022 06:20 )  3.9     |  18.0   |  0.80     Ca    7.0        ( 05 Aug 2022 06:20 )  Phos  2.6       ( 05 Aug 2022 06:20 )  Mg     1.6       ( 05 Aug 2022 06:20 )    TPro  6.4    /  Alb  1.8    /  TBili  1.4    /  DBili  x      /  AST  88     /  ALT  53     /  AlkPhos  124    ( 05 Aug 2022 06:20 )    LIVER FUNCTIONS - ( 05 Aug 2022 06:20 )  Alb: 1.8 g/dL / Pro: 6.4 g/dL / ALK PHOS: 124 U/L / ALT: 53 U/L / AST: 88 U/L / GGT: x               CAPILLARY BLOOD GLUCOSE              --------------------------------------------------------------------------------------------  IMAGING  Old CT reviewed, no obstruction, significant ascites    ASSESSMENT: Patient is a 77y old male with AMS, tube feed dependent, unclear if presence of bowel function, distention could be related to ileus/constipation also reactive in case of fast reaccumulation of ascites. Low suspicion for SBO given recent imaging    PLAN:    - Agree with holding tube feeds  - Can place G tube to gravity to assess output  - recommend enemas  - consider CT vs abdominal US to reassess reaccumulation of ascites  - DANIELITO  - Surgery will continue to follow

## 2024-11-08 NOTE — PROGRESS NOTE ADULT - ATTENDING COMMENTS
Patient seen and examined on 12 21,018. Overall I grew the above-mentioned assessment no expressible drainage the incision is well-healed dressing is clean dry and intact at this point in time we will replace with a nonpigmented and dressing to make sure that there is no tender mild amount of serosanguineous drainage large drainage has ceased completely then we'll deemed stable for transfer back to subacute rehabilitation. No

## 2024-11-10 NOTE — DIETITIAN INITIAL EVALUATION ADULT. - DIET TYPE
Patient is calling regarding cancelling a procedure.    Date/Time: 11/11/24 10:00am    Performing Physician: Rafita    Performing Physician/Nursing Supervisor Notified?:  YES [] NO [x]    Patient requesting call back to reschedule: YES [] NO [x]    Pt reached the answering service, she is currently admitted.  
consistent carbohydrate (no snacks)

## 2024-11-26 NOTE — PROGRESS NOTE ADULT - CONVERSATION/DISCUSSION
Quality 130: Documentation Of Current Medications In The Medical Record: Current Medications Documented
Detail Level: Detailed
Quality 47: Advance Care Plan: Advance Care Planning discussed and documented in the medical record; patient did not wish or was not able to name a surrogate decision maker or provide an advance care plan.
Diagnosis/Prognosis/MOLST Discussed/Treatment Options

## 2025-04-10 NOTE — PROGRESS NOTE ADULT - SUBJECTIVE AND OBJECTIVE BOX
Pt Name: TIMUR LOUIS    MRN: [id number]      Patient is a 73 year old Male status post thoracolumbar fusion 11/28/18. Patient seen and examined this morning at bedside. Patient resting comfortably in bed, no significant events reported overnight. The patient's complained of mild pain when sitting up yesterday, but was well managed with currently prescribed  pain medications. The patient is participating in physical therapy. Denies nausea, vomiting, chest pain, shortness of breath, abdominal pain or fever. No new complaints.    PAST MEDICAL & SURGICAL HISTORY:  PAST MEDICAL & SURGICAL HISTORY:  BPH (benign prostatic hyperplasia)  HTN (hypertension)  Hypothyroid  Dyslipidemia  OA (osteoarthritis)  Diabetes  S/P laminectomy  S/P hernia repair  S/P hip replacement: R      Allergies: No Known Allergies      Medications: acetaminophen   Tablet .. 650 milliGRAM(s) Oral every 6 hours PRN  atorvastatin 40 milliGRAM(s) Oral at bedtime  cefepime   IVPB 2000 milliGRAM(s) IV Intermittent every 12 hours  dextrose 40% Gel 15 Gram(s) Oral once PRN  dextrose 5%. 1000 milliLiter(s) IV Continuous <Continuous>  dextrose 50% Injectable 12.5 Gram(s) IV Push once  dextrose 50% Injectable 25 Gram(s) IV Push once  dextrose 50% Injectable 25 Gram(s) IV Push once  docusate sodium 100 milliGRAM(s) Oral three times a day  enoxaparin Injectable 40 milliGRAM(s) SubCutaneous daily  gabapentin 300 milliGRAM(s) Oral three times a day  glucagon  Injectable 1 milliGRAM(s) IntraMuscular once PRN  insulin lispro (HumaLOG) corrective regimen sliding scale   SubCutaneous three times a day before meals  levothyroxine 150 MICROGram(s) Oral daily  methocarbamol 500 milliGRAM(s) Oral every 6 hours PRN  metoprolol tartrate 50 milliGRAM(s) Oral two times a day  oxyCODONE    IR 10 milliGRAM(s) Oral every 4 hours PRN  senna 2 Tablet(s) Oral at bedtime  sodium chloride 2 Gram(s) Oral three times a day  tamsulosin 0.8 milliGRAM(s) Oral at bedtime  vancomycin  IVPB 1000 milliGRAM(s) IV Intermittent every 8 hours                    9.8    5.5   )-----------( 403      ( 17 Dec 2018 07:30 )             30.4     12-17    128<L>  |  90<L>  |  4.0<L>  ----------------------------<  151<H>  4.1   |  26.0  |  0.42<L>    Ca    8.1<L>      17 Dec 2018 07:30        PHYSICAL EXAM:    Vital Signs Last 24 Hrs  T(C): 36.7 (18 Dec 2018 04:42), Max: 36.8 (17 Dec 2018 15:33)  T(F): 98 (18 Dec 2018 04:42), Max: 98.3 (17 Dec 2018 15:33)  HR: 89 (18 Dec 2018 04:42) (85 - 102)  BP: 150/79 (18 Dec 2018 04:42) (99/57 - 169/92)  BP(mean): --  RR: 18 (18 Dec 2018 04:42) (18 - 20)  SpO2: 94% (17 Dec 2018 22:50) (94% - 94%)  Daily     Daily     Appearance: Alert, responsive, in no acute distress.    Thoracolumbar region: Betadine soaked gauze dressing in place. Proximal aspect of incision continues to drain serosanguinous fluid. No erythema no purulent drainage. No expressible drainage. No active drainage. Dressing removed and replaced with betadine soaked gauze and tegaderms. The calf is supple nontender bilaterally.  Sensation to light touch is grossly intact distally. Extensor hallucis longus and flexor hallucis longus are intact. No foot drop. 2+ dorsalis pedis pulse. Capillary refill is less than 2 seconds.     A/P:  Pt is a  73y Male status post thoracolumbar fusion 11/28/18    • Pain control as clinically indicated  • DVT prophylaxis as prescribed, including use of compression devices and ankle pumps  • Continue physical therapy  • Out of bed as tolerated with assistance of a walker  • Incentive spirometry encouraged  • Continue to monitor wound drainage/ daily dressing changes  • Discharge planning as per primary team No

## 2025-05-22 NOTE — PHYSICAL THERAPY INITIAL EVALUATION ADULT - PHYSICAL ASSIST/NONPHYSICAL ASSIST: SUPINE/SIT, REHAB EVAL
Patient Name: Keo Griffin  : 1958    MRN: [id number]                              Today's Date: 2025       Admit Date: 2025    Visit Dx:     ICD-10-CM ICD-9-CM   1. Cellulitis of right lower extremity  L03.115 682.6     Patient Active Problem List   Diagnosis    ST elevation myocardial infarction involving right coronary artery    Coronary artery disease involving native heart with unstable angina pectoris    Essential hypertension    Hyperlipidemia    Dental caries    History of noncompliance with medical treatment    Chronic low back pain    Acute non-recurrent pansinusitis    Cellulitis of right ear    Polysubstance abuse    Cellulitis     Past Medical History:   Diagnosis Date    Back injuries     Coronary artery disease     Heart attack     Hyperlipidemia     Impaired functional mobility, balance, gait, and endurance     Injury of back      Past Surgical History:   Procedure Laterality Date    CARDIAC CATHETERIZATION N/A 2018    Procedure: Left Heart Cath;  Surgeon: Primo Garcia MD;  Location: [facility] CATH INVASIVE LOCATION;  Service: Cardiovascular    CARDIAC CATHETERIZATION N/A 2018    Procedure: Coronary angiography;  Surgeon: Primo Garcia MD;  Location: [facility] CATH INVASIVE LOCATION;  Service: Cardiovascular    CARDIAC CATHETERIZATION N/A 2018    Procedure: Left ventriculography;  Surgeon: Primo Garcia MD;  Location: [facility] CATH INVASIVE LOCATION;  Service: Cardiovascular    CARDIAC CATHETERIZATION N/A 2018    Procedure: Stent BRADLEY coronary;  Surgeon: Primo Garcia MD;  Location: [facility] CATH INVASIVE LOCATION;  Service: Cardiovascular    CHOLECYSTECTOMY      CORONARY ARTERY BYPASS GRAFT N/A 2018    Procedure: MEDIAN STERNOTOMY, CORONARY ARTERY BYPASS WITH INTERNAL MAMMARY ARTERY GRAFT x 2, EVH OF THE RIGHT GREATER SAPHENOUS VEIN, NURY;  Surgeon: Kashif Delcid MD;  Location: [facility] OR;  Service: Cardiothoracic    HERNIA REPAIR      KNEE  SURGERY        General Information       Row Name 05/22/25 1112          Physical Therapy Time and Intention    Document Type evaluation  -JR     Mode of Treatment physical therapy  -JR       Row Name 05/22/25 1112          General Information    Patient Profile Reviewed yes  -JR     Prior Level of Function independent:;community mobility  -JR     Barriers to Rehab none identified  -JR       Row Name 05/22/25 1112          Living Environment    Current Living Arrangements home  -JR     People in Home spouse  -JR       Row Name 05/22/25 1112          Home Main Entrance    Number of Stairs, Main Entrance none  -JR       [facility] Name 05/22/25 1112          Stairs Within Home, Primary    Stairs, Within Home, Primary He does not go to the upper level  -JR     Number of Stairs, Within Home, Primary twelve  -JR       Row Name 05/22/25 1112          Cognition    Orientation Status (Cognition) oriented x 4  -JR       [facility] Name 05/22/25 1112          Safety Issues/Impairments Affecting Functional Mobility    Safety Issues Affecting Function (Mobility) safety precautions follow-through/compliance;awareness of need for assistance;insight into deficits/self-awareness  -     Impairments Affecting Function (Mobility) balance;endurance/activity tolerance;pain;strength  -               User Key  (r) = Recorded By, (t) = Taken By, (c) = Cosigned By      Initials Name Provider Type    JR Mercy Rubi, PT Physical Therapist                   Mobility       Row Name 05/22/25 1112          Bed Mobility    Bed Mobility supine-sit;sit-supine  -     Supine-Sit [place] (Bed Mobility) modified independence  -     Sit-Supine [place] (Bed Mobility) modified independence  -     Assistive Device (Bed Mobility) bed rails;head of bed elevated  -[facility] Name 05/22/25 1112          Sit-Stand Transfer    Sit-Stand [place] (Transfers) contact guard  -     Assistive Device (Sit-Stand Transfers) walker, front-wheeled  -        Row Name 05/22/25 1112          Gait/Stairs (Locomotion)    [place] Level (Gait) minimum assist (75% patient effort)  -JR     Assistive Device (Gait) walker, front-wheeled  -JR     Patient was able to Ambulate yes  -JR     Distance in Feet (Gait) 28  -JR     Deviations/Abnormal Patterns (Gait) antalgic  -JR     Right Sided Gait Deviations heel strike decreased;weight shift ability decreased  -JR     [place] Level (Stairs) not tested  -JR               User Key  (r) = Recorded By, (t) = Taken By, (c) = Cosigned By      Initials Name Provider Type    Mercy Lopez, PT Physical Therapist                   Obj/Interventions       Row Name 05/22/25 1112          Range of Motion Comprehensive    General Range of Motion bilateral lower extremity ROM WFL  -JR       Row Name 05/22/25 1112          Strength Comprehensive (MMT)    Comment, General Manual Muscle Testing (MMT) Assessment RLE grossly 3/5  -JR       Row Name 05/22/25 1112          Balance    Balance Assessment sitting static balance;sitting dynamic balance;standing static balance;standing dynamic balance  -JR     Static Sitting Balance modified independence  -JR     Dynamic Sitting Balance modified independence  -JR     Position, Sitting Balance unsupported;sitting edge of bed  -JR     Static Standing Balance contact guard  -JR     Dynamic Standing Balance minimal assist  -JR     Position/Device Used, Standing Balance walker, rolling  -JR               User Key  (r) = Recorded By, (t) = Taken By, (c) = Cosigned By      Initials Name Provider Type    Mercy Lopez, PT Physical Therapist                   Goals/Plan       Row Name 05/22/25 1112          Transfer Goal 1 (PT)    Activity/Assistive Device (Transfer Goal 1, PT) sit-to-stand/stand-to-sit;bed-to-chair/chair-to-bed;walker, rolling  -JR     [place] Level/Cues Needed (Transfer Goal 1, PT) modified independence  -JR     Time Frame (Transfer Goal 1, PT) short term goal (STG)  -JR      Progress/Outcome (Transfer Goal 1, PT) goal ongoing  -JR       Row Name 05/22/25 1112          Gait Training Goal 1 (PT)    Activity/Assistive Device (Gait Training Goal 1, PT) gait (walking locomotion);walker, rolling;increase endurance/gait distance;improve balance and speed  -JR     [place] Level (Gait Training Goal 1, PT) supervision required  -JR     Distance (Gait Training Goal 1, PT) 150  -JR     Time Frame (Gait Training Goal 1, PT) long term goal (LTG)  -JR     Progress/Outcome (Gait Training Goal 1, PT) goal ongoing  -JR       Row Name 05/22/25 1112          Patient Education Goal (PT)    Activity (Patient Education Goal, PT) Perform BLE exercises x 15  -JR     [place]/Cues/Accuracy (Memory Goal 2, PT) demonstrates adequately  -JR     Time Frame (Patient Education Goal, PT) 4 days  -JR     Progress/Outcome (Patient Education Goal, PT) goal ongoing  -JR       Row Name 05/22/25 1112          Therapy Assessment/Plan (PT)    Planned Therapy Interventions (PT) strengthening;balance training;transfer training;gait training;home exercise program;patient/family education  -JR               User Key  (r) = Recorded By, (t) = Taken By, (c) = Cosigned By      Initials Name Provider Type    JR Mercy Rubi, PT Physical Therapist                   Clinical Impression       Row Name 05/22/25 1112          Pain    Pretreatment Pain Rating 3/10  -JR     Posttreatment Pain Rating 5/10  -JR     Pain Location extremity  -JR     Pain Side/Orientation right;lower  -JR     Pain Management Interventions exercise or physical activity utilized  -JR     Response to Pain Interventions activity participation with increased pain  -JR       Row Name 05/22/25 1112          Plan of Care Review    Plan of Care Reviewed With patient  -JR     Progress no change  -JR     Outcome Evaluation Patient participated well in PT evaluation.  He lives with his spouse in two story home, but he does not access the upper level.  He was  independent with all mobility without assistive devices.  Prior to this hospitalization he was using axillary crutches that he borrowed from a friend due to the pain in his right leg.  He is able to perform bed mobility with SBA.  Transfers require CGA with RW.  He walked 28 feet with minimal assistance with cues for walker placement, sequencing and foot placement.  He is expected to benenfit from additional PT services while hospitalized and upon discharge to home with home health care services.  -       Row Name 05/22/25 1112          Therapy Assessment/Plan (PT)    Patient/Family Therapy Goals Statement (PT) Patient is eager to walk without pain  -JR     Rehab Potential (PT) good  -JR     Criteria for Skilled Interventions Met (PT) yes;meets criteria;skilled treatment is necessary  -JR     Therapy Frequency (PT) 5 times/wk  -JR       Row Name 05/22/25 1112          Positioning and Restraints    Pre-Treatment Position in bed  -JR     Post Treatment Position bed  -JR     In Bed supine;call light within reach;encouraged to call for assist;notified nsg  -JR               User Key  (r) = Recorded By, (t) = Taken By, (c) = Cosigned By      Initials Name Provider Type    Mercy Lopez, PT Physical Therapist                   Outcome Measures       Row Name 05/22/25 1112 05/22/25 0800       How much help from another person do you currently need...    Turning from your back to your side while in flat bed without using bedrails? 4  -JR 4  -EC    Moving from lying on back to sitting on the side of a flat bed without bedrails? 4  -JR 4  -EC    Moving to and from a bed to a chair (including a wheelchair)? 3  -JR 3  -EC    Standing up from a chair using your arms (e.g., wheelchair, bedside chair)? 3  -JR 3  -EC    Climbing 3-5 steps with a railing? 3  -JR 3  -EC    To walk in hospital room? 3  -JR 3  -EC    AM-PAC 6 Clicks Score (PT) 20  -JR 20  -EC    Highest Level of Mobility Goal Walk 10 Steps or More-6  -JR Walk 10  Steps or More-6  -      Row Name 05/22/25 1347 05/22/25 1112       Functional Assessment    Outcome Measure Options AM-PAC 6 Clicks Daily Activity (OT)  -SD AM-PAC 6 Clicks Basic Mobility (PT)  -              User Key  (r) = Recorded By, (t) = Taken By, (c) = Cosigned By      Initials Name Provider Type    JR Mercy Rubi, PT Physical Therapist    Tessy Doyle, OT Occupational Therapist    Margaret Cannon, RN Registered Nurse                                 Physical Therapy Education       Title: PT OT SLP Therapies (In Progress)       Topic: Physical Therapy (In Progress)       Point: Mobility training (Done)       Learning Progress Summary            Patient Acceptance, E,TB, VU by  at 5/22/2025 1888    Comment: Role of PT and POC                      Point: Home exercise program (Not Started)       Learner Progress:  Not documented in this visit.              Point: Body mechanics (Not Started)       Learner Progress:  Not documented in this visit.              Point: Precautions (Not Started)       Learner Progress:  Not documented in this visit.                              User Key       Initials Effective Dates Name Provider Type [facility] 08/22/23 -  Mercy Rubi, PT Physical Therapist PT                  PT Recommendation and Plan  Planned Therapy Interventions (PT): strengthening, balance training, transfer training, gait training, home exercise program, patient/family education  Progress: no change  Outcome Evaluation: Patient participated well in PT evaluation.  He lives with his spouse in two story home, but he does not access the upper level.  He was independent with all mobility without assistive devices.  Prior to this hospitalization he was using axillary crutches that he borrowed from a friend due to the pain in his right leg.  He is able to perform bed mobility with SBA.  Transfers require CGA with RW.  He walked 28 feet with minimal assistance with cues for walker placement,  sequencing and foot placement.  He is expected to benenfit from additional PT services while hospitalized and upon discharge to home with home health care services.     Time Calculation:   PT Evaluation Complexity  History, PT Evaluation Complexity: 1-2 personal factors and/or comorbidities  Examination of Body Systems (PT Eval Complexity): 1-2 elements  Clinical Presentation (PT Evaluation Complexity): evolving  Clinical Decision Making (PT Evaluation Complexity): low complexity  Overall Complexity (PT Evaluation Complexity): low complexity     PT Charges       Row Name 05/22/25 1112             Time Calculation    Start Time 1112  -JR      PT Received On 05/22/25  -JR      PT Goal Re-Cert Due Date 06/01/25  -JR         Untimed Charges    PT Eval/Re-eval Minutes 40  -JR         Total Minutes    Untimed Charges Total Minutes 40  -JR       Total Minutes 40  -JR                User Key  (r) = Recorded By, (t) = Taken By, (c) = Cosigned By      Initials Name Provider Type    Mercy Lopez, PT Physical Therapist                  Therapy Charges for Today       Code Description Service Date Service Provider Modifiers Qty    [id number]  PT EVAL LOW COMPLEXITY 3 5/22/2025 Mercy Rubi, PT GP 1            PT G-Codes  Outcome Measure Options: AM-PAC 6 Clicks Daily Activity (OT)  AM-PAC 6 Clicks Score (PT): 20  AM-PAC 6 Clicks Score (OT): 21  PT Discharge Summary  Anticipated Discharge Disposition (PT): home with home health    Mercy Rubi, PT  5/22/2025     1 person assist